# Patient Record
Sex: FEMALE | Race: WHITE | NOT HISPANIC OR LATINO | Employment: OTHER | URBAN - METROPOLITAN AREA
[De-identification: names, ages, dates, MRNs, and addresses within clinical notes are randomized per-mention and may not be internally consistent; named-entity substitution may affect disease eponyms.]

---

## 2017-03-01 ENCOUNTER — TRANSFERRED RECORDS (OUTPATIENT)
Dept: HEALTH INFORMATION MANAGEMENT | Facility: CLINIC | Age: 67
End: 2017-03-01

## 2017-03-20 NOTE — PROGRESS NOTES
"  SUBJECTIVE:                                                    Virginia Byers is a 66 year old female who presents to clinic today for the following health issues:  {Provider please address medication reconciliation discrepancies--rooming staff please delete if no med/rec issues}    {Superlists:599192}    {additional problems for provider to add:893310}    Problem list and histories reviewed & adjusted, as indicated.  Additional history: {NONE - AS DOCUMENTED:641192::\"as documented\"}    {HIST REVIEW/ LINKS 2:695759}    Reviewed and updated as needed this visit by clinical staff       Reviewed and updated as needed this visit by Provider         {PROVIDER CHARTING PREFERENCE:428981}    "

## 2017-03-29 ENCOUNTER — OFFICE VISIT (OUTPATIENT)
Dept: FAMILY MEDICINE | Facility: CLINIC | Age: 67
End: 2017-03-29
Payer: COMMERCIAL

## 2017-03-29 VITALS
SYSTOLIC BLOOD PRESSURE: 136 MMHG | WEIGHT: 155 LBS | DIASTOLIC BLOOD PRESSURE: 78 MMHG | HEIGHT: 63 IN | OXYGEN SATURATION: 96 % | BODY MASS INDEX: 27.46 KG/M2 | HEART RATE: 81 BPM | TEMPERATURE: 97 F

## 2017-03-29 DIAGNOSIS — Z11.59 NEED FOR HEPATITIS C SCREENING TEST: ICD-10-CM

## 2017-03-29 DIAGNOSIS — D05.82 OTHER SPECIFIED TYPE OF CARCINOMA IN SITU OF LEFT BREAST: ICD-10-CM

## 2017-03-29 DIAGNOSIS — Z00.00 ENCOUNTER FOR ROUTINE ADULT HEALTH EXAMINATION WITHOUT ABNORMAL FINDINGS: Primary | ICD-10-CM

## 2017-03-29 DIAGNOSIS — I10 BENIGN ESSENTIAL HYPERTENSION: ICD-10-CM

## 2017-03-29 DIAGNOSIS — E78.5 HYPERLIPIDEMIA LDL GOAL <160: ICD-10-CM

## 2017-03-29 DIAGNOSIS — Z12.11 SCREEN FOR COLON CANCER: ICD-10-CM

## 2017-03-29 LAB
ANION GAP SERPL CALCULATED.3IONS-SCNC: 6 MMOL/L (ref 3–14)
BUN SERPL-MCNC: 16 MG/DL (ref 7–30)
CALCIUM SERPL-MCNC: 9.6 MG/DL (ref 8.5–10.1)
CHLORIDE SERPL-SCNC: 103 MMOL/L (ref 94–109)
CHOLEST SERPL-MCNC: 238 MG/DL
CO2 SERPL-SCNC: 29 MMOL/L (ref 20–32)
CREAT SERPL-MCNC: 0.62 MG/DL (ref 0.52–1.04)
GFR SERPL CREATININE-BSD FRML MDRD: ABNORMAL ML/MIN/1.7M2
GLUCOSE SERPL-MCNC: 108 MG/DL (ref 70–99)
HCV AB SERPL QL IA: NORMAL
HDLC SERPL-MCNC: 75 MG/DL
LDLC SERPL CALC-MCNC: 125 MG/DL
NONHDLC SERPL-MCNC: 163 MG/DL
POTASSIUM SERPL-SCNC: 4.2 MMOL/L (ref 3.4–5.3)
SODIUM SERPL-SCNC: 138 MMOL/L (ref 133–144)
TRIGL SERPL-MCNC: 191 MG/DL

## 2017-03-29 PROCEDURE — 80061 LIPID PANEL: CPT | Performed by: FAMILY MEDICINE

## 2017-03-29 PROCEDURE — 99397 PER PM REEVAL EST PAT 65+ YR: CPT | Performed by: FAMILY MEDICINE

## 2017-03-29 PROCEDURE — 80048 BASIC METABOLIC PNL TOTAL CA: CPT | Performed by: FAMILY MEDICINE

## 2017-03-29 PROCEDURE — 86803 HEPATITIS C AB TEST: CPT | Performed by: FAMILY MEDICINE

## 2017-03-29 PROCEDURE — 36415 COLL VENOUS BLD VENIPUNCTURE: CPT | Performed by: FAMILY MEDICINE

## 2017-03-29 RX ORDER — LOSARTAN POTASSIUM 100 MG/1
100 TABLET ORAL DAILY
Qty: 90 TABLET | Refills: 4 | Status: SHIPPED | OUTPATIENT
Start: 2017-03-29 | End: 2018-04-04

## 2017-03-29 RX ORDER — ATORVASTATIN CALCIUM 20 MG/1
20 TABLET, FILM COATED ORAL DAILY
Qty: 90 TABLET | Refills: 4 | Status: SHIPPED | OUTPATIENT
Start: 2017-03-29 | End: 2018-04-04

## 2017-03-29 ASSESSMENT — PAIN SCALES - GENERAL: PAINLEVEL: NO PAIN (0)

## 2017-03-29 NOTE — NURSING NOTE
"Chief Complaint   Patient presents with     Physical       Initial /78  Pulse 81  Temp 97  F (36.1  C) (Oral)  Ht 5' 2.91\" (1.598 m)  Wt 155 lb (70.3 kg)  SpO2 96%  BMI 27.53 kg/m2 Estimated body mass index is 27.53 kg/(m^2) as calculated from the following:    Height as of this encounter: 5' 2.91\" (1.598 m).    Weight as of this encounter: 155 lb (70.3 kg).  Medication Reconciliation: complete    "

## 2017-03-29 NOTE — LETTER
[address] [place] [address]    March 30, 2017    Virginia Byers  [address]  [place] [address]          Dear [place],  Your lab tests are looking good, but your glucose and triglycerides are too high. This means you are eating too many carbohydrates and calories. Please try to follow a diet that is high in lean protein and vegetables and low in carbohydrates (such as potatoes, rice, breads, cereals, pasta, pizza, crackers, chips, desserts, juices, alcohol, soda pop, and candies) so you do not become diabetic.   Enclosed is a copy of your results.     Results for orders placed or performed in visit on 03/29/17   Hepatitis C Screen Reflex to HCV RNA Quant and Genotype   Result Value Ref Range    Hepatitis C Antibody  NR     Nonreactive   Assay performance characteristics have not been established for newborns,   infants, and children     Lipid panel reflex to direct LDL   Result Value Ref Range    Cholesterol 238 (H) <200 mg/dL    Triglycerides 191 (H) <150 mg/dL    HDL Cholesterol 75 >49 mg/dL    LDL Cholesterol Calculated 125 (H) <100 mg/dL    Non HDL Cholesterol 163 (H) <130 mg/dL   Basic metabolic panel   Result Value Ref Range    Sodium 138 133 - 144 mmol/L    Potassium 4.2 3.4 - 5.3 mmol/L    Chloride 103 94 - 109 mmol/L    Carbon Dioxide 29 20 - 32 mmol/L    Anion Gap 6 3 - 14 mmol/L    Glucose 108 (H) 70 - 99 mg/dL    Urea Nitrogen 16 7 - 30 mg/dL    Creatinine 0.62 0.52 - 1.04 mg/dL    GFR Estimate >90  Non  GFR Calc   >60 mL/min/1.7m2    GFR Estimate If Black >90   GFR Calc   >60 mL/min/1.7m2    Calcium 9.6 8.5 - 10.1 mg/dL       If you have any questions or concerns, please call myself or my nurse at [phone].      Sincerely,        Mariaelena Guevara MD/pb

## 2017-03-29 NOTE — PROGRESS NOTES
SUBJECTIVE:                                                            Virginia Byers is a 66 year old female who presents for Preventive Visit.  Are you in the first 12 months of your Medicare Part B coverage?  No    Healthy Habits:    Do you get at least three servings of calcium containing foods daily (dairy, green leafy vegetables, etc.)? yes    Amount of exercise or daily activities, outside of work: 2 day(s) per week    Problems taking medications regularly No    Medication side effects: No    Have you had an eye exam in the past two years? yes    Do you see a dentist twice per year? yes    Do you have sleep apnea, excessive snoring or daytime drowsiness?no    COGNITIVE SCREEN  1) Repeat 3 items (Banana, Sunrise, Chair)    2) Clock draw: NORMAL  3) 3 item recall: Recalls 3 objects  Results: 3 items recalled: COGNITIVE IMPAIRMENT LESS LIKELY    Mini-CogTM Copyright S Rahel. Licensed by the author for use in [facility]; reprinted with permission (vinh@[facility]). All rights reserved.                 Reviewed and updated as needed this visit by clinical staff  Tobacco  Allergies  Meds         Reviewed and updated as needed this visit by Provider        Social History   Substance Use Topics     Smoking status: Former Smoker     Types: Cigarettes     Quit date: 3/15/1999     Smokeless tobacco: Never Used     Alcohol use Yes      Comment: occasional       The patient does not drink >3 drinks per day nor >7 drinks per week.    Today's PHQ-2 Score:   PHQ-2 ( 1999 Pfizer) 3/29/2017 8/17/2016   Q1: Little interest or pleasure in doing things 0 0   Q2: Feeling down, depressed or hopeless 0 0   PHQ-2 Score 0 0       Do you feel safe in your environment - Yes    Do you have a Health Care Directive?: Yes: Advance Directive has been received and scanned.    Current providers sharing in care for this patient include:   Patient Care Team:  Mariaelena Guevara MD as PCP - General      Hearing impairment:  No    Ability to successfully perform activities of daily living: Yes, no assistance needed     Fall risk:  Fallen 2 or more times in the past year?: No  Any fall with injury in the past year?: No    Home safety:  throw rugs in the hallway and lack of grab bars in the bathroom    The following health maintenance items are reviewed in Epic and correct as of today:  Health Maintenance   Topic Date Due     HEPATITIS C SCREENING  10/13/1968     MICROALBUMIN Q1 YEAR( NO INBASKET)  03/19/2013     COLON CANCER SCREEN (SYSTEM ASSIGNED)  05/22/2016     FALL RISK ASSESSMENT  01/20/2017     ADVANCE DIRECTIVE PLANNING Q5 YRS (NO INBASKET)  01/23/2017     PAP Q3 YR  03/24/2017     MAMMO SCREEN Q2 YR (SYSTEM ASSIGNED)  05/15/2017     INFLUENZA VACCINE (SYSTEM ASSIGNED)  09/01/2017     LIPID SCREEN Q5 YR FEMALE (SYSTEM ASSIGNED)  08/17/2021     TETANUS IMMUNIZATION (SYSTEM ASSIGNED)  03/28/2026     DEXA SCAN SCREENING (SYSTEM ASSIGNED)  Completed     PNEUMOCOCCAL  Completed       Immunization History   Administered Date(s) Administered     Hepatitis A Vac Ped/Adol-2 Dose 03/07/2008, 03/13/2009     Influenza (IIV3) 10/20/2010, 10/15/2011, 09/28/2013     Pneumococcal (PCV 13) 03/25/2015     Pneumococcal 23 valent 08/17/2016     TD (ADULT, 7+) 12/30/1997     TDAP Vaccine (Adacel) 03/03/2006, 03/28/2016     Zoster vaccine, live 03/15/2010          ROS:  This 66 year old female is here today for annual female exam. She is single and still works full time night shift for [employer]. She hopes to retire in the next year. 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 She has had left breast cancer. Mammogram at [facility] 3/2017 was normal. All other review of systems are negative  Personal, family, and social history reviewed with patient and revised.    C: NEGATIVE for fever, chills, change in weight  I: NEGATIVE for worrisome rashes, moles or lesions, but wonders if I can remove some skin tags sometime  E: NEGATIVE for vision changes or irritation  E/M:  NEGATIVE for ear, mouth and throat problems  R: NEGATIVE for significant cough or SOB  B: NEGATIVE for masses, tenderness or discharge  CV: NEGATIVE for chest pain, palpitations or peripheral edema  GI: NEGATIVE for nausea, abdominal pain, heartburn, or change in bowel habits  : NEGATIVE for frequency, dysuria, or hematuria  M: NEGATIVE for significant arthralgias or myalgia  N: NEGATIVE for weakness, dizziness or paresthesias  E: NEGATIVE for temperature intolerance, skin/hair changes  H: NEGATIVE for bleeding problems  P: NEGATIVE for changes in mood or affect    Problem list, Medication list, Allergies, and Medical/Social/Surgical histories reviewed in [facility] and updated as appropriate.  Labs reviewed in EPIC  BP Readings from Last 3 Encounters:   03/29/17 136/78   08/17/16 124/60   03/28/16 128/70    Wt Readings from Last 3 Encounters:   03/29/17 155 lb (70.3 kg)   08/17/16 144 lb (65.3 kg)   03/28/16 146 lb (66.2 kg)                  Patient Active Problem List   Diagnosis     Osteoporosis     Atopic rhinitis     Hyperlipidemia LDL goal <160     S/P mastectomy     Breast cancer (H)     Advanced directives, counseling/discussion     Osteoarthritis     Family history of diabetes mellitus     Glaucoma     Personal history of malignant neoplasm of breast     Past Surgical History:   Procedure Laterality Date     C MASTECTOMY,SIMPLE  3/2000    left     COLONOSCOPY  2006    Q 10 years     SURGICAL HISTORY OF -  Left 8/2015    tissue expander placed in left breast area     SURGICAL HISTORY OF -  Left 2/2016    left breast implant and right mammoplasty       Social History   Substance Use Topics     Smoking status: Former Smoker     Types: Cigarettes     Quit date: 3/15/1999     Smokeless tobacco: Never Used     Alcohol use Yes      Comment: occasional     Family History   Problem Relation Age of Onset     CANCER Mother      bone cancer     HEART DISEASE Father      DIABETES Paternal Grandmother      Arthritis Sister   "        Current Outpatient Prescriptions   Medication Sig Dispense Refill     atorvastatin (LIPITOR) 20 MG tablet Take 1 tablet (20 mg) by mouth daily 90 tablet 4     losartan (COZAAR) 100 MG tablet Take 1 tablet (100 mg) by mouth daily 90 tablet 4     ASPIRIN PO Take 324 mg by mouth daily       latanoprost (XALATAN) 0.005 % ophthalmic solution 1 drop daily.       [DISCONTINUED] atorvastatin (LIPITOR) 20 MG tablet Take 1 tablet (20 mg) by mouth daily 90 tablet 4     [DISCONTINUED] losartan (COZAAR) 100 MG tablet Take 1 tablet (100 mg) by mouth daily 90 tablet 4     OBJECTIVE:                                                            /78  Pulse 81  Temp 97  F (36.1  C) (Oral)  Ht 5' 2.91\" (1.598 m)  Wt 155 lb (70.3 kg)  SpO2 96%  BMI 27.53 kg/m2 Estimated body mass index is 27.53 kg/(m^2) as calculated from the following:    Height as of this encounter: 5' 2.91\" (1.598 m).    Weight as of this encounter: 155 lb (70.3 kg).  EXAM:   GENERAL APPEARANCE: healthy, alert and no distress  EYES: Eyes grossly normal to inspection, PERRL and conjunctivae and sclerae normal  HENT: ear canals and TM's normal, nose and mouth without ulcers or lesions, oropharynx clear and oral mucous membranes moist  NECK: no adenopathy, no asymmetry, masses, or scars and thyroid normal to palpation  RESP: lungs clear to auscultation - no rales, rhonchi or wheezes  BREAST: normal without masses, tenderness or nipple discharge and no palpable axillary masses or adenopathy  CV: regular rate and rhythm, normal S1 S2, no S3 or S4, no murmur, click or rub, no peripheral edema and peripheral pulses strong  ABDOMEN: soft, nontender, no hepatosplenomegaly, no masses and bowel sounds normal  MS: no musculoskeletal defects are noted and gait is age appropriate without ataxia  SKIN: no suspicious lesions or rashes, but has skin tags in her axillary area that rub on her clothes. I can remove them in the future.   NEURO: Normal strength and tone, " "sensory exam grossly normal, mentation intact and speech normal  PSYCH: mentation appears normal and affect normal/bright    ASSESSMENT / PLAN:                                                            1. Encounter for routine adult health examination without abnormal findings  Healthy lady     2. Screen for colon cancer  due  - GASTROENTEROLOGY ADULT REF PROCEDURE ONLY    3. Hyperlipidemia LDL goal <160  Good control   - atorvastatin (LIPITOR) 20 MG tablet; Take 1 tablet (20 mg) by mouth daily  Dispense: 90 tablet; Refill: 4  - Lipid panel reflex to direct LDL    4. Benign essential hypertension  Good control   - losartan (COZAAR) 100 MG tablet; Take 1 tablet (100 mg) by mouth daily  Dispense: 90 tablet; Refill: 4  - Basic metabolic panel    5. 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 Need for hepatitis C screening test  due  - Hepatitis C Screen Reflex to HCV RNA Quant and Genotype    End of Life Planning:  Patient currently has an advanced directive: No.  I have verified the patient's ablity to prepare an advanced directive/make health care decisions.  Literature was provided to assist patient in preparing an advanced directive.    COUNSELING:  Reviewed preventive health counseling, as reflected in patient instructions       Regular exercise       Healthy diet/nutrition        Estimated body mass index is 27.53 kg/(m^2) as calculated from the following:    Height as of this encounter: 5' 2.91\" (1.598 m).    Weight as of this encounter: 155 lb (70.3 kg).     reports that she quit smoking about 18 years ago. Her smoking use included Cigarettes. She has never used smokeless tobacco.      Appropriate preventive services were discussed with this patient, including applicable screening as appropriate for cardiovascular disease, diabetes, osteopenia/osteoporosis, and glaucoma.  As appropriate for age/gender, discussed screening for colorectal cancer, prostate cancer, breast cancer, " and cervical cancer. Checklist reviewing preventive services available has been given to the patient.    Reviewed patients plan of care and provided an AVS. The Basic Care Plan (routine screening as documented in Health Maintenance) for [place] meets the Care Plan requirement. This Care Plan has been established and reviewed with the Patient.    Counseling Resources:  ATP IV Guidelines  Pooled Cohorts Equation Calculator  Breast Cancer Risk Calculator  FRAX Risk Assessment  ICSI Preventive Guidelines  Dietary Guidelines for Americans, 2010  USDA's MyPlate  ASA Prophylaxis  Lung CA Screening    WILMAR MELTON MD  [facility]

## 2017-03-29 NOTE — PATIENT INSTRUCTIONS
Preventive Health Recommendations    Female Ages 65 +    Yearly exam:     See your health care provider every year in order to  o Review health changes.   o Discuss preventive care.    o Review your medicines if your doctor has prescribed any.      You no longer need a yearly Pap test unless you've had an abnormal Pap test in the past 10 years. If you have vaginal symptoms, such as bleeding or discharge, be sure to talk with your provider about a Pap test.      Every 1 to 2 years, have a mammogram.  If you are over 69, talk with your health care provider about whether or not you want to continue having screening mammograms.      Every 10 years, have a colonoscopy. Or, have a yearly FIT test (stool test). These exams will check for colon cancer.       Have a cholesterol test every 5 years, or more often if your doctor advises it.       Have a diabetes test (fasting glucose) every three years. If you are at risk for diabetes, you should have this test more often.       At age 65, have a bone density scan (DEXA) to check for osteoporosis (brittle bone disease).    Shots:    Get a flu shot each year.    Get a tetanus shot every 10 years.    Talk to your doctor about your pneumonia vaccines. There are now two you should receive - Pneumovax (PPSV 23) and Prevnar (PCV 13).    Talk to your doctor about the shingles vaccine.    Talk to your doctor about the hepatitis B vaccine.    Nutrition:     Eat at least 5 servings of fruits and vegetables each day.      Eat whole-grain bread, whole-wheat pasta and brown rice instead of white grains and rice.      Talk to your provider about Calcium and Vitamin D.     Lifestyle    Exercise at least 150 minutes a week (30 minutes a day, 5 days a week). This will help you control your weight and prevent disease.      Limit alcohol to one drink per day.      No smoking.       Wear sunscreen to prevent skin cancer.       See your dentist twice a year for an exam and cleaning.      See your  eye doctor every 1 to 2 years to screen for conditions such as glaucoma, macular degeneration and cataracts.    [facility] (formerly [facility])    If you have any questions regarding to your visit please contact your care team:       Team Purple:   Clinic Hours Telephone Number   [address] Dr[address]   7am-7pm  Monday - Thursday   7am-5pm  Fridays  [phone]  (Appointment scheduling available 24/7)    Questions about your Visit?   Team Line:  [phone]   Urgent Care - [place] - 11am-9pm Monday-Friday Saturday-Sunday- 9am-5pm   [place] - 5pm-9pm Monday-Friday Saturday-Sunday- 9am-5pm  [phone] - [facility]  [phone] - [place]       What options do I have for visits at the clinic other than the traditional office visit?  To expand how we care for you, many of our providers are utilizing electronic visits (e-visits) and telephone visits, when medically appropriate, for interactions with their patients rather than a visit in the clinic.   We also offer nurse visits for many medical concerns. Just like any other service, we will bill your insurance company for this type of visit based on time spent on the phone with your provider. Not all insurance companies cover these visits. Please check with your medical insurance if this type of visit is covered. You will be responsible for any charges that are not paid by your insurance.      E-visits via Spodly:  generally incur a $35.00 fee.  Telephone visits:  Time spent on the phone: *charged based on time that is spent on the phone in increments of 10 minutes. Estimated cost:   5-10 mins $30.00   11-20 mins. $59.00   21-30 mins. $85.00     Use Lobt (secure email communication and access to your chart) to send your primary care provider a message or make an appointment. Ask someone on your Team how to sign up for Spodly.  For a Price Quote for your services, please call our  Consumer Raymond Line at [phone].  As always, Thank you for trusting us with your health care needs!

## 2017-03-29 NOTE — MR AVS SNAPSHOT
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   o Discuss preventive care.    o Review your medicines if your doctor has prescribed any.      You no longer need a yearly Pap test unless you've had an abnormal Pap test in the past 10 years. If you have vaginal symptoms, such as bleeding or discharge, be sure to talk with your provider about a Pap test.      Every 1 to 2 years, have a mammogram.  If you are over 69, talk with your health care provider about whether or not you want to continue having screening mammograms.      Every 10 years, have a colonoscopy. Or, have a yearly FIT test (stool test). These exams will check for colon cancer.       Have a cholesterol test every 5 years, or more often if your doctor advises it.       Have a diabetes test (fasting glucose) every three years. If you are at risk for diabetes, you should have this test more often.       At age 65, have a bone density scan (DEXA) to check for osteoporosis (brittle bone disease).    Shots:    Get a flu shot each year.    Get a tetanus shot every 10 years.    Talk to your doctor about your pneumonia vaccines. There are now two you should receive - Pneumovax (PPSV 23) and Prevnar (PCV 13).    Talk to your doctor about the  shingles vaccine.    Talk to your doctor about the hepatitis B vaccine.    Nutrition:     Eat at least 5 servings of fruits and vegetables each day.      Eat whole-grain bread, whole-wheat pasta and brown rice instead of white grains and rice.      Talk to your provider about Calcium and Vitamin D.     Lifestyle    Exercise at least 150 minutes a week (30 minutes a day, 5 days a week). This will help you control your weight and prevent disease.      Limit alcohol to one drink per day.      No smoking.       Wear sunscreen to prevent skin cancer.       See your dentist twice a year for an exam and cleaning.      See your eye doctor every 1 to 2 years to screen for conditions such as glaucoma, macular degeneration and cataracts.    [facility]    If you have any questions regarding to your visit please contact your care team:       Team Purple:   Clinic Hours Telephone Number   [address]ID [address] Dr.   7am-7pm  Monday - Thursday   7am-5pm  Fridays  [phone]  (Appointment scheduling available 24/7)    Questions about your Visit?   Team Line:  [phone]   Urgent Care - [place] and [facility] - 11am-9pm Monday-Friday Saturday-Sunday- 9am-5pm   [place] - 5pm-9pm Monday-Friday Saturday-Sunday- 9am-5pm  [phone] - [place]   [phone] - [place]       What options do I have for visits at the clinic other than the traditional office visit?  To expand how we care for you, many of our providers are utilizing electronic visits (e-visits) and telephone visits, when medically appropriate, for interactions with their patients rather than a visit in the clinic.   We also offer nurse visits for many medical concerns. Just like any other service, we will bill your insurance company for this type of visit based on time spent on the phone with your provider. Not all insurance companies cover these visits. Please check with your medical  insurance if this type of visit is covered. You will be responsible for any charges that are not paid by your insurance.      E-visits via Quacken[employer]:  generally incur a $35.00 fee.  Telephone visits:  Time spent on the phone: *charged based on time that is spent on the phone in increments of 10 minutes. Estimated cost:   5-10 mins $30.00   11-20 mins. $59.00   21-30 mins. $85.00     Use Quackenworth (secure email communication and access to your chart) to send your primary care provider a message or make an appointment. Ask someone on your Team how to sign up for [employer]en[employer].  For a Price Quote for your services, please call our [employer] Line at [phone].  As always, Thank you for trusting us with your health care needs!            Follow-ups after your visit        Additional Services     GASTROENTEROLOGY ADULT REF PROCEDURE ONLY       Last Lab Result: Creatinine (mg/dL)       Date                     Value                 03/28/2016               0.65             ----------  Body mass index is 27.53 kg/(m^2).      Patient will be contacted to schedule procedure.     Please be aware that coverage of these services is subject to the terms and limitations of your health insurance plan.  Call member services at your health plan with any benefit or coverage questions.  Any procedures must be performed at a [place] facility OR coordinated by your clinic's referral office.    Please bring the following with you to your appointment:    (1) Any X-Rays, CTs or MRIs which have been performed.  Contact the facility where they were done to arrange for  prior to your scheduled appointment.    (2) List of current medications   (3) This referral request   (4) Any documents/labs given to you for this referral                  Who to contact     If you have questions or need follow up information about today's clinic visit or your schedule please contact [facility] directly at [phone].  Normal or  "non-critical lab and imaging results will be communicated to you by MyChart, letter or phone within 4 business days after the clinic has received the results. If you do not hear from us within 7 days, please contact the clinic through Zbirdt or phone. If you have a critical or abnormal lab result, we will notify you by phone as soon as possible.  Submit refill requests through [id number] films or call your pharmacy and they will forward the refill request to us. Please allow 3 business days for your refill to be completed.          Additional Information About Your Visit        [employer] Information     3225 films lets you send messages to your doctor, view your test results, renew your prescriptions, schedule appointments and more. To sign up, go to [email or web address] . Click on \"Log in\" on the left side of the screen, which will take you to the Welcome page. Then click on \"Sign up Now\" on the right side of the page.     You will be asked to enter the access code listed below, as well as some personal information. Please follow the directions to create your username and password.     Your access code is: [email or web address]  Expires: 2017  8:35 AM     Your access code will  in 90 days. If you need help or a new code, please call your [place] clinic or [phone].        Care EveryWhere ID     This is your Care EveryWhere ID. This could be used by other organizations to access your [place] medical records  [id number]        Your Vitals Were     Pulse Temperature Height Pulse Oximetry BMI (Body Mass Index)       81 97  F (36.1  C) (Oral) 5' 2.91\" (1.598 m) 96% 27.53 kg/m2        Blood Pressure from Last 3 Encounters:   17 136/78   16 124/60   16 128/70    Weight from Last 3 Encounters:   17 155 lb (70.3 kg)   16 144 lb (65.3 kg)   16 146 lb (66.2 kg)              We Performed the Following     Basic metabolic panel     GASTROENTEROLOGY ADULT REF PROCEDURE ONLY     Hepatitis C " Screen Reflex to HCV RNA Quant and Genotype     Lipid panel reflex to direct LDL          Where to get your medicines      These medications were sent to [place] [address] [facility] [place] - [address]  [address] [place] [address]     Phone:  [phone]     atorvastatin 20 MG tablet    losartan 100 MG tablet          Primary Care Provider Office Phone # Fax #    Mariaelena Guevara [phone]       [address] [address]        Thank you!     Thank you for choosing [facility]  for your care. Our goal is always to provide you with excellent care. Hearing back from our patients is one way we can continue to improve our services. Please take a few minutes to complete the written survey that you may receive in the mail after your visit with us. Thank you!             Your Updated Medication List - Protect others around you: Learn how to safely use, store and throw away your medicines at www.disposemymeds.org.          This list is accurate as of: 3/29/17  8:35 AM.  Always use your most recent med list.                   Brand Name Dispense Instructions for use    ASPIRIN PO      Take 324 mg by mouth daily       atorvastatin 20 MG tablet    LIPITOR    90 tablet    Take 1 tablet (20 mg) by mouth daily       latanoprost 0.005 % ophthalmic solution    XALATAN     1 drop daily.       losartan 100 MG tablet    COZAAR    90 tablet    Take 1 tablet (100 mg) by mouth daily

## 2017-04-04 NOTE — PROGRESS NOTES
SUBJECTIVE:                                                    Virginia Byers is a 66 year old female who presents to clinic today for the following health issues:      Skin tag removal         Problem list and histories reviewed & adjusted, as indicated.  Additional history: as documented    Patient Active Problem List   Diagnosis     Osteoporosis     Atopic rhinitis     Hyperlipidemia LDL goal <160     S/P mastectomy     Breast cancer (H)     Advanced directives, counseling/discussion     Osteoarthritis     Family history of diabetes mellitus     Glaucoma     Personal history of malignant neoplasm of breast     Past Surgical History:   Procedure Laterality Date     C MASTECTOMY,SIMPLE  3/2000    left     COLONOSCOPY  2006    Q 10 years     SURGICAL HISTORY OF -  Left 8/2015    tissue expander placed in left breast area     SURGICAL HISTORY OF -  Left 2/2016    left breast implant and right mammoplasty       Social History   Substance Use Topics     Smoking status: Former Smoker     Types: Cigarettes     Quit date: 3/15/1999     Smokeless tobacco: Never Used     Alcohol use Yes      Comment: occasional     Family History   Problem Relation Age of Onset     CANCER Mother      bone cancer     HEART DISEASE Father      DIABETES Paternal Grandmother      Arthritis Sister          Current Outpatient Prescriptions   Medication Sig Dispense Refill     atorvastatin (LIPITOR) 20 MG tablet Take 1 tablet (20 mg) by mouth daily 90 tablet 4     losartan (COZAAR) 100 MG tablet Take 1 tablet (100 mg) by mouth daily 90 tablet 4     ASPIRIN PO Take 324 mg by mouth daily       latanoprost (XALATAN) 0.005 % ophthalmic solution 1 drop daily.       BP Readings from Last 3 Encounters:   04/10/17 130/74   03/29/17 136/78   08/17/16 124/60    Wt Readings from Last 3 Encounters:   04/10/17 158 lb (71.7 kg)   03/29/17 155 lb (70.3 kg)   08/17/16 144 lb (65.3 kg)                  Labs reviewed in EPIC    Reviewed and updated as needed  "this visit by clinical staff       Reviewed and updated as needed this visit by Provider         ROS:  This 66 year old female is here today because she has 4 skin tags that are rubbing on her clothing and jewelry. They seem to be growing. All other review of systems are negative  Personal, family, and social history reviewed with patient and revised.         OBJECTIVE:                                                    /74 (BP Location: Right arm, Patient Position: Chair, Cuff Size: Adult Regular)  Pulse 84  Temp 98.7  F (37.1  C)  Ht 5' 2.9\" (1.598 m)  Wt 158 lb (71.7 kg)  SpO2 99%  BMI 28.08 kg/m2  Body mass index is 28.08 kg/(m^2).   4 skins tags: 1 on neck, 1 in each axillary area, and 1 by her left breast  All are small and benign appears. Each was prepped with betadine and anesthetized with 1% xylocaine with epinephrine.  Each was easily removed with scissors and forceps. Bases were cauterized. Dressed with Bacitracin and band aids.   Patient tolerated it well  Discussed her recent elevated glucose and how patients with prediabetes can start to develop skin tags. She is motivated to eat less carbohydrates     Diagnostic Test Results:  none      ASSESSMENT/PLAN:                                                             1. Skin tag  As above   - REMOVAL OF SKIN TAGS, FIRST 15    Return to clinic as needed     WILMAR MELTON MD  [facility]  "

## 2017-04-10 ENCOUNTER — OFFICE VISIT (OUTPATIENT)
Dept: FAMILY MEDICINE | Facility: CLINIC | Age: 67
End: 2017-04-10
Payer: COMMERCIAL

## 2017-04-10 VITALS
HEART RATE: 84 BPM | TEMPERATURE: 98.7 F | HEIGHT: 63 IN | SYSTOLIC BLOOD PRESSURE: 130 MMHG | DIASTOLIC BLOOD PRESSURE: 74 MMHG | BODY MASS INDEX: 28 KG/M2 | WEIGHT: 158 LBS | OXYGEN SATURATION: 99 %

## 2017-04-10 DIAGNOSIS — L91.8 SKIN TAG: Primary | ICD-10-CM

## 2017-04-10 PROCEDURE — 11200 RMVL SKIN TAGS UP TO&INC 15: CPT | Performed by: FAMILY MEDICINE

## 2017-04-10 NOTE — NURSING NOTE
"Chief Complaint   Patient presents with     Skin Tags     4 skin tags       Initial /74 (BP Location: Right arm, Patient Position: Chair, Cuff Size: Adult Regular)  Pulse 84  Temp 98.7  F (37.1  C)  Ht 5' 2.9\" (1.598 m)  Wt 158 lb (71.7 kg)  SpO2 99%  BMI 28.08 kg/m2 Estimated body mass index is 28.08 kg/(m^2) as calculated from the following:    Height as of this encounter: 5' 2.9\" (1.598 m).    Weight as of this encounter: 158 lb (71.7 kg).  Medication Reconciliation: complete   Ailyn Mcdonald MA      "

## 2017-04-10 NOTE — PATIENT INSTRUCTIONS
[facility]    If you have any questions regarding to your visit please contact your care team:       Team Purple:   Clinic Hours Telephone Number   [address] Dr.   7am-7pm  Monday - Thursday   7am-5pm  Fridays  [phone]  (Appointment scheduling available 24/7)    Questions about your Visit?   Team Line:  [phone]   Urgent Care - [place] and [facility] - 11am-9pm Monday-Friday Saturday-Sunday- 9am-5pm   [place] - 5pm-9pm Monday-Friday Saturday-Sunday- 9am-5pm  [phone] - [facility]  [phone] - [place]       What options do I have for visits at the clinic other than the traditional office visit?  To expand how we care for you, many of our providers are utilizing electronic visits (e-visits) and telephone visits, when medically appropriate, for interactions with their patients rather than a visit in the clinic.   We also offer nurse visits for many medical concerns. Just like any other service, we will bill your insurance company for this type of visit based on time spent on the phone with your provider. Not all insurance companies cover these visits. Please check with your medical insurance if this type of visit is covered. You will be responsible for any charges that are not paid by your insurance.      E-visits via [employer]:  generally incur a $35.00 fee.  Telephone visits:  Time spent on the phone: *charged based on time that is spent on the phone in increments of 10 minutes. Estimated cost:   5-10 mins $30.00   11-20 mins. $59.00   21-30 mins. $85.00     Use Showpitcht (secure email communication and access to your chart) to send your primary care provider a message or make an appointment. Ask someone on your Team how to sign up for [employer].  For a Price Quote for your services, please call our Consumer Price Line at [phone].  As always, Thank you for trusting us with your health care needs!

## 2017-04-10 NOTE — MR AVS SNAPSHOT
After Visit Summary   4/10/2017    Virginia Byers    MRN: [id number]           Patient Information     Date Of Birth          1950        Visit Information        Provider Department      4/10/2017 9:15 AM Mariaelena Guevara MD [facility]        Today's Diagnoses     Skin tag    -  1      Care Instructions    [facility]    If you have any questions regarding to your visit please contact your care team:       Team Purple:   Clinic Hours Telephone Number   [address]ID [address] Dr[address] Dr.   7am-7pm  Monday - Thursday   7am-5pm  Fridays  [phone]  (Appointment scheduling available 24/7)    Questions about your Visit?   Team Line:  [phone]   Urgent Care - [place] [facility] - 11am-9pm Monday-Friday Saturday-Sunday- 9am-5pm   [place] - 5pm-9pm Monday-Friday Saturday-Sunday- 9am-5pm  [phone] - [place]   [phone] - [place]       What options do I have for visits at the clinic other than the traditional office visit?  To expand how we care for you, many of our providers are utilizing electronic visits (e-visits) and telephone visits, when medically appropriate, for interactions with their patients rather than a visit in the clinic.   We also offer nurse visits for many medical concerns. Just like any other service, we will bill your insurance company for this type of visit based on time spent on the phone with your provider. Not all insurance companies cover these visits. Please check with your medical insurance if this type of visit is covered. You will be responsible for any charges that are not paid by your insurance.      E-visits via [employer]:  generally incur a $35.00 fee.  Telephone visits:  Time spent on the phone: *charged based on time that is spent on the phone in increments of 10 minutes. Estimated cost:   5-10 mins $30.00   11-20 mins. $59.00   21-30 mins. $85.00     Use  "MyChart (secure email communication and access to your chart) to send your primary care provider a message or make an appointment. Ask someone on your Team how to sign up for Spocklyhart.  For a Price Quote for your services, please call our Consumer Price Line at [phone].  As always, Thank you for trusting us with your health care needs!            Follow-ups after your visit        Who to contact     If you have questions or need follow up information about today's clinic visit or your schedule please contact [facility] directly at [phone].  Normal or non-critical lab and imaging results will be communicated to you by MyChart, letter or phone within 4 business days after the clinic has received the results. If you do not hear from us within 7 days, please contact the clinic through Spocklyhart or phone. If you have a critical or abnormal lab result, we will notify you by phone as soon as possible.  Submit refill requests through [employer] or call your pharmacy and they will forward the refill request to us. Please allow 3 business days for your refill to be completed.          Additional Information About Your Visit        MyChart Information     Spocklyhart lets you send messages to your doctor, view your test results, renew your prescriptions, schedule appointments and more. To sign up, go to [email or web address] . Click on \"Log in\" on the left side of the screen, which will take you to the Welcome page. Then click on \"Sign up Now\" on the right side of the page.     You will be asked to enter the access code listed below, as well as some personal information. Please follow the directions to create your username and password.     Your access code is: [email or web address]  Expires: 2017  8:35 AM     Your access code will  in 90 days. If you need help or a new code, please call your [place] clinic or [phone].        Care EveryWhere ID     This is your Care EveryWhere ID. This could be used by " "other organizations to access your [place] medical records  [id number]        Your Vitals Were     Pulse Temperature Height Pulse Oximetry BMI (Body Mass Index)       84 98.7  F (37.1  C) 5' 2.9\" (1.598 m) 99% 28.08 kg/m2        Blood Pressure from Last 3 Encounters:   04/10/17 130/74   03/29/17 136/78   08/17/16 124/60    Weight from Last 3 Encounters:   04/10/17 158 lb (71.7 kg)   03/29/17 155 lb (70.3 kg)   08/17/16 144 lb (65.3 kg)              We Performed the Following     REMOVAL OF SKIN TAGS, FIRST 15        Primary Care Provider Office Phone # Fax #    Mariaelena Guevara [phone]       [address] [address]        Thank you!     Thank you for choosing [facility]  for your care. Our goal is always to provide you with excellent care. Hearing back from our patients is one way we can continue to improve our services. Please take a few minutes to complete the written survey that you may receive in the mail after your visit with us. Thank you!             Your Updated Medication List - Protect others around you: Learn how to safely use, store and throw away your medicines at www.disposemymeds.org.          This list is accurate as of: 4/10/17  9:50 AM.  Always use your most recent med list.                   Brand Name Dispense Instructions for use    ASPIRIN PO      Take 324 mg by mouth daily       atorvastatin 20 MG tablet    LIPITOR    90 tablet    Take 1 tablet (20 mg) by mouth daily       latanoprost 0.005 % ophthalmic solution    XALATAN     1 drop daily.       losartan 100 MG tablet    COZAAR    90 tablet    Take 1 tablet (100 mg) by mouth daily         "

## 2017-04-20 ENCOUNTER — TRANSFERRED RECORDS (OUTPATIENT)
Dept: HEALTH INFORMATION MANAGEMENT | Facility: CLINIC | Age: 67
End: 2017-04-20

## 2017-09-28 ENCOUNTER — TRANSFERRED RECORDS (OUTPATIENT)
Dept: HEALTH INFORMATION MANAGEMENT | Facility: CLINIC | Age: 67
End: 2017-09-28

## 2017-10-17 DIAGNOSIS — R05.9 COUGH: ICD-10-CM

## 2017-10-18 RX ORDER — ALBUTEROL SULFATE 90 UG/1
AEROSOL, METERED RESPIRATORY (INHALATION)
Qty: 18 INHALER | Refills: 3 | Status: SHIPPED | OUTPATIENT
Start: 2017-10-18 | End: 2018-04-04

## 2017-10-18 NOTE — TELEPHONE ENCOUNTER
albuterol (PROAIR HFA, PROVENTIL HFA, VENTOLIN HFA) 108 (90 BASE) MCG/ACT inhaler       Last Written Prescription Date: 08/17/2016   Last Fill Quantity: 1,   # refills: 1  Future Office visit:        Routing refill request to provider for review/approval because:  Drug not active on patient's medication list    Katharine Coe MA

## 2017-10-24 ENCOUNTER — TELEPHONE (OUTPATIENT)
Dept: FAMILY MEDICINE | Facility: CLINIC | Age: 67
End: 2017-10-24

## 2017-10-24 NOTE — LETTER
October 24, 2017          Virginia Byers,  [address]  [place] [address]        Dear Virginia Byers      Monitoring and managing your preventative and chronic health conditions are very important to us. Our records indicate that you have not scheduled for Colonoscopy or FIT test which was recommended by Dr. Guevara.      If you have received your health care elsewhere, please call the clinic so the information can be documented in your chart.    Please call [phone] or message us through your [employer] account to schedule an appointment or provide information for your chart.     Feel free to contact us if you have any questions or concerns!    I look forward to seeing you and working with you on your health care needs.     Sincerely,         Mariaelena Guevara MD  / JANEL

## 2017-10-24 NOTE — TELEPHONE ENCOUNTER
Panel Management Review      Patient has the following on her problem list: None      Composite cancer screening  Chart review shows that this patient is due/due soon for the following Colonoscopy and Fecal Colorectal (FIT)  Summary:    Patient is due/failing the following:   COLONOSCOPY and FIT    Action needed:   Patient needs office visit for Colon Cancer Screening.    Type of outreach:    Sent letter.    Questions for provider review:    None                                                                                                                                    Samina Barton MA        Chart routed to none .

## 2018-01-03 ENCOUNTER — TRANSFERRED RECORDS (OUTPATIENT)
Dept: HEALTH INFORMATION MANAGEMENT | Facility: CLINIC | Age: 68
End: 2018-01-03

## 2018-03-09 ENCOUNTER — TRANSFERRED RECORDS (OUTPATIENT)
Dept: HEALTH INFORMATION MANAGEMENT | Facility: CLINIC | Age: 68
End: 2018-03-09

## 2018-03-29 NOTE — PATIENT INSTRUCTIONS
Preventive Health Recommendations  Female Ages 65 +    Yearly exam:     See your health care provider every year in order to  o Review health changes.   o Discuss preventive care.    o Review your medicines if your doctor has prescribed any.      You no longer need a yearly Pap test unless you've had an abnormal Pap test in the past 10 years. If you have vaginal symptoms, such as bleeding or discharge, be sure to talk with your provider about a Pap test.      Every 1 to 2 years, have a mammogram.  If you are over 69, talk with your health care provider about whether or not you want to continue having screening mammograms.      Every 10 years, have a colonoscopy. Or, have a yearly FIT test (stool test). These exams will check for colon cancer.       Have a cholesterol test every 5 years, or more often if your doctor advises it.       Have a diabetes test (fasting glucose) every three years. If you are at risk for diabetes, you should have this test more often.       At age 65, have a bone density scan (DEXA) to check for osteoporosis (brittle bone disease).    Shots:    Get a flu shot each year.    Get a tetanus shot every 10 years.    Talk to your doctor about your pneumonia vaccines. There are now two you should receive - Pneumovax (PPSV 23) and Prevnar (PCV 13).    Talk to your doctor about the shingles vaccine.    Talk to your doctor about the hepatitis B vaccine.    Nutrition:     Eat at least 5 servings of fruits and vegetables each day.      Eat whole-grain bread, whole-wheat pasta and brown rice instead of white grains and rice.      Talk to your provider about Calcium and Vitamin D.     Lifestyle    Exercise at least 150 minutes a week (30 minutes a day, 5 days a week). This will help you control your weight and prevent disease.      Limit alcohol to one drink per day.      No smoking.       Wear sunscreen to prevent skin cancer.       See your dentist twice a year for an exam and cleaning.      See your  eye doctor every 1 to 2 years to screen for conditions such as glaucoma, macular degeneration, cataracts, etc     [facility]    If you have any questions regarding to your visit please contact your care team:       Team Purple:   Clinic Hours Telephone Number   Dr. Mariaelena Valentin   7am-7pm  Monday - Thursday   7am-5pm  Fridays  [phone]  (Appointment scheduling available 24/7)    Questions about your Visit?   Team Line:  [phone]   Urgent Care - [place] - 11am-9pm Monday-Friday Saturday-Sunday- 9am-5pm   [place] - 5pm-9pm Monday-Friday Saturday-Sunday- 9am-5pm  [phone] - [facility]  [phone] - [place]       What options do I have for visits at the clinic other than the traditional office visit?  To expand how we care for you, many of our providers are utilizing electronic visits (e-visits) and telephone visits, when medically appropriate, for interactions with their patients rather than a visit in the clinic.   We also offer nurse visits for many medical concerns. Just like any other service, we will bill your insurance company for this type of visit based on time spent on the phone with your provider. Not all insurance companies cover these visits. Please check with your medical insurance if this type of visit is covered. You will be responsible for any charges that are not paid by your insurance.      E-visits via [employer]:  generally incur a $35.00 fee.  Telephone visits:  Time spent on the phone: *charged based on time that is spent on the phone in increments of 10 minutes. Estimated cost:   5-10 mins $30.00   11-20 mins. $59.00   21-30 mins. $85.00     Use "[employer]t (secure email communication and access to your chart) to send your primary care provider a message or make an appointment. Ask someone on your Team how to sign up for [employer].  For a Price Quote for your services, please call  our Consumer Price Line at [phone].  As always, Thank you for trusting us with your health care needs!

## 2018-04-04 ENCOUNTER — OFFICE VISIT (OUTPATIENT)
Dept: FAMILY MEDICINE | Facility: CLINIC | Age: 68
End: 2018-04-04
Payer: MEDICARE

## 2018-04-04 VITALS
RESPIRATION RATE: 18 BRPM | TEMPERATURE: 97.3 F | HEIGHT: 63 IN | SYSTOLIC BLOOD PRESSURE: 130 MMHG | BODY MASS INDEX: 28.26 KG/M2 | WEIGHT: 159.5 LBS | HEART RATE: 76 BPM | OXYGEN SATURATION: 95 % | DIASTOLIC BLOOD PRESSURE: 70 MMHG

## 2018-04-04 DIAGNOSIS — Z85.3 PERSONAL HISTORY OF MALIGNANT NEOPLASM OF BREAST: ICD-10-CM

## 2018-04-04 DIAGNOSIS — Z00.00 ENCOUNTER FOR ROUTINE ADULT HEALTH EXAMINATION WITHOUT ABNORMAL FINDINGS: Primary | ICD-10-CM

## 2018-04-04 DIAGNOSIS — E78.5 HYPERLIPIDEMIA LDL GOAL <160: ICD-10-CM

## 2018-04-04 DIAGNOSIS — I10 BENIGN ESSENTIAL HYPERTENSION: ICD-10-CM

## 2018-04-04 DIAGNOSIS — Z12.11 SCREEN FOR COLON CANCER: ICD-10-CM

## 2018-04-04 LAB
ALBUMIN SERPL-MCNC: 3.9 G/DL (ref 3.4–5)
ALP SERPL-CCNC: 92 U/L (ref 40–150)
ALT SERPL W P-5'-P-CCNC: 33 U/L (ref 0–50)
ANION GAP SERPL CALCULATED.3IONS-SCNC: 4 MMOL/L (ref 3–14)
AST SERPL W P-5'-P-CCNC: 26 U/L (ref 0–45)
BILIRUB SERPL-MCNC: 0.5 MG/DL (ref 0.2–1.3)
BUN SERPL-MCNC: 16 MG/DL (ref 7–30)
CALCIUM SERPL-MCNC: 9.5 MG/DL (ref 8.5–10.1)
CHLORIDE SERPL-SCNC: 103 MMOL/L (ref 94–109)
CHOLEST SERPL-MCNC: 251 MG/DL
CO2 SERPL-SCNC: 31 MMOL/L (ref 20–32)
CREAT SERPL-MCNC: 0.65 MG/DL (ref 0.52–1.04)
GFR SERPL CREATININE-BSD FRML MDRD: >90 ML/MIN/1.7M2
GLUCOSE SERPL-MCNC: 106 MG/DL (ref 70–99)
HDLC SERPL-MCNC: 58 MG/DL
LDLC SERPL CALC-MCNC: 137 MG/DL
NONHDLC SERPL-MCNC: 193 MG/DL
POTASSIUM SERPL-SCNC: 4.3 MMOL/L (ref 3.4–5.3)
PROT SERPL-MCNC: 8 G/DL (ref 6.8–8.8)
SODIUM SERPL-SCNC: 138 MMOL/L (ref 133–144)
TRIGL SERPL-MCNC: 281 MG/DL

## 2018-04-04 PROCEDURE — 80061 LIPID PANEL: CPT | Performed by: FAMILY MEDICINE

## 2018-04-04 PROCEDURE — 36415 COLL VENOUS BLD VENIPUNCTURE: CPT | Performed by: FAMILY MEDICINE

## 2018-04-04 PROCEDURE — G0402 INITIAL PREVENTIVE EXAM: HCPCS | Performed by: FAMILY MEDICINE

## 2018-04-04 PROCEDURE — 80053 COMPREHEN METABOLIC PANEL: CPT | Performed by: FAMILY MEDICINE

## 2018-04-04 RX ORDER — LOSARTAN POTASSIUM 100 MG/1
100 TABLET ORAL DAILY
Qty: 90 TABLET | Refills: 4 | Status: SHIPPED | OUTPATIENT
Start: 2018-04-04 | End: 2019-04-08

## 2018-04-04 RX ORDER — ATORVASTATIN CALCIUM 20 MG/1
20 TABLET, FILM COATED ORAL DAILY
Qty: 90 TABLET | Refills: 4 | Status: SHIPPED | OUTPATIENT
Start: 2018-04-04 | End: 2019-04-08

## 2018-04-04 RX ORDER — TIMOLOL MALEATE 2.5 MG/ML
1 SOLUTION/ DROPS OPHTHALMIC DAILY
Refills: 5 | COMMUNITY
Start: 2018-02-05 | End: 2022-12-13

## 2018-04-04 NOTE — MR AVS SNAPSHOT
After Visit Summary   4/4/2018    Virginia Byers    MRN: [id number]           Patient Information     Date Of Birth          1950        Visit Information        Provider Department      4/4/2018 9:30 AM Mariaelena Guevara MD [facility]        Today's Diagnoses     Encounter for routine adult health examination without abnormal findings    -  1    Benign essential hypertension        Hyperlipidemia LDL goal <160        Screen for colon cancer          Care Instructions      Preventive Health Recommendations  Female Ages 65 +    Yearly exam:     See your health care provider every year in order to  o Review health changes.   o Discuss preventive care.    o Review your medicines if your doctor has prescribed any.      You no longer need a yearly Pap test unless you've had an abnormal Pap test in the past 10 years. If you have vaginal symptoms, such as bleeding or discharge, be sure to talk with your provider about a Pap test.      Every 1 to 2 years, have a mammogram.  If you are over 69, talk with your health care provider about whether or not you want to continue having screening mammograms.      Every 10 years, have a colonoscopy. Or, have a yearly FIT test (stool test). These exams will check for colon cancer.       Have a cholesterol test every 5 years, or more often if your doctor advises it.       Have a diabetes test (fasting glucose) every three years. If you are at risk for diabetes, you should have this test more often.       At age 65, have a bone density scan (DEXA) to check for osteoporosis (brittle bone disease).    Shots:    Get a flu shot each year.    Get a tetanus shot every 10 years.    Talk to your doctor about your pneumonia vaccines. There are now two you should receive - Pneumovax (PPSV 23) and Prevnar (PCV 13).    Talk to your doctor about the shingles vaccine.    Talk to your doctor about the hepatitis B vaccine.    Nutrition:     Eat at least 5 servings  of fruits and vegetables each day.      Eat whole-grain bread, whole-wheat pasta and brown rice instead of white grains and rice.      Talk to your provider about Calcium and Vitamin D.     Lifestyle    Exercise at least 150 minutes a week (30 minutes a day, 5 days a week). This will help you control your weight and prevent disease.      Limit alcohol to one drink per day.      No smoking.       Wear sunscreen to prevent skin cancer.       See your dentist twice a year for an exam and cleaning.      See your eye doctor every 1 to 2 years to screen for conditions such as glaucoma, macular degeneration, cataracts, etc     [facility]    If you have any questions regarding to your visit please contact your care team:       Team Purple:   Clinic Hours Telephone Number   Dr. Mariaelena Valentin   7am-7pm  Monday - Thursday   7am-5pm  Fridays  [phone]  (Appointment scheduling available 24/7)    Questions about your Visit?   Team Line:  [phone]   Urgent Care - [place] and [place] - 11am-9pm Monday-Friday Saturday-Sunday- 9am-5pm   [place] - 5pm-9pm Monday-Friday Saturday-Sunday- 9am-5pm  [phone] - [place]   [phone] - [place]       What options do I have for visits at the clinic other than the traditional office visit?  To expand how we care for you, many of our providers are utilizing electronic visits (e-visits) and telephone visits, when medically appropriate, for interactions with their patients rather than a visit in the clinic.   We also offer nurse visits for many medical concerns. Just like any other service, we will bill your insurance company for this type of visit based on time spent on the phone with your provider. Not all insurance companies cover these visits. Please check with your medical insurance if this type of visit is covered. You will be responsible for any charges that are not paid by your  insurance.      E-visits via [employer]:  generally incur a $35.00 fee.  Telephone visits:  Time spent on the phone: *charged based on time that is spent on the phone in increments of 10 minutes. Estimated cost:   5-10 mins $30.00   11-20 mins. $59.00   21-30 mins. $85.00     Use [employer] (secure email communication and access to your chart) to send your primary care provider a message or make an appointment. Ask someone on your Team how to sign up for [employer].  For a Price Quote for your services, please call our Health Access [employer] Line at [phone].  As always, Thank you for trusting us with your health care needs!              Follow-ups after your visit        Additional Services     GASTROENTEROLOGY ADULT REF PROCEDURE ONLY Other       Last Lab Result: Creatinine (mg/dL)       Date                     Value                 03/29/2017               0.62             ----------  There is no height or weight on file to calculate BMI.     Needed:  No  Language:  English    Patient will be contacted to schedule procedure.     Please be aware that coverage of these services is subject to the terms and limitations of your health insurance plan.  Call member services at your health plan with any benefit or coverage questions.  Any procedures must be performed at a [place] facility OR coordinated by your clinic's referral office.    Please bring the following with you to your appointment:    (1) Any X-Rays, CTs or MRIs which have been performed.  Contact the facility where they were done to arrange for  prior to your scheduled appointment.    (2) List of current medications   (3) This referral request   (4) Any documents/labs given to you for this referral                  Who to contact     If you have questions or need follow up information about today's clinic visit or your schedule please contact [facility] [place] directly at [phone].  Normal or non-critical lab and imaging results will be  "communicated to you by [employer], letter or phone within 4 business days after the clinic has received the results. If you do not hear from us within 7 days, please contact the clinic through [employer] or phone. If you have a critical or abnormal lab result, we will notify you by phone as soon as possible.  Submit refill requests through [employer] or call your pharmacy and they will forward the refill request to us. Please allow 3 business days for your refill to be completed.          Additional Information About Your Visit        [employer] Information     [employer] gives you secure access to your electronic health record. If you see a primary care provider, you can also send messages to your care team and make appointments. If you have questions, please call your primary care clinic.  If you do not have a primary care provider, please call [phone] and they will assist you.        Care EveryWhere ID     This is your Care EveryWhere ID. This could be used by other organizations to access your [place] medical records  [id number]        Your Vitals Were     Pulse Temperature Respirations Height Pulse Oximetry BMI (Body Mass Index)    76 97.3  F (36.3  C) (Oral) 18 5' 3.39\" (1.61 m) 95% 27.91 kg/m2       Blood Pressure from Last 3 Encounters:   04/04/18 130/70   04/10/17 130/74   03/29/17 136/78    Weight from Last 3 Encounters:   04/04/18 159 lb 8 oz (72.3 kg)   04/10/17 158 lb (71.7 kg)   03/29/17 155 lb (70.3 kg)              We Performed the Following     GASTROENTEROLOGY ADULT REF PROCEDURE ONLY Other          Where to get your medicines      These medications were sent to [place] [id number] [facility] - [place], [place] - [address]  [address] [address]     Phone:  [phone]     atorvastatin 20 MG tablet    losartan 100 MG tablet          Primary Care Provider Office Phone # Fax #    Mariaelena Guevara -[phone]       [address]  [place] [address]        Equal Access to Services     ESTEFANI ORTIZ : Hadii aad ku hadyamiletviolet Deanaadri, wasairada loringuyễnha, qamaria luzta williamsneelanasra carcamoalessianasra, jorge yeepegwillian vaughn. So [facility] [place] [phone].    ATENCIÓN: Si habla español, tiene a valenzuela disposición servicios gratuitos de asistencia lingüística. Llame al [phone].    We comply with applicable federal civil rights laws and [place] laws. We do not discriminate on the basis of race, color, national origin, age, disability, sex, sexual orientation, or gender identity.            Thank you!     Thank you for choosing [facility]  for your care. Our goal is always to provide you with excellent care. Hearing back from our patients is one way we can continue to improve our services. Please take a few minutes to complete the written survey that you may receive in the mail after your visit with us. Thank you!             Your Updated Medication List - Protect others around you: Learn how to safely use, store and throw away your medicines at www.disposemymeds.org.          This list is accurate as of 4/4/18 10:09 AM.  Always use your most recent med list.                   Brand Name Dispense Instructions for use Diagnosis    ASPIRIN PO      Take 324 mg by mouth daily        atorvastatin 20 MG tablet    LIPITOR    90 tablet    Take 1 tablet (20 mg) by mouth daily    Hyperlipidemia LDL goal <160       latanoprost 0.005 % ophthalmic solution    XALATAN     1 drop daily.        losartan 100 MG tablet    COZAAR    90 tablet    Take 1 tablet (100 mg) by mouth daily    Benign essential hypertension       timolol 0.25 % ophthalmic solution    TIMOPTIC     Place 1 drop into both eyes daily

## 2018-04-04 NOTE — PROGRESS NOTES
SUBJECTIVE:   Virginia Byers is a 67 year old female who presents for Preventive Visit.      Are you in the first 12 months of your Medicare Part B coverage?  Yes- wearing glasses,  Visual Acuity:  Right Eye: 10/16   Left Eye: 10/20  Both Eyes: 10/16    Healthy Habits:    Do you get at least three servings of calcium containing foods daily (dairy, green leafy vegetables, etc.)? yes    Amount of exercise or daily activities, outside of work: 7 day(s) per week of walking    Problems taking medications regularly No    Medication side effects: No    Have you had an eye exam in the past two years? yes    Do you see a dentist twice per year? yes    Do you have sleep apnea, excessive snoring or daytime drowsiness?no      Ability to successfully perform activities of daily living: Yes, no assistance needed    Home safety:  none identified     Hearing impairment: No    Fall risk:  Fallen 2 or more times in the past year?: No  Any fall with injury in the past year?: No        COGNITIVE SCREEN  1) Repeat 3 items (Banana, Sunrise, Chair)    2) Clock draw: ABNORMAL   3) 3 item recall: Recalls 3 objects  Results: 3 items recalled: COGNITIVE IMPAIRMENT LESS LIKELY    Mini-CogTM Copyright S Rahel. Licensed by the author for use in [facility]; reprinted with permission (soob@[facility]). All rights reserved.            Hyperlipidemia Follow-Up      Rate your low fat/cholesterol diet?: good    Taking statin?  Yes, no muscle aches from statin    Other lipid medications/supplements?:  none    Hypertension Follow-up      Outpatient blood pressures are not being checked.    Low Salt Diet: no added salt      Reviewed and updated as needed this visit by clinical staff  Tobacco  Allergies  Meds         Reviewed and updated as needed this visit by Provider        Social History   Substance Use Topics     Smoking status: Former Smoker     Types: Cigarettes     Quit date: 3/15/1999     Smokeless tobacco: Never Used      Alcohol use Yes      Comment: occasional       If you drink alcohol do you typically have >3 drinks per day or >7 drinks per week? No                        Today's PHQ-2 Score:   PHQ-2 ( 1999 [employer]) 4/4/2018 4/10/2017   Q1: Little interest or pleasure in doing things 0 0   Q2: Feeling down, depressed or hopeless 0 0   PHQ-2 Score 0 0       Do you feel safe in your environment - Yes    Do you have a Health Care Directive?: Yes: Advance Directive has been received and scanned.    Current providers sharing in care for this patient include:   Patient Care Team:  Mariaelena Guevara MD as PCP - General    The following health maintenance items are reviewed in Epic and correct as of today:  Health Maintenance   Topic Date Due     COLON CANCER SCREEN (SYSTEM ASSIGNED)  05/22/2016     ADVANCE DIRECTIVE PLANNING Q5 YRS  01/23/2017     FALL RISK ASSESSMENT  04/10/2018     INFLUENZA VACCINE (SYSTEM ASSIGNED)  09/01/2018     MAMMO SCREEN Q2 YR (SYSTEM ASSIGNED)  03/01/2019     LIPID SCREEN Q5 YR FEMALE (SYSTEM ASSIGNED)  03/29/2022     TETANUS IMMUNIZATION (SYSTEM ASSIGNED)  03/28/2026     DEXA SCAN SCREENING (SYSTEM ASSIGNED)  Completed     PNEUMOCOCCAL  Completed     HEPATITIS C SCREENING  Completed     Labs reviewed in EPIC  BP Readings from Last 3 Encounters:   04/04/18 130/70   04/10/17 130/74   03/29/17 136/78    Wt Readings from Last 3 Encounters:   04/04/18 159 lb 8 oz (72.3 kg)   04/10/17 158 lb (71.7 kg)   03/29/17 155 lb (70.3 kg)                  Patient Active Problem List   Diagnosis     Osteoporosis     Atopic rhinitis     Hyperlipidemia LDL goal <160     S/P mastectomy     Breast cancer (H)     Advanced directives, counseling/discussion     Osteoarthritis     Family history of diabetes mellitus     Glaucoma     Personal history of malignant neoplasm of breast     Past Surgical History:   Procedure Laterality Date     C MASTECTOMY,SIMPLE  3/2000    left     COLONOSCOPY  2006    Q 10 years     SURGICAL HISTORY  OF -  Left 8/2015    tissue expander placed in left breast area     SURGICAL HISTORY OF -  Left 2/2016    left breast implant and right mammoplasty       Social History   Substance Use Topics     Smoking status: Former Smoker     Types: Cigarettes     Quit date: 3/15/1999     Smokeless tobacco: Never Used     Alcohol use Yes      Comment: occasional     Family History   Problem Relation Age of Onset     CANCER Mother      bone cancer     HEART DISEASE Father      DIABETES Paternal Grandmother      Arthritis Sister          Current Outpatient Prescriptions   Medication Sig Dispense Refill     timolol (TIMOPTIC) 0.25 % ophthalmic solution Place 1 drop into both eyes daily  5     losartan (COZAAR) 100 MG tablet Take 1 tablet (100 mg) by mouth daily 90 tablet 4     atorvastatin (LIPITOR) 20 MG tablet Take 1 tablet (20 mg) by mouth daily 90 tablet 4     ASPIRIN PO Take 324 mg by mouth daily       latanoprost (XALATAN) 0.005 % ophthalmic solution 1 drop daily.       [DISCONTINUED] atorvastatin (LIPITOR) 20 MG tablet Take 1 tablet (20 mg) by mouth daily 90 tablet 4     [DISCONTINUED] losartan (COZAAR) 100 MG tablet Take 1 tablet (100 mg) by mouth daily 90 tablet 4     Allergies   Allergen Reactions     Compazine      Mental confusion     Simvastatin Other (See Comments)     Muscle aches            ROS:  This 67 year old female is here today for annual female exam. She just retired after 47 years with [employer]. She is finding many things to keep her busy. Tries to walk 2 miles a day, even if it is around in the house. She hopes to join a gym soon. No new concerns. Gets her mammograms at [facility] due to her past breast cancer. Finally is willing to have colonoscopy this summer. All other review of systems are negative  Personal, family, and social history reviewed with patient and revised.    CONSTITUTIONAL: NEGATIVE for fever, chills, change in weight  INTEGUMENTARY/SKIN: NEGATIVE for worrisome rashes, moles or  "lesions  EYES: NEGATIVE for vision changes or irritation  ENT/MOUTH: NEGATIVE for ear, mouth and throat problems  RESP: NEGATIVE for significant cough or SOB  BREAST: NEGATIVE for masses, tenderness or discharge  CV: NEGATIVE for chest pain, palpitations or peripheral edema  GI: NEGATIVE for nausea, abdominal pain, heartburn, or change in bowel habits  : NEGATIVE for frequency, dysuria, or hematuria  MUSCULOSKELETAL: NEGATIVE for significant arthralgias or myalgia  NEURO: NEGATIVE for weakness, dizziness or paresthesias  ENDOCRINE: NEGATIVE for temperature intolerance, skin/hair changes  HEME: NEGATIVE for bleeding problems  PSYCHIATRIC: NEGATIVE for changes in mood or affect    OBJECTIVE:   /70  Pulse 76  Temp 97.3  F (36.3  C) (Oral)  Resp 18  Ht 5' 3.39\" (1.61 m)  Wt 159 lb 8 oz (72.3 kg)  SpO2 95%  BMI 27.91 kg/m2 Estimated body mass index is 27.91 kg/(m^2) as calculated from the following:    Height as of this encounter: 5' 3.39\" (1.61 m).    Weight as of this encounter: 159 lb 8 oz (72.3 kg).  EXAM:   GENERAL APPEARANCE: healthy, alert and no distress  EYES: Eyes grossly normal to inspection, PERRL and conjunctivae and sclerae normal  HENT: ear canals and TM's normal, nose and mouth without ulcers or lesions, oropharynx clear and oral mucous membranes moist  NECK: no adenopathy, no asymmetry, masses, or scars and thyroid normal to palpation  RESP: lungs clear to auscultation - no rales, rhonchi or wheezes  BREAST: right breast is normal without masses, tenderness or nipple discharge and no palpable axillary masses or adenopathy. Left breast has implant. No masses or axillary adenopathy   CV: regular rate and rhythm, normal S1 S2, no S3 or S4, no murmur, click or rub, no peripheral edema and peripheral pulses strong  ABDOMEN: soft, nontender, no hepatosplenomegaly, no masses and bowel sounds normal  MS: no musculoskeletal defects are noted and gait is age appropriate without ataxia  SKIN: no " "suspicious lesions or rashes  NEURO: Normal strength and tone, sensory exam grossly normal, mentation intact and speech normal  PSYCH: mentation appears normal and affect normal/bright    ASSESSMENT / PLAN:   1. Encounter for routine adult health examination without abnormal findings  Healthy lady     2. Benign essential hypertension  Good control   - losartan (COZAAR) 100 MG tablet; Take 1 tablet (100 mg) by mouth daily  Dispense: 90 tablet; Refill: 4  - Comprehensive metabolic panel    3. Hyperlipidemia LDL goal <160  Good control   - atorvastatin (LIPITOR) 20 MG tablet; Take 1 tablet (20 mg) by mouth daily  Dispense: 90 tablet; Refill: 4  - Lipid panel reflex to direct LDL Fasting  - Comprehensive metabolic panel    4. Screen for colon cancer  due  - GASTROENTEROLOGY ADULT REF PROCEDURE ONLY Other    5. Personal history of malignant neoplasm of breast  As above   - Comprehensive metabolic panel    End of Life Planning:  Patient currently has an advanced directive: No.  I have verified the patient's ablity to prepare an advanced directive/make health care decisions.  Literature was provided to assist patient in preparing an advanced directive.    COUNSELING:  Reviewed preventive health counseling, as reflected in patient instructions       Regular exercise       Healthy diet/nutrition        Estimated body mass index is 27.91 kg/(m^2) as calculated from the following:    Height as of this encounter: 5' 3.39\" (1.61 m).    Weight as of this encounter: 159 lb 8 oz (72.3 kg).       reports that she quit smoking about 19 years ago. Her smoking use included Cigarettes. She has never used smokeless tobacco.      Appropriate preventive services were discussed with this patient, including applicable screening as appropriate for cardiovascular disease, diabetes, osteopenia/osteoporosis, and glaucoma.  As appropriate for age/gender, discussed screening for colorectal cancer, prostate cancer, breast cancer, and cervical " cancer. Checklist reviewing preventive services available has been given to the patient.    Reviewed patients plan of care and provided an AVS. The Basic Care Plan (routine screening as documented in Health Maintenance) for [place] meets the Care Plan requirement. This Care Plan has been established and reviewed with the Patient.    Counseling Resources:  ATP IV Guidelines  Pooled Cohorts Equation Calculator  Breast Cancer Risk Calculator  FRAX Risk Assessment  ICSI Preventive Guidelines  Dietary Guidelines for Americans, 2010  USDA's MyPlate  ASA Prophylaxis  Lung CA Screening    WILMAR MELTON MD  [facility]

## 2018-04-04 NOTE — LETTER
[address] [place] [address]    April 5, 2018    Virginia Byers  [address]  [place] [address]          Dear Virginia,    Your labs show that your glucose is too high. Your lipids have all gotten higher. This means you have been eating too many carbohydrates and calories. Please try to follow a diet that is high in lean protein and vegetables and low in carbohydrates (such as potatoes, rice, breads, cereals, pasta, pizza, crackers, chips, desserts, juices, alcohol, all soda pop, and candies). I would really like to try you on another cholesterol lowering medication. I know you developed muscle cramps on simvastatin. Please let me know if you would be willing to try lipitor or crestor to help bring down your cholesterol    Enclosed is a copy of your results.     Results for orders placed or performed in visit on 04/04/18   Lipid panel reflex to direct LDL Fasting   Result Value Ref Range    Cholesterol 251 (H) <200 mg/dL    Triglycerides 281 (H) <150 mg/dL    HDL Cholesterol 58 >49 mg/dL    LDL Cholesterol Calculated 137 (H) <100 mg/dL    Non HDL Cholesterol 193 (H) <130 mg/dL   Comprehensive metabolic panel   Result Value Ref Range    Sodium 138 133 - 144 mmol/L    Potassium 4.3 3.4 - 5.3 mmol/L    Chloride 103 94 - 109 mmol/L    Carbon Dioxide 31 20 - 32 mmol/L    Anion Gap 4 3 - 14 mmol/L    Glucose 106 (H) 70 - 99 mg/dL    Urea Nitrogen 16 7 - 30 mg/dL    Creatinine 0.65 0.52 - 1.04 mg/dL    GFR Estimate >90 >60 mL/min/1.7m2    GFR Estimate If Black >90 >60 mL/min/1.7m2    Calcium 9.5 8.5 - 10.1 mg/dL    Bilirubin Total 0.5 0.2 - 1.3 mg/dL    Albumin 3.9 3.4 - 5.0 g/dL    Protein Total 8.0 6.8 - 8.8 g/dL    Alkaline Phosphatase 92 40 - 150 U/L    ALT 33 0 - 50 U/L    AST 26 0 - 45 U/L       If you have any questions or concerns, please call myself or my nurse at [phone].      Sincerely,        Mariaelena CHAPA  Ismael ALEMAN/cornejo

## 2018-04-30 ENCOUNTER — TRANSFERRED RECORDS (OUTPATIENT)
Dept: HEALTH INFORMATION MANAGEMENT | Facility: CLINIC | Age: 68
End: 2018-04-30

## 2018-05-24 ENCOUNTER — TRANSFERRED RECORDS (OUTPATIENT)
Dept: HEALTH INFORMATION MANAGEMENT | Facility: CLINIC | Age: 68
End: 2018-05-24

## 2018-06-06 ENCOUNTER — TELEPHONE (OUTPATIENT)
Dept: FAMILY MEDICINE | Facility: CLINIC | Age: 68
End: 2018-06-06

## 2018-06-06 NOTE — TELEPHONE ENCOUNTER
6/6/2018        Patient is aware of preventative health screening and will schedule on their own time.                 Outreach ,  Chin Herring

## 2018-06-07 ENCOUNTER — TRANSFERRED RECORDS (OUTPATIENT)
Dept: HEALTH INFORMATION MANAGEMENT | Facility: CLINIC | Age: 68
End: 2018-06-07

## 2018-07-12 ENCOUNTER — TRANSFERRED RECORDS (OUTPATIENT)
Dept: HEALTH INFORMATION MANAGEMENT | Facility: CLINIC | Age: 68
End: 2018-07-12

## 2018-08-09 ENCOUNTER — TRANSFERRED RECORDS (OUTPATIENT)
Dept: HEALTH INFORMATION MANAGEMENT | Facility: CLINIC | Age: 68
End: 2018-08-09

## 2018-09-11 ENCOUNTER — TRANSFERRED RECORDS (OUTPATIENT)
Dept: HEALTH INFORMATION MANAGEMENT | Facility: CLINIC | Age: 68
End: 2018-09-11

## 2018-10-09 ENCOUNTER — TRANSFERRED RECORDS (OUTPATIENT)
Dept: HEALTH INFORMATION MANAGEMENT | Facility: CLINIC | Age: 68
End: 2018-10-09

## 2018-11-13 ENCOUNTER — TRANSFERRED RECORDS (OUTPATIENT)
Dept: HEALTH INFORMATION MANAGEMENT | Facility: CLINIC | Age: 68
End: 2018-11-13

## 2018-12-11 ENCOUNTER — TRANSFERRED RECORDS (OUTPATIENT)
Dept: HEALTH INFORMATION MANAGEMENT | Facility: CLINIC | Age: 68
End: 2018-12-11

## 2019-01-08 ENCOUNTER — TRANSFERRED RECORDS (OUTPATIENT)
Dept: HEALTH INFORMATION MANAGEMENT | Facility: CLINIC | Age: 69
End: 2019-01-08

## 2019-02-19 ENCOUNTER — TRANSFERRED RECORDS (OUTPATIENT)
Dept: HEALTH INFORMATION MANAGEMENT | Facility: CLINIC | Age: 69
End: 2019-02-19

## 2019-03-26 ENCOUNTER — TRANSFERRED RECORDS (OUTPATIENT)
Dept: HEALTH INFORMATION MANAGEMENT | Facility: CLINIC | Age: 69
End: 2019-03-26

## 2019-04-02 ENCOUNTER — TRANSFERRED RECORDS (OUTPATIENT)
Dept: HEALTH INFORMATION MANAGEMENT | Facility: CLINIC | Age: 69
End: 2019-04-02

## 2019-04-04 ASSESSMENT — ENCOUNTER SYMPTOMS
ABDOMINAL PAIN: 0
SHORTNESS OF BREATH: 0
HEADACHES: 0
EYE PAIN: 0
JOINT SWELLING: 0
PARESTHESIAS: 0
FEVER: 0
BREAST MASS: 0
HEMATOCHEZIA: 0
HEMATURIA: 0
CONSTIPATION: 0
NERVOUS/ANXIOUS: 0
PALPITATIONS: 0
DYSURIA: 0
WEAKNESS: 1
SORE THROAT: 0
NAUSEA: 0
ARTHRALGIAS: 1
DIZZINESS: 0
COUGH: 0
FREQUENCY: 0
HEARTBURN: 0
CHILLS: 0
DIARRHEA: 0
MYALGIAS: 1

## 2019-04-04 ASSESSMENT — ACTIVITIES OF DAILY LIVING (ADL): CURRENT_FUNCTION: NO ASSISTANCE NEEDED

## 2019-04-08 ENCOUNTER — TELEPHONE (OUTPATIENT)
Dept: FAMILY MEDICINE | Facility: CLINIC | Age: 69
End: 2019-04-08

## 2019-04-08 ENCOUNTER — OFFICE VISIT (OUTPATIENT)
Dept: FAMILY MEDICINE | Facility: CLINIC | Age: 69
End: 2019-04-08
Payer: MEDICARE

## 2019-04-08 VITALS
BODY MASS INDEX: 27.31 KG/M2 | HEART RATE: 89 BPM | TEMPERATURE: 96.8 F | SYSTOLIC BLOOD PRESSURE: 158 MMHG | HEIGHT: 64 IN | WEIGHT: 160 LBS | DIASTOLIC BLOOD PRESSURE: 78 MMHG | OXYGEN SATURATION: 96 % | RESPIRATION RATE: 16 BRPM

## 2019-04-08 DIAGNOSIS — M79.661 PAIN IN BOTH LOWER LEGS: ICD-10-CM

## 2019-04-08 DIAGNOSIS — I10 HYPERTENSION GOAL BP (BLOOD PRESSURE) < 140/90: ICD-10-CM

## 2019-04-08 DIAGNOSIS — Z83.3 FAMILY HISTORY OF DIABETES MELLITUS: ICD-10-CM

## 2019-04-08 DIAGNOSIS — Z90.12 STATUS POST LEFT MASTECTOMY: ICD-10-CM

## 2019-04-08 DIAGNOSIS — R73.09 ELEVATED GLUCOSE: ICD-10-CM

## 2019-04-08 DIAGNOSIS — Z00.01 ENCOUNTER FOR ROUTINE ADULT PHYSICAL EXAM WITH ABNORMAL FINDINGS: ICD-10-CM

## 2019-04-08 DIAGNOSIS — E78.5 HYPERLIPIDEMIA LDL GOAL <160: Primary | ICD-10-CM

## 2019-04-08 DIAGNOSIS — Z85.3 PERSONAL HISTORY OF MALIGNANT NEOPLASM OF BREAST: ICD-10-CM

## 2019-04-08 DIAGNOSIS — Z12.11 SCREEN FOR COLON CANCER: ICD-10-CM

## 2019-04-08 DIAGNOSIS — E78.5 HYPERLIPIDEMIA LDL GOAL <160: ICD-10-CM

## 2019-04-08 DIAGNOSIS — M79.662 PAIN IN BOTH LOWER LEGS: ICD-10-CM

## 2019-04-08 LAB
ALT SERPL W P-5'-P-CCNC: 30 U/L (ref 0–50)
ANION GAP SERPL CALCULATED.3IONS-SCNC: 10 MMOL/L (ref 3–14)
AST SERPL W P-5'-P-CCNC: 24 U/L (ref 0–45)
BUN SERPL-MCNC: 15 MG/DL (ref 7–30)
CALCIUM SERPL-MCNC: 9.2 MG/DL (ref 8.5–10.1)
CHLORIDE SERPL-SCNC: 104 MMOL/L (ref 94–109)
CHOLEST SERPL-MCNC: 222 MG/DL
CK SERPL-CCNC: 85 U/L (ref 30–225)
CO2 SERPL-SCNC: 23 MMOL/L (ref 20–32)
CREAT SERPL-MCNC: 0.54 MG/DL (ref 0.52–1.04)
GFR SERPL CREATININE-BSD FRML MDRD: >90 ML/MIN/{1.73_M2}
GLUCOSE SERPL-MCNC: 116 MG/DL (ref 70–99)
HDLC SERPL-MCNC: 61 MG/DL
LDLC SERPL CALC-MCNC: 117 MG/DL
NONHDLC SERPL-MCNC: 161 MG/DL
POTASSIUM SERPL-SCNC: 3.8 MMOL/L (ref 3.4–5.3)
SODIUM SERPL-SCNC: 137 MMOL/L (ref 133–144)
TRIGL SERPL-MCNC: 222 MG/DL
TSH SERPL DL<=0.005 MIU/L-ACNC: 1.25 MU/L (ref 0.4–4)

## 2019-04-08 PROCEDURE — G0438 PPPS, INITIAL VISIT: HCPCS | Performed by: FAMILY MEDICINE

## 2019-04-08 PROCEDURE — 84460 ALANINE AMINO (ALT) (SGPT): CPT | Performed by: FAMILY MEDICINE

## 2019-04-08 PROCEDURE — 82550 ASSAY OF CK (CPK): CPT | Performed by: FAMILY MEDICINE

## 2019-04-08 PROCEDURE — 84443 ASSAY THYROID STIM HORMONE: CPT | Performed by: FAMILY MEDICINE

## 2019-04-08 PROCEDURE — 99214 OFFICE O/P EST MOD 30 MIN: CPT | Mod: 25 | Performed by: FAMILY MEDICINE

## 2019-04-08 PROCEDURE — 80048 BASIC METABOLIC PNL TOTAL CA: CPT | Performed by: FAMILY MEDICINE

## 2019-04-08 PROCEDURE — 36415 COLL VENOUS BLD VENIPUNCTURE: CPT | Performed by: FAMILY MEDICINE

## 2019-04-08 PROCEDURE — 80061 LIPID PANEL: CPT | Performed by: FAMILY MEDICINE

## 2019-04-08 PROCEDURE — 84450 TRANSFERASE (AST) (SGOT): CPT | Performed by: FAMILY MEDICINE

## 2019-04-08 RX ORDER — LOSARTAN POTASSIUM AND HYDROCHLOROTHIAZIDE 12.5; 1 MG/1; MG/1
1 TABLET ORAL DAILY
Qty: 30 TABLET | Refills: 1 | Status: SHIPPED | OUTPATIENT
Start: 2019-04-08 | End: 2019-04-22

## 2019-04-08 RX ORDER — ATORVASTATIN CALCIUM 20 MG/1
20 TABLET, FILM COATED ORAL DAILY
Qty: 90 TABLET | Refills: 3 | Status: SHIPPED | OUTPATIENT
Start: 2019-04-08 | End: 2019-04-22

## 2019-04-08 ASSESSMENT — ENCOUNTER SYMPTOMS
SORE THROAT: 0
EYE PAIN: 0
CHILLS: 0
COUGH: 0
HEMATOCHEZIA: 0
JOINT SWELLING: 0
NERVOUS/ANXIOUS: 0
WEAKNESS: 0
FEVER: 0
ARTHRALGIAS: 1
BREAST MASS: 0
SHORTNESS OF BREATH: 0
HEMATURIA: 0
DYSURIA: 0
ABDOMINAL PAIN: 0
PARESTHESIAS: 0
NAUSEA: 0
HEADACHES: 0
MYALGIAS: 1
CONSTIPATION: 0
PALPITATIONS: 0
DIARRHEA: 0
DIZZINESS: 0
HEARTBURN: 0
FREQUENCY: 0

## 2019-04-08 ASSESSMENT — ACTIVITIES OF DAILY LIVING (ADL): CURRENT_FUNCTION: NO ASSISTANCE NEEDED

## 2019-04-08 ASSESSMENT — MIFFLIN-ST. JEOR: SCORE: 1227.82

## 2019-04-08 NOTE — TELEPHONE ENCOUNTER
Notes recorded by Tamika Fatima RN on 4/8/2019 at 4:05 PM CDT  Left message for patient to call RN hotline [phone].   Future lab order placed. Referral placed.  Patient has AFE appt scheduled for 4/22/19.  See TE.     Tamika Fatima RN  ------    Notes recorded by Chuyita Zaidi MD on 4/8/2019 at 3:54 PM CDT  lipid  ------    Notes recorded by Tamika Fatima RN on 4/8/2019 at 3:40 PM CDT  Please specify what labs you want ordered.     Tamika Fatima RN  ------    Notes recorded by Chuyita Zaidi MD on 4/8/2019 at 3:25 PM CDT  Cholesterol is High  Please start Crestor as recommended   The 10-year ASCVD -cardiac risk score (Tehu[place]ana DC Jr., et al., 2013) is: 17%  Follow up labs 6 weeks  Thyroid is normal   Electrolytes and kidney tests are normal.   Your glucose is borderline high. Please watch your diet-low calorie/low carb foods. Please see the Pre Diabetic educator at our clinic-You can make a appointment by calling [phone]  Chuyita Fatima RN

## 2019-04-08 NOTE — PROGRESS NOTES
"SUBJECTIVE:   [place]ia Byers is a 68 year old female who presents for Preventive Visit.  Are you in the first 12 months of your Medicare coverage?  No    Healthy Habits:     In general, how would you rate your overall health?  Fair    Frequency of exercise:  4-5 days/week    Duration of exercise:  15-30 minutes    Do you usually eat at least 4 servings of fruit and vegetables a day, include whole grains    & fiber and avoid regularly eating high fat or \"junk\" foods?  Yes    Taking medications regularly:  Yes    Medication side effects:  Muscle aches    Ability to successfully perform activities of daily living:  No assistance needed    Home Safety:  No safety concerns identified    Hearing Impairment:  No hearing concerns    In the past 6 months, have you been bothered by leaking of urine?  No    In general, how would you rate your overall mental or emotional health?  Excellent      PHQ-2 Total Score: 0    Do you feel safe in your environment? Yes    Do you have a Health Care Directive? No: Advance care planning reviewed with patient; information given to patient to review.    Fall risk  Fallen 2 or more times in the past year?: No  Any fall with injury in the past year?: No    Cognitive Screening   1) Repeat 3 items (Leader, Season, Table)    2) Clock draw: NORMAL  3) 3 item recall: Recalls 2 objects   Results: NORMAL clock, 1-2 items recalled: COGNITIVE IMPAIRMENT LESS LIKELY    Mini-CogTM Copyright S Rahel. Licensed by the author for use in [facility]; reprinted with permission (vinh@.[facility]). All rights reserved.      Do you have sleep apnea, excessive snoring or daytime drowsiness?: no    Reviewed and updated as needed this visit by clinical staff  Tobacco  Allergies  Meds  Med Hx  Surg Hx  Fam Hx  Soc Hx    Hyperlipidemia Follow-Up      Rate your low fat/cholesterol diet?: good    Taking statin?  Yes, no muscle aches from statin    Other lipid medications/supplements?:  " none    Hypertension Follow-up      Outpatient blood pressures are not being checked.    Low Salt Diet: no added salt  Legs Hurt  Has a hard time squatting  Sone Low back pain  This has been Going on  10 years  Would Like PT        Reviewed and updated as needed this visit by Provider        Social History     Tobacco Use     Smoking status: Former Smoker     Types: Cigarettes     Last attempt to quit: 3/15/1999     Years since quittin.0     Smokeless tobacco: Never Used   Substance Use Topics     Alcohol use: Yes     Comment: occasional         Alcohol Use 2019   Prescreen: >3 drinks/day or >7 drinks/week? No   Prescreen: >3 drinks/day or >7 drinks/week? -   No flowsheet data found.    1. Patient has some concerns regarding previous macular degeneration diagnosis and would like to discuss today.  Sees Opthamologist and getting shots  Current providers sharing in care for this patient include:   Patient Care Team:  Chuyita Zaidi MD as PCP - General (Family Practice)  Mariaelena Guevara MD as Assigned PCP    The following health maintenance items are reviewed in Epic and correct as of today:  Health Maintenance   Topic Date Due     ZOSTER IMMUNIZATION (2 of 3) 05/10/2010     COLON CANCER SCREEN (SYSTEM ASSIGNED)  2016     ADVANCE DIRECTIVE PLANNING Q5 YRS  2017     MEDICARE ANNUAL WELLNESS VISIT  2019     FALL RISK ASSESSMENT  2019     PHQ-2 Q1 YR  2020     MAMMO SCREEN Q2 YR (SYSTEM ASSIGNED)  2021     LIPID SCREEN Q5 YR FEMALE (SYSTEM ASSIGNED)  2023     DTAP/TDAP/TD IMMUNIZATION (4 - Td) 2026     DEXA SCAN SCREENING (SYSTEM ASSIGNED)  Completed     INFLUENZA VACCINE  Completed     HEPATITIS C SCREENING  Completed     IPV IMMUNIZATION  Aged Out     MENINGITIS IMMUNIZATION  Aged Out     Labs reviewed in EPIC  BP Readings from Last 3 Encounters:   19 158/78   18 130/70   04/10/17 130/74    Wt Readings from Last 3 Encounters:   19 72.6 kg  (160 lb)   18 72.3 kg (159 lb 8 oz)   04/10/17 71.7 kg (158 lb)                  Patient Active Problem List   Diagnosis     Osteoporosis     Atopic rhinitis     Hyperlipidemia LDL goal <160     S/P mastectomy     Breast cancer (H)     Advanced directives, counseling/discussion     Osteoarthritis     Family history of diabetes mellitus     Glaucoma     Personal history of malignant neoplasm of breast     Past Surgical History:   Procedure Laterality Date     C MASTECTOMY,SIMPLE  3/2000    left     COLONOSCOPY  2006    Q 10 years     SURGICAL HISTORY OF -  Left 2015    tissue expander placed in left breast area     SURGICAL HISTORY OF -  Left 2016    left breast implant and right mammoplasty       Social History     Tobacco Use     Smoking status: Former Smoker     Packs/day: 1.00     Years: 17.00     Pack years: 17.00     Types: Cigarettes     Last attempt to quit: 3/15/1999     Years since quittin.0     Smokeless tobacco: Never Used   Substance Use Topics     Alcohol use: Yes     Alcohol/week: 2.4 oz     Types: 4 Glasses of wine per week     Comment: occasional     Family History   Problem Relation Age of Onset     Cancer Mother         bone cancer     Other Cancer Mother      Heart Disease Father      Diabetes Maternal Grandmother      Diabetes Paternal Grandmother      Arthritis Sister          Current Outpatient Medications   Medication Sig Dispense Refill     atorvastatin (LIPITOR) 20 MG tablet Take 1 tablet (20 mg) by mouth daily 90 tablet 3     latanoprost (XALATAN) 0.005 % ophthalmic solution 1 drop daily.       losartan-hydrochlorothiazide (HYZAAR) 100-12.5 MG tablet Take 1 tablet by mouth daily 30 tablet 1     timolol (TIMOPTIC) 0.25 % ophthalmic solution Place 1 drop into both eyes daily  5     Allergies   Allergen Reactions     Compazine      Mental confusion     Simvastatin Other (See Comments)     Muscle aches     Recent Labs   Lab Test 18  1018 17  0842 16  1038  "03/28/16  0842  03/16/15  0746   * 125* 120* 157*  --  104   HDL 58 75 70 64  --  58   TRIG 281* 191* 119 159*  --  182*   ALT 33  --   --  31  --  29   CR 0.65 0.62  --  0.65   < > 0.54   GFRESTIMATED >90 >90  Non African American GFR Calc    --  >90  Non  GFR Calc     < > >90  Non  GFR Calc     GFRESTBLACK >90 >90  African American GFR Calc    --  >90   GFR Calc     < > >90   GFR Calc     POTASSIUM 4.3 4.2  --  4.1   < > 3.9    < > = values in this interval not displayed.      Pt hets annual Mammogram    Review of Systems   Constitutional: Negative for chills and fever.   HENT: Negative for congestion, ear pain, hearing loss and sore throat.    Eyes: Negative for pain. Visual disturbance: has macular degeneration.   Respiratory: Negative for cough and shortness of breath.    Cardiovascular: Positive for peripheral edema. Negative for chest pain and palpitations.   Gastrointestinal: Negative for abdominal pain, constipation, diarrhea, heartburn, hematochezia and nausea.   Breasts:  Negative for tenderness, breast mass and discharge.   Genitourinary: Negative for dysuria, frequency, genital sores, hematuria, pelvic pain, urgency, vaginal bleeding and vaginal discharge.   Musculoskeletal: Positive for arthralgias and myalgias. Negative for joint swelling.   Skin: Negative for rash.   Neurological: Negative for dizziness, weakness, headaches and paresthesias.   Psychiatric/Behavioral: Negative for mood changes. The patient is not nervous/anxious.          OBJECTIVE:   /78   Pulse 89   Temp 96.8  F (36  C) (Oral)   Resp 16   Ht 1.613 m (5' 3.5\")   Wt 72.6 kg (160 lb)   SpO2 96%   Breastfeeding? No   BMI 27.90 kg/m   Estimated body mass index is 27.9 kg/m  as calculated from the following:    Height as of this encounter: 1.613 m (5' 3.5\").    Weight as of this encounter: 72.6 kg (160 lb).  Physical Exam  GENERAL APPEARANCE: healthy, " alert and no distress  EYES: Eyes grossly normal to inspection, PERRL and conjunctivae and sclerae normal  HENT: ear canals and TM's normal, nose and mouth without ulcers or lesions, oropharynx clear and oral mucous membranes moist  NECK: no adenopathy, no asymmetry, masses, or scars and thyroid normal to palpation  RESP: lungs clear to auscultation - no rales, rhonchi or wheezes  BREAST:  Right normal without masses, tenderness or nipple discharge and no palpable axillary masses or adenopathy  , Left Breast -implants  CV: regular rate and rhythm, normal S1 S2, no S3 or S4, no murmur, click or rub, no peripheral edema and peripheral pulses strong  ABDOMEN: soft, nontender, no hepatosplenomegaly, no masses and bowel sounds normal  MS: no musculoskeletal defects are noted and gait is age appropriate without ataxia  SKIN: no suspicious lesions or rashes  NEURO: Normal strength and tone, sensory exam grossly normal, mentation intact and speech normal  PSYCH: mentation appears normal and affect normal/bright  Mild pain with Moveemnt in Both Hip area and Groin  Good strength LE  No tenderness Back  Diagnostic Test Results:  Pending     ASSESSMENT / PLAN:   1. Encounter for routine adult physical exam with abnormal findings      2. Hypertension goal BP (blood pressure) < 140/90  controlled  - TSH with free T4 reflex  - Basic metabolic panel  - CK total  - losartan-hydrochlorothiazide (HYZAAR) 100-12.5 MG tablet; Take 1 tablet by mouth daily  Dispense: 30 tablet; Refill: 1  Change to Hyzaar  Follow up BP check 1 month  3. Pain in both lower legs  Referral PT  - [facility]; Future    4. Personal history of malignant neoplasm of breast  Pt has had mammogram    5. Family history of diabetes mellitus      6. Hyperlipidemia LDL goal <160  Labs pending   - Lipid panel reflex to direct LDL Fasting  - ALT  - AST  - atorvastatin (LIPITOR) 20 MG tablet; Take 1 tablet (20 mg) by mouth daily  Dispense: 90  "tablet; Refill: 3    7. Status post left mastectomy      8. Screen for colon cancer  Advised cologuard  Pt is not ready for colonoscopy      End of Life Planning:  Patient currently has an advanced directive: pt is going to bring copy    COUNSELING:  Reviewed preventive health counseling, as reflected in patient instructions       Regular exercise       Healthy diet/nutrition       Vision screening       Hearing screening       Dental care       Bladder control       Fall risk prevention       Osteoporosis Prevention/Bone Health       Colon cancer screening       The 10-year ASCVD risk score (Yamile CROSS Jr., et al., 2013) is: 17.9%    Values used to calculate the score:      Age: 68 years      Sex: Female      Is Non- : No      Diabetic: No      Tobacco smoker: No      Systolic Blood Pressure: 158 mmHg      Is BP treated: Yes      HDL Cholesterol: 58 mg/dL      Total Cholesterol: 251 mg/dL       Advanced Planning     BP Readings from Last 1 Encounters:   04/08/19 158/78     Estimated body mass index is 27.9 kg/m  as calculated from the following:    Height as of this encounter: 1.613 m (5' 3.5\").    Weight as of this encounter: 72.6 kg (160 lb).           reports that she quit smoking about 20 years ago. Her smoking use included cigarettes. She has never used smokeless tobacco.  Tobacco Cessation Action Plan: Information offered: Patient not interested at this time    Appropriate preventive services were discussed with this patient, including applicable screening as appropriate for cardiovascular disease, diabetes, osteopenia/osteoporosis, and glaucoma.  As appropriate for age/gender, discussed screening for colorectal cancer, prostate cancer, breast cancer, and cervical cancer. Checklist reviewing preventive services available has been given to the patient.    Reviewed patients plan of care and provided an AVS. The Intermediate Care Plan ( asthma action plan, low back pain action plan, and " migraine action plan) for [place] meets the Care Plan requirement. This Care Plan has been established and reviewed with the Patient.    Counseling Resources:  ATP IV Guidelines  Pooled Cohorts Equation Calculator  Breast Cancer Risk Calculator  FRAX Risk Assessment  ICSI Preventive Guidelines  Dietary Guidelines for Americans, 2010  USDA's MyPlate  ASA Prophylaxis  Lung CA Screening    Chuyita Zaidi MD  [facility]    Identified Health Risks:

## 2019-04-08 NOTE — TELEPHONE ENCOUNTER
Patient notified of Provider's message as written.  Patient verbalized understanding.    Please clarify- Lipitor was refilled but result note says to take Crestor- which should she be on?    Ok to leave detailed message on patient's VM    Elijah Willis RN

## 2019-04-09 NOTE — TELEPHONE ENCOUNTER
Huddled with Dr. Zaidi.   OK to take Lipitor instead of Crestor.     Left detailed message on patient's VM with information.  Gave Rn Hotline # [phone] if any questions.    Tamika Fatima RN

## 2019-04-15 ENCOUNTER — TELEPHONE (OUTPATIENT)
Dept: FAMILY MEDICINE | Facility: CLINIC | Age: 69
End: 2019-04-15

## 2019-04-15 NOTE — TELEPHONE ENCOUNTER
Panel Management Review      Patient has the following on her problem list: None      Composite cancer screening  Chart review shows that this patient is due/due soon for the following Colonoscopy  Summary:    Patient is due/failing the following:   COLONOSCOPY    Action needed:   Patient needs referral/order: colonscopy    Type of outreach:    Sent comScore message.    Questions for provider review:    None                                                                                                                                    Jeffrey Salmeron CMA on 4/15/2019 at 8:02 AM       Chart routed to none .

## 2019-04-17 NOTE — PROGRESS NOTES
SUBJECTIVE:   [place]ia Byers is a 68 year old female who presents to clinic today for the following   health issues:      Hypertension Follow-up      Outpatient blood pressures are being checked at home.  Results are 137/76 () 128/76 ()    Low Salt Diet: no added salt    Amount of exercise or physical activity: 4-5 days/week for an average of 15-30 minutes    Problems taking medications regularly: No    Medication side effects: none    Diet: no added salt  Hypertension Follow-up      Outpatient blood pressures are not being checked.    Low Salt Diet: no added salt            Additional history: as documented    Reviewed  and updated as needed this visit by clinical staff         Reviewed and updated as needed this visit by Provider         Patient Active Problem List   Diagnosis     Osteoporosis     Atopic rhinitis     Hyperlipidemia LDL goal <160     S/P mastectomy     Advanced directives, counseling/discussion     Osteoarthritis     Family history of diabetes mellitus     Glaucoma     Personal history of malignant neoplasm of breast     Hypertension goal BP (blood pressure) < 140/90     Past Surgical History:   Procedure Laterality Date     C MASTECTOMY,SIMPLE  3/2000    left     COLONOSCOPY  2006    Q 10 years     SURGICAL HISTORY OF -  Left 2015    tissue expander placed in left breast area     SURGICAL HISTORY OF -  Left 2016    left breast implant and right mammoplasty       Social History     Tobacco Use     Smoking status: Former Smoker     Packs/day: 1.00     Years: 17.00     Pack years: 17.00     Types: Cigarettes     Last attempt to quit: 3/15/1999     Years since quittin.1     Smokeless tobacco: Never Used   Substance Use Topics     Alcohol use: Yes     Alcohol/week: 2.4 oz     Types: 4 Glasses of wine per week     Comment: occasional     Family History   Problem Relation Age of Onset     Cancer Mother         bone cancer     Other Cancer Mother      Heart Disease Father       "Diabetes Maternal Grandmother      Diabetes Paternal Grandmother      Arthritis Sister          Current Outpatient Medications   Medication Sig Dispense Refill     atorvastatin (LIPITOR) 40 MG tablet Take 1 tablet (40 mg) by mouth daily 90 tablet 3     latanoprost (XALATAN) 0.005 % ophthalmic solution 1 drop daily.       losartan-hydrochlorothiazide (HYZAAR) 100-12.5 MG tablet Take 1 tablet by mouth daily 90 tablet 3     timolol (TIMOPTIC) 0.25 % ophthalmic solution Place 1 drop into both eyes daily  5     Allergies   Allergen Reactions     Compazine      Mental confusion     Simvastatin Other (See Comments)     Muscle aches     Recent Labs   Lab Test 04/08/19  0849 04/04/18  1018 03/29/17  0842  03/28/16  0842   * 137* 125*   < > 157*   HDL 61 58 75   < > 64   TRIG 222* 281* 191*   < > 159*   ALT 30 33  --   --  31   CR 0.54 0.65 0.62  --  0.65   GFRESTIMATED >90 >90 >90  Non African American GFR Calc    --  >90  Non  GFR Calc     GFRESTBLACK >90 >90 >90  African American GFR Calc    --  >90   GFR Calc     POTASSIUM 3.8 4.3 4.2  --  4.1   TSH 1.25  --   --   --   --     < > = values in this interval not displayed.      BP Readings from Last 3 Encounters:   04/22/19 128/70   04/08/19 158/78   04/04/18 130/70    Wt Readings from Last 3 Encounters:   04/22/19 70.8 kg (156 lb)   04/08/19 72.6 kg (160 lb)   04/04/18 72.3 kg (159 lb 8 oz)                  Labs reviewed in EPIC    ROS:  CONSTITUTIONAL: NEGATIVE for fever, chills, change in weight  ENT/MOUTH: NEGATIVE for ear, mouth and throat problems  RESP: NEGATIVE for significant cough or SOB  CV: NEGATIVE for chest pain, palpitations or peripheral edema  GI: NEGATIVE for nausea, abdominal pain, heartburn, or change in bowel habits  MUSCULOSKELETAL: NEGATIVE for significant arthralgias or myalgia  ROS otherwise negative    OBJECTIVE:     /70   Pulse 86   Temp 97.2  F (36.2  C) (Oral)   Resp 16   Ht 1.613 m (5' 3.5\")   " Wt 70.8 kg (156 lb)   SpO2 95%   Breastfeeding? No   BMI 27.20 kg/m    Body mass index is 27.2 kg/m .  GENERAL: healthy, alert and no distress  NECK: no adenopathy, no asymmetry, masses, or scars and thyroid normal to palpation  RESP: lungs clear to auscultation - no rales, rhonchi or wheezes  CV: regular rate and rhythm, normal S1 S2, no S3 or S4, no murmur, click or rub, no peripheral edema and peripheral pulses strong  ABDOMEN: soft, nontender, no hepatosplenomegaly, no masses and bowel sounds normal  MS: no gross musculoskeletal defects noted, no edema    Diagnostic Test Results:  Pending     ASSESSMENT/PLAN:       1. Hypertension goal BP (blood pressure) < 140/90  Stable   - losartan-hydrochlorothiazide (HYZAAR) 100-12.5 MG tablet; Take 1 tablet by mouth daily  Dispense: 90 tablet; Refill: 3    2. Impaired glucose tolerance  Advised watch diet    3. Hyperlipidemia LDL goal <100  Advised   Follow up labs 6 weeks  - atorvastatin (LIPITOR) 40 MG tablet; Take 1 tablet (40 mg) by mouth daily  Dispense: 90 tablet; Refill: 3    4. Screen for colon cancer  Advised   Pt will do Cologuard  She has declined colonoscpy  Follow up 6 months  Chuyita Zaidi MD  [facility]

## 2019-04-21 ENCOUNTER — TRANSFERRED RECORDS (OUTPATIENT)
Dept: HEALTH INFORMATION MANAGEMENT | Facility: CLINIC | Age: 69
End: 2019-04-21

## 2019-04-22 ENCOUNTER — OFFICE VISIT (OUTPATIENT)
Dept: FAMILY MEDICINE | Facility: CLINIC | Age: 69
End: 2019-04-22
Payer: MEDICARE

## 2019-04-22 VITALS
HEIGHT: 64 IN | TEMPERATURE: 97.2 F | HEART RATE: 86 BPM | BODY MASS INDEX: 26.63 KG/M2 | DIASTOLIC BLOOD PRESSURE: 70 MMHG | WEIGHT: 156 LBS | SYSTOLIC BLOOD PRESSURE: 128 MMHG | RESPIRATION RATE: 16 BRPM | OXYGEN SATURATION: 95 %

## 2019-04-22 DIAGNOSIS — R73.02 IMPAIRED GLUCOSE TOLERANCE: ICD-10-CM

## 2019-04-22 DIAGNOSIS — Z12.11 SCREEN FOR COLON CANCER: ICD-10-CM

## 2019-04-22 DIAGNOSIS — I10 HYPERTENSION GOAL BP (BLOOD PRESSURE) < 140/90: ICD-10-CM

## 2019-04-22 DIAGNOSIS — E78.5 HYPERLIPIDEMIA LDL GOAL <100: ICD-10-CM

## 2019-04-22 LAB — POTASSIUM SERPL-SCNC: 3.7 MMOL/L (ref 3.4–5.3)

## 2019-04-22 PROCEDURE — 99213 OFFICE O/P EST LOW 20 MIN: CPT | Performed by: FAMILY MEDICINE

## 2019-04-22 PROCEDURE — 36415 COLL VENOUS BLD VENIPUNCTURE: CPT | Performed by: FAMILY MEDICINE

## 2019-04-22 PROCEDURE — 84132 ASSAY OF SERUM POTASSIUM: CPT | Performed by: FAMILY MEDICINE

## 2019-04-22 RX ORDER — ATORVASTATIN CALCIUM 40 MG/1
40 TABLET, FILM COATED ORAL DAILY
Qty: 90 TABLET | Refills: 3 | Status: SHIPPED | OUTPATIENT
Start: 2019-04-22 | End: 2020-03-17

## 2019-04-22 RX ORDER — LOSARTAN POTASSIUM AND HYDROCHLOROTHIAZIDE 12.5; 1 MG/1; MG/1
1 TABLET ORAL DAILY
Qty: 90 TABLET | Refills: 3 | Status: SHIPPED | OUTPATIENT
Start: 2019-04-22 | End: 2019-10-25

## 2019-04-22 ASSESSMENT — MIFFLIN-ST. JEOR: SCORE: 1209.67

## 2019-04-23 DIAGNOSIS — I10 HYPERTENSION GOAL BP (BLOOD PRESSURE) < 140/90: ICD-10-CM

## 2019-04-23 LAB — COLOGUARD-ABSTRACT: NEGATIVE

## 2019-04-23 RX ORDER — LOSARTAN POTASSIUM 100 MG/1
100 TABLET ORAL DAILY
Refills: 4 | COMMUNITY
Start: 2019-01-25 | End: 2019-10-23

## 2019-04-23 NOTE — TELEPHONE ENCOUNTER
Please call and check with pharmacy. Was just sent yesterday.    losartan-hydrochlorothiazide (HYZAAR) 100-12.5 MG tablet 90 tablet 3 4/22/2019  No   Sig - Route: Take 1 tablet by mouth daily - Oral   Sent to pharmacy as: losartan-hydrochlorothiazide (HYZAAR) 100-12.5 MG tablet   Class: E-Prescribe   Order: [id number]   E-Prescribing Status: Receipt confirmed by pharmacy (4/22/2019  8:16 AM CDT)       Tamika Fatima RN

## 2019-04-24 ENCOUNTER — MYC REFILL (OUTPATIENT)
Dept: FAMILY MEDICINE | Facility: CLINIC | Age: 69
End: 2019-04-24

## 2019-04-24 DIAGNOSIS — I10 HYPERTENSION GOAL BP (BLOOD PRESSURE) < 140/90: ICD-10-CM

## 2019-04-24 RX ORDER — LOSARTAN POTASSIUM AND HYDROCHLOROTHIAZIDE 12.5; 1 MG/1; MG/1
1 TABLET ORAL DAILY
Qty: 90 TABLET | Refills: 3 | Status: CANCELLED | OUTPATIENT
Start: 2019-04-24

## 2019-04-24 RX ORDER — LOSARTAN POTASSIUM AND HYDROCHLOROTHIAZIDE 12.5; 1 MG/1; MG/1
1 TABLET ORAL DAILY
Qty: 90 TABLET | Refills: 3
Start: 2019-04-24

## 2019-04-24 NOTE — TELEPHONE ENCOUNTER
Spoke to pharmacy and confirmed duplicate request.  Karol CALDERON CMA ([facility])

## 2019-05-01 ENCOUNTER — TELEPHONE (OUTPATIENT)
Dept: FAMILY MEDICINE | Facility: CLINIC | Age: 69
End: 2019-05-01

## 2019-05-01 NOTE — LETTER
May 2, 2019      Virginia Byers  [address] [facility]  [place] [address]      Dear [place],    The result of your recent Cologuard testing was negative. A negative result means that Cologuard did not detect significant levels of DNA and/or hemoglobin biomarkers in the stool which are associated with colon cancer or precancer.  Thank you for completing your screening, your next screening should be completed in 3 years.  If you have any questions or concerns, please contact your care team at [phone].     Sincerely,      Chuyita Zaidi MD/dt

## 2019-05-01 NOTE — TELEPHONE ENCOUNTER
Results have been received from [facility] for     The Cologuard Results Negative     Results date 4/21/2019    Results have been placed in provider basket- provider to review and sign off on results.  Please send back to team with OK to send result letter and any additional follow-up needed.     Kim Lozoya,       sent copy of results to scanning.

## 2019-05-17 DIAGNOSIS — E78.5 HYPERLIPIDEMIA LDL GOAL <160: ICD-10-CM

## 2019-05-17 LAB
CHOLEST SERPL-MCNC: 174 MG/DL
HDLC SERPL-MCNC: 56 MG/DL
LDLC SERPL CALC-MCNC: 101 MG/DL
NONHDLC SERPL-MCNC: 118 MG/DL
TRIGL SERPL-MCNC: 87 MG/DL

## 2019-05-17 PROCEDURE — 36415 COLL VENOUS BLD VENIPUNCTURE: CPT | Performed by: FAMILY MEDICINE

## 2019-05-17 PROCEDURE — 80061 LIPID PANEL: CPT | Performed by: FAMILY MEDICINE

## 2019-06-27 DIAGNOSIS — E78.5 HYPERLIPIDEMIA LDL GOAL <100: ICD-10-CM

## 2019-06-28 RX ORDER — ATORVASTATIN CALCIUM 40 MG/1
40 TABLET, FILM COATED ORAL DAILY
Qty: 90 TABLET | Refills: 3 | Status: CANCELLED | OUTPATIENT
Start: 2019-06-28

## 2019-06-28 NOTE — TELEPHONE ENCOUNTER
Patient should have refills available.   Please call and check with pharmacy.     atorvastatin (LIPITOR) 40 MG tablet 90 tablet 3 4/22/2019  --   Sig - Route: Take 1 tablet (40 mg) by mouth daily - Oral   Sent to pharmacy as: atorvastatin (LIPITOR) 40 MG tablet   Class: E-Prescribe   Order: [id number]   E-Prescribing Status: Receipt confirmed by pharmacy (4/22/2019  8:24 AM CDT)     Tamika Fatima RN

## 2019-06-28 NOTE — TELEPHONE ENCOUNTER
Called and verified with pharmacy on duplicate prescription. Please disregard. DOUGIE Rodney

## 2019-07-02 ENCOUNTER — THERAPY VISIT (OUTPATIENT)
Dept: PHYSICAL THERAPY | Facility: CLINIC | Age: 69
End: 2019-07-02
Attending: FAMILY MEDICINE
Payer: MEDICARE

## 2019-07-02 DIAGNOSIS — M79.662 PAIN IN BOTH LOWER LEGS: ICD-10-CM

## 2019-07-02 DIAGNOSIS — M54.50 CHRONIC BILATERAL LOW BACK PAIN WITHOUT SCIATICA: ICD-10-CM

## 2019-07-02 DIAGNOSIS — M79.661 PAIN IN BOTH LOWER LEGS: ICD-10-CM

## 2019-07-02 DIAGNOSIS — G89.29 CHRONIC BILATERAL LOW BACK PAIN WITHOUT SCIATICA: ICD-10-CM

## 2019-07-02 PROCEDURE — 97161 PT EVAL LOW COMPLEX 20 MIN: CPT | Mod: GP | Performed by: PHYSICAL THERAPIST

## 2019-07-02 PROCEDURE — 97110 THERAPEUTIC EXERCISES: CPT | Mod: GP | Performed by: PHYSICAL THERAPIST

## 2019-07-02 PROCEDURE — 97530 THERAPEUTIC ACTIVITIES: CPT | Mod: GP | Performed by: PHYSICAL THERAPIST

## 2019-07-02 NOTE — LETTER
DEPARTMENT OF HEALTH AND HUMAN SERVICES  CENTERS FOR MEDICARE & MEDICAID SERVICES    PLAN/UPDATED PLAN OF PROGRESS FOR OUTPATIENT REHABILITATION    PATIENTS NAME:  Virginia Byers   : 1950    PROVIDER NUMBER:    [id number]  HICN:  [id number]    PROVIDER NAME: [place] [facility] PHYSICAL [facility]ERAP[facility]  MEDICAL RECORD NUMBER: [id number]     START OF CARE DATE:  SOC Date: 19   TYPE:  PT    PRIMARY/TREATMENT DIAGNOSIS: (Pertinent Medical Diagnosis)  Pain in both lower legs  Chronic bilateral low back pain without sciatica    VISITS FROM START OF CARE:   1     Answers for HPI/ROS submitted by the patient on 2019   History Reported by Patient  Reason for Visit:: Muscle stiffness  How problem occurred:: Not sure  Pain quality: other  Progression since onset: gradually worsening  General health as reported by patient: good  Please check all that apply to your current or past medical history: cancer, high blood pressure  Surgeries: cancer surgery  Medications you are currently taking: high blood pressure medication  Other Meds Detail: Cholesterol atorvastatin 40 mg  Medical allergies: other  Other Allergies Detail: Compazine  Occupation:: Retired    [place] [employer] [facility] Initial Evaluation -- Lower Extremity  Evaluation Date: 2019  Virginia Byers is a 68 year old female with a (B) L/E condition.   Referral: GP  Work mechanical stresses: NA   Employment status: Retired  Leisure mechanical stresses: Walking 3 x week (2 miles)  Functional disability score: NA  VAS score (0-10): 6/10  Patient goals/expectations:  Improve stiffness to be able to walk, go up/down stairs, and rise from chair with greater ease.        PATIENTS NAME:  Virginia Byers   : 1950  PRIMARY/TREATMENT DIAGNOSIS: (Pertinent Medical Diagnosis)  Pain in both lower legs  Chronic bilateral low back pain without sciatica    HISTORY:  Present symptoms: (B) low back, Lt glut and posterior  thigh  Pain quality (sharp/shooting/stabbing/aching/burning/cramping):  Achy, and general stiffness throughout (B) L/E  Present since (onset date): 1.5 years MD referral date 19    Symptoms (improving/unchaning/worsening):  worsening.    Symptoms commenced as a result of: No apparent reason   Condition occurred in the following environment: Home   Symptoms at onset: As above    Paresthesia (yes/no):  No  Spinal history: Yes, on/off the past 10 or so years     Cough/Sneeze (pos/neg):  Neg  Constant symptoms:   Intermittent symptoms: As above  Symptoms are worse with the following: Always Bending, Always Rising, Always First few steps, Always Standing, Always Walking, Always Stairs, Always Squatting, Always On the move and Time of day - No effect   Symptoms are better with the following: Always When still  Continued use makes the pain (better/worse/no effect): worse  Disturbed night (yes/no): Yes, sometimes    Pain at rest (yes/no):  No    Site (back/hip/knee/ankle/foot):    Other questions (swelling/clicking/locking/giving way/falling):  None  Previous episodes: No  Previous treatments: None    Specific Questions:  General health (excellent/good/fair/poor):  Good  Pertinent medical history includes: See chart  Medications (nil/NSAIDS/analg/steroids/anticoag/other):  See chart  Medical allergies:  See chart  Imaging (none/Xray/MRI/other):  No  Recent or major surgery (yes/no):  See chart  Night pain (yes/no):  No  Accidents (yes/no):  No  Unexplained weight loss (yes/no):  No  Barriers at home: None  Other red flags: None    Sites for physical examination (back/hip/knee/ankle/foot/other): Back    EXAMINATION  Posture:  Sitting (good/fair/poor): Fair    Correction of Posture (better/worse/no effect/NA): No effect  Standing (good/fair/poor):   Other observations:    PATIENTS NAME:  Virginia Byers   : 1950  PRIMARY/TREATMENT DIAGNOSIS: (Pertinent Medical Diagnosis)  Pain in both lower legs  Chronic  bilateral low back pain without sciatica    Neurological: (NA/motor/sensory/reflexes/dural):    Motor:  Hip Flex Rt 4+/5 Lt 4/5; Quad Rt 5/5 Lt 5-/5; H-S Rt 4+/5 Lt 4/5, painful     Lt glut; DF 5/5 (B); PF 5/5 (B)   Dural:  Neg slump (B)    Baselines (pain or functional activity): Pain and stiffness standing/walking, rising from chair, going up/down stairs  Extremities (Hip / Knee / Ankle / Foot): NA    Movement Loss Clarence Mod Min Nil Pain   NT        Passive Movement (+/- over pressure)/(PDM/ERP):  NT  Resisted Test Response (pain): See above  Other Tests: NT    Spine:  Movement loss: Flex WNL (Pulls (B) thigh/calf), Ext Mod loss; SG Rt Mod loss/ERP Lt glut, Lt Mod loss, Pulls Rt lat hip  Effect of repeated movements:   Pre test sx:  2/10 Lt glut pain walking  EIL - NE, NE, Inc ROM (Ext and (B) SGIS w/less pain), Inc strength, Abolishes Lt glut pain walking afterwards  Effect of static positioning: NT  Spine testing (not relevant/relevant/secondary problem): Relevant    Baseline Symptoms: NA  Repeated Tests Symptom Response Mechanical Response   Active/Passive movement, resisted test, functional test During -  Produce, Abolish, Increase, Decrease, NE After -  Better, Worse, NB, NW, NE Effect -   ? or ? ROM, strength or key functional test No   Effect   NT due to screening lumbar spine       Effect of static positioning       NT               PATIENTS NAME:  Virginia Byers   : 1950  PRIMARY/TREATMENT DIAGNOSIS: (Pertinent Medical Diagnosis)  Pain in both lower legs  Chronic bilateral low back pain without sciatica    Provisional Classification (Extremity/Spine):  Spine - Derangement - Asymmetrical, unilateral, symptoms above knee    Princicple of Management:   Education:  Posture, specificity of exercise, spine as source of extremity sx's    Equipment provided:  None.  Use of rolled towel for posture correction.  Exercise and dosage:  EIL x 10-15 reps every 2 hrs.  EIS against counter as  alternative.    ASSESSMENT/PLAN:  Patient is a 68 year old female with lumbar complaints.    Patient has the following significant findings with corresponding treatment plan.                Diagnosis 1:  LBP  Pain -  self management, education, directional preference exercise and home program  Decreased ROM/flexibility - manual therapy, therapeutic exercise and home program  Decreased joint mobility - manual therapy, therapeutic exercise and home program  Decreased strength - therapeutic exercise, therapeutic activities and home program  Impaired muscle performance - neuro re-education and home program  Decreased function - therapeutic activities and home program  Impaired posture - neuro re-education and home program  Previous and current functional limitations:  (See Goal Flow Sheet for this information)    Short term and Long term goals: (See Goal Flow Sheet for this information)   Communication ability:  Patient appears to be able to clearly communicate and understand verbal and written communication and follow directions correctly.  Treatment Explanation - The following has been discussed with the patient:   RX ordered/plan of care, Anticipated outcomes, Possible risks and side effects                                                PATIENTS NAME:  Virginia Byers   : 1950  PRIMARY/TREATMENT DIAGNOSIS: (Pertinent Medical Diagnosis)  Pain in both lower legs  Chronic bilateral low back pain without sciatica        This patient would benefit from PT intervention to resume normal activities.   Rehab potential is good.  Frequency:  1 X week, once daily  Duration:  for 6 weeks  Discharge Plan:  Achieve all LTG.  Independent in home treatment program.  Reach maximal therapeutic benefit.           Caregiver Signature/Credentials _____________________________ Date ________          Vinayak Reese DPT, Cert MDT     I have reviewed and certified the need for these services and plan of treatment while under my  "care.        PHYSICIAN'S SIGNATURE:   _________________________________________      Date___________   Chuyita Zaidi MD    Certification period:  Beginning of Cert date period: 07/02/19 to 09/29/19     Functional Level Progress Report: Please see attached \"Goal Flow sheet for Functional level.\"    ____X____ Continue Services or       ________ DC Services                Service dates: From  SOC Date: 07/02/19  to present                         "

## 2019-07-02 NOTE — PROGRESS NOTES
Answers for HPI/ROS submitted by the patient on 6/30/2019   History Reported by Patient  Reason for Visit:: Muscle stiffness  How problem occurred:: Not sure  Pain quality: other  Progression since onset: gradually worsening  General health as reported by patient: good  Please check all that apply to your current or past medical history: cancer, high blood pressure  Surgeries: cancer surgery  Medications you are currently taking: high blood pressure medication  Other Meds Detail: Cholesterol atorvastatin 40 mg  Medical allergies: other  Other Allergies Detail: Compazine  Occupation:: Retired  [employer] [place] [employer] Initial Evaluation -- Lower Extremity    Evaluation Date: July 2, 2019  Virginia Byers is a 68 year old female with a (B) L/E condition.   Referral: GP  Work mechanical stresses: NA   Employment status: Retired  Leisure mechanical stresses: Walking 3 x week (2 miles)  Functional disability score: NA  VAS score (0-10): 6/10  Patient goals/expectations:  Improve stiffness to be able to walk, go up/down stairs, and rise from chair with greater ease.    HISTORY:    Present symptoms: (B) low back, Lt glut and posterior thigh  Pain quality (sharp/shooting/stabbing/aching/burning/cramping):  Achy, and general stiffness throughout (B) L/E    Present since (onset date): 1.5 years MD referral date 4.8.19    Symptoms (improving/unchaning/worsening):  worsening.      Symptoms commenced as a result of: No apparent reason   Condition occurred in the following environment: Home     Symptoms at onset: As above  Paresthesia (yes/no):  No  Spinal history: Yes, on/off the past 10 or so years   Cough/Sneeze (pos/neg):  Neg    Constant symptoms:   Intermittent symptoms: As above    Symptoms are worse with the following: Always Bending, Always Rising, Always First few steps, Always Standing, Always Walking, Always Stairs, Always Squatting, Always On the move and Time of day - No effect   Symptoms are better with  the following: Always When still    Continued use makes the pain (better/worse/no effect): worse    Disturbed night (yes/no): Yes, sometimes      Pain at rest (yes/no):  No  Site (back/hip/knee/ankle/foot):      Other questions (swelling/clicking/locking/giving way/falling):  None     Previous episodes: No  Previous treatments: None    Specific Questions:  General health (excellent/good/fair/poor):  Good  Pertinent medical history includes: See chart  Medications (nil/NSAIDS/analg/steroids/anticoag/other):  See chart  Medical allergies:  See chart  Imaging (none/Xray/MRI/other):  No  Recent or major surgery (yes/no):  See chart  Night pain (yes/no):  No  Accidents (yes/no):  No  Unexplained weight loss (yes/no):  No  Barriers at home: None  Other red flags: None    Sites for physical examination (back/hip/knee/ankle/foot/other): Back    EXAMINATION    Posture:  Sitting (good/fair/poor): Fair    Correction of Posture (better/worse/no effect/NA): No effect  Standing (good/fair/poor):   Other observations:      Neurological: (NA/motor/sensory/reflexes/dural):    Motor:  Hip Flex Rt 4+/5 Lt 4/5; Quad Rt 5/5 Lt 5-/5; H-S Rt 4+/5 Lt 4/5, painful Lt glut; DF 5/5 (B); PF 5/5 (B)   Dural:  Neg slump (B)    Baselines (pain or functional activity): Pain and stiffness standing/walking, rising from chair, going up/down stairs    Extremities (Hip / Knee / Ankle / Foot): NA    Movement Loss Clarence Mod Min Nil Pain   NT          Passive Movement (+/- over pressure)/(PDM/ERP):  NT  Resisted Test Response (pain): See above  Other Tests: NT    Spine:  Movement loss: Flex WNL (Pulls (B) thigh/calf), Ext Mod loss; SG Rt Mod loss/ERP Lt glut, Lt Mod loss, Pulls Rt lat hip  Effect of repeated movements:   Pre test sx:  2/10 Lt glut pain walking  EIL - NE, NE, Inc ROM (Ext and (B) SGIS w/less pain), Inc strength, Abolishes Lt glut pain walking afterwards  Effect of static positioning: NT  Spine testing (not relevant/relevant/secondary  problem): Relevant    Baseline Symptoms: NA  Repeated Tests Symptom Response Mechanical Response   Active/Passive movement, resisted test, functional test During -  Produce, Abolish, Increase, Decrease, NE After -  Better, Worse, NB, NW, NE Effect -   ? or ? ROM, strength or key functional test No   Effect   NT due to screening lumbar spine       Effect of static positioning       NT         Provisional Classification (Extremity/Spine):  Spine - Derangement - Asymmetrical, unilateral, symptoms above knee      Princicple of Management:   Education:  Posture, specificity of exercise, spine as source of extremity sx's    Equipment provided:  None.  Use of rolled towel for posture correction.  Exercise and dosage:  EIL x 10-15 reps every 2 hrs.  EIS against counter as alternative.    ASSESSMENT/PLAN:    Patient is a 68 year old female with lumbar complaints.    Patient has the following significant findings with corresponding treatment plan.                Diagnosis 1:  LBP    Pain -  self management, education, directional preference exercise and home program  Decreased ROM/flexibility - manual therapy, therapeutic exercise and home program  Decreased joint mobility - manual therapy, therapeutic exercise and home program  Decreased strength - therapeutic exercise, therapeutic activities and home program  Impaired muscle performance - neuro re-education and home program  Decreased function - therapeutic activities and home program  Impaired posture - neuro re-education and home program    Previous and current functional limitations:  (See Goal Flow Sheet for this information)    Short term and Long term goals: (See Goal Flow Sheet for this information)     Communication ability:  Patient appears to be able to clearly communicate and understand verbal and written communication and follow directions correctly.  Treatment Explanation - The following has been discussed with the patient:   RX ordered/plan of  care  Anticipated outcomes  Possible risks and side effects  This patient would benefit from PT intervention to resume normal activities.   Rehab potential is good.    Frequency:  1 X week, once daily  Duration:  for 6 weeks  Discharge Plan:  Achieve all LTG.  Independent in home treatment program.  Reach maximal therapeutic benefit.    Please refer to the daily flowsheet for treatment today, total treatment time and time spent performing 1:1 timed codes.

## 2019-07-10 ENCOUNTER — THERAPY VISIT (OUTPATIENT)
Dept: PHYSICAL THERAPY | Facility: CLINIC | Age: 69
End: 2019-07-10
Payer: MEDICARE

## 2019-07-10 DIAGNOSIS — G89.29 CHRONIC BILATERAL LOW BACK PAIN WITHOUT SCIATICA: ICD-10-CM

## 2019-07-10 DIAGNOSIS — M54.50 CHRONIC BILATERAL LOW BACK PAIN WITHOUT SCIATICA: ICD-10-CM

## 2019-07-10 PROCEDURE — 97110 THERAPEUTIC EXERCISES: CPT | Mod: GP | Performed by: PHYSICAL THERAPIST

## 2019-07-16 ENCOUNTER — THERAPY VISIT (OUTPATIENT)
Dept: PHYSICAL THERAPY | Facility: CLINIC | Age: 69
End: 2019-07-16
Payer: MEDICARE

## 2019-07-16 DIAGNOSIS — G89.29 CHRONIC BILATERAL LOW BACK PAIN WITHOUT SCIATICA: ICD-10-CM

## 2019-07-16 DIAGNOSIS — M54.50 CHRONIC BILATERAL LOW BACK PAIN WITHOUT SCIATICA: ICD-10-CM

## 2019-07-16 PROCEDURE — 97110 THERAPEUTIC EXERCISES: CPT | Mod: GP | Performed by: PHYSICAL THERAPIST

## 2019-07-23 ENCOUNTER — THERAPY VISIT (OUTPATIENT)
Dept: PHYSICAL THERAPY | Facility: CLINIC | Age: 69
End: 2019-07-23
Payer: MEDICARE

## 2019-07-23 DIAGNOSIS — M54.50 CHRONIC BILATERAL LOW BACK PAIN WITHOUT SCIATICA: ICD-10-CM

## 2019-07-23 DIAGNOSIS — G89.29 CHRONIC BILATERAL LOW BACK PAIN WITHOUT SCIATICA: ICD-10-CM

## 2019-07-23 PROCEDURE — 97110 THERAPEUTIC EXERCISES: CPT | Mod: GP | Performed by: PHYSICAL THERAPIST

## 2019-08-06 ENCOUNTER — THERAPY VISIT (OUTPATIENT)
Dept: PHYSICAL THERAPY | Facility: CLINIC | Age: 69
End: 2019-08-06
Payer: MEDICARE

## 2019-08-06 DIAGNOSIS — M54.50 CHRONIC BILATERAL LOW BACK PAIN WITHOUT SCIATICA: ICD-10-CM

## 2019-08-06 DIAGNOSIS — G89.29 CHRONIC BILATERAL LOW BACK PAIN WITHOUT SCIATICA: ICD-10-CM

## 2019-08-06 PROCEDURE — 97110 THERAPEUTIC EXERCISES: CPT | Mod: GP | Performed by: PHYSICAL THERAPIST

## 2019-08-20 ENCOUNTER — THERAPY VISIT (OUTPATIENT)
Dept: PHYSICAL THERAPY | Facility: CLINIC | Age: 69
End: 2019-08-20
Payer: MEDICARE

## 2019-08-20 DIAGNOSIS — M54.50 CHRONIC BILATERAL LOW BACK PAIN WITHOUT SCIATICA: ICD-10-CM

## 2019-08-20 DIAGNOSIS — G89.29 CHRONIC BILATERAL LOW BACK PAIN WITHOUT SCIATICA: ICD-10-CM

## 2019-08-20 PROCEDURE — 97110 THERAPEUTIC EXERCISES: CPT | Mod: GP | Performed by: PHYSICAL THERAPIST

## 2019-08-20 PROCEDURE — 97530 THERAPEUTIC ACTIVITIES: CPT | Mod: GP | Performed by: PHYSICAL THERAPIST

## 2019-08-20 NOTE — PROGRESS NOTES
DISCHARGE REPORT    Progress reporting period is from 7.2.19 to 8.20.19.       SUBJECTIVE  Subjective changes noted by patient:  Pt reports overall pain is much better.  Less pain when walking and sitting.  Much easier rising from chair and feels overall stronger.  Has been consistent with HEP as instructed.  [place] 4 miles without any increased pain.    Current Pain level: 0/10.     Initial Pain level: 6/10.   Changes in function:  Yes (See Goal flowsheet attached for changes in current functional level)  Adverse reaction to treatment or activity: None    OBJECTIVE  Objective: L/S AROM:  Flex WNL; Ext Min loss; SG Min loss (B).  ROM improves after repeated extension.  Strength:  Hip Flex 5-/5 (B); Hip Abd 4+/5 (B); Hip Ext 4+/5 (B).      ASSESSMENT/PLAN  Updated problem list and treatment plan:   Diagnosis 1:  LBP    Pain -  self management, education, directional preference exercise and home program  Decreased ROM/flexibility - therapeutic exercise and home program  Decreased joint mobility - therapeutic exercise and home program  Decreased strength - therapeutic exercise and home program  Impaired muscle performance - neuro re-education and home program  STG/LTGs have been met or progress has been made towards goals:  Yes (See Goal flow sheet completed today.)  Assessment of Progress: The patient's condition is improving.  The patient has met all of their long term goals.  Self Management Plans:  Patient is independent in a home treatment program.  Patient is independent in self management of symptoms.  I have re-evaluated this patient and find that the nature, scope, duration and intensity of the therapy is appropriate for the medical condition of the patient.  Virginia continues to require the following intervention to meet STG and LTG's:  PT intervention is no longer required to meet STG/LTG.    Recommendations:  This patient is ready to be discharged from therapy and continue their home  treatment program.

## 2019-10-22 NOTE — PROGRESS NOTES
[facility]  [address] [place] [address]  [place] [address]  [phone]  Dept: [phone]    PRE-OP EVALUATION:  Today's date: 10/23/2019    Virginia Byers (: 1950) presents for pre-operative evaluation assessment as requested by Dr. Casper.  She requires evaluation and anesthesia risk assessment prior to undergoing surgery/procedure for treatment of Cataract Surgery .    Fax number for surgical facility: [facility]  Fax [phone]  Primary Physician: Chuyita Zaidi  Type of Anesthesia Anticipated: to be determined    Patient has a Health Care Directive or Living Will:  YES     Preop Questions 10/20/2019   Who is doing your surgery? Dimas Casper MD   What are you having done? Cataract surgery   Date of Surgery/Procedure: 2019   Facility or Hospital where procedure/surgery will be performed: [address] [facility]   1.  Do you have a history of Heart attack, stroke, stent, coronary bypass surgery, or other heart surgery? No   2.  Do you ever have any pain or discomfort in your chest? No   3.  Do you have a history of  Heart Failure? No   4.   Are you troubled by shortness of breath when:  walking on a level surface, or up a slight hill, or at night? No   5.  Do you currently have a cold, bronchitis or other respiratory infection? No   6.  Do you have a cough, shortness of breath, or wheezing? No   7.  Do you sometimes get pains in the calves of your legs when you walk? No   8. Do you or anyone in your family have previous history of blood clots? No   9.  Do you or does anyone in your family have a serious bleeding problem such as prolonged bleeding following surgeries or cuts? No   10. Have you ever had problems with anemia or been told to take iron pills? No   11. Have you had any abnormal blood loss such as black, tarry or bloody stools, or abnormal vaginal bleeding? No   12. Have you ever had a blood transfusion?  No   13. Have you or any of your relatives ever had problems with anesthesia? No   14. Do you have sleep apnea, excessive snoring or daytime drowsiness? No   15. Do you have any prosthetic heart valves? No   16. Do you have prosthetic joints? No   17. Is there any chance that you may be pregnant? No         HPI:     HPI related to upcoming procedure: pt here for Preop -she is scheduled for cataract surgery.      HYPERLIPIDEMIA - Patient has a long history of significant Hyperlipidemia requiring medication for treatment with recent good control. Patient reports no problems or side effects with the medication.     HYPERTENSION - Patient has longstanding history of HTN , currently denies any symptoms referable to elevated blood pressure. Specifically denies chest pain, palpitations, dyspnea, orthopnea, PND or peripheral edema. Blood pressure readings have been in normal range. Current medication regimen is as listed below. Patient denies any side effects of medication.       MEDICAL HISTORY:     Patient Active Problem List    Diagnosis Date Noted     Pain in both lower legs 07/02/2019     Priority: Medium     Hypertension goal BP (blood pressure) < 140/90 04/22/2019     Priority: Medium     Personal history of malignant neoplasm of breast 03/28/2016     Priority: Medium     2000       Family history of diabetes mellitus 03/19/2012     Priority: Medium     Glaucoma 03/19/2012     Priority: Medium     Advanced directives, counseling/discussion 01/23/2012     Priority: Medium     Advance Directive Problem List Overview:   Name Relationship Phone    Primary Health Care Agent Aurelio Espinosa Friend [phone]         Alternative Health Care Agent Sariah Patel Sister [phone]   Received outside advance directive. Notary signature present.  scanned and placed behind media tab.  Please see advance directive for specifics. Advance Care Directive reviewed & complete.  Maria G Alcantara RN 3/21/2012    Patient states  has Advance Directive and will bring in a copy to clinic. 2012          Osteoarthritis 2012     Priority: Medium     Hyperlipidemia LDL goal <160 2011     Priority: Medium     S/P mastectomy 2011     Priority: Medium     Osteoporosis 03/15/2010     Priority: Medium     Atopic rhinitis 03/15/2010     Priority: Medium     (Problem list name updated by automated process. Provider to review and confirm.)        Past Medical History:   Diagnosis Date     Breast cancer (H)     Lt breast     Glaucoma     bilateral     Hyperlipidemia      Hypertension      Osteoarthritis      Osteoporosis      Past Surgical History:   Procedure Laterality Date     C MASTECTOMY,SIMPLE  3/2000    left     COLONOSCOPY  2006    Q 10 years     SURGICAL HISTORY OF -  Left 2015    tissue expander placed in left breast area     SURGICAL HISTORY OF -  Left 2016    left breast implant and right mammoplasty     Current Outpatient Medications   Medication Sig Dispense Refill     atorvastatin (LIPITOR) 40 MG tablet Take 1 tablet (40 mg) by mouth daily 90 tablet 3     brinzolamide-brimonidine (SIMBRINZA) 1-0.2 % ophthalmic suspension        latanoprost (XALATAN) 0.005 % ophthalmic solution 1 drop daily.       losartan-hydrochlorothiazide (HYZAAR) 100-12.5 MG tablet Take 1 tablet by mouth daily 90 tablet 3     timolol (TIMOPTIC) 0.25 % ophthalmic solution Place 1 drop into both eyes daily  5     OTC products: None, except as noted above    Allergies   Allergen Reactions     Compazine      Mental confusion     Simvastatin Other (See Comments)     Muscle aches      Latex Allergy: NO    Social History     Tobacco Use     Smoking status: Former Smoker     Packs/day: 1.00     Years: 17.00     Pack years: 17.00     Types: Cigarettes     Last attempt to quit: 3/15/1999     Years since quittin.6     Smokeless tobacco: Never Used   Substance Use Topics     Alcohol use: Yes     Alcohol/week: 4.0 standard drinks     Types: 4  Glasses of wine per week     Comment: occasional     History   Drug Use No     Social History     Socioeconomic History     Marital status: Single     Spouse name: Not on file     Number of children: 0     Years of education: Not on file     Highest education level: Not on file   Occupational History     Employer: [employer]     Occupation: REtired   Social Needs     Financial resource strain: Not on file     Food insecurity:     Worry: Not on file     Inability: Not on file     Transportation needs:     Medical: Not on file     Non-medical: Not on file   Tobacco Use     Smoking status: Former Smoker     Packs/day: 1.00     Years: 17.00     Pack years: 17.00     Types: Cigarettes     Last attempt to quit: 3/15/1999     Years since quittin.6     Smokeless tobacco: Never Used   Substance and Sexual Activity     Alcohol use: Yes     Alcohol/week: 4.0 standard drinks     Types: 4 Glasses of wine per week     Comment: occasional     Drug use: No     Sexual activity: Never   Lifestyle     Physical activity:     Days per week: Not on file     Minutes per session: Not on file     Stress: Not on file   Relationships     Social connections:     Talks on phone: Not on file     Gets together: Not on file     Attends [employer] service: Not on file     Active member of club or organization: Not on file     Attends meetings of clubs or organizations: Not on file     Relationship status: Not on file     Intimate partner violence:     Fear of current or ex partner: Not on file     Emotionally abused: Not on file     Physically abused: Not on file     Forced sexual activity: Not on file   Other Topics Concern     Parent/sibling w/ CABG, MI or angioplasty before 65F 55M? Yes     Comment: Father heart attack   Social History Narrative     Not on file     Family History   Problem Relation Age of Onset     Cancer Mother         bone cancer     Other Cancer Mother      Heart Disease Father      Diabetes Maternal Grandmother      Diabetes  "Paternal Grandmother      Arthritis Sister        REVIEW OF SYSTEMS:   CONSTITUTIONAL: NEGATIVE for fever, chills, change in weight  INTEGUMENTARY/SKIN: NEGATIVE for worrisome rashes, moles or lesions  EYES: as above  ENT/MOUTH: NEGATIVE for ear, mouth and throat problems  RESP: NEGATIVE for significant cough or SOB  BREAST: NEGATIVE for masses, tenderness or discharge  CV: NEGATIVE for chest pain, palpitations or peripheral edema  GI: NEGATIVE for nausea, abdominal pain, heartburn, or change in bowel habits  : NEGATIVE for frequency, dysuria, or hematuria  MUSCULOSKELETAL: NEGATIVE for significant arthralgias or myalgia  NEURO: NEGATIVE for weakness, dizziness or paresthesias  ENDOCRINE: NEGATIVE for temperature intolerance, skin/hair changes  HEME: NEGATIVE for bleeding problems  PSYCHIATRIC: NEGATIVE for changes in mood or affect    EXAM:   /62   Pulse 76   Temp 98.2  F (36.8  C) (Oral)   Resp 16   Ht 1.613 m (5' 3.5\")   Wt 70.7 kg (155 lb 12.8 oz)   SpO2 97%   Breastfeeding? No   BMI 27.17 kg/m      GENERAL APPEARANCE: healthy, alert and no distress     EYES: EOMI, PERRL     HENT: ear canals and TM's normal and nose and mouth without ulcers or lesions     NECK: no adenopathy, no asymmetry, masses, or scars and thyroid normal to palpation     RESP: lungs clear to auscultation - no rales, rhonchi or wheezes     CV: regular rates and rhythm, normal S1 S2, no S3 or S4 and no murmur, click or rub     ABDOMEN:  soft, nontender, no HSM or masses and bowel sounds normal     MS: extremities normal- no gross deformities noted, no evidence of inflammation in joints, FROM in all extremities.     SKIN: no suspicious lesions or rashes     NEURO: Normal strength and tone, sensory exam grossly normal, mentation intact and speech normal     PSYCH: mentation appears normal. and affect normal/bright     LYMPHATICS: No cervical adenopathy    DIAGNOSTICS:   No labs or EKG required for low risk surgery (cataract, skin " procedure, breast biopsy, etc)    Recent Labs   Lab Test 04/22/19  0835 04/08/19  0849 04/04/18  1018  08/27/15  0846   HGB  --   --   --   --  13.6   NA  --  137 138   < >  --    POTASSIUM 3.7 3.8 4.3   < > 3.6   CR  --  0.54 0.65   < >  --     < > = values in this interval not displayed.        IMPRESSION:   Reason for surgery/procedure: as above  Diagnosis/reason for consult: Preop    The proposed surgical procedure is considered LOW risk.    REVISED CARDIAC RISK INDEX  The patient has the following serious cardiovascular risks for perioperative complications such as (MI, PE, VFib and 3  AV Block):  No serious cardiac risks  INTERPRETATION: 0 risks: Class I (very low risk - 0.4% complication rate)    The patient has the following additional risks for perioperative complications:  No identified additional risks      ICD-10-CM    1. Preop general physical exam Z01.818    2. Hypertension goal BP (blood pressure) < 140/90 I10    3. Hyperlipidemia LDL goal <160 E78.5    4. Osteoporosis, unspecified osteoporosis type, unspecified pathological fracture presence M81.0    5. Personal history of malignant neoplasm of breast Z85.3        RECOMMENDATIONS:   APPROVAL GIVEN to proceed with proposed procedure, without further diagnostic evaluation       Signed Electronically by: Chuyita Zaidi MD    Copy of this evaluation report is provided to requesting physician.    [facility] Preop Guidelines    Revised Cardiac Risk Index

## 2019-10-23 ENCOUNTER — OFFICE VISIT (OUTPATIENT)
Dept: FAMILY MEDICINE | Facility: CLINIC | Age: 69
End: 2019-10-23
Payer: MEDICARE

## 2019-10-23 VITALS
HEART RATE: 76 BPM | RESPIRATION RATE: 16 BRPM | DIASTOLIC BLOOD PRESSURE: 62 MMHG | SYSTOLIC BLOOD PRESSURE: 122 MMHG | OXYGEN SATURATION: 97 % | TEMPERATURE: 98.2 F | BODY MASS INDEX: 26.6 KG/M2 | HEIGHT: 64 IN | WEIGHT: 155.8 LBS

## 2019-10-23 DIAGNOSIS — Z85.3 PERSONAL HISTORY OF MALIGNANT NEOPLASM OF BREAST: ICD-10-CM

## 2019-10-23 DIAGNOSIS — M81.0 OSTEOPOROSIS, UNSPECIFIED OSTEOPOROSIS TYPE, UNSPECIFIED PATHOLOGICAL FRACTURE PRESENCE: ICD-10-CM

## 2019-10-23 DIAGNOSIS — I10 HYPERTENSION GOAL BP (BLOOD PRESSURE) < 140/90: ICD-10-CM

## 2019-10-23 DIAGNOSIS — Z01.818 PREOP GENERAL PHYSICAL EXAM: Primary | ICD-10-CM

## 2019-10-23 DIAGNOSIS — E78.5 HYPERLIPIDEMIA LDL GOAL <160: ICD-10-CM

## 2019-10-23 PROCEDURE — 99214 OFFICE O/P EST MOD 30 MIN: CPT | Performed by: FAMILY MEDICINE

## 2019-10-23 ASSESSMENT — MIFFLIN-ST. JEOR: SCORE: 1203.76

## 2019-10-24 ENCOUNTER — TELEPHONE (OUTPATIENT)
Dept: FAMILY MEDICINE | Facility: CLINIC | Age: 69
End: 2019-10-24

## 2019-10-24 DIAGNOSIS — I10 HYPERTENSION GOAL BP (BLOOD PRESSURE) < 140/90: Primary | ICD-10-CM

## 2019-10-24 NOTE — TELEPHONE ENCOUNTER
"Pharmacy comments:    \"Please send new RXs for Losartan 100 AND hydrochlorothiazide 12.5 because the combo product is not available. Thank you.\"  "

## 2019-10-25 RX ORDER — LOSARTAN POTASSIUM 100 MG/1
100 TABLET ORAL DAILY
Qty: 90 TABLET | Refills: 1 | Status: SHIPPED | OUTPATIENT
Start: 2019-10-25 | End: 2020-03-17

## 2019-10-25 RX ORDER — HYDROCHLOROTHIAZIDE 12.5 MG/1
12.5 TABLET ORAL DAILY
Qty: 90 TABLET | Refills: 1 | Status: SHIPPED | OUTPATIENT
Start: 2019-10-25 | End: 2020-03-17

## 2019-10-25 NOTE — TELEPHONE ENCOUNTER
Rx's for losartan and hydrochlorothiazide sent to pharmacy.   Hyzaar removed from MAR.   Patient notified.     Tamika Slade RN

## 2020-03-17 ENCOUNTER — MYC REFILL (OUTPATIENT)
Dept: FAMILY MEDICINE | Facility: CLINIC | Age: 70
End: 2020-03-17

## 2020-03-17 DIAGNOSIS — I10 HYPERTENSION GOAL BP (BLOOD PRESSURE) < 140/90: ICD-10-CM

## 2020-03-17 DIAGNOSIS — E78.5 HYPERLIPIDEMIA LDL GOAL <100: ICD-10-CM

## 2020-03-18 ENCOUNTER — MYC REFILL (OUTPATIENT)
Dept: FAMILY MEDICINE | Facility: CLINIC | Age: 70
End: 2020-03-18

## 2020-03-18 DIAGNOSIS — I10 HYPERTENSION GOAL BP (BLOOD PRESSURE) < 140/90: ICD-10-CM

## 2020-03-18 DIAGNOSIS — E78.5 HYPERLIPIDEMIA LDL GOAL <100: ICD-10-CM

## 2020-03-18 RX ORDER — ATORVASTATIN CALCIUM 40 MG/1
40 TABLET, FILM COATED ORAL DAILY
Qty: 90 TABLET | Refills: 3 | Status: CANCELLED | OUTPATIENT
Start: 2020-03-18

## 2020-03-18 RX ORDER — HYDROCHLOROTHIAZIDE 12.5 MG/1
12.5 TABLET ORAL DAILY
Qty: 90 TABLET | Refills: 1 | Status: CANCELLED | OUTPATIENT
Start: 2020-03-18

## 2020-03-18 RX ORDER — LOSARTAN POTASSIUM 100 MG/1
100 TABLET ORAL DAILY
Qty: 90 TABLET | Refills: 1 | Status: CANCELLED | OUTPATIENT
Start: 2020-03-18

## 2020-03-19 DIAGNOSIS — I10 HYPERTENSION GOAL BP (BLOOD PRESSURE) < 140/90: ICD-10-CM

## 2020-03-19 RX ORDER — ATORVASTATIN CALCIUM 40 MG/1
40 TABLET, FILM COATED ORAL DAILY
Qty: 90 TABLET | Refills: 1 | Status: SHIPPED | OUTPATIENT
Start: 2020-03-19 | End: 2020-07-29

## 2020-03-19 RX ORDER — LOSARTAN POTASSIUM 100 MG/1
100 TABLET ORAL DAILY
Qty: 90 TABLET | Refills: 1 | Status: SHIPPED | OUTPATIENT
Start: 2020-03-19 | End: 2020-07-29

## 2020-03-19 RX ORDER — HYDROCHLOROTHIAZIDE 12.5 MG/1
12.5 TABLET ORAL DAILY
Qty: 90 TABLET | Refills: 1 | Status: SHIPPED | OUTPATIENT
Start: 2020-03-19 | End: 2020-07-29

## 2020-03-20 NOTE — TELEPHONE ENCOUNTER
Duplicate     Disp  Refills  Start  End  ELVIA    losartan (COZAAR) 100 MG tablet  90 tablet  1  3/19/2020   No    Sig - Route: Take 1 tablet (100 mg) by mouth daily - Oral    Sent to pharmacy as: losartan (COZAAR) 100 MG tablet    Class: E-Prescribe    Order: [id number]    E-Prescribing Status: Receipt confirmed by pharmacy (3/19/2020  8:00 AM CDT)

## 2020-03-22 ENCOUNTER — MYC MEDICAL ADVICE (OUTPATIENT)
Dept: FAMILY MEDICINE | Facility: CLINIC | Age: 70
End: 2020-03-22

## 2020-03-22 DIAGNOSIS — I10 HYPERTENSION GOAL BP (BLOOD PRESSURE) < 140/90: ICD-10-CM

## 2020-03-22 RX ORDER — LOSARTAN POTASSIUM 100 MG/1
TABLET ORAL
Qty: 90 TABLET | Refills: 0 | OUTPATIENT
Start: 2020-03-22

## 2020-03-23 ENCOUNTER — MYC REFILL (OUTPATIENT)
Dept: FAMILY MEDICINE | Facility: CLINIC | Age: 70
End: 2020-03-23

## 2020-03-23 DIAGNOSIS — I10 HYPERTENSION GOAL BP (BLOOD PRESSURE) < 140/90: ICD-10-CM

## 2020-03-23 RX ORDER — HYDROCHLOROTHIAZIDE 12.5 MG/1
12.5 TABLET ORAL DAILY
Qty: 90 TABLET | Refills: 1 | Status: CANCELLED | OUTPATIENT
Start: 2020-03-23

## 2020-03-23 RX ORDER — LOSARTAN POTASSIUM 100 MG/1
100 TABLET ORAL DAILY
Qty: 90 TABLET | Refills: 1 | Status: CANCELLED | OUTPATIENT
Start: 2020-03-23

## 2020-03-23 NOTE — TELEPHONE ENCOUNTER
hydrochlorothiazide (HYDRODIURIL) 12.5 MG tablet  90 tablet  1  3/19/2020   No    Sig - Route: Take 1 tablet (12.5 mg) by mouth daily - Oral    Sent to pharmacy as: hydrochlorothiazide (HYDRODIURIL) 12.5 MG tablet    Class: E-Prescribe    Order: [id number]    E-Prescribing Status: Receipt confirmed by pharmacy (3/19/2020  8:00 AM CDT)       Disp  Refills  Start  End  ELVIA    losartan (COZAAR) 100 MG tablet  90 tablet  1  3/19/2020   No    Sig - Route: Take 1 tablet (100 mg) by mouth daily - Oral    Sent to pharmacy as: losartan (COZAAR) 100 MG tablet    Class: E-Prescribe    Order: [id number]    E-Prescribing Status: Receipt confirmed by pharmacy (3/19/2020  8:00 AM CDT)      Duplicate request.   Closing encounter.     Tamika Slade RN

## 2020-03-23 NOTE — TELEPHONE ENCOUNTER
losartan (COZAAR) 100 MG tablet  90 tablet  1  3/19/2020   No    Sig - Route: Take 1 tablet (100 mg) by mouth daily - Oral    Sent to pharmacy as: losartan (COZAAR) 100 MG tablet    Class: E-Prescribe    Order: [id number]    E-Prescribing Status: Receipt confirmed by pharmacy (3/19/2020  8:00 AM CDT)      Duplicate request.   Closing encounter.     Tamika Slade RN

## 2020-03-23 NOTE — TELEPHONE ENCOUNTER
Sent a [employer] message to the patient:    A refills for (listed medications below) of 90 tablets and with 1 refill was sent over to your pharmacy of [facility] [address] [place], [place] on 3/19/2020.     atorvastatin (LIPITOR) 40 MG tablet, hydrochlorothiazide (HYDRODIURIL) 12.5 MG tablet & losartan (COZAAR) 100 MG tablet        Pharmacy : [facility] [address] [place], [place] - [address] [place] [address]    By chance are you using a old prescription number.    Your refills request have been sent to the refill team. New additional refills may not be sent as your pharmacy may already have these on file. Please contact them directly.     Kim Newsome ,  [facility]  [phone]  With Chuyita Zaidi MD

## 2020-07-21 ENCOUNTER — TRANSFERRED RECORDS (OUTPATIENT)
Dept: HEALTH INFORMATION MANAGEMENT | Facility: CLINIC | Age: 70
End: 2020-07-21

## 2020-07-28 DIAGNOSIS — E78.5 HYPERLIPIDEMIA LDL GOAL <100: ICD-10-CM

## 2020-07-28 DIAGNOSIS — I10 HYPERTENSION GOAL BP (BLOOD PRESSURE) < 140/90: ICD-10-CM

## 2020-07-29 RX ORDER — ATORVASTATIN CALCIUM 40 MG/1
TABLET, FILM COATED ORAL
Qty: 30 TABLET | Refills: 0 | Status: SHIPPED | OUTPATIENT
Start: 2020-07-29 | End: 2020-08-19

## 2020-07-29 RX ORDER — HYDROCHLOROTHIAZIDE 12.5 MG/1
TABLET ORAL
Qty: 30 TABLET | Refills: 0 | Status: SHIPPED | OUTPATIENT
Start: 2020-07-29 | End: 2020-08-11

## 2020-07-29 RX ORDER — LOSARTAN POTASSIUM 100 MG/1
TABLET ORAL
Qty: 30 TABLET | Refills: 0 | Status: SHIPPED | OUTPATIENT
Start: 2020-07-29 | End: 2020-08-11

## 2020-07-29 NOTE — TELEPHONE ENCOUNTER
Left a message for Virginia to call the clinic to schedule an appointment. Please help the patient schedule for (Virtual visit Video/ Phone) Provider Follow up appointment about medications. Kim Lozoya,

## 2020-07-29 NOTE — TELEPHONE ENCOUNTER
Chuyita Zaidi MD [facility] 15 minutes ago (12:23 PM)      Needs follow up appointment -next month with me   Routing comment     Chuyita Zaidi MD [facility] 17 minutes ago (12:21 PM)      Call -Please make appointment recheck meds and Physical if due with me   Routing comment

## 2020-07-29 NOTE — TELEPHONE ENCOUNTER
Routing refill request to provider for review/approval because:  Labs not current:  LDL, K+, Cr, Na

## 2020-08-10 NOTE — PROGRESS NOTES
"Virginia Byers is a 69 year old female who is being evaluated via a billable telephone visit.      The patient has been notified of following:     \"This telephone visit will be conducted via a call between you and your physician/provider. We have found that certain health care needs can be provided without the need for a physical exam.  This service lets us provide the care you need with a short phone conversation.  If a prescription is necessary we can send it directly to your pharmacy.  If lab work is needed we can place an order for that and you can then stop by our lab to have the test done at a later time.    Telephone visits are billed at different rates depending on your insurance coverage. During this emergency period, for some insurers they may be billed the same as an in-person visit.  Please reach out to your insurance provider with any questions.    If during the course of the call the physician/provider feels a telephone visit is not appropriate, you will not be charged for this service.\"    Patient has given verbal consent for Telephone visit?  Yes    What phone number would you like to be contacted at? [phone]    How would you like to obtain your AVS? [employer]   6:57 AM-start visit      Subjective     Virginia Byers is a 69 year old female who presents via phone visit today for the following health issues:    HPI    Hyperlipidemia Follow-Up      Are you regularly taking any medication or supplement to lower your cholesterol?   Yes- atorvastatin    Are you having muscle aches or other side effects that you think could be caused by your cholesterol lowering medication?  No    Hypertension Follow-up      Do you check your blood pressure regularly outside of the clinic? Yes     Are you following a low salt diet? Yes    Are your blood pressures ever more than 140 on the top number (systolic) OR more   than 90 on the bottom number (diastolic), for example 140/90? Yes      How many servings of " fruits and vegetables do you eat daily?  0-1    On average, how many sweetened beverages do you drink each day (Examples: soda, juice, sweet tea, etc.  Do NOT count diet or artificially sweetened beverages)?   0    How many days per week do you exercise enough to make your heart beat faster? 3 or less    How many minutes a day do you exercise enough to make your heart beat faster? 20 - 29    How many days per week do you miss taking your medication? 0         Patient Active Problem List   Diagnosis     Osteoporosis     Atopic rhinitis     Hyperlipidemia LDL goal <160     S/P mastectomy     Advanced directives, counseling/discussion     Osteoarthritis     Family history of diabetes mellitus     Glaucoma     Personal history of malignant neoplasm of breast     Hypertension goal BP (blood pressure) < 140/90     Pain in both lower legs     Past Surgical History:   Procedure Laterality Date     C MASTECTOMY,SIMPLE  3/2000    left     COLONOSCOPY  2006    Q 10 years     SURGICAL HISTORY OF -  Left 2015    tissue expander placed in left breast area     SURGICAL HISTORY OF -  Left 2016    left breast implant and right mammoplasty       Social History     Tobacco Use     Smoking status: Former Smoker     Packs/day: 1.00     Years: 17.00     Pack years: 17.00     Types: Cigarettes     Last attempt to quit: 3/15/1999     Years since quittin.4     Smokeless tobacco: Never Used   Substance Use Topics     Alcohol use: Yes     Alcohol/week: 4.0 standard drinks     Types: 4 Glasses of wine per week     Comment: occasional     Family History   Problem Relation Age of Onset     Cancer Mother         bone cancer     Other Cancer Mother      Heart Disease Father      Diabetes Maternal Grandmother      Diabetes Paternal Grandmother      Arthritis Sister          Current Outpatient Medications   Medication Sig Dispense Refill     atorvastatin (LIPITOR) 40 MG tablet TAKE 1 TABLET BY MOUTH EVERY DAY 30 tablet 0      brinzolamide-brimonidine (SIMBRINZA) 1-0.2 % ophthalmic suspension        hydrochlorothiazide (HYDRODIURIL) 12.5 MG tablet Take 1 tablet (12.5 mg) by mouth daily 90 tablet 0     latanoprost (XALATAN) 0.005 % ophthalmic solution 1 drop daily.       losartan (COZAAR) 100 MG tablet Take 1 tablet (100 mg) by mouth daily 90 tablet 0     timolol (TIMOPTIC) 0.25 % ophthalmic solution Place 1 drop into both eyes daily  5     Allergies   Allergen Reactions     Compazine      Mental confusion     Prochlorperazine Visual Disturbance     Simvastatin Other (See Comments)     Muscle aches     BP Readings from Last 3 Encounters:   10/23/19 122/62   04/22/19 128/70   04/08/19 158/78    Wt Readings from Last 3 Encounters:   10/23/19 70.7 kg (155 lb 12.8 oz)   04/22/19 70.8 kg (156 lb)   04/08/19 72.6 kg (160 lb)                    Reviewed and updated as needed this visit by Provider  Tobacco  Allergies  Meds  Problems  Med Hx  Surg Hx  Fam Hx         Review of Systems   CONSTITUTIONAL: NEGATIVE for fever, chills, change in weight  ENT/MOUTH: NEGATIVE for ear, mouth and throat problems  RESP: NEGATIVE for significant cough or SOB  CV: NEGATIVE for chest pain, palpitations or peripheral edema  MUSCULOSKELETAL: NEGATIVE for significant arthralgias or myalgia  PSYCHIATRIC: NEGATIVE for changes in mood or affect       Objective    BP -130's/73  134/79  Pulse -?  Reported vitals:  There were no vitals taken for this visit.   healthy, alert and no distress  PSYCH: Alert and oriented times 3; coherent speech, normal   rate and volume, able to articulate logical thoughts, able   to abstract reason, no tangential thoughts, no hallucinations   or delusions  Her affect is normal  RESP: No cough, no audible wheezing, able to talk in full sentences  Remainder of exam unable to be completed due to telephone visits    Diagnostic Test Results:  Labs reviewed in Epic  Pending         Assessment/Plan:    1. Hyperlipidemia LDL goal <160  Labs  pending   Refills done    2. Hypertension goal BP (blood pressure) < 140/90  Stable  Per pt    3. Personal history of malignant neoplasm of breast   advised annual Mammogram    4. Family history of diabetes mellitus  Labs pending     5. Osteoporosis, unspecified osteoporosis type, unspecified pathological fracture presence  Discussed starting Fosamax-pt will discuss with me t her visita      Return in about 1 month (around 9/11/2020) for Physical Exam.    7:12AM    Phone call duration:  As above/ minutes    Chuyita Zaidi MD

## 2020-08-11 ENCOUNTER — VIRTUAL VISIT (OUTPATIENT)
Dept: FAMILY MEDICINE | Facility: CLINIC | Age: 70
End: 2020-08-11
Payer: MEDICARE

## 2020-08-11 DIAGNOSIS — E78.5 HYPERLIPIDEMIA LDL GOAL <160: Primary | ICD-10-CM

## 2020-08-11 DIAGNOSIS — M81.0 OSTEOPOROSIS, UNSPECIFIED OSTEOPOROSIS TYPE, UNSPECIFIED PATHOLOGICAL FRACTURE PRESENCE: ICD-10-CM

## 2020-08-11 DIAGNOSIS — Z83.3 FAMILY HISTORY OF DIABETES MELLITUS: ICD-10-CM

## 2020-08-11 DIAGNOSIS — Z85.3 PERSONAL HISTORY OF MALIGNANT NEOPLASM OF BREAST: ICD-10-CM

## 2020-08-11 DIAGNOSIS — I10 HYPERTENSION GOAL BP (BLOOD PRESSURE) < 140/90: ICD-10-CM

## 2020-08-11 PROCEDURE — 99442 ZZC PHYSICIAN TELEPHONE EVALUATION 11-20 MIN: CPT | Performed by: FAMILY MEDICINE

## 2020-08-11 RX ORDER — HYDROCHLOROTHIAZIDE 12.5 MG/1
12.5 TABLET ORAL DAILY
Qty: 90 TABLET | Refills: 0 | Status: SHIPPED | OUTPATIENT
Start: 2020-08-11 | End: 2020-09-17

## 2020-08-11 RX ORDER — LOSARTAN POTASSIUM 100 MG/1
100 TABLET ORAL DAILY
Qty: 90 TABLET | Refills: 0 | Status: SHIPPED | OUTPATIENT
Start: 2020-08-11 | End: 2020-10-30

## 2020-08-12 DIAGNOSIS — I10 HYPERTENSION GOAL BP (BLOOD PRESSURE) < 140/90: ICD-10-CM

## 2020-08-12 DIAGNOSIS — E78.5 HYPERLIPIDEMIA LDL GOAL <160: ICD-10-CM

## 2020-08-12 LAB
ANION GAP SERPL CALCULATED.3IONS-SCNC: 6 MMOL/L (ref 3–14)
BUN SERPL-MCNC: 14 MG/DL (ref 7–30)
CALCIUM SERPL-MCNC: 9.5 MG/DL (ref 8.5–10.1)
CHLORIDE SERPL-SCNC: 102 MMOL/L (ref 94–109)
CHOLEST SERPL-MCNC: 200 MG/DL
CO2 SERPL-SCNC: 30 MMOL/L (ref 20–32)
CREAT SERPL-MCNC: 0.63 MG/DL (ref 0.52–1.04)
GFR SERPL CREATININE-BSD FRML MDRD: >90 ML/MIN/{1.73_M2}
GLUCOSE SERPL-MCNC: 103 MG/DL (ref 70–99)
HDLC SERPL-MCNC: 55 MG/DL
LDLC SERPL CALC-MCNC: 102 MG/DL
NONHDLC SERPL-MCNC: 145 MG/DL
POTASSIUM SERPL-SCNC: 4.1 MMOL/L (ref 3.4–5.3)
SODIUM SERPL-SCNC: 138 MMOL/L (ref 133–144)
TRIGL SERPL-MCNC: 216 MG/DL

## 2020-08-12 PROCEDURE — 80048 BASIC METABOLIC PNL TOTAL CA: CPT | Performed by: FAMILY MEDICINE

## 2020-08-12 PROCEDURE — 36415 COLL VENOUS BLD VENIPUNCTURE: CPT | Performed by: FAMILY MEDICINE

## 2020-08-12 PROCEDURE — 80061 LIPID PANEL: CPT | Performed by: FAMILY MEDICINE

## 2020-08-19 DIAGNOSIS — E78.5 HYPERLIPIDEMIA LDL GOAL <100: ICD-10-CM

## 2020-08-19 RX ORDER — ATORVASTATIN CALCIUM 40 MG/1
40 TABLET, FILM COATED ORAL DAILY
Qty: 90 TABLET | Refills: 3 | Status: SHIPPED | OUTPATIENT
Start: 2020-08-19 | End: 2020-09-17

## 2020-08-24 DIAGNOSIS — I10 HYPERTENSION GOAL BP (BLOOD PRESSURE) < 140/90: ICD-10-CM

## 2020-08-24 NOTE — TELEPHONE ENCOUNTER
Duplicate, 1st request.    losartan (COZAAR) 100 MG tablet  90 tablet  0  8/11/2020   No    Sig - Route: Take 1 tablet (100 mg) by mouth daily - Oral    Sent to pharmacy as: Losartan Potassium 100 MG Oral Tablet (COZAAR)    Class: E-Prescribe    Order: [id number]    E-Prescribing Status: Receipt confirmed by pharmacy (8/11/2020  7:10 AM CDT)      Karol CALDERON CMA ([facility])

## 2020-08-24 NOTE — TELEPHONE ENCOUNTER
Duplicate, 1st request.    hydrochlorothiazide (HYDRODIURIL) 12.5 MG tablet  90 tablet  0  8/11/2020   No    Sig - Route: Take 1 tablet (12.5 mg) by mouth daily - Oral    Sent to pharmacy as: hydroCHLOROthiazide 12.5 MG Oral Tablet (HYDRODIURIL)    Class: E-Prescribe    Order: [id number]    E-Prescribing Status: Receipt confirmed by pharmacy (8/11/2020  7:10 AM CDT)      Karol CALDERON CMA ([facility])

## 2020-08-25 RX ORDER — LOSARTAN POTASSIUM 100 MG/1
TABLET ORAL
Qty: 30 TABLET | Refills: 0 | OUTPATIENT
Start: 2020-08-25

## 2020-08-25 RX ORDER — HYDROCHLOROTHIAZIDE 12.5 MG/1
TABLET ORAL
Qty: 30 TABLET | Refills: 0 | OUTPATIENT
Start: 2020-08-25

## 2020-08-25 NOTE — TELEPHONE ENCOUNTER
Rx was sent for 3 month supply to [facility] at [address] [place] [address]. Message sent to call them to transfer script if patient wanting to switch pharmacy.

## 2020-09-16 ASSESSMENT — ENCOUNTER SYMPTOMS
PALPITATIONS: 0
HEARTBURN: 0
DIARRHEA: 0
BREAST MASS: 0
HEMATOCHEZIA: 0
CHILLS: 0
ABDOMINAL PAIN: 0
HEADACHES: 0
ARTHRALGIAS: 1
COUGH: 0
JOINT SWELLING: 0
CONSTIPATION: 0
MYALGIAS: 1
NERVOUS/ANXIOUS: 0
DYSURIA: 0
WEAKNESS: 0
SORE THROAT: 0
FEVER: 0
FREQUENCY: 0
HEMATURIA: 0
PARESTHESIAS: 0
NAUSEA: 0
EYE PAIN: 0
SHORTNESS OF BREATH: 0
DIZZINESS: 0

## 2020-09-16 ASSESSMENT — ACTIVITIES OF DAILY LIVING (ADL): CURRENT_FUNCTION: NO ASSISTANCE NEEDED

## 2020-09-17 ENCOUNTER — OFFICE VISIT (OUTPATIENT)
Dept: FAMILY MEDICINE | Facility: CLINIC | Age: 70
End: 2020-09-17
Payer: MEDICARE

## 2020-09-17 VITALS
DIASTOLIC BLOOD PRESSURE: 62 MMHG | HEART RATE: 83 BPM | SYSTOLIC BLOOD PRESSURE: 114 MMHG | OXYGEN SATURATION: 97 % | WEIGHT: 157 LBS | HEIGHT: 64 IN | RESPIRATION RATE: 16 BRPM | TEMPERATURE: 98 F | BODY MASS INDEX: 26.8 KG/M2

## 2020-09-17 DIAGNOSIS — M81.0 OSTEOPOROSIS, UNSPECIFIED OSTEOPOROSIS TYPE, UNSPECIFIED PATHOLOGICAL FRACTURE PRESENCE: ICD-10-CM

## 2020-09-17 DIAGNOSIS — E78.5 HYPERLIPIDEMIA LDL GOAL <160: ICD-10-CM

## 2020-09-17 DIAGNOSIS — Z00.00 ENCOUNTER FOR MEDICARE ANNUAL WELLNESS EXAM: ICD-10-CM

## 2020-09-17 DIAGNOSIS — Z71.89 ADVANCED DIRECTIVES, COUNSELING/DISCUSSION: ICD-10-CM

## 2020-09-17 DIAGNOSIS — Z90.12 STATUS POST LEFT MASTECTOMY: ICD-10-CM

## 2020-09-17 DIAGNOSIS — I10 HYPERTENSION GOAL BP (BLOOD PRESSURE) < 140/90: ICD-10-CM

## 2020-09-17 DIAGNOSIS — Z85.3 PERSONAL HISTORY OF MALIGNANT NEOPLASM OF BREAST: ICD-10-CM

## 2020-09-17 DIAGNOSIS — Z23 NEED FOR PROPHYLACTIC VACCINATION AND INOCULATION AGAINST INFLUENZA: ICD-10-CM

## 2020-09-17 PROCEDURE — G0439 PPPS, SUBSEQ VISIT: HCPCS | Performed by: FAMILY MEDICINE

## 2020-09-17 PROCEDURE — G0008 ADMIN INFLUENZA VIRUS VAC: HCPCS | Performed by: FAMILY MEDICINE

## 2020-09-17 PROCEDURE — 90662 IIV NO PRSV INCREASED AG IM: CPT | Performed by: FAMILY MEDICINE

## 2020-09-17 RX ORDER — ROSUVASTATIN CALCIUM 10 MG/1
10 TABLET, COATED ORAL DAILY
Qty: 30 TABLET | Refills: 0 | Status: SHIPPED | OUTPATIENT
Start: 2020-09-17 | End: 2020-09-28

## 2020-09-17 RX ORDER — ALENDRONATE SODIUM 70 MG/1
70 TABLET ORAL
Qty: 4 TABLET | Refills: 3 | Status: SHIPPED | OUTPATIENT
Start: 2020-09-17 | End: 2020-12-17

## 2020-09-17 RX ORDER — HYDROCHLOROTHIAZIDE 12.5 MG/1
12.5 TABLET ORAL DAILY
Qty: 90 TABLET | Refills: 3 | Status: SHIPPED | OUTPATIENT
Start: 2020-09-17 | End: 2021-09-30

## 2020-09-17 ASSESSMENT — ENCOUNTER SYMPTOMS
HEARTBURN: 0
COUGH: 0
FREQUENCY: 0
FEVER: 0
NERVOUS/ANXIOUS: 0
BREAST MASS: 0
DIZZINESS: 0
JOINT SWELLING: 0
HEMATURIA: 0
PARESTHESIAS: 0
MYALGIAS: 1
SHORTNESS OF BREATH: 0
CHILLS: 0
HEMATOCHEZIA: 0
ARTHRALGIAS: 1
DIARRHEA: 0
HEADACHES: 0
DYSURIA: 0
WEAKNESS: 0
CONSTIPATION: 0
EYE PAIN: 0
NAUSEA: 0
ABDOMINAL PAIN: 0
PALPITATIONS: 0
SORE THROAT: 0

## 2020-09-17 ASSESSMENT — MIFFLIN-ST. JEOR: SCORE: 1209.21

## 2020-09-17 ASSESSMENT — ACTIVITIES OF DAILY LIVING (ADL): CURRENT_FUNCTION: NO ASSISTANCE NEEDED

## 2020-09-17 ASSESSMENT — PAIN SCALES - GENERAL: PAINLEVEL: NO PAIN (0)

## 2020-09-17 NOTE — PATIENT INSTRUCTIONS
"  Patient Education   Personalized Prevention Plan  You are due for the preventive services outlined below.  Your care team is available to assist you in scheduling these services.  If you have already completed any of these items, please share that information with your care team to update in your medical record.  Health Maintenance Due   Topic Date Due     Zoster (Shingles) Vaccine (2 of 3) 05/10/2010     Discuss Advance Care Planning  01/23/2017     Annual Wellness Visit  04/08/2020     Flu Vaccine (1) 09/01/2020       Please take coenzyme q daily with your statins-Crestor  Sahil start Fosamax ones a week with a tall glass of water early in the Morning on a empty stomach  -Do not Lie down or eat anything for 30 minutes  Sincerely,  Chuyita Zaidi MD    Patient Education     Controlling Your Cholesterol  Cholesterol is a waxy substance. It travels in your blood through the blood vessels. When you have high cholesterol, it can build up along the walls of the blood vessels. This makes the vessels narrower and decreases blood flow. You are then at greater risk of having a heart attack or a stroke.  Good and bad cholesterol  Lipids are fats, and blood is mostly water. Fat and water don't mix. So our bodies need lipoproteins (lipids inside a protein shell) to carry the lipids. The protein shell carries its lipids through the bloodstream. There are two main kinds of lipoproteins:    LDL (low-density lipoprotein) is known as \"bad cholesterol.\" It mainly carries cholesterol. It delivers this cholesterol to body cells. Excess LDL cholesterol will build up in artery walls. This increases your risk for heart disease and stroke.    HDL (high-density lipoprotein) is known as \"good cholesterol.\" This protein shell collects excess cholesterol that LDLs have left behind on blood vessel walls. That's why high levels of HDL cholesterol can decrease your risk of heart disease and stroke.  Controlling cholesterol levels  Total " cholesterol includes LDL and HDL cholesterol, as well as other fats in the bloodstream. If your total cholesterol is high, follow the steps below to help lower your total cholesterol level:  Eat less unhealthy fat    Cut back on saturated fats and trans fats (also called hydrogenated) by selecting lean cuts of meat, low-fat dairy, and using oils instead of solid fats. Limit baked goods, processed meats, and fried foods. A diet that s high in these fats increases your bad cholesterol. It's not enough to just cut back on foods containing cholesterol.    Eat about 2 servings of fish per week. Most fish contain omega-3 fatty acids. These help lower blood cholesterol.    Eat more whole grains and soluble fiber (such as oat bran). These lower overall cholesterol.  Be active    Choose an activity you enjoy. Walking, swimming, and riding a bike are some good ways to be active.    Start at a level where you feel comfortable. Increase your time and pace a little each week.    Work up to 30 to 40 minutes of moderate to high intensity physical activity at least 3 to 4 days per week.    Remember, some activity is better than none.    If you haven't been exercising regularly, start slowly. Check with your healthcare provider to make sure the exercise plan is right for you.  Quit smoking  Quitting smoking can improve your lipid levels. It also lowers your risk for heart disease and stroke.  Manage your weight  If you are overweight or obese, your healthcare provider will work with you to lose weight and lower your BMI (body mass index) to a normal or near-normal level. Making diet changes and increasing physical activity can help.  Take medicine as directed  Many people need medicine to get their LDL levels to a safe level. Medicine to lower cholesterol levels is effective and safe. Taking medicine is not a substitute for exercise or watching your diet! Your healthcare provider can tell you whether you might benefit from a  cholesterol-lowering medicine.  Date Last Reviewed: 6/1/2017 2000-2019 [employer]. [address] [place] [id number]. All rights reserved. This information is not intended as a substitute for professional medical care. Always follow your healthcare professional's instructions.           Patient Education     Preventing Osteoporosis: Avoiding Bone Loss  Certain factors can speed up bone loss or decrease bone growth. For example, alcohol, cigarettes, and certain medicines reduce bone mass. Some foods make it hard for your body to absorb calcium.    Things to avoid  Here are things to avoid to help prevent osteoporosis:    Alcohol. This is toxic to bones. It is a major cause of bone loss. Heavy drinking can cause osteoporosis even if you have no other risk factors.    Smoking. This reduces bone mass. Smoking may also interfere with estrogen levels and cause early menopause.    Inactivity. Not being active makes your bones lose strength and become thinner. Over time, thin bones may break. Women who aren't active are at a high risk for osteoporosis.    Certain medicines. Some medicines, such as cortisone, increase bone loss. They also decrease bone growth. Ask your healthcare provider about any side effects of your medicines, and how to prevent them.    Protein-rich or salty foods. Eaten in large amounts, these foods may deplete calcium.    Caffeine. This increases calcium loss. People who drink a lot of coffee, tea, or soda lose more calcium than those who don't.  Date Last Reviewed: 5/1/2018 2000-2019 [employer]. [address] [place] [id number]. All rights reserved. This information is not intended as a substitute for professional medical care. Always follow your healthcare professional's instructions.           Patient Education     Alendronate tablets  Brand Name: Fosamax  What is this medicine?  ALENDRONATE (a WARREN droe terese) slows calcium loss from bones. It  helps to make normal healthy bone and to slow bone loss in people with Paget's disease and osteoporosis. It may be used in others at risk for bone loss.  How should I use this medicine?  You must take this medicine exactly as directed or you will lower the amount of the medicine you absorb into your body or you may cause yourself harm. Take this medicine by mouth first thing in the morning, after you are up for the day. Do not eat or drink anything before you take your medicine. Swallow the tablet with a full glass (6 to 8 fluid ounces) of plain water. Do not take this medicine with any other drink. Do not chew or crush the tablet. After taking this medicine, do not eat breakfast, drink, or take any medicines or vitamins for at least 30 minutes. Sit or stand up for at least 30 minutes after you take this medicine; do not lie down. Do not take your medicine more often than directed.  Talk to your pediatrician regarding the use of this medicine in children. Special care may be needed.  What side effects may I notice from receiving this medicine?  Side effects that you should report to your doctor or health care professional as soon as possible:    allergic reactions like skin rash, itching or hives, swelling of the face, lips, or tongue    black or tarry stools    bone, muscle or joint pain    changes in vision    chest pain    heartburn or stomach pain    jaw pain, especially after dental work    pain or trouble when swallowing    redness, blistering, peeling or loosening of the skin, including inside the mouth  Side effects that usually do not require medical attention (report to your doctor or health care professional if they continue or are bothersome):    changes in taste    diarrhea or constipation    eye pain or itching    headache    nausea or vomiting    stomach gas or fullness  What may interact with this medicine?    aluminum hydroxide    antacids    aspirin    calcium supplements    drugs for inflammation  like ibuprofen, naproxen, and others    iron supplements    magnesium supplements    vitamins with minerals    What if I miss a dose?  If you miss a dose, do not take it later in the day. Continue your normal schedule starting the next morning. Do not take double or extra doses.  Where should I keep my medicine?  Keep out of the reach of children.  Store at room temperature of 15 and 30 degrees C (59 and 86 degrees F). Throw away any unused medicine after the expiration date.  What should I tell my health care provider before I take this medicine?  They need to know if you have any of these conditions:    dental disease    esophagus, stomach, or intestine problems, like acid reflux or GERD    kidney disease    low blood calcium    low vitamin D    problems sitting or standing 30 minutes    trouble swallowing    an unusual or allergic reaction to alendronate, other medicines, foods, dyes, or preservatives    pregnant or trying to get pregnant    breast-feeding  What should I watch for while using this medicine?  Visit your doctor or health care professional for regular checks ups. It may be some time before you see benefit from this medicine. Do not stop taking your medicine except on your doctor's advice. Your doctor or health care professional may order blood tests and other tests to see how you are doing.  You should make sure you get enough calcium and vitamin D while you are taking this medicine, unless your doctor tells you not to. Discuss the foods you eat and the vitamins you take with your health care professional.  Some people who take this medicine have severe bone, joint, and/or muscle pain. This medicine may also increase your risk for a broken thigh bone. Tell your doctor right away if you have pain in your upper leg or groin. Tell your doctor if you have any pain that does not go away or that gets worse.  This medicine can make you more sensitive to the sun. If you get a rash while taking this  medicine, sunlight may cause the rash to get worse. Keep out of the sun. If you cannot avoid being in the sun, wear protective clothing and use sunscreen. Do not use sun lamps or tanning beds/booths.  NOTE:This sheet is a summary. It may not cover all possible information. If you have questions about this medicine, talk to your doctor, pharmacist, or health care provider. Copyright  2019 [employer]

## 2020-09-17 NOTE — PROGRESS NOTES
"SUBJECTIVE:   [place] Byers is a 69 year old female who presents for Preventive Visit.      Patient has been advised of split billing requirements and indicates understanding: Yes     Are you in the first 12 months of your Medicare coverage?  No    Healthy Habits:     In general, how would you rate your overall health?  Good    Frequency of exercise:  2-3 days/week    Duration of exercise:  Less than 15 minutes    Do you usually eat at least 4 servings of fruit and vegetables a day, include whole grains    & fiber and avoid regularly eating high fat or \"junk\" foods?  Yes    Taking medications regularly:  0    Medication side effects:  Muscle aches    Ability to successfully perform activities of daily living:  No assistance needed    Home Safety:  No safety concerns identified    Hearing Impairment:  No hearing concerns    In the past 6 months, have you been bothered by leaking of urine?  No    In general, how would you rate your overall mental or emotional health?  Good      PHQ-2 Total Score: 0    Additional concerns today:  No  History of Present Illness        Hyperlipidemia:  She presents for follow up of hyperlipidemia.  She is taking medication to lower cholesterol. She is having myalgia or other side effects to statin medications.    Hypertension: She presents for follow up of hypertension.  She does check blood pressure  regularly outside of the clinic. Outpatient blood pressures have not been over 140/90. She follows a low salt diet.     She eats 4 or more servings of fruits and vegetables daily.She consumes 0 sweetened beverage(s) daily.She exercises with enough effort to increase her heart rate 10 to 19 minutes per day.  She exercises with enough effort to increase her heart rate 3 or less days per week.   She is taking medications regularly.    Do you feel safe in your environment? Yes    Have you ever done Advance Care Planning? (For example, a Health Directive, POLST, or a discussion with a " medical provider or your loved ones about your wishes): Yes, advance care planning is on file.    Not dne  Fall risk  Fallen 2 or more times in the past year?: No  Any fall with injury in the past year?: No    Cognitive Screening   1) Repeat 3 items (Leader, Season, Table)    2) Clock draw: NORMAL  3) 3 item recall: Recalls 3 objects  Results: 3 items recalled: COGNITIVE IMPAIRMENT LESS LIKELY    Mini-CogTM Copyright MARIA LUZ Mcginnis. Licensed by the author for use in [facility]; reprinted with permission (vinh@[facility]). All rights reserved.      Do you have sleep apnea, excessive snoring or daytime drowsiness?: no    Reviewed and updated as needed this visit by clinical staff         Reviewed and updated as needed this visit by Provider        Social History     Tobacco Use     Smoking status: Former Smoker     Packs/day: 1.00     Years: 17.00     Pack years: 17.00     Types: Cigarettes     Last attempt to quit: 3/15/1999     Years since quittin.5     Smokeless tobacco: Never Used   Substance Use Topics     Alcohol use: Yes     Alcohol/week: 4.0 standard drinks     Types: 4 Glasses of wine per week     Comment: occasional     If you drink alcohol do you typically have >3 drinks per day or >7 drinks per week? No    Alcohol Use 2020   Prescreen: >3 drinks/day or >7 drinks/week? No   Prescreen: >3 drinks/day or >7 drinks/week? -   No flowsheet data found.            Current providers sharing in care for this patient include:   Patient Care Team:  Chuyita Zaidi MD as PCP - General (Family Practice)  Chuyita Zaidi MD as Assigned PCP    The following health maintenance items are reviewed in Epic and correct as of today:  Health Maintenance   Topic Date Due     ZOSTER IMMUNIZATION (2 of 3) 05/10/2010     ADVANCE CARE PLANNING  2017     MEDICARE ANNUAL WELLNESS VISIT  2020     INFLUENZA VACCINE (1) 2020     FALL RISK ASSESSMENT  2021     BMP  2021     COLORECTAL CANCER SCREENING   2022     MAMMO SCREENING  2022     LIPID  2025     DTAP/TDAP/TD IMMUNIZATION (4 - Td) 2026     DEXA  Completed     HEPATITIS C SCREENING  Completed     PHQ-2  Completed     PNEUMOCOCCAL IMMUNIZATION 65+ LOW/MEDIUM RISK  Completed     IPV IMMUNIZATION  Aged Out     MENINGITIS IMMUNIZATION  Aged Out     HEPATITIS B IMMUNIZATION  Aged Out     Lab work is in process  Labs reviewed in EPIC  BP Readings from Last 3 Encounters:   20 114/62   10/23/19 122/62   19 128/70    Wt Readings from Last 3 Encounters:   20 71.2 kg (157 lb)   10/23/19 70.7 kg (155 lb 12.8 oz)   19 70.8 kg (156 lb)                  Patient Active Problem List   Diagnosis     Osteoporosis     Atopic rhinitis     Hyperlipidemia LDL goal <160     S/P mastectomy     Advanced directives, counseling/discussion     Osteoarthritis     Family history of diabetes mellitus     Glaucoma     Personal history of malignant neoplasm of breast     Hypertension goal BP (blood pressure) < 140/90     Pain in both lower legs     Past Surgical History:   Procedure Laterality Date     C MASTECTOMY,SIMPLE  3/2000    left     COLONOSCOPY  2006    Q 10 years     SURGICAL HISTORY OF -  Left 2015    tissue expander placed in left breast area     SURGICAL HISTORY OF -  Left 2016    left breast implant and right mammoplasty       Social History     Tobacco Use     Smoking status: Former Smoker     Packs/day: 1.00     Years: 17.00     Pack years: 17.00     Types: Cigarettes     Last attempt to quit: 3/15/1999     Years since quittin.5     Smokeless tobacco: Never Used   Substance Use Topics     Alcohol use: Yes     Alcohol/week: 4.0 standard drinks     Types: 4 Glasses of wine per week     Comment: occasional     Family History   Problem Relation Age of Onset     Cancer Mother         bone cancer     Other Cancer Mother      Heart Disease Father      Diabetes Maternal Grandmother      Diabetes Paternal Grandmother       Arthritis Sister          Current Outpatient Medications   Medication Sig Dispense Refill     alendronate (FOSAMAX) 70 MG tablet Take 1 tablet (70 mg) by mouth every 7 days 4 tablet 3     hydrochlorothiazide (HYDRODIURIL) 12.5 MG tablet Take 1 tablet (12.5 mg) by mouth daily 90 tablet 3     latanoprost (XALATAN) 0.005 % ophthalmic solution 1 drop daily.       losartan (COZAAR) 100 MG tablet Take 1 tablet (100 mg) by mouth daily 90 tablet 0     rosuvastatin (CRESTOR) 10 MG tablet Take 1 tablet (10 mg) by mouth daily 30 tablet 0     timolol (TIMOPTIC) 0.25 % ophthalmic solution Place 1 drop into both eyes daily  5     Allergies   Allergen Reactions     Compazine      Mental confusion     Prochlorperazine Visual Disturbance     Simvastatin Other (See Comments)     Muscle aches     Recent Labs   Lab Test 08/12/20  0824 05/17/19  0752 04/22/19  0835 04/08/19  0849 04/04/18  1018  03/28/16  0842   * 101*  --  117* 137*   < > 157*   HDL 55 56  --  61 58   < > 64   TRIG 216* 87  --  222* 281*   < > 159*   ALT  --   --   --  30 33  --  31   CR 0.63  --   --  0.54 0.65   < > 0.65   GFRESTIMATED >90  --   --  >90 >90   < > >90  Non  GFR Calc     GFRESTBLACK >90  --   --  >90 >90   < > >90  African American GFR Calc     POTASSIUM 4.1  --  3.7 3.8 4.3   < > 4.1   TSH  --   --   --  1.25  --   --   --     < > = values in this interval not displayed.          Review of Systems   Constitutional: Negative for chills and fever.   HENT: Negative for congestion, ear pain, hearing loss and sore throat.    Eyes: Negative for pain and visual disturbance.   Respiratory: Negative for cough and shortness of breath.    Cardiovascular: Negative for chest pain, palpitations and peripheral edema.   Gastrointestinal: Negative for abdominal pain, constipation, diarrhea, heartburn, hematochezia and nausea.   Breasts:  Negative for tenderness, breast mass and discharge.   Genitourinary: Negative for dysuria, frequency, genital  "sores, hematuria, pelvic pain, urgency, vaginal bleeding and vaginal discharge.   Musculoskeletal: Positive for arthralgias and myalgias. Negative for joint swelling.   Skin: Negative for rash.   Neurological: Negative for dizziness, weakness, headaches and paresthesias.   Psychiatric/Behavioral: Negative for mood changes. The patient is not nervous/anxious.      CONSTITUTIONAL: NEGATIVE for fever, chills, change in weight  INTEGUMENTARY/SKIN: NEGATIVE for worrisome rashes, moles or lesions  EYES: NEGATIVE for vision changes or irritation  ENT/MOUTH: NEGATIVE for ear, mouth and throat problems  RESP: NEGATIVE for significant cough or SOB  BREAST: NEGATIVE for masses, tenderness or discharge  CV: NEGATIVE for chest pain, palpitations or peripheral edema  GI: NEGATIVE for nausea, abdominal pain, heartburn, or change in bowel habits  : NEGATIVE for frequency, dysuria, or hematuria  MUSCULOSKELETAL:myalgia due to statins  NEURO: NEGATIVE for weakness, dizziness or paresthesias  ENDOCRINE: NEGATIVE for temperature intolerance, skin/hair changes  HEME: NEGATIVE for bleeding problems  PSYCHIATRIC: NEGATIVE for changes in mood or affect    OBJECTIVE:   There were no vitals taken for this visit. Estimated body mass index is 27.17 kg/m  as calculated from the following:    Height as of 10/23/19: 1.613 m (5' 3.5\").    Weight as of 10/23/19: 70.7 kg (155 lb 12.8 oz).  Physical Exam  GENERAL APPEARANCE: healthy, alert and no distress  EYES: Eyes grossly normal to inspection, PERRL and conjunctivae and sclerae normal  HENT: ear canals and TM's normal, nose and mouth without ulcers or lesions, oropharynx clear and oral mucous membranes moist  NECK: no adenopathy, no asymmetry, masses, or scars and thyroid normal to palpation  RESP: lungs clear to auscultation - no rales, rhonchi or wheezes  BREAST: Right normal without masses, tenderness or nipple discharge and no palpable axillary masses or adenopathy, left Breast " Reconstruction after mastectomy  CV: regular rate and rhythm, normal S1 S2, no S3 or S4, no murmur, click or rub, no peripheral edema and peripheral pulses strong  ABDOMEN: soft, nontender, no hepatosplenomegaly, no masses and bowel sounds normal  MS: no musculoskeletal defects are noted and gait is age appropriate without ataxia  SKIN: no suspicious lesions or rashes  NEURO: Normal strength and tone, sensory exam grossly normal, mentation intact and speech normal  PSYCH: mentation appears normal and affect normal/bright    Diagnostic Test Results:  Labs reviewed in Epic    ASSESSMENT / PLAN:   1. Encounter for Medicare annual wellness exam      2. Hypertension goal BP (blood pressure) < 140/90  Stable   - hydrochlorothiazide (HYDRODIURIL) 12.5 MG tablet; Take 1 tablet (12.5 mg) by mouth daily  Dispense: 90 tablet; Refill: 3    3. Hyperlipidemia LDL goal <160  Advised change to Cresor due to muscle aches  Discussed side effects  Take co q 10 with this  Call if any side effects  - rosuvastatin (CRESTOR) 10 MG tablet; Take 1 tablet (10 mg) by mouth daily  Dispense: 30 tablet; Refill: 0    4. Osteoporosis, unspecified osteoporosis type, unspecified pathological fracture presence  The bone density shows some  Osteoporosis.  You should be taking 6121-0549 mg of calcium with vit D.  You should be exercising regularly.     .pt has had a drug Holiday from Fosamax  Advised restart  - alendronate (FOSAMAX) 70 MG tablet; Take 1 tablet (70 mg) by mouth every 7 days  Dispense: 4 tablet; Refill: 3  SEE [facility] care orders  The potential side effects of this medication have been discussed with the patient.  Call if any significant problems with these are experienced.    5. Personal history of malignant neoplasm of breast      6. Status post left mastectomy      7. Advanced directives, counseling/discussion  Discussed -Pt has one    8. Need for prophylactic vaccination and inoculation against influenza  Advised   - FLUZONE HIGH DOSE  "65+  [67171]  - Vaccine Administration, Initial [54554]  Advised shingrix at pharmacy    COUNSELING:  Reviewed preventive health counseling, as reflected in patient instructions       Regular exercise       Healthy diet/nutrition       Vision screening       Hearing screening       Dental care       Bladder control       Fall risk prevention       Colon cancer screening       Advanced Planning     Estimated body mass index is 27.17 kg/m  as calculated from the following:    Height as of 10/23/19: 1.613 m (5' 3.5\").    Weight as of 10/23/19: 70.7 kg (155 lb 12.8 oz).        She reports that she quit smoking about 21 years ago. Her smoking use included cigarettes. She has a 17.00 pack-year smoking history. She has never used smokeless tobacco.      Appropriate preventive services were discussed with this patient, including applicable screening as appropriate for cardiovascular disease, diabetes, osteopenia/osteoporosis, and glaucoma.  As appropriate for age/gender, discussed screening for colorectal cancer, prostate cancer, breast cancer, and cervical cancer. Checklist reviewing preventive services available has been given to the patient.    Reviewed patients plan of care and provided an AVS. The Intermediate Care Plan ( asthma action plan, low back pain action plan, and migraine action plan) for [place] meets the Care Plan requirement. This Care Plan has been established and reviewed with the Patient.    Counseling Resources:  ATP IV Guidelines  Pooled Cohorts Equation Calculator  Breast Cancer Risk Calculator  Breast Cancer: Medication to Reduce Risk  FRAX Risk Assessment  ICSI Preventive Guidelines  Dietary Guidelines for Americans, 2010  [employer]'s MyPlate  ASA Prophylaxis  Lung CA Screening    Chuyita Zaidi MD  [facility]    Identified Health Risks:  "

## 2020-09-28 ENCOUNTER — MYC REFILL (OUTPATIENT)
Dept: FAMILY MEDICINE | Facility: CLINIC | Age: 70
End: 2020-09-28

## 2020-09-28 DIAGNOSIS — E78.5 HYPERLIPIDEMIA LDL GOAL <160: ICD-10-CM

## 2020-09-30 RX ORDER — ROSUVASTATIN CALCIUM 10 MG/1
10 TABLET, COATED ORAL DAILY
Qty: 30 TABLET | Refills: 11 | Status: SHIPPED | OUTPATIENT
Start: 2020-09-30 | End: 2021-09-22

## 2020-12-16 DIAGNOSIS — M81.0 OSTEOPOROSIS, UNSPECIFIED OSTEOPOROSIS TYPE, UNSPECIFIED PATHOLOGICAL FRACTURE PRESENCE: ICD-10-CM

## 2020-12-17 DIAGNOSIS — E78.5 HYPERLIPIDEMIA LDL GOAL <160: ICD-10-CM

## 2020-12-17 LAB
CHOLEST SERPL-MCNC: 156 MG/DL
HDLC SERPL-MCNC: 52 MG/DL
LDLC SERPL CALC-MCNC: 72 MG/DL
NONHDLC SERPL-MCNC: 104 MG/DL
TRIGL SERPL-MCNC: 162 MG/DL

## 2020-12-17 PROCEDURE — 80061 LIPID PANEL: CPT | Performed by: FAMILY MEDICINE

## 2020-12-17 PROCEDURE — 36415 COLL VENOUS BLD VENIPUNCTURE: CPT | Performed by: FAMILY MEDICINE

## 2020-12-17 RX ORDER — ALENDRONATE SODIUM 70 MG/1
TABLET ORAL
Qty: 12 TABLET | Refills: 0 | Status: SHIPPED | OUTPATIENT
Start: 2020-12-17 | End: 2021-07-15

## 2020-12-17 NOTE — TELEPHONE ENCOUNTER
Prescription approved per [facility] Refill Protocol.  Kate Noble RN

## 2021-02-16 ENCOUNTER — TRANSFERRED RECORDS (OUTPATIENT)
Dept: HEALTH INFORMATION MANAGEMENT | Facility: CLINIC | Age: 71
End: 2021-02-16

## 2021-04-01 ENCOUNTER — OFFICE VISIT (OUTPATIENT)
Dept: FAMILY MEDICINE | Facility: CLINIC | Age: 71
End: 2021-04-01
Payer: MEDICARE

## 2021-04-01 VITALS
WEIGHT: 150 LBS | TEMPERATURE: 97.8 F | SYSTOLIC BLOOD PRESSURE: 128 MMHG | BODY MASS INDEX: 25.61 KG/M2 | OXYGEN SATURATION: 97 % | RESPIRATION RATE: 18 BRPM | HEART RATE: 77 BPM | HEIGHT: 64 IN | DIASTOLIC BLOOD PRESSURE: 72 MMHG

## 2021-04-01 DIAGNOSIS — G89.29 CHRONIC PAIN OF RIGHT KNEE: Primary | ICD-10-CM

## 2021-04-01 DIAGNOSIS — M25.561 CHRONIC PAIN OF RIGHT KNEE: Primary | ICD-10-CM

## 2021-04-01 PROCEDURE — 99213 OFFICE O/P EST LOW 20 MIN: CPT | Performed by: FAMILY MEDICINE

## 2021-04-01 ASSESSMENT — MIFFLIN-ST. JEOR: SCORE: 1172.46

## 2021-04-01 ASSESSMENT — PAIN SCALES - GENERAL: PAINLEVEL: SEVERE PAIN (7)

## 2021-04-01 NOTE — PROGRESS NOTES
"    Assessment & Plan     Chronic pain of right knee  Advised Xray  PT  Tylenol otc  Follow up 1 month  - XR Knee Right 1/2 Views; Future  - [facility] REFERR[facility]; Future  - Knee Supplies Order  Estimated body mass index is 26.15 kg/m  as calculated from the following:    Height as of this encounter: 1.613 m (5' 3.5\").    Weight as of this encounter: 68 kg (150 lb).           Return in about 1 month (around 5/1/2021) for recheck/Please schedule appointment.    Chuyita Zaidi MD  [facility] MIRIAN Coleman is a 70 year old who presents for the following health issues HPI       Pt has had Right Knee pain -several Months  Hard Time bending  Knee Hurts  No locking  No trauma  She wants to Exercise    Review of Systems   Rest of the ROS is Negative except see above and Problem list [stable]        Objective    /72   Pulse 77   Temp 97.8  F (36.6  C) (Oral)   Resp 18   Ht 1.613 m (5' 3.5\")   Wt 68 kg (150 lb)   SpO2 97%   BMI 26.15 kg/m    Body mass index is 26.15 kg/m .  Physical Exam   GENERAL: healthy, alert and no distress  MS: no gross musculoskeletal defects noted, no edema  SKIN: no suspicious lesions or rashes  BACK: no CVA tenderness, no paralumbar tenderness  PSYCH: mentation appears normal, affect normal/bright  Right Knee -no swelling  ROM is good  Mild medial Joint line tenderness  Back exam normal   Good ROM  Straight leg neg    Pending       DME (Durable Medical Equipment) Orders and Documentation  Orders Placed This Encounter   Procedures     Knee Supplies Order      The patient was assessed and it was determined the patient is in need of the following listed DME Supplies/Equipment. Please complete supporting documentation below to demonstrate medical necessity.      Knee Bracing Supplies Documentation  Patient requires the use of the ordered bracing device due to following medical need/condition: as above          "

## 2021-04-05 ENCOUNTER — ANCILLARY PROCEDURE (OUTPATIENT)
Dept: GENERAL RADIOLOGY | Facility: CLINIC | Age: 71
End: 2021-04-05
Attending: FAMILY MEDICINE
Payer: MEDICARE

## 2021-04-05 DIAGNOSIS — G89.29 CHRONIC PAIN OF RIGHT KNEE: ICD-10-CM

## 2021-04-05 DIAGNOSIS — M25.561 CHRONIC PAIN OF RIGHT KNEE: ICD-10-CM

## 2021-04-05 PROCEDURE — 73560 X-RAY EXAM OF KNEE 1 OR 2: CPT | Mod: RT | Performed by: RADIOLOGY

## 2021-04-16 ENCOUNTER — THERAPY VISIT (OUTPATIENT)
Dept: PHYSICAL THERAPY | Facility: CLINIC | Age: 71
End: 2021-04-16
Attending: FAMILY MEDICINE
Payer: MEDICARE

## 2021-04-16 DIAGNOSIS — M25.561 CHRONIC PAIN OF RIGHT KNEE: ICD-10-CM

## 2021-04-16 DIAGNOSIS — G89.29 CHRONIC PAIN OF RIGHT KNEE: ICD-10-CM

## 2021-04-16 DIAGNOSIS — M25.561 RIGHT KNEE PAIN: ICD-10-CM

## 2021-04-16 PROCEDURE — 97112 NEUROMUSCULAR REEDUCATION: CPT | Mod: GP | Performed by: PHYSICAL THERAPIST

## 2021-04-16 PROCEDURE — 97161 PT EVAL LOW COMPLEX 20 MIN: CPT | Mod: GP | Performed by: PHYSICAL THERAPIST

## 2021-04-16 ASSESSMENT — ACTIVITIES OF DAILY LIVING (ADL)
SQUAT: ACTIVITY IS VERY DIFFICULT
KNEE_ACTIVITY_OF_DAILY_LIVING_SUM: 26
HOW_WOULD_YOU_RATE_THE_CURRENT_FUNCTION_OF_YOUR_KNEE_DURING_YOUR_USUAL_DAILY_ACTIVITIES_ON_A_SCALE_FROM_0_TO_100_WITH_100_BEING_YOUR_LEVEL_OF_KNEE_FUNCTION_PRIOR_TO_YOUR_INJURY_AND_0_BEING_THE_INABILITY_TO_PERFORM_ANY_OF_YOUR_USUAL_DAILY_ACTIVITIES?: 50
WALK: ACTIVITY IS FAIRLY DIFFICULT
PAIN: THE SYMPTOM AFFECTS MY ACTIVITY MODERATELY
KNEE_ACTIVITY_OF_DAILY_LIVING_SCORE: 37.14
RAW_SCORE: 26
AS_A_RESULT_OF_YOUR_KNEE_INJURY,_HOW_WOULD_YOU_RATE_YOUR_CURRENT_LEVEL_OF_DAILY_ACTIVITY?: ABNORMAL
HOW_WOULD_YOU_RATE_THE_OVERALL_FUNCTION_OF_YOUR_KNEE_DURING_YOUR_USUAL_DAILY_ACTIVITIES?: ABNORMAL
SIT WITH YOUR KNEE BENT: ACTIVITY IS FAIRLY DIFFICULT
STAND: ACTIVITY IS FAIRLY DIFFICULT
LIMPING: THE SYMPTOM AFFECTS MY ACTIVITY MODERATELY
GO DOWN STAIRS: ACTIVITY IS VERY DIFFICULT
GO UP STAIRS: ACTIVITY IS VERY DIFFICULT
WEAKNESS: THE SYMPTOM AFFECTS MY ACTIVITY MODERATELY
KNEEL ON THE FRONT OF YOUR KNEE: ACTIVITY IS VERY DIFFICULT
STIFFNESS: THE SYMPTOM AFFECTS MY ACTIVITY MODERATELY
SWELLING: I DO NOT HAVE THE SYMPTOM
GIVING WAY, BUCKLING OR SHIFTING OF KNEE: THE SYMPTOM AFFECTS MY ACTIVITY MODERATELY
RISE FROM A CHAIR: ACTIVITY IS VERY DIFFICULT

## 2021-04-16 NOTE — PROGRESS NOTES
Physical Therapy Initial Evaluation  Subjective:    Patient Health History  Virginia Byers being seen for Stiffness making difficult with movement.       Problem occurred: Over years with not sure of improvement   Pain is reported as 7/10 on pain scale.  General health as reported by patient is good.  Pertinent medical history includes: cancer, high blood pressure, osteoporosis, pain at night/rest and weakness.     Medical allergies: other. Other medical allergies details: Compazine sim a statin brimonidine.   Surgeries include:  Cancer surgery and other. Other surgery history details: Glaucoma surgery.    Current medications:  High blood pressure medication. Other medications details: Cholesterol medication glaucoma drops macular degeneration injections.    Current occupation is Retired.   Primary job tasks include:  Repetitive tasks.                                    Objective:  System    Physical Exam    General     ROS     KNEE EVALUATION    Physical Therapy Initial Examination/Evaluation April 16, 2021   Virginia Byers is a 70 year old female referred to physical therapy by Dr. Chuyita Zaidi for treatment of R knee pain with Precautions/Restrictions/MD instructions E&T    Therapist Assessment:   Clinical Impression: Pt presents to PT with primary complaint of R knee and R LE pain.  Per clinical examination, knee with full ROM and strength.  Pt did have improved symptoms following seated nerve glide and trial of extension exercises.  Pt will benefit from skilled physical therapy for strengthening program, trial of directional preference exercises, and nerve glides.     Subjective: Pain has been present in R knee for years. Pt reports that she can't cross her legs. She feels its a muscular pain, especially in R calf. Pain is present from low back all the way to the foot/ankle. Pt reports stairs are getting very painful, both up and down. She has to lift leg into her car. Her low back feels very stiff. Pt  reports that if she stands for a period of time, she has pain with returning to sitting.   DOI/onset: MD Order: 2021   Mechanism of injury: NA   DOS NA   Related PMH: Back pain, chemo in  with breast CA   Previous treatment: PT    Imaging: X-ray of knee was negative    Chief Complaint: R knee and LE pain    Pain: rest  /10, activity /10  Described as: muscle achy, stiff Alleviated by: heating pad, ice,  Exacerbated by: Bending Progression of symptoms since initial onset: worsening  Time of day when pain is worse: none   Sleeping: pain with rolling, can't lay on back for a long time   Social history: Lives with friend    Occupation: Retired ; had a sitting job Job duties: Normal household tasks   Current HEP/exercise regimen: walking; tried elliptical but this was painful    Patient's goals are decrease pain    Other pertinent PMH: cataract surgery  General health as reported by patient: Good    Return to MD: 6/3/2021       Dynamic Movement Screen  Gait: Circumducting gait pattern with decreased knee flexion   Pain with transition to/from sitting/standing    Trunk Range of Motion  Flexion: 50%  Extension: WNL-good stretch   Side bendin% bilat but more of a stretch with R sidebend  Rotation: WNL; more pain with rotation to the R       Hip and Knee Strength   MMT Hip Abduction Hip Extension Hip Flexion  Knee Ext Knee Flexion   Left 3+/5 NT 4-/5 4+/5 4+/5   Right 3+/5 NT 4-/5 4+/5 4+/5     Special Tests  Neural tension: - slump ; improved symptoms with seated nerve glide   Dermatomes: WNL  Myotomes: WNL      Range of Motion  Knee ROM: WNL bilat -no pain with full ROM       PALPATION  Left: WNL  Right: Pain around fibular head    Assessment/Plan:  Patient is a 70 year old female with right side knee complaints.    Patient has the following significant findings with corresponding treatment plan.                Diagnosis 1:  Chronic pain of R knee   Pain -  hot/cold therapy, manual therapy, self management,  education, directional preference exercise and home program  Impaired muscle performance - neuro re-education and home program  Decreased function - therapeutic activities and home program    Therapy Evaluation Codes:   1) History comprised of:   Personal factors that impact the plan of care:      Past/current experiences and Time since onset of symptoms.    Comorbidity factors that impact the plan of care are:      See EMR.     Medications impacting care: See EMR.  2) Examination of Body Systems comprised of:   Body structures and functions that impact the plan of care:      Knee and Lumbar spine.   Activity limitations that impact the plan of care are:      Bending, Sitting, Squatting/kneeling, Stairs, Standing, Walking and Sleeping.  3) Clinical presentation characteristics are:   Evolving/Changing.  4) Decision-Making    Low complexity using standardized patient assessment instrument and/or measureable assessment of functional outcome.  Cumulative Therapy Evaluation is: Low complexity.    Previous and current functional limitations:  (See Goal Flow Sheet for this information)    Short term and Long term goals: (See Goal Flow Sheet for this information)     Communication ability:  Patient appears to be able to clearly communicate and understand verbal and written communication and follow directions correctly.  Treatment Explanation - The following has been discussed with the patient:   RX ordered/plan of care  Anticipated outcomes  Possible risks and side effects  This patient would benefit from PT intervention to resume normal activities.   Rehab potential is good.    Frequency:  1 X week, once daily  Duration:  for 6 weeks  Discharge Plan:  Achieve all LTG.  Independent in home treatment program.  Reach maximal therapeutic benefit.    Please refer to the daily flowsheet for treatment today, total treatment time and time spent performing 1:1 timed codes.

## 2021-04-16 NOTE — LETTER
DEPARTMENT OF HEALTH AND HUMAN SERVICES  [facility] MEDICA[facility] SERVICES    PLAN/UPDATED PLAN OF PROGRESS FOR OUTPATIENT REHABILITATION        PATIENTS NAME:  Virginia Byers     : 1950    PROVIDER NUMBER:    [id number]    HICN:  [id number]     PROVIDER NAME: [facility]    MEDICAL RECORD NUMBER: [id number]     START OF CARE DATE:  SOC Date: 21   TYPE:  PT    PRIMARY/TREATMENT DIAGNOSIS: (Pertinent Medical Diagnosis)  Chronic pain of right knee  Right knee pain    VISITS FROM START OF CARE:  Rxs Used: 1     Physical Therapy Initial Evaluation  Subjective:    Patient Health History  Virginia Byers being seen for Stiffness making difficult with movement.     Problem occurred: Over years with not sure of improvement   Pain is reported as 7/10 on pain scale.  General health as reported by patient is good.  Pertinent medical history includes: cancer, high blood pressure, osteoporosis, pain at night/rest and weakness.     Medical allergies: other. Other medical allergies details: Compazine sim a statin brimonidine.   Surgeries include:  Cancer surgery and other. Other surgery history details: Glaucoma surgery.    Current medications:  High blood pressure medication. Other medications details: Cholesterol medication glaucoma drops macular degeneration injections.    Current occupation is Retired.   Primary job tasks include:  Repetitive tasks.     KNEE EVALUATION    Physical Therapy Initial Examination/Evaluation 2021   Virginia Byers is a 70 year old female referred to physical therapy by Dr. Chuyita Zaidi for treatment of R knee pain with Precautions/Restrictions/MD instructions E&T    Therapist Assessment:   Clinical Impression: Pt presents to PT with primary complaint of R knee and R LE pain.  Per clinical examination, knee with full ROM and strength.  Pt did have improved symptoms following seated nerve glide and trial of extension  exercises.  Pt will benefit from skilled physical therapy for strengthening program, trial of directional preference exercises, and nerve glides.       PATIENTS NAME:  Virginia Byers   : 1950      Subjective: Pain has been present in R knee for years. Pt reports that she can't cross her legs. She feels its a muscular pain, especially in R calf. Pain is present from low back all the way to the foot/ankle. Pt reports stairs are getting very painful, both up and down. She has to lift leg into her car. Her low back feels very stiff. Pt reports that if she stands for a period of time, she has pain with returning to sitting.   DOI/onset: MD Order: 2021   Mechanism of injury: NA   DOS NA   Related PMH: Back pain, chemo in  with breast CA   Previous treatment: PT    Imaging: X-ray of knee was negative    Chief Complaint: R knee and LE pain    Pain: rest  /10, activity /10  Described as: muscle achy, stiff Alleviated by: heating pad, ice,  Exacerbated by: Bending Progression of symptoms since initial onset: worsening  Time of day when pain is worse: none   Sleeping: pain with rolling, can't lay on back for a long time   Social history: Lives with friend    Occupation: Retired ; had a sitting job Job duties: Normal household tasks   Current HEP/exercise regimen: walking; tried elliptical but this was painful    Patient's goals are decrease pain    Other pertinent PMH: cataract surgery  General health as reported by patient: Good    Return to MD: 6/3/2021     Dynamic Movement Screen  Gait: Circumducting gait pattern with decreased knee flexion   Pain with transition to/from sitting/standing    Trunk Range of Motion  Flexion: 50%  Extension: WNL-good stretch   Side bendin% bilat but more of a stretch with R sidebend  Rotation: WNL; more pain with rotation to the R     Hip and Knee Strength   MMT Hip Abduction Hip Extension Hip Flexion  Knee Ext Knee Flexion   Left 3+/5 NT 4-/5 4+/5 4+/5   Right 3+/5 NT  4-/5 4+/5 4+/5     Special Tests  Neural tension: - slump ; improved symptoms with seated nerve glide   Dermatomes: WNL  Myotomes: WNL    Range of Motion  Knee ROM: WNL bilat -no pain with full ROM   PALPATION  Left: WNL  Right: Pain around fibular head    Assessment/Plan:  Patient is a 70 year old female with right side knee complaints.    Patient has the following significant findings with corresponding treatment plan.                Diagnosis 1:  Chronic pain of R knee   Pain -  hot/cold therapy, manual therapy, self management, education, directional preference exercise and home program  Impaired muscle performance - neuro re-education and home program  Decreased function - therapeutic activities and home program  PATIENTS NAME:  Virginia Byers   : 1950      Therapy Evaluation Codes:   1) History comprised of:   Personal factors that impact the plan of care:      Past/current experiences and Time since onset of symptoms.    Comorbidity factors that impact the plan of care are:      See EMR.     Medications impacting care: See EMR.  2) Examination of Body Systems comprised of:   Body structures and functions that impact the plan of care:      Knee and Lumbar spine.   Activity limitations that impact the plan of care are:      Bending, Sitting, Squatting/kneeling, Stairs, Standing, Walking and Sleeping.  3) Clinical presentation characteristics are:   Evolving/Changing.  4) Decision-Making    Low complexity using standardized patient assessment instrument and/or measureable assessment of functional outcome.  Cumulative Therapy Evaluation is: Low complexity.    Previous and current functional limitations:  (See Goal Flow Sheet for this information)    Short term and Long term goals: (See Goal Flow Sheet for this information)     Communication ability:  Patient appears to be able to clearly communicate and understand verbal and written communication and follow directions correctly.  Treatment Explanation  "- The following has been discussed with the patient:   RX ordered/plan of care  Anticipated outcomes  Possible risks and side effects  This patient would benefit from PT intervention to resume normal activities.   Rehab potential is good.    Frequency:  1 X week, once daily  Duration:  for 6 weeks  Discharge Plan:  Achieve all LTG.  Independent in home treatment program.  Reach maximal therapeutic benefit.        Caregiver Signature/Credentials _____________________________ Date ________       Treating Provider: Earnestine Calderon, PT, DPT   I have reviewed and certified the need for these services and plan of treatment while under my care.        PHYSICIAN'S SIGNATURE:   _____________________________________  Date___________       Chuyita Dumont MD    Certification period:  Beginning of Cert date period: 04/16/21 to End of Cert period date: 07/14/21     Functional Level Progress Report: Please see attached \"Goal Flow sheet for Functional level.\"    ____X____ Continue Services or       ________ DC Services                Service dates: From  SOC Date: 04/16/21 date to present                "

## 2021-04-17 PROBLEM — M25.561 RIGHT KNEE PAIN: Status: ACTIVE | Noted: 2021-04-17

## 2021-04-21 ENCOUNTER — TRANSFERRED RECORDS (OUTPATIENT)
Dept: HEALTH INFORMATION MANAGEMENT | Facility: CLINIC | Age: 71
End: 2021-04-21

## 2021-04-23 ENCOUNTER — THERAPY VISIT (OUTPATIENT)
Dept: PHYSICAL THERAPY | Facility: CLINIC | Age: 71
End: 2021-04-23
Payer: MEDICARE

## 2021-04-23 DIAGNOSIS — M25.561 RIGHT KNEE PAIN: ICD-10-CM

## 2021-04-23 PROCEDURE — 97110 THERAPEUTIC EXERCISES: CPT | Mod: GP | Performed by: PHYSICAL THERAPIST

## 2021-04-23 PROCEDURE — 97112 NEUROMUSCULAR REEDUCATION: CPT | Mod: GP | Performed by: PHYSICAL THERAPIST

## 2021-04-30 ENCOUNTER — THERAPY VISIT (OUTPATIENT)
Dept: PHYSICAL THERAPY | Facility: CLINIC | Age: 71
End: 2021-04-30
Payer: MEDICARE

## 2021-04-30 DIAGNOSIS — M25.561 RIGHT KNEE PAIN: ICD-10-CM

## 2021-04-30 PROCEDURE — 97112 NEUROMUSCULAR REEDUCATION: CPT | Mod: GP | Performed by: PHYSICAL THERAPIST

## 2021-04-30 PROCEDURE — 97110 THERAPEUTIC EXERCISES: CPT | Mod: GP | Performed by: PHYSICAL THERAPIST

## 2021-05-14 ENCOUNTER — THERAPY VISIT (OUTPATIENT)
Dept: PHYSICAL THERAPY | Facility: CLINIC | Age: 71
End: 2021-05-14
Payer: MEDICARE

## 2021-05-14 DIAGNOSIS — M25.561 RIGHT KNEE PAIN: ICD-10-CM

## 2021-05-14 PROCEDURE — 97110 THERAPEUTIC EXERCISES: CPT | Mod: GP | Performed by: PHYSICAL THERAPIST

## 2021-05-14 PROCEDURE — 97012 MECHANICAL TRACTION THERAPY: CPT | Mod: GP | Performed by: PHYSICAL THERAPIST

## 2021-05-17 ENCOUNTER — THERAPY VISIT (OUTPATIENT)
Dept: PHYSICAL THERAPY | Facility: CLINIC | Age: 71
End: 2021-05-17
Payer: MEDICARE

## 2021-05-17 DIAGNOSIS — M25.561 RIGHT KNEE PAIN: ICD-10-CM

## 2021-05-17 PROCEDURE — 97012 MECHANICAL TRACTION THERAPY: CPT | Mod: GP | Performed by: PHYSICAL THERAPIST

## 2021-05-19 NOTE — PROGRESS NOTES
"    Assessment & Plan     Chronic bilateral low back pain with right-sided sciatica  Advised MRI as This has been going on for a while and getting worse  Pt has pain going down Right leg  Advised Tylenol otc  Follow up after MRI  - MR Lumbar Spine w/o Contrast; Future  - *UA reflex to Microscopic and Culture ([facility] (except [place] and [place])  - Urine Microscopic    Lumbar radiculopathy  As above  - MR Lumbar Spine w/o Contrast; Future  - *UA reflex to Microscopic and Culture ([place] [facility] (except [place] and [place])      Return in about 1 week (around 5/31/2021) for Imaging.    Chuyita Zaidi MD  [facility] MIRIAN Coleman is a 70 year old who presents for the following health issues {HPI     Chief Complaint   Patient presents with     Chronic pain of right knee     Fup       Chronic/Recurring Back Pain Follow Up      Where is your back pain located? (Select all that apply) low back bilateral 10 plus years    How would you describe your back pain?  Feels like a Bruise    Where does your back pain spread?goes down Right leg    Since your last clinic visit for back pain, how has your pain changed? gradually worsening    Does your back pain interfere with your job? YES at home    Since your last visit, have you tried any new treatment? No     Has a hard time getting up    Pain Lower back with walking    Review of Systems   CONSTITUTIONAL: NEGATIVE for fever, chills, change in weight  RESP: NEGATIVE for significant cough or SOB  CV: NEGATIVE for chest pain, palpitations or peripheral edema  GI: NEGATIVE for nausea, abdominal pain, heartburn, or change in bowel habits  : none  MUSCULOSKELETAL: as above  NEURO: as above  ROS otherwise negative      Objective    /78 (BP Location: Right arm, Patient Position: Chair, Cuff Size: Adult Regular)   Pulse 91   Temp 99.2  F (37.3  C) (Oral)   Ht 1.613 m (5' 3.5\")   Wt 69.4 kg (153 lb)   SpO2 97%   " BMI 26.68 kg/m    Body mass index is 26.68 kg/m .  Physical Exam   GENERAL: healthy, alert and no distress  RESP: lungs clear to auscultation - no rales, rhonchi or wheezes  CV: regular rate and rhythm, normal S1 S2, no S3 or S4, no murmur, click or rub, no peripheral edema and peripheral pulses strong  ABDOMEN: soft, nontender, no hepatosplenomegaly, no masses and bowel sounds normal  MS: no gross musculoskeletal defects noted, no edema  SKIN: no suspicious lesions or rashes  Comprehensive back pain exam:  Tenderness of Right Lower back, Pain limits the following motions: pain with flexion and extension, Lower extremity strength functional and equal on both sides, Lower extremity reflexes within normal limits bilaterally, Lower extremity sensation normal and equal on both sides and Straight leg raise negative bilaterally   Walks slowly due too back pain    Results for orders placed or performed in visit on 05/24/21   *UA reflex to Microscopic and Culture ([place] [facility] (except [place] and [place])     Status: Abnormal    Specimen: Midstream Urine   Result Value Ref Range    Color Urine Yellow     Appearance Urine Clear     Glucose Urine Negative NEG^Negative mg/dL    Bilirubin Urine Negative NEG^Negative    Ketones Urine Negative NEG^Negative mg/dL    Specific Gravity Urine <=1.005 1.003 - 1.035    Blood Urine Negative NEG^Negative    pH Urine 6.5 5.0 - 7.0 pH    Protein Albumin Urine Negative NEG^Negative mg/dL    Urobilinogen Urine 0.2 0.2 - 1.0 EU/dL    Nitrite Urine Negative NEG^Negative    Leukocyte Esterase Urine Trace (A) NEG^Negative    Source Midstream Urine    Urine Microscopic     Status: None   Result Value Ref Range    WBC Urine 0 - 5 OTO5^0 - 5 /HPF    RBC Urine O - 2 OTO2^O - 2 /HPF    Squamous Epithelial /LPF Urine Few FEW^Few /LPF

## 2021-05-21 ENCOUNTER — THERAPY VISIT (OUTPATIENT)
Dept: PHYSICAL THERAPY | Facility: CLINIC | Age: 71
End: 2021-05-21
Payer: MEDICARE

## 2021-05-21 DIAGNOSIS — M25.561 RIGHT KNEE PAIN: ICD-10-CM

## 2021-05-21 PROCEDURE — 97112 NEUROMUSCULAR REEDUCATION: CPT | Mod: GP | Performed by: PHYSICAL THERAPIST

## 2021-05-21 PROCEDURE — 97530 THERAPEUTIC ACTIVITIES: CPT | Mod: GP | Performed by: PHYSICAL THERAPIST

## 2021-05-21 NOTE — PROGRESS NOTES
Subjective:  HPI  Physical Exam                    Objective:  System    Physical Exam    General     ROS    PROGRESS  REPORT    Progress reporting period is from 4/16/2021 to 5/21/2021.       SUBJECTIVE  Subjective: Pt reports that pain has only minimally changed with traction and seems this improvement is only for a short time. The  pain improvement is in her leg, but not her back. She had more difficulty lifting leg into bathtub and car this morning, even having difficulty with the L leg. Pt very fearful of falling as she does not trust her legs with current symptoms.       Changes in function:  Minimal change in function. Slight improvement with gait immediately after traction but does not feel this lasts  Adverse reaction to treatment or activity: None    OBJECTIVE  Changes noted in objective findings:  Yes,   Myotomes: L2/L3 impairment with functional difficulty with R hip flexion     Lumbar ROM  Flexion: pain with any lumbar flexion   Extension: Feels good      Objective: Discussed return to MD for ongoing evaluation and pt agrees with this plan. Modified HEP and initiated core strengthening program. Discussed body mechanics to try to improve comfort with sleeping.      ASSESSMENT/PLAN  Updated problem list and treatment plan: Diagnosis 1:  R knee pain   Pain -  hot/cold therapy, self management, education, directional preference exercise and home program  Decreased ROM/flexibility - manual therapy, therapeutic exercise, therapeutic activity and home program  Impaired gait - gait training and home program  Impaired muscle performance - neuro re-education and home program  Decreased function - therapeutic activities and home program  STG/LTGs have been met or progress has been made towards goals:  Yes (See Goal flow sheet completed today.)  Assessment of Progress: The patient's condition is unchanged.  Self Management Plans:  Patient has been instructed in a home treatment program.  I have re-evaluated this  patient and find that the nature, scope, duration and intensity of the therapy is appropriate for the medical condition of the patient.  Virginia continues to require the following intervention to meet STG and LTG's:  PT-Will benefit from follow-up with MD prior to return to PT    Recommendations:  This patient would benefit from further evaluation.    Please refer to the daily flowsheet for treatment today, total treatment time and time spent performing 1:1 timed codes.

## 2021-05-24 ENCOUNTER — MYC MEDICAL ADVICE (OUTPATIENT)
Dept: FAMILY MEDICINE | Facility: CLINIC | Age: 71
End: 2021-05-24

## 2021-05-24 ENCOUNTER — OFFICE VISIT (OUTPATIENT)
Dept: FAMILY MEDICINE | Facility: CLINIC | Age: 71
End: 2021-05-24
Payer: MEDICARE

## 2021-05-24 VITALS
DIASTOLIC BLOOD PRESSURE: 78 MMHG | SYSTOLIC BLOOD PRESSURE: 130 MMHG | WEIGHT: 153 LBS | TEMPERATURE: 99.2 F | HEIGHT: 64 IN | HEART RATE: 91 BPM | OXYGEN SATURATION: 97 % | BODY MASS INDEX: 26.12 KG/M2

## 2021-05-24 DIAGNOSIS — G89.29 CHRONIC BILATERAL LOW BACK PAIN WITH RIGHT-SIDED SCIATICA: Primary | ICD-10-CM

## 2021-05-24 DIAGNOSIS — M54.41 CHRONIC BILATERAL LOW BACK PAIN WITH RIGHT-SIDED SCIATICA: Primary | ICD-10-CM

## 2021-05-24 DIAGNOSIS — M54.16 LUMBAR RADICULOPATHY: ICD-10-CM

## 2021-05-24 LAB
ALBUMIN UR-MCNC: NEGATIVE MG/DL
APPEARANCE UR: CLEAR
BILIRUB UR QL STRIP: NEGATIVE
COLOR UR AUTO: YELLOW
GLUCOSE UR STRIP-MCNC: NEGATIVE MG/DL
HGB UR QL STRIP: NEGATIVE
KETONES UR STRIP-MCNC: NEGATIVE MG/DL
LEUKOCYTE ESTERASE UR QL STRIP: ABNORMAL
NITRATE UR QL: NEGATIVE
NON-SQ EPI CELLS #/AREA URNS LPF: NORMAL /LPF
PH UR STRIP: 6.5 PH (ref 5–7)
RBC #/AREA URNS AUTO: NORMAL /HPF
SOURCE: ABNORMAL
SP GR UR STRIP: <=1.005 (ref 1–1.03)
UROBILINOGEN UR STRIP-ACNC: 0.2 EU/DL (ref 0.2–1)
WBC #/AREA URNS AUTO: NORMAL /HPF

## 2021-05-24 PROCEDURE — 99214 OFFICE O/P EST MOD 30 MIN: CPT | Performed by: FAMILY MEDICINE

## 2021-05-24 PROCEDURE — 81001 URINALYSIS AUTO W/SCOPE: CPT | Performed by: FAMILY MEDICINE

## 2021-05-24 ASSESSMENT — MIFFLIN-ST. JEOR: SCORE: 1186.06

## 2021-05-24 NOTE — TELEPHONE ENCOUNTER
Called Virginia to discuss imaging services. Pt states they got an appointment earlier on 5/27 in [place].   Karen Johnson-  Kati

## 2021-05-24 NOTE — TELEPHONE ENCOUNTER
She can Go to [facility] or [facility]-Please give phone no

## 2021-05-27 ENCOUNTER — ANCILLARY PROCEDURE (OUTPATIENT)
Dept: MRI IMAGING | Facility: CLINIC | Age: 71
End: 2021-05-27
Attending: FAMILY MEDICINE
Payer: MEDICARE

## 2021-05-27 DIAGNOSIS — M54.16 LUMBAR RADICULOPATHY: ICD-10-CM

## 2021-05-27 DIAGNOSIS — M54.41 CHRONIC BILATERAL LOW BACK PAIN WITH RIGHT-SIDED SCIATICA: ICD-10-CM

## 2021-05-27 DIAGNOSIS — G89.29 CHRONIC BILATERAL LOW BACK PAIN WITH RIGHT-SIDED SCIATICA: ICD-10-CM

## 2021-05-27 PROCEDURE — G1004 CDSM NDSC: HCPCS | Mod: TC | Performed by: RADIOLOGY

## 2021-05-27 PROCEDURE — 72148 MRI LUMBAR SPINE W/O DYE: CPT | Mod: TC | Performed by: RADIOLOGY

## 2021-06-02 ENCOUNTER — VIRTUAL VISIT (OUTPATIENT)
Dept: FAMILY MEDICINE | Facility: CLINIC | Age: 71
End: 2021-06-02
Payer: MEDICARE

## 2021-06-02 DIAGNOSIS — M48.061 SPINAL STENOSIS OF LUMBAR REGION, UNSPECIFIED WHETHER NEUROGENIC CLAUDICATION PRESENT: ICD-10-CM

## 2021-06-02 DIAGNOSIS — M51.369 DDD (DEGENERATIVE DISC DISEASE), LUMBAR: Primary | ICD-10-CM

## 2021-06-02 PROCEDURE — 99213 OFFICE O/P EST LOW 20 MIN: CPT | Mod: 95 | Performed by: FAMILY MEDICINE

## 2021-06-02 NOTE — PROGRESS NOTES
Virginia is a 70 year old who is being evaluated via a billable telephone visit.    5:08 PM-start visit  5:19 PM     What phone number would you like to be contacted at? [phone]   How would you like to obtain your AVS? MyChart    Assessment & Plan       ICD-10-CM    1. DDD (degenerative disc disease), lumbar  M51.36 PAIN MANAGEMENT REFERRAL     Orthopedic & Spine  Referral   2. Spinal stenosis of lumbar region, unspecified whether neurogenic claudication present  M48.061 PAIN MANAGEMENT REFERRAL     Orthopedic & Spine  Referral       Referral epidural and spine specialist  Continue PT  Follow up if any worsening    Chuyita Zaidi MD  [facility] [place]    Subjective   Virginia is a 70 year old who presents for the following health issues  She is here too discuss MRI Lumbar area  She still continues to have Low back pain   HPI     Chief Complaint   Patient presents with     Results     MRI results and plan     Review of Systems   Rest of the ROS is Negative except see above and Problem list [stable      Objective           Vitals:  No vitals were obtained today due to virtual visit.    Physical Exam   healthy, alert and no distress  PSYCH: Alert and oriented times 3; coherent speech, normal   rate and volume, able to articulate logical thoughts, able   to abstract reason, no tangential thoughts, no hallucinations   or delusions  Her affect is normal  RESP: No cough, no audible wheezing, able to talk in full sentences  Remainder of exam unable to be completed due to telephone visits    Office Visit on 05/24/2021   Component Date Value Ref Range Status     Color Urine 05/24/2021 Yellow   Final     Appearance Urine 05/24/2021 Clear   Final     Glucose Urine 05/24/2021 Negative  NEG^Negative mg/dL Final     Bilirubin Urine 05/24/2021 Negative  NEG^Negative Final     Ketones Urine 05/24/2021 Negative  NEG^Negative mg/dL Final     Specific Gravity Urine 05/24/2021 <=1.005  1.003 - 1.035 Final      Blood Urine 05/24/2021 Negative  NEG^Negative Final     pH Urine 05/24/2021 6.5  5.0 - 7.0 pH Final     Protein Albumin Urine 05/24/2021 Negative  NEG^Negative mg/dL Final     Urobilinogen Urine 05/24/2021 0.2  0.2 - 1.0 EU/dL Final     Nitrite Urine 05/24/2021 Negative  NEG^Negative Final     Leukocyte Esterase Urine 05/24/2021 Trace* NEG^Negative Final     Source 05/24/2021 Midstream Urine   Final     WBC Urine 05/24/2021 0 - 5  OTO5^0 - 5 /HPF Final     RBC Urine 05/24/2021 O - 2  OTO2^O - 2 /HPF Final     Squamous Epithelial /LPF Urine 05/24/2021 Few  FEW^Few /LPF Final     MRI report reviewed           Phone call duration: as above/ minutes

## 2021-06-04 ENCOUNTER — TELEPHONE (OUTPATIENT)
Dept: PALLIATIVE MEDICINE | Facility: CLINIC | Age: 71
End: 2021-06-04

## 2021-06-04 ENCOUNTER — OFFICE VISIT (OUTPATIENT)
Dept: PALLIATIVE MEDICINE | Facility: CLINIC | Age: 71
End: 2021-06-04
Payer: MEDICARE

## 2021-06-04 VITALS — DIASTOLIC BLOOD PRESSURE: 83 MMHG | HEART RATE: 80 BPM | SYSTOLIC BLOOD PRESSURE: 144 MMHG

## 2021-06-04 DIAGNOSIS — M51.369 DDD (DEGENERATIVE DISC DISEASE), LUMBAR: ICD-10-CM

## 2021-06-04 DIAGNOSIS — M54.81 CERVICO-OCCIPITAL NEURALGIA OF THE RIGHT SIDE: ICD-10-CM

## 2021-06-04 DIAGNOSIS — M47.819 FACET ARTHROPATHY: Primary | ICD-10-CM

## 2021-06-04 DIAGNOSIS — M48.061 SPINAL STENOSIS OF LUMBAR REGION, UNSPECIFIED WHETHER NEUROGENIC CLAUDICATION PRESENT: ICD-10-CM

## 2021-06-04 PROCEDURE — 99204 OFFICE O/P NEW MOD 45 MIN: CPT | Performed by: PSYCHIATRY & NEUROLOGY

## 2021-06-04 ASSESSMENT — PAIN SCALES - GENERAL: PAINLEVEL: EXTREME PAIN (9)

## 2021-06-04 NOTE — PATIENT INSTRUCTIONS
1. Cancel appt on Monday  2. Schedule lumbar facet joint injections  [phone]    ----------------------------------------------------------------  Clinic Number:  [phone]     Call with any questions about your care and for scheduling assistance.     Calls are returned Monday through Friday between 8 AM and 4:30 PM. We usually get back to you within 2 business days depending on the issue/request.    If we are prescribing your medications:    For opioid medication refills, call the clinic or send a [employer] message 7 days in advance.  Please include:    Name of requested medication    Name of the pharmacy.    For non-opioid medications, call your pharmacy directly to request a refill. Please allow 3-4 days to be processed.     Per [place] State Law:    All controlled substance prescriptions must be filled within 30 days of being written.      For those controlled substances allowing refills, pickup must occur within 30 days of last fill.      We believe regular attendance is key to your success in our program!      Any time you are unable to keep your appointment we ask that you call us at least 24 hours in advance to cancel.This will allow us to offer the appointment time to another patient.     Multiple missed appointments may lead to dismissal from the clinic.

## 2021-06-04 NOTE — TELEPHONE ENCOUNTER
Screening Questions for Radiology Injections:    Injection to be done at which interventional clinic site? [facility]    If [facility] location, tell patient that this procedure requires a COVID-19 lab test be done within 4 days of the procedure. Would you still like to move forward with scheduling the procedure?  NO   If YES, let patient know that someone will call them to schedule the COVID-19 test and that they will only receive a call back if the result is positive. Route to nursing to enter order.     Instruct patient to arrive as directed prior to the scheduled appointment time:    [place] minutes before      Mary: 30 minutes before; if IV needed 1 hour before     Procedure ordered by     Procedure ordered? bilateral L4/5 and L5/S1 facet joint injections       Transforaminal Cervical ROS - no pain provider currently performing    As a reminder, receiving steroids can decrease your body's ability to fight infection.   Would you still like to move forward with scheduling the injection?  Yes    What insurance would patient like us to bill for this procedure? MEDICARE/AARP      Worker's comp or MVA (motor vehicle accident) -Any injection DO NOT SCHEDULE and route to Darcy Stovall.      [id number] insurance - For SI joint injections, DO NOT SCHEDULE and route Darcy Stovall.       ALL BCBS, [employer] and [employer]-Route to Darcy for review DO NOT SCHEDULE      IF SCHEDULING IN [place] AND NEEDS A PA, IT IS OKAY TO SCHEDULE. [place] HANDLES THEIR OWN PA'S AFTER THE PATIENT IS SCHEDULED. PLEASE SCHEDULE AT LEAST 1 WEEK OUT SO A PA CAN BE OBTAINED.    Any chance of pregnancy? NO   If YES, do NOT schedule and route to RN pool    Is an  needed? No     Patient has a drive home? (mandatory) YES:     Is patient taking any blood thinners (i.e. plavix, coumadin, jantoven, warfarin, heparin, pradaxa or dabigatran, etc)? No   If hold needed, do NOT schedule, route to  RN pool     Is patient taking any aspirin products (includes Excedrin and Fiorinal)? No     If more than 325mg/day, OK to schedule; Instruct pt to decrease to less than 325 mg for 7 days AND route to RN pool    For CERVICAL procedures, hold all aspirin products for 6 days.     Tell pt that if aspirin product is not held for 6 days, the procedure WILL BE cancelled.      Does the patient have a bleeding or clotting disorder? No     If YES, okay to schedule AND route to RN nurse pool    For any patients with platelet count <100, must be forwarded to provider    Any allergies to contrast dye, iodine, shellfish, or numbing and steroid medications? No    If YES, add allergy information to appointment notes AND route to the RN pool     If ROS and Contrast Dye / Iodine Allergy? DO NOT SCHEDULE, route to RN pool    Allergies: Compazine, Prochlorperazine, and Simvastatin     Is patient diabetic?  No  If YES, instruct them to bring their glucometer.    Does patient have an active infection or treated for one within the past week? No     Is patient currently taking any antibiotics?  No     For patients on chronic, preventative, or prophylactic antibiotics, procedures may be scheduled.     For patients on antibiotics for active or recent infection:antibiotic course must have been completed for 4 days    Is patient currently taking any steroid medications? (i.e. Prednisone, Medrol)  No     For patients on steroid medications, course must have been completed for 4 days    Is patient actively being treated for cancer or immunocompromised? No  If YES, do NOT schedule and route to RN pool     Are you able to get on and off an exam table with minimal or no assistance? Yes  If NO, do NOT schedule and route to RN pool    Are you able to roll over and lay on your stomach with minimal or no assistance? Yes  If NO, do NOT schedule and route to RN pool     Has the patient had a flu shot or any other vaccinations within 7 days before or  after the procedure.  No     Have you recently had a COVID vaccine or have plans to get it in the near future? No    If yes, explain that for the vaccine to work best they need to:       wait 1 week before and 1 week after getting Vaccine #1    wait 1 week before and 2 weeks after getting Vaccine #2    If patient has concerns about the timing, send to RN pool     Does patient have an MRI/CT?  YES:   Check Procedure Scheduling Grid to see if required.      Was the MRI done within the last 3 years?  Yes    If yes, where was the MRI done i.e.[facility], [facility], [facility], [facility] etc? [facility]      If no, do not schedule and route to RN pool    If MRI was not done at [facility], [facility] or [facility] do NOT schedule and route to RN pool.      If pt has an imaging disc, the injection MAY be scheduled but pt has to bring disc to appt.     If they show up without the disc the injection cannot be done    Procedure Specific Instructions:      If celiac plexus block, informed patient NPO for 6 hours and that it is okay to take medications with sips of water, especially blood pressure medications  Not Applicable         If this is for a cervical procedure, informed patient that aspirin needs to be held for 6 days.   Not Applicable      If IV needed:    Do not schedule procedures requiring IV placement in the first appointment of the day or first appointment after lunch. Do NOT schedule at 0745, 0815 or 1245.     Instructed pt to arrive 30 minutes early for IV start if required. (Check Procedure Scheduling Grid)  NO    Reminders:      If you are started on any steroids or antibiotics between now and your appointment, you must contact us because the procedure may need to be cancelled.  Yes      For all procedures except radiofrequency ablations (RFAs) and spinal cord stimulator (SCS) trials, informed patient:    IV sedation is not provided for this procedure.  If you feel that an oral anti-anxiety medication is needed,  you can discuss this further with your referring provider or primary care provider.  The Pain Clinic provider will discuss specifics of what the procedure includes at your appointment.  Most procedures last 10-20 minutes.  We use numbing medications to help with any discomfort during the procedure.  Not Applicable      For patients 85 or older we recommend having an adult stay w/ them for the remainder of the day.       Does the patient have any questions?  NO  Darcy Stovall  [facility]

## 2021-06-04 NOTE — PROGRESS NOTES
[facility] Interventional Evaluation    Date of visit: 6/4/2021    Reason for consultation:    Virginia Byers is a 70 year old female who is seen for interventional evaluation today at the request of her provider, Dr. Zaidi.    Chief Complaint:    Chief Complaint   Patient presents with     Pain       Pain history:  Virginia Byers is a 70 year old female who first started having problems with pain in her low back for many years, midline.  Has symptoms in the legs, which are there when she is doing activity, but back pain is the worst.  She had pain in the right lateral calf, but that is better with PT, now has some pain in the psterior left calf.  She has trouble with walking, and was swinging the right leg. She feels this improved with PT.  Her neighbors noticed her atypical gait.  She also can't bend over.  She needs assistance to sit up. No n/t. No b/b problems.    Neck pain comes on when laying in bed. Feels it is coming from the low back.   She has some pain with rotating head to the right, at times feels shooting pain in the right upper neck to behind the ear.  She puts on heat and rubs to feel better.    Pain rating: Extreme Pain (9)   Any bowel or bladder incontinence: no    Other treatments have included:  Virginia Byers has not been seen at a pain clinic in the past.    PT: yes  Acupuncture: no  TENs Unit: no  Injections: no    Past Medical History:  Past Medical History:   Diagnosis Date     Breast cancer (H) 2000    Lt breast     Glaucoma     bilateral     Hyperlipidemia      Hypertension      Osteoarthritis      Osteoporosis      Patient Active Problem List    Diagnosis Date Noted     Spinal stenosis of lumbar region, unspecified whether neurogenic claudication present 06/02/2021     Priority: Medium     DDD (degenerative disc disease), lumbar 06/02/2021     Priority: Medium     Right knee pain 04/17/2021     Priority: Medium     Pain in both  lower legs 07/02/2019     Priority: Medium     Hypertension goal BP (blood pressure) < 140/90 04/22/2019     Priority: Medium     Personal history of malignant neoplasm of breast 03/28/2016     Priority: Medium     2000       Family history of diabetes mellitus 03/19/2012     Priority: Medium     Glaucoma 03/19/2012     Priority: Medium     Advanced directives, counseling/discussion 01/23/2012     Priority: Medium     Advance Directive Problem List Overview:   Name Relationship Phone    Primary Health Care Agent Aurelio Espinosa Friend [phone]         Alternative Health Care Agent Sariah Patel Sister [phone]   Received outside advance directive. Notary signature present.  scanned and placed behind media tab.  Please see advance directive for specifics. Advance Care Directive reviewed & complete.  Maria G Alcantara RN 3/21/2012    Patient states has Advance Directive and will bring in a copy to clinic. 1/23/2012          Osteoarthritis 01/23/2012     Priority: Medium     Hyperlipidemia LDL goal <160 03/16/2011     Priority: Medium     S/P mastectomy 03/16/2011     Priority: Medium     Osteoporosis 03/15/2010     Priority: Medium     Atopic rhinitis 03/15/2010     Priority: Medium     (Problem list name updated by automated process. Provider to review and confirm.)         Past Surgical History:  Past Surgical History:   Procedure Laterality Date     C MASTECTOMY,SIMPLE  3/2000    left     COLONOSCOPY  2006    Q 10 years     SURGICAL HISTORY OF -  Left 8/2015    tissue expander placed in left breast area     SURGICAL HISTORY OF -  Left 2/2016    left breast implant and right mammoplasty     Medications:  Current Outpatient Medications   Medication Sig Dispense Refill     hydrochlorothiazide (HYDRODIURIL) 12.5 MG tablet Take 1 tablet (12.5 mg) by mouth daily 90 tablet 3     latanoprost (XALATAN) 0.005 % ophthalmic solution 1 drop daily.       losartan (COZAAR) 100 MG tablet TAKE 1 TABLET BY MOUTH EVERY DAY  90 tablet 2     rosuvastatin (CRESTOR) 10 MG tablet Take 1 tablet (10 mg) by mouth daily 30 tablet 11     timolol (TIMOPTIC) 0.25 % ophthalmic solution Place 1 drop into both eyes daily  5     alendronate (FOSAMAX) 70 MG tablet TAKE 1 TABLET BY MOUTH EVERY 7 DAYS (Patient not taking: Reported on 2021) 12 tablet 0     Allergies:     Allergies   Allergen Reactions     Compazine      Mental confusion     Prochlorperazine Visual Disturbance     Simvastatin Other (See Comments)     Muscle aches     Social History:  Social History     Socioeconomic History     Marital status: Single     Spouse name: Not on file     Number of children: 0     Years of education: Not on file     Highest education level: Not on file   Occupational History     Employer: [employer]     Occupation: REtired   Social Needs     Financial resource strain: Not on file     Food insecurity     Worry: Not on file     Inability: Not on file     Transportation needs     Medical: Not on file     Non-medical: Not on file   Tobacco Use     Smoking status: Former Smoker     Packs/day: 1.00     Years: 17.00     Pack years: 17.00     Types: Cigarettes     Quit date: 3/15/1999     Years since quittin.2     Smokeless tobacco: Never Used   Substance and Sexual Activity     Alcohol use: Yes     Alcohol/week: 4.0 standard drinks     Types: 4 Glasses of wine per week     Comment: occasional     Drug use: No     Sexual activity: Never   Lifestyle     Physical activity     Days per week: Not on file     Minutes per session: Not on file     Stress: Not on file   Relationships     Social connections     Talks on phone: Not on file     Gets together: Not on file     Attends Sabianism service: Not on file     Active member of club or organization: Not on file     Attends meetings of clubs or organizations: Not on file     Relationship status: Not on file     Intimate partner violence     Fear of current or ex partner: Not on file     Emotionally abused: Not on file      Physically abused: Not on file     Forced sexual activity: Not on file   Other Topics Concern     Parent/sibling w/ CABG, MI or angioplasty before 65F 55M? Yes     Comment: Father heart attack   Social History Narrative     Not on file       Physical Exam:  Vitals:    21 1157   BP: (!) 144/83   Pulse: 80     Exam:  Constitutional: healthy, alert and no distress  Head: normocephalic. Atraumatic.   Skin: no suspicious lesions or rashes  Psychiatric: mentation appears normal and affect normal/bright    Musculoskeletal exam:  Gait/Station/Posture: wide based gait, some high steppage  Cervical spine: pain with right rotation and extension/rotation on the right    Flex:  30 degrees   Ext: 30 degrees   Rotation to right: 30 degrees- pain   Rotation to left: 30 degrees   Pain with extension/rotation to right   Right lesser occipital nerve tenderness     Thoracic spine:  Normal     Lumbar spine: myofascial tenderness lower   Flex:  10 degrees   Ext: 10 degrees   Rotation/ext to right: painful    Rotation/ext to left: painful     Myofascial tenderness:  No SI joint tenderness, no trochanteric bursa tenderness       Neurologic exam:  CN:    Motor:  5/5 UE and LE strength  Reflexes:     Biceps:     R:  1/4 L: 1/4   Brachioradialis   R:  1/4 L: 1/4   Triceps:  R:  1/4 L: 1/4   Patella:  R:  2/4 L: 2/4   Achilles:  R:  2/4 L: 2/4     Sensory:  (upper and lower extremities):   Light touch: normal    Allodynia: absent    Dysethesia: absent    Hyperalgesia: absent     Diagnostic tests:  MRI of lumbar spine was completed on 21 showin. Transitional segment is considered S1 for this dictation.  2. Grade 1 spondylolisthesis of L3 on L4.  3. Mild central spinal stenosis at L3-L4 and L4-L5.  4. Edema in the facets and periarticular tissues on the left side at  L4-L5 and L5-S1 is likely due to degenerative facet arthrosis.  5. Moderate to severe neural foraminal stenosis on the right side at  L5-S1 may be affecting the  right L5 nerve root. There is mild to  moderate foraminal narrowing on the right at L4-L5 and mild foraminal  narrowing on the right L3-L4.    Personally reviewed imaging - discussed with patient.        Assessment:  1. Chronic low back pain, with features of facet arthropathy, lumbar radiculopathy   2. Atypical gait, not sure if related to low back or other cause  3. Cervical spine pain- right, with features of possible right facet arthropathy , symptoms of right lesser occipital neuralgia      Plan:    1. Interventional recommendations:  1. Discussed facet procedures vs epidural steroid injection. Plan to start with facet joint injections.  2. She will update after 2 weeks.  Will consider epidural steroid injection.  Will need to monitor gait. Atypical for her issues, question if other potential cause.  3. Advised if the head pain worsens, feel free to call to schedule occipital nerve block.  2. Other recommendations: advised to cancel med spine appt, as it would be done by my partner and not different than what is being recommended today      45 minutes spent on the date of encounter doing chart review, history, and exam documentation and further activities as noted above.       Julia Maldonado MD

## 2021-06-13 NOTE — PROGRESS NOTES
Pre procedure Diagnosis: facet arthropathy   Post procedure Diagnosis: Same  Procedure performed: bilateral L4/5 and L5/S1 facet joint injections  Anesthesia: none  Complications: none  Operators: Julia Maldonado MD     Indications:   Virginia Byers is a 70 year old female seen by me in clinic for facet joint injections.  They have a history of low back pain.  Exam shows pain with extension/rotation  and they have tried conservative treatment including medication, PT.    Options/alternatives, benefits and risks were discussed with the patient including bleeding, infection, flared pain, tissue trauma, exposure to radiation, reaction to medications including seizure, spinal cord injury, paralysis, weakness, numbness and headache.   Questions were answered to her satisfaction and she agrees to proceed. Voluntary informed consent was obtained and signed.     Vitals were reviewed: Yes  Allergies were reviewed:  Yes   Medications were reviewed:  Yes   Pre-procedure pain score: 9/10    Procedure:  After getting informed consent, patient was brought into the procedure suite and was placed in a prone position on the procedure table.   A Pause for the Cause was performed.  Patient was prepped and draped in sterile fashion.     Under AP fluoroscopic guidance the L4/5 and L5/S1 facet joints on the right, then left side were identified, and the C-arm was rotated obliquely to the affected side to open the joint space. A total of 6 ml of 1% lidocaine was injected at the needle entry point and needle tract. Then a 22 gauge 3.5 inch quincke type spinal needle was inserted and advanced under fluoroscopic guidance targeting the superior articular pillar of each joint. Once the needle made a contact with SAP, it was rotated and was then advanced into the joint.    AP fluoroscopic views were obtained to confirm the needle placement. Then, contrast was injected after negative aspiration for heme and CSF in each joint, confirming  appropriate placement.  A total of 3ml of Omnipaque was used, and 7ml was wasted.     The injection was then accomplished using a solution containing 3ml of 0.5% bupivacaine mixed with 10mg of dexamethasone, divided between the 4 joints. The needles were removed.     Hemostasis was achieved, the area was cleaned, and bandaids were placed when appropriate.  The patient tolerated the procedure well, and was taken to the recovery room.    Images were saved to PACS.    Post-procedure pain score: 6/10  Follow-up includes:   -f/u with referring provider  -discussed possibility of radiofrequency ablation     Julia Maldonado MD  [facility]

## 2021-06-14 ENCOUNTER — MYC MEDICAL ADVICE (OUTPATIENT)
Dept: FAMILY MEDICINE | Facility: CLINIC | Age: 71
End: 2021-06-14

## 2021-06-14 ENCOUNTER — RADIOLOGY INJECTION OFFICE VISIT (OUTPATIENT)
Dept: PALLIATIVE MEDICINE | Facility: CLINIC | Age: 71
End: 2021-06-14
Payer: MEDICARE

## 2021-06-14 VITALS
RESPIRATION RATE: 16 BRPM | HEART RATE: 92 BPM | DIASTOLIC BLOOD PRESSURE: 82 MMHG | SYSTOLIC BLOOD PRESSURE: 157 MMHG | OXYGEN SATURATION: 97 %

## 2021-06-14 DIAGNOSIS — M47.816 LUMBAR FACET ARTHROPATHY: Primary | ICD-10-CM

## 2021-06-14 PROCEDURE — 64494 INJ PARAVERT F JNT L/S 2 LEV: CPT | Mod: LT | Performed by: PSYCHIATRY & NEUROLOGY

## 2021-06-14 PROCEDURE — 64493 INJ PARAVERT F JNT L/S 1 LEV: CPT | Mod: 50 | Performed by: PSYCHIATRY & NEUROLOGY

## 2021-06-14 ASSESSMENT — PAIN SCALES - GENERAL: PAINLEVEL: EXTREME PAIN (9)

## 2021-06-14 NOTE — NURSING NOTE
Discharge Information    IV Discontiued Time:  NA    Amount of Fluid Infused:  NA    Discharge Criteria = When patient returns to baseline or as per MD order    Consciousness:  Pt is fully awake    Circulation:  BP +/- 20% of pre-procedure level    Respiration:  Patient is able to breathe deeply    O2 Sat:  Patient is able to maintain O2 Sat >92% on room air    Activity:  Moves 4 extremities on command    Ambulation:  Patient is able to stand and walk or stand and pivot into wheelchair    Dressing:  Clean/dry or No Dressing    Notes:   Discharge instructions and AVS given to patient    Patient meets criteria for discharge?  YES    Admitted to PCU?  No    Responsible adult present to accompany patient home?  Yes    Signature/Title:    Vlad Turner RN  RN Care Coordinator  [facility]

## 2021-06-14 NOTE — PATIENT INSTRUCTIONS
[facility]   Procedure Discharge Instructions    Today you saw:  Dr. Valentina Maldonado      You had an:  facet joint injection      Medications used:  Lidocaine   Bupivacaine   Dexamethasone Omnipaque               If you were holding your blood thinning medication, please restart taking it: N/A    Be cautious when walking. Numbness and/or weakness in the lower extremities may occur for up to 6-8 hours after the procedure due to effect of the local anesthetic    Do not drive for 6 hours. The effect of the local anesthetic could slow your reflexes.     You may resume your regular activities after 24 hours    Avoid strenuous activity for the first 24 hours    You may shower, however avoid swimming, tub baths or hot tubs for 24 hours following your procedure    You may have a mild to moderate increase in pain for several days following the injection.    It may take up to 14 days for the steroid medication to start working although you may feel the effect as early as a few days after the procedure.       You may use ice packs for 10-15 minutes, 3 to 4 times a day at the injection site for comfort    Do not use heat to painful areas for 6 to 8 hours. This will give the local anesthetic time to wear off and prevent you from accidentally burning your skin.     Unless you have been directed to avoid the use of anti-inflammatory medications (NSAIDS), you may use medications such as ibuprofen, Aleve or Tylenol for pain control if needed.    Possible side effects of steroids that you may experience include flushing, elevated blood pressure, increased appetite, mild headaches and restlessness.  All of these symptoms will get better with time.    If you experience any of the following, call the Pain Clinic during work hours (Mon-Friday 8-4:30 pm) at [phone] or the Provider Line after hours at [phone]:  -Fever over 100 degree F  -Swelling, bleeding, redness, drainage, warmth at the injection  site  -Progressive weakness or numbness in your legs  -Unusual new onset of pain that is not improving

## 2021-06-14 NOTE — NURSING NOTE
Pre-procedure Intake    Have you been fasting? NA    If yes, for how long? NA    Are you taking a prescribed blood thinner such as coumadin, Plavix, Xarelto?    No    If yes, when did you take your last dose? NA    Do you take aspirin?  No    If cervical procedure, have you held aspirin for 6 days?   NA    Do you have any allergies to contrast dye, iodine, steroid and/or numbing medications?  NO    Are you currently taking antibiotics or have an active infection?  NO    Have you had a fever/elevated temperature within the past week? NO    Are you currently taking oral steroids? NO    Do you have a ? Yes       Are you pregnant or breastfeeding?  NO    Have you received the COVID-19 vaccine? Yes       If yes, was it your 1st, 2nd or only dose needed? 2nd    Date of most recent vaccine: 03/10/21    Notify provider and RNs if systolic BP >170, diastolic BP >100, P >100 or O2 sats < 90%      Estelle Jung CMA ([facility])

## 2021-06-24 ENCOUNTER — MYC MEDICAL ADVICE (OUTPATIENT)
Dept: PALLIATIVE MEDICINE | Facility: CLINIC | Age: 71
End: 2021-06-24

## 2021-06-24 DIAGNOSIS — M54.16 LUMBAR RADICULOPATHY: Primary | ICD-10-CM

## 2021-06-25 NOTE — TELEPHONE ENCOUNTER
From: Virginia Byers      Created: 6/24/2021 10:02 AM        Update on my facet joint injection.  It s been almost two weeks with little to no improvement.  The first two days were great after that went back to the way it was.  I have not been able to do therapy because my therapist can not get me in until july19. I have been taking Tylenol just to be able to move.  Can you please tell me what I can do?            From: Karol Cobos, RN      Created: 6/25/2021 2:24 PM        Saul Sands you have continued pain. Lets touch base on Monday about how you are doing as that is the 2 week point. We can discuss next steps at that time based on how your feeling.      TAY Kim, RN  Care Coordinator  [facility]          From: Virginia Byers      Created: 6/28/2021 11:48 AM        Touching base with you as today has been two weeks.  I still have not been able to get with my therapist until July 8 so have no instructions as to what to do for therapy.   It has been very painful and difficult getting in and out of bed and any turning getting in and out of the car I have to lift my legs there seems to be stiffness all the way down my legs now and I am not able to bend my knees to  anything.   If I could get some direction as to what to do would be very helpful.  Thank You Virginia Zeeshan          06/14/21Procedure performed: bilateral L4/5 and L5/S1 facet joint injections    06/04/21 Plan:     1. Interventional recommendations:  1. Discussed facet procedures vs epidural steroid injection. Plan to start with facet joint injections.  2. She will update after 2 weeks.  Will consider epidural steroid injection.  Will need to monitor gait. Atypical for her issues, question if other potential cause.  Advised if the head pain worsens, feel free to call to schedule occipital nerve block.    Routing to Dr Maldonado to review.

## 2021-06-28 NOTE — TELEPHONE ENCOUNTER
From: Karol Cobos, RN      Created: 6/28/2021 12:02 PM        Thank you for the update Virginia. Dr Maldonado mentioned we can consider trying an epidural steroid injection. Is that something you would like to try? Please respond to this message rather than starting a new one. It helps me keep track of the information we need. Thanks Virginia. Let me know if you would like to try the epidural.      TAY Kim, RN  Care Coordinator  [facility] [place]          From: Virginia Byers      Created: 6/28/2021 12:41 PM        Yes please let me know what I will then need to do.          Will route to Dr Maldonado to review and place order for LESI if approved.    TAY Kim, RN  Care Coordinator  [facility]

## 2021-06-28 NOTE — TELEPHONE ENCOUNTER
Virginia,  I put an order in for the epidural steroid injection.  You can call [phone] tomorrow during business hours to schedule.  I am tight for getting patients in to see me, as I have some vacation and meetings set up.  If you would like to get in sooner, you can certainly schedule with one of my partners.  You can find out availability when you call to schedule.    Julia Maldonado MD  [facility]

## 2021-06-30 ENCOUNTER — TRANSFERRED RECORDS (OUTPATIENT)
Dept: HEALTH INFORMATION MANAGEMENT | Facility: CLINIC | Age: 71
End: 2021-06-30

## 2021-06-30 NOTE — TELEPHONE ENCOUNTER
Pt seen for interventional eval only.    Messaged Dr. Zaidi, who is willing to discuss with patient.    --------------  Virginia,  I'm sorry for the delay. I am out of the office for vacation and fellowship interviews, but I'm not sure why they gave you out to July 19th.  I'm glad they offered that sooner appt.     I didn't review medications or other treatments, as Dr. Zaidi asked me to discuss interventional options. I just reached out to her, and she is willing to work with you on that, if you reach out to her office.     I would like an update 2 weeks after your injection with Dr. Garcia.     MD EVAN [facility]   -    She recommends scheduling a virtual visit or follow up      MD EVAN Campos [facility]

## 2021-06-30 NOTE — TELEPHONE ENCOUNTER
LVM to schedule injection sooner with Dr. Garcia.    Margie SCANLON    [facility] includes the [facility]

## 2021-06-30 NOTE — TELEPHONE ENCOUNTER
Patient was rescheduled 07/09/21 with Dr Garcia after she requested a sooner appointment. Will route to Dr Maldonado to review the info about her pain and the request for a medication option.    KEITH KimN, RN  Care Coordinator  [facility] [place]

## 2021-07-01 ENCOUNTER — MYC MEDICAL ADVICE (OUTPATIENT)
Dept: FAMILY MEDICINE | Facility: CLINIC | Age: 71
End: 2021-07-01

## 2021-07-01 DIAGNOSIS — M51.369 DDD (DEGENERATIVE DISC DISEASE), LUMBAR: Primary | ICD-10-CM

## 2021-07-01 RX ORDER — TRAMADOL HYDROCHLORIDE 50 MG/1
50 TABLET ORAL EVERY 6 HOURS PRN
Qty: 10 TABLET | Refills: 0 | Status: SHIPPED | OUTPATIENT
Start: 2021-07-01 | End: 2021-07-04

## 2021-07-02 NOTE — TELEPHONE ENCOUNTER
Patient notified. She expressed she is very thankful. No further questions.       Germaine Flores RN

## 2021-07-05 ENCOUNTER — VIRTUAL VISIT (OUTPATIENT)
Dept: FAMILY MEDICINE | Facility: CLINIC | Age: 71
End: 2021-07-05
Payer: MEDICARE

## 2021-07-05 DIAGNOSIS — M43.16 SPONDYLOLISTHESIS OF LUMBAR REGION: ICD-10-CM

## 2021-07-05 DIAGNOSIS — M51.369 DDD (DEGENERATIVE DISC DISEASE), LUMBAR: Primary | ICD-10-CM

## 2021-07-05 DIAGNOSIS — I10 HYPERTENSION GOAL BP (BLOOD PRESSURE) < 140/90: ICD-10-CM

## 2021-07-05 DIAGNOSIS — M48.061 LUMBAR FORAMINAL STENOSIS: ICD-10-CM

## 2021-07-05 PROCEDURE — 99443 PR PHYSICIAN TELEPHONE EVALUATION 21-30 MIN: CPT | Performed by: FAMILY MEDICINE

## 2021-07-05 RX ORDER — GABAPENTIN 100 MG/1
CAPSULE ORAL
Qty: 90 CAPSULE | Refills: 0 | Status: SHIPPED | OUTPATIENT
Start: 2021-07-05 | End: 2021-07-15

## 2021-07-05 RX ORDER — LOSARTAN POTASSIUM 100 MG/1
100 TABLET ORAL DAILY
Qty: 90 TABLET | Refills: 2 | Status: CANCELLED | OUTPATIENT
Start: 2021-07-05

## 2021-07-05 RX ORDER — NABUMETONE 500 MG/1
500 TABLET, FILM COATED ORAL 2 TIMES DAILY
Qty: 30 TABLET | Refills: 0 | Status: SHIPPED | OUTPATIENT
Start: 2021-07-05 | End: 2021-07-15

## 2021-07-05 NOTE — PROGRESS NOTES
Virginia is a 70 year old who is being evaluated via a billable telephone visit.    What phone number would you like to be contacted at? [phone]  How would you like to obtain your AVS? Nathanaelt   7:46 AM -start visit  8:07 AM -end visit    Assessment & Plan     Hypertension goal BP (blood pressure) < 140/90  Refilled  Follow up 2-3 weeks in clinic for BP check    DDD (degenerative disc disease), lumbar  Advised make appointment with spine specialist  SEE [facility] orders  The potential side effects of this medication have been discussed with the patient.  Call if any significant problems with these are experienced.    - Spine Referral; Future  - gabapentin (NEURONTIN) 100 MG capsule; 1 tablet daily x 1 week,  1 tablet twice daily   3-4 days And then increase  to 1 tablet 3 times daily  - PAIN MANAGEMENT REFERRAL; Future  - diclofenac (VOLTAREN) 1 % topical gel; Apply 4 g topically 4 times daily  - nabumetone (RELAFEN) 500 MG tablet; Take 1 tablet (500 mg) by mouth 2 times daily  If pain not better see pain clinic  Lumbar foraminal stenosis  As above  - Spine Referral; Future  - gabapentin (NEURONTIN) 100 MG capsule; 1 tablet daily x 1 week,  1 tablet twice daily   3-4 days And then increase  to 1 tablet 3 times daily  - PAIN MANAGEMENT REFERRAL; Future  - diclofenac (VOLTAREN) 1 % topical gel; Apply 4 g topically 4 times daily  - nabumetone (RELAFEN) 500 MG tablet; Take 1 tablet (500 mg) by mouth 2 times daily    Spondylolisthesis of lumbar region  As above  - Spine Referral; Future  - gabapentin (NEURONTIN) 100 MG capsule; 1 tablet daily x 1 week,  1 tablet twice daily   3-4 days And then increase  to 1 tablet 3 times daily  - PAIN MANAGEMENT REFERRAL; Future  - diclofenac (VOLTAREN) 1 % topical gel; Apply 4 g topically 4 times daily  - nabumetone (RELAFEN) 500 MG tablet; Take 1 tablet (500 mg) by mouth 2 times daily      No follow-ups on file.    Chuyita Zaidi MD  [facility]    Virginia is a 70 year old who presents for the following health issues   HPI   Chief Complaint   Patient presents with     Medication Request     Patient is requesting a possible pain pill until she is able to get her next epidural injection   pt has  a appointment for epidural this week  Has Problems moving Ultram has helped  Pt had a facet Lumbar Injection  Has stiffness in leg  No Incontinence or Leaking  MRI -lumbar  1. Transitional segment is considered S1 for this dictation.  2. Grade 1 spondylolisthesis of L3 on L4.  3. Mild central spinal stenosis at L3-L4 and L4-L5.  4. Edema in the facets and periarticular tissues on the left side at  L4-L5 and L5-S1 is likely due to degenerative facet arthrosis.  5. Moderate to severe neural foraminal stenosis on the right side at  L5-S1 may be affecting the right L5 nerve root. There is mild to  moderate foraminal narrowing on the right at L4-L5 and mild foraminal  narrowing on the right L3-L4.     Pertinent negatives include no fever, no numbness, no abdominal pain, no abdominal swelling, no bowel incontinence, no perianal numbness, no bladder incontinence, l no weakness.        Hypertension Follow-up      Do you check your blood pressure regularly outside of the clinic? No     Are you following a low salt diet? Yes      Review of Systems   Rest of the ROS is Negative except see above and Problem list [stable]        Objective           Vitals:  No vitals were obtained today due to virtual visit.    Physical Exam   alert and no distress  PSYCH: Alert and oriented times 3; coherent speech, normal   rate and volume, able to articulate logical thoughts, able   to abstract reason, no tangential thoughts, no hallucinations   or delusions  Her affect is normal  RESP: No cough, no audible wheezing, able to talk in full sentences  Remainder of exam unable to be completed due to telephone visits        Phone call duration: as above minutes

## 2021-07-05 NOTE — PATIENT INSTRUCTIONS
Please see me in 3 weeks for are check on medicines  Make appointment with spine specialist  Sincerely,  Chuyita Zaidi MD

## 2021-07-08 ENCOUNTER — THERAPY VISIT (OUTPATIENT)
Dept: PHYSICAL THERAPY | Facility: CLINIC | Age: 71
End: 2021-07-08
Attending: FAMILY MEDICINE
Payer: MEDICARE

## 2021-07-08 ENCOUNTER — MEDICAL CORRESPONDENCE (OUTPATIENT)
Dept: HEALTH INFORMATION MANAGEMENT | Facility: CLINIC | Age: 71
End: 2021-07-08

## 2021-07-08 DIAGNOSIS — M47.816 LUMBAR FACET ARTHROPATHY: ICD-10-CM

## 2021-07-08 PROCEDURE — 97112 NEUROMUSCULAR REEDUCATION: CPT | Mod: GP | Performed by: PHYSICAL THERAPIST

## 2021-07-08 PROCEDURE — 97161 PT EVAL LOW COMPLEX 20 MIN: CPT | Mod: GP | Performed by: PHYSICAL THERAPIST

## 2021-07-08 NOTE — PROGRESS NOTES
Physical Therapy Initial Evaluation  Subjective:    Patient Health History  Virginia Byers being seen for Lower lumbar.          Pain is reported as 10/10 on pain scale.                    Current occupation is Retired.                                       Objective:  System    Physical Exam    General     ROS     Lumbar Spine Evaluation    Physical Therapy Initial Examination/Evaluation July 8, 2021   Virginia Byers is a 70 year old female referred to physical therapy by Dr. Dhruv Delgadillo for treatment of Low back pain, lumbar facet arthropathy  with Precautions/Restrictions/MD instructions E&T    Therapist Assessment:   Clinical Impression: Pt presents to PT with primary complaint of low back and LE pain .  Per clinical examination, pt with overall impaired mobility from pain symptoms.  Pt initially with R LE symptoms, but more recently reporting bilat LE pain. Pt demonstrates weakness & pain with functional R hip flexion.   Special testing consistent with lumbar spine pathology.  Pt will benefit from skilled physical therapy for continued trial of directional preference exercises, education on proper body mechanics, and core stabilization program. Pt will also benefit from ongoing evaluation from spine specialist.     Subjective: Patient has a steroid epidural planned for tomorrow. She is seeing a spine specialist next Thursday. Pt has no issues with lying flat or sitting. She has pain with transitioning to standing and turning. She is now having symptoms into bilat LEs. Getting up at night is difficult. She is waking every 2 hours. Pt feels that pain has greatly affected her overall mobility and quality of life.   DOI/onset: MD order: 6/14/2021   Mechanism of injury: NA   DOS NA   Related PMH: LE pain  Previous treatment: injection, PT for knee   Imaging: MRI lumbar spine    Chief Complaint: Low back pain and LE pain    Pain: rest 7 /10, activity 9/10  Described as: Stiff  Alleviated by:  Lying on back  Exacerbated by:  Transition from sitting to standing, twisting, stairs, getting in and out of car, getting in and out of tub   Progression of symptoms since initial onset: worsening  Time of day when pain is worse: none noted    Sleeping: has to sleep on her back; wakes every 2 hours    Social history: Lives with a friend who is able to help  Occupation: retired  Job duties: normal housework    Current HEP/exercise regimen: hasn't been able to continue due to pain    Patient's goals are decrease pain, figure out what is wrong with back    Other pertinent PMH: See EMR General health as reported by patient: good    Return to MD: 7/15/2021         Dynamic Movement Screen    Gait:Circumducting gait on the R, limited R hip flexion   Antalgic overall movement      Trunk Range of Motion  Flexion: 25% hesitant to complete   Extension: 25% feels ok   Side bendin% stiff   Rotation: 75% to the R, 50% to the L       Hip and Knee Strength   MMT Hip Abduction Hip Extension Hip Flexion (sitting)  Knee Extension  Knee Flexion   Left NT NT 4-/5 pain  4+/5 4/5 discomfort   Right NT NT 4-/5 pain  4+/5 4/5 discomfort      Special Tests  Neural tension: + slump bilat     Assessment/Plan:    Patient is a 70 year old female with lumbar complaints.    Patient has the following significant findings with corresponding treatment plan.                Diagnosis 1:  Low back Pain, Lumbar facet arthropathy   Pain -  hot/cold therapy, manual therapy, self management, education, directional preference exercise and home program  Decreased ROM/flexibility - manual therapy, therapeutic exercise, therapeutic activity and home program  Decreased strength - therapeutic exercise, therapeutic activities and home program  Impaired muscle performance - neuro re-education and home program  Decreased function - therapeutic activities and home program    Therapy Evaluation Codes:   1) History comprised of:   Personal factors that impact the plan of care:       Living environment, Past/current experiences and Time since onset of symptoms.    Comorbidity factors that impact the plan of care are:      See EMR.     Medications impacting care: See EMR.  2) Examination of Body Systems comprised of:   Body structures and functions that impact the plan of care:      Lumbar spine.   Activity limitations that impact the plan of care are:      Bathing, Bending, Cooking, Driving, Dressing, Lifting, Sitting, Standing, Walking and Sleeping.  3) Clinical presentation characteristics are:   Stable/Uncomplicated.  4) Decision-Making    Low complexity using standardized patient assessment instrument and/or measureable assessment of functional outcome.  Cumulative Therapy Evaluation is: Low complexity.    Previous and current functional limitations:  (See Goal Flow Sheet for this information)    Short term and Long term goals: (See Goal Flow Sheet for this information)     Communication ability:  Patient appears to be able to clearly communicate and understand verbal and written communication and follow directions correctly.  Treatment Explanation - The following has been discussed with the patient:   RX ordered/plan of care  Anticipated outcomes  Possible risks and side effects  This patient would benefit from PT intervention to resume normal activities.   Rehab potential is good.    Frequency:  1 X week, once daily  Duration:  for 6 weeks  Discharge Plan:  Achieve all LTG.  Independent in home treatment program.  Reach maximal therapeutic benefit.    Please refer to the daily flowsheet for treatment today, total treatment time and time spent performing 1:1 timed codes.

## 2021-07-08 NOTE — LETTER
DEPARTMENT OF HEALTH AND HUMAN SERVICES  CENTERS FOR MEDICARE & MEDICAID SERVICES    PLAN/UPDATED PLAN OF PROGRESS FOR OUTPATIENT REHABILITATION          PATIENTS NAME:  Virginia Byers   : 1950  PROVIDER NUMBER:    [id number]  HICN: [id number]   PROVIDER NAME: [facility]  MEDICAL RECORD NUMBER: [id number]   START OF CARE DATE:  SOC Date: 21   TYPE:  PT    PRIMARY/TREATMENT DIAGNOSIS: (Pertinent Medical Diagnosis)  Lumbar facet arthropathy    VISITS FROM START OF CARE:  Rxs Used: 1     Physical Therapy Initial Evaluation    Lumbar Spine Evaluation  Physical Therapy Initial Examination/Evaluation 2021   Virginia Byers is a 70 year old female referred to physical therapy by Dr. Dhruv Delgadillo for treatment of Low back pain, lumbar facet arthropathy  with Precautions/Restrictions/MD instructions E&T    Therapist Assessment:   Clinical Impression: Pt presents to PT with primary complaint of low back and LE pain .  Per clinical examination, pt with overall impaired mobility from pain symptoms.  Pt initially with R LE symptoms, but more recently reporting bilat LE pain. Pt demonstrates weakness & pain with functional R hip flexion.   Special testing consistent with lumbar spine pathology.  Pt will benefit from skilled physical therapy for continued trial of directional preference exercises, education on proper body mechanics, and core stabilization program. Pt will also benefit from ongoing evaluation from spine specialist.     Subjective: Patient has a steroid epidural planned for tomorrow. She is seeing a spine specialist next Thursday. Pt has no issues with lying flat or sitting. She has pain with transitioning to standing and turning. She is now having symptoms into bilat LEs. Getting up at night is difficult. She is waking every 2 hours. Pt feels that pain has greatly affected her overall mobility and quality of life.   DOI/onset: MD order: 2021    Mechanism of injury: NA   DOS NA   Related PMH: LE pain  Previous treatment: injection, PT for knee   Imaging: MRI lumbar spine    Chief Complaint: Low back pain and LE pain    Pain: rest 7 /10, activity 9/10  Described as: Stiff  Alleviated by:  Lying on back Exacerbated by:  Transition from sitting to standing, twisting, stairs, getting in and out of car, getting in and out of tub   Progression of symptoms since initial onset: worsening  Time of day when pain is worse: none noted    Sleeping: has to sleep on her back; wakes every 2 hours    Social history: Lives with a friend who is able to help  PATIENTS NAME:  Virginia Byers   : 1950    Occupation: retired  Job duties: normal housework    Current HEP/exercise regimen: hasn't been able to continue due to pain    Patient's goals are decrease pain, figure out what is wrong with back    Other pertinent PMH: See EMR General health as reported by patient: good    Return to MD: 7/15/2021     Dynamic Movement Screen  Gait:Circumducting gait on the R, limited R hip flexion   Antalgic overall movement    Trunk Range of Motion  Flexion: 25% hesitant to complete   Extension: 25% feels ok   Side bendin% stiff   Rotation: 75% to the R, 50% to the L     Hip and Knee Strength   MMT Hip Abduction Hip Extension Hip Flexion (sitting)  Knee Extension  Knee Flexion   Left NT NT 4-/5 pain  4+/5 4/5 discomfort   Right NT NT 4-/5 pain  4+/5 4/5 discomfort      Special Tests  Neural tension: + slump bilat     Assessment/Plan:    Patient is a 70 year old female with lumbar complaints.    Patient has the following significant findings with corresponding treatment plan.                Diagnosis 1:  Low back Pain, Lumbar facet arthropathy   Pain -  hot/cold therapy, manual therapy, self management, education, directional preference exercise and home program  Decreased ROM/flexibility - manual therapy, therapeutic exercise, therapeutic activity and home program  Decreased  strength - therapeutic exercise, therapeutic activities and home program  Impaired muscle performance - neuro re-education and home program  Decreased function - therapeutic activities and home program    Therapy Evaluation Codes:   1) History comprised of:   Personal factors that impact the plan of care:      Living environment, Past/current experiences and Time since onset of symptoms.    Comorbidity factors that impact the plan of care are:      See EMR.     Medications impacting care: See EMR.  2) Examination of Body Systems comprised of:   Body structures and functions that impact the plan of care:      Lumbar spine.   Activity limitations that impact the plan of care are:    PATIENTS NAME:  Virginia Byers   : 1950      Bathing, Bending, Cooking, Driving, Dressing, Lifting, Sitting, Standing, Walking and Sleeping.  3) Clinical presentation characteristics are:   Stable/Uncomplicated.  4) Decision-Making    Low complexity using standardized patient assessment instrument and/or measureable assessment of functional outcome.  Cumulative Therapy Evaluation is: Low complexity.    Previous and current functional limitations:  (See Goal Flow Sheet for this information)    Short term and Long term goals: (See Goal Flow Sheet for this information)     Communication ability:  Patient appears to be able to clearly communicate and understand verbal and written communication and follow directions correctly.  Treatment Explanation - The following has been discussed with the patient:   RX ordered/plan of care  Anticipated outcomes  Possible risks and side effects  This patient would benefit from PT intervention to resume normal activities.   Rehab potential is good.    Frequency:  1 X week, once daily  Duration:  for 6 weeks  Discharge Plan:  Achieve all LTG.  Independent in home treatment program.  Reach maximal therapeutic benefit.    Please refer to the daily flowsheet for treatment today, total treatment time  "and time spent performing 1:1 timed codes.         Caregiver Signature/Credentials _____________________________ Date ________       Treating Provider: Earnestine Calderon, PT, DPT    I have reviewed and certified the need for these services and plan of treatment while under my care.        PHYSICIAN'S SIGNATURE:   _________________________________________  Date___________   Chuyita Zaidi MD    Certification period:  Beginning of Cert date period: 07/08/21 to  End of Cert period date: 10/05/21     Functional Level Progress Report: Please see attached \"Goal Flow sheet for Functional level.\"    ____X____ Continue Services or       ________ DC Services                Service dates: From  SOC Date: 07/08/21 date to present                         "

## 2021-07-09 ENCOUNTER — RADIOLOGY INJECTION OFFICE VISIT (OUTPATIENT)
Dept: PALLIATIVE MEDICINE | Facility: CLINIC | Age: 71
End: 2021-07-09
Attending: PSYCHIATRY & NEUROLOGY
Payer: MEDICARE

## 2021-07-09 VITALS
SYSTOLIC BLOOD PRESSURE: 145 MMHG | HEART RATE: 89 BPM | DIASTOLIC BLOOD PRESSURE: 75 MMHG | OXYGEN SATURATION: 98 % | RESPIRATION RATE: 16 BRPM

## 2021-07-09 DIAGNOSIS — I10 HYPERTENSION GOAL BP (BLOOD PRESSURE) < 140/90: ICD-10-CM

## 2021-07-09 DIAGNOSIS — M54.16 LUMBAR RADICULOPATHY: ICD-10-CM

## 2021-07-09 PROBLEM — M47.816 LUMBAR FACET ARTHROPATHY: Status: ACTIVE | Noted: 2021-07-09

## 2021-07-09 PROCEDURE — 64483 NJX AA&/STRD TFRM EPI L/S 1: CPT | Mod: 50 | Performed by: PAIN MEDICINE

## 2021-07-09 ASSESSMENT — PAIN SCALES - GENERAL: PAINLEVEL: EXTREME PAIN (9)

## 2021-07-09 NOTE — PATIENT INSTRUCTIONS
[facility]   Procedure Discharge Instructions    Today you saw:  Dr. Florencio Garcia      You had an:  Lumbar Epidural steroid injection       Medications used:  Lidocaine   Bupivacaine   Dexamethasone Omnipaque               If you were holding your blood thinning medication, please restart taking it: N/A    Be cautious when walking. Numbness and/or weakness in the lower extremities may occur for up to 6-8 hours after the procedure due to effect of the local anesthetic    Do not drive for 6 hours. The effect of the local anesthetic could slow your reflexes.     You may resume your regular activities after 24 hours    Avoid strenuous activity for the first 24 hours    You may shower, however avoid swimming, tub baths or hot tubs for 24 hours following your procedure    You may have a mild to moderate increase in pain for several days following the injection.    It may take up to 14 days for the steroid medication to start working although you may feel the effect as early as a few days after the procedure.       You may use ice packs for 10-15 minutes, 3 to 4 times a day at the injection site for comfort    Do not use heat to painful areas for 6 to 8 hours. This will give the local anesthetic time to wear off and prevent you from accidentally burning your skin.     Unless you have been directed to avoid the use of anti-inflammatory medications (NSAIDS), you may use medications such as ibuprofen, Aleve or Tylenol for pain control if needed.     Possible side effects of steroids that you may experience include flushing, elevated blood pressure, increased appetite, mild headaches and restlessness.  All of these symptoms will get better with time.    If you experience any of the following, call the Pain Clinic during work hours (Mon-Friday 8-4:30 pm) at [phone] or the Provider Line after hours at [phone]:  -Fever over 100 degree F  -Swelling, bleeding, redness, drainage, warmth at  the injection site  -Progressive weakness or numbness in your legs   -Loss of bowel or bladder function  -Unusual new onset of pain that is not improving

## 2021-07-09 NOTE — NURSING NOTE
Pre-procedure Intake    Have you been fasting? NA    If yes, for how long? NA    Are you taking a prescribed blood thinner such as coumadin, Plavix, Xarelto?    No    If yes, when did you take your last dose? NA    Do you take aspirin?  No    If cervical procedure, have you held aspirin for 6 days?   NA    Do you have any allergies to contrast dye, iodine, steroid and/or numbing medications?  NO    Are you currently taking antibiotics or have an active infection?  NO    Have you had a fever/elevated temperature within the past week? NO    Are you currently taking oral steroids? NO    Do you have a ? Yes       Are you pregnant or breastfeeding?  NO    Have you received the COVID-19 vaccine? Yes       If yes, was it your 1st, 2nd or only dose needed? 2nd     Date of most recent vaccine: 03/10/21    Notify provider and RNs if systolic BP >170, diastolic BP >100, P >100 or O2 sats < 90%      Estelle Jung CMA ([facility])

## 2021-07-09 NOTE — PROGRESS NOTES
Pre procedure Diagnosis: lumbar radiculopathy, lumbar degenerative disc disease   Post procedure Diagnosis: Same  Procedure performed: lumbar transforaminal epidural steroid injection at Bilateral L5-S1, fluoroscopically guided, contrast controlled  Anesthesia: none  Complications: none  Operators: Florencio Garcia MD     Indications:   Virginia Byers is a 70 year old female.  They have a history of bilateral LBP radiating to her LE.  Exam shows neg slump and they have tried conservative treatment including meds/pt.    MRI   T12-L1: Normal.     L1-L2: Normal.     L2-L3: Normal disc height. Mild dehydration of the disc. Shallow disc  bulge. Facets are unremarkable. Spinal canal is patent. Neural  foramina are patent.     L3-L4: Disc is partially uncovered by the anterolisthesis. Normal disc  height. Dehydration of disc. Shallow disc bulge. Mild facet  hypertrophy. The spinal canal is mildly narrowed. Neural foramina are  mildly narrowed on the right and mild to moderately narrowed on the  left.     L4-L5: Moderate loss of disc height. Dehydration of the disc.  Generalized disc bulge. Moderate ligamentum flavum thickening and mild  facet hypertrophy. The spinal canal is mildly narrowed. There is mild  lateral recess narrowing bilaterally. The neural foramina are mild to  moderately narrowed on the right and mildly narrowed on the left.     L5-S1: Normal disc height. Dehydration of the disc. Shallow disc  bulge. Mild facet hypertrophy on the left side. Mild ligamentum flavum  thickening. The spinal canal is patent. The neural foramina are  moderately to severely narrowed on the right and moderately narrowed  on the left.                                                                      IMPRESSION:  1. Transitional segment is considered S1 for this dictation.  2. Grade 1 spondylolisthesis of L3 on L4.  3. Mild central spinal stenosis at L3-L4 and L4-L5.  4. Edema in the facets and periarticular tissues on the  left side at  L4-L5 and L5-S1 is likely due to degenerative facet arthrosis.  5. Moderate to severe neural foraminal stenosis on the right side at  L5-S1 may be affecting the right L5 nerve root. There is mild to  moderate foraminal narrowing on the right at L4-L5 and mild foraminal  narrowing on the right L3-L4.Options/alternatives, benefits and risks were discussed with the patient including bleeding, infection, tissue trauma, numbness, weakness, paralysis, spinal cord injury, radiation exposure, headache and reaction to medications. Questions were answered to her satisfaction and she agrees to proceed. Voluntary informed consent was obtained and signed.     Vitals were reviewed: Yes  BP (!) 145/75   Pulse 89   Resp 16   SpO2 98%   Allergies were reviewed:  Yes   Medications were reviewed:  Yes   Pre-procedure pain score: 9/10    Procedure:  After getting informed consent, patient was brought into the procedure suite and was placed in a prone position on the procedure table.   A Pause for the Cause was performed.  Patient was prepped and draped in sterile fashion.     After identifying the bilateral L5-S1 neuroforamen, the C-arm was rotated to a bilateral lateral oblique angle.  A total of 4ml of Lidocaine 1% was used to anesthetize the skin and the needle track at a skin entry site coaxial with the fluoroscopy beam, and overriding the superior aspect of the neuroforamen.  A 22 gauge 3.5 inch spinal needle was advanced under intermittent fluoroscopy until it entered the foramen superiorly.    The position was then inspected from anteroposterior and lateral views, and the needle adjusted appropriately.  A total of 1ml of Omnipaque-300 was injected, confirming appropriate position, with spread into the nerve root sheath and the epidural space, with no intravascular uptake. 9ml was wasted    Then, after repeated negative aspiration, a combination of Decadron 10 mg, 0.25% bupivacaine 2 ml, diluted with 3ml of  normal saline was injected.     Hemostasis was achieved, the area was cleaned, and bandaids were placed when appropriate.  The patient tolerated the procedure well, and was taken to the recovery room.    Images were saved to PACS.    Post-procedure pain score: 6/10  Follow-up includes:   -f/u phone call in one week  -f/u with referring provider    Florencio Garcia MD  [facility]

## 2021-07-09 NOTE — NURSING NOTE
Discharge Information    IV Discontiued Time:  NA    Amount of Fluid Infused:  NA    Discharge Criteria = When patient returns to baseline or as per MD order    Consciousness:  Pt is fully awake    Circulation:  BP +/- 20% of pre-procedure level    Respiration:  Patient is able to breathe deeply    O2 Sat:  Patient is able to maintain O2 Sat >92% on room air    Activity:  Moves 4 extremities on command    Ambulation:  Patient is able to stand and walk or stand and pivot into wheelchair    Dressing:  Clean/dry or No Dressing    Notes:   Discharge instructions and AVS given to patient    Patient meets criteria for discharge?  YES    Admitted to PCU?  No    Responsible adult present to accompany patient home?  Yes    Signature/Title:    Vlad Turner RN  RN Care Coordinator  [facility]

## 2021-07-12 RX ORDER — LOSARTAN POTASSIUM 100 MG/1
TABLET ORAL
Qty: 30 TABLET | Refills: 0 | Status: SHIPPED | OUTPATIENT
Start: 2021-07-12 | End: 2021-08-10

## 2021-07-12 NOTE — TELEPHONE ENCOUNTER
Spoke to patient and she is wondering if she can wait to come until September when she is due for her wellness appointment. Patient has been checking blood pressure at home every day and stated she has been having good readings. Please advise.  Karol CALDERON CMA ([facility])

## 2021-07-12 NOTE — TELEPHONE ENCOUNTER
Routing refill request to provider for review/approval because:  BP Readings from Last 3 Encounters:   07/09/21 (!) 145/75   06/14/21 (!) 157/82   06/04/21 (!) 144/83

## 2021-07-15 ENCOUNTER — OFFICE VISIT (OUTPATIENT)
Dept: NEUROSURGERY | Facility: CLINIC | Age: 71
End: 2021-07-15
Payer: MEDICARE

## 2021-07-15 VITALS — SYSTOLIC BLOOD PRESSURE: 146 MMHG | HEART RATE: 96 BPM | DIASTOLIC BLOOD PRESSURE: 73 MMHG | OXYGEN SATURATION: 99 %

## 2021-07-15 DIAGNOSIS — M43.16 SPONDYLOLISTHESIS OF LUMBAR REGION: ICD-10-CM

## 2021-07-15 DIAGNOSIS — M48.061 LUMBAR FORAMINAL STENOSIS: ICD-10-CM

## 2021-07-15 DIAGNOSIS — M51.369 DDD (DEGENERATIVE DISC DISEASE), LUMBAR: ICD-10-CM

## 2021-07-15 PROCEDURE — 99203 OFFICE O/P NEW LOW 30 MIN: CPT | Performed by: NEUROLOGICAL SURGERY

## 2021-07-15 NOTE — PROGRESS NOTES
I was asked by Dr. Zaidi to see this patient in consultation    70F w/ lumbar spondylolisthesis and stenosis, back and leg pain.  Several months of throbbing, 7/10, daily, back pain, with radiation to the lateral thighs, knees, and shin, worse when she stands, gets out of a car, or bends over.  Has done 2 ROS, most recent one gave a couple days of complete relief but symptoms have returned.  MR shows a transitional counting system, shows L3-4 spondylolisthesis, L4-5 severe disc degeneration, and L5-S1 facet arthropathy, with lateral recess and foraminal stenosis.       Past Medical History:   Diagnosis Date     Breast cancer (H)     Lt breast     Glaucoma     bilateral     Hyperlipidemia      Hypertension      Osteoarthritis      Osteoporosis      Past Surgical History:   Procedure Laterality Date     C MASTECTOMY,SIMPLE  3/2000    left     COLONOSCOPY  2006    Q 10 years     SURGICAL HISTORY OF -  Left 2015    tissue expander placed in left breast area     SURGICAL HISTORY OF -  Left 2016    left breast implant and right mammoplasty     Social History     Socioeconomic History     Marital status: Single     Spouse name: Not on file     Number of children: 0     Years of education: Not on file     Highest education level: Not on file   Occupational History     Employer: [employer]     Occupation: REtired   Tobacco Use     Smoking status: Former Smoker     Packs/day: 1.00     Years: 17.00     Pack years: 17.00     Types: Cigarettes     Quit date: 3/15/1999     Years since quittin.3     Smokeless tobacco: Never Used   Substance and Sexual Activity     Alcohol use: Yes     Alcohol/week: 4.0 standard drinks     Types: 4 Glasses of wine per week     Comment: occasional     Drug use: No     Sexual activity: Never   Other Topics Concern     Parent/sibling w/ CABG, MI or angioplasty before 65F 55M? Yes     Comment: Father heart attack   Social History Narrative     Not on file     Social Determinants of Health      Financial Resource Strain:      Difficulty of Paying Living Expenses:    Food Insecurity:      Worried About Running Out of Food in the Last Year:      Ran Out of Food in the Last Year:    Transportation Needs:      Lack of Transportation (Medical):      Lack of Transportation (Non-Medical):    Physical Activity:      Days of Exercise per Week:      Minutes of Exercise per Session:    Stress:      Feeling of Stress :    Social Connections:      Frequency of Communication with Friends and Family:      Frequency of Social Gatherings with Friends and Family:      Attends Jehovah's witness Services:      Active Member of Clubs or Organizations:      Attends Club or Organization Meetings:      Marital Status:    Intimate Partner Violence:      Fear of Current or Ex-Partner:      Emotionally Abused:      Physically Abused:      Sexually Abused:      Family History   Problem Relation Age of Onset     Cancer Mother         bone cancer     Other Cancer Mother      Heart Disease Father      Diabetes Maternal Grandmother      Diabetes Paternal Grandmother      Arthritis Sister         ROS: 10 point ROS neg other than the symptoms noted above in the HPI.    Physical Exam  BP (!) 146/73   Pulse 96   SpO2 99%   HEENT:  Normocephalic, atraumatic.  PERRLA.  EOM s intact.  Visual fields full to gross exam  Neck:  Supple, non-tender, without lymphadenopathy.  Heart:  No peripheral edema  Lungs:  No SOB  Abdomen:  Non-distended.   Skin:  Warm and dry.  Extremities:  No edema, cyanosis or clubbing.  Psychiatric:  No apparent distress  Musculoskeletal:  Normal bulk and tone    NEUROLOGICAL EXAMINATION:     Mental status:  Alert and Oriented x 3, speech is fluent.  Cranial nerves:  II-XII intact.   Motor:    Shoulder Abduction:  Right:  5/5   Left:  5/5  Biceps:                      Right:  5/5   Left:  5/5  Triceps:                     Right:  5/5   Left:  5/5  Wrist Extensors:       Right:  5/5   Left:  5/5  Wrist Flexors:            Right:  5/5   Left:  5/5  interosseus :            Right:  5/5   Left:  5/5  Hip Flexion:                Right: 5/5  Left:  5/5  Quadriceps:             Right:  5/5  Left:  5/5  Hamstrings:             Right:  5/5  Left:  5/5  Gastroc Soleus:        Right:  5/5  Left:  5/5  Tib/Ant:                      Right:  5/5  Left:  5/5  EHL:                     Right:  5/5  Left:  5/5  Sensation:  Intact  Reflexes:  Negative Babinski.  Negative Clonus.  Negative Escamilla's.  Coordination:  Smooth finger to nose testing.   Negative pronator drift.  Antalgic gait    A/P:  70F w/ lumbar spondylolisthesis and stenosis, back and leg pain    I had a discussion with the patient, reviewing the history, symptoms, and imaging  Will plan for complete course of PT  Discussed that if no improvement, she may need to ultimately consider L3-S1 decompression and fusion  Follow up in 6 weeks

## 2021-07-15 NOTE — LETTER
7/15/2021         RE: Virginia Byers  [id number] [facility] [id number]        Dear Colleague,    Thank you for referring your patient, Virginia Byers, to the [facility]. Please see a copy of my visit note below.    I was asked by Dr. Zaidi to see this patient in consultation    70F w/ lumbar spondylolisthesis and stenosis, back and leg pain.  Several months of throbbing, 7/10, daily, back pain, with radiation to the lateral thighs, knees, and shin, worse when she stands, gets out of a car, or bends over.  Has done 2 ROS, most recent one gave a couple days of complete relief but symptoms have returned.  MR shows a transitional counting system, shows L3-4 spondylolisthesis, L4-5 severe disc degeneration, and L5-S1 facet arthropathy, with lateral recess and foraminal stenosis.       Past Medical History:   Diagnosis Date     Breast cancer (H)     Lt breast     Glaucoma     bilateral     Hyperlipidemia      Hypertension      Osteoarthritis      Osteoporosis      Past Surgical History:   Procedure Laterality Date     C MASTECTOMY,SIMPLE  3/2000    left     COLONOSCOPY  2006    Q 10 years     SURGICAL HISTORY OF -  Left 2015    tissue expander placed in left breast area     SURGICAL HISTORY OF -  Left 2016    left breast implant and right mammoplasty     Social History     Socioeconomic History     Marital status: Single     Spouse name: Not on file     Number of children: 0     Years of education: Not on file     Highest education level: Not on file   Occupational History     Employer: [employer]     Occupation: REtired   Tobacco Use     Smoking status: Former Smoker     Packs/day: 1.00     Years: 17.00     Pack years: 17.00     Types: Cigarettes     Quit date: 3/15/1999     Years since quittin.3     Smokeless tobacco: Never Used   Substance and Sexual Activity     Alcohol use: Yes     Alcohol/week: 4.0 standard drinks     Types: 4 Glasses of wine per week      Comment: occasional     Drug use: No     Sexual activity: Never   Other Topics Concern     Parent/sibling w/ CABG, MI or angioplasty before 65F 55M? Yes     Comment: Father heart attack   Social History Narrative     Not on file     Social Determinants of Health     Financial Resource Strain:      Difficulty of Paying Living Expenses:    Food Insecurity:      Worried About Running Out of Food in the Last Year:      Ran Out of Food in the Last Year:    Transportation Needs:      Lack of Transportation (Medical):      Lack of Transportation (Non-Medical):    Physical Activity:      Days of Exercise per Week:      Minutes of Exercise per Session:    Stress:      Feeling of Stress :    Social Connections:      Frequency of Communication with Friends and Family:      Frequency of Social Gatherings with Friends and Family:      Attends Hinduism Services:      Active Member of Clubs or Organizations:      Attends Club or Organization Meetings:      Marital Status:    Intimate Partner Violence:      Fear of Current or Ex-Partner:      Emotionally Abused:      Physically Abused:      Sexually Abused:      Family History   Problem Relation Age of Onset     Cancer Mother         bone cancer     Other Cancer Mother      Heart Disease Father      Diabetes Maternal Grandmother      Diabetes Paternal Grandmother      Arthritis Sister         ROS: 10 point ROS neg other than the symptoms noted above in the HPI.    Physical Exam  BP (!) 146/73   Pulse 96   SpO2 99%   HEENT:  Normocephalic, atraumatic.  PERRLA.  EOM s intact.  Visual fields full to gross exam  Neck:  Supple, non-tender, without lymphadenopathy.  Heart:  No peripheral edema  Lungs:  No SOB  Abdomen:  Non-distended.   Skin:  Warm and dry.  Extremities:  No edema, cyanosis or clubbing.  Psychiatric:  No apparent distress  Musculoskeletal:  Normal bulk and tone    NEUROLOGICAL EXAMINATION:     Mental status:  Alert and Oriented x 3, speech is fluent.  Cranial  nerves:  II-XII intact.   Motor:    Shoulder Abduction:  Right:  5/5   Left:  5/5  Biceps:                      Right:  5/5   Left:  5/5  Triceps:                     Right:  5/5   Left:  5/5  Wrist Extensors:       Right:  5/5   Left:  5/5  Wrist Flexors:           Right:  5/5   Left:  5/5  interosseus :            Right:  5/5   Left:  5/5  Hip Flexion:                Right: 5/5  Left:  5/5  Quadriceps:             Right:  5/5  Left:  5/5  Hamstrings:             Right:  5/5  Left:  5/5  Gastroc Soleus:        Right:  5/5  Left:  5/5  Tib/Ant:                      Right:  5/5  Left:  5/5  EHL:                     Right:  5/5  Left:  5/5  Sensation:  Intact  Reflexes:  Negative Babinski.  Negative Clonus.  Negative Escamilla's.  Coordination:  Smooth finger to nose testing.   Negative pronator drift.  Antalgic gait    A/P:  70F w/ lumbar spondylolisthesis and stenosis, back and leg pain    I had a discussion with the patient, reviewing the history, symptoms, and imaging  Will plan for complete course of PT  Discussed that if no improvement, she may need to ultimately consider L3-S1 decompression and fusion  Follow up in 6 weeks         Again, thank you for allowing me to participate in the care of your patient.        Sincerely,        Jose Alexander MD

## 2021-07-16 ENCOUNTER — THERAPY VISIT (OUTPATIENT)
Dept: PHYSICAL THERAPY | Facility: CLINIC | Age: 71
End: 2021-07-16
Payer: MEDICARE

## 2021-07-16 DIAGNOSIS — M47.816 LUMBAR FACET ARTHROPATHY: ICD-10-CM

## 2021-07-16 PROCEDURE — 97140 MANUAL THERAPY 1/> REGIONS: CPT | Mod: GP | Performed by: PHYSICAL THERAPIST

## 2021-07-16 PROCEDURE — 97110 THERAPEUTIC EXERCISES: CPT | Mod: GP | Performed by: PHYSICAL THERAPIST

## 2021-08-06 ENCOUNTER — THERAPY VISIT (OUTPATIENT)
Dept: PHYSICAL THERAPY | Facility: CLINIC | Age: 71
End: 2021-08-06
Payer: MEDICARE

## 2021-08-06 DIAGNOSIS — M47.816 LUMBAR FACET ARTHROPATHY: ICD-10-CM

## 2021-08-06 PROCEDURE — 97112 NEUROMUSCULAR REEDUCATION: CPT | Mod: GP | Performed by: PHYSICAL THERAPIST

## 2021-08-06 PROCEDURE — 97110 THERAPEUTIC EXERCISES: CPT | Mod: GP | Performed by: PHYSICAL THERAPIST

## 2021-08-09 PROCEDURE — 64483 NJX AA&/STRD TFRM EPI L/S 1: CPT | Mod: 50 | Performed by: PAIN MEDICINE

## 2021-08-09 RX ORDER — DEXAMETHASONE SODIUM PHOSPHATE 10 MG/ML
10 INJECTION INTRAMUSCULAR; INTRAVENOUS ONCE
Status: COMPLETED | OUTPATIENT
Start: 2021-07-09 | End: 2021-08-09

## 2021-08-09 RX ADMIN — DEXAMETHASONE SODIUM PHOSPHATE 10 MG: 10 INJECTION INTRAMUSCULAR; INTRAVENOUS at 15:16

## 2021-08-09 RX ADMIN — DEXAMETHASONE SODIUM PHOSPHATE 10 MG: 10 INJECTION INTRAMUSCULAR; INTRAVENOUS at 15:19

## 2021-08-12 ENCOUNTER — OFFICE VISIT (OUTPATIENT)
Dept: FAMILY MEDICINE | Facility: CLINIC | Age: 71
End: 2021-08-12
Payer: MEDICARE

## 2021-08-12 VITALS
TEMPERATURE: 98.3 F | SYSTOLIC BLOOD PRESSURE: 135 MMHG | RESPIRATION RATE: 15 BRPM | WEIGHT: 141.5 LBS | OXYGEN SATURATION: 99 % | DIASTOLIC BLOOD PRESSURE: 74 MMHG | BODY MASS INDEX: 24.67 KG/M2 | HEART RATE: 99 BPM

## 2021-08-12 DIAGNOSIS — I10 HYPERTENSION GOAL BP (BLOOD PRESSURE) < 140/90: Primary | ICD-10-CM

## 2021-08-12 DIAGNOSIS — E78.5 HYPERLIPIDEMIA LDL GOAL <160: ICD-10-CM

## 2021-08-12 DIAGNOSIS — M51.369 DDD (DEGENERATIVE DISC DISEASE), LUMBAR: ICD-10-CM

## 2021-08-12 LAB
ALBUMIN UR-MCNC: NEGATIVE MG/DL
ANION GAP SERPL CALCULATED.3IONS-SCNC: 7 MMOL/L (ref 3–14)
APPEARANCE UR: CLEAR
BACTERIA #/AREA URNS HPF: ABNORMAL /HPF
BILIRUB UR QL STRIP: NEGATIVE
BUN SERPL-MCNC: 18 MG/DL (ref 7–30)
CALCIUM SERPL-MCNC: 9.7 MG/DL (ref 8.5–10.1)
CHLORIDE BLD-SCNC: 93 MMOL/L (ref 94–109)
CHOLEST SERPL-MCNC: 138 MG/DL
CO2 SERPL-SCNC: 29 MMOL/L (ref 20–32)
COLOR UR AUTO: YELLOW
CREAT SERPL-MCNC: 0.5 MG/DL (ref 0.52–1.04)
FASTING STATUS PATIENT QL REPORTED: YES
GFR SERPL CREATININE-BSD FRML MDRD: >90 ML/MIN/1.73M2
GLUCOSE BLD-MCNC: 101 MG/DL (ref 70–99)
GLUCOSE UR STRIP-MCNC: NEGATIVE MG/DL
HDLC SERPL-MCNC: 38 MG/DL
HGB UR QL STRIP: ABNORMAL
KETONES UR STRIP-MCNC: ABNORMAL MG/DL
LDLC SERPL CALC-MCNC: 83 MG/DL
LEUKOCYTE ESTERASE UR QL STRIP: NEGATIVE
MUCOUS THREADS #/AREA URNS LPF: PRESENT /LPF
NITRATE UR QL: NEGATIVE
NONHDLC SERPL-MCNC: 100 MG/DL
PH UR STRIP: 6 [PH] (ref 5–7)
POTASSIUM BLD-SCNC: 3.7 MMOL/L (ref 3.4–5.3)
RBC #/AREA URNS AUTO: ABNORMAL /HPF
SODIUM SERPL-SCNC: 129 MMOL/L (ref 133–144)
SP GR UR STRIP: >=1.03 (ref 1–1.03)
SQUAMOUS #/AREA URNS AUTO: ABNORMAL /LPF
TRIGL SERPL-MCNC: 85 MG/DL
UROBILINOGEN UR STRIP-ACNC: 0.2 E.U./DL
WBC #/AREA URNS AUTO: ABNORMAL /HPF

## 2021-08-12 PROCEDURE — 36415 COLL VENOUS BLD VENIPUNCTURE: CPT | Performed by: FAMILY MEDICINE

## 2021-08-12 PROCEDURE — 80048 BASIC METABOLIC PNL TOTAL CA: CPT | Performed by: FAMILY MEDICINE

## 2021-08-12 PROCEDURE — 80061 LIPID PANEL: CPT | Performed by: FAMILY MEDICINE

## 2021-08-12 PROCEDURE — 99214 OFFICE O/P EST MOD 30 MIN: CPT | Performed by: FAMILY MEDICINE

## 2021-08-12 PROCEDURE — 81001 URINALYSIS AUTO W/SCOPE: CPT | Performed by: FAMILY MEDICINE

## 2021-08-12 NOTE — PROGRESS NOTES
Assessment & Plan     Hypertension goal BP (blood pressure) < 140/90  Stable   - Basic metabolic panel  (Ca, Cl, CO2, Creat, Gluc, K, Na, BUN); Future  - Lipid panel reflex to direct LDL Fasting; Future  - Lipid panel reflex to direct LDL Fasting  - Basic metabolic panel  (Ca, Cl, CO2, Creat, Gluc, K, Na, BUN)    Hyperlipidemia LDL goal <160  Labs pending   - Lipid panel reflex to direct LDL Fasting; Future  - Lipid panel reflex to direct LDL Fasting    DDD (degenerative disc disease), lumbar    - UA Macro with Reflex to Micro and Culture - lab collect; Future  - UA Macro with Reflex to Micro and Culture - lab collect  Follow up 6 weeks if not better    Return in about 2 months (around 10/12/2021) for Medicare wellness Exam.    Chuyita Zaidi MD  [facility] MIRIAN Coleman is a 70 year old who presents for the following health issues     HPI     Hypertension Follow-up      Do you check your blood pressure regularly outside of the clinic? YES    Are you following a low salt diet? Yes    Are your blood pressures ever more than 140 on the top number (systolic) OR more   than 90 on the bottom number (diastolic), for example 140/90? NO      How many servings of fruits and vegetables do you eat daily?  4 or more    On average, how many sweetened beverages do you drink each day (Examples: soda, juice, sweet tea, etc.  Do NOT count diet or artificially sweetened beverages)?   0    How many days per week do you exercise enough to make your heart beat faster? 3 or less- back issue    How many minutes a day do you exercise enough to make your heart beat faster? 9 or less    How many days per week do you miss taking your medication? 0    Hyperlipidemia Follow-Up      Are you regularly taking any medication or supplement to lower your cholesterol?   Yes- statin    Are you having muscle aches or other side effects that you think could be caused by your cholesterol lowering medication?  No  PT has seen  Dr Alexander for her back  Has had epidural shots  She feels PT maybe helping  Review of Systems   CONSTITUTIONAL: NEGATIVE for fever, chills, change in weight  ENT/MOUTH: NEGATIVE for ear, mouth and throat problems  RESP: NEGATIVE for significant cough or SOB  CV: NEGATIVE for chest pain, palpitations or peripheral edema  MUSCULOSKELETAL- as above  ROS otherwise negative      Objective    /74   Pulse 99   Temp 98.3  F (36.8  C) (Oral)   Resp 15   Wt 64.2 kg (141 lb 8 oz)   SpO2 99%   BMI 24.67 kg/m    Body mass index is 24.67 kg/m .  Physical Exam   GENERAL: healthy, alert and no distress  NECK: no adenopathy, no asymmetry, masses, or scars and thyroid normal to palpation  RESP: lungs clear to auscultation - no rales, rhonchi or wheezes  CV: regular rate and rhythm, normal S1 S2, no S3 or S4, no murmur, click or rub, no peripheral edema and peripheral pulses strong  ABDOMEN: soft, nontender, no hepatosplenomegaly, no masses and bowel sounds normal  MS: no gross musculoskeletal defects noted, no edema  NEURO: Normal strength and tone, mentation intact and speech normal    Pending

## 2021-08-13 ENCOUNTER — THERAPY VISIT (OUTPATIENT)
Dept: PHYSICAL THERAPY | Facility: CLINIC | Age: 71
End: 2021-08-13
Payer: MEDICARE

## 2021-08-13 DIAGNOSIS — M47.816 LUMBAR FACET ARTHROPATHY: ICD-10-CM

## 2021-08-13 PROCEDURE — 97110 THERAPEUTIC EXERCISES: CPT | Mod: GP | Performed by: PHYSICAL THERAPIST

## 2021-08-13 PROCEDURE — 97112 NEUROMUSCULAR REEDUCATION: CPT | Mod: GP | Performed by: PHYSICAL THERAPIST

## 2021-09-01 ENCOUNTER — TELEPHONE (OUTPATIENT)
Dept: OTOLARYNGOLOGY | Facility: CLINIC | Age: 71
End: 2021-09-01

## 2021-09-01 ENCOUNTER — TRANSFERRED RECORDS (OUTPATIENT)
Dept: HEALTH INFORMATION MANAGEMENT | Facility: CLINIC | Age: 71
End: 2021-09-01

## 2021-09-01 NOTE — TELEPHONE ENCOUNTER
Reason for call:  Other   Patient called regarding (reason for call): call back  Additional comments: Patient has an appt on 9/8/21 but was wondering if its possible to be seen sooner.ay have a fungal infection on tongue that seems to be moving to throat and interfering with eating.    Phone number to reach patient:  Cell number on file:    Telephone Information:   Mobile [phone]       Best Time:  Anytime    Can we leave a detailed message on this number?  YES    Travel screening: Not Applicable

## 2021-09-02 DIAGNOSIS — I10 HYPERTENSION GOAL BP (BLOOD PRESSURE) < 140/90: ICD-10-CM

## 2021-09-03 NOTE — TELEPHONE ENCOUNTER
Talked with patient.  Let her know that there are no sooner openings w/ ENT.    This is not a new issue for her, it's been going on several weeks and she has seen her PCP for it.  She is not having any trouble swallowing or breathing.  Let her know if that changes that she should be seen in ED immediately.    Kimberly Carrillo RN

## 2021-09-05 RX ORDER — LOSARTAN POTASSIUM 100 MG/1
TABLET ORAL
Qty: 30 TABLET | Refills: 0 | Status: SHIPPED | OUTPATIENT
Start: 2021-09-05 | End: 2021-10-03

## 2021-09-08 ENCOUNTER — OFFICE VISIT (OUTPATIENT)
Dept: OTOLARYNGOLOGY | Facility: CLINIC | Age: 71
End: 2021-09-08
Payer: MEDICARE

## 2021-09-08 VITALS
HEART RATE: 113 BPM | SYSTOLIC BLOOD PRESSURE: 138 MMHG | RESPIRATION RATE: 18 BRPM | OXYGEN SATURATION: 98 % | DIASTOLIC BLOOD PRESSURE: 70 MMHG

## 2021-09-08 DIAGNOSIS — K11.7 XEROSTOMIA: Primary | ICD-10-CM

## 2021-09-08 PROCEDURE — 99203 OFFICE O/P NEW LOW 30 MIN: CPT | Performed by: OTOLARYNGOLOGY

## 2021-09-08 NOTE — PROGRESS NOTES
Chief Complaint - dry mouth    History of Present Illness - Virginia Byers is a 70 year old female presents with dry mouth. The patient has noticed for approximately 4-6 months. She had an epidural injection in back, and feels like that was at the start of it. The dry mouth keeps her up at night. She also had some jaw pain. During the day it isn't dry. At night her mouth seems stuck shut. She is mouth breathing. Not snoring. Nonsmoker. She went to dentist. She tried biotene, but that didn't help.     Past Medical History -   Patient Active Problem List   Diagnosis     Osteoporosis     Atopic rhinitis     Hyperlipidemia LDL goal <160     S/P mastectomy     Advanced directives, counseling/discussion     Osteoarthritis     Family history of diabetes mellitus     Glaucoma     Personal history of malignant neoplasm of breast     Hypertension goal BP (blood pressure) < 140/90     Lumbago     Pain in both lower legs     Right knee pain     Spinal stenosis of lumbar region, unspecified whether neurogenic claudication present     DDD (degenerative disc disease), lumbar     Lumbar facet arthropathy       Current Medications -   Current Outpatient Medications:      hydrochlorothiazide (HYDRODIURIL) 12.5 MG tablet, Take 1 tablet (12.5 mg) by mouth daily, Disp: 90 tablet, Rfl: 3     latanoprost (XALATAN) 0.005 % ophthalmic solution, 1 drop daily., Disp: , Rfl:      losartan (COZAAR) 100 MG tablet, TAKE 1 TABLET BY MOUTH EVERY DAY, Disp: 30 tablet, Rfl: 0     rosuvastatin (CRESTOR) 10 MG tablet, Take 1 tablet (10 mg) by mouth daily, Disp: 30 tablet, Rfl: 11     timolol (TIMOPTIC) 0.25 % ophthalmic solution, Place 1 drop into both eyes daily, Disp: , Rfl: 5    Allergies -   Allergies   Allergen Reactions     Compazine      Mental confusion     Prochlorperazine Visual Disturbance     Simvastatin Other (See Comments)     Muscle aches       Social History -   Social History     Socioeconomic History     Marital status: Single      Spouse name: Not on file     Number of children: 0     Years of education: Not on file     Highest education level: Not on file   Occupational History     Employer: [employer]     Occupation: REtired   Tobacco Use     Smoking status: Former Smoker     Packs/day: 1.00     Years: 17.00     Pack years: 17.00     Types: Cigarettes     Quit date: 3/15/1999     Years since quittin.4     Smokeless tobacco: Never Used   Substance and Sexual Activity     Alcohol use: Yes     Alcohol/week: 4.0 standard drinks     Types: 4 Glasses of wine per week     Comment: occasional     Drug use: No     Sexual activity: Never   Other Topics Concern     Parent/sibling w/ CABG, MI or angioplasty before 65F 55M? Yes     Comment: Father heart attack   Social History Narrative     Not on file     Social Determinants of Health     Financial Resource Strain:      Difficulty of Paying Living Expenses:    Food Insecurity:      Worried About Running Out of Food in the Last Year:      Ran Out of Food in the Last Year:    Transportation Needs:      Lack of Transportation (Medical):      Lack of Transportation (Non-Medical):    Physical Activity:      Days of Exercise per Week:      Minutes of Exercise per Session:    Stress:      Feeling of Stress :    Social Connections:      Frequency of Communication with Friends and Family:      Frequency of Social Gatherings with Friends and Family:      Attends Taoism Services:      Active Member of Clubs or Organizations:      Attends Club or Organization Meetings:      Marital Status:    Intimate Partner Violence:      Fear of Current or Ex-Partner:      Emotionally Abused:      Physically Abused:      Sexually Abused:        Family History -   Family History   Problem Relation Age of Onset     Cancer Mother         bone cancer     Other Cancer Mother      Heart Disease Father      Diabetes Maternal Grandmother      Diabetes Paternal Grandmother      Arthritis Sister        Review of Systems - As per HPI  and PMHx, back pain, otherwise 7 system review of the head and neck negative.    Physical Exam  /70   Pulse 113   Resp 18   SpO2 98%   General - The patient is in no distress. Alert and oriented x3, answers questions and cooperates with examination appropriately.   Voice and Breathing - The patient was breathing comfortably without the use of accessory muscles. There was no wheezing, stridor, or stertor.  The patients voice was clear and strong.  Eyes - Extraocular movements intact. Sclera were not icteric or injected, conjunctiva were pink and moist.  Neurologic - Cranial nerves II-XII are grossly intact. Specifically, the facial nerve is intact, House-Brackmann grade 1 of 6.   Mouth - Examination of the oral cavity showed dryness and some thicker saliva. She has saliva flow out of levi's ducts, but less overall.   Oropharynx - The walls of the oropharynx were smooth, symmetric, and had no lesions or ulcerations. The uvula was midline and the palate raised symmetrically.   Neck -  Soft. Non-tender. Palpation of the occipital, submental, submandibular, internal jugular chain, and supraclavicular nodes did not demonstrate any abnormal lymph nodes or masses. The parotid glands were without masses. Palpation of the thyroid was soft and smooth, with no nodules or goiter appreciated.  The trachea was midline.      A/P - Virginia Byers is a 70 year old female with dry mouth only at night.  This may be due to mouth breathing.  I want her to try chinstrap or headband around her chin and mouth to keep it shut.  She can also sleep next a humidifier.  She can try Biotene.  She admits to taking her pills in the middle of the night and I recommend she take them in the morning in case there is any drying effects.  Drink copious amounts of water.  I see no infection today.    Jim Espinosa MD  Otolaryngology  [facility]

## 2021-09-08 NOTE — LETTER
9/8/2021         RE: Virginia Byers  [address]  [place] [address]        Dear Colleague,    Thank you for referring your patient, Virginia Byers, to the [facility]. Please see a copy of my visit note below.    Chief Complaint - dry mouth    History of Present Illness - Virginia Byers is a 70 year old female presents with dry mouth. The patient has noticed for approximately 4-6 months. She had an epidural injection in back, and feels like that was at the start of it. The dry mouth keeps her up at night. She also had some jaw pain. During the day it isn't dry. At night her mouth seems stuck shut. She is mouth breathing. Not snoring. Nonsmoker. She went to dentist. She tried biotene, but that didn't help.     Past Medical History -   Patient Active Problem List   Diagnosis     Osteoporosis     Atopic rhinitis     Hyperlipidemia LDL goal <160     S/P mastectomy     Advanced directives, counseling/discussion     Osteoarthritis     Family history of diabetes mellitus     Glaucoma     Personal history of malignant neoplasm of breast     Hypertension goal BP (blood pressure) < 140/90     Lumbago     Pain in both lower legs     Right knee pain     Spinal stenosis of lumbar region, unspecified whether neurogenic claudication present     DDD (degenerative disc disease), lumbar     Lumbar facet arthropathy       Current Medications -   Current Outpatient Medications:      hydrochlorothiazide (HYDRODIURIL) 12.5 MG tablet, Take 1 tablet (12.5 mg) by mouth daily, Disp: 90 tablet, Rfl: 3     latanoprost (XALATAN) 0.005 % ophthalmic solution, 1 drop daily., Disp: , Rfl:      losartan (COZAAR) 100 MG tablet, TAKE 1 TABLET BY MOUTH EVERY DAY, Disp: 30 tablet, Rfl: 0     rosuvastatin (CRESTOR) 10 MG tablet, Take 1 tablet (10 mg) by mouth daily, Disp: 30 tablet, Rfl: 11     timolol (TIMOPTIC) 0.25 % ophthalmic solution, Place 1 drop into both eyes daily, Disp: , Rfl: 5    Allergies -    Allergies   Allergen Reactions     Compazine      Mental confusion     Prochlorperazine Visual Disturbance     Simvastatin Other (See Comments)     Muscle aches       Social History -   Social History     Socioeconomic History     Marital status: Single     Spouse name: Not on file     Number of children: 0     Years of education: Not on file     Highest education level: Not on file   Occupational History     Employer: [employer]     Occupation: REtired   Tobacco Use     Smoking status: Former Smoker     Packs/day: 1.00     Years: 17.00     Pack years: 17.00     Types: Cigarettes     Quit date: 3/15/1999     Years since quittin.4     Smokeless tobacco: Never Used   Substance and Sexual Activity     Alcohol use: Yes     Alcohol/week: 4.0 standard drinks     Types: 4 Glasses of wine per week     Comment: occasional     Drug use: No     Sexual activity: Never   Other Topics Concern     Parent/sibling w/ CABG, MI or angioplasty before 65F 55M? Yes     Comment: Father heart attack   Social History Narrative     Not on file     Social Determinants of Health     Financial Resource Strain:      Difficulty of Paying Living Expenses:    Food Insecurity:      Worried About Running Out of Food in the Last Year:      Ran Out of Food in the Last Year:    Transportation Needs:      Lack of Transportation (Medical):      Lack of Transportation (Non-Medical):    Physical Activity:      Days of Exercise per Week:      Minutes of Exercise per Session:    Stress:      Feeling of Stress :    Social Connections:      Frequency of Communication with Friends and Family:      Frequency of Social Gatherings with Friends and Family:      Attends Amish Services:      Active Member of Clubs or Organizations:      Attends Club or Organization Meetings:      Marital Status:    Intimate Partner Violence:      Fear of Current or Ex-Partner:      Emotionally Abused:      Physically Abused:      Sexually Abused:        Family History -   Family  History   Problem Relation Age of Onset     Cancer Mother         bone cancer     Other Cancer Mother      Heart Disease Father      Diabetes Maternal Grandmother      Diabetes Paternal Grandmother      Arthritis Sister        Review of Systems - As per HPI and PMHx, back pain, otherwise 7 system review of the head and neck negative.    Physical Exam  /70   Pulse 113   Resp 18   SpO2 98%   General - The patient is in no distress. Alert and oriented x3, answers questions and cooperates with examination appropriately.   Voice and Breathing - The patient was breathing comfortably without the use of accessory muscles. There was no wheezing, stridor, or stertor.  The patients voice was clear and strong.  Eyes - Extraocular movements intact. Sclera were not icteric or injected, conjunctiva were pink and moist.  Neurologic - Cranial nerves II-XII are grossly intact. Specifically, the facial nerve is intact, House-Brackmann grade 1 of 6.   Mouth - Examination of the oral cavity showed dryness and some thicker saliva. She has saliva flow out of levi's ducts, but less overall.   Oropharynx - The walls of the oropharynx were smooth, symmetric, and had no lesions or ulcerations. The uvula was midline and the palate raised symmetrically.   Neck -  Soft. Non-tender. Palpation of the occipital, submental, submandibular, internal jugular chain, and supraclavicular nodes did not demonstrate any abnormal lymph nodes or masses. The parotid glands were without masses. Palpation of the thyroid was soft and smooth, with no nodules or goiter appreciated.  The trachea was midline.      A/P - Virginia Byers is a 70 year old female with dry mouth only at night.  This may be due to mouth breathing.  I want her to try chinstrap or headband around her chin and mouth to keep it shut.  She can also sleep next a humidifier.  She can try Biotene.  She admits to taking her pills in the middle of the night and I recommend she take them  in the morning in case there is any drying effects.  Drink copious amounts of water.  I see no infection today.    Jim Espinosa MD  Otolaryngology  [facility]        Again, thank you for allowing me to participate in the care of your patient.        Sincerely,        Jim Espinosa MD

## 2021-09-08 NOTE — PATIENT INSTRUCTIONS
- head band over chin to keep mouth shut, or chin strap to keep mouth shut  - humidifier at night  - take medications in morning  - biotene before bed  - drink plenty of fluids  - consider sialagen

## 2021-09-08 NOTE — H&P (VIEW-ONLY)
Chief Complaint - dry mouth    History of Present Illness - Virginia Byers is a 70 year old female presents with dry mouth. The patient has noticed for approximately 4-6 months. She had an epidural injection in back, and feels like that was at the start of it. The dry mouth keeps her up at night. She also had some jaw pain. During the day it isn't dry. At night her mouth seems stuck shut. She is mouth breathing. Not snoring. Nonsmoker. She went to dentist. She tried biotene, but that didn't help.     Past Medical History -   Patient Active Problem List   Diagnosis     Osteoporosis     Atopic rhinitis     Hyperlipidemia LDL goal <160     S/P mastectomy     Advanced directives, counseling/discussion     Osteoarthritis     Family history of diabetes mellitus     Glaucoma     Personal history of malignant neoplasm of breast     Hypertension goal BP (blood pressure) < 140/90     Lumbago     Pain in both lower legs     Right knee pain     Spinal stenosis of lumbar region, unspecified whether neurogenic claudication present     DDD (degenerative disc disease), lumbar     Lumbar facet arthropathy       Current Medications -   Current Outpatient Medications:      hydrochlorothiazide (HYDRODIURIL) 12.5 MG tablet, Take 1 tablet (12.5 mg) by mouth daily, Disp: 90 tablet, Rfl: 3     latanoprost (XALATAN) 0.005 % ophthalmic solution, 1 drop daily., Disp: , Rfl:      losartan (COZAAR) 100 MG tablet, TAKE 1 TABLET BY MOUTH EVERY DAY, Disp: 30 tablet, Rfl: 0     rosuvastatin (CRESTOR) 10 MG tablet, Take 1 tablet (10 mg) by mouth daily, Disp: 30 tablet, Rfl: 11     timolol (TIMOPTIC) 0.25 % ophthalmic solution, Place 1 drop into both eyes daily, Disp: , Rfl: 5    Allergies -   Allergies   Allergen Reactions     Compazine      Mental confusion     Prochlorperazine Visual Disturbance     Simvastatin Other (See Comments)     Muscle aches       Social History -   Social History     Socioeconomic History     Marital status: Single      Spouse name: Not on file     Number of children: 0     Years of education: Not on file     Highest education level: Not on file   Occupational History     Employer: [employer]     Occupation: REtired   Tobacco Use     Smoking status: Former Smoker     Packs/day: 1.00     Years: 17.00     Pack years: 17.00     Types: Cigarettes     Quit date: 3/15/1999     Years since quittin.4     Smokeless tobacco: Never Used   Substance and Sexual Activity     Alcohol use: Yes     Alcohol/week: 4.0 standard drinks     Types: 4 Glasses of wine per week     Comment: occasional     Drug use: No     Sexual activity: Never   Other Topics Concern     Parent/sibling w/ CABG, MI or angioplasty before 65F 55M? Yes     Comment: Father heart attack   Social History Narrative     Not on file     Social Determinants of Health     Financial Resource Strain:      Difficulty of Paying Living Expenses:    Food Insecurity:      Worried About Running Out of Food in the Last Year:      Ran Out of Food in the Last Year:    Transportation Needs:      Lack of Transportation (Medical):      Lack of Transportation (Non-Medical):    Physical Activity:      Days of Exercise per Week:      Minutes of Exercise per Session:    Stress:      Feeling of Stress :    Social Connections:      Frequency of Communication with Friends and Family:      Frequency of Social Gatherings with Friends and Family:      Attends Pentecostal Services:      Active Member of Clubs or Organizations:      Attends Club or Organization Meetings:      Marital Status:    Intimate Partner Violence:      Fear of Current or Ex-Partner:      Emotionally Abused:      Physically Abused:      Sexually Abused:        Family History -   Family History   Problem Relation Age of Onset     Cancer Mother         bone cancer     Other Cancer Mother      Heart Disease Father      Diabetes Maternal Grandmother      Diabetes Paternal Grandmother      Arthritis Sister        Review of Systems - As per HPI  and PMHx, back pain, otherwise 7 system review of the head and neck negative.    Physical Exam  /70   Pulse 113   Resp 18   SpO2 98%   General - The patient is in no distress. Alert and oriented x3, answers questions and cooperates with examination appropriately.   Voice and Breathing - The patient was breathing comfortably without the use of accessory muscles. There was no wheezing, stridor, or stertor.  The patients voice was clear and strong.  Eyes - Extraocular movements intact. Sclera were not icteric or injected, conjunctiva were pink and moist.  Neurologic - Cranial nerves II-XII are grossly intact. Specifically, the facial nerve is intact, House-Brackmann grade 1 of 6.   Mouth - Examination of the oral cavity showed dryness and some thicker saliva. She has saliva flow out of levi's ducts, but less overall.   Oropharynx - The walls of the oropharynx were smooth, symmetric, and had no lesions or ulcerations. The uvula was midline and the palate raised symmetrically.   Neck -  Soft. Non-tender. Palpation of the occipital, submental, submandibular, internal jugular chain, and supraclavicular nodes did not demonstrate any abnormal lymph nodes or masses. The parotid glands were without masses. Palpation of the thyroid was soft and smooth, with no nodules or goiter appreciated.  The trachea was midline.      A/P - Virginia Byers is a 70 year old female with dry mouth only at night.  This may be due to mouth breathing.  I want her to try chinstrap or headband around her chin and mouth to keep it shut.  She can also sleep next a humidifier.  She can try Biotene.  She admits to taking her pills in the middle of the night and I recommend she take them in the morning in case there is any drying effects.  Drink copious amounts of water.  I see no infection today.    Jim Espinosa MD  Otolaryngology  [facility]

## 2021-09-16 ASSESSMENT — ENCOUNTER SYMPTOMS
ABDOMINAL PAIN: 0
HEMATOCHEZIA: 0
SHORTNESS OF BREATH: 0
MYALGIAS: 1
NAUSEA: 1
CHILLS: 0
CONSTIPATION: 0
NERVOUS/ANXIOUS: 0
DIZZINESS: 0
PARESTHESIAS: 0
BREAST MASS: 0
HEMATURIA: 0
FEVER: 0
COUGH: 0
FREQUENCY: 0
HEADACHES: 0
HEARTBURN: 0
SORE THROAT: 0
WEAKNESS: 1
PALPITATIONS: 0
DIARRHEA: 0
JOINT SWELLING: 0
ARTHRALGIAS: 1
DYSURIA: 0
EYE PAIN: 0

## 2021-09-16 ASSESSMENT — ACTIVITIES OF DAILY LIVING (ADL): CURRENT_FUNCTION: NO ASSISTANCE NEEDED

## 2021-09-17 ENCOUNTER — THERAPY VISIT (OUTPATIENT)
Dept: PHYSICAL THERAPY | Facility: CLINIC | Age: 71
End: 2021-09-17
Payer: MEDICARE

## 2021-09-17 DIAGNOSIS — M47.816 LUMBAR FACET ARTHROPATHY: ICD-10-CM

## 2021-09-17 PROCEDURE — 97110 THERAPEUTIC EXERCISES: CPT | Mod: GP | Performed by: PHYSICAL THERAPIST

## 2021-09-17 PROCEDURE — 97112 NEUROMUSCULAR REEDUCATION: CPT | Mod: GP | Performed by: PHYSICAL THERAPIST

## 2021-09-17 NOTE — LETTER
[facility]  [address]  [address]  [place] [address]  [phone]    2021    Re: Virginia Byers   :   1950  MRN:  [id number]   REFERRING PHYSICIAN:   MD EVAN Munroe [facility]  Date of Initial Evaluation:  2021  Visits:  Rxs Used: 5  Reason for Referral:  Lumbar facet arthropathy    EVALUATION SUMMARY     PROGRESS  REPORT  Progress reporting period is from 2021 to 2021.       SUBJECTIVE  Subjective: Pt reports back pain is about the same, maybe gradually getting better. She reports she just doesn't feel right and is fatiuged all of the time. She has her physical on Monday and is hoping to talk to her doctor about her numerous health issues. She is frustrated and feels that she does not have answers to why she is feeling this way.     Initial Pain level: 9/10.   Changes in function:  Slow progress towards PT goals   Adverse reaction to treatment or activity: None    OBJECTIVE    Objective: Reviewed and progressed HEP as able. Pt is making progress, although slow, with some of her physical strength.      ASSESSMENT/PLAN  Updated problem list and treatment plan: Diagnosis 1:  Low Back pain   Pain -  hot/cold therapy, manual therapy, self management, education, directional preference exercise and home program  Decreased strength - therapeutic exercise, therapeutic activities and home program  Impaired gait - gait training and home program  Impaired muscle performance - neuro re-education and home program  Decreased function - therapeutic activities and home program  STG/LTGs have been met or progress has been made towards goals:  Yes (See Goal flow sheet completed today.)  Assessment of Progress: The patient's condition has potential to improve.  Self Management Plans:  Patient has been instructed in a home treatment program.  I have re-evaluated this patient and find that  the nature, scope, duration and intensity of the therapy is appropriate for the medical condition of the patient.  Virginia continues to require the following intervention to meet STG and LTG's:  PT      Re: Virginia Byers   :   1950    Recommendations:  This patient would benefit from continued therapy.     Frequency:  2 X a month, once daily  Duration:  for 2 months      Please refer to the daily flowsheet for treatment today, total treatment time and time spent performing 1:1 timed codes.        Thank you for your referral.    INQUIRIES  Therapist: Earnestine Calderon DPT  [facility] [address]  [address] [place] [id number]  Phone: [phone]  Fax: [phone]

## 2021-09-20 ENCOUNTER — OFFICE VISIT (OUTPATIENT)
Dept: FAMILY MEDICINE | Facility: CLINIC | Age: 71
End: 2021-09-20
Payer: MEDICARE

## 2021-09-20 VITALS
WEIGHT: 134 LBS | BODY MASS INDEX: 22.88 KG/M2 | SYSTOLIC BLOOD PRESSURE: 122 MMHG | HEIGHT: 64 IN | OXYGEN SATURATION: 100 % | DIASTOLIC BLOOD PRESSURE: 69 MMHG | TEMPERATURE: 98.8 F | HEART RATE: 72 BPM

## 2021-09-20 DIAGNOSIS — I10 HYPERTENSION GOAL BP (BLOOD PRESSURE) < 140/90: ICD-10-CM

## 2021-09-20 DIAGNOSIS — Z85.3 PERSONAL HISTORY OF MALIGNANT NEOPLASM OF BREAST: ICD-10-CM

## 2021-09-20 DIAGNOSIS — R63.4 WEIGHT LOSS: ICD-10-CM

## 2021-09-20 DIAGNOSIS — R68.2 DRY MOUTH: ICD-10-CM

## 2021-09-20 DIAGNOSIS — Z90.12 STATUS POST LEFT MASTECTOMY: ICD-10-CM

## 2021-09-20 DIAGNOSIS — R74.8 ELEVATED ALKALINE PHOSPHATASE LEVEL: ICD-10-CM

## 2021-09-20 DIAGNOSIS — D64.9 ANEMIA, UNSPECIFIED TYPE: ICD-10-CM

## 2021-09-20 DIAGNOSIS — M48.061 SPINAL STENOSIS OF LUMBAR REGION, UNSPECIFIED WHETHER NEUROGENIC CLAUDICATION PRESENT: ICD-10-CM

## 2021-09-20 DIAGNOSIS — Z00.00 ENCOUNTER FOR MEDICARE ANNUAL WELLNESS EXAM: Primary | ICD-10-CM

## 2021-09-20 DIAGNOSIS — R53.83 FATIGUE, UNSPECIFIED TYPE: ICD-10-CM

## 2021-09-20 DIAGNOSIS — Z12.31 VISIT FOR SCREENING MAMMOGRAM: ICD-10-CM

## 2021-09-20 LAB
ALBUMIN SERPL-MCNC: 2.3 G/DL (ref 3.4–5)
ALP SERPL-CCNC: 235 U/L (ref 40–150)
ALT SERPL W P-5'-P-CCNC: 108 U/L (ref 0–50)
ANION GAP SERPL CALCULATED.3IONS-SCNC: 8 MMOL/L (ref 3–14)
AST SERPL W P-5'-P-CCNC: 39 U/L (ref 0–45)
BASOPHILS # BLD AUTO: 0.1 10E3/UL (ref 0–0.2)
BASOPHILS NFR BLD AUTO: 1 %
BILIRUB SERPL-MCNC: 0.5 MG/DL (ref 0.2–1.3)
BUN SERPL-MCNC: 16 MG/DL (ref 7–30)
CALCIUM SERPL-MCNC: 9.6 MG/DL (ref 8.5–10.1)
CHLORIDE BLD-SCNC: 95 MMOL/L (ref 94–109)
CO2 SERPL-SCNC: 29 MMOL/L (ref 20–32)
CREAT SERPL-MCNC: 0.56 MG/DL (ref 0.52–1.04)
EOSINOPHIL # BLD AUTO: 0 10E3/UL (ref 0–0.7)
EOSINOPHIL NFR BLD AUTO: 0 %
ERYTHROCYTE [DISTWIDTH] IN BLOOD BY AUTOMATED COUNT: 15.4 % (ref 10–15)
ERYTHROCYTE [SEDIMENTATION RATE] IN BLOOD BY WESTERGREN METHOD: 106 MM/HR (ref 0–30)
FERRITIN SERPL-MCNC: 817 NG/ML (ref 8–252)
FOLATE SERPL-MCNC: 13.3 NG/ML
GFR SERPL CREATININE-BSD FRML MDRD: >90 ML/MIN/1.73M2
GLUCOSE BLD-MCNC: 114 MG/DL (ref 70–99)
HCT VFR BLD AUTO: 31.4 % (ref 35–47)
HGB BLD-MCNC: 10.1 G/DL (ref 11.7–15.7)
IMM GRANULOCYTES # BLD: 0.2 10E3/UL
IMM GRANULOCYTES NFR BLD: 1 %
IRON SATN MFR SERPL: 8 % (ref 15–46)
IRON SERPL-MCNC: 21 UG/DL (ref 35–180)
LYMPHOCYTES # BLD AUTO: 1.2 10E3/UL (ref 0.8–5.3)
LYMPHOCYTES NFR BLD AUTO: 9 %
MCH RBC QN AUTO: 25.4 PG (ref 26.5–33)
MCHC RBC AUTO-ENTMCNC: 32.2 G/DL (ref 31.5–36.5)
MCV RBC AUTO: 79 FL (ref 78–100)
MONOCYTES # BLD AUTO: 1.1 10E3/UL (ref 0–1.3)
MONOCYTES NFR BLD AUTO: 9 %
NEUTROPHILS # BLD AUTO: 10.2 10E3/UL (ref 1.6–8.3)
NEUTROPHILS NFR BLD AUTO: 80 %
NRBC # BLD AUTO: 0 10E3/UL
NRBC BLD AUTO-RTO: 0 /100
PLATELET # BLD AUTO: 682 10E3/UL (ref 150–450)
POTASSIUM BLD-SCNC: 4.5 MMOL/L (ref 3.4–5.3)
PROT SERPL-MCNC: 7.2 G/DL (ref 6.8–8.8)
PTH-INTACT SERPL-MCNC: 37 PG/ML (ref 18–80)
RBC # BLD AUTO: 3.98 10E6/UL (ref 3.8–5.2)
RETICS # AUTO: 0.07 10E6/UL (ref 0.03–0.1)
RETICS/RBC NFR AUTO: 1.8 % (ref 0.5–2)
SODIUM SERPL-SCNC: 132 MMOL/L (ref 133–144)
TIBC SERPL-MCNC: 266 UG/DL (ref 240–430)
TSH SERPL DL<=0.005 MIU/L-ACNC: 1.86 MU/L (ref 0.4–4)
URATE SERPL-MCNC: 3.7 MG/DL (ref 2.6–6)
VIT B12 SERPL-MCNC: 395 PG/ML (ref 193–986)
WBC # BLD AUTO: 12.8 10E3/UL (ref 4–11)

## 2021-09-20 PROCEDURE — 85045 AUTOMATED RETICULOCYTE COUNT: CPT | Performed by: FAMILY MEDICINE

## 2021-09-20 PROCEDURE — 86235 NUCLEAR ANTIGEN ANTIBODY: CPT | Performed by: FAMILY MEDICINE

## 2021-09-20 PROCEDURE — G0008 ADMIN INFLUENZA VIRUS VAC: HCPCS | Performed by: FAMILY MEDICINE

## 2021-09-20 PROCEDURE — 85060 BLOOD SMEAR INTERPRETATION: CPT | Performed by: PATHOLOGY

## 2021-09-20 PROCEDURE — 86038 ANTINUCLEAR ANTIBODIES: CPT | Performed by: FAMILY MEDICINE

## 2021-09-20 PROCEDURE — 82607 VITAMIN B-12: CPT | Performed by: FAMILY MEDICINE

## 2021-09-20 PROCEDURE — 85652 RBC SED RATE AUTOMATED: CPT | Performed by: FAMILY MEDICINE

## 2021-09-20 PROCEDURE — 85025 COMPLETE CBC W/AUTO DIFF WBC: CPT | Performed by: FAMILY MEDICINE

## 2021-09-20 PROCEDURE — 84550 ASSAY OF BLOOD/URIC ACID: CPT | Performed by: FAMILY MEDICINE

## 2021-09-20 PROCEDURE — 84443 ASSAY THYROID STIM HORMONE: CPT | Performed by: FAMILY MEDICINE

## 2021-09-20 PROCEDURE — 82728 ASSAY OF FERRITIN: CPT | Performed by: FAMILY MEDICINE

## 2021-09-20 PROCEDURE — G0439 PPPS, SUBSEQ VISIT: HCPCS | Performed by: FAMILY MEDICINE

## 2021-09-20 PROCEDURE — 80053 COMPREHEN METABOLIC PANEL: CPT | Performed by: FAMILY MEDICINE

## 2021-09-20 PROCEDURE — 83550 IRON BINDING TEST: CPT | Performed by: FAMILY MEDICINE

## 2021-09-20 PROCEDURE — 99214 OFFICE O/P EST MOD 30 MIN: CPT | Mod: 25 | Performed by: FAMILY MEDICINE

## 2021-09-20 PROCEDURE — 90662 IIV NO PRSV INCREASED AG IM: CPT | Performed by: FAMILY MEDICINE

## 2021-09-20 PROCEDURE — 86039 ANTINUCLEAR ANTIBODIES (ANA): CPT | Performed by: FAMILY MEDICINE

## 2021-09-20 PROCEDURE — 82746 ASSAY OF FOLIC ACID SERUM: CPT | Performed by: FAMILY MEDICINE

## 2021-09-20 PROCEDURE — 86431 RHEUMATOID FACTOR QUANT: CPT | Performed by: FAMILY MEDICINE

## 2021-09-20 PROCEDURE — 36415 COLL VENOUS BLD VENIPUNCTURE: CPT | Performed by: FAMILY MEDICINE

## 2021-09-20 PROCEDURE — 83970 ASSAY OF PARATHORMONE: CPT | Performed by: FAMILY MEDICINE

## 2021-09-20 ASSESSMENT — ENCOUNTER SYMPTOMS
DIZZINESS: 0
NAUSEA: 1
PALPITATIONS: 0
MYALGIAS: 1
CONSTIPATION: 0
SHORTNESS OF BREATH: 0
HEMATURIA: 0
PARESTHESIAS: 0
HEARTBURN: 0
NERVOUS/ANXIOUS: 0
FEVER: 0
COUGH: 0
JOINT SWELLING: 0
WEAKNESS: 1
ARTHRALGIAS: 1
DYSURIA: 0
ABDOMINAL PAIN: 0
FREQUENCY: 0
HEMATOCHEZIA: 0
CHILLS: 0
DIARRHEA: 0
SORE THROAT: 0
HEADACHES: 0
BREAST MASS: 0
EYE PAIN: 0

## 2021-09-20 ASSESSMENT — ACTIVITIES OF DAILY LIVING (ADL): CURRENT_FUNCTION: NO ASSISTANCE NEEDED

## 2021-09-20 ASSESSMENT — MIFFLIN-ST. JEOR: SCORE: 1099.88

## 2021-09-20 NOTE — PATIENT INSTRUCTIONS
Patient Education   Personalized Prevention Plan  You are due for the preventive services outlined below.  Your care team is available to assist you in scheduling these services.  If you have already completed any of these items, please share that information with your care team to update in your medical record.  Health Maintenance Due   Topic Date Due     Zoster (Shingles) Vaccine (2 of 3) 05/10/2010     Flu Vaccine (1) 09/01/2021     FALL RISK ASSESSMENT  09/17/2021     Annual Wellness Visit  09/17/2021

## 2021-09-20 NOTE — PROGRESS NOTES
"SUBJECTIVE:   Virignia Byers is a 70 year old female who presents for Preventive Visit.    Patient has been advised of split billing requirements and indicates understanding: Yes   Are you in the first 12 months of your Medicare coverage?  No    Healthy Habits:     In general, how would you rate your overall health?  Poor    Frequency of exercise:  6-7 days/week    Duration of exercise:  15-30 minutes    Do you usually eat at least 4 servings of fruit and vegetables a day, include whole grains    & fiber and avoid regularly eating high fat or \"junk\" foods?  Yes    Taking medications regularly:  No    Medication side effects:  Not applicable    Ability to successfully perform activities of daily living:  No assistance needed    Home Safety:  No safety concerns identified    Hearing Impairment:  No hearing concerns    In the past 6 months, have you been bothered by leaking of urine?  No    In general, how would you rate your overall mental or emotional health?  Good      PHQ-2 Total Score: 1    Additional concerns today:  No    Do you feel safe in your environment? Yes    Have you ever done Advance Care Planning? (For example, a Health Directive, POLST, or a discussion with a medical provider or your loved ones about your wishes): Yes, advance care planning is on file.       Fall risk  Fallen 2 or more times in the past year?: No  Any fall with injury in the past year?: No    Cognitive Screening   1) Repeat 3 items (Leader, Season, Table)    2) Clock draw: NORMAL  3) 3 item recall: Recalls 3 objects  Results: 3 items recalled: COGNITIVE IMPAIRMENT LESS LIKELY    Mini-CogTM Copyright MARIA LUZ Mcginnis. Licensed by the author for use in [facility]; reprinted with permission (vinh@.[facility]). All rights reserved.      Do you have sleep apnea, excessive snoring or daytime drowsiness?: no    Reviewed and updated as needed this visit by clinical staff  Tobacco  Allergies  Meds            Feels very " fatigued  Decreased appetite  Weight Loss  Dry mouth  The patient denies abdominal or flank pain, anorexia, nausea or vomiting, dysphagia, change in bowel habits or black or bloody stools.  No chest pain  No sob  Feels Tired  No visual  Symptoms or pain  No  sjkin rash  All this started per pt after the epidural    Reviewed and updated as needed this visit by Provider                Social History     Tobacco Use     Smoking status: Former Smoker     Packs/day: 1.00     Years: 17.00     Pack years: 17.00     Types: Cigarettes     Quit date: 3/15/1999     Years since quittin.5     Smokeless tobacco: Never Used   Substance Use Topics     Alcohol use: Yes     Alcohol/week: 4.0 standard drinks     Types: 4 Glasses of wine per week     Comment: occasional         Alcohol Use 2021   Prescreen: >3 drinks/day or >7 drinks/week? No   Prescreen: >3 drinks/day or >7 drinks/week? -       Current providers sharing in care for this patient include:   Patient Care Team:  Chuyita Zaidi MD as PCP - General (Family Practice)  Chuyita Zaidi MD as Assigned PCP  Jose Alexander MD as Assigned Neuroscience Provider    The following health maintenance items are reviewed in Epic and correct as of today:  Health Maintenance Due   Topic Date Due     ZOSTER IMMUNIZATION (2 of 3) 05/10/2010     FALL RISK ASSESSMENT  2021               Review of Systems   Constitutional: Negative for chills and fever.   HENT: Negative for congestion, ear pain, hearing loss and sore throat.    Eyes: Negative for pain and visual disturbance.   Respiratory: Negative for cough and shortness of breath.    Cardiovascular: Negative for chest pain, palpitations and peripheral edema.   Gastrointestinal: Positive for nausea. Negative for abdominal pain, constipation, diarrhea, heartburn and hematochezia.   Breasts:  Negative for tenderness, breast mass and discharge.   Genitourinary: Negative for dysuria, frequency, genital sores, hematuria, pelvic  "pain, urgency, vaginal bleeding and vaginal discharge.   Musculoskeletal: Positive for arthralgias and myalgias. Negative for joint swelling.   Skin: Negative for rash.   Neurological: Positive for weakness. Negative for dizziness, headaches and paresthesias.   Psychiatric/Behavioral: Negative for mood changes. The patient is not nervous/anxious.    Rest of the ROS is Negative except see above and Problem list [stable]      OBJECTIVE:   /69 (BP Location: Right arm, Patient Position: Sitting, Cuff Size: Adult Regular)   Pulse 72   Temp 98.8  F (37.1  C) (Oral)   Ht 1.613 m (5' 3.5\")   Wt 60.8 kg (134 lb)   SpO2 100%   BMI 23.36 kg/m   Estimated body mass index is 23.36 kg/m  as calculated from the following:    Height as of this encounter: 1.613 m (5' 3.5\").    Weight as of this encounter: 60.8 kg (134 lb).  Physical Exam  GENERAL APPEARANCE: alert, no distress and pale  EYES: Eyes grossly normal to inspection, PERRL and conjunctivae and sclerae normal  HENT: ear canals and TM's normal, nose and mouth without ulcers or lesions, oropharynx clear and oral mucous membranes moist  NECK: no adenopathy, no asymmetry, masses, or scars and thyroid normal to palpation  RESP: lungs clear to auscultation - no rales, rhonchi or wheezes  BREAST: normal without masses, tenderness or nipple discharge and no palpable axillary masses or adenopathy  CV: regular rate and rhythm, normal S1 S2, no S3 or S4, no murmur, click or rub, no peripheral edema and peripheral pulses strong  ABDOMEN: soft, nontender, no hepatosplenomegaly, no masses and bowel sounds normal  MS: no musculoskeletal defects are noted and gait is age appropriate without ataxia  SKIN: no suspicious lesions or rashes  NEURO: Normal strength and tone, sensory exam grossly normal, mentation intact and speech normal  PSYCH: mentation appears normal and affect normal/bright    Diagnostic Test Results:  Labs reviewed in Epic  Results for orders placed or performed " in visit on 09/20/21 (from the past 24 hour(s))   Uric acid   Result Value Ref Range    Uric Acid 3.7 2.6 - 6.0 mg/dL   Parathyroid Hormone Intact   Result Value Ref Range    Parathyroid Hormone Intact 37 18 - 80 pg/mL   CBC with platelets and differential *Canceled*    Narrative    The following orders were created for panel order CBC with platelets and differential.  Procedure                               Abnormality         Status                     ---------                               -----------         ------                     CBC with platelets and d...[[id number]]                                                   Please view results for these tests on the individual orders.   TSH with free T4 reflex   Result Value Ref Range    TSH 1.86 0.40 - 4.00 mU/L   ESR: Erythrocyte sedimentation rate   Result Value Ref Range    Erythrocyte Sedimentation Rate 106 (H) 0 - 30 mm/hr   Comprehensive metabolic panel (BMP + Alb, Alk Phos, ALT, AST, Total. Bili, TP)   Result Value Ref Range    Sodium 132 (L) 133 - 144 mmol/L    Potassium 4.5 3.4 - 5.3 mmol/L    Chloride 95 94 - 109 mmol/L    Carbon Dioxide (CO2) 29 20 - 32 mmol/L    Anion Gap 8 3 - 14 mmol/L    Urea Nitrogen 16 7 - 30 mg/dL    Creatinine 0.56 0.52 - 1.04 mg/dL    Calcium 9.6 8.5 - 10.1 mg/dL    Glucose 114 (H) 70 - 99 mg/dL    Alkaline Phosphatase 235 (H) 40 - 150 U/L    AST 39 0 - 45 U/L     (H) 0 - 50 U/L    Protein Total 7.2 6.8 - 8.8 g/dL    Albumin 2.3 (L) 3.4 - 5.0 g/dL    Bilirubin Total 0.5 0.2 - 1.3 mg/dL    GFR Estimate >90 >60 mL/min/1.73m2   VITAMIN B12   Result Value Ref Range    Vitamin B12 395 193 - 986 pg/mL   Lab Blood Morphology Pathologist Review    Narrative    The following orders were created for panel order Lab Blood Morphology Pathologist Review.  Procedure                               Abnormality         Status                     ---------                               -----------         ------                     Bld  morphology pathology...[[id number]]                      In process                 CBC with platelets and d...[[id number]]  Abnormal            Final result               Reticulocyte count[[id number]]           Normal              Final result               Morphology Tracking[[id number]]                              Final result                 Please view results for these tests on the individual orders.   CBC with platelets and differential   Result Value Ref Range    WBC Count 12.8 (H) 4.0 - 11.0 10e3/uL    RBC Count 3.98 3.80 - 5.20 10e6/uL    Hemoglobin 10.1 (L) 11.7 - 15.7 g/dL    Hematocrit 31.4 (L) 35.0 - 47.0 %    MCV 79 78 - 100 fL    MCH 25.4 (L) 26.5 - 33.0 pg    MCHC 32.2 31.5 - 36.5 g/dL    RDW 15.4 (H) 10.0 - 15.0 %    Platelet Count 682 (H) 150 - 450 10e3/uL    % Neutrophils 80 %    % Lymphocytes 9 %    % Monocytes 9 %    % Eosinophils 0 %    % Basophils 1 %    % Immature Granulocytes 1 %    NRBCs per 100 WBC 0 <1 /100    Absolute Neutrophils 10.2 (H) 1.6 - 8.3 10e3/uL    Absolute Lymphocytes 1.2 0.8 - 5.3 10e3/uL    Absolute Monocytes 1.1 0.0 - 1.3 10e3/uL    Absolute Eosinophils 0.0 0.0 - 0.7 10e3/uL    Absolute Basophils 0.1 0.0 - 0.2 10e3/uL    Absolute Immature Granulocytes 0.2 (H) <=0.0 10e3/uL    Absolute NRBCs 0.0 10e3/uL   Reticulocyte count   Result Value Ref Range    % Reticulocyte 1.8 0.5 - 2.0 %    Absolute Reticulocyte 0.071 0.025 - 0.095 10e6/uL   Iron & Iron Binding Capacity   Result Value Ref Range    Iron 21 (L) 35 - 180 ug/dL    Iron Binding Capacity 266 240 - 430 ug/dL    Iron Sat Index 8 (L) 15 - 46 %   Ferritin   Result Value Ref Range    Ferritin 817 (H) 8 - 252 ng/mL       ASSESSMENT / PLAN:   (Z00.00) Encounter for Medicare annual wellness exam  (primary encounter diagnosis)  Comment:   Plan:         Pending     (I10) Hypertension goal BP (blood pressure) < 140/90  Comment: controlled  Plan    (Z85.3) Personal history of malignant neoplasm of breast  Comment: left Breast CA_in  2000  Plan: s/p Left mastectomy    (M48.061) Spinal stenosis of lumbar region, unspecified whether neurogenic claudication present  Comment:   Plan: in PT -she is better    (R53.83) Fatigue, unspecified type  Comment: labs pending   Plan: Comprehensive metabolic panel (BMP + Alb, Alk         Phos, ALT, AST, Total. Bili, TP), ESR:         Erythrocyte sedimentation rate, TSH with free         T4 reflex, CBC with platelets and differential,        Parathyroid Hormone Intact, Anti Nuclear Liza         IgG by IFA with Reflex, Rheumatoid factor, Uric        acid, Iron & Iron Binding Capacity, Ferritin,         CT Abdomen wo & w & Pelvis w Contrast            (R68.2) Dry mouth  Comment: pending   Plan: SSA Ro CHANCE Antibody IgG, Scleroderma Antibody         Scl70 CHANCE IgG            (D64.9) Anemia, unspecified type  Comment: pending all labs  If Iron def recommend colonoscopy and EGD  Plan: IRON AND IRON BINDING CAPACITY, Ferritin,         VITAMIN B12, FOLATE, Lab Blood Morphology         Pathologist Review, Iron & Iron Binding         Capacity, Ferritin, CT Abdomen wo & w & Pelvis         w Contrast            (Z12.31) Visit for screening mammogram  Comment: advised   Plan: MA Screening Digital Bilateral            (Z90.12) Status post left mastectomy  Comment: in 2000  Plan:     (R74.8) Blood alkaline phosphatase increased   Comment:   Plan: CT Abdomen wo & w & Pelvis w Contrast        Advised       Patient has been advised of split billing requirements and indicates understanding: handouts  COUNSELING:  Reviewed preventive health counseling, as reflected in patient instructions       Regular exercise       Healthy diet/nutrition       Vision screening       Hearing screening       Bladder control       Colon cancer screening       The 10-year ASCVD risk score (Yamile CROSS Jr., et al., 2013) is: 12.3%    Values used to calculate the score:      Age: 70 years      Sex: Female      Is Non- : No       "Diabetic: No      Tobacco smoker: No      Systolic Blood Pressure: 122 mmHg      Is BP treated: Yes      HDL Cholesterol: 38 mg/dL      Total Cholesterol: 138 mg/dL       Advanced Planning     Estimated body mass index is 23.36 kg/m  as calculated from the following:    Height as of this encounter: 1.613 m (5' 3.5\").    Weight as of this encounter: 60.8 kg (134 lb).        She reports that she quit smoking about 22 years ago. Her smoking use included cigarettes. She has a 17.00 pack-year smoking history. She has never used smokeless tobacco.      Appropriate preventive services were discussed with this patient, including applicable screening as appropriate for cardiovascular disease, diabetes, osteopenia/osteoporosis, and glaucoma.  As appropriate for age/gender, discussed screening for colorectal cancer, prostate cancer, breast cancer, and cervical cancer. Checklist reviewing preventive services available has been given to the patient.    Reviewed patients plan of care and provided an AVS. The Complex Care Plan (for patients with higher acuity and needing more deliberate coordination of services) for [place] meets the Care Plan requirement. This Care Plan has been established and reviewed with the pt    Counseling Resources:  ATP IV Guidelines  Pooled Cohorts Equation Calculator  Breast Cancer Risk Calculator  Breast Cancer: Medication to Reduce Risk  FRAX Risk Assessment  ICSI Preventive Guidelines  Dietary Guidelines for Americans, 2010  "[employer]'s MyPlate  ASA Prophylaxis  Lung CA Screening    Chuyita Zaidi MD  [facility]    Identified Health Risks:  "

## 2021-09-20 NOTE — PROGRESS NOTES
Subjective:  HPI  Physical Exam                    Objective:  System    Physical Exam    General     ROS       PROGRESS  REPORT    Progress reporting period is from 7/8/2021 to 9/17/2021.       SUBJECTIVE  Subjective: Pt reports back pain is about the same, maybe gradually getting better. She reports she just doesn't feel right and is fatiuged all of the time. She has her physical on Monday and is hoping to talk to her doctor about her numerous health issues. She is frustrated and feels that she does not have answers to why she is feeling this way.     Initial Pain level: 9/10.   Changes in function:  Slow progress towards PT goals   Adverse reaction to treatment or activity: None    OBJECTIVE    Objective: Reviewed and progressed HEP as able. Pt is making progress, although slow, with some of her physical strength.      ASSESSMENT/PLAN  Updated problem list and treatment plan: Diagnosis 1:  Low Back pain   Pain -  hot/cold therapy, manual therapy, self management, education, directional preference exercise and home program  Decreased strength - therapeutic exercise, therapeutic activities and home program  Impaired gait - gait training and home program  Impaired muscle performance - neuro re-education and home program  Decreased function - therapeutic activities and home program  STG/LTGs have been met or progress has been made towards goals:  Yes (See Goal flow sheet completed today.)  Assessment of Progress: The patient's condition has potential to improve.  Self Management Plans:  Patient has been instructed in a home treatment program.  I have re-evaluated this patient and find that the nature, scope, duration and intensity of the therapy is appropriate for the medical condition of the patient.  [place] continues to require the following intervention to meet STG and LTG's:  PT    Recommendations:  This patient would benefit from continued therapy.     Frequency:  2 X a month, once daily  Duration:  for 2  months        Please refer to the daily flowsheet for treatment today, total treatment time and time spent performing 1:1 timed codes.

## 2021-09-21 ENCOUNTER — ANCILLARY PROCEDURE (OUTPATIENT)
Dept: CT IMAGING | Facility: CLINIC | Age: 71
End: 2021-09-21
Attending: FAMILY MEDICINE
Payer: MEDICARE

## 2021-09-21 DIAGNOSIS — E78.5 HYPERLIPIDEMIA LDL GOAL <160: ICD-10-CM

## 2021-09-21 DIAGNOSIS — D64.9 ANEMIA, UNSPECIFIED TYPE: ICD-10-CM

## 2021-09-21 DIAGNOSIS — R74.8 ELEVATED ALKALINE PHOSPHATASE LEVEL: ICD-10-CM

## 2021-09-21 DIAGNOSIS — R53.83 FATIGUE, UNSPECIFIED TYPE: ICD-10-CM

## 2021-09-21 LAB
ANA PAT SER IF-IMP: ABNORMAL
ANA SER QL IF: ABNORMAL
ANA TITR SER IF: ABNORMAL {TITER}
ENA SCL70 IGG SER IA-ACNC: <0.6 U/ML
ENA SCL70 IGG SER IA-ACNC: NEGATIVE
ENA SS-A AB SER IA-ACNC: <0.5 U/ML
ENA SS-A AB SER IA-ACNC: NEGATIVE
PATH REPORT.COMMENTS IMP SPEC: NORMAL
PATH REPORT.FINAL DX SPEC: NORMAL
PATH REPORT.MICROSCOPIC SPEC OTHER STN: NORMAL
PATH REPORT.MICROSCOPIC SPEC OTHER STN: NORMAL

## 2021-09-21 PROCEDURE — 74177 CT ABD & PELVIS W/CONTRAST: CPT | Performed by: RADIOLOGY

## 2021-09-21 RX ORDER — IOPAMIDOL 755 MG/ML
82 INJECTION, SOLUTION INTRAVASCULAR ONCE
Status: COMPLETED | OUTPATIENT
Start: 2021-09-21 | End: 2021-09-21

## 2021-09-21 RX ADMIN — IOPAMIDOL 82 ML: 755 INJECTION, SOLUTION INTRAVASCULAR at 13:57

## 2021-09-22 ENCOUNTER — PATIENT OUTREACH (OUTPATIENT)
Dept: GASTROENTEROLOGY | Facility: CLINIC | Age: 71
End: 2021-09-22

## 2021-09-22 ENCOUNTER — PREP FOR PROCEDURE (OUTPATIENT)
Dept: GASTROENTEROLOGY | Facility: CLINIC | Age: 71
End: 2021-09-22

## 2021-09-22 DIAGNOSIS — Z11.59 ENCOUNTER FOR SCREENING FOR OTHER VIRAL DISEASES: ICD-10-CM

## 2021-09-22 DIAGNOSIS — R79.89 ELEVATED LFTS: Primary | ICD-10-CM

## 2021-09-22 RX ORDER — ROSUVASTATIN CALCIUM 10 MG/1
TABLET, COATED ORAL
Qty: 90 TABLET | Refills: 2 | Status: SHIPPED | OUTPATIENT
Start: 2021-09-22 | End: 2021-12-14

## 2021-09-22 NOTE — TELEPHONE ENCOUNTER
Per Dr. Ruby  I will schedule EUS and/or ERCP. I will do liver biopsies as well. But peeking at the CT this does not appear to be a biliary issue but we can confirm on EUS       Please assist in scheduling:     Procedure/Imaging/Clinic: EUS +/- ERCP  Physician: Dr. Ruby  Timing: 10/4  Procedure length:60 min  Anesthesia:gen  Dx: elevated LFTs  Tier:2  Location: [facility]       Called to discuss with patient. Explained they can expect a call from  for date and time of procedure, will need a , someone to stay with them for 24 hours and should stay in town for 24 hours (within 45 min of Hospital) post procedure    Patient needs to get pre-op physical completed. If outside [facility] system will need physical faxed to number [phone]   If you do not get a preop physical, your procedure could be cancelled, patient voiced understanding*    Preop Plan:see wellness exam from 9-20    Med Review    Blood thinner -  no  ASA - no  Diabetic - no    COVID test discussed:will get 3-4 days prior    Patient Education r/t procedure:done    Does patient have any history of gastric bypass/gastric surgery/altered panc/bili anatomy?no    A pre-op nurse will call 1-2 days prior to the procedure. I    NPO/Prep: 8 hrs/2 hrs    Other specific details/comments:done     Verbalized understanding of all instructions. All questions answered.

## 2021-09-26 ENCOUNTER — HEALTH MAINTENANCE LETTER (OUTPATIENT)
Age: 71
End: 2021-09-26

## 2021-09-27 ENCOUNTER — TELEPHONE (OUTPATIENT)
Dept: GASTROENTEROLOGY | Facility: CLINIC | Age: 71
End: 2021-09-27

## 2021-09-27 NOTE — TELEPHONE ENCOUNTER
REFERRAL INFORMATION:    Referring Provider:  Dr. Chuyita Zaidi     Referring Clinic:  [facility]     Reason for Visit/Diagnosis: Abnormal weight loss     FUTURE VISIT INFORMATION:    Appointment Date: 1/21/2022    Appointment Time: 8 AM      NOTES STATUS DETAILS   OFFICE NOTE from Referring Provider Internal 9/20/2021 Office visit with Dr. Zaidi     OFFICE NOTE from Other Specialist N/A    HOSPITAL DISCHARGE SUMMARY/  ED VISITS N/A    OPERATIVE REPORT N/A    MEDICATION LIST Internal         ENDOSCOPY  In process    COLONOSCOPY In process    ERCP N/A    EUS N/A    STOOL TESTING N/A    PERTINENT LABS Internal    PATHOLOGY REPORTS (RELATED) In process    IMAGING (CT, MRI, EGD, MRCP, Small Bowel Follow Through/SBT, MR/CT Enterography) Internal CT Abdomen Pelvis: 9/21/2021

## 2021-09-27 NOTE — TELEPHONE ENCOUNTER
[facility] Call Center    Phone Message    May a detailed message be left on voicemail: yes     Reason for Call: Other: Patient called to schedule appt for DX - abnormal weight loss.  Patient lost 25 lbs in 2 months.  Appt scheduled for 1/21/22.  Please follow up with patient as needed.  Thank you!     Action Taken: Message routed to:  [facility]    Travel Screening: Not Applicable

## 2021-09-28 NOTE — TELEPHONE ENCOUNTER
Chart reviewed and patient has a pre-op physical on 9-30 and a EUS and ERCP on 10-4 with Dr. Ruby.

## 2021-09-30 ENCOUNTER — OFFICE VISIT (OUTPATIENT)
Dept: FAMILY MEDICINE | Facility: CLINIC | Age: 71
End: 2021-09-30
Payer: MEDICARE

## 2021-09-30 ENCOUNTER — MYC MEDICAL ADVICE (OUTPATIENT)
Dept: FAMILY MEDICINE | Facility: CLINIC | Age: 71
End: 2021-09-30

## 2021-09-30 ENCOUNTER — TELEPHONE (OUTPATIENT)
Dept: FAMILY MEDICINE | Facility: CLINIC | Age: 71
End: 2021-09-30

## 2021-09-30 VITALS
BODY MASS INDEX: 23.56 KG/M2 | RESPIRATION RATE: 18 BRPM | OXYGEN SATURATION: 99 % | HEIGHT: 64 IN | SYSTOLIC BLOOD PRESSURE: 136 MMHG | HEART RATE: 94 BPM | WEIGHT: 138 LBS | DIASTOLIC BLOOD PRESSURE: 74 MMHG | TEMPERATURE: 98.3 F

## 2021-09-30 DIAGNOSIS — I10 HYPERTENSION GOAL BP (BLOOD PRESSURE) < 140/90: ICD-10-CM

## 2021-09-30 DIAGNOSIS — Z11.59 ENCOUNTER FOR SCREENING FOR OTHER VIRAL DISEASES: ICD-10-CM

## 2021-09-30 DIAGNOSIS — D64.9 ANEMIA, UNSPECIFIED TYPE: ICD-10-CM

## 2021-09-30 DIAGNOSIS — R53.83 FATIGUE, UNSPECIFIED TYPE: ICD-10-CM

## 2021-09-30 DIAGNOSIS — R63.4 WEIGHT LOSS: ICD-10-CM

## 2021-09-30 DIAGNOSIS — R68.2 DRY MOUTH: ICD-10-CM

## 2021-09-30 DIAGNOSIS — Z01.818 PREOP GENERAL PHYSICAL EXAM: Primary | ICD-10-CM

## 2021-09-30 LAB
HGB BLD-MCNC: 9.6 G/DL (ref 11.7–15.7)
POTASSIUM BLD-SCNC: 3.8 MMOL/L (ref 3.4–5.3)

## 2021-09-30 PROCEDURE — U0005 INFEC AGEN DETEC AMPLI PROBE: HCPCS | Performed by: FAMILY MEDICINE

## 2021-09-30 PROCEDURE — 85018 HEMOGLOBIN: CPT | Performed by: FAMILY MEDICINE

## 2021-09-30 PROCEDURE — U0003 INFECTIOUS AGENT DETECTION BY NUCLEIC ACID (DNA OR RNA); SEVERE ACUTE RESPIRATORY SYNDROME CORONAVIRUS 2 (SARS-COV-2) (CORONAVIRUS DISEASE [COVID-19]), AMPLIFIED PROBE TECHNIQUE, MAKING USE OF HIGH THROUGHPUT TECHNOLOGIES AS DESCRIBED BY CMS-2020-01-R: HCPCS | Performed by: FAMILY MEDICINE

## 2021-09-30 PROCEDURE — 93000 ELECTROCARDIOGRAM COMPLETE: CPT | Performed by: FAMILY MEDICINE

## 2021-09-30 PROCEDURE — 36415 COLL VENOUS BLD VENIPUNCTURE: CPT | Performed by: FAMILY MEDICINE

## 2021-09-30 PROCEDURE — 84132 ASSAY OF SERUM POTASSIUM: CPT | Performed by: FAMILY MEDICINE

## 2021-09-30 PROCEDURE — 99214 OFFICE O/P EST MOD 30 MIN: CPT | Performed by: FAMILY MEDICINE

## 2021-09-30 RX ORDER — MULTIPLE VITAMINS W/ MINERALS TAB 9MG-400MCG
1 TAB ORAL DAILY
COMMUNITY
End: 2022-10-18

## 2021-09-30 ASSESSMENT — MIFFLIN-ST. JEOR: SCORE: 1118.02

## 2021-09-30 NOTE — TELEPHONE ENCOUNTER
"\"Please call Pt   After her endoscopy-I would Like her to stop hydrochlorothiazide and see if her Dry mouth gets better   Follow up with me in 2 weeks to check BP   Also do NOT take hydrochlorothiazide on the day of her Procedure\"    Left message on answering machine for patient to call back to the nurse at [phone].    Germaine Flores RN  [facility]      "

## 2021-09-30 NOTE — TELEPHONE ENCOUNTER
Patient called wanting to speak with Dr Zaidi's nurse.  Was frustrated because she was on hold forever and then did not get through to Dr Zaidi's office.  Patient requesting call back please

## 2021-09-30 NOTE — TELEPHONE ENCOUNTER
Please call Pt-I have also referred her for colonoscopy as she is anemic-cause of Bleeding  Ulcers, CA, polyos etc

## 2021-09-30 NOTE — TELEPHONE ENCOUNTER
Pt was given provider's message as written. Number provided for scheduling.    Karol Harvey RN  [facility]

## 2021-09-30 NOTE — PROGRESS NOTES
[facility]  [address] [address]  Phone: [phone]  Primary Provider: Magali Ahumada  Pre-op Performing Provider: MAGALI AHUMADA      PREOPERATIVE EVALUATION:  Today's date: 9/30/2021    Virginia Byers is a 70 year old female who presents for a preoperative evaluation.    Surgical Information:  Surgery/Procedure: ENDOSCOPIC ULTRASOUND, ESOPHAGOSCOPY / UPPER GASTROINTESTINAL TRACT   Surgery Location: [facility]  Surgeon: Dr Ruby  Surgery Date: 10/4/21   Time of Surgery: 7:25  Where patient plans to recover: At home with family  Fax number for surgical facility: Note does not need to be faxed, will be available electronically in Epic.    Type of Anesthesia Anticipated: General    Assessment & Plan     The proposed surgical procedure is considered LOW risk.    Preop general physical exam    - EKG 12-lead complete w/read - Clinics  - Hemoglobin; Future  - Potassium; Future    Encounter for screening for other viral diseases    - Asymptomatic COVID-19 Virus (Coronavirus) by PCR    Weight loss    - GASTROENTEROLOGY ADULT REF CONSULT ONLY; Future    Dry mouth  Labs reviewed     Fatigue, unspecified type      Anemia, unspecified type      Hypertension goal BP (blood pressure) < 140/90  Stable            Risks and Recommendations:  The patient has the following additional risks and recommendations for perioperative complications:   - No identified additional risk factors other than previously addressed    Medication Instructions:  Patient is to take all scheduled medications on the day of surgery EXCEPT for modifications listed below:   - Diuretics: HOLD on the day of surgery.    RECOMMENDATION:  APPROVAL GIVEN to proceed with proposed procedure, without further diagnostic evaluation.    Review of external notes as documented above                   Subjective     HPI related to upcoming procedure: pt has elevated LFT and Dilated bile duct now  scheduled for above    Preop Questions 9/23/2021   1. Have you ever had a heart attack or stroke? No   2. Have you ever had surgery on your heart or blood vessels, such as a stent placement, a coronary artery bypass, or surgery on an artery in your head, neck, heart, or legs? No   3. Do you have chest pain with activity? No   4. Do you have a history of  heart failure? No   5. Do you currently have a cold, bronchitis or symptoms of other infection? No   6. Do you have a cough, shortness of breath, or wheezing? No   7. Do you or anyone in your family have previous history of blood clots? No   8. Do you or does anyone in your family have a serious bleeding problem such as prolonged bleeding following surgeries or cuts? No   9. Have you ever had problems with anemia or been told to take iron pills? No   10. Have you had any abnormal blood loss such as black, tarry or bloody stools, or abnormal vaginal bleeding? No   11. Have you ever had a blood transfusion? No   12. Are you willing to have a blood transfusion if it is medically needed before, during, or after your surgery? Yes   13. Have you or any of your relatives ever had problems with anesthesia? No   14. Do you have sleep apnea, excessive snoring or daytime drowsiness? No   15. Do you have any artifical heart valves or other implanted medical devices like a pacemaker, defibrillator, or continuous glucose monitor? No   16. Do you have artificial joints? No   17. Are you allergic to latex? No   18. Is there any chance that you may be pregnant? -       Health Care Directive:  Patient does not have a Health Care Directive or Living Will: Patient states has Advance Directive and will bring in a copy to clinic.    Preoperative Review of :   reviewed - no record of controlled substances prescribed.      Status of Chronic Conditions:  HYPERTENSION - Patient has longstanding history of HTN , currently denies any symptoms referable to elevated blood pressure.  Specifically denies chest pain, palpitations, dyspnea, orthopnea, PND or peripheral edema. Blood pressure readings have been in normal range. Current medication regimen is as listed below. Patient denies any side effects of medication.       Review of Systems  CONSTITUTIONAL: NEGATIVE for fever, chills, change in weight  INTEGUMENTARY/SKIN: NEGATIVE for worrisome rashes, moles or lesions  EYES: NEGATIVE for vision changes or irritation  ENT/MOUTH: NEGATIVE for ear, mouth and throat problems  RESP: NEGATIVE for significant cough or SOB  CV: NEGATIVE for chest pain, palpitations or peripheral edema  GI: NEGATIVE for nausea, abdominal pain, heartburn, or change in bowel habits  : NEGATIVE for frequency, dysuria, or hematuria  MUSCULOSKELETAL: NEGATIVE for significant arthralgias or myalgia  NEURO: NEGATIVE for weakness, dizziness or paresthesias  ENDOCRINE: NEGATIVE for temperature intolerance, skin/hair changes  HEME: NEGATIVE for bleeding problems  PSYCHIATRIC: NEGATIVE for changes in mood or affect    Patient Active Problem List    Diagnosis Date Noted     Anemia, unspecified type 09/20/2021     Priority: Medium     Fatigue, unspecified type 09/20/2021     Priority: Medium     Dry mouth 09/20/2021     Priority: Medium     Weight loss 09/20/2021     Priority: Medium     Lumbar facet arthropathy 07/09/2021     Priority: Medium     Spinal stenosis of lumbar region, unspecified whether neurogenic claudication present 06/02/2021     Priority: Medium     DDD (degenerative disc disease), lumbar 06/02/2021     Priority: Medium     Right knee pain 04/17/2021     Priority: Medium     Lumbago 07/02/2019     Priority: Medium     Pain in both lower legs 07/02/2019     Priority: Medium     Hypertension goal BP (blood pressure) < 140/90 04/22/2019     Priority: Medium     Personal history of malignant neoplasm of breast 03/28/2016     Priority: Medium     2000       Family history of diabetes mellitus 03/19/2012     Priority:  Medium     Glaucoma 03/19/2012     Priority: Medium     Advanced directives, counseling/discussion 01/23/2012     Priority: Medium     Advance Directive Problem List Overview:   Name Relationship Phone    Primary Health Care Agent Aurelio Espinosa Friend [phone]         Alternative Health Care Agent Sariah Patel Sister [phone]   Received outside advance directive. Notary signature present.  scanned and placed behind media tab.  Please see advance directive for specifics. Advance Care Directive reviewed & complete.  Maria G Alcantara RN 3/21/2012    Patient states has Advance Directive and will bring in a copy to clinic. 1/23/2012          Osteoarthritis 01/23/2012     Priority: Medium     Hyperlipidemia LDL goal <160 03/16/2011     Priority: Medium     S/P mastectomy 03/16/2011     Priority: Medium     Osteoporosis 03/15/2010     Priority: Medium     Atopic rhinitis 03/15/2010     Priority: Medium     (Problem list name updated by automated process. Provider to review and confirm.)        Past Medical History:   Diagnosis Date     Breast cancer (H) 2000    Lt breast     Glaucoma     bilateral     Hyperlipidemia      Hypertension      Macular degeneration      Osteoarthritis      Osteoporosis      Past Surgical History:   Procedure Laterality Date     C MASTECTOMY,SIMPLE  3/2000    left     COLONOSCOPY  2006    Q 10 years     SURGICAL HISTORY OF -  Left 8/2015    tissue expander placed in left breast area     SURGICAL HISTORY OF -  Left 2/2016    left breast implant and right mammoplasty     Current Outpatient Medications   Medication Sig Dispense Refill     hydrochlorothiazide (HYDRODIURIL) 12.5 MG tablet Take 1 tablet (12.5 mg) by mouth daily 90 tablet 3     latanoprost (XALATAN) 0.005 % ophthalmic solution Place 1 drop into both eyes daily        losartan (COZAAR) 100 MG tablet TAKE 1 TABLET BY MOUTH EVERY DAY 30 tablet 0     multivitamin w/minerals (MULTI-VITAMIN) tablet Take 1 tablet by mouth daily        rosuvastatin (CRESTOR) 10 MG tablet TAKE 1 TABLET BY MOUTH EVERY DAY 90 tablet 2     timolol (TIMOPTIC) 0.25 % ophthalmic solution Place 1 drop into both eyes daily  5       Allergies   Allergen Reactions     Compazine      Mental confusion     Prochlorperazine Visual Disturbance     Simvastatin Other (See Comments)     Muscle aches        Social History     Tobacco Use     Smoking status: Former Smoker     Packs/day: 1.00     Years: 17.00     Pack years: 17.00     Types: Cigarettes     Quit date: 3/15/1999     Years since quittin.5     Smokeless tobacco: Never Used   Substance Use Topics     Alcohol use: Not Currently     Alcohol/week: 4.0 standard drinks     Types: 4 Glasses of wine per week     Comment: occasional     Family History   Problem Relation Age of Onset     Cancer Mother         bone cancer     Other Cancer Mother      Heart Disease Father      Arthritis Sister      Breast Cancer Sister         age 75     Diabetes Maternal Grandmother      Diabetes Paternal Grandmother      History   Drug Use No         Objective     There were no vitals taken for this visit.    Physical Exam    GENERAL APPEARANCE: healthy, alert and no distress     EYES: EOMI, PERRL     HENT: ear canals and TM's normal and nose and mouth without ulcers or lesions     NECK: no adenopathy, no asymmetry, masses, or scars and thyroid normal to palpation     RESP: lungs clear to auscultation - no rales, rhonchi or wheezes     CV: regular rates and rhythm, normal S1 S2, no S3 or S4 and no murmur, click or rub     ABDOMEN:  soft, nontender, no HSM or masses and bowel sounds normal     MS: extremities normal- no gross deformities noted, no evidence of inflammation in joints, FROM in all extremities.     SKIN: no suspicious lesions or rashes     NEURO: Normal strength and tone, sensory exam grossly normal, mentation intact and speech normal     PSYCH: mentation appears normal. and affect normal/bright     LYMPHATICS: No cervical  adenopathy    Recent Labs   Lab Test 09/20/21  0847 09/20/21  0800 08/12/21  1158   HGB 10.1*  --   --    *  --   --    NA  --  132* 129*   POTASSIUM  --  4.5 3.7   CR  --  0.56 0.50*        Diagnostics:  Labs pending at this time.  Results will be reviewed when available.   EKG: appears normal, NSR, normal axis, normal intervals, no acute ST/T changes c/w ischemia, no LVH by voltage criteria, unchanged from previous tracings    Revised Cardiac Risk Index (RCRI):  The patient has the following serious cardiovascular risks for perioperative complications:   - No serious cardiac risks = 0 points     RCRI Interpretation: 0 points: Class I (very low risk - 0.4% complication rate)           Signed Electronically by: Chuyita Zaidi MD  Copy of this evaluation report is provided to requesting physician.

## 2021-09-30 NOTE — PATIENT INSTRUCTIONS

## 2021-09-30 NOTE — Clinical Note
Please call Pt  After her endoscopy-I would Like her to stop hydrochlorothiazide and see if her Dry mouth gets better  Follow up with me in 2 weeks to check BP  Also do NOT take hydrochlorothiazide on the day of her Procedure

## 2021-09-30 NOTE — TELEPHONE ENCOUNTER
Called pt. Gave her provider's message as written. Verbalized understanding and agrees with plan.    Karol Harvey RN  [facility]

## 2021-09-30 NOTE — H&P (VIEW-ONLY)
[facility]  [address] [address]  Phone: [phone]  Primary Provider: Magali Ahumada  Pre-op Performing Provider: MAGALI AHUMADA      PREOPERATIVE EVALUATION:  Today's date: 9/30/2021    Virginia Byers is a 70 year old female who presents for a preoperative evaluation.    Surgical Information:  Surgery/Procedure: ENDOSCOPIC ULTRASOUND, ESOPHAGOSCOPY / UPPER GASTROINTESTINAL TRACT   Surgery Location: [facility]  Surgeon: Dr Ruby  Surgery Date: 10/4/21   Time of Surgery: 7:25  Where patient plans to recover: At home with family  Fax number for surgical facility: Note does not need to be faxed, will be available electronically in Epic.    Type of Anesthesia Anticipated: General    Assessment & Plan     The proposed surgical procedure is considered LOW risk.    Preop general physical exam    - EKG 12-lead complete w/read - Clinics  - Hemoglobin; Future  - Potassium; Future    Encounter for screening for other viral diseases    - Asymptomatic COVID-19 Virus (Coronavirus) by PCR    Weight loss    - GASTROENTEROLOGY ADULT REF CONSULT ONLY; Future    Dry mouth  Labs reviewed     Fatigue, unspecified type      Anemia, unspecified type      Hypertension goal BP (blood pressure) < 140/90  Stable            Risks and Recommendations:  The patient has the following additional risks and recommendations for perioperative complications:   - No identified additional risk factors other than previously addressed    Medication Instructions:  Patient is to take all scheduled medications on the day of surgery EXCEPT for modifications listed below:   - Diuretics: HOLD on the day of surgery.    RECOMMENDATION:  APPROVAL GIVEN to proceed with proposed procedure, without further diagnostic evaluation.    Review of external notes as documented above                   Subjective     HPI related to upcoming procedure: pt has elevated LFT and Dilated bile duct now  scheduled for above    Preop Questions 9/23/2021   1. Have you ever had a heart attack or stroke? No   2. Have you ever had surgery on your heart or blood vessels, such as a stent placement, a coronary artery bypass, or surgery on an artery in your head, neck, heart, or legs? No   3. Do you have chest pain with activity? No   4. Do you have a history of  heart failure? No   5. Do you currently have a cold, bronchitis or symptoms of other infection? No   6. Do you have a cough, shortness of breath, or wheezing? No   7. Do you or anyone in your family have previous history of blood clots? No   8. Do you or does anyone in your family have a serious bleeding problem such as prolonged bleeding following surgeries or cuts? No   9. Have you ever had problems with anemia or been told to take iron pills? No   10. Have you had any abnormal blood loss such as black, tarry or bloody stools, or abnormal vaginal bleeding? No   11. Have you ever had a blood transfusion? No   12. Are you willing to have a blood transfusion if it is medically needed before, during, or after your surgery? Yes   13. Have you or any of your relatives ever had problems with anesthesia? No   14. Do you have sleep apnea, excessive snoring or daytime drowsiness? No   15. Do you have any artifical heart valves or other implanted medical devices like a pacemaker, defibrillator, or continuous glucose monitor? No   16. Do you have artificial joints? No   17. Are you allergic to latex? No   18. Is there any chance that you may be pregnant? -       Health Care Directive:  Patient does not have a Health Care Directive or Living Will: Patient states has Advance Directive and will bring in a copy to clinic.    Preoperative Review of :   reviewed - no record of controlled substances prescribed.      Status of Chronic Conditions:  HYPERTENSION - Patient has longstanding history of HTN , currently denies any symptoms referable to elevated blood pressure.  Specifically denies chest pain, palpitations, dyspnea, orthopnea, PND or peripheral edema. Blood pressure readings have been in normal range. Current medication regimen is as listed below. Patient denies any side effects of medication.       Review of Systems  CONSTITUTIONAL: NEGATIVE for fever, chills, change in weight  INTEGUMENTARY/SKIN: NEGATIVE for worrisome rashes, moles or lesions  EYES: NEGATIVE for vision changes or irritation  ENT/MOUTH: NEGATIVE for ear, mouth and throat problems  RESP: NEGATIVE for significant cough or SOB  CV: NEGATIVE for chest pain, palpitations or peripheral edema  GI: NEGATIVE for nausea, abdominal pain, heartburn, or change in bowel habits  : NEGATIVE for frequency, dysuria, or hematuria  MUSCULOSKELETAL: NEGATIVE for significant arthralgias or myalgia  NEURO: NEGATIVE for weakness, dizziness or paresthesias  ENDOCRINE: NEGATIVE for temperature intolerance, skin/hair changes  HEME: NEGATIVE for bleeding problems  PSYCHIATRIC: NEGATIVE for changes in mood or affect    Patient Active Problem List    Diagnosis Date Noted     Anemia, unspecified type 09/20/2021     Priority: Medium     Fatigue, unspecified type 09/20/2021     Priority: Medium     Dry mouth 09/20/2021     Priority: Medium     Weight loss 09/20/2021     Priority: Medium     Lumbar facet arthropathy 07/09/2021     Priority: Medium     Spinal stenosis of lumbar region, unspecified whether neurogenic claudication present 06/02/2021     Priority: Medium     DDD (degenerative disc disease), lumbar 06/02/2021     Priority: Medium     Right knee pain 04/17/2021     Priority: Medium     Lumbago 07/02/2019     Priority: Medium     Pain in both lower legs 07/02/2019     Priority: Medium     Hypertension goal BP (blood pressure) < 140/90 04/22/2019     Priority: Medium     Personal history of malignant neoplasm of breast 03/28/2016     Priority: Medium     2000       Family history of diabetes mellitus 03/19/2012     Priority:  Medium     Glaucoma 03/19/2012     Priority: Medium     Advanced directives, counseling/discussion 01/23/2012     Priority: Medium     Advance Directive Problem List Overview:   Name Relationship Phone    Primary Health Care Agent Aurelio Espinosa Friend [phone]         Alternative Health Care Agent Sariah Patel Sister [phone]   Received outside advance directive. Notary signature present.  scanned and placed behind media tab.  Please see advance directive for specifics. Advance Care Directive reviewed & complete.  Maria G Alcantara RN 3/21/2012    Patient states has Advance Directive and will bring in a copy to clinic. 1/23/2012          Osteoarthritis 01/23/2012     Priority: Medium     Hyperlipidemia LDL goal <160 03/16/2011     Priority: Medium     S/P mastectomy 03/16/2011     Priority: Medium     Osteoporosis 03/15/2010     Priority: Medium     Atopic rhinitis 03/15/2010     Priority: Medium     (Problem list name updated by automated process. Provider to review and confirm.)        Past Medical History:   Diagnosis Date     Breast cancer (H) 2000    Lt breast     Glaucoma     bilateral     Hyperlipidemia      Hypertension      Macular degeneration      Osteoarthritis      Osteoporosis      Past Surgical History:   Procedure Laterality Date     C MASTECTOMY,SIMPLE  3/2000    left     COLONOSCOPY  2006    Q 10 years     SURGICAL HISTORY OF -  Left 8/2015    tissue expander placed in left breast area     SURGICAL HISTORY OF -  Left 2/2016    left breast implant and right mammoplasty     Current Outpatient Medications   Medication Sig Dispense Refill     hydrochlorothiazide (HYDRODIURIL) 12.5 MG tablet Take 1 tablet (12.5 mg) by mouth daily 90 tablet 3     latanoprost (XALATAN) 0.005 % ophthalmic solution Place 1 drop into both eyes daily        losartan (COZAAR) 100 MG tablet TAKE 1 TABLET BY MOUTH EVERY DAY 30 tablet 0     multivitamin w/minerals (MULTI-VITAMIN) tablet Take 1 tablet by mouth daily        rosuvastatin (CRESTOR) 10 MG tablet TAKE 1 TABLET BY MOUTH EVERY DAY 90 tablet 2     timolol (TIMOPTIC) 0.25 % ophthalmic solution Place 1 drop into both eyes daily  5       Allergies   Allergen Reactions     Compazine      Mental confusion     Prochlorperazine Visual Disturbance     Simvastatin Other (See Comments)     Muscle aches        Social History     Tobacco Use     Smoking status: Former Smoker     Packs/day: 1.00     Years: 17.00     Pack years: 17.00     Types: Cigarettes     Quit date: 3/15/1999     Years since quittin.5     Smokeless tobacco: Never Used   Substance Use Topics     Alcohol use: Not Currently     Alcohol/week: 4.0 standard drinks     Types: 4 Glasses of wine per week     Comment: occasional     Family History   Problem Relation Age of Onset     Cancer Mother         bone cancer     Other Cancer Mother      Heart Disease Father      Arthritis Sister      Breast Cancer Sister         age 75     Diabetes Maternal Grandmother      Diabetes Paternal Grandmother      History   Drug Use No         Objective     There were no vitals taken for this visit.    Physical Exam    GENERAL APPEARANCE: healthy, alert and no distress     EYES: EOMI, PERRL     HENT: ear canals and TM's normal and nose and mouth without ulcers or lesions     NECK: no adenopathy, no asymmetry, masses, or scars and thyroid normal to palpation     RESP: lungs clear to auscultation - no rales, rhonchi or wheezes     CV: regular rates and rhythm, normal S1 S2, no S3 or S4 and no murmur, click or rub     ABDOMEN:  soft, nontender, no HSM or masses and bowel sounds normal     MS: extremities normal- no gross deformities noted, no evidence of inflammation in joints, FROM in all extremities.     SKIN: no suspicious lesions or rashes     NEURO: Normal strength and tone, sensory exam grossly normal, mentation intact and speech normal     PSYCH: mentation appears normal. and affect normal/bright     LYMPHATICS: No cervical  adenopathy    Recent Labs   Lab Test 09/20/21  0847 09/20/21  0800 08/12/21  1158   HGB 10.1*  --   --    *  --   --    NA  --  132* 129*   POTASSIUM  --  4.5 3.7   CR  --  0.56 0.50*        Diagnostics:  Labs pending at this time.  Results will be reviewed when available.   EKG: appears normal, NSR, normal axis, normal intervals, no acute ST/T changes c/w ischemia, no LVH by voltage criteria, unchanged from previous tracings    Revised Cardiac Risk Index (RCRI):  The patient has the following serious cardiovascular risks for perioperative complications:   - No serious cardiac risks = 0 points     RCRI Interpretation: 0 points: Class I (very low risk - 0.4% complication rate)           Signed Electronically by: Chuyita Zaidi MD  Copy of this evaluation report is provided to requesting physician.

## 2021-10-01 ENCOUNTER — TELEPHONE (OUTPATIENT)
Dept: GASTROENTEROLOGY | Facility: CLINIC | Age: 71
End: 2021-10-01

## 2021-10-01 LAB — SARS-COV-2 RNA RESP QL NAA+PROBE: NEGATIVE

## 2021-10-01 NOTE — TELEPHONE ENCOUNTER
Screening Questions  1. Are you active on mychart? Yes     2. What insurance is in the chart? Medicare    2.  Ordering/Referring Provider: Chuyita Zaidi MD    3. BMI 24.4    4. Are you on daily home oxygen? N    5. Have you had a heart, lung, or liver transplant? N    6. Are you currently on dialysis or have chronic kidney disease? N    7. Have you had a stroke or Transient ischemic atttack (TIA) within 6 months? N    8. In the past 6 months, have you had any heart related issues including cardiomyopathy or heart attack? N      If yes, did it require cardiac stenting or other implantable device?N      9. Do you have any implantable devices in your body (pacemaker, defib, LVAD)? N    10. Do you take nitroglycerin? If yes, how often? N    11. Are you currently taking any blood thinners?N    12. Are you a diabetic? N    13. (Females) Are you currently pregnant? N  If yes, how many weeks?      15. Are you taking any prescription pain medications on a routine schedule? N If yes, MAC sedation.    16. Do you have any chemical dependencies such as alcohol, street drugs, or methadone? If yes, MAC sedation.    17. Do you have any history of post-traumatic stress syndrome, severe anxiety or history of psychosis? N    18. Do you transfer independently? Y    19.  Do you have any issues with constipation? N    20. Preferred Pharmacy for Pre Prescription [facility]/ On Chart     Scheduling Details    Which Colonoscopy Prep was Sent?:   Procedure Scheduled: Colonoscopy  Provider/Surgeon:   Date of Procedure: 10/04/2021  Location: [facility] OR   Caller (Please ask for phone number if not scheduled by patient): Self/ Virginia Byers      Sedation Type: General  Conscious Sedation- Needs  for 6 hours after the procedure  MAC/General-Needs  for 24 hours after procedure    Pre-op Required at [facility], [place], [place] and OR for MAC sedation:   (if yes advise patient they will need a pre-op prior to procedure)      Is patient  on blood thinners? -N (If yes- inform patient to follow up with PCP or provider for follow up instructions)     Informed patient they will need an adult  Y  Cannot take any type of public or medical transportation alone    [facility]: Y    Confirmed Nurse will call to complete assessment Y    Additional comments: N

## 2021-10-01 NOTE — TELEPHONE ENCOUNTER
Pre assessment questions completed for upcoming colonoscopy/EUS and ERCP procedure scheduled on 10/4/21 in the OR.    COVID test scheduled 9/30/21    Pre-op scheduled 9/30/21 [facility] with Dr. Zaidi    Procedural arrival time and facility location reviewed.    Designated  policy reviewed. Instructed to have someone stay 24 hours post procedure.     Bowel prep recommendation: Miralax/Magnesium citrate/Dulcolax     Reviewed Miralax prep instructions with patient. No fiber/iron supplements or foods that contain nuts/seeds 7 days prior to procedure. Patient is an colonoscopy add on. States she had blueberries this morning for breakfast.     Anticoagulation/blood thinners? no    Electronic implanted devices? no    Patient verbalized understanding and had no questions or concerns at this time.    Karol Hernandez RN

## 2021-10-03 RX ORDER — LOSARTAN POTASSIUM 100 MG/1
TABLET ORAL
Qty: 30 TABLET | Refills: 0 | Status: SHIPPED | OUTPATIENT
Start: 2021-10-03 | End: 2021-11-01

## 2021-10-04 ENCOUNTER — ANESTHESIA EVENT (OUTPATIENT)
Dept: SURGERY | Facility: CLINIC | Age: 71
End: 2021-10-04
Payer: MEDICARE

## 2021-10-04 ENCOUNTER — HOSPITAL ENCOUNTER (OUTPATIENT)
Facility: CLINIC | Age: 71
Discharge: HOME | End: 2021-10-04
Attending: INTERNAL MEDICINE | Admitting: INTERNAL MEDICINE
Payer: MEDICARE

## 2021-10-04 ENCOUNTER — ANESTHESIA (OUTPATIENT)
Dept: SURGERY | Facility: CLINIC | Age: 71
End: 2021-10-04
Payer: MEDICARE

## 2021-10-04 VITALS
BODY MASS INDEX: 23.48 KG/M2 | TEMPERATURE: 97.2 F | HEART RATE: 82 BPM | SYSTOLIC BLOOD PRESSURE: 122 MMHG | RESPIRATION RATE: 14 BRPM | HEIGHT: 63 IN | DIASTOLIC BLOOD PRESSURE: 64 MMHG | OXYGEN SATURATION: 97 % | WEIGHT: 132.5 LBS

## 2021-10-04 DIAGNOSIS — R79.89 ELEVATED LFTS: ICD-10-CM

## 2021-10-04 LAB
COLONOSCOPY: NORMAL
GLUCOSE BLDC GLUCOMTR-MCNC: 123 MG/DL (ref 70–99)

## 2021-10-04 PROCEDURE — 88307 TISSUE EXAM BY PATHOLOGIST: CPT | Mod: TC | Performed by: INTERNAL MEDICINE

## 2021-10-04 PROCEDURE — 272N000001 HC OR GENERAL SUPPLY STERILE: Performed by: INTERNAL MEDICINE

## 2021-10-04 PROCEDURE — 258N000003 HC RX IP 258 OP 636: Performed by: NURSE ANESTHETIST, CERTIFIED REGISTERED

## 2021-10-04 PROCEDURE — 250N000009 HC RX 250: Performed by: NURSE ANESTHETIST, CERTIFIED REGISTERED

## 2021-10-04 PROCEDURE — 370N000017 HC ANESTHESIA TECHNICAL FEE, PER MIN: Performed by: INTERNAL MEDICINE

## 2021-10-04 PROCEDURE — 82962 GLUCOSE BLOOD TEST: CPT

## 2021-10-04 PROCEDURE — 710N000012 HC RECOVERY PHASE 2, PER MINUTE: Performed by: INTERNAL MEDICINE

## 2021-10-04 PROCEDURE — 250N000011 HC RX IP 250 OP 636: Performed by: NURSE ANESTHETIST, CERTIFIED REGISTERED

## 2021-10-04 PROCEDURE — 250N000009 HC RX 250: Performed by: INTERNAL MEDICINE

## 2021-10-04 PROCEDURE — 999N000141 HC STATISTIC PRE-PROCEDURE NURSING ASSESSMENT: Performed by: INTERNAL MEDICINE

## 2021-10-04 PROCEDURE — 710N000010 HC RECOVERY PHASE 1, LEVEL 2, PER MIN: Performed by: INTERNAL MEDICINE

## 2021-10-04 PROCEDURE — 360N000076 HC SURGERY LEVEL 3, PER MIN: Performed by: INTERNAL MEDICINE

## 2021-10-04 PROCEDURE — 250N000011 HC RX IP 250 OP 636: Performed by: INTERNAL MEDICINE

## 2021-10-04 PROCEDURE — 250N000025 HC SEVOFLURANE, PER MIN: Performed by: INTERNAL MEDICINE

## 2021-10-04 RX ORDER — SODIUM CHLORIDE, SODIUM LACTATE, POTASSIUM CHLORIDE, CALCIUM CHLORIDE 600; 310; 30; 20 MG/100ML; MG/100ML; MG/100ML; MG/100ML
INJECTION, SOLUTION INTRAVENOUS CONTINUOUS PRN
Status: DISCONTINUED | OUTPATIENT
Start: 2021-10-04 | End: 2021-10-04

## 2021-10-04 RX ORDER — FENTANYL CITRATE 50 UG/ML
25 INJECTION, SOLUTION INTRAMUSCULAR; INTRAVENOUS EVERY 5 MIN PRN
Status: DISCONTINUED | OUTPATIENT
Start: 2021-10-04 | End: 2021-10-04 | Stop reason: HOSPADM

## 2021-10-04 RX ORDER — NALOXONE HYDROCHLORIDE 0.4 MG/ML
0.2 INJECTION, SOLUTION INTRAMUSCULAR; INTRAVENOUS; SUBCUTANEOUS
Status: DISCONTINUED | OUTPATIENT
Start: 2021-10-04 | End: 2021-10-04 | Stop reason: HOSPADM

## 2021-10-04 RX ORDER — HYDROMORPHONE HCL IN WATER/PF 6 MG/30 ML
0.2 PATIENT CONTROLLED ANALGESIA SYRINGE INTRAVENOUS EVERY 5 MIN PRN
Status: DISCONTINUED | OUTPATIENT
Start: 2021-10-04 | End: 2021-10-04 | Stop reason: HOSPADM

## 2021-10-04 RX ORDER — LIDOCAINE HYDROCHLORIDE 20 MG/ML
INJECTION, SOLUTION INFILTRATION; PERINEURAL PRN
Status: DISCONTINUED | OUTPATIENT
Start: 2021-10-04 | End: 2021-10-04

## 2021-10-04 RX ORDER — NALOXONE HYDROCHLORIDE 0.4 MG/ML
0.4 INJECTION, SOLUTION INTRAMUSCULAR; INTRAVENOUS; SUBCUTANEOUS
Status: DISCONTINUED | OUTPATIENT
Start: 2021-10-04 | End: 2021-10-04 | Stop reason: HOSPADM

## 2021-10-04 RX ORDER — DEXAMETHASONE SODIUM PHOSPHATE 4 MG/ML
INJECTION, SOLUTION INTRA-ARTICULAR; INTRALESIONAL; INTRAMUSCULAR; INTRAVENOUS; SOFT TISSUE PRN
Status: DISCONTINUED | OUTPATIENT
Start: 2021-10-04 | End: 2021-10-04

## 2021-10-04 RX ORDER — ONDANSETRON 2 MG/ML
4 INJECTION INTRAMUSCULAR; INTRAVENOUS EVERY 6 HOURS PRN
Status: DISCONTINUED | OUTPATIENT
Start: 2021-10-04 | End: 2021-10-04 | Stop reason: HOSPADM

## 2021-10-04 RX ORDER — MEPERIDINE HYDROCHLORIDE 25 MG/ML
12.5 INJECTION INTRAMUSCULAR; INTRAVENOUS; SUBCUTANEOUS
Status: DISCONTINUED | OUTPATIENT
Start: 2021-10-04 | End: 2021-10-04 | Stop reason: HOSPADM

## 2021-10-04 RX ORDER — ONDANSETRON 4 MG/1
4 TABLET, ORALLY DISINTEGRATING ORAL EVERY 30 MIN PRN
Status: DISCONTINUED | OUTPATIENT
Start: 2021-10-04 | End: 2021-10-04 | Stop reason: HOSPADM

## 2021-10-04 RX ORDER — PROPOFOL 10 MG/ML
INJECTION, EMULSION INTRAVENOUS PRN
Status: DISCONTINUED | OUTPATIENT
Start: 2021-10-04 | End: 2021-10-04

## 2021-10-04 RX ORDER — SODIUM CHLORIDE, SODIUM LACTATE, POTASSIUM CHLORIDE, CALCIUM CHLORIDE 600; 310; 30; 20 MG/100ML; MG/100ML; MG/100ML; MG/100ML
INJECTION, SOLUTION INTRAVENOUS CONTINUOUS
Status: DISCONTINUED | OUTPATIENT
Start: 2021-10-04 | End: 2021-10-04 | Stop reason: HOSPADM

## 2021-10-04 RX ORDER — ONDANSETRON 2 MG/ML
4 INJECTION INTRAMUSCULAR; INTRAVENOUS EVERY 30 MIN PRN
Status: DISCONTINUED | OUTPATIENT
Start: 2021-10-04 | End: 2021-10-04 | Stop reason: HOSPADM

## 2021-10-04 RX ORDER — FLUMAZENIL 0.1 MG/ML
0.2 INJECTION, SOLUTION INTRAVENOUS
Status: DISCONTINUED | OUTPATIENT
Start: 2021-10-04 | End: 2021-10-04 | Stop reason: HOSPADM

## 2021-10-04 RX ORDER — ONDANSETRON 2 MG/ML
INJECTION INTRAMUSCULAR; INTRAVENOUS PRN
Status: DISCONTINUED | OUTPATIENT
Start: 2021-10-04 | End: 2021-10-04

## 2021-10-04 RX ORDER — FENTANYL CITRATE 50 UG/ML
INJECTION, SOLUTION INTRAMUSCULAR; INTRAVENOUS PRN
Status: DISCONTINUED | OUTPATIENT
Start: 2021-10-04 | End: 2021-10-04

## 2021-10-04 RX ORDER — OXYCODONE HYDROCHLORIDE 5 MG/1
5 TABLET ORAL EVERY 4 HOURS PRN
Status: DISCONTINUED | OUTPATIENT
Start: 2021-10-04 | End: 2021-10-04 | Stop reason: HOSPADM

## 2021-10-04 RX ORDER — KETAMINE HYDROCHLORIDE 10 MG/ML
INJECTION INTRAMUSCULAR; INTRAVENOUS PRN
Status: DISCONTINUED | OUTPATIENT
Start: 2021-10-04 | End: 2021-10-04

## 2021-10-04 RX ORDER — HEPARIN SODIUM (PORCINE) LOCK FLUSH IV SOLN 100 UNIT/ML 100 UNIT/ML
SOLUTION INTRAVENOUS PRN
Status: DISCONTINUED | OUTPATIENT
Start: 2021-10-04 | End: 2021-10-04 | Stop reason: HOSPADM

## 2021-10-04 RX ORDER — LIDOCAINE 40 MG/G
CREAM TOPICAL
Status: DISCONTINUED | OUTPATIENT
Start: 2021-10-04 | End: 2021-10-04 | Stop reason: HOSPADM

## 2021-10-04 RX ORDER — FENTANYL CITRATE 50 UG/ML
25 INJECTION, SOLUTION INTRAMUSCULAR; INTRAVENOUS
Status: DISCONTINUED | OUTPATIENT
Start: 2021-10-04 | End: 2021-10-04 | Stop reason: HOSPADM

## 2021-10-04 RX ORDER — ONDANSETRON 4 MG/1
4 TABLET, ORALLY DISINTEGRATING ORAL EVERY 6 HOURS PRN
Status: DISCONTINUED | OUTPATIENT
Start: 2021-10-04 | End: 2021-10-04 | Stop reason: HOSPADM

## 2021-10-04 RX ADMIN — PHENYLEPHRINE HYDROCHLORIDE 100 MCG: 10 INJECTION INTRAVENOUS at 11:17

## 2021-10-04 RX ADMIN — ROCURONIUM BROMIDE 10 MG: 10 INJECTION INTRAVENOUS at 11:26

## 2021-10-04 RX ADMIN — PROPOFOL 60 MG: 10 INJECTION, EMULSION INTRAVENOUS at 10:18

## 2021-10-04 RX ADMIN — DEXAMETHASONE SODIUM PHOSPHATE 8 MG: 4 INJECTION, SOLUTION INTRA-ARTICULAR; INTRALESIONAL; INTRAMUSCULAR; INTRAVENOUS; SOFT TISSUE at 10:45

## 2021-10-04 RX ADMIN — ONDANSETRON 4 MG: 2 INJECTION INTRAMUSCULAR; INTRAVENOUS at 11:28

## 2021-10-04 RX ADMIN — SUGAMMADEX 200 MG: 100 INJECTION, SOLUTION INTRAVENOUS at 11:32

## 2021-10-04 RX ADMIN — SODIUM CHLORIDE, POTASSIUM CHLORIDE, SODIUM LACTATE AND CALCIUM CHLORIDE: 600; 310; 30; 20 INJECTION, SOLUTION INTRAVENOUS at 10:14

## 2021-10-04 RX ADMIN — FENTANYL CITRATE 25 MCG: 50 INJECTION, SOLUTION INTRAMUSCULAR; INTRAVENOUS at 10:18

## 2021-10-04 RX ADMIN — Medication 30 MG: at 10:18

## 2021-10-04 RX ADMIN — ROCURONIUM BROMIDE 40 MG: 10 INJECTION INTRAVENOUS at 10:18

## 2021-10-04 RX ADMIN — LIDOCAINE HYDROCHLORIDE 100 MG: 20 INJECTION, SOLUTION INFILTRATION; PERINEURAL at 10:18

## 2021-10-04 ASSESSMENT — LIFESTYLE VARIABLES: TOBACCO_USE: 1

## 2021-10-04 ASSESSMENT — MIFFLIN-ST. JEOR: SCORE: 1085.13

## 2021-10-04 NOTE — ANESTHESIA POSTPROCEDURE EVALUATION
Patient: Virginia Byers    Procedure: Procedure(s):  ENDOSCOPIC ULTRASOUND, ESOPHAGOSCOPY / UPPER GASTROINTESTINAL TRACT (GI), Esophagogastroduodenoscopy,  Fine Needle Biopsy of liver  COLONOSCOPY       Diagnosis:Elevated LFTs [R79.89]  Diagnosis Additional Information: No value filed.    Anesthesia Type:  General    Note:  Disposition: Outpatient   Postop Pain Control: Uneventful            Sign Out: Well controlled pain   PONV: No   Neuro/Psych: Uneventful            Sign Out: Acceptable/Baseline neuro status   Airway/Respiratory: Uneventful            Sign Out: Acceptable/Baseline resp. status   CV/Hemodynamics: Uneventful            Sign Out: Acceptable CV status; No obvious hypovolemia; No obvious fluid overload   Other NRE: NONE   DID A NON-ROUTINE EVENT OCCUR? No           Last vitals:  Vitals Value Taken Time   /65 10/04/21 1200   Temp 34.9  C (94.82  F) 10/04/21 1201   Pulse 84 10/04/21 1201   Resp 14 10/04/21 1145   SpO2 100 % 10/04/21 1201   Vitals shown include unvalidated device data.    Electronically Signed By: Rosie Lovelace MD  October 4, 2021  12:02 PM

## 2021-10-04 NOTE — ANESTHESIA CARE TRANSFER NOTE
Patient: Virginia Byers    Procedure: Procedure(s):  ENDOSCOPIC ULTRASOUND, ESOPHAGOSCOPY / UPPER GASTROINTESTINAL TRACT (GI), Esophagogastroduodenoscopy,  Fine Needle Biopsy of liver  COLONOSCOPY       Diagnosis: Elevated LFTs [R79.89]  Diagnosis Additional Information: No value filed.    Anesthesia Type:   General     Note:    Oropharynx: oropharynx clear of all foreign objects  Level of Consciousness: awake  Oxygen Supplementation: face mask  Level of Supplemental Oxygen (L/min / FiO2): 6  Independent Airway: airway patency satisfactory and stable  Dentition: dentition unchanged  Vital Signs Stable: post-procedure vital signs reviewed and stable  Report to RN Given: handoff report given  Patient transferred to: PACU  Comments: Anesthesia Care Transfer Note    Patient: Virginia Byers    Transferred to: PACU with supplemental O2    Patient vital signs: stable    Airway: none    Monitors on, VSS, breathing spontaneously, report to RN      Sherry Zhu CRNA   10/4/2021 11:42 AM  Handoff Report: Identifed the Patient, Identified the Reponsible Provider, Reviewed the pertinent medical history, Discussed the surgical course, Reviewed Intra-OP anesthesia mangement and issues during anesthesia, Set expectations for post-procedure period and Allowed opportunity for questions and acknowledgement of understanding      Vitals:  Vitals Value Taken Time   BP     Temp     Pulse     Resp     SpO2 100 % 10/04/21 1141   Vitals shown include unvalidated device data.    Electronically Signed By: KOSTA Carmona CRNA  October 4, 2021  11:42 AM

## 2021-10-04 NOTE — ANESTHESIA PREPROCEDURE EVALUATION
Anesthesia Pre-Procedure Evaluation    Patient: Virginia Byers   MRN: [id number] : 1950        Preoperative Diagnosis: Elevated LFTs [R79.89]   Procedure : Procedure(s):  ENDOSCOPIC ULTRASOUND, ESOPHAGOSCOPY / UPPER GASTROINTESTINAL TRACT (GI)  COLONOSCOPY  ENDOSCOPIC RETROGRADE CHOLANGIOPANCREATOGRAPHY     Past Medical History:   Diagnosis Date     Breast cancer (H)     Lt breast     Glaucoma     bilateral     Hyperlipidemia      Hypertension      Macular degeneration      Osteoarthritis      Osteoporosis       Past Surgical History:   Procedure Laterality Date     C MASTECTOMY,SIMPLE  3/2000    left     COLONOSCOPY  2006    Q 10 years     SURGICAL HISTORY OF -  Left 2015    tissue expander placed in left breast area     SURGICAL HISTORY OF -  Left 2016    left breast implant and right mammoplasty      Allergies   Allergen Reactions     Compazine      Mental confusion     Prochlorperazine Visual Disturbance     Simvastatin Other (See Comments)     Muscle aches      Social History     Tobacco Use     Smoking status: Former Smoker     Packs/day: 1.00     Years: 17.00     Pack years: 17.00     Types: Cigarettes     Quit date: 3/15/1999     Years since quittin.5     Smokeless tobacco: Never Used   Substance Use Topics     Alcohol use: Not Currently     Alcohol/week: 4.0 standard drinks     Types: 4 Glasses of wine per week     Comment: occasional      Wt Readings from Last 1 Encounters:   10/04/21 60.1 kg (132 lb 7.9 oz)        Anesthesia Evaluation   Pt has had prior anesthetic. Type: General and MAC.        ROS/MED HX  ENT/Pulmonary:     (+) tobacco use, Past use, 17  Pack-Year Hx,      Neurologic:       Cardiovascular:     (+) Dyslipidemia hypertension-----    METS/Exercise Tolerance:     Hematologic:     (+) anemia,     Musculoskeletal:   (+) arthritis,     GI/Hepatic:       Renal/Genitourinary:       Endo:       Psychiatric/Substance Use:       Infectious Disease:       Malignancy:        Other:      (+) , H/O Chronic Pain,           OUTSIDE LABS:  CBC:   Lab Results   Component Value Date    WBC 12.8 (H) 09/20/2021    HGB 9.6 (L) 09/30/2021    HGB 10.1 (L) 09/20/2021    HCT 31.4 (L) 09/20/2021     (H) 09/20/2021     BMP:   Lab Results   Component Value Date     (L) 09/20/2021     (L) 08/12/2021    POTASSIUM 3.8 09/30/2021    POTASSIUM 4.5 09/20/2021    CHLORIDE 95 09/20/2021    CHLORIDE 93 (L) 08/12/2021    CO2 29 09/20/2021    CO2 29 08/12/2021    BUN 16 09/20/2021    BUN 18 08/12/2021    CR 0.56 09/20/2021    CR 0.50 (L) 08/12/2021     (H) 10/04/2021     (H) 09/20/2021     COAGS: No results found for: PTT, INR, FIBR  POC: No results found for: BGM, HCG, HCGS  HEPATIC:   Lab Results   Component Value Date    ALBUMIN 2.3 (L) 09/20/2021    PROTTOTAL 7.2 09/20/2021     (H) 09/20/2021    AST 39 09/20/2021    ALKPHOS 235 (H) 09/20/2021    BILITOTAL 0.5 09/20/2021     OTHER:   Lab Results   Component Value Date    COURTNEY 9.6 09/20/2021    TSH 1.86 09/20/2021    T4 1.14 03/15/2010     (H) 09/20/2021       Anesthesia Plan    ASA Status:  3      Anesthesia Type: General.     - Airway: ETT   Induction: Intravenous, Propofol.   Maintenance: Balanced.        Consents    Anesthesia Plan(s) and associated risks, benefits, and realistic alternatives discussed. Questions answered and patient/representative(s) expressed understanding.     - Discussed with:  Patient      - Extended Intubation/Ventilatory Support Discussed: No.      - Patient is DNR/DNI Status: No    Use of blood products discussed: No .     Postoperative Care    Pain management: IV analgesics.   PONV prophylaxis: Ondansetron (or other 5HT-3), Dexamethasone or Solumedrol     Comments:                Rosie Lovelace MD

## 2021-10-04 NOTE — ANESTHESIA PROCEDURE NOTES
Airway       Patient location during procedure: OR       Procedure Start/Stop Times: 10/4/2021 10:20 AM  Staff -        Performed By: resident  Consent for Airway        Urgency: elective  Indications and Patient Condition       Indications for airway management: matteo-procedural       Induction type:intravenous       Mask difficulty assessment: 1 - vent by mask    Final Airway Details       Final airway type: endotracheal airway       Successful airway: ETT - single  Endotracheal Airway Details        ETT size (mm): 7.0       Cuffed: yes       Successful intubation technique: direct laryngoscopy       DL Blade Type: MAC 3       Grade View of Cords: 1       Adjucts: stylet       Position: Right       Measured from: lips       Secured at (cm): 20       Bite Block used: GI bite block.    Post intubation assessment        Placement verified by: capnometry, equal breath sounds and chest rise        Number of attempts at approach: 1       Secured with: silk tape       Ease of procedure: easy       Dentition: Intact and Unchanged    Additional Comments       Intubation performed by Venkatesh Weaver DDS [facility] resident

## 2021-10-04 NOTE — DISCHARGE INSTRUCTIONS
[facility]  Same-Day Surgery   Adult Discharge Orders & Instructions     For 24 hours after surgery    1. Get plenty of rest.  A responsible adult must stay with you for at least 24 hours after you leave the hospital.   2. Do not drive or use heavy equipment.  If you have weakness or tingling, don't drive or use heavy equipment until this feeling goes away.  3. Do not drink alcohol.  4. Avoid strenuous or risky activities.  Ask for help when climbing stairs.   5. You may feel lightheaded.  IF so, sit for a few minutes before standing.  Have someone help you get up.   6. If you have nausea (feel sick to your stomach): Drink only clear liquids such as apple juice, ginger ale, broth or 7-Up.  Rest may also help.  Be sure to drink enough fluids.  Move to a regular diet as you feel able.  7. You may have a slight fever. Call the doctor if your fever is over 100 F (37.7 C) (taken under the tongue) or lasts longer than 24 hours.  8. You may have a dry mouth, a sore throat, muscle aches or trouble sleeping.  These should go away after 24 hours.  9. Do not make important or legal decisions.   Call your doctor for any of the followin.  Signs of infection (fever, growing tenderness at the surgery site, a large amount of drainage or bleeding, severe pain, foul-smelling drainage, redness, swelling).    2. It has been over 8 to 10 hours since surgery and you are still not able to urinate (pass water).    3.  Headache for over 24 hours.      To contact a doctor, call Dr. Guru Ruby @ [phone] (GI Clinic) or [phone] or:        [phone] and ask for the resident on call for Gastroenterology (answered 24 hours a day)        Emergency Department:    [facility]: [phone]       (TTY for hearing impaired: [phone])    [facility]: [phone]       (TTY for hearing impaired: [phone])

## 2021-10-05 DIAGNOSIS — R79.89 ELEVATED LFTS: Primary | ICD-10-CM

## 2021-10-05 LAB
PATH REPORT.COMMENTS IMP SPEC: NORMAL
PATH REPORT.FINAL DX SPEC: NORMAL
PATH REPORT.GROSS SPEC: NORMAL
PATH REPORT.MICROSCOPIC SPEC OTHER STN: NORMAL
PATH REPORT.MICROSCOPIC SPEC OTHER STN: NORMAL
PATH REPORT.RELEVANT HX SPEC: NORMAL
PHOTO IMAGE: NORMAL

## 2021-10-05 PROCEDURE — 88307 TISSUE EXAM BY PATHOLOGIST: CPT | Mod: 26 | Performed by: PATHOLOGY

## 2021-10-05 PROCEDURE — 88313 SPECIAL STAINS GROUP 2: CPT | Mod: 26 | Performed by: PATHOLOGY

## 2021-10-06 DIAGNOSIS — I10 HYPERTENSION GOAL BP (BLOOD PRESSURE) < 140/90: ICD-10-CM

## 2021-10-06 LAB — UPPER EUS: NORMAL

## 2021-10-07 RX ORDER — HYDROCHLOROTHIAZIDE 12.5 MG/1
12.5 TABLET ORAL DAILY
Qty: 90 TABLET | Refills: 3 | Status: SHIPPED | OUTPATIENT
Start: 2021-10-07 | End: 2021-10-14

## 2021-10-07 NOTE — TELEPHONE ENCOUNTER
"Routing refill request to provider for review/approval because:  Labs out of range:  Sodium   Requested Prescriptions   Pending Prescriptions Disp Refills     hydrochlorothiazide (HYDRODIURIL) 12.5 MG tablet 30 tablet 0     Sig: Take 1 tablet (12.5 mg) by mouth daily       Diuretics (Including Combos) Protocol Failed - 10/6/2021  4:09 PM        Failed - Normal serum sodium on file in past 12 months     Recent Labs   Lab Test 09/20/21  0800   *              Passed - Blood pressure under 140/90 in past 12 months     BP Readings from Last 3 Encounters:   10/04/21 122/64   09/30/21 136/74   09/20/21 122/69                 Passed - Recent (12 mo) or future (30 days) visit within the authorizing provider's specialty     Patient has had an office visit with the authorizing provider or a provider within the authorizing providers department within the previous 12 mos or has a future within next 30 days. See \"Patient Info\" tab in inbasket, or \"Choose Columns\" in Meds & Orders section of the refill encounter.              Passed - Medication is active on med list        Passed - Patient is age 18 or older        Passed - No active pregancy on record        Passed - Normal serum creatinine on file in past 12 months     Recent Labs   Lab Test 09/20/21  0800   CR 0.56              Passed - Normal serum potassium on file in past 12 months     Recent Labs   Lab Test 09/30/21  1311   POTASSIUM 3.8                    Passed - No positive pregnancy test in past 12 months           Mona Brown RN          "

## 2021-10-14 ENCOUNTER — OFFICE VISIT (OUTPATIENT)
Dept: FAMILY MEDICINE | Facility: CLINIC | Age: 71
End: 2021-10-14
Payer: MEDICARE

## 2021-10-14 VITALS
TEMPERATURE: 98.5 F | HEART RATE: 89 BPM | SYSTOLIC BLOOD PRESSURE: 133 MMHG | BODY MASS INDEX: 22.88 KG/M2 | RESPIRATION RATE: 18 BRPM | OXYGEN SATURATION: 99 % | HEIGHT: 64 IN | DIASTOLIC BLOOD PRESSURE: 69 MMHG | WEIGHT: 134 LBS

## 2021-10-14 DIAGNOSIS — D64.9 ANEMIA, UNSPECIFIED TYPE: ICD-10-CM

## 2021-10-14 DIAGNOSIS — I10 HYPERTENSION GOAL BP (BLOOD PRESSURE) < 140/90: ICD-10-CM

## 2021-10-14 DIAGNOSIS — Z23 HIGH PRIORITY FOR 2019-NCOV VACCINE: ICD-10-CM

## 2021-10-14 DIAGNOSIS — R79.89 ELEVATED LIVER FUNCTION TESTS: ICD-10-CM

## 2021-10-14 LAB
ALBUMIN SERPL-MCNC: 2.4 G/DL (ref 3.4–5)
ALP SERPL-CCNC: 146 U/L (ref 40–150)
ALT SERPL W P-5'-P-CCNC: 51 U/L (ref 0–50)
AST SERPL W P-5'-P-CCNC: 16 U/L (ref 0–45)
BILIRUB DIRECT SERPL-MCNC: 0.1 MG/DL (ref 0–0.2)
BILIRUB SERPL-MCNC: 0.3 MG/DL (ref 0.2–1.3)
PROT SERPL-MCNC: 8 G/DL (ref 6.8–8.8)

## 2021-10-14 PROCEDURE — 36415 COLL VENOUS BLD VENIPUNCTURE: CPT | Performed by: FAMILY MEDICINE

## 2021-10-14 PROCEDURE — 87340 HEPATITIS B SURFACE AG IA: CPT | Performed by: FAMILY MEDICINE

## 2021-10-14 PROCEDURE — 85027 COMPLETE CBC AUTOMATED: CPT | Performed by: FAMILY MEDICINE

## 2021-10-14 PROCEDURE — 86039 ANTINUCLEAR ANTIBODIES (ANA): CPT | Performed by: FAMILY MEDICINE

## 2021-10-14 PROCEDURE — 99214 OFFICE O/P EST MOD 30 MIN: CPT | Mod: 25 | Performed by: FAMILY MEDICINE

## 2021-10-14 PROCEDURE — 80076 HEPATIC FUNCTION PANEL: CPT | Performed by: FAMILY MEDICINE

## 2021-10-14 PROCEDURE — 86803 HEPATITIS C AB TEST: CPT | Performed by: FAMILY MEDICINE

## 2021-10-14 PROCEDURE — 0003A COVID-19,PF,PFIZER (12+ YRS): CPT | Performed by: FAMILY MEDICINE

## 2021-10-14 PROCEDURE — 91300 COVID-19,PF,PFIZER (12+ YRS): CPT | Performed by: FAMILY MEDICINE

## 2021-10-14 PROCEDURE — 86038 ANTINUCLEAR ANTIBODIES: CPT | Performed by: FAMILY MEDICINE

## 2021-10-14 RX ORDER — HYDROCHLOROTHIAZIDE 12.5 MG/1
TABLET ORAL
Qty: 1 TABLET | Refills: 0 | Status: SHIPPED | OUTPATIENT
Start: 2021-10-14 | End: 2022-09-21

## 2021-10-14 ASSESSMENT — MIFFLIN-ST. JEOR: SCORE: 1094.88

## 2021-10-14 NOTE — PROGRESS NOTES
Assessment & Plan     Hypertension goal BP (blood pressure) < 140/90  Stable  - hydrochlorothiazide (HYDRODIURIL) 12.5 MG tablet; Stop the medicine    Elevated liver function tests  Has appointment with hepatologist  Labs pending   - Hepatic panel (Albumin, ALT, AST, Bili, Alk Phos, TP); Future  - Hepatitis B surface antigen; Future  - Hepatitis C Screen Reflex to HCV RNA Quant and Genotype; Future  - Hepatitis C Screen Reflex to HCV RNA Quant and Genotype  - Hepatitis B surface antigen  - Hepatic panel (Albumin, ALT, AST, Bili, Alk Phos, TP)    Anemia, unspecified type  Pending    - CBC with platelets; Future  - CBC with platelets  Weight is better    High priority for 2019-nCoV vaccine  Advised   - COVID-19,PF,PFIZER  }    Return in about 6 weeks (around 11/25/2021) for recheck/Please schedule appointment.    Chuyita Zaidi MD  [facility] MIRIAN Coleman is a 71 year old who presents for the following health issues    HPI   Chief Complaint   Patient presents with     Follow Up     test result     Imm/Inj     COVID-19 VACCINE     Pt has elevated LFT and has appointment with a Hepatologist  She is feeling much better since she stopped hydrochlorothiazide as discussed   Mouth feels normal   No jaw pain  She has gained weight and almost up to her usual weight  Liver biopsy showed Hepatic congestion  No abdominal pain  Feels back to normal   Blood Pressure is stable       Review of Systems   CONSTITUTIONAL: NEGATIVE for fever, chills, change in weight  ENT/MOUTH: NEGATIVE for ear, mouth and throat problems  RESP: NEGATIVE for significant cough or SOB  CV: NEGATIVE for chest pain, palpitations or peripheral edema  GI: NEGATIVE for nausea, abdominal pain, heartburn, or change in bowel habits  MUSCULOSKELETAL: NEGATIVE for significant arthralgias or myalgia  PSYCHIATRIC: NEGATIVE for changes in mood or affect      Objective    /69   Pulse 89   Temp 98.5  F (36.9  C) (Oral)   Resp 18   " Ht 1.613 m (5' 3.5\")   Wt 60.8 kg (134 lb)   SpO2 99%   BMI 23.36 kg/m    Body mass index is 23.36 kg/m .  Physical Exam   GENERAL: healthy, alert and no distress  EYES: Eyes grossly normal to inspection, PERRL and conjunctivae and sclerae normal  NECK: no adenopathy, no asymmetry, masses, or scars and thyroid normal to palpation  RESP: lungs clear to auscultation - no rales, rhonchi or wheezes  CV: regular rate and rhythm, normal S1 S2, no S3 or S4, no murmur, click or rub, no peripheral edema and peripheral pulses strong  ABDOMEN: soft, nontender, no hepatosplenomegaly, no masses and bowel sounds normal  MS: no gross musculoskeletal defects noted, no edema  NEURO: Normal strength and tone, mentation intact and speech normal  PSYCH: mentation appears normal, affect normal/bright    Admission on 10/04/2021, Discharged on 10/04/2021   Component Date Value Ref Range Status     GLUCOSE BY METER POCT 10/04/2021 123* 70 - 99 mg/dL Final     Case Report 10/04/2021    Final                    Value:Surgical Pathology Report                         Case: [id number]                                  Authorizing Provider:  Guru Lyla Ruby Collected:           10/04/2021 10:58 AM                                 MD Freddie                                                                 Ordering Location:     [facility]                 Received:            10/04/2021 11:22 AM          Pathologist:           Sabrina Griffiths MD                                                           Specimen:    Liver, Random Liver Biopsy (Fine Needle Biopsy)                                             Final Diagnosis 10/04/2021    Final                    Value:This result contains rich text formatting which cannot be displayed here.     Comment 10/04/2021    Final                    Value:This result contains rich text formatting which cannot be displayed here.     Clinical Information 10/04/2021    Final                    " Value:This result contains rich text formatting which cannot be displayed here.     Gross Description 10/04/2021    Final                    Value:This result contains rich text formatting which cannot be displayed here.     Microscopic Description 10/04/2021    Final                    Value:This result contains rich text formatting which cannot be displayed here.     Special Stains 10/04/2021    Final                    Value:This result contains rich text formatting which cannot be displayed here.     Performing Labs 10/04/2021    Final                    Value:This result contains rich text formatting which cannot be displayed here.     COLONOSCOPY 10/04/2021    Final                    Value:[address] [place] [address] [phone]     Endoscopy Department  _______________________________________________________________________________  Patient Name: Virginia Byers         Procedure Date: 10/4/2021 10:18 AM  MRN: [id number]                       Account Number: [id number]  YOB: 1950             Admit Type: Outpatient  Age: 70                               Room: OR  Gender: Female                        Note Status: Finalized  Attending MD: Guru Ruby ,     Total Sedation Time:   _______________________________________________________________________________     Procedure:           Colonoscopy  Indications:         Iron deficiency anemia                       70 year old white female who is being evaluated for dry                        mouth, unintentional weigh loss 26 lbs, no overt GI                        bleeding, diarrhea or constipation, normal cologuard in                        2019                           and normal colonoscopy 12 years ago. Anemia based                        on iron panel appears to be anemia of chronic disease.                        Colonoscopy to evaluate for bleeding as a cause for                         anemia  Providers:           Guru Ruby  Referring MD:        Chuyita Zaidi MD  Medicines:           General Anesthesia  Complications:       No immediate complications.  _______________________________________________________________________________  Procedure:           Pre-Anesthesia Assessment:                       - Prior to the procedure, a History and Physical was                        performed, and patient medications and allergies were                        reviewed. The patient is competent. The risks and                        benefits of the procedure and the sedation options and                        risks were discussed with the patient. All questions                        were answered and informed consent was obtained. Patient                                                  identification and proposed procedure were verified by                        the physician and the nurse in the pre-procedure area.                        Mental Status Examination: alert and oriented. Airway                        Examination: normal oropharyngeal airway and neck                        mobility. Respiratory Examination: clear to                        auscultation. CV Examination: normal. Prophylactic                        Antibiotics: The patient does not require prophylactic                        antibiotics. Prior Anticoagulants: The patient has taken                        no previous anticoagulant or antiplatelet agents. ASA                        Grade Assessment: II - A patient with mild systemic                        disease. After reviewing the risks and benefits, the                        patient was deemed in satisfactory condition to undergo                        the procedure. The anesthesia plan was to use general                                                  anesthesia. Immediately prior to administration of                        medications, the patient was  re-assessed for adequacy to                        receive sedatives. The heart rate, respiratory rate,                        oxygen saturations, blood pressure, adequacy of                        pulmonary ventilation, and response to care were                        monitored throughout the procedure. The physical status                        of the patient was re-assessed after the procedure.                       After obtaining informed consent, the colonoscope was                        passed under direct vision. Throughout the procedure,                        the patient's blood pressure, pulse, and oxygen                        saturations were monitored continuously. The Colonoscope                        was introduced through the anus and advanced to 10 cm                        into the ileum.                                                                                                             Findings:       Hemorrhoids were found on perianal exam.       A few small and large-mouthed diverticula were found in the sigmoid        colon and splenic flexure.       The exam was otherwise without abnormality.       The terminal ileum appeared normal.       Non-bleeding internal hemorrhoids were found during retroflexion. The        hemorrhoids were mild.                                                                                   Impression:          - Hemorrhoids found on perianal exam.                       - Diverticulosis in the sigmoid colon and at the splenic                        flexure.                       - The examination was otherwise normal.                       - The examined portion of the ileum was normal.                       - Non-bleeding internal hemorrhoids.  Recommendation:      - Observe patient in same day observation unit for                        ongoing care.                       - Her anemia appears to be anemia of chronic disease                                                   more than iron deficiency anemia, therefore will hold                        off on further work up including capsule endoscopy                       - Will recommend further work up for autoimmune diseases                        or thyroid disease with PCP                                                                                     ____________________  Guru Ruby,   10/4/2021 11:55:01 AM  I was physically present for the entire viewing portion of the exam.  __________________________  Signature of teaching physician  [id number] Ruby  Number of Addenda: 0    Note Initiated On: 10/4/2021 10:18 AM       Upper EUS 10/04/2021    Final                    Value:M [address] [place] [address] [phone]     Endoscopy Department  _______________________________________________________________________________  Patient Name: Virginia Byers         Procedure Date: 10/4/2021 10:19 AM  MRN: [id number]                       Account Number: [id number]  YOB: 1950             Admit Type: Outpatient  Age: 70                               Room: OR  Gender: Female                        Note Status: Finalized  Attending MD: Guru Ruby ,     Total Sedation Time:   _______________________________________________________________________________     Procedure:           Upper EUS  Indications:         Elevated liver enzymes                       70 year old white female with h/o breast cancer, HTN,                        HLD, glaucoma, macular degenerationis being evaluated                        for 26 lbs unintentional weight loss since July, no                        overt GI bl                          eeding, but new onset anemia (of chronic                        disease), mildly elevated ALT and alkaline phosphatase.                        EGD to evaluate for causes for unintentional weight loss                         followed by EUS to evaluate for elevated LFTs  Providers:           Guru Ruby  Referring MD:        Chuyita Zaidi MD  Medicines:           General Anesthesia  Complications:       No immediate complications.  _______________________________________________________________________________  Procedure:           Pre-Anesthesia Assessment:                       - Prior to the procedure, a History and Physical was                        performed, and patient medications and allergies were                        reviewed. The patient is competent. The risks and                        benefits of the procedure and the sedation options and                        risks were discussed with the patient. All questions                        were answered and informed co                          nsent was obtained. Patient                        identification and proposed procedure were verified by                        the physician and the nurse in the pre-procedure area.                        Mental Status Examination: alert and oriented. Airway                        Examination: normal oropharyngeal airway and neck                        mobility. Respiratory Examination: clear to                        auscultation. CV Examination: normal. Prophylactic                        Antibiotics: The patient does not require prophylactic                        antibiotics. Prior Anticoagulants: The patient has taken                        no previous anticoagulant or antiplatelet agents. ASA                        Grade Assessment: II - A patient with mild systemic                        disease. After reviewing the risks and benefits, the                        patient was deemed in satisfactory condition to undergo                        the procedure. The anesthesia plan wa                          s to use general                        anesthesia. Immediately prior to administration of                         medications, the patient was re-assessed for adequacy to                        receive sedatives. The heart rate, respiratory rate,                        oxygen saturations, blood pressure, adequacy of                        pulmonary ventilation, and response to care were                        monitored throughout the procedure. The physical status                        of the patient was re-assessed after the procedure.                       After obtaining informed consent, the endoscope was                        passed under direct vision. Throughout the procedure,                        the patient's blood pressure, pulse, and oxygen                        saturations were monitored continuously. The Endoscope                        was introduced through the mouth, and advanced to the                        second part of duodenum. The was introduced through the                                                  mouth, and advanced to the second part of duodenum.                                                                                   Findings:       ENDOSCOPIC FINDING: :       The examined esophagus was endoscopically normal.       The entire examined stomach was endoscopically normal.       The examined duodenum was endoscopically normal.       ENDOSONOGRAPHIC FINDING: :       The major papilla was endoscopically and sonographically normal. The        common bile duct was followed from the major papilla to the liver and no        stones were seen. The duct was non-dilated and measured 4 mm.The        gallbladder was surgically absent.There was no intrahepatic biliary        dilation.       There was no sign of significant endosonographic abnormality in the        visualized portion of the liver. No focal pathology and no masses were        identified. Fine needle biopsy was performed. Color Doppler imaging was        utilized prior to needle puncture to confir                          m a lack  of significant        vascular structures within the needle path. Two passes were made with        the 19 gauge ultrasound biopsy needle using a transgastric approach. A        good visible core of tissue was obtained. Final cytology results are        pending.       Non specific pancreatic parenchymal changes such as hyperechoic strands        were present. There were no features of chronic pancreatitis. No masses,        cysts or stones were visualized in the pancreatic parenchyma. The main        pancreatic duct was followed from the head to the body, excluding        pancreas divisum. The pancreatic duct measured 2.0 mm in the head, 0.8        mm in the body of the pancreas.       There was no sign of significant endosonographic abnormality in the left        adrenal gland. No focal pathology was identified.       No lymphadenopathy seen.                                                                                   Impression:          - Normal EGD and no stigmata or s                          ource of upper GI                        bleeding could be identfied to explain anemia.                       - Bile duct was normal and no evidence of biliary tract                        obstruction and gall bladder was unremarkable                       - There was no evidence of significant pathology in the                        visualized portion of the liver. EUS guided fine needle                        biopsy was performed using a 19 G needle (wet suction                        method and slow pull technique).                       - Non-specific pancreatic parenchymal changes                       - Liver, left adrenal were unremarkable  Recommendation:      - Perform a colonoscopy today for further work up of                        anemia.                       - Await path results.                       - Patient has been referred to Dr Cisse for further                        work up and management of  the mildly elevated LFTs                       - Findings discussed w                          ith patient                                                                                     ____________________  Guru Ruby,   10/6/2021 1:27:22 PM  I was physically present for the entire viewing portion of the exam.  __________________________  Signature of teaching physician  Omer/[id number] Ruby  Number of Addenda: 0    Note Initiated On: 10/4/2021 10:19 AM  Scope In:  Scope Out:

## 2021-10-15 LAB
ERYTHROCYTE [DISTWIDTH] IN BLOOD BY AUTOMATED COUNT: 16 % (ref 10–15)
HBV SURFACE AG SERPL QL IA: NONREACTIVE
HCT VFR BLD AUTO: 31.9 % (ref 35–47)
HCV AB SERPL QL IA: NONREACTIVE
HGB BLD-MCNC: 10.1 G/DL (ref 11.7–15.7)
MCH RBC QN AUTO: 25.4 PG (ref 26.5–33)
MCHC RBC AUTO-ENTMCNC: 31.7 G/DL (ref 31.5–36.5)
MCV RBC AUTO: 80 FL (ref 78–100)
PLATELET # BLD AUTO: 630 10E3/UL (ref 150–450)
RBC # BLD AUTO: 3.98 10E6/UL (ref 3.8–5.2)
WBC # BLD AUTO: 11 10E3/UL (ref 4–11)

## 2021-10-18 ENCOUNTER — TRANSFERRED RECORDS (OUTPATIENT)
Dept: HEALTH INFORMATION MANAGEMENT | Facility: CLINIC | Age: 71
End: 2021-10-18
Payer: COMMERCIAL

## 2021-10-18 LAB
ANA PAT SER IF-IMP: ABNORMAL
ANA SER QL IF: ABNORMAL
ANA TITR SER IF: ABNORMAL {TITER}

## 2021-10-25 NOTE — TELEPHONE ENCOUNTER
RECORDS RECEIVED FROM: [facility]   Appt Date: 10.28.2021   NOTES STATUS DETAILS   OFFICE NOTE from referring provider Internal 10.05.2021 Consult Guru Lyla Ruby MD   OFFICE NOTES from other specialists Internal 10.14.2021   Chuyita Zaidi MD          DISCHARGE SUMMARY from hospital Internal 10.04.2021 Guru Lyla Ruby MD     MEDICATION LIST Internal    LIVER BIOSPY (IF APPLICABLE)      PATHOLOGY REPORTS  Internal 10.04.2021 Liver Biopsy    IMAGING     ENDOSCOPY (IF AVAILABLE) N/A    COLONOSCOPY (IF AVAILABLE) Internal 10.04.2021   ULTRASOUND LIVER N/A    CT OF ABDOMEN N/A    MRI OF LIVER N/A    FIBROSCAN, US ELASTOGRAPHY, FIBROSIS SCAN, MR ELASTOGRAPHY N/A    LABS     HEPATIC PANEL (LIVER PANEL) Internal 10.14.2021   BASIC METABOLIC PANEL Internal 08.12.2021   COMPLETE METABOLIC PANEL Internal 10.14.2021   COMPLETE BLOOD COUNT (CBC) Internal 10.14.2021   INTERNATIONAL NORMALIZED RATIO (INR) N/A    HEPATITIS C ANTIBODY N/A    HEPATITIS C VIRAL LOAD/PCR N/A    HEPATITIS C GENOTYPE N/A    HEPATITIS B SURFACE ANTIGEN Internal 10.14.2021   HEPATITIS B SURFACE ANTIBODY N/A    HEPATITIS B DNA QUANT LEVEL N/A    HEPATITIS B CORE ANTIBODY N/A

## 2021-10-28 ENCOUNTER — OFFICE VISIT (OUTPATIENT)
Dept: GASTROENTEROLOGY | Facility: CLINIC | Age: 71
End: 2021-10-28
Attending: INTERNAL MEDICINE
Payer: MEDICARE

## 2021-10-28 ENCOUNTER — PRE VISIT (OUTPATIENT)
Dept: GASTROENTEROLOGY | Facility: CLINIC | Age: 71
End: 2021-10-28

## 2021-10-28 VITALS
HEART RATE: 93 BPM | WEIGHT: 134 LBS | DIASTOLIC BLOOD PRESSURE: 72 MMHG | SYSTOLIC BLOOD PRESSURE: 142 MMHG | OXYGEN SATURATION: 97 % | BODY MASS INDEX: 23.36 KG/M2

## 2021-10-28 DIAGNOSIS — I10 HYPERTENSION GOAL BP (BLOOD PRESSURE) < 140/90: ICD-10-CM

## 2021-10-28 DIAGNOSIS — R74.8 ABNORMAL LIVER ENZYMES: Primary | ICD-10-CM

## 2021-10-28 PROCEDURE — 99204 OFFICE O/P NEW MOD 45 MIN: CPT | Performed by: INTERNAL MEDICINE

## 2021-10-28 ASSESSMENT — PAIN SCALES - GENERAL: PAINLEVEL: NO PAIN (0)

## 2021-10-28 NOTE — PATIENT INSTRUCTIONS
It was a pleasure taking care of you today. I've included a brief summary of our discussion and care plan from today's visit below.  Please review this information with your primary care provider.  _______________________________________________________________________    My recommendations are summarized as follows:    1. Follow up with Dr. Zaidi. I would suggest that you have repeat liver enzymes in 3 and 6 months through her clinic.    2. Return to GI and/or Hepatology Clinic if you have recurrent GI symptoms or liver test abnormalities.          _______________________________________________________________________    Please be in touch if there are any further questions that arise following today's visit.  There are multiple ways to contact your gastroenterology care team.      During business hours, you may reach your gastroenterology RN Care Coordinator, Kimberly Cornejo, at [phone].      You can always send a secure message through [email or web address]. [email or web address] messages are answered by your nurse or doctor typically within 24 hours. Please allow extra time on weekends and holidays.     What is [email or web address]?  [email or web address] is a secure way for you to access all of your healthcare records from the [facility].  It is a web based computer program, so you can sign on to it from any location.  It also allows you to send secure messages to your care team.  I recommend signing up for [email or web address] access if you have not already done so and are comfortable with using a computer.     For urgent/emergent questions after business hours, you may reach the on-call GI Fellow by contacting the [facility] at [phone].     How will I get the results of any tests ordered?    You will receive all of your results.  If you have signed up for aka-aki network[employer], any tests ordered at your visit will be available to you after your physician reviews them.  Typically this takes 1-2 weeks.  If there are urgent results that require a change  in your care plan, your physician or nurse will call you to discuss the next steps.      Thank you for choosing [facility]!       Sincerely,    Wayne Cisse MD  Professor of Medicine  [facility]  Division of Gastroenterology, Hepatology, and Nutrition

## 2021-10-28 NOTE — PROGRESS NOTES
[facility]       Hepatology Consult    Date of Service: 10/28/2021     Referring Provider: Jerardo Ruby and Dejuan    Subjective:            Virginia Byers is a 71 year old female presenting for evaluation of abnormal liver enzymes.    History of Present Illness   Ms. Byers is a 71 year old female who has been undergoing a recent work-up because of fatigue, weight loss, nausea, and malaise.  A clear cause of her symptoms is not been identified.  Fortunately, she feels that the all of the symptoms are slowly and spontaneously improving, and she now feels that she has essentially returned to her normal state of health.    She does have chronic back pain that limits her activities.  She otherwise denies joint symptoms or rash.  She previously had some nausea with occasional vomiting, along with anorexia, but these have resolved.  She feels that her weight has stabilized and that her eating pattern is back to normal.    She reports that she is never had any type of liver disease that she is aware of.  She did have moderately abnormal liver enzymes, but these have essentially returned to normal.  Her serologic work-up, CT scan, and EUS did not reveal evidence of overt liver disease.    On review of symptoms now she reports no GI, hepatic, or constitutional symptoms.  She reports no history of GI bleeding.    Past Medical History:  Past Medical History:   Diagnosis Date     Breast cancer (H) 2000    Lt breast     Glaucoma     bilateral     Hyperlipidemia      Hypertension      Macular degeneration      Osteoarthritis      Osteoporosis        Surgical History:  Past Surgical History:   Procedure Laterality Date     C MASTECTOMY,SIMPLE  3/2000    left     COLONOSCOPY  2006    Q 10 years     COLONOSCOPY N/A 10/4/2021    Procedure: COLONOSCOPY;  Surgeon: Guru Lyla Ruby MD;  Location:  OR     ESOPHAGOSCOPY, GASTROSCOPY, DUODENOSCOPY (EGD), COMBINED N/A  10/4/2021    Procedure: ENDOSCOPIC ULTRASOUND, ESOPHAGOSCOPY / UPPER GASTROINTESTINAL TRACT (GI), Esophagogastroduodenoscopy,  Fine Needle Biopsy of liver;  Surgeon: Guru Lyla Ruby MD;  Location: [facility] OR     SURGICAL HISTORY OF -  Left 2015    tissue expander placed in left breast area     SURGICAL HISTORY OF -  Left 2016    left breast implant and right mammoplasty       Social History:  Social History     Tobacco Use     Smoking status: Former Smoker     Packs/day: 1.00     Years: 17.00     Pack years: 17.00     Types: Cigarettes     Quit date: 3/15/1999     Years since quittin.6     Smokeless tobacco: Never Used   Substance Use Topics     Alcohol use: Not Currently     Alcohol/week: 4.0 standard drinks     Types: 4 Glasses of wine per week     Comment: occasional     Drug use: No       Family History:  Family History   Problem Relation Age of Onset     Cancer Mother         bone cancer     Other Cancer Mother      Heart Disease Father      Arthritis Sister      Breast Cancer Sister         age 75     Diabetes Maternal Grandmother      Diabetes Paternal Grandmother    No family history of liver disease.    Medications:  Current Outpatient Medications   Medication     hydrochlorothiazide (HYDRODIURIL) 12.5 MG tablet     latanoprost (XALATAN) 0.005 % ophthalmic solution     losartan (COZAAR) 100 MG tablet     multivitamin w/minerals (MULTI-VITAMIN) tablet     rosuvastatin (CRESTOR) 10 MG tablet     timolol (TIMOPTIC) 0.25 % ophthalmic solution     No current facility-administered medications for this visit.       Review of Systems  A complete 10 point review of systems was asked and answered in the negative unless specifically commented upon in the HPI    Objective:         Vitals:    10/28/21 1320   BP: (!) 142/72   Pulse: 93   SpO2: 97%   Weight: 60.8 kg (134 lb)     Body mass index is 23.36 kg/m .     Physical Exam  Constitutional: Well-developed, well-nourished, in no apparent  distress.    HEENT: Normocephalic.  No scleral icterus. Moist oral mucosa.  Neck/Lymph: Normal ROM, supple. No thyromegaly.  No lymphadenopathy  Cardiac:  Regular rate and rhythm.  No overt murmurs  Respiratory: Clear to auscultation bilaterally.  No wheezes or rales  GI:  Abdomen soft, non-distended, non-tender. BS present.  No shifting dullness. No overt hepatosplenomegaly.     Skin:  No rash noted.  No jaundice.  No spider nevi noted.    Peripheral Vascular: No lower extremity edema.   Musculoskeletal:  ROM intact, somewhat decreased muscle bulk    Psychiatric: Normal mood and affect. Behavior is normal.  Neuro: No asterixis    Labs and Diagnostic tests:  Lab Results   Component Value Date     09/20/2021     08/12/2020    POTASSIUM 3.8 09/30/2021    POTASSIUM 4.1 08/12/2020    CHLORIDE 95 09/20/2021    CHLORIDE 102 08/12/2020    CO2 29 09/20/2021    CO2 30 08/12/2020    BUN 16 09/20/2021    BUN 14 08/12/2020    CR 0.56 09/20/2021    CR 0.63 08/12/2020     Lab Results   Component Value Date    BILITOTAL 0.3 10/14/2021    BILITOTAL 0.5 04/04/2018    ALT 51 10/14/2021    ALT 30 04/08/2019    AST 16 10/14/2021    AST 24 04/08/2019    ALKPHOS 146 10/14/2021    ALKPHOS 92 04/04/2018     Lab Results   Component Value Date    ALBUMIN 2.4 10/14/2021    ALBUMIN 3.9 04/04/2018    PROTTOTAL 8.0 10/14/2021    PROTTOTAL 8.0 04/04/2018      Lab Results   Component Value Date    WBC 11.0 10/14/2021    HGB 10.1 10/14/2021    HGB 13.6 08/27/2015    MCV 80 10/14/2021     10/14/2021       Imaging:  CT and EUS reviewed.    LIVER, RANDOM NEEDLE BIOPSY:   Benign parenchyma with congestion; no significant fibrosis or other abnormalities      Assessment and Plan:    1.  Recently abnormal liver enzyme tests.  She had mildly elevated alkaline phosphatase and ALT in the past few months associated with nonspecific constitutional symptoms.  As noted above, the patient has noted a steady improvement in her symptoms, and her  liver tests have essentially returned to normal.  Her albumin is low, which may reflect her prior poor oral intake or residual systemic inflammation.    Her liver looked normal on imaging studies aside from some small cysts.  The biopsy findings were mostly normal, aside from some possibly increased sinusoidal congestion.  The CT scan did not suggest this was due to hepatic outflow obstruction.  If otherwise clinically indicated, an echocardiogram could be obtained to rule out right-sided heart failure, but this may not be warranted if she continues to improve.    At this point, I think it is reasonable for the patient to follow-up with Dr. Zaidi with no additional liver testing aside from repeat liver panels in 3 and 6 months.  If she has recurrent liver test abnormalities, GI symptoms, or hepatic symptoms,, we would be happy to see her back.    Her thrombocytosis is notable, but I will defer further evaluation to Dr. Zaidi.  It is conceivable that she had a transient thrombosis of the hepatic veins, but this was not evident at the time of her CT scan.  I would consider hematology/oncology consult reguarding the risk of thrombosis (and evaluation of the underlying cause of her thrombocytosis).    The patient's questions were answered.      About 30 minutes spent with patient, reviewing results, and coordinating care.     Thank you very much for the opportunity to participate in the care of this patient.  If you have any further questions, please don't hesitate to contact our office.    Wayne Cisse MD  Professor of Medicine  [facility]  Division of Gastroenterology, Hepatology, and Nutrition

## 2021-10-28 NOTE — Clinical Note
Brent Zaidi - do you want to work-up her thrombocytosis further?  Please contact me if any questions or concerns.  -Toro Cisse

## 2021-10-28 NOTE — LETTER
10/28/2021         RE: Virginia Byers  [address] [facility] [address]        Dear Colleague,    Thank you for referring your patient, Virginia Byers, to the [facility] [place]. Please see a copy of my visit note below.    [facility]       Hepatology Consult    Date of Service: 10/28/2021     Referring Provider: Jerardo Ruby and Dejuan    Subjective:            Virginia Byers is a 71 year old female presenting for evaluation of abnormal liver enzymes.    History of Present Illness   Ms. Byers is a 71 year old female who has been undergoing a recent work-up because of fatigue, weight loss, nausea, and malaise.  A clear cause of her symptoms is not been identified.  Fortunately, she feels that the all of the symptoms are slowly and spontaneously improving, and she now feels that she has essentially returned to her normal state of health.    She does have chronic back pain that limits her activities.  She otherwise denies joint symptoms or rash.  She previously had some nausea with occasional vomiting, along with anorexia, but these have resolved.  She feels that her weight has stabilized and that her eating pattern is back to normal.    She reports that she is never had any type of liver disease that she is aware of.  She did have moderately abnormal liver enzymes, but these have essentially returned to normal.  Her serologic work-up, CT scan, and EUS did not reveal evidence of overt liver disease.    On review of symptoms now she reports no GI, hepatic, or constitutional symptoms.  She reports no history of GI bleeding.    Past Medical History:  Past Medical History:   Diagnosis Date     Breast cancer (H) 2000    Lt breast     Glaucoma     bilateral     Hyperlipidemia      Hypertension      Macular degeneration      Osteoarthritis      Osteoporosis        Surgical History:  Past Surgical History:   Procedure Laterality Date      C MASTECTOMY,SIMPLE  3/2000    left     COLONOSCOPY      Q 10 years     COLONOSCOPY N/A 10/4/2021    Procedure: COLONOSCOPY;  Surgeon: Guru Lyla Ruby MD;  Location: [facility] OR     ESOPHAGOSCOPY, GASTROSCOPY, DUODENOSCOPY (EGD), COMBINED N/A 10/4/2021    Procedure: ENDOSCOPIC ULTRASOUND, ESOPHAGOSCOPY / UPPER GASTROINTESTINAL TRACT (GI), Esophagogastroduodenoscopy,  Fine Needle Biopsy of liver;  Surgeon: Guru Lyla Ruby MD;  Location: [facility] OR     SURGICAL HISTORY OF -  Left 2015    tissue expander placed in left breast area     SURGICAL HISTORY OF -  Left 2016    left breast implant and right mammoplasty       Social History:  Social History     Tobacco Use     Smoking status: Former Smoker     Packs/day: 1.00     Years: 17.00     Pack years: 17.00     Types: Cigarettes     Quit date: 3/15/1999     Years since quittin.6     Smokeless tobacco: Never Used   Substance Use Topics     Alcohol use: Not Currently     Alcohol/week: 4.0 standard drinks     Types: 4 Glasses of wine per week     Comment: occasional     Drug use: No       Family History:  Family History   Problem Relation Age of Onset     Cancer Mother         bone cancer     Other Cancer Mother      Heart Disease Father      Arthritis Sister      Breast Cancer Sister         age 75     Diabetes Maternal Grandmother      Diabetes Paternal Grandmother    No family history of liver disease.    Medications:  Current Outpatient Medications   Medication     hydrochlorothiazide (HYDRODIURIL) 12.5 MG tablet     latanoprost (XALATAN) 0.005 % ophthalmic solution     losartan (COZAAR) 100 MG tablet     multivitamin w/minerals (MULTI-VITAMIN) tablet     rosuvastatin (CRESTOR) 10 MG tablet     timolol (TIMOPTIC) 0.25 % ophthalmic solution     No current facility-administered medications for this visit.       Review of Systems  A complete 10 point review of systems was asked and answered in the negative unless specifically  commented upon in the HPI    Objective:         Vitals:    10/28/21 1320   BP: (!) 142/72   Pulse: 93   SpO2: 97%   Weight: 60.8 kg (134 lb)     Body mass index is 23.36 kg/m .     Physical Exam  Constitutional: Well-developed, well-nourished, in no apparent distress.    HEENT: Normocephalic.  No scleral icterus. Moist oral mucosa.  Neck/Lymph: Normal ROM, supple. No thyromegaly.  No lymphadenopathy  Cardiac:  Regular rate and rhythm.  No overt murmurs  Respiratory: Clear to auscultation bilaterally.  No wheezes or rales  GI:  Abdomen soft, non-distended, non-tender. BS present.  No shifting dullness. No overt hepatosplenomegaly.     Skin:  No rash noted.  No jaundice.  No spider nevi noted.    Peripheral Vascular: No lower extremity edema.   Musculoskeletal:  ROM intact, somewhat decreased muscle bulk    Psychiatric: Normal mood and affect. Behavior is normal.  Neuro: No asterixis    Labs and Diagnostic tests:  Lab Results   Component Value Date     09/20/2021     08/12/2020    POTASSIUM 3.8 09/30/2021    POTASSIUM 4.1 08/12/2020    CHLORIDE 95 09/20/2021    CHLORIDE 102 08/12/2020    CO2 29 09/20/2021    CO2 30 08/12/2020    BUN 16 09/20/2021    BUN 14 08/12/2020    CR 0.56 09/20/2021    CR 0.63 08/12/2020     Lab Results   Component Value Date    BILITOTAL 0.3 10/14/2021    BILITOTAL 0.5 04/04/2018    ALT 51 10/14/2021    ALT 30 04/08/2019    AST 16 10/14/2021    AST 24 04/08/2019    ALKPHOS 146 10/14/2021    ALKPHOS 92 04/04/2018     Lab Results   Component Value Date    ALBUMIN 2.4 10/14/2021    ALBUMIN 3.9 04/04/2018    PROTTOTAL 8.0 10/14/2021    PROTTOTAL 8.0 04/04/2018      Lab Results   Component Value Date    WBC 11.0 10/14/2021    HGB 10.1 10/14/2021    HGB 13.6 08/27/2015    MCV 80 10/14/2021     10/14/2021       Imaging:  CT and EUS reviewed.    LIVER, RANDOM NEEDLE BIOPSY:   Benign parenchyma with congestion; no significant fibrosis or other abnormalities      Assessment and  Plan:    1.  Recently abnormal liver enzyme tests.  She had mildly elevated alkaline phosphatase and ALT in the past few months associated with nonspecific constitutional symptoms.  As noted above, the patient has noted a steady improvement in her symptoms, and her liver tests have essentially returned to normal.  Her albumin is low, which may reflect her prior poor oral intake or residual systemic inflammation.    Her liver looked normal on imaging studies aside from some small cysts.  The biopsy findings were mostly normal, aside from some possibly increased sinusoidal congestion.  The CT scan did not suggest this was due to hepatic outflow obstruction.  If otherwise clinically indicated, an echocardiogram could be obtained to rule out right-sided heart failure, but this may not be warranted if she continues to improve.    At this point, I think it is reasonable for the patient to follow-up with Dr. Zaidi with no additional liver testing aside from repeat liver panels in 3 and 6 months.  If she has recurrent liver test abnormalities, GI symptoms, or hepatic symptoms,, we would be happy to see her back.    Her thrombocytosis is notable, but I will defer further evaluation to Dr. Zaidi.  It is conceivable that she had a transient thrombosis of the hepatic veins, but this was not evident at the time of her CT scan.  I would consider hematology/oncology consult reguarding the risk of thrombosis (and evaluation of the underlying cause of her thrombocytosis).    The patient's questions were answered.      About 30 minutes spent with patient, reviewing results, and coordinating care.     Thank you very much for the opportunity to participate in the care of this patient.  If you have any further questions, please don't hesitate to contact our office.    Wayne Cisse MD  Professor of Medicine  [facility]  Division of Gastroenterology, Hepatology, and Nutrition      Again, thank you for allowing me to  participate in the care of your patient.        Sincerely,        Wayne Cisse MD

## 2021-10-28 NOTE — NURSING NOTE
Chief Complaint   Patient presents with     New Patient     Liver biopsy/weight loss     Blood pressure (!) 142/72, pulse 93, weight 60.8 kg (134 lb), SpO2 97 %, not currently breastfeeding.    Opal Contreras, CMA

## 2021-10-29 ENCOUNTER — TELEPHONE (OUTPATIENT)
Dept: GASTROENTEROLOGY | Facility: CLINIC | Age: 71
End: 2021-10-29

## 2021-10-29 NOTE — TELEPHONE ENCOUNTER
In review of 10/28 office visit recommendations, patient will do lab work through primary, Dr. Zaidi, and contact us for follow-up if needed.    No further action warranted at this time.

## 2021-10-31 NOTE — TELEPHONE ENCOUNTER
Routing refill request to provider for review/approval because:  Drug not on the [facility] refill protocol   BP Readings from Last 3 Encounters:   10/28/21 (!) 142/72   10/14/21 133/69   10/04/21 122/64

## 2021-11-01 RX ORDER — LOSARTAN POTASSIUM 100 MG/1
TABLET ORAL
Qty: 30 TABLET | Refills: 0 | Status: SHIPPED | OUTPATIENT
Start: 2021-11-01 | End: 2021-11-29

## 2021-11-08 ENCOUNTER — TRANSFERRED RECORDS (OUTPATIENT)
Dept: HEALTH INFORMATION MANAGEMENT | Facility: CLINIC | Age: 71
End: 2021-11-08

## 2021-11-17 ENCOUNTER — TELEPHONE (OUTPATIENT)
Dept: FAMILY MEDICINE | Facility: CLINIC | Age: 71
End: 2021-11-17
Payer: MEDICARE

## 2021-11-18 NOTE — TELEPHONE ENCOUNTER
Reason for call:  Other   Patient called regarding (reason for call): call back  Additional comments:   Patient requested PT orders via [employer] request.  Please call patient to discuss.    Phone number to reach patient:  Home number on file [phone] (home) or Cell number on file:    Telephone Information:   Mobile [phone]       Best Time:  any    Can we leave a detailed message on this number?  YES    Travel screening: Not Applicable

## 2021-11-18 NOTE — TELEPHONE ENCOUNTER
PT orders placed on 6/14/2021.    Called pt and informed her that orders were placed and gave her the number to call to get scheduled.    Letitia Orr MA

## 2021-11-25 DIAGNOSIS — I10 HYPERTENSION GOAL BP (BLOOD PRESSURE) < 140/90: ICD-10-CM

## 2021-11-29 RX ORDER — LOSARTAN POTASSIUM 100 MG/1
TABLET ORAL
Qty: 90 TABLET | Refills: 0 | Status: SHIPPED | OUTPATIENT
Start: 2021-11-29 | End: 2021-12-14

## 2021-11-29 NOTE — TELEPHONE ENCOUNTER
Medication is being filled for 1 time refill only due to:  Patient needs to be seen because due for blood pressure check.   Patient already scheduled with provider on 12/14/21.    Mona Brown RN

## 2021-12-10 ENCOUNTER — THERAPY VISIT (OUTPATIENT)
Dept: PHYSICAL THERAPY | Facility: CLINIC | Age: 71
End: 2021-12-10
Payer: MEDICARE

## 2021-12-10 DIAGNOSIS — M47.816 LUMBAR FACET ARTHROPATHY: ICD-10-CM

## 2021-12-10 PROCEDURE — 97110 THERAPEUTIC EXERCISES: CPT | Mod: GP | Performed by: PHYSICAL THERAPIST

## 2021-12-10 PROCEDURE — 97164 PT RE-EVAL EST PLAN CARE: CPT | Mod: GP | Performed by: PHYSICAL THERAPIST

## 2021-12-10 NOTE — PROGRESS NOTES
[facility]    OUTPATIENT Physical Therapy ORTHOPEDIC EVALUATION  PLAN OF TREATMENT FOR OUTPATIENT REHABILITATION  (COMPLETE FOR INITIAL CLAIMS ONLY)  Patient's Last Name, First Name, M.I.  YOB: 1950  Virginia Byers    Provider s Name:  [facility]   Medical Record No.  [id number]   Start of Care Date:  07/08/21   Onset Date:   06/14/21   Type:     _X__PT   ___OT Medical Diagnosis:    Encounter Diagnosis   Name Primary?     Lumbar facet arthropathy         Treatment Diagnosis:  Low Back Pain        Goals:     12/10/21 0500   Body Part   Goals listed below are for Low Back Pain    Goal #2   Goal #2 sitting   Previous Functional Level No restrictions   Current Functional Level Minutes patient can sit   Performance level < 30 minutes with pain increasing to 9/10    STG Target Performance Minutes patient will be able to sit   Performance level 15 minutes with pain <4/10    Rationale to allow rest from standing   Due date 08/06/21   Date Goal Met 08/13/21   LTG Target Performance Minutes patient will be able to sit   Performance Level 30 minutes with pain < 4/10    Rationale for community transportation   Due date 01/21/22   If goal not met, Why? date extended given gap in plan of care   Goal #3   Goal #3 self cares/transfers/bed mobility   Previous Functional Level No restrictions   Current Functional Level Able;to transfer in and out of a car;to transfer in and out of a bed   Performance Level with use of bilat UEs and pain increasing to up to 9/10 in low back    STG Target Performance Be able to ; transfer in and out of a car; transfer in and out of a chair; transfer in and out of a bed   Performance Level with pain <6/10 in low back and LEs   Rationale for independent self care such as dressing, personal hygiene, bathing;for independent living   Due Date 08/06/21    Date Goal Met 08/13/21   LTG Target Performance Be able to ; transfer in and out of a car; transfer in and out of a chair; transfer in and out of a bed  (and bathtub)   Performance level with pain < 3/10 in Low back and LEs   Rationale for independent community transportation;for independent living   Due Date 02/04/22   If goal not met, Why? date extended given gap in plan of care       Therapy Frequency:     Predicted Duration of Therapy Intervention:       Rob Newman, PT                 I CERTIFY THE NEED FOR THESE SERVICES FURNISHED UNDER        THIS PLAN OF TREATMENT AND WHILE UNDER MY CARE     (Physician attestation of this document indicates review and certification of the therapy plan).                       Certification Date From:  10/06/21   Certification Date To:  01/03/22    Referring Provider:  Dhruv Delgadillo    Initial Assessment        See Epic Evaluation SOC Date: 07/08/21

## 2021-12-10 NOTE — PROGRESS NOTES
"Subjective:    Patient Health History  Virginia Byers being seen for Back and shoulder pain.       Problem occurred: Age   Pain is reported as 5/10 on pain scale.  General health as reported by patient is fair.  Pertinent medical history includes: other.     Medical allergies: none.   Surgeries include:  Cancer surgery.    Current medications:  High blood pressure medication and other. Other medications details: Cholesterol.    Current occupation is Retired.   Primary job tasks include:  Lifting/carrying, prolonged sitting and prolonged standing.                  Physical Exam  Oswestry Score: (P) 40 %                 Objective:  System    Physical Exam    General     ROS    Assessment/Plan:    PROGRESS  REPORT    Progress reporting period is from 07/08/2021 to 12/10/2021.       SUBJECTIVE  Subjective: Pt reports she is doing better than when PT began but frustrated overall in that feels like she is surely not getting better lately.  Last seen in PT in September but has since been \"sick for a long time\" and not really sure why.  \"I feel like I'm getting more disabled.\"  Feels like pain has been spreading from her low back toward her shoulders and down her legs.  Frustrated that she doesn't feel like there is a clear diagnosis.  Difficulty walking, putting on jacket, sit to stand, getting in and out of car.  Wonders if she is simply deconditioned from not being active lately.    Current Pain level: No change.     Initial Pain level: 9/10.   Changes in function:  Yes (See Goal flowsheet attached for changes in current functional level)  Adverse reaction to treatment or activity: None    OBJECTIVE  Objective: Pt ambulates without device.  Wide KEE in shuffling pattern with minimal arm swing.  Guarded hypomobility diffusely throughout from cervical to shoulders, lumbar, hips.  Pt reported pain with all AROM in clinic.  Diffusely 3/5 MMT through B LE.  Negative SLR, [place], thigh thrust B. "     ASSESSMENT/PLAN  Updated problem list and treatment plan: Diagnosis 1:  Back pain in context of widespread symptoms -- see plan below  STG/LTGs have been met or progress has been made towards goals:  Yes (See Goal flow sheet completed today.)  Assessment of Progress: The patient has had set backs in their progress.  Self Management Plans:  Patient has been instructed in a home treatment program.  I have re-evaluated this patient and find that the nature, scope, duration and intensity of the therapy is appropriate for the medical condition of the patient.  Virginia continues to require the following intervention to meet STG and LTG's:  PT    Recommendations:  Pt returns to PT today for the first time since 09/17/2021.  She presents with symptoms wider spread compared to then.  Positive symptomatic testing diffusely.  Some concern over shuffling gait and widespread pain.  She is seeing primary care next week.  Anticipate working on increasing mobility and strength but underlying cause not apparent at this point.  Once per week for six weeks.    Please refer to the daily flowsheet for treatment today, total treatment time and time spent performing 1:1 timed codes.

## 2021-12-14 ENCOUNTER — OFFICE VISIT (OUTPATIENT)
Dept: FAMILY MEDICINE | Facility: CLINIC | Age: 71
End: 2021-12-14
Payer: MEDICARE

## 2021-12-14 VITALS
RESPIRATION RATE: 18 BRPM | WEIGHT: 134 LBS | OXYGEN SATURATION: 99 % | HEART RATE: 94 BPM | HEIGHT: 64 IN | SYSTOLIC BLOOD PRESSURE: 134 MMHG | DIASTOLIC BLOOD PRESSURE: 74 MMHG | TEMPERATURE: 97.8 F | BODY MASS INDEX: 22.88 KG/M2

## 2021-12-14 DIAGNOSIS — R79.89 ELEVATED LFTS: ICD-10-CM

## 2021-12-14 DIAGNOSIS — E78.5 HYPERLIPIDEMIA LDL GOAL <160: ICD-10-CM

## 2021-12-14 DIAGNOSIS — I10 HYPERTENSION GOAL BP (BLOOD PRESSURE) < 140/90: ICD-10-CM

## 2021-12-14 DIAGNOSIS — D75.839 THROMBOCYTOSIS: ICD-10-CM

## 2021-12-14 LAB
ERYTHROCYTE [DISTWIDTH] IN BLOOD BY AUTOMATED COUNT: 17.3 % (ref 10–15)
HCT VFR BLD AUTO: 34.9 % (ref 35–47)
HGB BLD-MCNC: 11 G/DL (ref 11.7–15.7)
MCH RBC QN AUTO: 25.1 PG (ref 26.5–33)
MCHC RBC AUTO-ENTMCNC: 31.5 G/DL (ref 31.5–36.5)
MCV RBC AUTO: 80 FL (ref 78–100)
PLATELET # BLD AUTO: 480 10E3/UL (ref 150–450)
RBC # BLD AUTO: 4.38 10E6/UL (ref 3.8–5.2)
WBC # BLD AUTO: 9.3 10E3/UL (ref 4–11)

## 2021-12-14 PROCEDURE — 99213 OFFICE O/P EST LOW 20 MIN: CPT | Performed by: FAMILY MEDICINE

## 2021-12-14 PROCEDURE — 36415 COLL VENOUS BLD VENIPUNCTURE: CPT | Performed by: FAMILY MEDICINE

## 2021-12-14 PROCEDURE — 80053 COMPREHEN METABOLIC PANEL: CPT | Performed by: FAMILY MEDICINE

## 2021-12-14 PROCEDURE — 85027 COMPLETE CBC AUTOMATED: CPT | Performed by: FAMILY MEDICINE

## 2021-12-14 RX ORDER — LOSARTAN POTASSIUM 100 MG/1
100 TABLET ORAL DAILY
Qty: 90 TABLET | Refills: 3 | Status: SHIPPED | OUTPATIENT
Start: 2021-12-14 | End: 2022-11-29

## 2021-12-14 RX ORDER — ROSUVASTATIN CALCIUM 10 MG/1
10 TABLET, COATED ORAL DAILY
Qty: 90 TABLET | Refills: 3 | Status: SHIPPED | OUTPATIENT
Start: 2021-12-14 | End: 2022-11-29

## 2021-12-14 ASSESSMENT — MIFFLIN-ST. JEOR: SCORE: 1094.88

## 2021-12-14 NOTE — PROGRESS NOTES
Assessment & Plan     Hyperlipidemia LDL goal <160  Stable   - rosuvastatin (CRESTOR) 10 MG tablet; Take 1 tablet (10 mg) by mouth daily  - Comprehensive metabolic panel (BMP + Alb, Alk Phos, ALT, AST, Total. Bili, TP); Future  - Comprehensive metabolic panel (BMP + Alb, Alk Phos, ALT, AST, Total. Bili, TP)    Hypertension goal BP (blood pressure) < 140/90  Stable   - losartan (COZAAR) 100 MG tablet; Take 1 tablet (100 mg) by mouth daily    Thrombocytosis  Will check  If High -see hematologist  - CBC with platelets; Future  - CBC with platelets    Elevated LFTs    - Comprehensive metabolic panel (BMP + Alb, Alk Phos, ALT, AST, Total. Bili, TP); Future  - Comprehensive metabolic panel (BMP + Alb, Alk Phos, ALT, AST, Total. Bili, TP)  Repeat 6 months          Return in about 6 months (around 6/14/2022) for Lab Work.    Chuyita Zaidi MD  [facility] MIRIAN Coleman is a 71 year old who presents for the following health issues     History of Present Illness       Hyperlipidemia:  She presents for follow up of hyperlipidemia.  She is taking medication to lower cholesterol. She is not having myalgia or other side effects to statin medications.    Hypertension: She presents for follow up of hypertension.  She does check blood pressure  regularly outside of the clinic. Outpatient blood pressures have not been over 140/90. She follows a low salt diet.     She eats 2-3 servings of fruits and vegetables daily.She consumes 0 sweetened beverage(s) daily.She exercises with enough effort to increase her heart rate 9 or less minutes per day.  She exercises with enough effort to increase her heart rate 3 or less days per week.   She is taking medications regularly.   pt has followed up with hepatologist  Feels Good and doing well  Notes reviewed   No abdominal pain  Appetites is good  Review of Systems   CONSTITUTIONAL: NEGATIVE for fever, chills, change in weight  ENT/MOUTH: NEGATIVE for ear, mouth and  "throat problems  RESP: NEGATIVE for significant cough or SOB  CV: NEGATIVE for chest pain, palpitations or peripheral edema  GI: NEGATIVE for nausea, abdominal pain, heartburn, or change in bowel habits  PSYCHIATRIC: NEGATIVE for changes in mood or affect  ROS otherwise negative      Objective    /74   Pulse 94   Temp 97.8  F (36.6  C) (Oral)   Resp 18   Ht 1.613 m (5' 3.5\")   Wt 60.8 kg (134 lb)   SpO2 99%   BMI 23.36 kg/m    Body mass index is 23.36 kg/m .  Physical Exam   GENERAL: healthy, alert and no distress  NECK: no adenopathy, no asymmetry, masses, or scars and thyroid normal to palpation  RESP: lungs clear to auscultation - no rales, rhonchi or wheezes  CV: regular rate and rhythm, normal S1 S2, no S3 or S4, no murmur, click or rub, no peripheral edema and peripheral pulses strong  ABDOMEN: soft, nontender, no hepatosplenomegaly, no masses and bowel sounds normal  MS: no gross musculoskeletal defects noted, no edema  PSYCH: mentation appears normal, affect normal/bright    Office Visit on 10/14/2021   Component Date Value Ref Range Status     Hepatitis C Antibody 10/14/2021 Nonreactive  Nonreactive Final     Hepatitis B Surface Antigen 10/14/2021 Nonreactive  Nonreactive Final     Bilirubin Total 10/14/2021 0.3  0.2 - 1.3 mg/dL Final     Bilirubin Direct 10/14/2021 0.1  0.0 - 0.2 mg/dL Final     Protein Total 10/14/2021 8.0  6.8 - 8.8 g/dL Final     Albumin 10/14/2021 2.4* 3.4 - 5.0 g/dL Final     Alkaline Phosphatase 10/14/2021 146  40 - 150 U/L Final     AST 10/14/2021 16  0 - 45 U/L Final     ALT 10/14/2021 51* 0 - 50 U/L Final     WBC Count 10/14/2021 11.0  4.0 - 11.0 10e3/uL Final     RBC Count 10/14/2021 3.98  3.80 - 5.20 10e6/uL Final     Hemoglobin 10/14/2021 10.1* 11.7 - 15.7 g/dL Final     Hematocrit 10/14/2021 31.9* 35.0 - 47.0 % Final     MCV 10/14/2021 80  78 - 100 fL Final     MCH 10/14/2021 25.4* 26.5 - 33.0 pg Final     MCHC 10/14/2021 31.7  31.5 - 36.5 g/dL Final     RDW " 10/14/2021 16.0* 10.0 - 15.0 % Final     Platelet Count 10/14/2021 630* 150 - 450 10e3/uL Final     DEVON interpretation 10/14/2021 Borderline Positive* Negative Final      Negative:              <1:40  Borderline Positive:   1:40 - 1:80  Positive:              >1:80     DEVON pattern 1 10/14/2021 Speckled   Final     DEVON titer 1 10/14/2021 1:40   Final

## 2021-12-15 LAB
ALBUMIN SERPL-MCNC: 2.8 G/DL (ref 3.4–5)
ALP SERPL-CCNC: 123 U/L (ref 40–150)
ALT SERPL W P-5'-P-CCNC: 25 U/L (ref 0–50)
ANION GAP SERPL CALCULATED.3IONS-SCNC: 5 MMOL/L (ref 3–14)
AST SERPL W P-5'-P-CCNC: 14 U/L (ref 0–45)
BILIRUB SERPL-MCNC: 0.2 MG/DL (ref 0.2–1.3)
BUN SERPL-MCNC: 20 MG/DL (ref 7–30)
CALCIUM SERPL-MCNC: 9.6 MG/DL (ref 8.5–10.1)
CHLORIDE BLD-SCNC: 100 MMOL/L (ref 94–109)
CO2 SERPL-SCNC: 29 MMOL/L (ref 20–32)
CREAT SERPL-MCNC: 0.41 MG/DL (ref 0.52–1.04)
GFR SERPL CREATININE-BSD FRML MDRD: >90 ML/MIN/1.73M2
GLUCOSE BLD-MCNC: 91 MG/DL (ref 70–99)
POTASSIUM BLD-SCNC: 3.9 MMOL/L (ref 3.4–5.3)
PROT SERPL-MCNC: 7.9 G/DL (ref 6.8–8.8)
SODIUM SERPL-SCNC: 134 MMOL/L (ref 133–144)

## 2021-12-16 LAB — RHEUMATOID FACT SER NEPH-ACNC: 12 IU/ML

## 2022-01-21 ENCOUNTER — PRE VISIT (OUTPATIENT)
Dept: GASTROENTEROLOGY | Facility: CLINIC | Age: 72
End: 2022-01-21

## 2022-02-07 ENCOUNTER — TRANSFERRED RECORDS (OUTPATIENT)
Dept: HEALTH INFORMATION MANAGEMENT | Facility: CLINIC | Age: 72
End: 2022-02-07

## 2022-02-10 PROBLEM — M47.816 LUMBAR FACET ARTHROPATHY: Status: RESOLVED | Noted: 2021-07-09 | Resolved: 2022-02-10

## 2022-03-14 PROBLEM — M25.561 RIGHT KNEE PAIN: Status: RESOLVED | Noted: 2021-04-17 | Resolved: 2022-03-14

## 2022-03-14 NOTE — PROGRESS NOTES
Patient did not return for further treatment and no additional progress was noted.  Please refer to the progress note and goal flowsheet completed on 09/17/21 for discharge information.

## 2022-05-31 ENCOUNTER — TRANSFERRED RECORDS (OUTPATIENT)
Dept: HEALTH INFORMATION MANAGEMENT | Facility: CLINIC | Age: 72
End: 2022-05-31

## 2022-06-06 ENCOUNTER — TRANSFERRED RECORDS (OUTPATIENT)
Dept: HEALTH INFORMATION MANAGEMENT | Facility: CLINIC | Age: 72
End: 2022-06-06

## 2022-06-20 ENCOUNTER — TRANSFERRED RECORDS (OUTPATIENT)
Dept: HEALTH INFORMATION MANAGEMENT | Facility: CLINIC | Age: 72
End: 2022-06-20

## 2022-08-02 NOTE — TELEPHONE ENCOUNTER
Nexplanon Insertion:    Procedures    Pre Dx: 1) Nexplanon Insertion   Post Dx: 1) Nexplanon Insertion     NDC #: 92762-781-40  Lot #: [id number]  Exp Date: 7/3/2024  BH device  Patient has not had menses c/p MAB  She did have a UPT in office today. The results were Negative.    Risks, benefits, alternatives explained. All questions answered. Consents signed.  Patient placed on examined table in supine position with her Right arm flexed at the elbow and hand resting under her head.  The area for insertion prepped with betadine in a sterile fashion.      The insertion site was identified approximately 8-10 cm proximal to the humoral epicondyle and 3-4 cm below with sulcus of the triceps and biceps muscle. The skin at the insertion site was stretched by my thumb and index finger. The insertion site was anesthetized about 3 mL of 1% lidocaine. Nex, the needle tip was inserted, bevel side up, at an angle not greater than 30° to the skin surface, just until the skin was penetrated to below the bevel. The applicator was then lowered so that it was parallel to the arm. To minimize the chance of deep incision, the skin was tented upwards with the tip of the needle. The needle was then gently inserted to the full length and the device was fixed in place. I then slowly and carefully retracted the needle back by retracting the release lever. The obturator was seen in the device to determine that the nexplanon had been released.     Both the patient and I were easily able to feel the device under the skin. Steri-Strips were applied at the site, and the arm was gently wrapped with gauze.    There were no complications.   The patient tolerated the procedure well.    She was given a reminder card. She was advised to use condoms as a backup method for at least a week after insertion.     She understands that the device terms in three years.     Expectations, warning signs and limitations reviewed.     Amberly Cruz,  From: [place] Byers      Created: 6/30/2021 5:37 AM        I did call to set up an appointment.  I was told there were no openings until July 19 and that was with you.  When I asked about seeing a partner I was told that there were only two of you and the partner was more booked.  I asked about a different location was told [facility] or [facility] still July 19 the earliest.  Is there any other options for me?  If not is there anything I could take to help from being so crippled and painful to move until that time?  I have been taking Tylenol and i m not sure that s good for me to just keep taking and it doesn t seem to be helping that much. Been using ice packs and heating pad.  Thank you in advance [place] Byers           MA  08/02/2022

## 2022-08-03 ENCOUNTER — TRANSFERRED RECORDS (OUTPATIENT)
Dept: HEALTH INFORMATION MANAGEMENT | Facility: CLINIC | Age: 72
End: 2022-08-03

## 2022-09-14 ASSESSMENT — PATIENT HEALTH QUESTIONNAIRE - PHQ9: SUM OF ALL RESPONSES TO PHQ QUESTIONS 1-9: 14

## 2022-09-21 ENCOUNTER — OFFICE VISIT (OUTPATIENT)
Dept: FAMILY MEDICINE | Facility: CLINIC | Age: 72
End: 2022-09-21
Payer: MEDICARE

## 2022-09-21 VITALS
SYSTOLIC BLOOD PRESSURE: 126 MMHG | HEIGHT: 63 IN | BODY MASS INDEX: 24.91 KG/M2 | OXYGEN SATURATION: 97 % | TEMPERATURE: 97.5 F | HEART RATE: 93 BPM | DIASTOLIC BLOOD PRESSURE: 70 MMHG | WEIGHT: 140.6 LBS

## 2022-09-21 DIAGNOSIS — Z85.3 PERSONAL HISTORY OF MALIGNANT NEOPLASM OF BREAST: ICD-10-CM

## 2022-09-21 DIAGNOSIS — M81.0 OSTEOPOROSIS, UNSPECIFIED OSTEOPOROSIS TYPE, UNSPECIFIED PATHOLOGICAL FRACTURE PRESENCE: ICD-10-CM

## 2022-09-21 DIAGNOSIS — E78.5 HYPERLIPIDEMIA LDL GOAL <160: ICD-10-CM

## 2022-09-21 DIAGNOSIS — I10 HYPERTENSION GOAL BP (BLOOD PRESSURE) < 140/90: ICD-10-CM

## 2022-09-21 DIAGNOSIS — Z23 HIGH PRIORITY FOR 2019-NCOV VACCINE: ICD-10-CM

## 2022-09-21 DIAGNOSIS — D64.9 ANEMIA, UNSPECIFIED TYPE: ICD-10-CM

## 2022-09-21 DIAGNOSIS — Z23 NEED FOR PROPHYLACTIC VACCINATION AND INOCULATION AGAINST INFLUENZA: ICD-10-CM

## 2022-09-21 DIAGNOSIS — Z00.00 ENCOUNTER FOR MEDICARE ANNUAL WELLNESS EXAM: ICD-10-CM

## 2022-09-21 DIAGNOSIS — Z90.12 STATUS POST LEFT MASTECTOMY: ICD-10-CM

## 2022-09-21 DIAGNOSIS — M51.369 DDD (DEGENERATIVE DISC DISEASE), LUMBAR: ICD-10-CM

## 2022-09-21 DIAGNOSIS — Z12.31 VISIT FOR SCREENING MAMMOGRAM: ICD-10-CM

## 2022-09-21 DIAGNOSIS — Z83.3 FAMILY HISTORY OF DIABETES MELLITUS: ICD-10-CM

## 2022-09-21 PROBLEM — R53.83 FATIGUE, UNSPECIFIED TYPE: Status: RESOLVED | Noted: 2021-09-20 | Resolved: 2022-09-21

## 2022-09-21 PROBLEM — R63.4 WEIGHT LOSS: Status: RESOLVED | Noted: 2021-09-20 | Resolved: 2022-09-21

## 2022-09-21 LAB
ALBUMIN UR-MCNC: NEGATIVE MG/DL
APPEARANCE UR: CLEAR
BILIRUB UR QL STRIP: ABNORMAL
COLOR UR AUTO: YELLOW
ERYTHROCYTE [DISTWIDTH] IN BLOOD BY AUTOMATED COUNT: 14.8 % (ref 10–15)
GLUCOSE UR STRIP-MCNC: NEGATIVE MG/DL
HCT VFR BLD AUTO: 39.8 % (ref 35–47)
HGB BLD-MCNC: 13.2 G/DL (ref 11.7–15.7)
HGB UR QL STRIP: ABNORMAL
KETONES UR STRIP-MCNC: 15 MG/DL
LEUKOCYTE ESTERASE UR QL STRIP: NEGATIVE
MCH RBC QN AUTO: 27.9 PG (ref 26.5–33)
MCHC RBC AUTO-ENTMCNC: 33.2 G/DL (ref 31.5–36.5)
MCV RBC AUTO: 84 FL (ref 78–100)
NITRATE UR QL: NEGATIVE
PH UR STRIP: 5.5 [PH] (ref 5–7)
PLATELET # BLD AUTO: 391 10E3/UL (ref 150–450)
RBC # BLD AUTO: 4.73 10E6/UL (ref 3.8–5.2)
RBC #/AREA URNS AUTO: ABNORMAL /HPF
SP GR UR STRIP: 1.02 (ref 1–1.03)
SQUAMOUS #/AREA URNS AUTO: ABNORMAL /LPF
TOTAL PROTEIN SERUM FOR ELP: 7.4 G/DL (ref 6.4–8.3)
UROBILINOGEN UR STRIP-ACNC: 0.2 E.U./DL
WBC # BLD AUTO: 11.4 10E3/UL (ref 4–11)
WBC #/AREA URNS AUTO: ABNORMAL /HPF

## 2022-09-21 PROCEDURE — G0008 ADMIN INFLUENZA VIRUS VAC: HCPCS | Performed by: FAMILY MEDICINE

## 2022-09-21 PROCEDURE — 90662 IIV NO PRSV INCREASED AG IM: CPT | Performed by: FAMILY MEDICINE

## 2022-09-21 PROCEDURE — 81001 URINALYSIS AUTO W/SCOPE: CPT | Performed by: FAMILY MEDICINE

## 2022-09-21 PROCEDURE — 91312 COVID-19,PF,PFIZER BOOSTER BIVALENT: CPT | Performed by: FAMILY MEDICINE

## 2022-09-21 PROCEDURE — 84165 PROTEIN E-PHORESIS SERUM: CPT | Performed by: PATHOLOGY

## 2022-09-21 PROCEDURE — 36415 COLL VENOUS BLD VENIPUNCTURE: CPT | Performed by: FAMILY MEDICINE

## 2022-09-21 PROCEDURE — 84155 ASSAY OF PROTEIN SERUM: CPT | Mod: 59 | Performed by: FAMILY MEDICINE

## 2022-09-21 PROCEDURE — G0439 PPPS, SUBSEQ VISIT: HCPCS | Performed by: FAMILY MEDICINE

## 2022-09-21 PROCEDURE — 0124A COVID-19,PF,PFIZER BOOSTER BIVALENT: CPT | Performed by: FAMILY MEDICINE

## 2022-09-21 PROCEDURE — 99214 OFFICE O/P EST MOD 30 MIN: CPT | Mod: 25 | Performed by: FAMILY MEDICINE

## 2022-09-21 PROCEDURE — 85027 COMPLETE CBC AUTOMATED: CPT | Performed by: FAMILY MEDICINE

## 2022-09-21 PROCEDURE — 80053 COMPREHEN METABOLIC PANEL: CPT | Performed by: FAMILY MEDICINE

## 2022-09-21 RX ORDER — TRAMADOL HYDROCHLORIDE 50 MG/1
50 TABLET ORAL EVERY 6 HOURS PRN
Qty: 25 TABLET | Refills: 0 | Status: SHIPPED | OUTPATIENT
Start: 2022-09-21 | End: 2022-09-24

## 2022-09-21 RX ORDER — DORZOLAMIDE HYDROCHLORIDE AND TIMOLOL MALEATE 20; 5 MG/ML; MG/ML
1 SOLUTION/ DROPS OPHTHALMIC 2 TIMES DAILY
COMMUNITY
Start: 2022-09-02

## 2022-09-21 RX ORDER — GABAPENTIN 100 MG/1
100 CAPSULE ORAL 3 TIMES DAILY
Qty: 90 CAPSULE | Refills: 0 | Status: SHIPPED | OUTPATIENT
Start: 2022-09-21 | End: 2022-10-18

## 2022-09-21 ASSESSMENT — ENCOUNTER SYMPTOMS
PARESTHESIAS: 0
DIARRHEA: 0
MYALGIAS: 1
SHORTNESS OF BREATH: 0
ABDOMINAL PAIN: 0
JOINT SWELLING: 1
HEMATOCHEZIA: 0
FREQUENCY: 0
CHILLS: 0
COUGH: 0
HEARTBURN: 0
ARTHRALGIAS: 1
EYE PAIN: 0
DYSURIA: 0
WEAKNESS: 0
HEMATURIA: 0
CONSTIPATION: 0
PALPITATIONS: 0
HEADACHES: 0
WEAKNESS: 1
FEVER: 0
SORE THROAT: 0
NAUSEA: 0
DIZZINESS: 0
NERVOUS/ANXIOUS: 0

## 2022-09-21 ASSESSMENT — ACTIVITIES OF DAILY LIVING (ADL)
CURRENT_FUNCTION: LAUNDRY REQUIRES ASSISTANCE
CURRENT_FUNCTION: TRANSPORTATION REQUIRES ASSISTANCE
CURRENT_FUNCTION: HOUSEWORK REQUIRES ASSISTANCE

## 2022-09-21 ASSESSMENT — PATIENT HEALTH QUESTIONNAIRE - PHQ9
10. IF YOU CHECKED OFF ANY PROBLEMS, HOW DIFFICULT HAVE THESE PROBLEMS MADE IT FOR YOU TO DO YOUR WORK, TAKE CARE OF THINGS AT HOME, OR GET ALONG WITH OTHER PEOPLE: SOMEWHAT DIFFICULT
SUM OF ALL RESPONSES TO PHQ QUESTIONS 1-9: 14

## 2022-09-21 ASSESSMENT — PAIN SCALES - GENERAL: PAINLEVEL: NO PAIN (0)

## 2022-09-21 NOTE — PROGRESS NOTES
"SUBJECTIVE:   [place] is a 71 year old who presents for Preventive Visit.    Answers for HPI/ROS submitted by the patient on 9/21/2022  If you checked off any problems, how difficult have these problems made it for you to do your work, take care of things at home, or get along with other people?: Somewhat difficult  PHQ9 TOTAL SCORE: 14    Due to her back pain  Pt says she does not feel depressed but her back pain Limits her activitie      Patient has been advised of split billing requirements and indicates understanding: Yes  Are you in the first 12 months of your Medicare coverage?  No    Healthy Habits:     In general, how would you rate your overall health?  Poor    Frequency of exercise:  2-3 days/week    Duration of exercise:  30-45 minutes    Do you usually eat at least 4 servings of fruit and vegetables a day, include whole grains    & fiber and avoid regularly eating high fat or \"junk\" foods?  Yes    Ability to successfully perform activities of daily living:  Transportation requires assistance, housework requires assistance and laundry requires assistance    Home Safety:  No safety concerns identified    Hearing Impairment:  No hearing concerns    In the past 6 months, have you been bothered by leaking of urine? Yes    In general, how would you rate your overall mental or emotional health?  Fair      PHQ-2 Total Score: 2    Additional concerns today:  Yes (1. form FMLA paper work. 2 disablilty park. 3. back pain PT is not helping. )    Do you feel safe in your environment? Yes    Have you ever done Advance Care Planning? (For example, a Health Directive, POLST, or a discussion with a medical provider or your loved ones about your wishes): Yes, advance care planning is on file.       Fall risk  Fallen 2 or more times in the past year?: No  Any fall with injury in the past year?: No    Cognitive Screening   1) Repeat 3 items (Leader, Season, Table)    2) Clock draw: NORMAL  3) 3 item recall: Recalls 3 " objects  Results: 3 items recalled: COGNITIVE IMPAIRMENT LESS LIKELY    Mini-CogTM Copyright MARIA LUZ Mcginnis. Licensed by the author for use in [facility]; reprinted with permission (vinh@.[email or web address]). All rights reserved.      Do you have sleep apnea, excessive snoring or daytime drowsiness?: no    Reviewed and updated as needed this visit by clinical staff   Tobacco  Allergies  Meds  Problems  Med Hx  Surg Hx  Fam Hx            Reviewed and updated as needed this visit by Provider   Tobacco  Allergies  Meds  Problems  Med Hx  Surg Hx  Fam Hx           Social History     Tobacco Use     Smoking status: Former Smoker     Packs/day: 1.00     Years: 17.00     Pack years: 17.00     Types: Cigarettes     Quit date: 3/15/1999     Years since quittin.5     Smokeless tobacco: Never Used   Substance Use Topics     Alcohol use: Yes     Alcohol/week: 4.0 standard drinks     Types: 4 Glasses of wine per week     Comment: occasional     Pt has chronic back pain  With some pain Right leg  Saw Spine specialist  Notes reviewed   Pertinent negatives include no fever, no numbness, no abdominal pain, no abdominal swelling, no bowel incontinence, no perianal numbness, no bladder incontinence, no dysuria, no leg pain, no paresthesias, no paresis, no tingling and no weakness.        Alcohol Use 2022   Prescreen: >3 drinks/day or >7 drinks/week? No   Prescreen: >3 drinks/day or >7 drinks/week? -           Hyperlipidemia Follow-Up      Are you regularly taking any medication or supplement to lower your cholesterol?   Yes- statin    Are you having muscle aches or other side effects that you think could be caused by your cholesterol lowering medication?  No    Hypertension Follow-up      Do you check your blood pressure regularly outside of the clinic? No     Are you following a low salt diet? Yes    Are your blood pressures ever more than 140 on the top number (systolic) OR more   than 90 on the bottom number  (diastolic), for example 140/90? No    Wants handicap forms  Current providers sharing in care for this patient include:   Patient Care Team:  Chuyita Zaidi MD as PCP - General (Family Practice)  Chuyita Zaidi MD as Assigned PCP  Jose Alexander MD as Assigned Neuroscience Provider  Jim Espinosa MD as Assigned Surgical Provider  Marlena Rogers PA-C as Physician Assistant (Gastroenterology)  Wayne Cisse MD as Assigned Gastroenterology Provider    The following health maintenance items are reviewed in Epic and correct as of today:  Health Maintenance   Topic Date Due     ZOSTER IMMUNIZATION (3 of 3) 09/19/2022     MAMMO SCREENING  10/18/2022     BMP  12/14/2022     PHQ-9  03/21/2023     MEDICARE ANNUAL WELLNESS VISIT  09/21/2023     ANNUAL REVIEW OF HM ORDERS  09/21/2023     FALL RISK ASSESSMENT  09/21/2023     DTAP/TDAP/TD IMMUNIZATION (4 - Td or Tdap) 03/28/2026     LIPID  08/12/2026     DEXA  03/19/2027     ADVANCE CARE PLANNING  09/21/2027     COLORECTAL CANCER SCREENING  10/04/2031     HEPATITIS C SCREENING  Completed     INFLUENZA VACCINE  Completed     Pneumococcal Vaccine: 65+ Years  Completed     COVID-19 Vaccine  Completed     IPV IMMUNIZATION  Aged Out     MENINGITIS IMMUNIZATION  Aged Out     HEPATITIS B IMMUNIZATION  Aged Out     Lab work is in process  Labs reviewed in EPIC  BP Readings from Last 3 Encounters:   09/21/22 126/70   12/14/21 134/74   10/28/21 (!) 142/72    Wt Readings from Last 3 Encounters:   09/21/22 63.8 kg (140 lb 9.6 oz)   12/14/21 60.8 kg (134 lb)   10/28/21 60.8 kg (134 lb)                  Patient Active Problem List   Diagnosis     Osteoporosis     Atopic rhinitis     Hyperlipidemia LDL goal <160     S/P mastectomy     Advanced directives, counseling/discussion     Osteoarthritis     Family history of diabetes mellitus     Glaucoma     Personal history of malignant neoplasm of breast     Hypertension goal BP (blood pressure) < 140/90     Lumbago      Pain in both lower legs     Spinal stenosis of lumbar region, unspecified whether neurogenic claudication present     DDD (degenerative disc disease), lumbar     Anemia, unspecified type     Dry mouth     Past Surgical History:   Procedure Laterality Date     C MASTECTOMY,SIMPLE  3/2000    left     COLONOSCOPY      Q 10 years     COLONOSCOPY N/A 10/4/2021    Procedure: COLONOSCOPY;  Surgeon: Guru Lyla Ruby MD;  Location: [facility] OR     ESOPHAGOSCOPY, GASTROSCOPY, DUODENOSCOPY (EGD), COMBINED N/A 10/4/2021    Procedure: ENDOSCOPIC ULTRASOUND, ESOPHAGOSCOPY / UPPER GASTROINTESTINAL TRACT (GI), Esophagogastroduodenoscopy,  Fine Needle Biopsy of liver;  Surgeon: Guru Lyla Ruby MD;  Location: [facility] OR     SURGICAL HISTORY OF -  Left 2015    tissue expander placed in left breast area     SURGICAL HISTORY OF -  Left 2016    left breast implant and right mammoplasty       Social History     Tobacco Use     Smoking status: Former Smoker     Packs/day: 1.00     Years: 17.00     Pack years: 17.00     Types: Cigarettes     Quit date: 3/15/1999     Years since quittin.5     Smokeless tobacco: Never Used   Substance Use Topics     Alcohol use: Yes     Alcohol/week: 4.0 standard drinks     Types: 4 Glasses of wine per week     Comment: occasional     Family History   Problem Relation Age of Onset     Cancer Mother         bone cancer     Other Cancer Mother      Heart Disease Father      Coronary Artery Disease Father      Arthritis Sister      Breast Cancer Sister         age 75     Diabetes Maternal Grandmother      Diabetes Paternal Grandmother          Current Outpatient Medications   Medication Sig Dispense Refill     dorzolamide-timolol (COSOPT) 2-0.5 % ophthalmic solution Place 1 drop into both eyes 2 times daily       gabapentin (NEURONTIN) 100 MG capsule Take 1 capsule (100 mg) by mouth 3 times daily 1  Capsule daily x 1 week, 2 capsules daily x-2nd week, and then 3  capsules daily  After this 90 capsule 0     latanoprost (XALATAN) 0.005 % ophthalmic solution Place 1 drop into both eyes daily        losartan (COZAAR) 100 MG tablet Take 1 tablet (100 mg) by mouth daily 90 tablet 3     Multiple Vitamins-Minerals (ICAPS AREDS 2 PO) Take by mouth 2 times daily       rosuvastatin (CRESTOR) 10 MG tablet Take 1 tablet (10 mg) by mouth daily 90 tablet 3     traMADol (ULTRAM) 50 MG tablet Take 1 tablet (50 mg) by mouth every 6 hours as needed for severe pain (7-10) 25 tablet 0     multivitamin w/minerals (MULTI-VITAMIN) tablet Take 1 tablet by mouth daily       timolol (TIMOPTIC) 0.25 % ophthalmic solution Place 1 drop into both eyes daily  5     Allergies   Allergen Reactions     Compazine      Mental confusion     Hydrochlorothiazide      Dryness mouth     Prochlorperazine Visual Disturbance     Simvastatin Other (See Comments)     Muscle aches     Pt is due for mammogram        Review of Systems   Constitutional: Negative for chills and fever.   HENT: Negative for congestion, ear pain, hearing loss and sore throat.    Eyes: Negative for pain and visual disturbance.   Respiratory: Negative for cough and shortness of breath.    Cardiovascular: Negative for chest pain, palpitations and peripheral edema.   Gastrointestinal: Negative for abdominal pain, constipation, diarrhea, heartburn, hematochezia and nausea.   Breasts:  Negative for tenderness and discharge.   Genitourinary: Negative for dysuria, frequency, genital sores, hematuria, urgency and vaginal bleeding.   Musculoskeletal: Positive for arthralgias, joint swelling and myalgias.   Skin: Negative for rash.   Neurological: Negative for dizziness, weakness, headaches and paresthesias.   Psychiatric/Behavioral: Negative for mood changes. The patient is not nervous/anxious.      CONSTITUTIONAL: NEGATIVE for fever, chills, change in weight  INTEGUMENTARY/SKIN: NEGATIVE for worrisome rashes, moles or lesions  EYES: NEGATIVE for vision  "changes or irritation  ENT/MOUTH: NEGATIVE for ear, mouth and throat problems  RESP: NEGATIVE for significant cough or SOB  BREAST: NEGATIVE for masses, tenderness or discharge  CV: NEGATIVE for chest pain, palpitations or peripheral edema  GI: NEGATIVE for nausea, abdominal pain, heartburn, or change in bowel habits  : NEGATIVE for frequency, dysuria, or hematuria  MUSCULOSKELETAL:as above  NEURO: NEGATIVE for weakness, dizziness or paresthesias  ENDOCRINE: NEGATIVE for temperature intolerance, skin/hair changes  HEME: NEGATIVE for bleeding problems  PSYCHIATRIC: NEGATIVE for changes in mood or affect  Rest of the ROS is Negative except see above and Problem list [stable]    OBJECTIVE:   /70   Pulse 93   Temp 97.5  F (36.4  C) (Temporal)   Ht 1.598 m (5' 2.91\")   Wt 63.8 kg (140 lb 9.6 oz)   SpO2 97%   BMI 24.98 kg/m   Estimated body mass index is 24.98 kg/m  as calculated from the following:    Height as of this encounter: 1.598 m (5' 2.91\").    Weight as of this encounter: 63.8 kg (140 lb 9.6 oz).  Physical Exam  GENERAL APPEARANCE: healthy, alert and no distress  EYES: Eyes grossly normal to inspection, PERRL and conjunctivae and sclerae normal  HENT: ear canals and TM's normal, nose and mouth without ulcers or lesions, oropharynx clear and oral mucous membranes moist  NECK: no adenopathy, no asymmetry, masses, or scars and thyroid normal to palpation  RESP: lungs clear to auscultation - no rales, rhonchi or wheezes  BREAST: normal without masses, tenderness or nipple discharge and no palpable axillary masses or adenopathy Right  Left mastectomy-has Implant  CV: regular rate and rhythm, normal S1 S2, no S3 or S4, no murmur, click or rub, no peripheral edema and peripheral pulses strong  ABDOMEN: soft, nontender, no hepatosplenomegaly, no masses and bowel sounds normal  MS: no musculoskeletal defects are noted and gait is age appropriate without ataxia  MS: has pain Lower back when asked to lie " down  SKIN: no suspicious lesions or rashes  NEURO: Normal strength and tone, sensory exam grossly normal, mentation intact and speech normal  PSYCH: mentation appears normal and affect normal/bright    Diagnostic Test Results:  Labs reviewed in Epic    ASSESSMENT / PLAN:   (Z00.00) Encounter for Medicare annual wellness exam  Comment:   Plan: CBC with platelets            (M51.36) DDD (degenerative disc disease), lumbar  Comment: refer pain  Pt yohana not do wwell with epidural  Has done PT  Spine specialist Notes reviewed   Advised gabapentin   SEE [facility] care orders  The potential side effects of this medication have been discussed with the patient.  Call if any significant problems with these are experienced.    Plan: gabapentin (NEURONTIN) 100 MG capsule, Pain         Management  Referral, traMADol         (ULTRAM) 50 MG tablet, Protein electrophoresis,        UA Macro with Reflex to Micro and Culture - lab        collect, Urine Microscopic            (E78.5) Hyperlipidemia LDL goal <160  Comment: pending   Plan: Comprehensive metabolic panel (BMP + Alb, Alk         Phos, ALT, AST, Total. Bili, TP)            (I10) Hypertension goal BP (blood pressure) < 140/90  Comment: stable   Plan: Comprehensive metabolic panel (BMP + Alb, Alk         Phos, ALT, AST, Total. Bili, TP)            (M81.0) Osteoporosis, unspecified osteoporosis type, unspecified pathological fracture presence  Comment: advised meds  Advised Dexa  Follow up after Dexa  Plan: DX Hip/Pelvis/Spine        T  You should be taking 6668-8423 mg of calcium with vit D.  You should be exercising regularly.  See me in the office to discuss the merits of  Taking meds for this.      (Z12.31) Visit for screening mammogram  Comment: advised   Plan: MA SCREENING DIGITAL BILAT - Future  (s+30)            (Z23) Need for prophylactic vaccination and inoculation against influenza  Comment: advised   Plan: INFLUENZA, QUAD, HIGH DOSE, PF, 65YR + (FLUZONE         "HD)            (Z23) High priority for 2019-nCoV vaccine  Comment:   Plan: COVID-19,PF,PFIZER BOOSTER BIVALENT 12+Yrs            (D64.9) Anemia, unspecified type  Comment: pending labs  Plan:     (Z90.12) Status post left mastectomy  Comment:   Plan:     (Z83.3) Family history of diabetes mellitus  Comment: pending   Plan:     (Z85.3) Personal history of malignant neoplasm of breast  Comment: doing well  Plan:   Reviewed preventive health counseling, as reflected in patient instructions       Regular exercise       Healthy diet/nutrition       Vision screening       Hearing screening       Dental care       Bladder control       Fall risk prevention       Osteoporosis prevention/bone health       Colon cancer screening       The 10-year ASCVD risk score (Yamile CROSS Jr., et al., 2013) is: 14.5%    Values used to calculate the score:      Age: 71 years      Sex: Female      Is Non- : No      Diabetic: No      Tobacco smoker: No      Systolic Blood Pressure: 126 mmHg      Is BP treated: Yes      HDL Cholesterol: 38 mg/dL      Total Cholesterol: 138 mg/dL       Advanced Planning     Estimated body mass index is 24.98 kg/m  as calculated from the following:    Height as of this encounter: 1.598 m (5' 2.91\").    Weight as of this encounter: 63.8 kg (140 lb 9.6 oz).        She reports that she quit smoking about 23 years ago. Her smoking use included cigarettes. She has a 17.00 pack-year smoking history. She has never used smokeless tobacco.      Appropriate preventive services were discussed with this patient, including applicable screening as appropriate for cardiovascular disease, diabetes, osteopenia/osteoporosis, and glaucoma.  As appropriate for age/gender, discussed screening for colorectal cancer, prostate cancer, breast cancer, and cervical cancer. Checklist reviewing preventive services available has been given to the patient.    Reviewed patients plan of care and provided an AVS. The " Intermediate Care Plan ( asthma action plan, low back pain action plan, and migraine action plan) for [place] meets the Care Plan requirement. This Care Plan has been established and reviewed with the Patient.    Counseling Resources:  ATP IV Guidelines  Pooled Cohorts Equation Calculator  Breast Cancer Risk Calculator  Breast Cancer: Medication to Reduce Risk  FRAX Risk Assessment  ICSI Preventive Guidelines  Dietary Guidelines for Americans, 2010  [employer] MyPlate  ASA Prophylaxis  Lung CA Screening    Chuyita Zaidi MD  [facility]    Identified Health Risks:

## 2022-09-21 NOTE — PROGRESS NOTES
"    The patient was provided with suggestions to help her develop a healthy physical lifestyle.  The patient reports that she has difficulty with activities of daily living. I have asked that the patient make a follow up appointment in 4  weeks where this issue will be further evaluated and addressed.  Information on urinary incontinence and treatment options given to patient.  The patient was provided with suggestions to help her develop a healthy emotional lifestyle.  The patient s PHQ-9 score is consistent with moderate depression. She was provided with information regarding depression and was advised to schedule a follow up appointment in 12 weeks to further address this issue.  Answers for HPI/ROS submitted by the patient on 9/21/2022  If you checked off any problems, how difficult have these problems made it for you to do your work, take care of things at home, or get along with other people?: Somewhat difficult  PHQ9 TOTAL SCORE: 14  In general, how would you rate your overall physical health?: poor  Frequency of exercise:: 2-3 days/week  Do you usually eat at least 4 servings of fruit and vegetables a day, include whole grains & fiber, and avoid regularly eating high fat or \"junk\" foods? : Yes  Activities of Daily Living: transportation requires assistance, housework requires assistance, laundry requires assistance  Home safety: no safety concerns identified  Hearing Impairment:: no hearing concerns  In the past 6 months, have you been bothered by leaking of urine?: Yes  abdominal pain: No  Blood in stool: No  Blood in urine: No  chest pain: No  chills: No  congestion: No  constipation: No  cough: No  diarrhea: No  dizziness: No  ear pain: No  eye pain: No  nervous/anxious: No  fever: No  frequency: No  genital sores: No  headaches: No  hearing loss: No  heartburn: No  arthralgias: Yes  joint swelling: Yes  peripheral edema: Yes  mood changes: No  myalgias: Yes  nausea: No  dysuria: No  palpitations: " No  Skin sensation changes: No  sore throat: No  urgency: No  rash: No  shortness of breath: No  visual disturbance: No  weakness: Yes  vaginal bleeding: No  tenderness: No  breast discharge: No  In general, how would you rate your overall mental or emotional health?: fair  Duration of exercise:: 30-45 minutes

## 2022-09-21 NOTE — PATIENT INSTRUCTIONS
Patient Education   Personalized Prevention Plan  You are due for the preventive services outlined below.  Your care team is available to assist you in scheduling these services.  If you have already completed any of these items, please share that information with your care team to update in your medical record.  Health Maintenance Due   Topic Date Due     ANNUAL REVIEW OF HM ORDERS  08/12/2022     Zoster (Shingles) Vaccine (3 of 3) 09/19/2022     Mammogram  10/18/2022     Your Health Risk Assessment indicates you feel you are not in good health    A healthy lifestyle helps keep the body fit and the mind alert. It helps protect you from disease, helps you fight disease, and helps prevent chronic disease (disease that doesn't go away) from getting worse. This is important as you get older and begin to notice twinges in muscles and joints and a decline in the strength and stamina you once took for granted. A healthy lifestyle includes good healthcare, good nutrition, weight control, recreation, and regular exercise. Avoid harmful substances and do what you can to keep safe. Another part of a healthy lifestyle is stay mentally active and socially involved.    Good healthcare     Have a wellness visit every year.     If you have new symptoms, let us know right away. Don't wait until the next checkup.     Take medicines exactly as prescribed and keep your medicines in a safe place. Tell us if your medicine causes problems.   Healthy diet and weight control     Eat 3 or 4 small, nutritious, low-fat, high-fiber meals a day. Include a variety of fruits, vegetables, and whole-grain foods.     Make sure you get enough calcium in your diet. Calcium, vitamin D, and exercise help prevent osteoporosis (bone thinning).     If you live alone, try eating with others when you can. That way you get a good meal and have company while you eat it.     Try to keep a healthy weight. If you eat more calories than your body uses for  energy, it will be stored as fat and you will gain weight.     Recreation   Recreation is not limited to sports and team events. It includes any activity that provides relaxation, interest, enjoyment, and exercise. Recreation provides an outlet for physical, mental, and social energy. It can give a sense of worth and achievement. It can help you stay healthy.    Mental Exercise and Social Involvement  Mental and emotional health is as important as physical health. Keep in touch with friends and family. Stay as active as possible. Continue to learn and challenge yourself.   Things you can do to stay mentally active are:    Learn something new, like a foreign language or musical instrument.     Play SCRABBLE or do crossword puzzles. If you cannot find people to play these games with you at home, you can play them with others on your computer through the Internet.     Join a games club--anything from card games to chess or checkers or lawn bowling.     Start a new hobby.     Go back to school.     Volunteer.     Read.   Keep up with world events.  Activities of Daily Living    Your Health Risk Assessment indicates you have difficulties with activities of daily living such as housework, bathing, preparing meals, taking medication, etc. Please make a follow up appointment for us to address this issue in more detail.    Urinary Incontinence, Female (Adult)   Urinary incontinence means loss of bladder control. This problem affects many women, especially as they get older. If you have incontinence, you may be embarrassed to ask for help. But know that this problem can be treated.   Types of Incontinence  There are different types of incontinence. Two of the main types are described here. You can have more than one type.     Stress incontinence. With this type, urine leaks when pressure (stress) is put on the bladder. This may happen when you cough, sneeze, or laugh. Stress incontinence most often occurs because the pelvic  floor muscles that support the bladder and urethra are weak. This can happen after pregnancy and vaginal childbirth or a hysterectomy. It can also be due to excess body weight or hormone changes.    Urge incontinence (also called overactive bladder). With this type, a sudden urge to urinate is felt often. This may happen even though there may not be much urine in the bladder. The need to urinate often during the night is common. Urge incontinence most often occurs because of bladder spasms. This may be due to bladder irritation or infection. Damage to bladder nerves or pelvic muscles, constipation, and certain medicines can also lead to urge incontinence.  Treatment depends on the cause. Further evaluation is needed to find the type you have. This will likely include an exam and certain tests. Based on the results, you and your healthcare provider can then plan treatment. Until a diagnosis is made, the home care tips below can help ease symptoms.   Home care    Do pelvic floor muscle exercises, if they are prescribed. The pelvic floor muscles help support the bladder and urethra. Many women find that their symptoms improve when doing special exercises that strengthen these muscles. To do the exercises, contract the muscles you would use to stop your stream of urine. But do this when you re not urinating. Hold for 10 seconds, then relax. Repeat 10 to 20 times in a row, at least 3 times a day. Your healthcare provider may give you other instructions for how to do the exercises and how often.    Keep a bladder diary. This helps track how often and how much you urinate over a set period of time. Bring this diary with you to your next visit with the provider. The information can help your provider learn more about your bladder problem.    Lose weight, if advised to by your provider. Extra weight puts pressure on the bladder. Your provider can help you create a weight-loss plan that s right for you. This may include  exercising more and making certain diet changes.    Don't have foods and drinks that may irritate the bladder. These can include alcohol and caffeinated drinks.    Quit smoking. Smoking and other tobacco use can lead to a long-term (chronic) cough that strains the pelvic floor muscles. Smoking may also damage the bladder and urethra. Talk with your provider about treatments or methods you can use to quit smoking.    If drinking large amounts of fluid makes you have symptoms, you may be advised to limit your fluid intake. You may also be advised to drink most of your fluids during the day and to limit fluids at night.    If you re worried about urine leakage or accidents, you may wear absorbent pads to catch urine. Change the pads often. This helps reduce discomfort. It may also reduce the risk of skin or bladder infections.    Follow-up care  Follow up with your healthcare provider, or as directed. It may take some to find the right treatment for your problem. But healthy lifestyle changes can be made right away. These include such things as exercising on a regular basis, eating a healthy diet, losing weight (if needed), and quitting smoking. Your treatment plan may include special therapies or medicines. Certain procedures or surgery may also be options. Talk about any questions you have with your provider.   When to seek medical advice  Call the healthcare provider right away if any of these occur:    Fever of 100.4 F (38 C) or higher, or as directed by your provider    Bladder pain or fullness    Belly swelling    Nausea or vomiting    Back pain    Weakness, dizziness, or fainting  [employer] last reviewed this educational content on 1/1/2020 2000-2021 [employer]. All rights reserved. This information is not intended as a substitute for professional medical care. Always follow your healthcare professional's instructions.        Your Health Risk Assessment indicates you feel you are not in good  "emotional health.    Recreation   Recreation is not limited to sports and team events. It includes any activity that provides relaxation, interest, enjoyment, and exercise. Recreation provides an outlet for physical, mental, and social energy. It can give a sense of worth and achievement. It can help you stay healthy.    Mental Exercise and Social Involvement  Mental and emotional health is as important as physical health. Keep in touch with friends and family. Stay as active as possible. Continue to learn and challenge yourself.   Things you can do to stay mentally active are:    Learn something new, like a foreign language or musical instrument.     Play SCRABBLE or do crossword puzzles. If you cannot find people to play these games with you at home, you can play them with others on your computer through the Internet.     Join a games club--anything from card games to chess or checkers or lawn bowling.     Start a new hobby.     Go back to school.     Volunteer.     Read.   Keep up with world events.    Depression and Suicide in Older Adults    Nearly 2 million older Americans have some type of depression. Some of them even take their own lives. Yet depression among older adults is often ignored. Learn the warning signs. You may help spare a loved one needless pain. You may also save a life.   What is depression?  Depression is a common and serious illness that affects the way you think and feel. It is not a normal part of aging, nor is it a sign of weakness, a character flaw, or something you can snap out of. Most people with depression need treatment to get better. The most common symptom is a feeling of deep sadness. People who are depressed also may seem tired and listless. And nothing seems to give them pleasure. It s normal to grieve or be sad sometimes. But sadness lessens or passes with time. Depression rarely goes away or improves on its own. A person with clinical depression can't \"snap out of it.\" Other " symptoms of depression are:     Sleeping more or less than normal    Eating more or less than normal    Having headaches, stomachaches, or other pains that don t go away    Feeling nervous,  empty,  or worthless    Crying a great deal    Thinking or talking about suicide or death    Loss of interest in activities previously enjoyed    Social isolation    Feeling confused or forgetful  What causes it?  The causes of depression aren t fully known. But it is thought to result from a complex blend of these factors:     Biochemistry. Certain chemicals in the brain play a role.    Genes. Depression does run in families.    Life stress. Life stresses can also trigger depression in some people. Older adults often face many stressors, such as death of friends or a spouse, health problems, and financial concerns.    Chronic conditions. This includes conditions such as diabetes, heart disease, or cancer. These can cause symptoms of depression. Medicine side effects can cause changes in thoughts and behaviors.  How you can help  Often, depressed people may not want to ask for help. When they do, they may be ignored. Or, they may receive the wrong treatment. You can help by showing parents and older friends love and support. If they seem depressed, don t lecture the person, ignore the symptoms, or discount the symptoms as a  normal  part of aging -which they are not. Get involved, listen, and show interest and support.   Help them understand that depression is a treatable illness. Tell them you can help them find the right treatment. Offer to go to their healthcare provider's appointment with them for support when the symptoms are discussed. With their approval, contact a local mental health center, social service agency, or hospital about services.   You can be an advocate for him or her at healthcare appointments. Many older adults have chronic illnesses that can cause symptoms of depression. Medicine side effects can change  thoughts and behaviors. You can help make sure that the healthcare provider looks at all of these factors. He or she should refer your family member or friend to a mental healthcare provider when needed. in some cases, untreated depression can lead to a misdiagnosis. A person may be diagnosed with a brain disorder such as dementia. If the healthcare provider does not take the issue of depression seriously, help your family member or friend to find another provider.   Don't be afraid to ask  If you think an older person you care about could be suicidal, ask,  Have you thought about suicide?  Most people will tell you the truth. If they say  yes,  they may already have a plan for how and when they will attempt it. Find out as much as you can. The more detailed the plan, and the easier it is to carry out, the more danger the person is in right now. Tell the person you are there for them and do not want them to harm him or herself. Don't wait to get help for the person. Call the person's healthcare provider, local hospital, or emergency services.   To learn more    National Suicide Prevention Lifeline (crisis hotline) 310-361-FZIC (396-871-0271)    National [employer] of Mental Knmkxa916-092-7207gdd.[facility].nih.gov    National [place] on Mental Ykkobyc086-872-7640ayn.liv.org    Mental Health Bwlxoxy419-870-9943hxm.[facility] [place].org    National Suicide Icnojmj515-OWKUECT (039-251-9271)    Call 911  Never leave the person alone. A person who is actively suicidal needs psychiatric care right away. They will need constant supervision. Never leave the person out of sight. Call 911 or the national 24-hour suicide crisis hotline at 852-943-SDMK (505-055-4698). You can also take the person to the closest emergency room.   [employer] last reviewed this educational content on 5/1/2020 2000-2021 [employer]. All rights reserved. This information is not intended as a substitute for professional medical care. Always follow your  healthcare professional's instructions.

## 2022-09-22 LAB
ALBUMIN SERPL ELPH-MCNC: 3.6 G/DL (ref 3.7–5.1)
ALBUMIN SERPL-MCNC: 3.5 G/DL (ref 3.4–5)
ALP SERPL-CCNC: 132 U/L (ref 40–150)
ALPHA1 GLOB SERPL ELPH-MCNC: 0.5 G/DL (ref 0.2–0.4)
ALPHA2 GLOB SERPL ELPH-MCNC: 1.2 G/DL (ref 0.5–0.9)
ALT SERPL W P-5'-P-CCNC: 20 U/L (ref 0–50)
ANION GAP SERPL CALCULATED.3IONS-SCNC: 9 MMOL/L (ref 3–14)
AST SERPL W P-5'-P-CCNC: 14 U/L (ref 0–45)
B-GLOBULIN SERPL ELPH-MCNC: 0.9 G/DL (ref 0.6–1)
BILIRUB SERPL-MCNC: 0.4 MG/DL (ref 0.2–1.3)
BUN SERPL-MCNC: 20 MG/DL (ref 7–30)
CALCIUM SERPL-MCNC: 10.1 MG/DL (ref 8.5–10.1)
CHLORIDE BLD-SCNC: 105 MMOL/L (ref 94–109)
CO2 SERPL-SCNC: 23 MMOL/L (ref 20–32)
CREAT SERPL-MCNC: 0.53 MG/DL (ref 0.52–1.04)
GAMMA GLOB SERPL ELPH-MCNC: 1.2 G/DL (ref 0.7–1.6)
GFR SERPL CREATININE-BSD FRML MDRD: >90 ML/MIN/1.73M2
GLUCOSE BLD-MCNC: 88 MG/DL (ref 70–99)
M PROTEIN SERPL ELPH-MCNC: 0.1 G/DL
POTASSIUM BLD-SCNC: 4.2 MMOL/L (ref 3.4–5.3)
PROT PATTERN SERPL ELPH-IMP: ABNORMAL
PROT SERPL-MCNC: 8.5 G/DL (ref 6.8–8.8)
SODIUM SERPL-SCNC: 137 MMOL/L (ref 133–144)

## 2022-09-27 ENCOUNTER — LAB (OUTPATIENT)
Dept: LAB | Facility: CLINIC | Age: 72
End: 2022-09-27
Payer: MEDICARE

## 2022-09-27 DIAGNOSIS — M51.369 DDD (DEGENERATIVE DISC DISEASE), LUMBAR: ICD-10-CM

## 2022-09-27 PROCEDURE — 36415 COLL VENOUS BLD VENIPUNCTURE: CPT

## 2022-09-27 PROCEDURE — 86334 IMMUNOFIX E-PHORESIS SERUM: CPT

## 2022-09-27 PROCEDURE — 86335 IMMUNFIX E-PHORSIS/URINE/CSF: CPT

## 2022-09-28 LAB
PROT ELPH PNL UR ELPH: NORMAL
PROT PATTERN SERPL IFE-IMP: NORMAL

## 2022-10-14 ENCOUNTER — ANCILLARY PROCEDURE (OUTPATIENT)
Dept: BONE DENSITY | Facility: CLINIC | Age: 72
End: 2022-10-14
Attending: FAMILY MEDICINE
Payer: MEDICARE

## 2022-10-14 DIAGNOSIS — M81.0 OSTEOPOROSIS, UNSPECIFIED OSTEOPOROSIS TYPE, UNSPECIFIED PATHOLOGICAL FRACTURE PRESENCE: ICD-10-CM

## 2022-10-14 PROCEDURE — 77085 DXA BONE DENSITY AXL VRT FX: CPT | Performed by: INTERNAL MEDICINE

## 2022-10-18 ENCOUNTER — VIRTUAL VISIT (OUTPATIENT)
Dept: FAMILY MEDICINE | Facility: CLINIC | Age: 72
End: 2022-10-18
Payer: MEDICARE

## 2022-10-18 DIAGNOSIS — M81.0 OSTEOPOROSIS, UNSPECIFIED OSTEOPOROSIS TYPE, UNSPECIFIED PATHOLOGICAL FRACTURE PRESENCE: Primary | ICD-10-CM

## 2022-10-18 DIAGNOSIS — M51.369 DDD (DEGENERATIVE DISC DISEASE), LUMBAR: ICD-10-CM

## 2022-10-18 PROCEDURE — 99213 OFFICE O/P EST LOW 20 MIN: CPT | Mod: 95 | Performed by: FAMILY MEDICINE

## 2022-10-18 RX ORDER — GABAPENTIN 100 MG/1
100 CAPSULE ORAL 2 TIMES DAILY
Qty: 60 CAPSULE | Refills: 3 | Status: SHIPPED | OUTPATIENT
Start: 2022-10-18 | End: 2022-11-16

## 2022-10-18 RX ORDER — TRAMADOL HYDROCHLORIDE 50 MG/1
50 TABLET ORAL EVERY 6 HOURS PRN
Qty: 10 TABLET | Refills: 0 | Status: SHIPPED | OUTPATIENT
Start: 2022-10-18 | End: 2022-10-21

## 2022-10-18 NOTE — PATIENT INSTRUCTIONS
Decrease Crestor to 10 mg daily  Take gabapentin 100 mg Twice daily  Follow up pain management  Sincerely,  Chuyita Zaidi MD

## 2022-10-18 NOTE — PROGRESS NOTES
Virginia is a 72 year old who is being evaluated via a billable telephone visit.      What phone number would you like to be contacted at? [phone]  How would you like to obtain your AVS? MyChart    Assessment & Plan     DDD (degenerative disc disease), lumbar  Advised take Gabapentin that she is Tolerating without dizzines  Tylenol 1000 mg q 6 Hours  Pain clinic appointment pending   - gabapentin (NEURONTIN) 100 MG capsule; Take 1 capsule (100 mg) by mouth 2 times daily  - traMADol (ULTRAM) 50 MG tablet; Take 1 tablet (50 mg) by mouth every 6 hours as needed for severe pain    Osteoporosis, unspecified osteoporosis type, unspecified pathological fracture presence  She will discuss this after her pain management appointment   Avoid falls  The bone density shows some  Osteoporosis.  You should be taking [id number] mg of calcium with vit D.  You should be exercising regularly.  See me in the office to discuss the merits of  Taking meds for this.  Consider meds for osteoporosis as discussed   Advised Hold Crestor due to pain issues and then restart at 1 tablet a week  Will increase when pain is better    No follow-ups on file.    Chuyita Zaidi MD  [facility] [place]    Subjective   Virginia is a 72 year old, presenting for the following health issues:  Recheck Medication (gabapentin (NEURONTIN) 100 MG capsule) and Results (Dexa results and discuss medication. )  she says gabapentin 300 mg made her =Dizzy   She was able to tolerate 100 mg BID  No dizziness now  Pt has chronic pain and has appointment with a pain specialist  DEXA showed osteoporosis  5:14 PM -start visit  5:30 PM -end visit  History of Present Illness       Reason for visit:  Followup to medications    She eats 2-3 servings of fruits and vegetables daily.She consumes 0 sweetened beverage(s) daily.She exercises with enough effort to increase her heart rate 9 or less minutes per day.  She exercises with enough effort to increase her heart rate 3 or  less days per week.   She is taking medications regularly.     Review of Systems   Rest of the ROS is Negative except see above and Problem list [stable]        Objective           Vitals:  No vitals were obtained today due to virtual visit.    Physical Exam   healthy, alert and no distress  PSYCH: Alert and oriented times 3; coherent speech, normal   rate and volume, able to articulate logical thoughts, able   to abstract reason, no tangential thoughts, no hallucinations   or delusions  Her affect is normal  RESP: No cough, no audible wheezing, able to talk in full sentences  Remainder of exam unable to be completed due to telephone visits    Phone call duration: as above minutes

## 2022-10-25 ENCOUNTER — TRANSFERRED RECORDS (OUTPATIENT)
Dept: HEALTH INFORMATION MANAGEMENT | Facility: CLINIC | Age: 72
End: 2022-10-25

## 2022-11-12 ENCOUNTER — E-VISIT (OUTPATIENT)
Dept: FAMILY MEDICINE | Facility: CLINIC | Age: 72
End: 2022-11-12
Payer: MEDICARE

## 2022-11-12 DIAGNOSIS — G89.29 CHRONIC LOW BACK PAIN WITH SCIATICA, SCIATICA LATERALITY UNSPECIFIED, UNSPECIFIED BACK PAIN LATERALITY: ICD-10-CM

## 2022-11-12 DIAGNOSIS — M54.40 CHRONIC LOW BACK PAIN WITH SCIATICA, SCIATICA LATERALITY UNSPECIFIED, UNSPECIFIED BACK PAIN LATERALITY: ICD-10-CM

## 2022-11-12 DIAGNOSIS — M79.661 PAIN IN BOTH LOWER LEGS: ICD-10-CM

## 2022-11-12 DIAGNOSIS — M79.662 PAIN IN BOTH LOWER LEGS: ICD-10-CM

## 2022-11-12 DIAGNOSIS — M48.061 SPINAL STENOSIS OF LUMBAR REGION, UNSPECIFIED WHETHER NEUROGENIC CLAUDICATION PRESENT: Primary | ICD-10-CM

## 2022-11-12 PROCEDURE — 99421 OL DIG E/M SVC 5-10 MIN: CPT | Performed by: FAMILY MEDICINE

## 2022-11-12 NOTE — PATIENT INSTRUCTIONS
Virginia,    Thank you for choosing us for your care. I understand that you suffer from chronic back and leg pain. You have previously had an MRI and tried physical therapy. You are taking medications as prescribed. You are scheduled to see a pain specialist. In some cases, steroid injections or referral to a spine specialist (neurosurgeon) are considered too.     Would you specifically request the refills that you need in your MyChart? Let me know what else you need in the meantime.     View your full visit summary for details by clicking on the link below. Follow-up as planned. You can schedule an appointment right here in [facility], or call [phone]  If the visit is for the same symptoms as your eVisit, we'll refund the cost of your eVisit if seen within seven days.      Sharlene Bishop MD      Caring for Your Back    You are not alone.    Low back pain is very common. Nearly half of all adults will have low back pain in any given year. The good news is that back pain is rarely a danger to your health. Most people can manage their back pain on their own. About half of people start feeling better within two weeks. In 9 out of 10 cases, low back pain goes away or no longer limits daily activity within 6 weeks.     Your outlook is good!     Your symptoms tell us that your low back pain is most likely not a danger to you. Most of the time we will not know the exact cause of low back pain, even if you see a doctor or have an MRI. However, treatment can still work without knowing the cause of the pain. Less than 1 in 100 people need surgery for their back pain.     What can I do about my low back pain?     There are three basic things you can do to ease low back pain and help it go away.     Use heat or cold packs.    Take medicine as directed.    Use positions, movements and exercises.    Using heat or cold packs:    Try cold packs or gentle heat to ease your pain.  Use whichever gives you the most relief. Apply the  cold pack or heat for 15 minutes at a time, as often as needed.    Taking medicine:    If taking over-the-counter medicine:    Take ibuprofen (Advil, Motrin) 600 mg three times a day as needed for pain.  OR    Take Aleve (naproxen) 220 to 440 mg two times a day as needed for pain. If your doctor prescribed a muscle relaxant (cyclobenzaprine 10 mg.):    Take   to 1 tablet at bedtime.    Do not drive when taking this medicine. This drug may make you sleepy.     Using positions, movements and exercises:    Research tells us that moving your joints and muscles can help you recover from back pain. Such activity should be simple and gentle. Use the positions below as well as walking to help relieve your pain. Try taking a short walk every 3 to 4 hours during the day. Walk for a few minutes inside your home or take longer walks outside, on a treadmill or at a mall. Slowly increase the amount of time you walk. Expect discomfort when you begin, but it should lessen as your back starts to heal. When your back feels better, walk daily to keep your back and body healthy.    Finding a position that is comfortable:    When your back pain is new, certain positions will ease your pain. Gently try each of the positions below until you find one that is helpful. Once you find a position of comfort, use it as often as you like when you are resting. You will recover faster if you combine rest with activity.    * Lie on your back with your legs bent. You can do this by placing a pillow under your knees or lie on the floor and rest your lower legs on the seat of a chair.  * Lie on your side with your knees bent and place a pillow between your knees.    Lie on your stomach over pillows.       When should I call my doctor?    Your back pain should improve over the first couple of weeks. As it improves, you should be able to return to your normal activities.  But call your doctor if:      You have a sudden change in your ability to control  your bladder or bowels.    You begin to feel tingling in your groin or legs.    The pain spreads down your leg and into your foot.    Your toes, feet or leg muscles begin to feel weak.    You feel generally unwell or sick.    Your pain gets worse.    If you are deaf or hard of hearing, please let us know. We provide many free services including sign language interpreters, oral interpreters, TTYs, telephone amplifiers, note takers and written materials.    For informational purposes only. Not to replace the advice of your health care provider. Copyright   2013 [facility]. All rights reserved. [employer] 064231 - 04/13.

## 2022-11-16 ENCOUNTER — VIRTUAL VISIT (OUTPATIENT)
Dept: FAMILY MEDICINE | Facility: CLINIC | Age: 72
End: 2022-11-16
Payer: MEDICARE

## 2022-11-16 DIAGNOSIS — M48.061 SPINAL STENOSIS OF LUMBAR REGION, UNSPECIFIED WHETHER NEUROGENIC CLAUDICATION PRESENT: ICD-10-CM

## 2022-11-16 DIAGNOSIS — G89.29 CHRONIC LOW BACK PAIN WITH SCIATICA, SCIATICA LATERALITY UNSPECIFIED, UNSPECIFIED BACK PAIN LATERALITY: Primary | ICD-10-CM

## 2022-11-16 DIAGNOSIS — M54.40 CHRONIC LOW BACK PAIN WITH SCIATICA, SCIATICA LATERALITY UNSPECIFIED, UNSPECIFIED BACK PAIN LATERALITY: Primary | ICD-10-CM

## 2022-11-16 DIAGNOSIS — M51.369 DDD (DEGENERATIVE DISC DISEASE), LUMBAR: ICD-10-CM

## 2022-11-16 DIAGNOSIS — C48.8 MALIGNANT NEOPLASM OF OVERLAPPING SITES OF RETROPERITONEUM AND PERITONEUM (H): ICD-10-CM

## 2022-11-16 DIAGNOSIS — R59.1 LA (LYMPHADENOPATHY): ICD-10-CM

## 2022-11-16 DIAGNOSIS — M79.661 PAIN IN BOTH LOWER LEGS: ICD-10-CM

## 2022-11-16 DIAGNOSIS — M79.662 PAIN IN BOTH LOWER LEGS: ICD-10-CM

## 2022-11-16 PROCEDURE — 99214 OFFICE O/P EST MOD 30 MIN: CPT | Mod: 95 | Performed by: FAMILY MEDICINE

## 2022-11-16 RX ORDER — GABAPENTIN 100 MG/1
100 CAPSULE ORAL 2 TIMES DAILY
Qty: 60 CAPSULE | Refills: 3 | Status: SHIPPED | OUTPATIENT
Start: 2022-11-16 | End: 2022-12-21

## 2022-11-16 RX ORDER — TRAMADOL HYDROCHLORIDE 50 MG/1
50 TABLET ORAL EVERY 6 HOURS PRN
Qty: 30 TABLET | Refills: 0 | Status: SHIPPED | OUTPATIENT
Start: 2022-11-16 | End: 2022-11-19

## 2022-11-16 NOTE — PROGRESS NOTES
Virginia is a 72 year old who is being evaluated via a billable video visit.      How would you like to obtain your AVS? MyChart          Assessment & Plan       ICD-10-CM    1. Chronic low back pain with sciatica, sciatica laterality unspecified, unspecified back pain laterality  M54.40 Spine  Referral    G89.29 traMADol (ULTRAM) 50 MG tablet     MR Lumbar Spine w/o Contrast      2. Spinal stenosis of lumbar region, unspecified whether neurogenic claudication present  M48.061 Spine  Referral     traMADol (ULTRAM) 50 MG tablet     MR Lumbar Spine w/o Contrast      3. Pain in both lower legs  M79.661 Spine  Referral    M79.662 traMADol (ULTRAM) 50 MG tablet     MR Lumbar Spine w/o Contrast      4. DDD (degenerative disc disease), lumbar  M51.36 traMADol (ULTRAM) 50 MG tablet     gabapentin (NEURONTIN) 100 MG capsule     MR Lumbar Spine w/o Contrast        Advised PT  Tramadol  Advised MRI Lumbar  Keep appointment pain clinic  Take gabapentin as reviewed   Make appointment spine specialist  Return in about 1 month (around 12/16/2022) for recheck/ sooner if worse or New symptoms.    Chuyita Zaidi MD  [facility] [place]    Subjective   Virginia is a 72 year old{, presenting for the following health issues:  No chief complaint on file.      History of Present Illness       Back Pain:  She presents for follow up of back pain. Patient's back pain is a chronic problem.  Location of back pain:  Right middle of back, left middle of back, right side of waist and left side of waist  Description of back pain: other  Back pain spreads: right thigh, left thigh, right knee and left knee    Since patient first noticed back pain, pain is: gradually worsening  Does back pain interfere with her job:  Not applicable      She eats 2-3 servings of fruits and vegetables daily.She consumes 0 sweetened beverage(s) daily.She exercises with enough effort to increase her heart rate 9 or less minutes per day.  She  exercises with enough effort to increase her heart rate 3 or less days per week.   She is taking medications regularly.   Review of Systems   Rest of the ROS is Negative except see above and Problem list [stable]        Objective           Vitals:  No vitals were obtained today due to virtual visit.    Physical Exam   GENERAL: Healthy, alert and no distress  EYES: Eyes grossly normal to inspection.  No discharge or erythema, or obvious scleral/conjunctival abnormalities.  RESP: No audible wheeze, cough, or visible cyanosis.  No visible retractions or increased work of breathing.    SKIN: Visible skin clear. No significant rash, abnormal pigmentation or lesions.  NEURO: Cranial nerves grossly intact.  Mentation and speech appropriate for age.  PSYCH: Mentation appears normal, affect normal/bright, judgement and insight intact, normal speech and appearance well-groomed.      Do Not remember the Time but total 25 minutes with 5 minutes documentation lonnie sosa and Genie in clinic

## 2022-11-16 NOTE — PATIENT INSTRUCTIONS
Please make appointment with spine specialist  I want you to get another MRI  Take Ultram  Let me Know in 2 weeks How You are doing  Sincerely,  Chuyita Zaidi MD

## 2022-11-16 NOTE — CONFIDENTIAL NOTE
Virginia is a 72 year old who is being evaluated via a billable video visit.      How would you like to obtain your AVS? MyChart  If the video visit is dropped, the invitation should be resent by: Text to cell phone: [phone]  Will anyone else be joining your video visit? No        Assessment & Plan    MRI done last year  Transitional segment is considered S1 for this dictation.  2. Grade 1 spondylolisthesis of L3 on L4.  3. Mild central spinal stenosis at L3-L4 and L4-L5.  4. Edema in the facets and periarticular tissues on the left side at  L4-L5 and L5-S1 is likely due to degenerative facet arthrosis.  5. Moderate to severe neural foraminal stenosis on the right side at  L5-S1 may be affecting the right L5 nerve root. There is mild to  moderate foraminal narrowing on the right at L4-L5 and mild foraminal  narrowing on the right L3-L4.    ICD-10-CM    1. Chronic low back pain with sciatica, sciatica laterality unspecified, unspecified back pain laterality  M54.40 Spine  Referral    G89.29 traMADol (ULTRAM) 50 MG tablet     MR Lumbar Spine w/o Contrast      2. Spinal stenosis of lumbar region, unspecified whether neurogenic claudication present  M48.061 Spine  Referral     traMADol (ULTRAM) 50 MG tablet     MR Lumbar Spine w/o Contrast      3. Pain in both lower legs  M79.661 Spine  Referral    M79.662 traMADol (ULTRAM) 50 MG tablet     MR Lumbar Spine w/o Contrast      4. DDD (degenerative disc disease), lumbar  M51.36 traMADol (ULTRAM) 50 MG tablet     gabapentin (NEURONTIN) 100 MG capsule     MR Lumbar Spine w/o Contrast          SEE EPIC care orders  The potential side effects of this medication have been discussed with the patient.  Call if any significant problems with these are experienced.  Advised make appointment with spine specialist  Get another MRI  Try to increase Gabapentin if Tolerated  Follow up 1 month    Return in about 1 month (around 12/16/2022) for recheck/  sooner if worse or New symptoms.  See PI  5:26 PM -visit complete  Chuyita Zaidi MD  [facility] MIRIAN Coleman is a 72 year old, presenting for the following health issues:  No chief complaint on file.    PAIN CLINIC APPOINTMENT HAD TO BE RESChEDULED as pain Provider had to cancel  HPI     Pain History:  When did you first notice your pain? - Chronic Pain   Have you seen this provider for your pain in the past?Pt ahs had chronic back pain  MRI with spinal stenosis  Has Radiation of pain leg  This is chronic  No change  Pt Frustrated that she cannot get in with pain specialist till January  Pertinent negatives include no fever, no numbness, no abdominal pain, no abdominal swelling, no bowel incontinence, no perianal numbness, no bladder incontinence, no dysuria, no leg pain, no paresthesias, no paresis no weakness.      Review of Systems   Rest of the ROS is Negative except see above and Problem list [stable]        Objective           Vitals:  No vitals were obtained today due to virtual visit.    Physical Exam   GENERAL: Healthy, alert and no distress  EYES: Eyes grossly normal to inspection.  No discharge or erythema, or obvious scleral/conjunctival abnormalities.  RESP: No audible wheeze, cough, or visible cyanosis.  No visible retractions or increased work of breathing.    SKIN: Visible skin clear. No significant rash, abnormal pigmentation or lesions.  NEURO: Cranial nerves grossly intact.  Mentation and speech appropriate for age.  PSYCH: Mentation appears normal, affect normal/bright, judgement and insight intact, normal speech and appearance well-groomed.              Video-Visit Details    Video Start Time: 4-29 PM    Type of service:  Video Visit    Video End Time:5:16 PM    Originating Location (pt. Location): Home    Distant Location (provider location):  On-site    Platform used for Video Visit: jessikawell and Genie

## 2022-11-17 ENCOUNTER — TELEPHONE (OUTPATIENT)
Dept: FAMILY MEDICINE | Facility: CLINIC | Age: 72
End: 2022-11-17

## 2022-11-17 NOTE — TELEPHONE ENCOUNTER
SPINE PATIENTS - NEW PROTOCOL PREVISIT    RECORDS RECEIVED FROM: Internal   REASON FOR VISIT: Chronic low back pain with sciatica    Date of Appt: 12/13/2022   NOTES (FOR ALL VISITS) STATUS DETAILS   OFFICE NOTE from referring provider Internal 11/16/2022 Dr Zaidi [facility]    OFFICE NOTE from other specialist Internal 11/12/2022 Dr Bishop [facility] e-visit  12/10/2021 PT [facility]   07/15/2021 Dr Alexander [facility]   07/09/2021 Dr Garcia [facility]    DISCHARGE SUMMARY from hospital N/A    DISCHARGE REPORT from ER N/A    EMG REPORT N/A    MEDICATION LIST N/A    IMAGING  (FOR ALL VISITS)     MRI (HEAD, NECK, SPINE) Internal 11/25/2022 lumbar spine  05/27/2021 lumbar spine   XRAY (SPINE) *NEUROSURGERY* N/A    CT (HEAD, NECK, SPINE) N/A

## 2022-11-23 ENCOUNTER — TRANSFERRED RECORDS (OUTPATIENT)
Dept: HEALTH INFORMATION MANAGEMENT | Facility: CLINIC | Age: 72
End: 2022-11-23

## 2022-11-25 ENCOUNTER — ANCILLARY PROCEDURE (OUTPATIENT)
Dept: MRI IMAGING | Facility: CLINIC | Age: 72
End: 2022-11-25
Attending: FAMILY MEDICINE
Payer: MEDICARE

## 2022-11-25 DIAGNOSIS — G89.29 CHRONIC LOW BACK PAIN WITH SCIATICA, SCIATICA LATERALITY UNSPECIFIED, UNSPECIFIED BACK PAIN LATERALITY: ICD-10-CM

## 2022-11-25 DIAGNOSIS — M79.662 PAIN IN BOTH LOWER LEGS: ICD-10-CM

## 2022-11-25 DIAGNOSIS — M48.061 SPINAL STENOSIS OF LUMBAR REGION, UNSPECIFIED WHETHER NEUROGENIC CLAUDICATION PRESENT: ICD-10-CM

## 2022-11-25 DIAGNOSIS — M54.40 CHRONIC LOW BACK PAIN WITH SCIATICA, SCIATICA LATERALITY UNSPECIFIED, UNSPECIFIED BACK PAIN LATERALITY: ICD-10-CM

## 2022-11-25 DIAGNOSIS — M51.369 DDD (DEGENERATIVE DISC DISEASE), LUMBAR: ICD-10-CM

## 2022-11-25 DIAGNOSIS — M79.661 PAIN IN BOTH LOWER LEGS: ICD-10-CM

## 2022-11-25 PROCEDURE — 72148 MRI LUMBAR SPINE W/O DYE: CPT | Mod: TC | Performed by: RADIOLOGY

## 2022-11-25 PROCEDURE — G1010 CDSM STANSON: HCPCS | Mod: TC | Performed by: RADIOLOGY

## 2022-11-28 DIAGNOSIS — I10 HYPERTENSION GOAL BP (BLOOD PRESSURE) < 140/90: ICD-10-CM

## 2022-11-28 DIAGNOSIS — E78.5 HYPERLIPIDEMIA LDL GOAL <160: ICD-10-CM

## 2022-11-28 RX ORDER — DORZOLAMIDE HYDROCHLORIDE AND TIMOLOL MALEATE 20; 5 MG/ML; MG/ML
SOLUTION/ DROPS OPHTHALMIC
Qty: 30 ML | Refills: 1 | OUTPATIENT
Start: 2022-11-28

## 2022-11-28 NOTE — TELEPHONE ENCOUNTER
Called and spoke to pharmacy. Pharmacy stated that we can disregard the request for dorzolamide-timolol (COSOPT) 2-0.5 % ophthalmic solution.   Karon Salmeron CMA

## 2022-11-29 RX ORDER — LOSARTAN POTASSIUM 100 MG/1
TABLET ORAL
Qty: 90 TABLET | Refills: 0 | Status: SHIPPED | OUTPATIENT
Start: 2022-11-29 | End: 2023-04-11

## 2022-11-29 RX ORDER — ROSUVASTATIN CALCIUM 10 MG/1
10 TABLET, COATED ORAL DAILY
Qty: 90 TABLET | Refills: 3 | Status: SHIPPED | OUTPATIENT
Start: 2022-11-29 | End: 2023-09-26

## 2022-12-06 NOTE — PROGRESS NOTES
"    SUBJECTIVE:  HPI:  Virginia Byers  Is a 72 year old female who presents today for new patient evaluation of chronic bilateral low middle back and waist back pain radiating to both thighs and both knees, upon referral from Dr. Diana Zaidi, whose virtual visit of 11/16/2022 documents treatment with tramadol and gabapentin.  No red flags noted.    States that for the last 5 years she has been back pain.  Despite many rounds of physical therapy, the pain has not improved and in the last year has gotten worse.  She says \"I do not trust my legs\" with the right worse than the left,and she has to lifti her leg up by hand to step into a car.  She has been relegated to using a walker for a year.  Records indicate she had bilateral L5-S1 transforaminal ROS by Dr. Garcia on 7/9/2021, and Virginia states that she could walk without a walker or pain for about a week, but not more than that.  She walked \"funny\" for about a year after that.  She did not need any walker for a year but she said her therapist told her that the right foot \"curved in\" with walking.  She had a similar response to bilateral L4-5 and L5-S1 facet joint injections on 6/14/2021.  She was compliant with her HEP but did not appreciate significant improvement.  She is taking gabapentin 100 mg at night which gives her fatigue and dizziness and \"I hate them\" and that she does not want to be on medication.  She is not taking the tramadol.    Neurosurgery consultation with Dr. Jose Alexander 7/15/2021 led to recommendation for completing physical therapy and if not improved ultimately consider an L3-S1 decompression and fusion.  MRI showed what he felt was severe L4-5 DDD and L5-S1 facet arthropathy with lateral recess and foraminal stenosis.    She says that her bones are \"cracking\" whenever she moves, which has come on over the last year or so.  In reviewing her chart, she had a dramatically elevated ESR of 106, Borderline positive DEVON 1: 80 speckled, RF " borderline 12, Normal uric acid, negative single-stranded antibody IgG, negative scleroderma antibody IgG on 9/20/2021.  Repeat DEVON 1: 40 borderline + 10/14/2021.    Lumbar MRI 11/25/2022: No high-grade stenosis.    DEXA scan 10/14/2022: Osteoporosis T score -2.6 lumbar, -3.7 left femoral neck, -2.9 right femoral neck.    SYMPTOMS WORSENED WITH standing, sitting, going to lay down    SYMPTOMS IMPROVED WITH staying in 1 place    Pain score and diagram reviewed.  See questionnaire.      ROS: .  Otherwise negative for bowel/bladder dysfunction, balance changes, headache, leg pain/numbness/weakness, fevers, chills, night sweats, unexplained weight loss;  otherwise unremarkable.   See the patient's intake questionnaire from today for details.    Treatment to Date: Physical therapy, bilateral TF LESI L5-S1 7/9/2021-diagnostic and some therapeutic response.  Bilateral L4-5 and L5-S1 facet joint injections-diagnostic and some therapeutic response.  No chiropractic.    MEDICATIONS:  Reviewed.    ALLERGIES:  Reviewed.     Reviewed past medical, surgical, and family history.    Pertinent for osteoporosis, hypertension, breast cancer (status post left mastectomy 2000)-in remission since, osteoarthritis, osteoporosis by 10/14/2022 DEXA scan.  Sister has rheumatoid arthritis and needs injections.    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  She is single with no children.  She has a roommate.  Sports hobbies and activities: None currently but she used to love biking, hiking, traveling.      OBJECTIVE:    --CONSTITUTIONAL:   No acute distress.  The patient is well nourished and well groomed.  Transitioning from sitting to standing she asked that school her backside edge of the seat and have a heavy pushoff with both arms, getting up quite slowly.  She needs my hands for support to walk without her walker.  --PSYCHIATRIC:  Appropriate mood and affect. The patient is awake, alert,  oriented to person, place, time and answering questions appropriately with clear speech.    --SKIN:  Skin over the face, bilateral lower extremities, and posterior torso is clean, dry, intact without rashes.    --RESPIRATORY: Normal respiratory excursion and effort, and no dyspnea.   --GAIT:  is non-antalgic. Flat foot, heel and toe walking:  normal   .  Squat and rise   FPC in bed under her own power.  All the time with my hands for minimal support..  She is unable to actively flex her hips sufficiently to get up onto the exam table even with the help of a footstool.  --STANDING EXAMINATION:    Symmetry of spine/pelvis   unremarkable   .      Range of motion more painful in extension than flexion.  Lumbar range of motion is accompanied by dramatic and audible crepitation that seems to be coming from her lumbar spine that sounds like [employer]cracker plus a [place] counter.  It is quite dramatic.  Palpable crepitation felt throughout the spinal column sequentliay with progressive ROM.  This also occurs with active and passive left hip movements with crepitation coming from the hip socket but not on the right..  Forward flexion is hands to the knees.  Extension 50% with   Standing flexion   negative   .    Yefri's sign   negative    .     Stork test   negative    .   --NEUROLOGICAL:     ROMBERG, PRONATOR DRIFT:   Normal.   Tandem walk aborted for safety.  SENSATION to light touch is intact in bilateral thighs, lower legs and feet.   MANUAL MOTOR TESTING:  L3- S1 Myotomes, Femoral, Obturator, Peroneal and Tibial nerves 5/5   DURAL STRETCH TESTS:  SLR negative.  Femoral Stretch Test not done.   --PELVIC/HIP JOINTS:              Unable to assess as she is unable to get onto the exam table, but standing exam suggested.   --LUMBAR/GLUTEAL MUSCLES: Nontender.    --ABDOMINAL:  Non-distended.  Nontender in the seated position        IMAGING: Images and reports reviewed.    MRI LUMBAR SPINE WITHOUT CONTRAST   11/25/2022      COMPARISON: Lumbar spine MR 5/27/2021.      Mild rightward listhesis of L3 on L4. Mild grade 1 anterolisthesis of  L3 on L4. No loss of vertebral body height. No focal destructive bony  lesions. Mild STIR hyperintense endplate edema (Modic type I changes)  on the right at L5-S1. Several presumed Schmorl's node deformities  throughout the lumbar spine.     L3-L4: Mild grade 1 anterolisthesis with uncovering of the disc. Disc  bulge posteriorly through the left foraminal region with left uncinate  spurring. Mild facet hypertrophy left greater than right with facet  tropism. No significant spinal canal narrowing. No right neural  foraminal narrowing. Mild left neural foraminal narrowing.      L4-L5: Moderate loss of disc height and signal with degenerative  endplate changes more pronounced towards the right. Disc bulge in the  right foraminal and far lateral region with mild right uncinate  spurring. Normal facets. No spinal canal narrowing. Moderate right  neural foraminal narrowing. No significant left neural foraminal  narrowing.      Paraspinous soft tissues: Left iliac lymph nodes appear enlarged.  Abnormal T1 hypointensity of the right iliac bone which is only  partially visualized.                                                                      IMPRESSION:    1. 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  2. Degenerative changes in the lumbar spine as detailed above.  3. Moderate neural foraminal narrowings on the right at L4-L5 and  L5-S1. Mild left neural foraminal narrowing at L3-L4.  4. Abnormal T1 hypointensity of the right iliac bone which is  incompletely evaluated.  5. Prominent left iliac lymph nodes.    ASSESSMENT: Virginia Byers is a 72 year old female who presents  today for new patient evaluation of:      Chronic low back pain    Dramatic crepitation with lumbosacral flexion and extension and also with left hip resisted flexion    Grade 1 anterolisthesis of L3 on  4    Neurologically intact    No findings to suggest significant Pelvic Joint Dysfunction    Atypical lesion within the right iliac bone incompletely evaluated on lumbar MRI with a history of breast cancer in remission since 2000    Status post bilateral TF LESI L5-S1 as well as bilateral L4-5 and L5-S1 facet joint injections 2021, with some improvement which wore off over the course of a year    Minimal lumbar facet arthrosis, not significant enough to be causing these clinical findings.    Failure of conservative care including multiple physical therapy attempts    Elevated ESR, borderline RF and DEVON on 9/20/2021 with a family history of a sister with rheumatoid arthritis      PLAN:  Pelvis MRI with and without contrast.  Stop the gabapentin.    With Virginia's permission, we took videos of the crepitation noted with left hip movement as well as flexion extension and she allows me to send this video to my colleagues for consultation.  Bilateral leg EMGs.   Consultation with Dr. Margie Agudelo.  Rheumatology consultation.  I will message her on my chart with the results of her MRI.    I like to thank Dr. Diana Zaidi for the opportunity to participate in the care of this very nice patient.    Advised patient to call or return early if symptoms worsen, or having problems controlling bladder and bowel function or worsening leg weakness.     Please note: Voice recognition software was used in this dictation.  It may therefore contain typographical errors.    Sami Murphy MD    Addendum 12/15/2022:  With the patient's permission I shared her video with several of my colleagues in neurology as well as PMR.  Thus far we are all stumped as to what is causing her dramatic examination findings.  I have also made a referral to the neurology movement disorder clinic preferably to be done after Dr. Margie Agudelo completes the EMGs of the legs.  Also Lyme Dz test ordered.

## 2022-12-13 ENCOUNTER — OFFICE VISIT (OUTPATIENT)
Dept: NEUROSURGERY | Facility: CLINIC | Age: 72
End: 2022-12-13
Attending: FAMILY MEDICINE
Payer: MEDICARE

## 2022-12-13 ENCOUNTER — PRE VISIT (OUTPATIENT)
Dept: NEUROSURGERY | Facility: CLINIC | Age: 72
End: 2022-12-13

## 2022-12-13 VITALS
HEART RATE: 87 BPM | SYSTOLIC BLOOD PRESSURE: 155 MMHG | BODY MASS INDEX: 25.76 KG/M2 | DIASTOLIC BLOOD PRESSURE: 79 MMHG | WEIGHT: 145 LBS

## 2022-12-13 DIAGNOSIS — Z17.0 MALIGNANT NEOPLASM OF OVERLAPPING SITES OF BREAST IN FEMALE, ESTROGEN RECEPTOR POSITIVE, UNSPECIFIED LATERALITY (H): ICD-10-CM

## 2022-12-13 DIAGNOSIS — C50.819 MALIGNANT NEOPLASM OF OVERLAPPING SITES OF BREAST IN FEMALE, ESTROGEN RECEPTOR POSITIVE, UNSPECIFIED LATERALITY (H): ICD-10-CM

## 2022-12-13 DIAGNOSIS — M47.816 LUMBAR SPONDYLOSIS: ICD-10-CM

## 2022-12-13 DIAGNOSIS — M54.50 CHRONIC MIDLINE LOW BACK PAIN WITHOUT SCIATICA: Primary | ICD-10-CM

## 2022-12-13 DIAGNOSIS — M25.552 LEFT HIP PAIN: ICD-10-CM

## 2022-12-13 DIAGNOSIS — G89.29 CHRONIC MIDLINE LOW BACK PAIN WITHOUT SCIATICA: Primary | ICD-10-CM

## 2022-12-13 PROCEDURE — 99204 OFFICE O/P NEW MOD 45 MIN: CPT | Performed by: PREVENTIVE MEDICINE

## 2022-12-13 NOTE — PATIENT INSTRUCTIONS
Virginia that is very nice to meet you and I am sorry you are going through such difficulty.  Honestly I am not sure what is causing this, but I am going to get some other things going to see if someone else can help us figure this out.  See the assessment and plan below for further details of what we discussed today and I will send you a MyChart message regarding her pelvis MRI.  Hopefully we can get some answers for you.  I wish you a happy holiday season      ASSESSMENT: Virginia Byers is a 72 year old female who presents  today for new patient evaluation of:    Chronic low back pain  Traumatic crepitation with lumbosacral flexion and extension and also with left hip resisted flexion  Neurologically intact  No findings to suggest significant Pelvic Joint Dysfunction  Atypical lesion within the right iliac bone incompletely evaluated on lumbar MRI with a history of breast cancer in remission since 2000  Status post bilateral TF LESI L5-S1 as well as bilateral L4-5 and L5-S1 facet joint injections 2021, with some improvement wore off over the course of the year  Minimal lumbar sacral facet arthrosis, not significant enough to be causing these clinical findings.  Failure of conservative care including multiple physical therapy attempts  Elevated ESR, borderline RF and DEVON on 9/20/2021 with a family history of a sister with rheumatoid arthritis      PLAN:  Pelvis MRI with and without contrast.  Stop the gabapentin.    With Virginia's permission, we took videos of the crepitation noted with left hip movement as well as flexion extension and she allows me to send this video to my colleagues for consultation.  Bilateral leg EMGs.   Consultation with Dr. Margie Agudelo.  Rheumatology consultation.  I will message her on my chart with the results of her MRI.

## 2022-12-13 NOTE — NURSING NOTE
Reason For Visit:   Chief Complaint   Patient presents with     Consult     Low back pain         Occupation: former   Currently working? No.  Work status?  Retired.    Sports: n  Activities: n             BP (!) 155/79   Pulse 87   Wt 65.8 kg (145 lb)   BMI 25.76 kg/m        Allergies   Allergen Reactions     Compazine      Mental confusion     Hydrochlorothiazide      Dryness mouth     Prochlorperazine Visual Disturbance     Simvastatin Other (See Comments)     Muscle aches       Current Outpatient Medications   Medication     dorzolamide-timolol (COSOPT) 2-0.5 % ophthalmic solution     gabapentin (NEURONTIN) 100 MG capsule     latanoprost (XALATAN) 0.005 % ophthalmic solution     losartan (COZAAR) 100 MG tablet     rosuvastatin (CRESTOR) 10 MG tablet     timolol (TIMOPTIC) 0.25 % ophthalmic solution     No current facility-administered medications for this visit.         Darla Severin-Brown, LPN

## 2022-12-13 NOTE — LETTER
"    12/13/2022         RE: Virginia Byers  [id number] [facility] [address]        Dear Colleague,    Thank you for referring your patient, Virginia Byers, to the [facility] [place]. Please see a copy of my visit note below.        SUBJECTIVE:  HPI:  Virginia Byers  Is a 72 year old female who presents today for new patient evaluation of chronic bilateral low middle back and waist back pain radiating to both thighs and both knees, upon referral from Dr. Diana Zaidi, whose virtual visit of 11/16/2022 documents treatment with tramadol and gabapentin.  No red flags noted.    States that for the last 5 years she has been back pain.  Despite many rounds of physical therapy, the pain has not improved and in the last year has gotten worse.  She says \"I do not trust my legs\" with the right worse than the left,and she has to lifti her leg up by hand to step into a car.  She has been relegated to using a walker for a year.  Records indicate she had bilateral L5-S1 transforaminal ROS by Dr. Garcia on 7/9/2021, and Virginia states that she could walk without a walker or pain for about a week, but not more than that.  She walked \"funny\" for about a year after that.  She did not need any walker for a year but she said her therapist told her that the right foot \"curved in\" with walking.  She had a similar response to bilateral L4-5 and L5-S1 facet joint injections on 6/14/2021.  She was compliant with her HEP but did not appreciate significant improvement.  She is taking gabapentin 100 mg at night which gives her fatigue and dizziness and \"I hate them\" and that she does not want to be on medication.  She is not taking the tramadol.    Neurosurgery consultation with Dr. Jose Alexander 7/15/2021 led to recommendation for completing physical therapy and if not improved ultimately consider an L3-S1 decompression and fusion.  MRI showed what he felt was severe L4-5 DDD and L5-S1 facet arthropathy with " "lateral recess and foraminal stenosis.    She says that her bones are \"cracking\" whenever she moves, which has come on over the last year or so.  In reviewing her chart, she had a dramatically elevated ESR of 106, Borderline positive DEVON 1: 80 speckled, RF borderline 12, Normal uric acid, negative single-stranded antibody IgG, negative scleroderma antibody IgG on 9/20/2021.  Repeat DEVON 1: 40 borderline + 10/14/2021.    Lumbar MRI 11/25/2022: No high-grade stenosis.    DEXA scan 10/14/2022: Osteoporosis T score -2.6 lumbar, -3.7 left femoral neck, -2.9 right femoral neck.    SYMPTOMS WORSENED WITH standing, sitting, going to lay down    SYMPTOMS IMPROVED WITH staying in 1 place    Pain score and diagram reviewed.  See questionnaire.      ROS: .  Otherwise negative for bowel/bladder dysfunction, balance changes, headache, leg pain/numbness/weakness, fevers, chills, night sweats, unexplained weight loss;  otherwise unremarkable.   See the patient's intake questionnaire from today for details.    Treatment to Date: Physical therapy, bilateral TF LESI L5-S1 7/9/2021-diagnostic and some therapeutic response.  Bilateral L4-5 and L5-S1 facet joint injections-diagnostic and some therapeutic response.  No chiropractic.    MEDICATIONS:  Reviewed.    ALLERGIES:  Reviewed.     Reviewed past medical, surgical, and family history.    Pertinent for osteoporosis, hypertension, breast cancer (status post left mastectomy 2000)-in remission since, osteoarthritis, osteoporosis by 10/14/2022 DEXA scan.  Sister has rheumatoid arthritis and needs injections.    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  She is single with no children.  She has a roommate.  Sports hobbies and activities: None currently but she used to love biking, hiking, traveling.      OBJECTIVE:    --CONSTITUTIONAL:   No acute distress.  The patient is well nourished and well groomed.  Transitioning from sitting to standing " she asked that school her backside edge of the seat and have a heavy pushoff with both arms, getting up quite slowly.  She needs my hands for support to walk without her walker.  --PSYCHIATRIC:  Appropriate mood and affect. The patient is awake, alert, oriented to person, place, time and answering questions appropriately with clear speech.    --SKIN:  Skin over the face, bilateral lower extremities, and posterior torso is clean, dry, intact without rashes.    --RESPIRATORY: Normal respiratory excursion and effort, and no dyspnea.   --GAIT:  is non-antalgic. Flat foot, heel and toe walking:  normal   .  Squat and rise   long term in bed under her own power.  All the time with my hands for minimal support..  She is unable to actively flex her hips sufficiently to get up onto the exam table even with the help of a footstool.  --STANDING EXAMINATION:    Symmetry of spine/pelvis   unremarkable   .      Range of motion more painful in extension than flexion.  Lumbar range of motion is accompanied by dramatic and audible crepitation that seems to be coming from her lumbar spine that sounds like Nutcracker plus a Caleb counter.  It is quite dramatic.  Palpable crepitation felt throughout the spinal column sequentliay with progressive ROM.  This also occurs with active and passive left hip movements with crepitation coming from the hip socket but not on the right..  Forward flexion is hands to the knees.  Extension 50% with   Standing flexion   negative   .    Yefri's sign   negative    .     Stork test   negative    .   --NEUROLOGICAL:     ROMBERG, PRONATOR DRIFT:   Normal.   Tandem walk aborted for safety.  SENSATION to light touch is intact in bilateral thighs, lower legs and feet.   MANUAL MOTOR TESTING:  L3- S1 Myotomes, Femoral, Obturator, Peroneal and Tibial nerves 5/5   DURAL STRETCH TESTS:  SLR negative.  Femoral Stretch Test not done.   --PELVIC/HIP JOINTS:              Unable to assess as she is unable to get onto  the exam table, but standing exam suggested.   --LUMBAR/GLUTEAL MUSCLES: Nontender.    --ABDOMINAL:  Non-distended.  Nontender in the seated position        IMAGING: Images and reports reviewed.    MRI LUMBAR SPINE WITHOUT CONTRAST   11/25/2022     COMPARISON: Lumbar spine MR 5/27/2021.      Mild rightward listhesis of L3 on L4. Mild grade 1 anterolisthesis of  L3 on L4. No loss of vertebral body height. No focal destructive bony  lesions. Mild STIR hyperintense endplate edema (Modic type I changes)  on the right at L5-S1. Several presumed Schmorl's node deformities  throughout the lumbar spine.     L3-L4: Mild grade 1 anterolisthesis with uncovering of the disc. Disc  bulge posteriorly through the left foraminal region with left uncinate  spurring. Mild facet hypertrophy left greater than right with facet  tropism. No significant spinal canal narrowing. No right neural  foraminal narrowing. Mild left neural foraminal narrowing.      L4-L5: Moderate loss of disc height and signal with degenerative  endplate changes more pronounced towards the right. Disc bulge in the  right foraminal and far lateral region with mild right uncinate  spurring. Normal facets. No spinal canal narrowing. Moderate right  neural foraminal narrowing. No significant left neural foraminal  narrowing.      Paraspinous soft tissues: Left iliac lymph nodes appear enlarged.  Abnormal T1 hypointensity of the right iliac bone which is only  partially visualized.                                                                      IMPRESSION:    1. 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  2. Degenerative changes in the lumbar spine as detailed above.  3. Moderate neural foraminal narrowings on the right at L4-L5 and  L5-S1. Mild left neural foraminal narrowing at L3-L4.  4. Abnormal T1 hypointensity of the right iliac bone which is  incompletely evaluated.  5. Prominent left iliac lymph nodes.    ASSESSMENT: Virginia GRESHAM  Zeeshan is a 72 year old female who presents  today for new patient evaluation of:      Chronic low back pain    Dramatic crepitation with lumbosacral flexion and extension and also with left hip resisted flexion    Grade 1 anterolisthesis of L3 on 4    Neurologically intact    No findings to suggest significant Pelvic Joint Dysfunction    Atypical lesion within the right iliac bone incompletely evaluated on lumbar MRI with a history of breast cancer in remission since 2000    Status post bilateral TF LESI L5-S1 as well as bilateral L4-5 and L5-S1 facet joint injections 2021, with some improvement which wore off over the course of a year    Minimal lumbar facet arthrosis, not significant enough to be causing these clinical findings.    Failure of conservative care including multiple physical therapy attempts    Elevated ESR, borderline RF and DEVON on 9/20/2021 with a family history of a sister with rheumatoid arthritis      PLAN:  Pelvis MRI with and without contrast.  Stop the gabapentin.    With Virginia's permission, we took videos of the crepitation noted with left hip movement as well as flexion extension and she allows me to send this video to my colleagues for consultation.  Bilateral leg EMGs.   Consultation with Dr. Margie Agudelo.  Rheumatology consultation.  I will message her on my chart with the results of her MRI.    I like to thank Dr. Diana Zaidi for the opportunity to participate in the care of this very nice patient.    Advised patient to call or return early if symptoms worsen, or having problems controlling bladder and bowel function or worsening leg weakness.     Please note: Voice recognition software was used in this dictation.  It may therefore contain typographical errors.    Sami Murphy MD             Again, thank you for allowing me to participate in the care of your patient.        Sincerely,        Sami Murphy MD

## 2022-12-15 NOTE — TELEPHONE ENCOUNTER
RECORDS RECEIVED FROM: internal   REASON FOR VISIT: Movement Disorders/Tremor   Date of Appt: 2/22/23   NOTES (FOR ALL VISITS) STATUS DETAILS   OFFICE NOTE from referring provider Internal Dr Sami Murphy @ [facility]:  12/15/22 mychart encounter  12/13/22   MEDICATION LIST Internal    IMAGING  (FOR ALL VISITS)     MRI (HEAD, NECK, SPINE) Internal [facility]:  MRI Lumbar Spine 11/25/22  MRI Lumbar Spine 5/27/21

## 2022-12-20 ENCOUNTER — LAB (OUTPATIENT)
Dept: LAB | Facility: CLINIC | Age: 72
End: 2022-12-20
Payer: MEDICARE

## 2022-12-20 ENCOUNTER — ANCILLARY PROCEDURE (OUTPATIENT)
Dept: MRI IMAGING | Facility: CLINIC | Age: 72
End: 2022-12-20
Attending: PREVENTIVE MEDICINE
Payer: MEDICARE

## 2022-12-20 DIAGNOSIS — M54.50 CHRONIC MIDLINE LOW BACK PAIN WITHOUT SCIATICA: ICD-10-CM

## 2022-12-20 DIAGNOSIS — Z85.3 HISTORY OF BREAST CANCER IN ADULTHOOD: Primary | ICD-10-CM

## 2022-12-20 DIAGNOSIS — M47.816 LUMBAR SPONDYLOSIS: ICD-10-CM

## 2022-12-20 DIAGNOSIS — G89.29 CHRONIC MIDLINE LOW BACK PAIN WITHOUT SCIATICA: ICD-10-CM

## 2022-12-20 PROCEDURE — 72197 MRI PELVIS W/O & W/DYE: CPT | Mod: MG | Performed by: RADIOLOGY

## 2022-12-20 PROCEDURE — G1010 CDSM STANSON: HCPCS | Performed by: RADIOLOGY

## 2022-12-20 PROCEDURE — 86618 LYME DISEASE ANTIBODY: CPT

## 2022-12-20 PROCEDURE — A9585 GADOBUTROL INJECTION: HCPCS | Mod: JW | Performed by: RADIOLOGY

## 2022-12-20 PROCEDURE — 36415 COLL VENOUS BLD VENIPUNCTURE: CPT

## 2022-12-20 RX ORDER — GADOBUTROL 604.72 MG/ML
7.5 INJECTION INTRAVENOUS ONCE
Status: COMPLETED | OUTPATIENT
Start: 2022-12-20 | End: 2022-12-20

## 2022-12-20 RX ADMIN — GADOBUTROL 6.5 ML: 604.72 INJECTION INTRAVENOUS at 09:50

## 2022-12-20 NOTE — RESULT ENCOUNTER NOTE
I discussed the pelvic MRI results with the patient by phone, explaining the differential diagnosis including the possibility of metastatic disease.  She is seeing Dr. Agudelo in March and she is seeing the rheumatologist in July.  Dr. Diana Zaidi is aware of this.  I will refer her to hematology oncology for a work-up of possible metastatic disease.

## 2022-12-21 ENCOUNTER — PATIENT OUTREACH (OUTPATIENT)
Dept: ONCOLOGY | Facility: CLINIC | Age: 72
End: 2022-12-21

## 2022-12-21 DIAGNOSIS — R29.898 LEG WEAKNESS, BILATERAL: Primary | ICD-10-CM

## 2022-12-21 LAB — B BURGDOR IGG+IGM SER QL: 0.17

## 2022-12-21 NOTE — PROGRESS NOTES
New Patient Oncology Nurse Navigator Note     Referring provider: Sami Murphy MD    Referring Clinic/Organization: [facility]    Referred to (specialty:) Medical Oncology      Date Referral Received: December 21, 2022     Evaluation for:  Breast cancer with pelvic MRI results suggesting metastatic disease in the pelvis and in multiple lymph nodes     Clinical History (per Nurse review of records provided):    Virginia has a history of left breast cancer diagnosed in 2000. She is s/p left mastectomy, chemotherapy, and delayed left breast reconstruction and right breast augmentation. In at least 2021 she had work up for fatigue, weight loss, nausea and malaise and chronic back pain.  CT Abdomen pelvis on 9/21/21 (bookmarked in Imaging tab) revealed no destructive bony lesions of the visualized bones but did show mild left pelvic and retroperitoneal adenopathy. An external iliac node on the left measures 2.7 x 1.5 cm. Just above this measures 1.5 x 1.3 cm. Mildly prominent common iliac nodes noted as well on the left.    12/20/22 - MRI Pelvis (bookmarked in Imaging tab)  Impression:  1. Severe bilateral hip degenerative changes with synovitis and extensive subchondral cystic changes. Findings may potentially indicate inflammatory component presence with secondary osteoarthrosis.  2. Enhancing marrow signal abnormality of the right iliac bone extending from the superior acetabulum subchondral location proximally almost to the level of the sacroiliac joint caudal aspect. No associated fracture line. Underlying marrow infiltrative process such as from metastasis cannot be excluded especially given the degree of T1 hypointensity and history of primary tumor. Other consideration include stress reaction.  3. Mildly increased left external iliac chain, and the right medial thigh lymphadenopathy. Metastasis cannot be excluded.  4. Left posterior iliac enhancing lesion, similar in size to prior CT 9/2021 and  previously not visible on CT, focal enhancing lesion in the right greater trochanter laterally. Findings are concerning for metastasis.  5. Nonspecific T1 hyperintensity in sacral perineural region, possibly enhancing. In a provided clinical setting, leptomeningeal disease cannot be entirely excluded. Consider follow up with lumbar spine MR without with contrast in 3-6 months or lumbar puncture if clinically indicated.    2000 - Virginia is s/p left mastectomy, chemotherapy, endocrine therapy, and delayed left breast reconstruction and right breast augmentation.     3/2/2000  Excisional biopsy of Left breast mass -   Infiltrating ductal carcinoma with associated DCIS   ER/OR+, HER2 by FISH POSITIVE    3/10/2000 - Left lumpectomy and left axillary node  Invasive ductal carcinoma, Grade 3, 1.45 cm, DCIS, greatest histologic dimension of tumor (DCIS PLUS invasive tumor) = 2.9 cm (estimate 50% DCIS)  1 of 17 axillary lymph nodes sampled was positive for metastatic focus of 0.5 cm.  ER/OR+    Sections of lumpectomy left breast showing residual 1.1 cm focus of ductal carcinoma in situ (DCIS), present within microns of the linked margin of resection.      4/11/2000 - Left breast simple mastectomy, negative for residual carcinoma  Left mastectomy on 4/11/2000 performed by Dr. Cristian Mcdonald at [facility].     Her medical oncologist was Dr. Sakina Brandt at [facility] and documentation is now available up to 5/1/2006.  Virginia received adjuvant chemotherapy with four cycles of adriamycin and cyclophosphamide, and four cycles of paclitaxel. Dr. Brandt's documentation on 5/2/2000 describes a discussion regarding about trastuzumab and wanted sounded like a randomized trial/protocol. Patient declined stating the year of Herceptin would be a hardship for her.     Dr. Brandt prescribed tamoxifen and Virginia took this through approximately April 2004 and then was switched to anastrozole with plan to discontinue in  December 2005.  Patient was on Fosamax for osteopenia.     Of note: Dr. Brandt's documentation after 6/3/2002 states patient had radiation therapy but patient denies this and Dr. Brandt's documentation on 5/2/2000 states they talked about radiation therapy, but patient absolutely did not want any radiation therapy.      8/31/2015 Left delayed reconstruction with tissue expander  2/3/2016 Left 2nd stage 450 cc high profile silicone implant; right augmentation 150 cc Moderate classic profile silicone implant; cresent mastopexy    Records Location: Care Everywhere, Media and See Bookmarked material     Records Needed:   All breast imaging for at least last 5 years  Core biopsy pathology from spring 2000  Left mastectomy pathology report from 4/11/2000  Medical oncology consult and progress notes from 2000 to approximately 2010    Telephoned and spoke with Virginia to review her breast cancer history and assist in scheduling medical oncology consult. Virginia resides in [place] and would like to see provider in [place]. She was able to retrieve some details about her providers from 2000 breast cancer. She is willing to meet provider for video consult if needed. (Winter storm predicted for next few days.)    Writer received referral, reviewed for appropriate plan, and call transferred to New Patient Scheduling for completion.

## 2022-12-22 NOTE — PROGRESS NOTES
RECORDS STATUS - BREAST    RECORDS REQUESTED FROM: [facility]/ [facility] ([facility] Ph: [phone] )     DATE REQUESTED: 12/27/2022    Action    Action Taken 12/22/2022 10AM KEMARLY     I called [facility]     3:46pm KEMARLY   I called our internal file room to have the mammos resolved in PACS  [phone]- Khris will work on the images tomorrow AM       NOTES DETAILS STATUS   OFFICE NOTE from referring provider Complete Sami Carmona MD   OFFICE NOTE from surgeon     OFFICE NOTE from radiation oncologist     DISCHARGE SUMMARY from hospital     DISCHARGE REPORT from the ER     MEDICATION LIST Complete [facility]   CLINICAL TRIAL TREATMENTS TO DATE     LABS     REQUEST BLOCKS FOR ALL BREAST CANCER PTS     PATHOLOGY REPORTS  (Tissue diagnosis, Stage, ER/PA percentage positive and intensity of staining, HER2 IHC, FISH, and all biopsies from breast and any distant metastasis)                     GENONOMIC TESTING     TYPE:   (Next Generation Sequencing, including Foundation One testing, and Oncotype score)     IMAGING (NEED IMAGES & REPORT)     CT SCANS     MRI Requested- [facility]     MAMMO Requested- [facility]     ULTRASOUND     PET     BONE SCAN     BRAIN MRI

## 2022-12-27 ENCOUNTER — PRE VISIT (OUTPATIENT)
Dept: ONCOLOGY | Facility: CLINIC | Age: 72
End: 2022-12-27

## 2022-12-27 NOTE — PROGRESS NOTES
Telephoned and spoke with Virginia to inform her we will need to reschedule medical oncology consult originally scheduled for today at 1:00 with Dr. Jose. Medical oncology records from earlier cancer diagnosis are not available yet.  Medical records aware and can hopefully retrieve from [facility] later today.    Virginia is anxious her cancer has come back and worried about the delays in care.    12/27 12:22 Writer paged Rapid [facility] provider and sent Staff Message. Dr. Chen placed orders for CT CAP, lab tests (CBC, CMP, CEA).     12/27 14:07 - Telephoned and spoke with Virginia to describe options based on Dr. Chen's suggestion.  He did order CT CAP, lab tests (CBC, CMP, CEA).  We did conference call to central scheduling and CT CAP is scheduled for 12/28. New Patient Scheduling was able to assist with scheduling labs and those will also be performed tomorrow.    Distant medical oncology notes have been reviewed by writer and staff message to provider regarding 12/29 at 1:45. Patient knows to expect call to finalize appointment.

## 2022-12-27 NOTE — TELEPHONE ENCOUNTER
Action    Action Taken 12/27/22  Spoke w/ pt - pt advised they had their procedures done @ [facility]. Pt advised they saw Dr. Sakina Brandt, who saw her @ [facility] in [place], [place].     Advised pt due to age of records MAKEDA may be needed. Emailed MAKEDA to pt.     Spoke misael/ More @ [facility] - they faxed over Bx reports for pt - sent to HIM for STAT upload, emailed to Casandra YUNG.     Spoke w/ [facility] Medical Records - they advised that they have no records for pt. They advised that their [place] location used to be owned by [employer] & at one point [facility]/. They also provided a phone number for clinic, [phone], which is a [facility] location.    Spoke w/ pt again, confirmed receipt of MAKEDA on our end. Pt advised they never went to [facility].     Spoke w/ [facility] Medical records - they advised they could not see records from that far back & requested I fax a request for them to check to see if they have the records. Faxed request. They advised it could take between 3-7 business days.     Spoke w/  Medical records - no records found.     Spoke w/ [facility] - they were unable to locate pt. Advised they could check and older system, but requested MAKEDA/Faxed request. Faxed MAKEDA/Request.  9:48 AM    Records from [facility] received, sent to HIM for STAT upload, emailed to Casandra ROQUE   2:17 PM

## 2022-12-28 ENCOUNTER — ANCILLARY PROCEDURE (OUTPATIENT)
Dept: CT IMAGING | Facility: CLINIC | Age: 72
End: 2022-12-28
Attending: INTERNAL MEDICINE
Payer: MEDICARE

## 2022-12-28 ENCOUNTER — LAB (OUTPATIENT)
Dept: LAB | Facility: CLINIC | Age: 72
End: 2022-12-28
Payer: MEDICARE

## 2022-12-28 DIAGNOSIS — Z17.0 MALIGNANT NEOPLASM OF OVERLAPPING SITES OF BREAST IN FEMALE, ESTROGEN RECEPTOR POSITIVE, UNSPECIFIED LATERALITY (H): ICD-10-CM

## 2022-12-28 DIAGNOSIS — C50.819 MALIGNANT NEOPLASM OF OVERLAPPING SITES OF BREAST IN FEMALE, ESTROGEN RECEPTOR POSITIVE, UNSPECIFIED LATERALITY (H): ICD-10-CM

## 2022-12-28 DIAGNOSIS — R59.1 LA (LYMPHADENOPATHY): ICD-10-CM

## 2022-12-28 DIAGNOSIS — R59.9 LYMPH NODE ENLARGEMENT: ICD-10-CM

## 2022-12-28 DIAGNOSIS — R93.89 ABNORMAL FINDINGS ON DIAGNOSTIC IMAGING OF OTHER SPECIFIED BODY STRUCTURES: ICD-10-CM

## 2022-12-28 DIAGNOSIS — C48.8 MALIGNANT NEOPLASM OF OVERLAPPING SITES OF RETROPERITONEUM AND PERITONEUM (H): ICD-10-CM

## 2022-12-28 LAB
ALBUMIN SERPL-MCNC: 3.3 G/DL (ref 3.4–5)
ALP SERPL-CCNC: 141 U/L (ref 40–150)
ALT SERPL W P-5'-P-CCNC: 22 U/L (ref 0–50)
ANION GAP SERPL CALCULATED.3IONS-SCNC: 4 MMOL/L (ref 3–14)
AST SERPL W P-5'-P-CCNC: 12 U/L (ref 0–45)
BASOPHILS # BLD AUTO: 0.1 10E3/UL (ref 0–0.2)
BASOPHILS NFR BLD AUTO: 1 %
BILIRUB SERPL-MCNC: 0.4 MG/DL (ref 0.2–1.3)
BUN SERPL-MCNC: 14 MG/DL (ref 7–30)
CALCIUM SERPL-MCNC: 10.4 MG/DL (ref 8.5–10.1)
CANCER AG125 SERPL-ACNC: 29 U/ML
CANCER AG15-3 SERPL-ACNC: 24 U/ML
CEA SERPL-MCNC: 3.1 NG/ML
CHLORIDE BLD-SCNC: 106 MMOL/L (ref 94–109)
CO2 SERPL-SCNC: 30 MMOL/L (ref 20–32)
CREAT SERPL-MCNC: 0.43 MG/DL (ref 0.52–1.04)
EOSINOPHIL # BLD AUTO: 0.2 10E3/UL (ref 0–0.7)
EOSINOPHIL NFR BLD AUTO: 2 %
ERYTHROCYTE [DISTWIDTH] IN BLOOD BY AUTOMATED COUNT: 14.3 % (ref 10–15)
GFR SERPL CREATININE-BSD FRML MDRD: >90 ML/MIN/1.73M2
GLUCOSE BLD-MCNC: 111 MG/DL (ref 70–99)
HCT VFR BLD AUTO: 40.9 % (ref 35–47)
HGB BLD-MCNC: 12.9 G/DL (ref 11.7–15.7)
IMM GRANULOCYTES # BLD: 0 10E3/UL
IMM GRANULOCYTES NFR BLD: 0 %
LYMPHOCYTES # BLD AUTO: 2.2 10E3/UL (ref 0.8–5.3)
LYMPHOCYTES NFR BLD AUTO: 21 %
MCH RBC QN AUTO: 26.9 PG (ref 26.5–33)
MCHC RBC AUTO-ENTMCNC: 31.5 G/DL (ref 31.5–36.5)
MCV RBC AUTO: 85 FL (ref 78–100)
MONOCYTES # BLD AUTO: 0.7 10E3/UL (ref 0–1.3)
MONOCYTES NFR BLD AUTO: 7 %
NEUTROPHILS # BLD AUTO: 7.6 10E3/UL (ref 1.6–8.3)
NEUTROPHILS NFR BLD AUTO: 69 %
NRBC # BLD AUTO: 0 10E3/UL
NRBC BLD AUTO-RTO: 0 /100
PLATELET # BLD AUTO: 422 10E3/UL (ref 150–450)
POTASSIUM BLD-SCNC: 4.8 MMOL/L (ref 3.4–5.3)
PROT SERPL-MCNC: 8.4 G/DL (ref 6.8–8.8)
RBC # BLD AUTO: 4.8 10E6/UL (ref 3.8–5.2)
SODIUM SERPL-SCNC: 140 MMOL/L (ref 133–144)
WBC # BLD AUTO: 10.8 10E3/UL (ref 4–11)

## 2022-12-28 PROCEDURE — 86304 IMMUNOASSAY TUMOR CA 125: CPT

## 2022-12-28 PROCEDURE — 82378 CARCINOEMBRYONIC ANTIGEN: CPT

## 2022-12-28 PROCEDURE — 80053 COMPREHEN METABOLIC PANEL: CPT

## 2022-12-28 PROCEDURE — 82105 ALPHA-FETOPROTEIN SERUM: CPT

## 2022-12-28 PROCEDURE — 86300 IMMUNOASSAY TUMOR CA 15-3: CPT

## 2022-12-28 PROCEDURE — G1010 CDSM STANSON: HCPCS | Mod: XU | Performed by: RADIOLOGY

## 2022-12-28 PROCEDURE — 74177 CT ABD & PELVIS W/CONTRAST: CPT | Mod: MG | Performed by: RADIOLOGY

## 2022-12-28 PROCEDURE — 85025 COMPLETE CBC W/AUTO DIFF WBC: CPT

## 2022-12-28 PROCEDURE — 36415 COLL VENOUS BLD VENIPUNCTURE: CPT

## 2022-12-28 PROCEDURE — 71260 CT THORAX DX C+: CPT | Mod: MG | Performed by: RADIOLOGY

## 2022-12-28 RX ORDER — IOPAMIDOL 755 MG/ML
89 INJECTION, SOLUTION INTRAVASCULAR ONCE
Status: COMPLETED | OUTPATIENT
Start: 2022-12-28 | End: 2022-12-28

## 2022-12-28 RX ADMIN — IOPAMIDOL 89 ML: 755 INJECTION, SOLUTION INTRAVASCULAR at 14:13

## 2022-12-28 NOTE — TELEPHONE ENCOUNTER
RECORDS STATUS - BREAST    RECORDS REQUESTED FROM: [facility], [facility], [facility]   DATE REQUESTED:    NOTES DETAILS STATUS   OFFICE NOTE from referring provider [facility] 12/13/22: Dr. Sami hess   OFFICE NOTE from medical oncologist External: [facility] 05/01/06: Dr. Sakina Brandt   OFFICE NOTE from surgeon [facility] 08/11/21: Dr. Eileen Matta   OPERATIVE REPORT [facility] 02/03/16: LEFT SECOND STAGE BREAST RECONSTRUCTION WITH REMOVAL OF TISSUE EXPANDER PLACEMENT OF PERMANENT IMPLANT    08/31/15: FIRST STAGE LEFT BREAST RECONSTRUCTION WITH PLACEMENT OF TISSUE EXPANDER AND DERMAL MATRIX    03/02/00: Left breast biopsy   MEDICATION LIST External: [facility]    LABS     REQUEST BLOCKS FOR ALL BREAST CANCER PTS     PATHOLOGY REPORTS  (Tissue diagnosis, Stage, ER/[place] percentage positive and intensity of staining, HER2 IHC, FISH, and all biopsies from breast and any distant metastasis)                 External: [facility] (Slides no longer available for send out) 03/02/00:: [id number]  06/03/02: HER 2 KAJAL   IMAGING (NEED IMAGES & REPORT)     CT SCANS PACS 12/28/22: CT Chest Abd Pel  09/21/21: CT Abd Pel   MRI PACS 12/20/22: MR Pelvis  06/18/19: MR Breast   MAMMO PACS 10/18/21-05/04/14   BONE SCAN PACS 10/14/22

## 2022-12-29 ENCOUNTER — ONCOLOGY VISIT (OUTPATIENT)
Dept: ONCOLOGY | Facility: CLINIC | Age: 72
End: 2022-12-29
Attending: PREVENTIVE MEDICINE
Payer: MEDICARE

## 2022-12-29 ENCOUNTER — PRE VISIT (OUTPATIENT)
Dept: ONCOLOGY | Facility: CLINIC | Age: 72
End: 2022-12-29
Payer: MEDICARE

## 2022-12-29 VITALS
HEIGHT: 63 IN | HEART RATE: 85 BPM | BODY MASS INDEX: 25.16 KG/M2 | OXYGEN SATURATION: 99 % | WEIGHT: 142 LBS | DIASTOLIC BLOOD PRESSURE: 73 MMHG | SYSTOLIC BLOOD PRESSURE: 137 MMHG

## 2022-12-29 DIAGNOSIS — E83.52 HYPERCALCEMIA: ICD-10-CM

## 2022-12-29 DIAGNOSIS — R93.89 ABNORMAL FINDINGS ON DIAGNOSTIC IMAGING OF OTHER SPECIFIED BODY STRUCTURES: ICD-10-CM

## 2022-12-29 DIAGNOSIS — R59.9 LYMPH NODE ENLARGEMENT: Primary | ICD-10-CM

## 2022-12-29 DIAGNOSIS — G89.3 NEOPLASM RELATED PAIN (ACUTE) (CHRONIC): ICD-10-CM

## 2022-12-29 DIAGNOSIS — R59.9 LYMPH NODE ENLARGEMENT: ICD-10-CM

## 2022-12-29 DIAGNOSIS — Z85.3 HISTORY OF BREAST CANCER IN ADULTHOOD: ICD-10-CM

## 2022-12-29 LAB
AFP SERPL-MCNC: 5.9 NG/ML
HOLD SPECIMEN: NORMAL
LDH SERPL L TO P-CCNC: 169 U/L (ref 81–234)
TOTAL PROTEIN SERUM FOR ELP: 7.2 G/DL (ref 6.4–8.3)

## 2022-12-29 PROCEDURE — 83615 LACTATE (LD) (LDH) ENZYME: CPT | Performed by: INTERNAL MEDICINE

## 2022-12-29 PROCEDURE — 86334 IMMUNOFIX E-PHORESIS SERUM: CPT

## 2022-12-29 PROCEDURE — 84155 ASSAY OF PROTEIN SERUM: CPT | Performed by: INTERNAL MEDICINE

## 2022-12-29 PROCEDURE — 86335 IMMUNFIX E-PHORSIS/URINE/CSF: CPT

## 2022-12-29 PROCEDURE — 82784 ASSAY IGA/IGD/IGG/IGM EACH: CPT | Performed by: INTERNAL MEDICINE

## 2022-12-29 PROCEDURE — 99000 SPECIMEN HANDLING OFFICE-LAB: CPT | Performed by: INTERNAL MEDICINE

## 2022-12-29 PROCEDURE — 84165 PROTEIN E-PHORESIS SERUM: CPT

## 2022-12-29 PROCEDURE — 86301 IMMUNOASSAY TUMOR CA 19-9: CPT | Performed by: INTERNAL MEDICINE

## 2022-12-29 PROCEDURE — 99204 OFFICE O/P NEW MOD 45 MIN: CPT | Performed by: INTERNAL MEDICINE

## 2022-12-29 PROCEDURE — 36415 COLL VENOUS BLD VENIPUNCTURE: CPT | Performed by: INTERNAL MEDICINE

## 2022-12-29 PROCEDURE — 83521 IG LIGHT CHAINS FREE EACH: CPT | Performed by: INTERNAL MEDICINE

## 2022-12-29 ASSESSMENT — PAIN SCALES - GENERAL: PAINLEVEL: EXTREME PAIN (9)

## 2022-12-29 NOTE — PROGRESS NOTES
[facility]    Hematology/Oncology New Patient Note      Today's Date: 12/29/2022    Referring provider: Sami Murphy MD  Reason for Consultation: See a pelvic MRI results suggesting metastatic disease in the pelvis and in multiple lymph nodes.  History of breast cancer in remission since 2000    HISTORY OF PRESENT ILLNESS: Virginia Byers is a 72 year old female who was referred to the Hematology/Oncology Clinic for concern for metastatic disease with a distant history of breast cancer in 2000. Pt is accompanied by her friend Aurelio, who is her primary care taker.     Patient has medical history including LEFT breast cancer in 2000, hypertension, hyperlipidemia, osteoporosis, osteoarthritis, degenerative disc disease, spinal stenosis of lumbar region,  s/p bilateral L5-S1 transforaminal ROS by Dr. Garcia on 7/9/2021, s/p bilateral L4-5 and L5-S1 facet joint injections on 6/14/2021, glaucoma and macular degeneration, cataracts s/p surgery, history of tobacco use (quit 1999)    Regarding previous history of breast cancer:  She was previously treated by Dr. Sakina Brandt at [facility]     In summary, diagnosed at age 49 with LEFT breast IDC ER positive, UT positive, HER2 positive on FISH. 3/2000 left breast lumpectomy and left axillary node dissection (IDC, grade 3, 1.45 cm incompletely excised tumor, DCIS, positive margins with infiltrating carcinoma, 1 of 17 lymph nodes positive (0.5 cm metastatic focus), ER positive, UT positive, HER2 positive).  4/2020 left breast simple mastectomy with no residual carcinoma. S/p ACx4, taxol x4, no anti-her2 treatment, no RT. S/p 5 years adjuvant endocrine therapy (tamoxifen and anastrozole). Delayed left breast reconstruction and right breast augmentation.      3/2/2000  Excisional biopsy of Left breast mass -   Infiltrating ductal carcinoma with associated DCIS   ER positive (60%), UT positive (22%), HER2 by FISH  POSITIVE     3/10/2000 - Left lumpectomy and left axillary node  Invasive ductal carcinoma, Grade 3, 1.45 cm (incompletely excised), negative for LVI  DCIS, greatest histologic dimension of tumor (DCIS PLUS invasive tumor) = 2.9 cm (estimate 50% DCIS)  Positive margins- infiltrating carcinoma extends to margin  1 of 17 axillary lymph nodes sampled was positive for metastatic focus of 0.5 cm.  ER/WI+     Sections of lumpectomy left breast showing residual 1.1 cm focus of ductal carcinoma in situ (DCIS), present within microns of the linked margin of resection.       4/11/2000 - Left breast simple mastectomy, negative for residual carcinoma  Left mastectomy on 4/11/2000 performed by Dr. Cristian Mcdonald at [facility].      Adjuvant treatment:  - 5/15/2000-7/24/2000 4 cycles of Adriamycin and Cytoxan   - 8/16/2000-10/18/2000 4 cycles of paclitaxel   - Appears she may have been on some kind of trial/protocol, was not given any anti-HER2 targeted treatment  - No adjuvant radiation  - 12/2000- 10/2002 tamoxifen  - 10/2002-5/2006 switched to anastrozole (due to in vitro studies showing anastrozole may be slightly better for patients were HER2 positive) with Fosamax for osteopenia    8/31/2015 Left delayed reconstruction with tissue expander    2/3/2016 Left 2nd stage 450 cc high profile silicone implant; right augmentation 150 cc Moderate classic profile silicone implant; cresent mastopexy     Current history:  -Ongoing right knee pain not improved with brace and physical therapy, s/p bilateral L5-S1 transforaminal ROS by Dr. Garcia on 7/9/2021, s/p bilateral L4-5 and L5-S1 facet joint injections on 6/14/2021. ongoing b/l hip and leg pain, worse with position changes (ie sitting to standing), constant, 8-9/10, tylenol brings pain down to 6/10 (using tylenol bid). ECOG 3.   -Patient has seen neurosurgery Dr. Jose Alexander, occupational medicine specialist Dr.Orrin Murphy, with plan to see PM&R Dr. Margie Agudelo and  movement disorder clinic, EMG pending  - 11/22 MRI lumbar spine:    prominent left iliac lymph nodes among other findings related to degenerative changes and neural foraminal stenosis throughout lumbar spine  - 12/22 MRI pelvis:  2. Enhancing marrow signal abnormality of the right iliac bone extending from the superior acetabulum subchondral location proximally almost to the level of the sacroiliac joint caudal aspect. No associated fracture line. Underlying marrow infiltrative process such as from metastasis cannot be excluded especially given the degree of T1 hypointensity and history of primary tumor. Other consideration include stress reaction.  3. Mildly increased left external iliac chain, and the right medial thigh lymphadenopathy. Metastasis cannot be excluded.  4. Left posterior iliac enhancing lesion, similar in size to prior CT 9/2021 and previously not visible on CT, focal enhancing lesion in the right greater trochanter laterally. Findings are concerning for Metastasis.  -12/22 CT chest abdomen pelvis with contrast:  COMPARISON: MRI pelvis 12/20/2022 and CT abdomen pelvis 9/21/2021.  1. Mastectomies with implant reconstructions. No lymphadenopathy  2.  There is an new or increased left common iliac, external iliac and pelvic sidewall lymphadenopathy since prior CT. Distal left common iliac lymph nodes measure up to 1.2 cm on this exam compared to 0.7 cm on prior CT. Left pelvic sidewall lymph node measures 2.1 cm short axis on this exam compared to 1.2 cm on prior CT . consistent with metastatic disease.  3. Sclerotic lesions in the left sacrum and left posterior ilium are unchanged from prior CT.  may be metastatic.  4. No evidence of metastatic disease in the chest.    Of note, patient has documentation of anemia (iron deficiency and anemia of chronic disease), elevated LFTs, nausea and vomiting of all solids and liquids, fatigue with 26 pound unintentional weight loss from 7/20/2021 - 10/20/2021 with  mildly elevated LFTs which led to evaluation with mammogram, endoscopy with EUS and colonoscopy as detailed below. Pt did associate these with some spinal injections she had gotten, due to overlapping times. Pt did regain all of the weight and hand improvement of anemia within 2 months, without significant intervention.     -10/18/2021: Screening mammogram right breast ZACKARY  -10/2021: Endoscopy with EUS normal EGD and no stigmata or source of upper GI bleeding, visualized bile duct, gallbladder, liver, pancreas, left adrenal gland normal.  No lymphadenopathy. LIVER, RANDOM NEEDLE BIOPSY: Benign parenchyma with congestion; no significant fibrosis or other abnormalities     -10/2021 colonoscopy: Hemorrhoids, diverticulosis in sigmoid colon and splenic flexure    Family history of cancer- sister breast cancer- dx at age 75 y/o, localized breast cancer, surgical resection, RT, no adjuvant chemo or endocrine therapy that pt is aware of     Denies melena, hematochezia, hematuria, dysphagia, odynophagia, GERD, dyspnea. Pts activity is limited by pain. Pt has hx of smoking- quit in 1999.       REVIEW OF SYSTEMS:   A 14 point ROS was reviewed with pertinent positives and negatives in the HPI.        HOME MEDICATIONS:  Current Outpatient Medications   Medication Sig Dispense Refill     dorzolamide-timolol (COSOPT) 2-0.5 % ophthalmic solution Place 1 drop into both eyes 2 times daily       latanoprost (XALATAN) 0.005 % ophthalmic solution Place 1 drop into both eyes daily        losartan (COZAAR) 100 MG tablet TAKE 1 TABLET BY MOUTH EVERY DAY 90 tablet 0     rosuvastatin (CRESTOR) 10 MG tablet TAKE 1 TABLET (10 MG) BY MOUTH DAILY. (Patient taking differently: Take 10 mg by mouth every 7 days) 90 tablet 3         ALLERGIES:  Allergies   Allergen Reactions     Compazine      Mental confusion     Hydrochlorothiazide      Dryness mouth     Prochlorperazine Visual Disturbance     Simvastatin Other (See Comments)     Muscle aches          PAST MEDICAL HISTORY:  Past Medical History:   Diagnosis Date     Breast cancer (H)     Lt breast     Glaucoma     bilateral     Hyperlipidemia      Hypertension      Macular degeneration      Osteoarthritis      Osteoporosis          PAST SURGICAL HISTORY:  Past Surgical History:   Procedure Laterality Date     C MASTECTOMY,SIMPLE  3/2000    left     COLONOSCOPY      Q 10 years     COLONOSCOPY N/A 10/4/2021    Procedure: COLONOSCOPY;  Surgeon: Guru Lyla Ruby MD;  Location: [facility] OR     ESOPHAGOSCOPY, GASTROSCOPY, DUODENOSCOPY (EGD), COMBINED N/A 10/4/2021    Procedure: ENDOSCOPIC ULTRASOUND, ESOPHAGOSCOPY / UPPER GASTROINTESTINAL TRACT (GI), Esophagogastroduodenoscopy,  Fine Needle Biopsy of liver;  Surgeon: Guru Lyla Ruby MD;  Location: [facility] OR     SURGICAL HISTORY OF -  Left 2015    tissue expander placed in left breast area     SURGICAL HISTORY OF -  Left 2016    left breast implant and right mammoplasty         SOCIAL HISTORY:  Social History     Socioeconomic History     Marital status: Single     Spouse name: Not on file     Number of children: 0     Years of education: Not on file     Highest education level: Not on file   Occupational History     Employer: [employer]     Occupation: REtired   Tobacco Use     Smoking status: Former     Packs/day: 1.00     Years: 17.00     Pack years: 17.00     Types: Cigarettes     Quit date: 3/15/1999     Years since quittin.8     Smokeless tobacco: Never   Substance and Sexual Activity     Alcohol use: Yes     Alcohol/week: 4.0 standard drinks     Types: 4 Glasses of wine per week     Comment: occasional     Drug use: No     Sexual activity: Not Currently   Other Topics Concern     Parent/sibling w/ CABG, MI or angioplasty before 65F 55M? Yes     Comment: Father heart attack   Social History Narrative     Not on file     Social Determinants of Health     Financial Resource Strain: Not on file   Food Insecurity: Not  "on file   Transportation Needs: Not on file   Physical Activity: Not on file   Stress: Not on file   Social Connections: Not on file   Intimate Partner Violence: Not on file   Housing Stability: Not on file         FAMILY HISTORY:  Family History   Problem Relation Age of Onset     Cancer Mother         bone cancer     Other Cancer Mother      Heart Disease Father      Coronary Artery Disease Father      Arthritis Sister      Breast Cancer Sister         age 75     Diabetes Maternal Grandmother      Diabetes Paternal Grandmother          PHYSICAL EXAM:  Vital signs:  /73 (BP Location: Right arm, Patient Position: Chair, Cuff Size: Adult Regular)   Pulse 85   Ht 1.598 m (5' 2.91\")   Wt 64.4 kg (142 lb)   SpO2 99%   BMI 25.23 kg/m       ECOG: 3  GENERAL/CONSTITUTIONAL: No acute distress.  EYES: Pupils are equal and round. Extraocular movements intact without nystagmus.  No scleral icterus.  RESPIRATORY: Equal chest rise.   MUSCULOSKELETAL: Warm and well-perfused, no cyanosis, clubbing, or edema.   NEUROLOGIC: Cranial nerves are grossly intact. Alert, oriented to person, place and time, answers questions appropriately.  INTEGUMENTARY: No rashes or jaundice.  GAIT: Steady, does not use assistive device  BREAST: left breast implant intact, right breast reconstructive surgery with scarring at areola. No palpable masses.        LABS:  CBC RESULTS:   Recent Labs   Lab Test 12/28/22  1254   WBC 10.8   RBC 4.80   HGB 12.9   HCT 40.9   MCV 85   MCH 26.9   MCHC 31.5   RDW 14.3          Recent Labs   Lab Test 12/28/22  1254 09/21/22  1256    137   POTASSIUM 4.8 4.2   CHLORIDE 106 105   CO2 30 23   ANIONGAP 4 9   * 88   BUN 14 20   CR 0.43* 0.53   COURTNEY 10.4* 10.1         PATHOLOGY:  Case Report   Surgical Pathology Report                         Case: [id number]                                   Authorizing Provider:  Guru Lyla Ruby Collected:           10/04/2021 10:58 AM                "                   MD Freddie                                                                  Ordering Location:      MAIN OR                 Received:            10/04/2021 11:22 AM           Pathologist:           Sabrina Griffiths MD                                                            Specimen:    Liver, Random Liver Biopsy (Fine Needle Biopsy)                                            Final Diagnosis   LIVER, RANDOM NEEDLE BIOPSY:   Benign parenchyma with congestion; no significant fibrosis or other abnormalities   (See Comment)          Electronically signed by Sabrina Griffiths MD on 10/5/2021 at  8:53 AM   Comment  [facility]   The sample is fragmented and shows benign, non-neoplastic liver parenchyma with no steatosis, necroinflammation or evidence of an obstructive or other biliary tract disease. The parenchyma is however moderately congested with mildly dilated sinusoids. There is no mass lesion identified, and the trichrome and reticulin stains highlight absence of fibrosis or other architectural abnormality of NRH or other. The iron stain is negative for hemosiderosis; PAS and PAS-D stains show no abnormal cytoplasmic accumulations.      Parenchymal congestion in a random biopsy usually indicates some element of venous outflow impediment, for which additional screening for right ventricular function, and assessment of the major hepatic veins and IVC should be considered.  Rarely, this is due to injury at the level of small intrahepatic venules and distal sinusoids, typically by medications (but also radiation, and others). Lastly congestion could be seen around a mass lesion due to compression. This does not immediately seem to be the context in this patient though, given that this was a non-targeted lesion and recent images does not seem to have neoplastic concerns.  Nevertheless, if necessary, this could be another consideration in the overall work-up in this clinical context of unexplained weight  "loss.       Clinical Information  SHAGUFTA   Patient is a 70-year-old woman with abnormal weight loss of up to 25 lbs in 2 months. Liver enzymes also elevated at ; ; AST 39; normal bilirubin.      Gross Description  SHAGUFTA GRESHAM(1). Liver, Random Liver Biopsy (Fine Needle Biopsy):  The specimen is received in formalin with proper patient identification, labeled \"random liver biopsy\".  The specimen consists of multiple fragmented white-red core biopsies ranging from 0.1 to 0.9 cm in length and averaging 0.1 cm in diameter.  The specimen is filtered and submitted in toto in cassettes A1-A2.                 Microscopic Description  SHAGUFTA   Microscopic examination has been performed.     Special Stains  LUBAMARISELA   Trichrome, reticulin, iron, PAS/PAS-D          IMAGING:  MR pelvis with and withoutcontrast 12/20/2022 10:13 AM     Techniques: Multiplanar multisequence imaging of the pelvis was  obtained with and without administration of intravenous contrast using  routing MSK protocol.     History: Chronic midline low back pain without sciatica; Chronic  midline low back pain without sciatica      Comparison: MRI lumbar spine same date, CT 9/21/2021     Findings:     Low signal-to-noise ratio on the fluid sensitive sequences partially  compromising assessment.     Osseous structures  Osseous structures: Severe bilateral hip degenerative changes with  complete joint space loss, extensive subchondral cystlike changes and  subchondral edema like marrow signal intensities. No fracture     T1 hypointense, T2 hyperintense, enhancing marrow signal abnormality  of the right iliac bone extending from the superior acetabulum  subchondral location proximally almost to the level of the sacroiliac  joint caudal aspect. Degree of T1 hypointensity reaches mildly  hypointense to regional muscle. No associated fracture line.     Enhancing lesion in the left posterior iliac bone, measuring  approximately 2.1 cm, similar to " comparison where previously  associated sclerotic lesion was present. Findings most concerning for  metastatic lesion.     Previously not visible on CT, focal enhancing lesion in the right  greater trochanter laterally (image 43 axial). Nonspecific cystlike  change at the right iliac bone adjacent to some sacroiliac joint.     Degenerative changes of pubic symphysis.     Degenerative changes of visualized lower lumbar spine. Lumbosacral  transitional anatomy.     Internal derangement of joints are not well assessed owing to chosen  field of view.     Joint and Periarticular soft tissue:     Sacroiliac joints and pubic symphysis are congruent.     Joint effusion: Bilateral moderate hip joint effusions with extensive  synovitis.     Bursal effusion: Minimal nonspecific edema over the greater  trochanter. No substantial iliopsoas or trochanteric bursal effusion.     Muscles and tendons  Muscles and tendons: Small left ischial tuberosity bursal fluid.  Severe left hamstring conjoined portion tendinosis. Bilateral adductor  muscle edema, likely related to muscle strain.     The rectus femoris, sartorius, and iliopsoas tendons intact  bilaterally. The hip abductors are intact bilaterally.     Nerves:  The visualized course of the sciatic nerves are unremarkable  bilaterally.     Other Findings:     Persistent, mildly increased left external iliac chain  lymphadenopathy, measuring 1.8 x 3.2, previously 1.5 x 2.7 cm using  similar measurement technique. Presumed 1.7 cm short axis right medial  thigh lymphadenopathy, mildly increased since comparison.     T1 hyperintense presumed perineural cysts at sacrum.                                                                      Impression:  1. Severe bilateral hip degenerative changes with synovitis and  extensive subchondral cystic changes. Findings may potentially  indicate inflammatory component presence with secondary  osteoarthrosis.  2. Enhancing marrow signal abnormality  of the right iliac bone  extending from the superior acetabulum subchondral location proximally  almost to the level of the sacroiliac joint caudal aspect. No  associated fracture line. Underlying marrow infiltrative process such  as from metastasis cannot be excluded especially given the degree of  T1 hypointensity and history of primary tumor. Other consideration  include stress reaction.  3. Mildly increased left external iliac chain, and the right medial  thigh lymphadenopathy. Metastasis cannot be excluded.  4. Left posterior iliac enhancing lesion, similar in size to prior CT  9/2021 and previously not visible on CT, focal enhancing lesion in the  right greater trochanter laterally. Findings are concerning for  metastasis.     I have personally reviewed the examination and initial interpretation  and I agree with the findings.     HARSH MOREL     PROCEDURES:  COLONOSCOPY 10/04/2021 10:18 AM [address] [place] [address] [phone]     Endoscopy Department   _______________________________________________________________________________   Patient Name: Virginia Byers         Procedure Date: 10/4/2021 10:18 AM   MRN: [id number]                       Account Number: [id number]   YOB: 1950             Admit Type: Outpatient   Age: 70                               Room: OR   Gender: Female                        Note Status: Finalized   Attending MD: Guru Ruby ,     Total Sedation Time:   _______________________________________________________________________________       Procedure:           Colonoscopy   Indications:         Iron deficiency anemia                        70 year old white female who is being evaluated for dry                        mouth, unintentional weigh loss 26 lbs, no overt GI                        bleeding, diarrhea or constipation, normal cologuard in                        2019 and  normal colonoscopy 12 years ago. Anemia based                        on iron panel appears to be anemia of chronic disease.                        Colonoscopy to evaluate for bleeding as a cause for                        anemia   Providers:           Guru Ruby   Referring MD:        Chuyita Zaidi MD   Medicines:           General Anesthesia   Complications:       No immediate complications.   _______________________________________________________________________________   Procedure:           Pre-Anesthesia Assessment:                        - Prior to the procedure, a History and Physical was                        performed, and patient medications and allergies were                        reviewed. The patient is competent. The risks and                        benefits of the procedure and the sedation options and                        risks were discussed with the patient. All questions                        were answered and informed consent was obtained. Patient                        identification and proposed procedure were verified by                        the physician and the nurse in the pre-procedure area.                        Mental Status Examination: alert and oriented. Airway                        Examination: normal oropharyngeal airway and neck                        mobility. Respiratory Examination: clear to                        auscultation. CV Examination: normal. Prophylactic                        Antibiotics: The patient does not require prophylactic                        antibiotics. Prior Anticoagulants: The patient has taken                        no previous anticoagulant or antiplatelet agents. ASA                        Grade Assessment: II - A patient with mild systemic                        disease. After reviewing the risks and benefits, the                        patient was deemed in satisfactory condition to undergo                        the procedure. The  anesthesia plan was to use general                        anesthesia. Immediately prior to administration of                        medications, the patient was re-assessed for adequacy to                        receive sedatives. The heart rate, respiratory rate,                        oxygen saturations, blood pressure, adequacy of                        pulmonary ventilation, and response to care were                        monitored throughout the procedure. The physical status                        of the patient was re-assessed after the procedure.                        After obtaining informed consent, the colonoscope was                        passed under direct vision. Throughout the procedure,                        the patient's blood pressure, pulse, and oxygen                        saturations were monitored continuously. The Colonoscope                        was introduced through the anus and advanced to 10 cm                        into the ileum.                                                                                     Findings:        Hemorrhoids were found on perianal exam.        A few small and large-mouthed diverticula were found in the sigmoid        colon and splenic flexure.        The exam was otherwise without abnormality.        The terminal ileum appeared normal.        Non-bleeding internal hemorrhoids were found during retroflexion. The        hemorrhoids were mild.                                                                                     Impression:          - Hemorrhoids found on perianal exam.                        - Diverticulosis in the sigmoid colon and at the splenic                        flexure.                        - The examination was otherwise normal.                        - The examined portion of the ileum was normal.                        - Non-bleeding internal hemorrhoids.   Recommendation:      - Observe patient in same day observation  unit for                        ongoing care.                        - Her anemia appears to be anemia of chronic disease                         more than iron deficiency anemia, therefore will hold                        off on further work up including capsule endoscopy                        - Will recommend further work up for autoimmune diseases                        or thyroid disease with PCP                                                                                       ____________________   Guru Ruby,   10/4/2021 11:55:01 AM   I was physically present for the entire viewing portion of the exam.   __________________________      Upper EUS 10/04/2021 10:19 AM [address] [place] [address] [phone]     Endoscopy Department   _______________________________________________________________________________   Patient Name: Virginia Byers         Procedure Date: 10/4/2021 10:19 AM   MRN: [id number]                       Account Number: [id number]   YOB: 1950             Admit Type: Outpatient   Age: 70                               Room: OR   Gender: Female                        Note Status: Finalized   Attending MD: Guru Ruby ,     Total Sedation Time:   _______________________________________________________________________________       Procedure:           Upper EUS   Indications:         Elevated liver enzymes                        70 year old white female with h/o breast cancer, HTN,                        HLD, glaucoma, macular degenerationis being evaluated                        for 26 lbs unintentional weight loss since July, no                        overt GI bleeding, but new onset anemia (of chronic                        disease), mildly elevated ALT and alkaline phosphatase.                        EGD to evaluate for causes for unintentional weight loss                        followed  by EUS to evaluate for elevated LFTs   Providers:           Guru Ruby   Referring MD:        Chuyita Zaidi MD   Medicines:           General Anesthesia   Complications:       No immediate complications.   _______________________________________________________________________________   Procedure:           Pre-Anesthesia Assessment:                        - Prior to the procedure, a History and Physical was                        performed, and patient medications and allergies were                        reviewed. The patient is competent. The risks and                        benefits of the procedure and the sedation options and                        risks were discussed with the patient. All questions                        were answered and informed consent was obtained. Patient                        identification and proposed procedure were verified by                        the physician and the nurse in the pre-procedure area.                        Mental Status Examination: alert and oriented. Airway                        Examination: normal oropharyngeal airway and neck                        mobility. Respiratory Examination: clear to                        auscultation. CV Examination: normal. Prophylactic                        Antibiotics: The patient does not require prophylactic                        antibiotics. Prior Anticoagulants: The patient has taken                        no previous anticoagulant or antiplatelet agents. ASA                        Grade Assessment: II - A patient with mild systemic                        disease. After reviewing the risks and benefits, the                        patient was deemed in satisfactory condition to undergo                        the procedure. The anesthesia plan was to use general                        anesthesia. Immediately prior to administration of                        medications, the patient was re-assessed for adequacy to                         receive sedatives. The heart rate, respiratory rate,                        oxygen saturations, blood pressure, adequacy of                        pulmonary ventilation, and response to care were                        monitored throughout the procedure. The physical status                        of the patient was re-assessed after the procedure.                        After obtaining informed consent, the endoscope was                        passed under direct vision. Throughout the procedure,                        the patient's blood pressure, pulse, and oxygen                        saturations were monitored continuously. The Endoscope                        was introduced through the mouth, and advanced to the                        second part of duodenum. The was introduced through the                        mouth, and advanced to the second part of duodenum.                                                                                     Findings:        ENDOSCOPIC FINDING: :        The examined esophagus was endoscopically normal.        The entire examined stomach was endoscopically normal.        The examined duodenum was endoscopically normal.        ENDOSONOGRAPHIC FINDING: :        The major papilla was endoscopically and sonographically normal. The        common bile duct was followed from the major papilla to the liver and no        stones were seen. The duct was non-dilated and measured 4 mm.The        gallbladder was surgically absent.There was no intrahepatic biliary        dilation.        There was no sign of significant endosonographic abnormality in the        visualized portion of the liver. No focal pathology and no masses were        identified. Fine needle biopsy was performed. Color Doppler imaging was        utilized prior to needle puncture to confirm a lack of significant        vascular structures within the needle path. Two passes were made with        the 19  gauge ultrasound biopsy needle using a transgastric approach. A        good visible core of tissue was obtained. Final cytology results are        pending.        Non specific pancreatic parenchymal changes such as hyperechoic strands        were present. There were no features of chronic pancreatitis. No masses,        cysts or stones were visualized in the pancreatic parenchyma. The main        pancreatic duct was followed from the head to the body, excluding        pancreas divisum. The pancreatic duct measured 2.0 mm in the head, 0.8        mm in the body of the pancreas.        There was no sign of significant endosonographic abnormality in the left        adrenal gland. No focal pathology was identified.        No lymphadenopathy seen.                                                                                     Impression:          - Normal EGD and no stigmata or source of upper GI                        bleeding could be identfied to explain anemia.                        - Bile duct was normal and no evidence of biliary tract                        obstruction and gall bladder was unremarkable                        - There was no evidence of significant pathology in the                        visualized portion of the liver. EUS guided fine needle                        biopsy was performed using a 19 G needle (wet suction                        method and slow pull technique).                        - Non-specific pancreatic parenchymal changes                        - Liver, left adrenal were unremarkable   Recommendation:      - Perform a colonoscopy today for further work up of                        anemia.                        - Await path results.                        - Patient has been referred to Dr Cisse for further                        work up and management of the mildly elevated LFTs                        - Findings discussed with patient                                                                                        ____________________   Guru Ruby,   10/6/2021 1:27:22 PM   I was physically present for the entire viewing portion of the exam.          ASSESSMENT/PLAN:  Virginia Byers is a 72 year old female with:    #New left common iliac, external iliac, pelvic sidewall lymphadenopathy, suspected metastatic disease with unknown primary   #Stable sclerotic lesions in left sacrum and left posterior ilium, possible metastasis  -10/21 mammogram, endoscopy with EUS and colonoscopy WNL  -11/22 MRI lumbar spine in 12/22 MRI pelvis show prominent left iliac lymph nodes, right medial thigh lymphadenopathy, right iliac bone enhancing marrow signal, left posterior iliac enhancing lesion  -12/22 CT chest abdomen pelvis shows new or increased left common iliac, external iliac, pelvic sidewall lymphadenopathy and sclerotic lesions in left sacrum and left posterior ilium.  No metastatic disease in chest.  No abnormal findings in breast.  - 12/22 tumor markers: CA 15-3 24,  29, CEA 3.1    - IR referral for lymph node biopsy  - MRI brain with contrast  - check AFP, , LDH    #mild hypercalcemia  - 12/22 calcium 10.4, corrected for hypoalbuminemia calcium is 11  -May be related to suspected malignancy as detailed above  -Patient does have protein gap with hypoalbuminemia  -9/22 SIFE and UIFE negative, repeat protein studies to check for monoclonal gammopathy including SPEP, SIFE, 24 hour urine UPEP and UIFE, 24 hour urine with protein to creatinine ratio, kappa and lambda light chain ratio, quantitative serum immunoglobulins    #History of LEFT breast IDC ER positive, [place]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    -4/2020 left breast simple mastectomy with no residual carcinoma.   -S/p ACx4, taxol x4, no anti-her2 treatment, no RT.   -S/p 5 years adjuvant endocrine therapy (tamoxifen and anastrozole).  -Delayed left breast reconstruction and right breast augmentation.     RTC 2 weeks for f/u      Naomy Zaidi DO  Hematology/Oncology  [facility]    Future Appointments   Date Time Provider Department Center   12/29/2022  1:45 PM Naomy Zaidi DO [facility]   1/6/2023 12:30 PM Margie Agudelo MD [facility]   1/19/2023  9:00 AM Janett Noland APRN CNP [facility]   2/22/2023  4:00 PM Mikki Witt MD [facility]   3/23/2023  1:00 PM Santi Weems MD [facility]   7/10/2023  9:00 AM Dusty Robin MD [facility]

## 2022-12-29 NOTE — LETTER
12/29/2022         RE: Virginia Byers  [address] [facility] [address]        Dear Colleague,    Thank you for referring your patient, Virginia Byers, to the [facility]. Please see a copy of my visit note below.    [facility]    Hematology/Oncology New Patient Note      Today's Date: 12/29/2022    Referring provider: Sami Murphy MD  Reason for Consultation: See a pelvic MRI results suggesting metastatic disease in the pelvis and in multiple lymph nodes.  History of breast cancer in remission since 2000    HISTORY OF PRESENT ILLNESS: Virginia Byers is a 72 year old female who was referred to the Hematology/Oncology Clinic for concern for metastatic disease with a distant history of breast cancer in 2000. Pt is accompanied by her friend Aurelio, who is her primary care taker.     Patient has medical history including LEFT breast cancer in 2000, hypertension, hyperlipidemia, osteoporosis, osteoarthritis, degenerative disc disease, spinal stenosis of lumbar region,  s/p bilateral L5-S1 transforaminal ROS by Dr. Garcia on 7/9/2021, s/p bilateral L4-5 and L5-S1 facet joint injections on 6/14/2021, glaucoma and macular degeneration, cataracts s/p surgery, history of tobacco use (quit 1999)    Regarding previous history of breast cancer:  She was previously treated by Dr. Sakina Brandt at [facility]     In summary, diagnosed at age 49 with LEFT breast IDC ER positive, AZ positive, HER2 positive on FISH. 3/2000 left breast lumpectomy and left axillary node dissection (IDC, grade 3, 1.45 cm incompletely excised tumor, DCIS, positive margins with infiltrating carcinoma, 1 of 17 lymph nodes positive (0.5 cm metastatic focus), ER positive, AZ positive, HER2 positive).  4/2020 left breast simple mastectomy with no residual carcinoma. S/p ACx4, taxol x4, no anti-her2 treatment, no RT. S/p 5 years adjuvant endocrine therapy (tamoxifen and  anastrozole). Delayed left breast reconstruction and right breast augmentation.      3/2/2000  Excisional biopsy of Left breast mass -   Infiltrating ductal carcinoma with associated DCIS   ER positive (60%), NM positive (22%), HER2 by FISH POSITIVE     3/10/2000 - Left lumpectomy and left axillary node  Invasive ductal carcinoma, Grade 3, 1.45 cm (incompletely excised), negative for LVI  DCIS, greatest histologic dimension of tumor (DCIS PLUS invasive tumor) = 2.9 cm (estimate 50% DCIS)  Positive margins- infiltrating carcinoma extends to margin  1 of 17 axillary lymph nodes sampled was positive for metastatic focus of 0.5 cm.  ER/NM+     Sections of lumpectomy left breast showing residual 1.1 cm focus of ductal carcinoma in situ (DCIS), present within microns of the linked margin of resection.       4/11/2000 - Left breast simple mastectomy, negative for residual carcinoma  Left mastectomy on 4/11/2000 performed by Dr. Cristian Mcdonald at [facility].      Adjuvant treatment:  - 5/15/2000-7/24/2000 4 cycles of Adriamycin and Cytoxan   - 8/16/2000-10/18/2000 4 cycles of paclitaxel   - Appears she may have been on some kind of trial/protocol, was not given any anti-HER2 targeted treatment  - No adjuvant radiation  - 12/2000- 10/2002 tamoxifen  - 10/2002-5/2006 switched to anastrozole (due to in vitro studies showing anastrozole may be slightly better for patients were HER2 positive) with Fosamax for osteopenia    8/31/2015 Left delayed reconstruction with tissue expander    2/3/2016 Left 2nd stage 450 cc high profile silicone implant; right augmentation 150 cc Moderate classic profile silicone implant; cresent mastopexy     Current history:  -Ongoing right knee pain not improved with brace and physical therapy, s/p bilateral L5-S1 transforaminal ROS by Dr. Garcia on 7/9/2021, s/p bilateral L4-5 and L5-S1 facet joint injections on 6/14/2021. ongoing b/l hip and leg pain, worse with position changes (ie sitting  to standing), constant, 8-9/10, tylenol brings pain down to 6/10 (using tylenol bid). ECOG 3.   -Patient has seen neurosurgery Dr. Jose Alexander, occupational medicine specialist Dr.Orrin Murphy, with plan to see PM&R Dr. Margie Agudelo and movement disorder clinic, EMG pending  - 11/22 MRI lumbar spine:    prominent left iliac lymph nodes among other findings related to degenerative changes and neural foraminal stenosis throughout lumbar spine  - 12/22 MRI pelvis:  2. Enhancing marrow signal abnormality of the right iliac bone extending from the superior acetabulum subchondral location proximally almost to the level of the sacroiliac joint caudal aspect. No associated fracture line. Underlying marrow infiltrative process such as from metastasis cannot be excluded especially given the degree of T1 hypointensity and history of primary tumor. Other consideration include stress reaction.  3. Mildly increased left external iliac chain, and the right medial thigh lymphadenopathy. Metastasis cannot be excluded.  4. Left posterior iliac enhancing lesion, similar in size to prior CT 9/2021 and previously not visible on CT, focal enhancing lesion in the right greater trochanter laterally. Findings are concerning for Metastasis.  -12/22 CT chest abdomen pelvis with contrast:  COMPARISON: MRI pelvis 12/20/2022 and CT abdomen pelvis 9/21/2021.  1. Mastectomies with implant reconstructions. No lymphadenopathy  2.  There is an new or increased left common iliac, external iliac and pelvic sidewall lymphadenopathy since prior CT. Distal left common iliac lymph nodes measure up to 1.2 cm on this exam compared to 0.7 cm on prior CT. Left pelvic sidewall lymph node measures 2.1 cm short axis on this exam compared to 1.2 cm on prior CT . consistent with metastatic disease.  3. Sclerotic lesions in the left sacrum and left posterior ilium are unchanged from prior CT.  may be metastatic.  4. No evidence of metastatic disease in the  chest.    Of note, patient has documentation of anemia (iron deficiency and anemia of chronic disease), elevated LFTs, nausea and vomiting of all solids and liquids, fatigue with 26 pound unintentional weight loss from 7/20/2021 - 10/20/2021 with mildly elevated LFTs which led to evaluation with mammogram, endoscopy with EUS and colonoscopy as detailed below. Pt did associate these with some spinal injections she had gotten, due to overlapping times. Pt did regain all of the weight and hand improvement of anemia within 2 months, without significant intervention.     -10/18/2021: Screening mammogram right breast ZACKARY  -10/2021: Endoscopy with EUS normal EGD and no stigmata or source of upper GI bleeding, visualized bile duct, gallbladder, liver, pancreas, left adrenal gland normal.  No lymphadenopathy. LIVER, RANDOM NEEDLE BIOPSY: Benign parenchyma with congestion; no significant fibrosis or other abnormalities     -10/2021 colonoscopy: Hemorrhoids, diverticulosis in sigmoid colon and splenic flexure    Family history of cancer- sister breast cancer- dx at age 77 y/o, localized breast cancer, surgical resection, RT, no adjuvant chemo or endocrine therapy that pt is aware of     Denies melena, hematochezia, hematuria, dysphagia, odynophagia, GERD, dyspnea. Pts activity is limited by pain. Pt has hx of smoking- quit in 1999.       REVIEW OF SYSTEMS:   A 14 point ROS was reviewed with pertinent positives and negatives in the HPI.        HOME MEDICATIONS:  Current Outpatient Medications   Medication Sig Dispense Refill     dorzolamide-timolol (COSOPT) 2-0.5 % ophthalmic solution Place 1 drop into both eyes 2 times daily       latanoprost (XALATAN) 0.005 % ophthalmic solution Place 1 drop into both eyes daily        losartan (COZAAR) 100 MG tablet TAKE 1 TABLET BY MOUTH EVERY DAY 90 tablet 0     rosuvastatin (CRESTOR) 10 MG tablet TAKE 1 TABLET (10 MG) BY MOUTH DAILY. (Patient taking differently: Take 10 mg by mouth every  7 days) 90 tablet 3         ALLERGIES:  Allergies   Allergen Reactions     Compazine      Mental confusion     Hydrochlorothiazide      Dryness mouth     Prochlorperazine Visual Disturbance     Simvastatin Other (See Comments)     Muscle aches         PAST MEDICAL HISTORY:  Past Medical History:   Diagnosis Date     Breast cancer (H)     Lt breast     Glaucoma     bilateral     Hyperlipidemia      Hypertension      Macular degeneration      Osteoarthritis      Osteoporosis          PAST SURGICAL HISTORY:  Past Surgical History:   Procedure Laterality Date     C MASTECTOMY,SIMPLE  3/2000    left     COLONOSCOPY  2006    Q 10 years     COLONOSCOPY N/A 10/4/2021    Procedure: COLONOSCOPY;  Surgeon: Guru Lyla Ruby MD;  Location: [facility] OR     ESOPHAGOSCOPY, GASTROSCOPY, DUODENOSCOPY (EGD), COMBINED N/A 10/4/2021    Procedure: ENDOSCOPIC ULTRASOUND, ESOPHAGOSCOPY / UPPER GASTROINTESTINAL TRACT (GI), Esophagogastroduodenoscopy,  Fine Needle Biopsy of liver;  Surgeon: Guru Lyla Ruby MD;  Location: [facility] OR     SURGICAL HISTORY OF -  Left 2015    tissue expander placed in left breast area     SURGICAL HISTORY OF -  Left 2016    left breast implant and right mammoplasty         SOCIAL HISTORY:  Social History     Socioeconomic History     Marital status: Single     Spouse name: Not on file     Number of children: 0     Years of education: Not on file     Highest education level: Not on file   Occupational History     Employer: [employer]     Occupation: REtired   Tobacco Use     Smoking status: Former     Packs/day: 1.00     Years: 17.00     Pack years: 17.00     Types: Cigarettes     Quit date: 3/15/1999     Years since quittin.8     Smokeless tobacco: Never   Substance and Sexual Activity     Alcohol use: Yes     Alcohol/week: 4.0 standard drinks     Types: 4 Glasses of wine per week     Comment: occasional     Drug use: No     Sexual activity: Not Currently   Other Topics  "Concern     Parent/sibling w/ CABG, MI or angioplasty before 65F 55M? Yes     Comment: Father heart attack   Social History Narrative     Not on file     Social Determinants of Health     Financial Resource Strain: Not on file   Food Insecurity: Not on file   Transportation Needs: Not on file   Physical Activity: Not on file   Stress: Not on file   Social Connections: Not on file   Intimate Partner Violence: Not on file   Housing Stability: Not on file         FAMILY HISTORY:  Family History   Problem Relation Age of Onset     Cancer Mother         bone cancer     Other Cancer Mother      Heart Disease Father      Coronary Artery Disease Father      Arthritis Sister      Breast Cancer Sister         age 75     Diabetes Maternal Grandmother      Diabetes Paternal Grandmother          PHYSICAL EXAM:  Vital signs:  /73 (BP Location: Right arm, Patient Position: Chair, Cuff Size: Adult Regular)   Pulse 85   Ht 1.598 m (5' 2.91\")   Wt 64.4 kg (142 lb)   SpO2 99%   BMI 25.23 kg/m       ECOG: 3  GENERAL/CONSTITUTIONAL: No acute distress.  EYES: Pupils are equal and round. Extraocular movements intact without nystagmus.  No scleral icterus.  RESPIRATORY: Equal chest rise.   MUSCULOSKELETAL: Warm and well-perfused, no cyanosis, clubbing, or edema.   NEUROLOGIC: Cranial nerves are grossly intact. Alert, oriented to person, place and time, answers questions appropriately.  INTEGUMENTARY: No rashes or jaundice.  GAIT: Steady, does not use assistive device  BREAST: left breast implant intact, right breast reconstructive surgery with scarring at areola. No palpable masses.        LABS:  CBC RESULTS:   Recent Labs   Lab Test 12/28/22  1254   WBC 10.8   RBC 4.80   HGB 12.9   HCT 40.9   MCV 85   MCH 26.9   MCHC 31.5   RDW 14.3          Recent Labs   Lab Test 12/28/22  1254 09/21/22  1256    137   POTASSIUM 4.8 4.2   CHLORIDE 106 105   CO2 30 23   ANIONGAP 4 9   * 88   BUN 14 20   CR 0.43* 0.53   COURTNEY " 10.4* 10.1         PATHOLOGY:  Case Report   Surgical Pathology Report                         Case: [id number]                                   Authorizing Provider:  Guru Lyla Ruby Collected:           10/04/2021 10:58 AM                                  MD Freddie                                                                  Ordering Location:      MAIN OR                 Received:            10/04/2021 11:22 AM           Pathologist:           Sabrina Griffiths MD                                                            Specimen:    Liver, Random Liver Biopsy (Fine Needle Biopsy)                                            Final Diagnosis   LIVER, RANDOM NEEDLE BIOPSY:   Benign parenchyma with congestion; no significant fibrosis or other abnormalities   (See Comment)          Electronically signed by Sabrina Griffiths MD on 10/5/2021 at  8:53 AM   Comment  [facility]   The sample is fragmented and shows benign, non-neoplastic liver parenchyma with no steatosis, necroinflammation or evidence of an obstructive or other biliary tract disease. The parenchyma is however moderately congested with mildly dilated sinusoids. There is no mass lesion identified, and the trichrome and reticulin stains highlight absence of fibrosis or other architectural abnormality of NRH or other. The iron stain is negative for hemosiderosis; PAS and PAS-D stains show no abnormal cytoplasmic accumulations.      Parenchymal congestion in a random biopsy usually indicates some element of venous outflow impediment, for which additional screening for right ventricular function, and assessment of the major hepatic veins and IVC should be considered.  Rarely, this is due to injury at the level of small intrahepatic venules and distal sinusoids, typically by medications (but also radiation, and others). Lastly congestion could be seen around a mass lesion due to compression. This does not immediately seem to be the context in this  "patient though, given that this was a non-targeted lesion and recent images does not seem to have neoplastic concerns.  Nevertheless, if necessary, this could be another consideration in the overall work-up in this clinical context of unexplained weight loss.       Clinical Information  SHAGUFTA   Patient is a 70-year-old woman with abnormal weight loss of up to 25 lbs in 2 months. Liver enzymes also elevated at ; ; AST 39; normal bilirubin.      Gross Description  SHAGUFTA GRESHAM(1). Liver, Random Liver Biopsy (Fine Needle Biopsy):  The specimen is received in formalin with proper patient identification, labeled \"random liver biopsy\".  The specimen consists of multiple fragmented white-red core biopsies ranging from 0.1 to 0.9 cm in length and averaging 0.1 cm in diameter.  The specimen is filtered and submitted in toto in cassettes A1-A2.                 Microscopic Description  SHAGUFTA   Microscopic examination has been performed.     Special Stains  UEUGENE   Trichrome, reticulin, iron, PAS/PAS-D          IMAGING:  MR pelvis with and withoutcontrast 12/20/2022 10:13 AM     Techniques: Multiplanar multisequence imaging of the pelvis was  obtained with and without administration of intravenous contrast using  routing [facility] protocol.     History: Chronic midline low back pain without sciatica; Chronic  midline low back pain without sciatica      Comparison: MRI lumbar spine same date, CT 9/21/2021     Findings:     Low signal-to-noise ratio on the fluid sensitive sequences partially  compromising assessment.     Osseous structures  Osseous structures: Severe bilateral hip degenerative changes with  complete joint space loss, extensive subchondral cystlike changes and  subchondral edema like marrow signal intensities. No fracture     T1 hypointense, T2 hyperintense, enhancing marrow signal abnormality  of the right iliac bone extending from the superior acetabulum  subchondral location proximally almost to the " level of the sacroiliac  joint caudal aspect. Degree of T1 hypointensity reaches mildly  hypointense to regional muscle. No associated fracture line.     Enhancing lesion in the left posterior iliac bone, measuring  approximately 2.1 cm, similar to comparison where previously  associated sclerotic lesion was present. Findings most concerning for  metastatic lesion.     Previously not visible on CT, focal enhancing lesion in the right  greater trochanter laterally (image 43 axial). Nonspecific cystlike  change at the right iliac bone adjacent to some sacroiliac joint.     Degenerative changes of pubic symphysis.     Degenerative changes of visualized lower lumbar spine. Lumbosacral  transitional anatomy.     Internal derangement of joints are not well assessed owing to chosen  field of view.     Joint and Periarticular soft tissue:     Sacroiliac joints and pubic symphysis are congruent.     Joint effusion: Bilateral moderate hip joint effusions with extensive  synovitis.     Bursal effusion: Minimal nonspecific edema over the greater  trochanter. No substantial iliopsoas or trochanteric bursal effusion.     Muscles and tendons  Muscles and tendons: Small left ischial tuberosity bursal fluid.  Severe left hamstring conjoined portion tendinosis. Bilateral adductor  muscle edema, likely related to muscle strain.     The rectus femoris, sartorius, and iliopsoas tendons intact  bilaterally. The hip abductors are intact bilaterally.     Nerves:  The visualized course of the sciatic nerves are unremarkable  bilaterally.     Other Findings:     Persistent, mildly increased left external iliac chain  lymphadenopathy, measuring 1.8 x 3.2, previously 1.5 x 2.7 cm using  similar measurement technique. Presumed 1.7 cm short axis right medial  thigh lymphadenopathy, mildly increased since comparison.     T1 hyperintense presumed perineural cysts at sacrum.                                                                       Impression:  1. Severe bilateral hip degenerative changes with synovitis and  extensive subchondral cystic changes. Findings may potentially  indicate inflammatory component presence with secondary  osteoarthrosis.  2. Enhancing marrow signal abnormality of the right iliac bone  extending from the superior acetabulum subchondral location proximally  almost to the level of the sacroiliac joint caudal aspect. No  associated fracture line. Underlying marrow infiltrative process such  as from metastasis cannot be excluded especially given the degree of  T1 hypointensity and history of primary tumor. Other consideration  include stress reaction.  3. Mildly increased left external iliac chain, and the right medial  thigh lymphadenopathy. Metastasis cannot be excluded.  4. Left posterior iliac enhancing lesion, similar in size to prior CT  9/2021 and previously not visible on CT, focal enhancing lesion in the  right greater trochanter laterally. Findings are concerning for  metastasis.     I have personally reviewed the examination and initial interpretation  and I agree with the findings.     HARSH MOREL     PROCEDURES:  COLONOSCOPY 10/04/2021 10:18 AM [address] [place] [address] [phone]     Endoscopy Department   _______________________________________________________________________________   Patient Name: Virginia Byers         Procedure Date: 10/4/2021 10:18 AM   MRN: [id number]                       Account Number: [id number]   YOB: 1950             Admit Type: Outpatient   Age: 70                               Room: OR   Gender: Female                        Note Status: Finalized   Attending MD: Guru Ruyb ,     Total Sedation Time:   _______________________________________________________________________________       Procedure:           Colonoscopy   Indications:         Iron deficiency anemia                         70 year old white female who is being evaluated for dry                        mouth, unintentional weigh loss 26 lbs, no overt GI                        bleeding, diarrhea or constipation, normal cologuard in                        2019 and normal colonoscopy 12 years ago. Anemia based                        on iron panel appears to be anemia of chronic disease.                        Colonoscopy to evaluate for bleeding as a cause for                        anemia   Providers:           Guru Ruby   Referring MD:        Chuyita Zaidi MD   Medicines:           General Anesthesia   Complications:       No immediate complications.   _______________________________________________________________________________   Procedure:           Pre-Anesthesia Assessment:                        - Prior to the procedure, a History and Physical was                        performed, and patient medications and allergies were                        reviewed. The patient is competent. The risks and                        benefits of the procedure and the sedation options and                        risks were discussed with the patient. All questions                        were answered and informed consent was obtained. Patient                        identification and proposed procedure were verified by                        the physician and the nurse in the pre-procedure area.                        Mental Status Examination: alert and oriented. Airway                        Examination: normal oropharyngeal airway and neck                        mobility. Respiratory Examination: clear to                        auscultation. CV Examination: normal. Prophylactic                        Antibiotics: The patient does not require prophylactic                        antibiotics. Prior Anticoagulants: The patient has taken                        no previous anticoagulant or antiplatelet agents. ASA                         Grade Assessment: II - A patient with mild systemic                        disease. After reviewing the risks and benefits, the                        patient was deemed in satisfactory condition to undergo                        the procedure. The anesthesia plan was to use general                        anesthesia. Immediately prior to administration of                        medications, the patient was re-assessed for adequacy to                        receive sedatives. The heart rate, respiratory rate,                        oxygen saturations, blood pressure, adequacy of                        pulmonary ventilation, and response to care were                        monitored throughout the procedure. The physical status                        of the patient was re-assessed after the procedure.                        After obtaining informed consent, the colonoscope was                        passed under direct vision. Throughout the procedure,                        the patient's blood pressure, pulse, and oxygen                        saturations were monitored continuously. The Colonoscope                        was introduced through the anus and advanced to 10 cm                        into the ileum.                                                                                     Findings:        Hemorrhoids were found on perianal exam.        A few small and large-mouthed diverticula were found in the sigmoid        colon and splenic flexure.        The exam was otherwise without abnormality.        The terminal ileum appeared normal.        Non-bleeding internal hemorrhoids were found during retroflexion. The        hemorrhoids were mild.                                                                                     Impression:          - Hemorrhoids found on perianal exam.                        - Diverticulosis in the sigmoid colon and at the splenic                        flexure.                         - The examination was otherwise normal.                        - The examined portion of the ileum was normal.                        - Non-bleeding internal hemorrhoids.   Recommendation:      - Observe patient in same day observation unit for                        ongoing care.                        - Her anemia appears to be anemia of chronic disease                         more than iron deficiency anemia, therefore will hold                        off on further work up including capsule endoscopy                        - Will recommend further work up for autoimmune diseases                        or thyroid disease with PCP                                                                                       ____________________   Guru Ruby,   10/4/2021 11:55:01 AM   I was physically present for the entire viewing portion of the exam.   __________________________      Upper EUS 10/04/2021 10:19 AM [address] [place] [address] [phone]     Endoscopy Department   _______________________________________________________________________________   Patient Name: Virginia Byers         Procedure Date: 10/4/2021 10:19 AM   MRN: [id number]                       Account Number: [id number]   YOB: 1950             Admit Type: Outpatient   Age: 70                               Room: OR   Gender: Female                        Note Status: Finalized   Attending MD: Guru Ruby ,     Total Sedation Time:   _______________________________________________________________________________       Procedure:           Upper EUS   Indications:         Elevated liver enzymes                        70 year old white female with h/o breast cancer, HTN,                        HLD, glaucoma, macular degenerationis being evaluated                        for 26 lbs unintentional weight loss since July, no                         overt GI bleeding, but new onset anemia (of chronic                        disease), mildly elevated ALT and alkaline phosphatase.                        EGD to evaluate for causes for unintentional weight loss                        followed by EUS to evaluate for elevated LFTs   Providers:           Guru Ruby   Referring MD:        Chuyita Zaidi MD   Medicines:           General Anesthesia   Complications:       No immediate complications.   _______________________________________________________________________________   Procedure:           Pre-Anesthesia Assessment:                        - Prior to the procedure, a History and Physical was                        performed, and patient medications and allergies were                        reviewed. The patient is competent. The risks and                        benefits of the procedure and the sedation options and                        risks were discussed with the patient. All questions                        were answered and informed consent was obtained. Patient                        identification and proposed procedure were verified by                        the physician and the nurse in the pre-procedure area.                        Mental Status Examination: alert and oriented. Airway                        Examination: normal oropharyngeal airway and neck                        mobility. Respiratory Examination: clear to                        auscultation. CV Examination: normal. Prophylactic                        Antibiotics: The patient does not require prophylactic                        antibiotics. Prior Anticoagulants: The patient has taken                        no previous anticoagulant or antiplatelet agents. ASA                        Grade Assessment: II - A patient with mild systemic                        disease. After reviewing the risks and benefits, the                        patient was deemed in satisfactory  condition to undergo                        the procedure. The anesthesia plan was to use general                        anesthesia. Immediately prior to administration of                        medications, the patient was re-assessed for adequacy to                        receive sedatives. The heart rate, respiratory rate,                        oxygen saturations, blood pressure, adequacy of                        pulmonary ventilation, and response to care were                        monitored throughout the procedure. The physical status                        of the patient was re-assessed after the procedure.                        After obtaining informed consent, the endoscope was                        passed under direct vision. Throughout the procedure,                        the patient's blood pressure, pulse, and oxygen                        saturations were monitored continuously. The Endoscope                        was introduced through the mouth, and advanced to the                        second part of duodenum. The was introduced through the                        mouth, and advanced to the second part of duodenum.                                                                                     Findings:        ENDOSCOPIC FINDING: :        The examined esophagus was endoscopically normal.        The entire examined stomach was endoscopically normal.        The examined duodenum was endoscopically normal.        ENDOSONOGRAPHIC FINDING: :        The major papilla was endoscopically and sonographically normal. The        common bile duct was followed from the major papilla to the liver and no        stones were seen. The duct was non-dilated and measured 4 mm.The        gallbladder was surgically absent.There was no intrahepatic biliary        dilation.        There was no sign of significant endosonographic abnormality in the        visualized portion of the liver. No focal pathology and no  masses were        identified. Fine needle biopsy was performed. Color Doppler imaging was        utilized prior to needle puncture to confirm a lack of significant        vascular structures within the needle path. Two passes were made with        the 19 gauge ultrasound biopsy needle using a transgastric approach. A        good visible core of tissue was obtained. Final cytology results are        pending.        Non specific pancreatic parenchymal changes such as hyperechoic strands        were present. There were no features of chronic pancreatitis. No masses,        cysts or stones were visualized in the pancreatic parenchyma. The main        pancreatic duct was followed from the head to the body, excluding        pancreas divisum. The pancreatic duct measured 2.0 mm in the head, 0.8        mm in the body of the pancreas.        There was no sign of significant endosonographic abnormality in the left        adrenal gland. No focal pathology was identified.        No lymphadenopathy seen.                                                                                     Impression:          - Normal EGD and no stigmata or source of upper GI                        bleeding could be identfied to explain anemia.                        - Bile duct was normal and no evidence of biliary tract                        obstruction and gall bladder was unremarkable                        - There was no evidence of significant pathology in the                        visualized portion of the liver. EUS guided fine needle                        biopsy was performed using a 19 G needle (wet suction                        method and slow pull technique).                        - Non-specific pancreatic parenchymal changes                        - Liver, left adrenal were unremarkable   Recommendation:      - Perform a colonoscopy today for further work up of                        anemia.                        - Await path  results.                        - Patient has been referred to Dr Cisse for further                        work up and management of the mildly elevated LFTs                        - Findings discussed with patient                                                                                       ____________________   Guru Ruby,   10/6/2021 1:27:22 PM   I was physically present for the entire viewing portion of the exam.          ASSESSMENT/PLAN:  Virginia Byers is a 72 year old female with:    #New left common iliac, external iliac, pelvic sidewall lymphadenopathy, suspected metastatic disease with unknown primary   #Stable sclerotic lesions in left sacrum and left posterior ilium, possible metastasis  -10/21 mammogram, endoscopy with EUS and colonoscopy WNL  -11/22 MRI lumbar spine in 12/22 MRI pelvis show prominent left iliac lymph nodes, right medial thigh lymphadenopathy, right iliac bone enhancing marrow signal, left posterior iliac enhancing lesion  -12/22 CT chest abdomen pelvis shows new or increased left common iliac, external iliac, pelvic sidewall lymphadenopathy and sclerotic lesions in left sacrum and left posterior ilium.  No metastatic disease in chest.  No abnormal findings in breast.  - 12/22 tumor markers: CA 15-3 24,  29, CEA 3.1    - IR referral for lymph node biopsy  - MRI brain with contrast  - check AFP, , LDH    #mild hypercalcemia  - 12/22 calcium 10.4, corrected for hypoalbuminemia calcium is 11  -May be related to suspected malignancy as detailed above  -Patient does have protein gap with hypoalbuminemia  -9/22 SIFE and UIFE negative, repeat protein studies to check for monoclonal gammopathy including SPEP, SIFE, 24 hour urine UPEP and UIFE, 24 hour urine with protein to creatinine ratio, kappa and lambda light chain ratio, quantitative serum immunoglobulins    #History of LEFT breast IDC ER positive, WV positive, 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    -4/2020 left breast simple mastectomy with no residual carcinoma.   -S/p ACx4, taxol x4, no anti-her2 treatment, no RT.   -S/p 5 years adjuvant endocrine therapy (tamoxifen and anastrozole).  -Delayed left breast reconstruction and right breast augmentation.     RTC 2 weeks for f/u      Arlene Zaidi DO  Hematology/Oncology  [facility]    Future Appointments   Date Time Provider Department Center   12/29/2022  1:45 PM Arlene Zaidi DO [facility]   1/6/2023 12:30 PM Margie Agudelo MD [facility]   1/19/2023  9:00 AM Janett Noland APRN CNP [facility]   2/22/2023  4:00 PM Mikki Witt MD [facility]   3/23/2023  1:00 PM Santi Weems MD [facility]   7/10/2023  9:00 AM Dusty Robin MD [facility]          Again, thank you for allowing me to participate in the care of your patient.        Sincerely,        ARLENE ZAIDI DO

## 2022-12-29 NOTE — CONSULTS
Outpatient IR Biopsy Referral    Patient is a 73 y/o female with a PMH of HDL, DDD, glaucoma, HTN, osteoporosis, spinal stenosis of lumbar region,  s/p bilateral L5-S1 transforaminal ROS by Dr. Garcia on 7/9/2021, s/p bilateral L4-5 and L5-S1 facet joint injections on 6/14/2021,tobacco use, left breast cancer s/p left  Lumpectomy 3/2000, left mastectomy 4/2000, chemotherapy, adjuvant endocrine therapy, back pain, new imaging notes progressive pelvic lymphadenopathy, bone lesions. IR has been asked to biopsy left external iliac or accessible LN for concerns of metastatic disease.        CT 12/28/22 LYMPH NODES: There is an new or increased left common iliac, external iliac and pelvic sidewall lymphadenopathy since prior CT. Distal left common iliac lymph nodes measure up to 1.2 cm on this exam (image 28) compared to 0.7 cm on prior CT. Left pelvic sidewall lymph node measures 2.1 cm short axis on this exam (image 260) compared to 1.2 cm on prior CT.    IMPRESSION:  1. New/increased pelvic sidewall lymphadenopathy since 9/21/2021 consistent with metastatic disease.  2. Sclerotic lesions in the left sacrum and left posterior ilium unchanged and may be metastatic.  3. No evidence of metastatic disease in the chest.  4. Colonic diverticulosis.    Case and imaging CT 12/28/22 was reviewed with Dr. Harvey from IR and US Guided biopsy of a right external iliac LN is approved. Series 5 Image 312      Procedure order, surgical pathology and flow orders placed.    If requesting team would like samples sent for anything else please enter them prior to scheduled procedure.    Primary team Dr. Zaidi Internal Medicine made aware of IR recommendations via epic messaging.    KOSTA Nolan CNP  Interventional Radiology   IR on-call pager: [phone]

## 2022-12-29 NOTE — NURSING NOTE
"Oncology Rooming Note    December 29, 2022 1:31 PM   Virginia Byers is a 72 year old female who presents for:    Chief Complaint   Patient presents with     Oncology Clinic Visit     New Patient     Initial Vitals: /73 (BP Location: Right arm, Patient Position: Chair, Cuff Size: Adult Regular)   Pulse 85   Ht 1.598 m (5' 2.91\")   Wt 64.4 kg (142 lb)   SpO2 99%   BMI 25.23 kg/m   Estimated body mass index is 25.23 kg/m  as calculated from the following:    Height as of this encounter: 1.598 m (5' 2.91\").    Weight as of this encounter: 64.4 kg (142 lb). Body surface area is 1.69 meters squared.  Extreme Pain (9) Comment: Legs - walking and getting up   No LMP recorded. Patient is postmenopausal.  Allergies reviewed: Yes  Medications reviewed: Yes    Medications: Medication refills not needed today.  Pharmacy name entered into [employer]: [id number] - [place] - [id number] [facility]    Clinical concerns: New Patient     Dr. Zaidi was notified.      Linda Landry CMA              "

## 2022-12-30 LAB
ALBUMIN SERPL ELPH-MCNC: 3.5 G/DL (ref 3.7–5.1)
ALPHA1 GLOB SERPL ELPH-MCNC: 0.4 G/DL (ref 0.2–0.4)
ALPHA2 GLOB SERPL ELPH-MCNC: 1.3 G/DL (ref 0.5–0.9)
B-GLOBULIN SERPL ELPH-MCNC: 0.9 G/DL (ref 0.6–1)
GAMMA GLOB SERPL ELPH-MCNC: 1.1 G/DL (ref 0.7–1.6)
IGA SERPL-MCNC: 187 MG/DL (ref 84–499)
IGG SERPL-MCNC: 1221 MG/DL (ref 610–1616)
IGM SERPL-MCNC: 165 MG/DL (ref 35–242)
KAPPA LC FREE SER-MCNC: 2.17 MG/DL (ref 0.33–1.94)
KAPPA LC FREE/LAMBDA FREE SER NEPH: 1.3 {RATIO} (ref 0.26–1.65)
LAMBDA LC FREE SERPL-MCNC: 1.67 MG/DL (ref 0.57–2.63)
M PROTEIN SERPL ELPH-MCNC: 0 G/DL
PROT ELPH PNL UR ELPH: NORMAL
PROT PATTERN SERPL ELPH-IMP: ABNORMAL
PROT PATTERN SERPL IFE-IMP: NORMAL

## 2022-12-31 ENCOUNTER — TELEPHONE (OUTPATIENT)
Dept: FAMILY MEDICINE | Facility: CLINIC | Age: 72
End: 2022-12-31

## 2022-12-31 LAB — CANCER AG19-9 SERPL IA-ACNC: 4 U/ML

## 2022-12-31 NOTE — TELEPHONE ENCOUNTER
Patient scheduled a Pre-op appointment with Dr. Zaidi on 01-. Her surgery is on  01-20-23. If there are any cancellations before the 01/18, Patient would like to be called for a possible appointment. Her Pre-op appt is so close to her surgery date and she would prefer something a little sooner incase anything comes up. There were no other appointments available.     Please call [phone] to leave a message.

## 2023-01-03 ENCOUNTER — PRE VISIT (OUTPATIENT)
Dept: ONCOLOGY | Facility: CLINIC | Age: 73
End: 2023-01-03

## 2023-01-06 ENCOUNTER — OFFICE VISIT (OUTPATIENT)
Dept: NEUROLOGY | Facility: CLINIC | Age: 73
End: 2023-01-06
Attending: PREVENTIVE MEDICINE
Payer: MEDICARE

## 2023-01-06 DIAGNOSIS — R29.898 LEG WEAKNESS, BILATERAL: ICD-10-CM

## 2023-01-06 DIAGNOSIS — M47.816 LUMBAR SPONDYLOSIS: Primary | ICD-10-CM

## 2023-01-06 PROCEDURE — 95886 MUSC TEST DONE W/N TEST COMP: CPT | Mod: RT | Performed by: PHYSICAL MEDICINE & REHABILITATION

## 2023-01-06 PROCEDURE — 95910 NRV CNDJ TEST 7-8 STUDIES: CPT | Performed by: PHYSICAL MEDICINE & REHABILITATION

## 2023-01-06 PROCEDURE — 95885 MUSC TST DONE W/NERV TST LIM: CPT | Mod: LT | Performed by: PHYSICAL MEDICINE & REHABILITATION

## 2023-01-06 NOTE — LETTER
2023       RE: Virginia Byers  [address] [address] [facility] [address]     Dear Colleague,    Thank you for referring your patient, Virginia Byers, to the [facility] [place] at [facility]. Please see a copy of my visit note below.                          [facility]  Electrodiagnostic Laboratory                 Department of Neurology                                                                                                         Test Date:  2023    Patient: Virginia Byers : 1950 Physician: Margie Agudelo MD   Sex: Female AGE: 72 year Ref Phys: Sami Murphy MD   ID#: [id number]   Technician: Vlad Shipley       Clinical Information:  Virginia Byers is a 73 yo female with h/o breast cancer, HTN, HLD, osteoporosis and lumbar spondylosis who presents with low back pain, radicular symptoms and some subjective weakness in her bilateral lower extremities. Query peripheral neuropathy vs radiculopathy.       Techniques:  Motor and sensory conduction studies were done with surface recording electrodes. EMG was done with a concentric needle electrode.       Results:  All the nerve conduction studies (as indicated in the following tables) were within normal limits.      All examined muscles (as indicated in the following table) showed no evidence of electrical instability.        Interpretation:  Normal examination.    --There is no electrodiagnostic evidence of diffuse peripheral neuropathy in the lower extremities.    --There is no electrodiagnostic evidence of right or left lower extremity radiculopathy.      ___________________________  Margie Agudelo MD        Nerve Conduction Studies  Motor Sites      Latency Amplitude Neg. Amp Diff Segment Distance Velocity Neg. Dur Neg Area Diff Temperature Comment   Site (ms) Norm (mV) Norm %  cm m/s Norm ms %  C    Left Fibular (EDB) Motor   Ankle 3.7  < 6.0 2.7  > 2.0   Ankle-EDB 8   7.3  31.6    Bel Fib Head 9.9 - 2.5 - -7.4 Bel Fib Head-Ankle 28.5 46  > 38 7.8 -0.84 31.3    Pop Fossa 10.8 - 2.4 - -4.0 Pop Fossa-Bel Fib Head 6 67  > 38 8.0 -5.1 31.2    Right Fibular (EDB) Motor   Ankle 4.0  < 6.0 2.2  > 2.0  Ankle-EDB 8   8.3  31.9    Bel Fib Head 11.1 - 2.0 - -9.1 Bel Fib Head-Ankle 32.2 45  > 38 9.0 -5.8 32    Pop Fossa 12.3 - 1.99 - -0.50 Pop Fossa-Bel Fib Head 5 42  > 38 8.9 2.1 32    Left Tibial (AHB) Motor   Ankle 3.7  < 6.5 13.2  > 4.4  Ankle-AHB 8   6.0  30.8    Knee 11.5 - 9.6 - -27.3 Knee-Ankle 32.5 42  > 38 6.5 -11.1 31.5    Right Tibial (AHB) Motor   Ankle 3.5  < 6.5 10.8  > 4.4  Ankle-AHB 8   5.8  31.8    Knee 11.0 - 9.1 - -15.7 Knee-Ankle 32.7 44  > 38 7.1 -9.6 31.7      Sensory Sites      Onset Lat Peak Lat Amp (O-P) Amp (P-P) Segment Distance Velocity Temperature Comment   Site ms ms  V Norm  V  cm m/s Norm  C    Left Superficial Fibular Sensory   14 cm-Ankle 2.5 3.2 6  > 3 8 14 cm-Ankle 12.5 50  > 38 31.2    Right Superficial Fibular Sensory   14 cm-Ankle 2.5 3.3 8  > 3 9 14 cm-Ankle 12.5 50  > 38 31.4    Left Sural Sensory   Calf-Lat Mall 3.0 3.8 8  > 5 4 Calf-Lat Mall 14 47  > 38 31.3    Right Sural Sensory   Calf-Lat Mall 2.9 3.7 14  > 5 13 Calf-Lat Mall 14 48  > 38 31.6        Electromyography     Side Muscle Ins Act Fibs/PSW Fasc HF Amp Dur Poly Recrt Int Pat   Right Tib Anterior Nml None Nml 0 Nml Nml 0 Nml Nml   Right Gastroc Nml None Nml 0 Nml Nml 0 Nml Nml   Right Vastus Lat Nml None Nml 0 Nml Nml 0 Nml Nml   Left Tib Anterior Nml None Nml 0 Nml Nml 0 Nml Nml   Left Gastroc Nml None Nml 0 Nml Nml 0 Nml Nml   Left Vastus Lat Nml None Nml 0 Nml Nml 0 Nml Nml   Right Biceps Fem SH Nml None Nml 0 Nml Nml 0 Nml Nml   Right TensFascLat Nml None Nml 0 Nml Nml 0 Nml Nml         NCS Waveforms:    Motor                Sensory                        Sincerely,    Margie Agudelo MD

## 2023-01-06 NOTE — PROGRESS NOTES
[facility]  Electrodiagnostic Laboratory                 Department of Neurology                                                                                                         Test Date:  2023    Patient: Virginia Byers : 1950 Physician: Margie Agudelo MD   Sex: Female AGE: 72 year Ref Phys: Sami Murphy MD   ID#: [id number]   Technician: Vlad Shipley       Clinical Information:  Virginia Byers is a 73 yo female with h/o breast cancer, HTN, HLD, osteoporosis and lumbar spondylosis who presents with low back pain, radicular symptoms and some subjective weakness in her bilateral lower extremities. Query peripheral neuropathy vs radiculopathy.       Techniques:  Motor and sensory conduction studies were done with surface recording electrodes. EMG was done with a concentric needle electrode.       Results:  All the nerve conduction studies (as indicated in the following tables) were within normal limits.      All examined muscles (as indicated in the following table) showed no evidence of electrical instability.        Interpretation:  Normal examination.    --There is no electrodiagnostic evidence of diffuse peripheral neuropathy in the lower extremities.    --There is no electrodiagnostic evidence of right or left lower extremity radiculopathy.      ___________________________  Margie Agudelo MD        Nerve Conduction Studies  Motor Sites      Latency Amplitude Neg. Amp Diff Segment Distance Velocity Neg. Dur Neg Area Diff Temperature Comment   Site (ms) Norm (mV) Norm %  cm m/s Norm ms %  C    Left Fibular (EDB) Motor   Ankle 3.7  < 6.0 2.7  > 2.0  Ankle-EDB 8   7.3  31.6    Bel Fib Head 9.9 - 2.5 - -7.4 Bel Fib Head-Ankle 28.5 46  > 38 7.8 -0.84 31.3    Pop Fossa 10.8 - 2.4 - -4.0 Pop Fossa-Bel Fib Head 6 67  > 38 8.0 -5.1 31.2    Right Fibular (EDB) Motor   Ankle 4.0  < 6.0 2.2  > 2.0  Ankle-EDB 8   8.3  31.9    Bel Fib Head 11.1 - 2.0 - -9.1 Bel Fib  Head-Ankle 32.2 45  > 38 9.0 -5.8 32    Pop Fossa 12.3 - 1.99 - -0.50 Pop Fossa-Bel Fib Head 5 42  > 38 8.9 2.1 32    Left Tibial (AHB) Motor   Ankle 3.7  < 6.5 13.2  > 4.4  Ankle-AHB 8   6.0  30.8    Knee 11.5 - 9.6 - -27.3 Knee-Ankle 32.5 42  > 38 6.5 -11.1 31.5    Right Tibial (AHB) Motor   Ankle 3.5  < 6.5 10.8  > 4.4  Ankle-AHB 8   5.8  31.8    Knee 11.0 - 9.1 - -15.7 Knee-Ankle 32.7 44  > 38 7.1 -9.6 31.7      Sensory Sites      Onset Lat Peak Lat Amp (O-P) Amp (P-P) Segment Distance Velocity Temperature Comment   Site ms ms  V Norm  V  cm m/s Norm  C    Left Superficial Fibular Sensory   14 cm-Ankle 2.5 3.2 6  > 3 8 14 cm-Ankle 12.5 50  > 38 31.2    Right Superficial Fibular Sensory   14 cm-Ankle 2.5 3.3 8  > 3 9 14 cm-Ankle 12.5 50  > 38 31.4    Left Sural Sensory   Calf-Lat Mall 3.0 3.8 8  > 5 4 Calf-Lat Mall 14 47  > 38 31.3    Right Sural Sensory   Calf-Lat Mall 2.9 3.7 14  > 5 13 Calf-Lat Mall 14 48  > 38 31.6        Electromyography     Side Muscle Ins Act Fibs/PSW Fasc HF Amp Dur Poly Recrt Int Pat   Right Tib Anterior Nml None Nml 0 Nml Nml 0 Nml Nml   Right Gastroc Nml None Nml 0 Nml Nml 0 Nml Nml   Right Vastus Lat Nml None Nml 0 Nml Nml 0 Nml Nml   Left Tib Anterior Nml None Nml 0 Nml Nml 0 Nml Nml   Left Gastroc Nml None Nml 0 Nml Nml 0 Nml Nml   Left Vastus Lat Nml None Nml 0 Nml Nml 0 Nml Nml   Right Biceps Fem SH Nml None Nml 0 Nml Nml 0 Nml Nml   Right TensFascLat Nml None Nml 0 Nml Nml 0 Nml Nml         NCS Waveforms:    Motor                Sensory

## 2023-01-09 ENCOUNTER — ANCILLARY PROCEDURE (OUTPATIENT)
Dept: MRI IMAGING | Facility: CLINIC | Age: 73
End: 2023-01-09
Attending: INTERNAL MEDICINE
Payer: MEDICARE

## 2023-01-09 DIAGNOSIS — R59.9 LYMPH NODE ENLARGEMENT: ICD-10-CM

## 2023-01-09 LAB
ALBUMIN MFR UR ELPH: <6 MG/DL (ref 1–14)
COLLECT DURATION TIME UR: 24 H
PROT 24H UR-MRATE: NORMAL G/(24.H)
SPECIMEN VOL UR: 1950 ML

## 2023-01-09 PROCEDURE — 81050 URINALYSIS VOLUME MEASURE: CPT | Performed by: INTERNAL MEDICINE

## 2023-01-09 PROCEDURE — G1010 CDSM STANSON: HCPCS | Performed by: RADIOLOGY

## 2023-01-09 PROCEDURE — 70553 MRI BRAIN STEM W/O & W/DYE: CPT | Mod: TC | Performed by: RADIOLOGY

## 2023-01-09 PROCEDURE — 81050 URINALYSIS VOLUME MEASURE: CPT

## 2023-01-09 PROCEDURE — 84166 PROTEIN E-PHORESIS/URINE/CSF: CPT

## 2023-01-09 PROCEDURE — A9585 GADOBUTROL INJECTION: HCPCS | Mod: JW | Performed by: RADIOLOGY

## 2023-01-09 PROCEDURE — 84156 ASSAY OF PROTEIN URINE: CPT | Performed by: INTERNAL MEDICINE

## 2023-01-09 RX ORDER — GADOBUTROL 604.72 MG/ML
7.5 INJECTION INTRAVENOUS ONCE
Status: COMPLETED | OUTPATIENT
Start: 2023-01-09 | End: 2023-01-09

## 2023-01-09 RX ADMIN — GADOBUTROL 6.5 ML: 604.72 INJECTION INTRAVENOUS at 11:13

## 2023-01-10 ENCOUNTER — MYC MEDICAL ADVICE (OUTPATIENT)
Dept: INTERVENTIONAL RADIOLOGY/VASCULAR | Facility: CLINIC | Age: 73
End: 2023-01-10

## 2023-01-10 ENCOUNTER — OFFICE VISIT (OUTPATIENT)
Dept: FAMILY MEDICINE | Facility: CLINIC | Age: 73
End: 2023-01-10
Payer: MEDICARE

## 2023-01-10 VITALS
WEIGHT: 146 LBS | TEMPERATURE: 97.7 F | BODY MASS INDEX: 25.94 KG/M2 | DIASTOLIC BLOOD PRESSURE: 76 MMHG | HEART RATE: 79 BPM | OXYGEN SATURATION: 98 % | SYSTOLIC BLOOD PRESSURE: 138 MMHG

## 2023-01-10 DIAGNOSIS — E78.5 HYPERLIPIDEMIA LDL GOAL <160: ICD-10-CM

## 2023-01-10 DIAGNOSIS — M81.0 OSTEOPOROSIS, UNSPECIFIED OSTEOPOROSIS TYPE, UNSPECIFIED PATHOLOGICAL FRACTURE PRESENCE: ICD-10-CM

## 2023-01-10 DIAGNOSIS — I10 HYPERTENSION GOAL BP (BLOOD PRESSURE) < 140/90: ICD-10-CM

## 2023-01-10 DIAGNOSIS — Z85.3 PERSONAL HISTORY OF MALIGNANT NEOPLASM OF BREAST: ICD-10-CM

## 2023-01-10 DIAGNOSIS — M06.9 RHEUMATOID ARTHRITIS, INVOLVING UNSPECIFIED SITE, UNSPECIFIED WHETHER RHEUMATOID FACTOR PRESENT (H): ICD-10-CM

## 2023-01-10 DIAGNOSIS — Z01.818 PREOP GENERAL PHYSICAL EXAM: ICD-10-CM

## 2023-01-10 DIAGNOSIS — M48.061 SPINAL STENOSIS OF LUMBAR REGION, UNSPECIFIED WHETHER NEUROGENIC CLAUDICATION PRESENT: ICD-10-CM

## 2023-01-10 DIAGNOSIS — Z79.899 ENCOUNTER FOR LONG-TERM (CURRENT) USE OF MEDICATIONS: ICD-10-CM

## 2023-01-10 PROCEDURE — 93000 ELECTROCARDIOGRAM COMPLETE: CPT | Performed by: FAMILY MEDICINE

## 2023-01-10 PROCEDURE — 99214 OFFICE O/P EST MOD 30 MIN: CPT | Performed by: FAMILY MEDICINE

## 2023-01-10 ASSESSMENT — PAIN SCALES - GENERAL: PAINLEVEL: NO PAIN (0)

## 2023-01-10 NOTE — PROGRESS NOTES
[facility]  [address]  [place] [address]  Phone: [phone]  Primary Provider: Magali Ahumada  Pre-op Performing Provider: MAGALI AHUMADA      PREOPERATIVE EVALUATION:  Today's date: 1/10/2023    Virginia Byers is a 72 year old female who presents for a preoperative evaluation.    Surgical Information:  Surgery/Procedure: Biosy Lymphnodes  Surgery Location: [facility]  Surgeon:   Surgery Date: January 20, 2023  Time of Surgery: 11:00 am   Where patient plans to recover: At home with family  Fax number for surgical facility: Note does not need to be faxed, will be available electronically in Epic.    Type of Anesthesia Anticipated: to be determined    Assessment & Plan     The proposed surgical procedure is considered LOW risk.    Preop general physical exam    - EKG 12-lead complete w/read - Clinics    Encounter for long-term (current) use of medications      Hypertension goal BP (blood pressure) < 140/90  Stable     Hyperlipidemia LDL goal <160  Stable     Osteoporosis, unspecified osteoporosis type, unspecified pathological fracture presence      Personal history of malignant neoplasm of breast  In the past    Spinal stenosis of lumbar region, unspecified whether neurogenic claudication present      Rheumatoid arthritis, involving unspecified site, unspecified whether rheumatoid factor present (H)                   Medication Instructions:  Patient is to take all scheduled medications on the day of surgery   - ACE/ARB: May be continued on the day of surgery.    - Statins: Continue taking on the day of surgery.     RECOMMENDATION:  APPROVAL GIVEN to proceed with proposed procedure, without further diagnostic evaluation.  Subjective     HPI related to upcoming procedure: pt  Has  had  Low back pain  And scheduled for Lymph node Biopsy  CT chest showed  .  New/increased pelvic sidewall lymphadenopathy since 9/21/2021 consistent with  metastatic disease.  2.  Sclerotic lesions in the left sacrum and left posterior ilium unchanged and may be metastatic.  3.  No evidence of metastatic disease in the chest.  4.  Colonic diverticulosis.       Preop Questions 1/3/2023   1. Have you ever had a heart attack or stroke? No   2. Have you ever had surgery on your heart or blood vessels, such as a stent placement, a coronary artery bypass, or surgery on an artery in your head, neck, heart, or legs? No   3. Do you have chest pain with activity? No   4. Do you have a history of  heart failure? No   5. Do you currently have a cold, bronchitis or symptoms of other infection? No   6. Do you have a cough, shortness of breath, or wheezing? No   7. Do you or anyone in your family have previous history of blood clots? UNKNOWN -    8. Do you or does anyone in your family have a serious bleeding problem such as prolonged bleeding following surgeries or cuts? UNKNOWN -    9. Have you ever had problems with anemia or been told to take iron pills? No   10. Have you had any abnormal blood loss such as black, tarry or bloody stools, or abnormal vaginal bleeding? No   11. Have you ever had a blood transfusion? No   12. Are you willing to have a blood transfusion if it is medically needed before, during, or after your surgery? Yes   13. Have you or any of your relatives ever had problems with anesthesia? No   14. Do you have sleep apnea, excessive snoring or daytime drowsiness? No   15. Do you have any artifical heart valves or other implanted medical devices like a pacemaker, defibrillator, or continuous glucose monitor? No   16. Do you have artificial joints? No   17. Are you allergic to latex? No   18. Is there any chance that you may be pregnant? -       Health Care Directive:  Patient does not have a Health Care Directive or Living Will: Advance Directive received and scanned. Click on Code in the patient header to view.    Preoperative Review of :   reviewed - no  record of controlled substances prescribed.      Status of Chronic Conditions:  HYPERLIPIDEMIA - Patient has a long history of significant Hyperlipidemia requiring medication for treatment with recent good control. Patient reports no problems or side effects with the medication.     HYPERTENSION - Patient has longstanding history of HTN , currently denies any symptoms referable to elevated blood pressure. Specifically denies chest pain, palpitations, dyspnea, orthopnea, PND or peripheral edema. Blood pressure readings have been in normal range. Current medication regimen is as listed below. Patient denies any side effects of medication.       Review of Systems  CONSTITUTIONAL: NEGATIVE for fever, chills, change in weight  INTEGUMENTARY/SKIN: NEGATIVE for worrisome rashes, moles or lesions  EYES: NEGATIVE for vision changes or irritation  ENT/MOUTH: NEGATIVE for ear, mouth and throat problems  RESP: NEGATIVE for significant cough or SOB  CV: NEGATIVE for chest pain, palpitations or peripheral edema  GI: NEGATIVE for nausea, abdominal pain, heartburn, or change in bowel habits  : NEGATIVE for frequency, dysuria, or hematuria  MUSCULOSKELETAL:DDD  NEURO: NEGATIVE for weakness, dizziness or paresthesias  ENDOCRINE: NEGATIVE for temperature intolerance, skin/hair changes  HEME: NEGATIVE for bleeding problems  PSYCHIATRIC: NEGATIVE for changes in mood or affect       Patient Active Problem List    Diagnosis Date Noted     Anemia, unspecified type 09/20/2021     Priority: Medium     Dry mouth 09/20/2021     Priority: Medium     Spinal stenosis of lumbar region, unspecified whether neurogenic claudication present 06/02/2021     Priority: Medium     DDD (degenerative disc disease), lumbar 06/02/2021     Priority: Medium     Lumbago 07/02/2019     Priority: Medium     Pain in both lower legs 07/02/2019     Priority: Medium     Hypertension goal BP (blood pressure) < 140/90 04/22/2019     Priority: Medium     Personal  history of malignant neoplasm of breast 03/28/2016     Priority: Medium     2000       Family history of diabetes mellitus 03/19/2012     Priority: Medium     Glaucoma 03/19/2012     Priority: Medium     Advanced directives, counseling/discussion 01/23/2012     Priority: Medium     Advance Directive Problem List Overview:   Name Relationship Phone    Primary Health Care Agent Aurelio Espinosa Friend [phone]         Alternative Health Care Agent Sariah Patel Sister [phone]   Received outside advance directive. Notary signature present.  scanned and placed behind media tab.  Please see advance directive for specifics. Advance Care Directive reviewed & complete.  Maria G Alcantara RN 3/21/2012    Patient states has Advance Directive and will bring in a copy to clinic. 1/23/2012          Osteoarthritis 01/23/2012     Priority: Medium     Hyperlipidemia LDL goal <160 03/16/2011     Priority: Medium     S/P mastectomy 03/16/2011     Priority: Medium     Osteoporosis 03/15/2010     Priority: Medium     Atopic rhinitis 03/15/2010     Priority: Medium     (Problem list name updated by automated process. Provider to review and confirm.)       Past Medical History:   Diagnosis Date     Breast cancer (H) 2000    Lt breast     Glaucoma     bilateral     Hyperlipidemia      Hypertension      Macular degeneration      Osteoarthritis      Osteoporosis      Past Surgical History:   Procedure Laterality Date     C MASTECTOMY,SIMPLE  3/2000    left     COLONOSCOPY  2006    Q 10 years     COLONOSCOPY N/A 10/4/2021    Procedure: COLONOSCOPY;  Surgeon: Guru Lyla Ruby MD;  Location:  OR     ESOPHAGOSCOPY, GASTROSCOPY, DUODENOSCOPY (EGD), COMBINED N/A 10/4/2021    Procedure: ENDOSCOPIC ULTRASOUND, ESOPHAGOSCOPY / UPPER GASTROINTESTINAL TRACT (GI), Esophagogastroduodenoscopy,  Fine Needle Biopsy of liver;  Surgeon: Guru Lyla Ruby MD;  Location:  OR     SURGICAL HISTORY OF -  Left  2015    tissue expander placed in left breast area     SURGICAL HISTORY OF -  Left 2016    left breast implant and right mammoplasty     Current Outpatient Medications   Medication Sig Dispense Refill     dorzolamide-timolol (COSOPT) 2-0.5 % ophthalmic solution Place 1 drop into both eyes 2 times daily       latanoprost (XALATAN) 0.005 % ophthalmic solution Place 1 drop into both eyes daily        losartan (COZAAR) 100 MG tablet TAKE 1 TABLET BY MOUTH EVERY DAY 90 tablet 0     rosuvastatin (CRESTOR) 10 MG tablet TAKE 1 TABLET (10 MG) BY MOUTH DAILY. (Patient taking differently: Take 10 mg by mouth every 7 days) 90 tablet 3       Allergies   Allergen Reactions     Compazine      Mental confusion     Hydrochlorothiazide      Dryness mouth     Prochlorperazine Visual Disturbance     Simvastatin Other (See Comments)     Muscle aches        Social History     Tobacco Use     Smoking status: Former     Packs/day: 1.00     Years: 17.00     Pack years: 17.00     Types: Cigarettes     Quit date: 3/15/1999     Years since quittin.8     Smokeless tobacco: Never   Substance Use Topics     Alcohol use: Yes     Alcohol/week: 4.0 standard drinks     Types: 4 Glasses of wine per week     Comment: occasional     Family History   Problem Relation Age of Onset     Cancer Mother         bone cancer     Other Cancer Mother      Heart Disease Father      Coronary Artery Disease Father      Diabetes Father      Arthritis Sister      Breast Cancer Sister         age 75     Diabetes Maternal Grandmother      Diabetes Paternal Grandmother      History   Drug Use No         Objective     /76   Pulse 79   Temp 97.7  F (36.5  C) (Temporal)   Wt 66.2 kg (146 lb)   SpO2 98%   BMI 25.94 kg/m      Physical Exam    GENERAL APPEARANCE: healthy, alert and no distress     EYES: EOMI, PERRL     HENT: ear canals and TM's normal and nose and mouth without ulcers or lesions     NECK: no adenopathy, no asymmetry, masses, or scars and  thyroid normal to palpation     RESP: lungs clear to auscultation - no rales, rhonchi or wheezes     CV: regular rates and rhythm, normal S1 S2, no S3 or S4 and no murmur, click or rub     ABDOMEN:  soft, nontender, no HSM or masses and bowel sounds normal     MS: extremities normal- no gross deformities noted, no evidence of inflammation in joints, FROM in all extremities.     SKIN: no suspicious lesions or rashes     NEURO: Normal strength and tone, sensory exam grossly normal, mentation intact and speech normal     PSYCH: mentation appears normal. and affect normal/bright     LYMPHATICS: No cervical adenopathy    Recent Labs   Lab Test 12/28/22  1254 09/21/22  1256   HGB 12.9 13.2    391    137   POTASSIUM 4.8 4.2   CR 0.43* 0.53        Diagnostics:  Recent Results (from the past 720 hour(s))   **Lyme Disease Total Abs Bld with Reflex to Confirm CLIA FUTURE 14d    Collection Time: 12/20/22  8:18 AM   Result Value Ref Range    Lyme Disease Antibodies Total 0.17 <0.90   Comprehensive metabolic panel (BMP + Alb, Alk Phos, ALT, AST, Total. Bili, TP)    Collection Time: 12/28/22 12:54 PM   Result Value Ref Range    Sodium 140 133 - 144 mmol/L    Potassium 4.8 3.4 - 5.3 mmol/L    Chloride 106 94 - 109 mmol/L    Carbon Dioxide (CO2) 30 20 - 32 mmol/L    Anion Gap 4 3 - 14 mmol/L    Urea Nitrogen 14 7 - 30 mg/dL    Creatinine 0.43 (L) 0.52 - 1.04 mg/dL    Calcium 10.4 (H) 8.5 - 10.1 mg/dL    Glucose 111 (H) 70 - 99 mg/dL    Alkaline Phosphatase 141 40 - 150 U/L    AST 12 0 - 45 U/L    ALT 22 0 - 50 U/L    Protein Total 8.4 6.8 - 8.8 g/dL    Albumin 3.3 (L) 3.4 - 5.0 g/dL    Bilirubin Total 0.4 0.2 - 1.3 mg/dL    GFR Estimate >90 >60 mL/min/1.73m2   CEA    Collection Time: 12/28/22 12:54 PM   Result Value Ref Range    CEA 3.1 ng/mL   Cancer Antigen 15-3    Collection Time: 12/28/22 12:54 PM   Result Value Ref Range    Cancer Antigen 15-3 24 <=25 U/mL   CBC with platelets and differential    Collection Time:  12/28/22 12:54 PM   Result Value Ref Range    WBC Count 10.8 4.0 - 11.0 10e3/uL    RBC Count 4.80 3.80 - 5.20 10e6/uL    Hemoglobin 12.9 11.7 - 15.7 g/dL    Hematocrit 40.9 35.0 - 47.0 %    MCV 85 78 - 100 fL    MCH 26.9 26.5 - 33.0 pg    MCHC 31.5 31.5 - 36.5 g/dL    RDW 14.3 10.0 - 15.0 %    Platelet Count 422 150 - 450 10e3/uL    % Neutrophils 69 %    % Lymphocytes 21 %    % Monocytes 7 %    % Eosinophils 2 %    % Basophils 1 %    % Immature Granulocytes 0 %    NRBCs per 100 WBC 0 <1 /100    Absolute Neutrophils 7.6 1.6 - 8.3 10e3/uL    Absolute Lymphocytes 2.2 0.8 - 5.3 10e3/uL    Absolute Monocytes 0.7 0.0 - 1.3 10e3/uL    Absolute Eosinophils 0.2 0.0 - 0.7 10e3/uL    Absolute Basophils 0.1 0.0 - 0.2 10e3/uL    Absolute Immature Granulocytes 0.0 <=0.4 10e3/uL    Absolute NRBCs 0.0 10e3/uL       Collection Time: 12/28/22 12:54 PM   Result Value Ref Range     29 <=38 U/mL   AFP tumor marker    Collection Time: 12/28/22 12:54 PM   Result Value Ref Range    AFP tumor marker 5.9 <=8.3 ng/mL   Kappa and lambda light chain    Collection Time: 12/29/22  2:56 PM   Result Value Ref Range    Kappa Free Light Chains 2.17 (H) 0.33 - 1.94 mg/dL    Lambda Free Light Chains 1.67 0.57 - 2.63 mg/dL    Kappa /Lambda Ratio 1.30 0.26 - 1.65   Immunoglobulins A G and M    Collection Time: 12/29/22  2:56 PM   Result Value Ref Range    Immunoglobulin G 1,221 610 - 1,616 mg/dL    Immunoglobulin A 187 84 - 499 mg/dL    Immunoglobulin M 165 35 - 242 mg/dL   Protein Immunofixation Serum    Collection Time: 12/29/22  2:56 PM   Result Value Ref Range    Immunofixation ELP       Trace amount of IgG Kappa monoclonal protein seen on immunofixation. Pathologic significance requires clinical correlation. MISSY Moran M.D., Ph.D., Pathologist   Protein immunofixation urine    Collection Time: 12/29/22  2:56 PM   Result Value Ref Range    Immunofixation ELP Urine       No monoclonal protein seen on immunofixation. Pathologic  significance requires clinical correlation. MISSY Mroan M.D., Ph.D., Pathologist   Cancer antigen 19-9    Collection Time: 12/29/22  2:56 PM   Result Value Ref Range    Cancer Antigen 19-9 4 <=35 U/mL   Lactate Dehydrogenase    Collection Time: 12/29/22  2:56 PM   Result Value Ref Range    Lactate Dehydrogenase 169 81 - 234 U/L   Total Protein, Serum for ELP    Collection Time: 12/29/22  2:56 PM   Result Value Ref Range    Total Protein Serum for ELP 7.2 6.4 - 8.3 g/dL   Protein Electrophoresis, Serum    Collection Time: 12/29/22  2:56 PM   Result Value Ref Range    Albumin 3.5 (L) 3.7 - 5.1 g/dL    Alpha 1 0.4 0.2 - 0.4 g/dL    Alpha 2 1.3 (H) 0.5 - 0.9 g/dL    Beta Globulin 0.9 0.6 - 1.0 g/dL    Gamma Globulin 1.1 0.7 - 1.6 g/dL    Monoclonal Peak 0.0 <=0.0 g/dL    ELP Interpretation       Hypoalbuminemia with high normal alpha 1 and  increased alpha 2 globulins, suggestive of acute phase reaction. No monoclonal protein seen. Pathologic significance requires clinical correlation. MISSY Moran M.D., Ph.D., Pathologist.   Extra Purple Top Tube    Collection Time: 12/29/22  2:56 PM   Result Value Ref Range    Hold Specimen JIC    Protein timed urine    Collection Time: 01/09/23  9:47 AM   Result Value Ref Range    Total Protein Urine mg/dL <6.0 1.0 - 14.0 mg/dL    Duration in hours 24.0 h    Volume in mL 1,950 mL    Total Protein Timed Urine        EKG: appears normal, NSR, unchanged from previous tracings    Revised Cardiac Risk Index (RCRI):  The patient has the following serious cardiovascular risks for perioperative complications:   - No serious cardiac risks = 0 points     RCRI Interpretation: 0 points: Class I (very low risk - 0.4% complication rate)           Signed Electronically by: Chuyita Zaidi MD  Copy of this evaluation report is provided to requesting physician.

## 2023-01-11 LAB — PROT PATTERN UR ELPH-IMP: NORMAL

## 2023-01-12 ASSESSMENT — PAIN SCALES - PAIN ENJOYMENT GENERAL ACTIVITY SCALE (PEG)
PEG_TOTALSCORE: 10
INTERFERED_GENERAL_ACTIVITY: 10 - COMPLETELY INTERFERES
INTERFERED_ENJOYMENT_LIFE: 10 - COMPLETELY INTERFERES
AVG_PAIN_PASTWEEK: 10 - PAIN AS BAD AS YOU CAN IMAGINE

## 2023-01-12 ASSESSMENT — ANXIETY QUESTIONNAIRES
1. FEELING NERVOUS, ANXIOUS, OR ON EDGE: SEVERAL DAYS
4. TROUBLE RELAXING: SEVERAL DAYS
2. NOT BEING ABLE TO STOP OR CONTROL WORRYING: SEVERAL DAYS
3. WORRYING TOO MUCH ABOUT DIFFERENT THINGS: SEVERAL DAYS
5. BEING SO RESTLESS THAT IT IS HARD TO SIT STILL: NOT AT ALL
IF YOU CHECKED OFF ANY PROBLEMS ON THIS QUESTIONNAIRE, HOW DIFFICULT HAVE THESE PROBLEMS MADE IT FOR YOU TO DO YOUR WORK, TAKE CARE OF THINGS AT HOME, OR GET ALONG WITH OTHER PEOPLE: NOT DIFFICULT AT ALL
GAD7 TOTAL SCORE: 5
6. BECOMING EASILY ANNOYED OR IRRITABLE: SEVERAL DAYS
7. FEELING AFRAID AS IF SOMETHING AWFUL MIGHT HAPPEN: NOT AT ALL

## 2023-01-13 ENCOUNTER — ONCOLOGY VISIT (OUTPATIENT)
Dept: ONCOLOGY | Facility: CLINIC | Age: 73
End: 2023-01-13
Attending: INTERNAL MEDICINE
Payer: MEDICARE

## 2023-01-13 ENCOUNTER — TELEPHONE (OUTPATIENT)
Dept: INTERVENTIONAL RADIOLOGY/VASCULAR | Facility: CLINIC | Age: 73
End: 2023-01-13

## 2023-01-13 VITALS
HEIGHT: 63 IN | WEIGHT: 144.8 LBS | TEMPERATURE: 98.1 F | RESPIRATION RATE: 16 BRPM | OXYGEN SATURATION: 98 % | SYSTOLIC BLOOD PRESSURE: 152 MMHG | HEART RATE: 77 BPM | DIASTOLIC BLOOD PRESSURE: 72 MMHG | BODY MASS INDEX: 25.66 KG/M2

## 2023-01-13 DIAGNOSIS — H04.032: ICD-10-CM

## 2023-01-13 DIAGNOSIS — K11.8 NODULE OF PAROTID GLAND: ICD-10-CM

## 2023-01-13 DIAGNOSIS — R59.9 LYMPH NODE ENLARGEMENT: Primary | ICD-10-CM

## 2023-01-13 PROCEDURE — 99214 OFFICE O/P EST MOD 30 MIN: CPT | Performed by: INTERNAL MEDICINE

## 2023-01-13 ASSESSMENT — PAIN SCALES - GENERAL: PAINLEVEL: SEVERE PAIN (7)

## 2023-01-13 NOTE — LETTER
1/13/2023         RE: Virginia Byers  [id number] [facility] [address]        Dear Colleague,    Thank you for referring your patient, Virginia Byers, to the [facility]. Please see a copy of my visit note below.    [facility]    Hematology/Oncology Established Patient Follow-up Note    Treatment Summary:      Today's Date: 1/13/2023    Reason for Follow-up: New left common iliac, external iliac, pelvic sidewall lymphadenopathy, suspected metastatic disease with unknown primary    and Stable sclerotic lesions in left sacrum and left posterior ilium, possible metastasis    HISTORY OF PRESENT ILLNESS: Virginia Byers is a 72 year old female who presents for follow up.    Patient has medical history including LEFT breast cancer in 2000, hypertension, hyperlipidemia, osteoporosis, osteoarthritis, degenerative disc disease, spinal stenosis of lumbar region,  s/p bilateral L5-S1 transforaminal ROS by Dr. Garcia on 7/9/2021, s/p bilateral L4-5 and L5-S1 facet joint injections on 6/14/2021, glaucoma and macular degeneration, cataracts s/p surgery, history of tobacco use (quit 1999)     Regarding previous history of breast cancer:  She was previously treated by Dr. Sakina Brandt at [facility]      In summary, diagnosed at age 49 with LEFT breast IDC ER positive, OR positive, HER2 positive on FISH. 3/2000 left breast lumpectomy and left axillary node dissection (IDC, grade 3, 1.45 cm incompletely excised tumor, DCIS, positive margins with infiltrating carcinoma, 1 of 17 lymph nodes positive (0.5 cm metastatic focus), ER positive, OR positive, HER2 positive).  4/2020 left breast simple mastectomy with no residual carcinoma. S/p ACx4, taxol x4, no anti-her2 treatment, no RT. S/p 5 years adjuvant endocrine therapy (tamoxifen and anastrozole). Delayed left breast reconstruction and right breast augmentation.      3/2/2000  Excisional biopsy  of Left breast mass -   Infiltrating ductal carcinoma with associated DCIS   ER positive (60%), CA positive (22%), HER2 by FISH POSITIVE     3/10/2000 - Left lumpectomy and left axillary node  Invasive ductal carcinoma, Grade 3, 1.45 cm (incompletely excised), negative for LVI  DCIS, greatest histologic dimension of tumor (DCIS PLUS invasive tumor) = 2.9 cm (estimate 50% DCIS)  Positive margins- infiltrating carcinoma extends to margin  1 of 17 axillary lymph nodes sampled was positive for metastatic focus of 0.5 cm.  ER/CA+     Sections of lumpectomy left breast showing residual 1.1 cm focus of ductal carcinoma in situ (DCIS), present within microns of the linked margin of resection.       4/11/2000 - Left breast simple mastectomy, negative for residual carcinoma  Left mastectomy on 4/11/2000 performed by Dr. Cristian Mcdonald at [facility].      Adjuvant treatment:  - 5/15/2000-7/24/2000 4 cycles of Adriamycin and Cytoxan   - 8/16/2000-10/18/2000 4 cycles of paclitaxel   - Appears she may have been on some kind of trial/protocol, was not given any anti-HER2 targeted treatment  - No adjuvant radiation  - 12/2000- 10/2002 tamoxifen  - 10/2002-5/2006 switched to anastrozole (due to in vitro studies showing anastrozole may be slightly better for patients were HER2 positive) with Fosamax for osteopenia     8/31/2015 Left delayed reconstruction with tissue expander    2/3/2016 Left 2nd stage 450 cc high profile silicone implant; right augmentation 150 cc Moderate classic profile silicone implant; cresent mastopexy     Current history:  -Ongoing right knee pain not improved with brace and physical therapy, s/p bilateral L5-S1 transforaminal ROS by Dr. Garcia on 7/9/2021, s/p bilateral L4-5 and L5-S1 facet joint injections on 6/14/2021. ongoing b/l hip and leg pain, worse with position changes (ie sitting to standing), constant, 8-9/10, tylenol brings pain down to 6/10 (using tylenol bid). ECOG 3.   -Patient has seen  neurosurgery Dr. Jose Alexander, occupational medicine specialist Dr.Orrin Murphy, with plan to see PM&R Dr. Margie Agudelo and movement disorder clinic, EMG pending  - 11/22 MRI lumbar spine:    prominent left iliac lymph nodes among other findings related to degenerative changes and neural foraminal stenosis throughout lumbar spine  - 12/22 MRI pelvis:  1. Enhancing marrow signal abnormality of the right iliac bone extending from the superior acetabulum subchondral location proximally almost to the level of the sacroiliac joint caudal aspect. No associated fracture line. Underlying marrow infiltrative process such as from metastasis cannot be excluded especially given the degree of T1 hypointensity and history of primary tumor. Other consideration include stress reaction.  2. Mildly increased left external iliac chain, and the right medial thigh lymphadenopathy. Metastasis cannot be excluded.  3. Left posterior iliac enhancing lesion, similar in size to prior CT 9/2021 and previously not visible on CT, focal enhancing lesion in the right greater trochanter laterally. Findings are concerning for Metastasis.  -12/22 CT chest abdomen pelvis with contrast:  COMPARISON: MRI pelvis 12/20/2022 and CT abdomen pelvis 9/21/2021.  1. Mastectomies with implant reconstructions. No lymphadenopathy  2.  There is an new or increased left common iliac, external iliac and pelvic sidewall lymphadenopathy since prior CT. Distal left common iliac lymph nodes measure up to 1.2 cm on this exam compared to 0.7 cm on prior CT. Left pelvic sidewall lymph node measures 2.1 cm short axis on this exam compared to 1.2 cm on prior CT . consistent with metastatic disease.  3. Sclerotic lesions in the left sacrum and left posterior ilium are unchanged from prior CT.  may be metastatic.  4. No evidence of metastatic disease in the chest.     Of note, patient has documentation of anemia (iron deficiency and anemia of chronic disease), elevated LFTs,  nausea and vomiting of all solids and liquids, fatigue with 26 pound unintentional weight loss from 7/20/2021 - 10/20/2021 with mildly elevated LFTs which led to evaluation with mammogram, endoscopy with EUS and colonoscopy as detailed below. Pt did associate these with some spinal injections she had gotten, due to overlapping times. Pt did regain all of the weight and hand improvement of anemia within 2 months, without significant intervention.      -10/18/2021: Screening mammogram right breast ZACKARY  -10/2021: Endoscopy with EUS normal EGD and no stigmata or source of upper GI bleeding, visualized bile duct, gallbladder, liver, pancreas, left adrenal gland normal.  No lymphadenopathy. LIVER, RANDOM NEEDLE BIOPSY: Benign parenchyma with congestion; no significant fibrosis or other abnormalities     -10/2021 colonoscopy: Hemorrhoids, diverticulosis in sigmoid colon and splenic flexure        Denies melena, hematochezia, hematuria, dysphagia, odynophagia, GERD, dyspnea. Pts activity is limited by pain. Pt has hx of smoking- quit in 1999.        INTERIM HISTORY:  -1/2023 MRI brain w/wo contrast:  IMPRESSION:  1. No findings of intracranial metastatic disease.  2. Indeterminate ovoid T2 hyperintense enhancing mass in the region of the  left lacrimal gland measuring approximately 2.4 cm AP by 1.4 cm  transverse by 2.3 cm craniocaudal associated with/replacing the left  lacrimal gland. Consider correlation with tissue sampling.  3. A few indeterminate partially visualized heterogeneous enhancing  Nodules measuring up to approximately 1.5 cm in the bilateral parotid glands. Consider soft tissue neck CT with contrast for further evaluation.    -planning for 1/20/2023: IR US Guided biopsy of a right external iliac LN     pt has better control of pain with aleve 2 in AM w/breakast, 1 in evening.     Upcoming appts:  Neuro movement d/o appt 2/22/23  PM&R appt 3/23/23  Rheumatology appt 7/10/23    REVIEW OF SYSTEMS:   A 14 point  ROS was reviewed with pertinent positives and negatives in the HPI.       HOME MEDICATIONS:  Current Outpatient Medications   Medication Sig Dispense Refill     dorzolamide-timolol (COSOPT) 2-0.5 % ophthalmic solution Place 1 drop into both eyes 2 times daily       latanoprost (XALATAN) 0.005 % ophthalmic solution Place 1 drop into both eyes daily        losartan (COZAAR) 100 MG tablet TAKE 1 TABLET BY MOUTH EVERY DAY 90 tablet 0     rosuvastatin (CRESTOR) 10 MG tablet TAKE 1 TABLET (10 MG) BY MOUTH DAILY. (Patient taking differently: Take 10 mg by mouth every 7 days) 90 tablet 3         ALLERGIES:  Allergies   Allergen Reactions     Compazine      Mental confusion     Hydrochlorothiazide      Dryness mouth     Prochlorperazine Visual Disturbance     Simvastatin Other (See Comments)     Muscle aches         PAST MEDICAL HISTORY:  Past Medical History:   Diagnosis Date     Breast cancer (H) 2000    Lt breast     Glaucoma     bilateral     Hyperlipidemia      Hypertension      Macular degeneration      Osteoarthritis      Osteoporosis          PAST SURGICAL HISTORY:  Past Surgical History:   Procedure Laterality Date     C MASTECTOMY,SIMPLE  3/2000    left     COLONOSCOPY  2006    Q 10 years     COLONOSCOPY N/A 10/4/2021    Procedure: COLONOSCOPY;  Surgeon: Guru Lyla Ruby MD;  Location: [facility] OR     ESOPHAGOSCOPY, GASTROSCOPY, DUODENOSCOPY (EGD), COMBINED N/A 10/4/2021    Procedure: ENDOSCOPIC ULTRASOUND, ESOPHAGOSCOPY / UPPER GASTROINTESTINAL TRACT (GI), Esophagogastroduodenoscopy,  Fine Needle Biopsy of liver;  Surgeon: Guru Lyla Ruby MD;  Location: [facility] OR     SURGICAL HISTORY OF -  Left 8/2015    tissue expander placed in left breast area     SURGICAL HISTORY OF -  Left 2/2016    left breast implant and right mammoplasty         SOCIAL HISTORY:  Social History     Socioeconomic History     Marital status: Single     Spouse name: Not on file     Number of children: 0     Years  "of education: Not on file     Highest education level: Not on file   Occupational History     Employer: [employer]     Occupation: REtired   Tobacco Use     Smoking status: Former     Packs/day: 1.00     Years: 17.00     Pack years: 17.00     Types: Cigarettes     Quit date: 3/15/1999     Years since quittin.8     Smokeless tobacco: Never   Substance and Sexual Activity     Alcohol use: Yes     Alcohol/week: 4.0 standard drinks     Types: 4 Glasses of wine per week     Comment: occasional     Drug use: No     Sexual activity: Not Currently     Birth control/protection: None   Other Topics Concern     Parent/sibling w/ CABG, MI or angioplasty before 65F 55M? Yes     Comment: Father heart attack   Social History Narrative     Not on file     Social Determinants of Health     Financial Resource Strain: Not on file   Food Insecurity: Not on file   Transportation Needs: Not on file   Physical Activity: Not on file   Stress: Not on file   Social Connections: Not on file   Intimate Partner Violence: Not on file   Housing Stability: Not on file         FAMILY HISTORY:  Family History   Problem Relation Age of Onset     Cancer Mother         bone cancer     Other Cancer Mother      Heart Disease Father      Coronary Artery Disease Father      Diabetes Father      Arthritis Sister      Breast Cancer Sister         age 75     Diabetes Maternal Grandmother      Diabetes Paternal Grandmother      Family history of cancer- sister breast cancer- dx at age 75 y/o, localized breast cancer, surgical resection, RT, no adjuvant chemo or endocrine therapy that pt is aware of     PHYSICAL EXAM:  Vital signs:  BP (!) 152/72 (BP Location: Right arm)   Pulse 77   Temp 98.1  F (36.7  C) (Oral)   Resp 16   Ht 1.598 m (5' 2.91\")   Wt 65.7 kg (144 lb 12.8 oz)   SpO2 98%   BMI 25.72 kg/m       ECO  GENERAL/CONSTITUTIONAL: No acute distress.  EYES: Pupils are equal and round. Extraocular movements intact without nystagmus.  No scleral " icterus.  RESPIRATORY: Equal chest rise.   MUSCULOSKELETAL: Warm and well-perfused, no cyanosis, clubbing, or edema.   NEUROLOGIC: Cranial nerves are grossly intact. Alert, oriented to person, place and time, answers questions appropriately.  INTEGUMENTARY: No rashes or jaundice.  GAIT: Steady, does not use assistive device        LABS:   Latest Reference Range & Units 12/28/22 12:54 12/29/22 14:56   Sodium 133 - 144 mmol/L 140    Potassium 3.4 - 5.3 mmol/L 4.8    Chloride 94 - 109 mmol/L 106    Carbon Dioxide 20 - 32 mmol/L 30    Urea Nitrogen 7 - 30 mg/dL 14    Creatinine 0.52 - 1.04 mg/dL 0.43 (L)    GFR Estimate >60 mL/min/1.73m2 >90    Calcium 8.5 - 10.1 mg/dL 10.4 (H)    Anion Gap 3 - 14 mmol/L 4    Albumin 3.4 - 5.0 g/dL 3.3 (L)    Protein Total 6.8 - 8.8 g/dL 8.4    Alkaline Phosphatase 40 - 150 U/L 141    ALT 0 - 50 U/L 22    AST 0 - 45 U/L 12    AFP tumor marker <=8.3 ng/mL 5.9    Bilirubin Total 0.2 - 1.3 mg/dL 0.4    Cancer Antigen 15-3 <=25 U/mL 24     <=38 U/mL 29    Cancer Antigen 19-9 <=35 U/mL  4   CEA ng/mL 3.1    Lactate Dehydrogenase 81 - 234 U/L  169   Glucose 70 - 99 mg/dL 111 (H)    WBC 4.0 - 11.0 10e3/uL 10.8    Hemoglobin 11.7 - 15.7 g/dL 12.9    Hematocrit 35.0 - 47.0 % 40.9    Platelet Count 150 - 450 10e3/uL 422    RBC Count 3.80 - 5.20 10e6/uL 4.80    MCV 78 - 100 fL 85    MCH 26.5 - 33.0 pg 26.9    MCHC 31.5 - 36.5 g/dL 31.5    RDW 10.0 - 15.0 % 14.3    % Neutrophils % 69    % Lymphocytes % 21    % Monocytes % 7    % Eosinophils % 2    % Basophils % 1    Absolute Basophils 0.0 - 0.2 10e3/uL 0.1    Absolute Eosinophils 0.0 - 0.7 10e3/uL 0.2    Absolute Immature Granulocytes <=0.4 10e3/uL 0.0    Absolute Lymphocytes 0.8 - 5.3 10e3/uL 2.2    Absolute Monocytes 0.0 - 1.3 10e3/uL 0.7    % Immature Granulocytes % 0    Absolute Neutrophils 1.6 - 8.3 10e3/uL 7.6    Absolute NRBCs 10e3/uL 0.0    NRBCs per 100 WBC <1 /100 0    Albumin Fraction 3.7 - 5.1 g/dL  3.5 (L)   Alpha 1 Fraction 0.2  - 0.4 g/dL  0.4   Alpha 2 Fraction 0.5 - 0.9 g/dL  1.3 (H)   Beta Fraction 0.6 - 1.0 g/dL  0.9   ELP Interpretation:   Hypoalbuminemia with high normal alpha 1 and  increased alpha 2 globulins, suggestive of acute phase reaction. No monoclonal protein seen. Pathologic significance requires clinical correlation. MISSY Moran M.D., Ph.D., Pathologist.   Gamma Fraction 0.7 - 1.6 g/dL  1.1   IGA 84 - 499 mg/dL  187    - 1,616 mg/dL  1,221   IGM 35 - 242 mg/dL  165   Immunofix ELP Urine   No monoclonal protein seen on immunofixation. Pathologic significance requires clinical correlation. MISSY Moran M.D., Ph.D., Pathologist   Immunofixation ELP   Trace amount of IgG Kappa monoclonal protein seen on immunofixation. Pathologic significance requires clinical correlation. MISSY Moran M.D., Ph.D., Pathologist   Kappa Free Lt Chain 0.33 - 1.94 mg/dL  2.17 (H)   Kappa Lambda Ratio 0.26 - 1.65   1.30   Lambda Free Lt Chain 0.57 - 2.63 mg/dL  1.67   Monoclonal Peak <=0.0 g/dL  0.0   Total Protein Serum for ELP 6.4 - 8.3 g/dL  7.2   CT CHEST/ABDOMEN/PELVIS W CONTRAST      (L): Data is abnormally low  (H): Data is abnormally high  Rpt: View report in Results Review for more information    PATHOLOGY:      IMAGING:  MRI BRAIN WITHOUT AND WITH CONTRAST  1/9/2023 11:35 AM      HISTORY:  New pelvic lymphadenopathy and bone lesions concerning for  metastatic disease of unknown origin, r/o brain mets; Lymph node  enlargement.      TECHNIQUE:  Multiplanar, multisequence MRI of the brain without and  with 6.5 mL Gadavist      COMPARISON: None.      FINDINGS: No abnormal intracranial resected diffusion identified. The  ventricles are normal in size and configuration. Mild generalized  brain parenchymal volume loss. Mild to moderate patchy scattered  nonspecific T2 FLAIR hyperintense signal in the cerebral white matter  and ivan, likely due to chronic small vessel ischemic disease. No  acute intracranial hemorrhage, extra axial  fluid collection, or mass  effect. Punctate focus of susceptibility related signal loss in the  right cerebellar hemisphere (series 9 image 6), possibly a chronic  microhemorrhage. No intracranial mass or definite abnormal  intracranial enhancement identified.     There is an ovoid T2 hyperintense enhancing mass in the region of the  left lacrimal gland measuring approximately 2.4 cm AP by 1.4 cm  transverse by 2.3 cm craniocaudal (series 8 image 4, series 12 image  59). Bilateral lens implants.     There appear to be at least a couple of T2 FLAIR hyperintense  heterogeneously enhancing indeterminate nodules in the bilateral  parotid glands measuring up to approximately 1.5 cm (series 8 image  11, series 13 image 82). The calvarium, skull base, and midface  otherwise appear grossly unremarkable.                                                                      IMPRESSION:  1. No findings of intracranial metastatic disease.  2. Indeterminate enhancing mass associated with/replacing the left  lacrimal gland. Consider correlation with tissue sampling.  3. A few indeterminate partially visualized heterogeneous enhancing  nodules in the bilateral parotid glands. Consider soft tissue neck CT  with contrast for further evaluation.    ASSESSMENT/PLAN:  Virginia Byers is a 72 year old female with:    #New left common iliac, external iliac, pelvic sidewall lymphadenopathy, suspected metastatic disease with unknown primary   #Stable sclerotic lesions in left sacrum and left posterior ilium, possible metastasis  -10/21 mammogram, endoscopy with EUS and colonoscopy WNL  -11/22 MRI lumbar spine in 12/22 MRI pelvis show prominent left iliac lymph nodes, right medial thigh lymphadenopathy, right iliac bone enhancing marrow signal, left posterior iliac enhancing lesion  -12/22 CT chest abdomen pelvis shows new or increased left common iliac, external iliac, pelvic sidewall lymphadenopathy and sclerotic lesions in left sacrum  and left posterior ilium.  No metastatic disease in chest.  No abnormal findings in breast.  -  tumor markers: CA 15-3 24,  29, CEA 3.1, CA 19-9 4, AFP 5.9,   - 2023 MRI brain w/wo contrast: no metastases   -2023: IR US Guided biopsy of a right external iliac LN    #mild hypercalcemia  -  calcium 10.4, corrected for hypoalbuminemia calcium is 11  -May be related to suspected malignancy as detailed above  -Patient does have protein gap with hypoalbuminemia  - SIFE and UIFE negative  -  labs:  1.  SIFE: trace amount of IgG kappa monoclonal protein  2.  SPEP: no monoclonal protein seen  3.  UIFE: no monoclonal protein seen  4.  UPEP: no monoclonal protein seen  5.  Kappa/lambda ratio: kappa 2.17, lambda 1.67, kappa/lambda ratio 1.30   6.  Quantitative immunoglobulins: Ig    IgA: 187    IgM: 165  - can repeat SIFE in 3-6 months (-) for f/u, otherwise no significant findings of monoclonal gammopathy     # left lacrimal gland enhancing mass  -  brain MRI: Indeterminate ovoid T2 hyperintense enhancing mass in the region of the left lacrimal gland measuring approximately 2.4 cm AP by 1.4 cm  transverse by 2.3 cm craniocaudal associated with/replacing the left  lacrimal gland. 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 Pt follows with Dr. Moore at [facility] in [place]. Planned f/u in 3/2023  - will obtain records   - pt to f/u with Dr. Moore for the above and further recommendations    #b/l parotid gland nodules  -  brain MRI:  A few indeterminate partially visualized heterogeneous enhancing nodules measuring up to approximately 1.5 cm in the bilateral parotid glands.  Consider soft tissue neck CT with contrast for further evaluation.  - CT soft tissue neck  ordered    #History of LEFT breast IDC ER positive, CA positive, HER2 positive on FISH  -Diagnosed at age 49  - 3/2000 left breast lumpectomy and left axillary node dissection (IDC, grade 3, 1.45 cm incompletely excised tumor, DCIS, positive margins with infiltrating carcinoma, 1 of 17 lymph nodes positive (0.5 cm metastatic focus), ER positive, CA positive, HER2 positive).    -4/2020 left breast simple mastectomy with no residual carcinoma.   -S/p ACx4, taxol x4, no anti-her2 treatment, no RT.   -S/p 5 years adjuvant endocrine therapy (tamoxifen and anastrozole).  -Delayed left breast reconstruction and right breast augmentation.     RTC 3-4 weeks for f/u (after biopsy results are back)    Arlene Zaidi DO  Hematology/Oncology  [facility]    Future Appointments   Date Time Provider Department Center   1/13/2023 10:15 AM Arlene Zaidi DO [facility]   1/19/2023  9:00 AM Janett Noland APRN CNP [facility]   1/20/2023  9:30 AM [facility] ROOM 10 [facility] O   1/20/2023 11:00 AM [id number] [place] [address] O   2/22/2023  4:00 PM Mikki Witt MD [facility]   3/23/2023  1:00 PM Santi Weems MD [facility]   7/10/2023  9:00 AM [facility]          Again, thank you for allowing me to participate in the care of your patient.        Sincerely,        ARLENE ZAIDI DO

## 2023-01-13 NOTE — PROGRESS NOTES
[facility]    Hematology/Oncology Established Patient Follow-up Note    Treatment Summary:      Today's Date: 1/13/2023    Reason for Follow-up: New left common iliac, external iliac, pelvic sidewall lymphadenopathy, suspected metastatic disease with unknown primary    and Stable sclerotic lesions in left sacrum and left posterior ilium, possible metastasis    HISTORY OF PRESENT ILLNESS: Virginia Byers is a 72 year old female who presents for follow up.    Patient has medical history including LEFT breast cancer in 2000, hypertension, hyperlipidemia, osteoporosis, osteoarthritis, degenerative disc disease, spinal stenosis of lumbar region,  s/p bilateral L5-S1 transforaminal ROS by Dr. Garcia on 7/9/2021, s/p bilateral L4-5 and L5-S1 facet joint injections on 6/14/2021, glaucoma and macular degeneration, cataracts s/p surgery, history of tobacco use (quit 1999)     Regarding previous history of breast cancer:  She was previously treated by Dr. Sakina Brandt at [facility]      In summary, diagnosed at age 49 with LEFT breast IDC ER positive, UT positive, HER2 positive on FISH. 3/2000 left breast lumpectomy and left axillary node dissection (IDC, grade 3, 1.45 cm incompletely excised tumor, DCIS, positive margins with infiltrating carcinoma, 1 of 17 lymph nodes positive (0.5 cm metastatic focus), ER positive, UT positive, HER2 positive).  4/2020 left breast simple mastectomy with no residual carcinoma. S/p ACx4, taxol x4, no anti-her2 treatment, no RT. S/p 5 years adjuvant endocrine therapy (tamoxifen and anastrozole). Delayed left breast reconstruction and right breast augmentation.      3/2/2000  Excisional biopsy of Left breast mass -   Infiltrating ductal carcinoma with associated DCIS   ER positive (60%), UT positive (22%), HER2 by FISH POSITIVE     3/10/2000 - Left lumpectomy and left axillary node  Invasive ductal carcinoma, Grade 3, 1.45 cm (incompletely  excised), negative for LVI  DCIS, greatest histologic dimension of tumor (DCIS PLUS invasive tumor) = 2.9 cm (estimate 50% DCIS)  Positive margins- infiltrating carcinoma extends to margin  1 of 17 axillary lymph nodes sampled was positive for metastatic focus of 0.5 cm.  ER/OK+     Sections of lumpectomy left breast showing residual 1.1 cm focus of ductal carcinoma in situ (DCIS), present within microns of the linked margin of resection.       4/11/2000 - Left breast simple mastectomy, negative for residual carcinoma  Left mastectomy on 4/11/2000 performed by Dr. Cristian Mcdonald at [facility].      Adjuvant treatment:  - 5/15/2000-7/24/2000 4 cycles of Adriamycin and Cytoxan   - 8/16/2000-10/18/2000 4 cycles of paclitaxel   - Appears she may have been on some kind of trial/protocol, was not given any anti-HER2 targeted treatment  - No adjuvant radiation  - 12/2000- 10/2002 tamoxifen  - 10/2002-5/2006 switched to anastrozole (due to in vitro studies showing anastrozole may be slightly better for patients were HER2 positive) with Fosamax for osteopenia     8/31/2015 Left delayed reconstruction with tissue expander    2/3/2016 Left 2nd stage 450 cc high profile silicone implant; right augmentation 150 cc Moderate classic profile silicone implant; cresent mastopexy     Current history:  -Ongoing right knee pain not improved with brace and physical therapy, s/p bilateral L5-S1 transforaminal ROS by Dr. Garcia on 7/9/2021, s/p bilateral L4-5 and L5-S1 facet joint injections on 6/14/2021. ongoing b/l hip and leg pain, worse with position changes (ie sitting to standing), constant, 8-9/10, tylenol brings pain down to 6/10 (using tylenol bid). ECOG 3.   -Patient has seen neurosurgery Dr. Jose Alexander, occupational medicine specialist Dr.Orrin Murphy, with plan to see PM&R Dr. Margie Agudelo and movement disorder clinic, EMG pending  - 11/22 MRI lumbar spine:    prominent left iliac lymph nodes among other findings related  to degenerative changes and neural foraminal stenosis throughout lumbar spine  - 12/22 MRI pelvis:  1. Enhancing marrow signal abnormality of the right iliac bone extending from the superior acetabulum subchondral location proximally almost to the level of the sacroiliac joint caudal aspect. No associated fracture line. Underlying marrow infiltrative process such as from metastasis cannot be excluded especially given the degree of T1 hypointensity and history of primary tumor. Other consideration include stress reaction.  2. Mildly increased left external iliac chain, and the right medial thigh lymphadenopathy. Metastasis cannot be excluded.  3. Left posterior iliac enhancing lesion, similar in size to prior CT 9/2021 and previously not visible on CT, focal enhancing lesion in the right greater trochanter laterally. Findings are concerning for Metastasis.  -12/22 CT chest abdomen pelvis with contrast:  COMPARISON: MRI pelvis 12/20/2022 and CT abdomen pelvis 9/21/2021.  1. Mastectomies with implant reconstructions. No lymphadenopathy  2.  There is an new or increased left common iliac, external iliac and pelvic sidewall lymphadenopathy since prior CT. Distal left common iliac lymph nodes measure up to 1.2 cm on this exam compared to 0.7 cm on prior CT. Left pelvic sidewall lymph node measures 2.1 cm short axis on this exam compared to 1.2 cm on prior CT . consistent with metastatic disease.  3. Sclerotic lesions in the left sacrum and left posterior ilium are unchanged from prior CT.  may be metastatic.  4. No evidence of metastatic disease in the chest.     Of note, patient has documentation of anemia (iron deficiency and anemia of chronic disease), elevated LFTs, nausea and vomiting of all solids and liquids, fatigue with 26 pound unintentional weight loss from 7/20/2021 - 10/20/2021 with mildly elevated LFTs which led to evaluation with mammogram, endoscopy with EUS and colonoscopy as detailed below. Pt did  associate these with some spinal injections she had gotten, due to overlapping times. Pt did regain all of the weight and hand improvement of anemia within 2 months, without significant intervention.      -10/18/2021: Screening mammogram right breast ZACKARY  -10/2021: Endoscopy with EUS normal EGD and no stigmata or source of upper GI bleeding, visualized bile duct, gallbladder, liver, pancreas, left adrenal gland normal.  No lymphadenopathy. LIVER, RANDOM NEEDLE BIOPSY: Benign parenchyma with congestion; no significant fibrosis or other abnormalities     -10/2021 colonoscopy: Hemorrhoids, diverticulosis in sigmoid colon and splenic flexure        Denies melena, hematochezia, hematuria, dysphagia, odynophagia, GERD, dyspnea. Pts activity is limited by pain. Pt has hx of smoking- quit in 1999.        INTERIM HISTORY:  -1/2023 MRI brain w/wo contrast:  IMPRESSION:  1. No findings of intracranial metastatic disease.  2. Indeterminate ovoid T2 hyperintense enhancing mass in the region of the  left lacrimal gland measuring approximately 2.4 cm AP by 1.4 cm  transverse by 2.3 cm craniocaudal associated with/replacing the left  lacrimal gland. Consider correlation with tissue sampling.  3. A few indeterminate partially visualized heterogeneous enhancing  Nodules measuring up to approximately 1.5 cm in the bilateral parotid glands. Consider soft tissue neck CT with contrast for further evaluation.    -planning for 1/20/2023: IR US Guided biopsy of a right external iliac LN     pt has better control of pain with aleve 2 in AM w/breakast, 1 in evening.     Upcoming appts:  Neuro movement d/o appt 2/22/23  PM&R appt 3/23/23  Rheumatology appt 7/10/23    REVIEW OF SYSTEMS:   A 14 point ROS was reviewed with pertinent positives and negatives in the HPI.       HOME MEDICATIONS:  Current Outpatient Medications   Medication Sig Dispense Refill     dorzolamide-timolol (COSOPT) 2-0.5 % ophthalmic solution Place 1 drop into both eyes 2  times daily       latanoprost (XALATAN) 0.005 % ophthalmic solution Place 1 drop into both eyes daily        losartan (COZAAR) 100 MG tablet TAKE 1 TABLET BY MOUTH EVERY DAY 90 tablet 0     rosuvastatin (CRESTOR) 10 MG tablet TAKE 1 TABLET (10 MG) BY MOUTH DAILY. (Patient taking differently: Take 10 mg by mouth every 7 days) 90 tablet 3         ALLERGIES:  Allergies   Allergen Reactions     Compazine      Mental confusion     Hydrochlorothiazide      Dryness mouth     Prochlorperazine Visual Disturbance     Simvastatin Other (See Comments)     Muscle aches         PAST MEDICAL HISTORY:  Past Medical History:   Diagnosis Date     Breast cancer (H) 2000    Lt breast     Glaucoma     bilateral     Hyperlipidemia      Hypertension      Macular degeneration      Osteoarthritis      Osteoporosis          PAST SURGICAL HISTORY:  Past Surgical History:   Procedure Laterality Date     C MASTECTOMY,SIMPLE  3/2000    left     COLONOSCOPY  2006    Q 10 years     COLONOSCOPY N/A 10/4/2021    Procedure: COLONOSCOPY;  Surgeon: Guru Lyla Ruby MD;  Location: [facility] OR     ESOPHAGOSCOPY, GASTROSCOPY, DUODENOSCOPY (EGD), COMBINED N/A 10/4/2021    Procedure: ENDOSCOPIC ULTRASOUND, ESOPHAGOSCOPY / UPPER GASTROINTESTINAL TRACT (GI), Esophagogastroduodenoscopy,  Fine Needle Biopsy of liver;  Surgeon: Guru Lyla Ruby MD;  Location: [facility] OR     SURGICAL HISTORY OF -  Left 8/2015    tissue expander placed in left breast area     SURGICAL HISTORY OF -  Left 2/2016    left breast implant and right mammoplasty         SOCIAL HISTORY:  Social History     Socioeconomic History     Marital status: Single     Spouse name: Not on file     Number of children: 0     Years of education: Not on file     Highest education level: Not on file   Occupational History     Employer: [employer]     Occupation: REtired   Tobacco Use     Smoking status: Former     Packs/day: 1.00     Years: 17.00     Pack years: 17.00     Types:  "Cigarettes     Quit date: 3/15/1999     Years since quittin.8     Smokeless tobacco: Never   Substance and Sexual Activity     Alcohol use: Yes     Alcohol/week: 4.0 standard drinks     Types: 4 Glasses of wine per week     Comment: occasional     Drug use: No     Sexual activity: Not Currently     Birth control/protection: None   Other Topics Concern     Parent/sibling w/ CABG, MI or angioplasty before 65F 55M? Yes     Comment: Father heart attack   Social History Narrative     Not on file     Social Determinants of Health     Financial Resource Strain: Not on file   Food Insecurity: Not on file   Transportation Needs: Not on file   Physical Activity: Not on file   Stress: Not on file   Social Connections: Not on file   Intimate Partner Violence: Not on file   Housing Stability: Not on file         FAMILY HISTORY:  Family History   Problem Relation Age of Onset     Cancer Mother         bone cancer     Other Cancer Mother      Heart Disease Father      Coronary Artery Disease Father      Diabetes Father      Arthritis Sister      Breast Cancer Sister         age 75     Diabetes Maternal Grandmother      Diabetes Paternal Grandmother      Family history of cancer- sister breast cancer- dx at age 75 y/o, localized breast cancer, surgical resection, RT, no adjuvant chemo or endocrine therapy that pt is aware of     PHYSICAL EXAM:  Vital signs:  BP (!) 152/72 (BP Location: Right arm)   Pulse 77   Temp 98.1  F (36.7  C) (Oral)   Resp 16   Ht 1.598 m (5' 2.91\")   Wt 65.7 kg (144 lb 12.8 oz)   SpO2 98%   BMI 25.72 kg/m       ECO  GENERAL/CONSTITUTIONAL: No acute distress.  EYES: Pupils are equal and round. Extraocular movements intact without nystagmus.  No scleral icterus.  RESPIRATORY: Equal chest rise.   MUSCULOSKELETAL: Warm and well-perfused, no cyanosis, clubbing, or edema.   NEUROLOGIC: Cranial nerves are grossly intact. Alert, oriented to person, place and time, answers questions " appropriately.  INTEGUMENTARY: No rashes or jaundice.  GAIT: Steady, does not use assistive device        LABS:   Latest Reference Range & Units 12/28/22 12:54 12/29/22 14:56   Sodium 133 - 144 mmol/L 140    Potassium 3.4 - 5.3 mmol/L 4.8    Chloride 94 - 109 mmol/L 106    Carbon Dioxide 20 - 32 mmol/L 30    Urea Nitrogen 7 - 30 mg/dL 14    Creatinine 0.52 - 1.04 mg/dL 0.43 (L)    GFR Estimate >60 mL/min/1.73m2 >90    Calcium 8.5 - 10.1 mg/dL 10.4 (H)    Anion Gap 3 - 14 mmol/L 4    Albumin 3.4 - 5.0 g/dL 3.3 (L)    Protein Total 6.8 - 8.8 g/dL 8.4    Alkaline Phosphatase 40 - 150 U/L 141    ALT 0 - 50 U/L 22    AST 0 - 45 U/L 12    AFP tumor marker <=8.3 ng/mL 5.9    Bilirubin Total 0.2 - 1.3 mg/dL 0.4    Cancer Antigen 15-3 <=25 U/mL 24     <=38 U/mL 29    Cancer Antigen 19-9 <=35 U/mL  4   CEA ng/mL 3.1    Lactate Dehydrogenase 81 - 234 U/L  169   Glucose 70 - 99 mg/dL 111 (H)    WBC 4.0 - 11.0 10e3/uL 10.8    Hemoglobin 11.7 - 15.7 g/dL 12.9    Hematocrit 35.0 - 47.0 % 40.9    Platelet Count 150 - 450 10e3/uL 422    RBC Count 3.80 - 5.20 10e6/uL 4.80    MCV 78 - 100 fL 85    MCH 26.5 - 33.0 pg 26.9    MCHC 31.5 - 36.5 g/dL 31.5    RDW 10.0 - 15.0 % 14.3    % Neutrophils % 69    % Lymphocytes % 21    % Monocytes % 7    % Eosinophils % 2    % Basophils % 1    Absolute Basophils 0.0 - 0.2 10e3/uL 0.1    Absolute Eosinophils 0.0 - 0.7 10e3/uL 0.2    Absolute Immature Granulocytes <=0.4 10e3/uL 0.0    Absolute Lymphocytes 0.8 - 5.3 10e3/uL 2.2    Absolute Monocytes 0.0 - 1.3 10e3/uL 0.7    % Immature Granulocytes % 0    Absolute Neutrophils 1.6 - 8.3 10e3/uL 7.6    Absolute NRBCs 10e3/uL 0.0    NRBCs per 100 WBC <1 /100 0    Albumin Fraction 3.7 - 5.1 g/dL  3.5 (L)   Alpha 1 Fraction 0.2 - 0.4 g/dL  0.4   Alpha 2 Fraction 0.5 - 0.9 g/dL  1.3 (H)   Beta Fraction 0.6 - 1.0 g/dL  0.9   ELP Interpretation:   Hypoalbuminemia with high normal alpha 1 and  increased alpha 2 globulins, suggestive of acute phase  reaction. No monoclonal protein seen. Pathologic significance requires clinical correlation. MISSY Moran M.D., Ph.D., Pathologist.   Gamma Fraction 0.7 - 1.6 g/dL  1.1   IGA 84 - 499 mg/dL  187    - 1,616 mg/dL  1,221   IGM 35 - 242 mg/dL  165   Immunofix ELP Urine   No monoclonal protein seen on immunofixation. Pathologic significance requires clinical correlation. MISSY Moran M.D., Ph.D., Pathologist   Immunofixation ELP   Trace amount of IgG Kappa monoclonal protein seen on immunofixation. Pathologic significance requires clinical correlation. MISSY Moran M.D., Ph.D., Pathologist   Kappa Free Lt Chain 0.33 - 1.94 mg/dL  2.17 (H)   Kappa Lambda Ratio 0.26 - 1.65   1.30   Lambda Free Lt Chain 0.57 - 2.63 mg/dL  1.67   Monoclonal Peak <=0.0 g/dL  0.0   Total Protein Serum for ELP 6.4 - 8.3 g/dL  7.2   CT CHEST/ABDOMEN/PELVIS W CONTRAST      (L): Data is abnormally low  (H): Data is abnormally high  Rpt: View report in Results Review for more information    PATHOLOGY:      IMAGING:  MRI BRAIN WITHOUT AND WITH CONTRAST  1/9/2023 11:35 AM      HISTORY:  New pelvic lymphadenopathy and bone lesions concerning for  metastatic disease of unknown origin, r/o brain mets; Lymph node  enlargement.      TECHNIQUE:  Multiplanar, multisequence MRI of the brain without and  with 6.5 mL Gadavist      COMPARISON: None.      FINDINGS: No abnormal intracranial resected diffusion identified. The  ventricles are normal in size and configuration. Mild generalized  brain parenchymal volume loss. Mild to moderate patchy scattered  nonspecific T2 FLAIR hyperintense signal in the cerebral white matter  and ivan, likely due to chronic small vessel ischemic disease. No  acute intracranial hemorrhage, extra axial fluid collection, or mass  effect. Punctate focus of susceptibility related signal loss in the  right cerebellar hemisphere (series 9 image 6), possibly a chronic  microhemorrhage. No intracranial mass or definite  abnormal  intracranial enhancement identified.     There is an ovoid T2 hyperintense enhancing mass in the region of the  left lacrimal gland measuring approximately 2.4 cm AP by 1.4 cm  transverse by 2.3 cm craniocaudal (series 8 image 4, series 12 image  59). Bilateral lens implants.     There appear to be at least a couple of T2 FLAIR hyperintense  heterogeneously enhancing indeterminate nodules in the bilateral  parotid glands measuring up to approximately 1.5 cm (series 8 image  11, series 13 image 82). The calvarium, skull base, and midface  otherwise appear grossly unremarkable.                                                                      IMPRESSION:  1. No findings of intracranial metastatic disease.  2. Indeterminate enhancing mass associated with/replacing the left  lacrimal gland. Consider correlation with tissue sampling.  3. A few indeterminate partially visualized heterogeneous enhancing  nodules in the bilateral parotid glands. Consider soft tissue neck CT  with contrast for further evaluation.    ASSESSMENT/PLAN:  Virginia Byers is a 72 year old female with:    #New left common iliac, external iliac, pelvic sidewall lymphadenopathy, suspected metastatic disease with unknown primary   #Stable sclerotic lesions in left sacrum and left posterior ilium, possible metastasis  -10/21 mammogram, endoscopy with EUS and colonoscopy WNL  -11/22 MRI lumbar spine in 12/22 MRI pelvis show prominent left iliac lymph nodes, right medial thigh lymphadenopathy, right iliac bone enhancing marrow signal, left posterior iliac enhancing lesion  -12/22 CT chest abdomen pelvis shows new or increased left common iliac, external iliac, pelvic sidewall lymphadenopathy and sclerotic lesions in left sacrum and left posterior ilium.  No metastatic disease in chest.  No abnormal findings in breast.  - 12/22 tumor markers: CA 15-3 24,  29, CEA 3.1, CA 19-9 4, AFP 5.9,   - 1/2023 MRI brain w/wo contrast: no  metastases   -2023: IR US Guided biopsy of a right external iliac LN    #mild hypercalcemia  -  calcium 10.4, corrected for hypoalbuminemia calcium is 11  -May be related to suspected malignancy as detailed above  -Patient does have protein gap with hypoalbuminemia  - SIFE and UIFE negative  -  labs:  1.  SIFE: trace amount of IgG kappa monoclonal protein  2.  SPEP: no monoclonal protein seen  3.  UIFE: no monoclonal protein seen  4.  UPEP: no monoclonal protein seen  5.  Kappa/lambda ratio: kappa 2.17, lambda 1.67, kappa/lambda ratio 1.30   6.  Quantitative immunoglobulins: Ig    IgA: 187    IgM: 165  - can repeat SIFE in 3-6 months (-) for f/u, otherwise no significant findings of monoclonal gammopathy     # left lacrimal gland enhancing mass  -  brain MRI: Indeterminate ovoid T2 hyperintense enhancing mass in the region of the left lacrimal gland measuring approximately 2.4 cm AP by 1.4 cm  transverse by 2.3 cm craniocaudal associated with/replacing the left  lacrimal gland. 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 Pt follows with Dr. Moore at [facility] in [place]. Planned f/u in 3/2023  - will obtain records   - pt to f/u with Dr. Moore for the above and further recommendations    #b/l parotid gland nodules  -  brain MRI:  A few indeterminate partially visualized heterogeneous enhancing nodules measuring up to approximately 1.5 cm in the bilateral parotid glands.  Consider soft tissue neck CT with contrast for further evaluation.  - CT soft tissue neck ordered    #History of LEFT breast IDC ER positive, TN positive, HER2 positive on FISH  -Diagnosed at age 49  - 3/2000 left breast lumpectomy and left axillary node dissection (IDC, grade 3, 1.45 cm incompletely excised tumor,  DCIS, positive margins with infiltrating carcinoma, 1 of 17 lymph nodes positive (0.5 cm metastatic focus), ER positive, NH positive, HER2 positive).    -4/2020 left breast simple mastectomy with no residual carcinoma.   -S/p ACx4, taxol x4, no anti-her2 treatment, no RT.   -S/p 5 years adjuvant endocrine therapy (tamoxifen and anastrozole).  -Delayed left breast reconstruction and right breast augmentation.     RTC 3-4 weeks for f/u (after biopsy results are back)    Naomy Zaidi DO  Hematology/Oncology  [facility]    Future Appointments   Date Time Provider Department Center   1/13/2023 10:15 AM Naomy Zaidi DO [facility] [place]   1/19/2023  9:00 AM Janett Noland APRN CNP [facility]   1/20/2023  9:30 AM [facility] ROOM 10 [facility] O   1/20/2023 11:00 AM [id number] [place] [address] O   2/22/2023  4:00 PM Mikki Witt MD [facility]   3/23/2023  1:00 PM Santi Weems MD [facility]   7/10/2023  9:00 AM Dusty Robin MD [facility]

## 2023-01-13 NOTE — NURSING NOTE
"Oncology Rooming Note    January 13, 2023 10:17 AM   Virginia Byers is a 72 year old female who presents for:    Chief Complaint   Patient presents with     Oncology Clinic Visit     2 week follow up- results     Initial Vitals: BP (!) 152/72 (BP Location: Right arm)   Pulse 77   Temp 98.1  F (36.7  C) (Oral)   Resp 16   Ht 1.598 m (5' 2.91\")   Wt 65.7 kg (144 lb 12.8 oz)   SpO2 98%   BMI 25.72 kg/m   Estimated body mass index is 25.72 kg/m  as calculated from the following:    Height as of this encounter: 1.598 m (5' 2.91\").    Weight as of this encounter: 65.7 kg (144 lb 12.8 oz). Body surface area is 1.71 meters squared.  Severe Pain (7) Comment: Data Unavailable   No LMP recorded. Patient is postmenopausal.  Allergies reviewed: Yes  Medications reviewed: Yes    Medications: Medication refills not needed today.  Pharmacy name entered into Mob[facility]: [id number] - [place] - [address]     Clinical concerns: results       Eri Garber LPN            "

## 2023-01-20 ENCOUNTER — APPOINTMENT (OUTPATIENT)
Dept: MEDSURG UNIT | Facility: CLINIC | Age: 73
End: 2023-01-20
Attending: INTERNAL MEDICINE
Payer: MEDICARE

## 2023-01-20 ENCOUNTER — HOSPITAL ENCOUNTER (OUTPATIENT)
Facility: CLINIC | Age: 73
Discharge: HOME | End: 2023-01-20
Attending: INTERNAL MEDICINE | Admitting: INTERNAL MEDICINE
Payer: MEDICARE

## 2023-01-20 ENCOUNTER — APPOINTMENT (OUTPATIENT)
Dept: INTERVENTIONAL RADIOLOGY/VASCULAR | Facility: CLINIC | Age: 73
End: 2023-01-20
Attending: INTERNAL MEDICINE
Payer: MEDICARE

## 2023-01-20 VITALS
HEIGHT: 63 IN | SYSTOLIC BLOOD PRESSURE: 142 MMHG | OXYGEN SATURATION: 97 % | BODY MASS INDEX: 25.11 KG/M2 | WEIGHT: 141.7 LBS | DIASTOLIC BLOOD PRESSURE: 65 MMHG | RESPIRATION RATE: 16 BRPM | TEMPERATURE: 98.4 F | HEART RATE: 83 BPM

## 2023-01-20 DIAGNOSIS — R59.9 LYMPH NODE ENLARGEMENT: ICD-10-CM

## 2023-01-20 LAB
ATRIAL RATE - MUSE: 77 BPM
DIASTOLIC BLOOD PRESSURE - MUSE: NORMAL MMHG
INR PPP: 1.09 (ref 0.85–1.15)
INTERPRETATION ECG - MUSE: NORMAL
P AXIS - MUSE: 63 DEGREES
PR INTERVAL - MUSE: 180 MS
QRS DURATION - MUSE: 84 MS
QT - MUSE: 388 MS
QTC - MUSE: 439 MS
R AXIS - MUSE: -51 DEGREES
SYSTOLIC BLOOD PRESSURE - MUSE: NORMAL MMHG
T AXIS - MUSE: 51 DEGREES
VENTRICULAR RATE- MUSE: 77 BPM

## 2023-01-20 PROCEDURE — 93005 ELECTROCARDIOGRAM TRACING: CPT

## 2023-01-20 PROCEDURE — 99152 MOD SED SAME PHYS/QHP 5/>YRS: CPT | Performed by: PHYSICIAN ASSISTANT

## 2023-01-20 PROCEDURE — 250N000009 HC RX 250: Performed by: RADIOLOGY

## 2023-01-20 PROCEDURE — 250N000011 HC RX IP 250 OP 636: Performed by: RADIOLOGY

## 2023-01-20 PROCEDURE — 38505 NEEDLE BIOPSY LYMPH NODES: CPT | Mod: RT | Performed by: PHYSICIAN ASSISTANT

## 2023-01-20 PROCEDURE — 88333 PATH CONSLTJ SURG CYTO XM 1: CPT | Mod: 26 | Performed by: PATHOLOGY

## 2023-01-20 PROCEDURE — 76942 ECHO GUIDE FOR BIOPSY: CPT | Mod: 26 | Performed by: PHYSICIAN ASSISTANT

## 2023-01-20 PROCEDURE — 88185 FLOWCYTOMETRY/TC ADD-ON: CPT | Performed by: INTERNAL MEDICINE

## 2023-01-20 PROCEDURE — 88305 TISSUE EXAM BY PATHOLOGIST: CPT | Mod: 26 | Performed by: PATHOLOGY

## 2023-01-20 PROCEDURE — 93010 ELECTROCARDIOGRAM REPORT: CPT | Mod: 59 | Performed by: INTERNAL MEDICINE

## 2023-01-20 PROCEDURE — 88305 TISSUE EXAM BY PATHOLOGIST: CPT | Mod: TC | Performed by: INTERNAL MEDICINE

## 2023-01-20 PROCEDURE — 88275 CYTOGENETICS 100-300: CPT | Performed by: INTERNAL MEDICINE

## 2023-01-20 PROCEDURE — 999N000132 HC STATISTIC PP CARE STAGE 1

## 2023-01-20 PROCEDURE — 258N000003 HC RX IP 258 OP 636: Performed by: NURSE PRACTITIONER

## 2023-01-20 PROCEDURE — 88189 FLOWCYTOMETRY/READ 16 & >: CPT | Performed by: PATHOLOGY

## 2023-01-20 PROCEDURE — 88333 PATH CONSLTJ SURG CYTO XM 1: CPT | Mod: TC | Performed by: INTERNAL MEDICINE

## 2023-01-20 PROCEDURE — 88360 TUMOR IMMUNOHISTOCHEM/MANUAL: CPT | Mod: 26 | Performed by: PATHOLOGY

## 2023-01-20 PROCEDURE — 999N000054 HC STATISTIC EKG NON-CHARGEABLE

## 2023-01-20 PROCEDURE — 999N000142 HC STATISTIC PROCEDURE PREP ONLY

## 2023-01-20 PROCEDURE — 99152 MOD SED SAME PHYS/QHP 5/>YRS: CPT

## 2023-01-20 PROCEDURE — 88341 IMHCHEM/IMCYTCHM EA ADD ANTB: CPT | Mod: 26 | Performed by: PATHOLOGY

## 2023-01-20 PROCEDURE — 88342 IMHCHEM/IMCYTCHM 1ST ANTB: CPT | Mod: 26 | Performed by: PATHOLOGY

## 2023-01-20 PROCEDURE — 85610 PROTHROMBIN TIME: CPT | Performed by: NURSE PRACTITIONER

## 2023-01-20 PROCEDURE — 36415 COLL VENOUS BLD VENIPUNCTURE: CPT | Performed by: NURSE PRACTITIONER

## 2023-01-20 PROCEDURE — 88184 FLOWCYTOMETRY/ TC 1 MARKER: CPT | Performed by: INTERNAL MEDICINE

## 2023-01-20 PROCEDURE — 88368 INSITU HYBRIDIZATION MANUAL: CPT | Mod: 26 | Performed by: MEDICAL GENETICS

## 2023-01-20 RX ORDER — NALOXONE HYDROCHLORIDE 0.4 MG/ML
0.4 INJECTION, SOLUTION INTRAMUSCULAR; INTRAVENOUS; SUBCUTANEOUS
Status: DISCONTINUED | OUTPATIENT
Start: 2023-01-20 | End: 2023-01-20 | Stop reason: HOSPADM

## 2023-01-20 RX ORDER — LIDOCAINE 40 MG/G
CREAM TOPICAL
Status: DISCONTINUED | OUTPATIENT
Start: 2023-01-20 | End: 2023-01-20 | Stop reason: HOSPADM

## 2023-01-20 RX ORDER — NALOXONE HYDROCHLORIDE 0.4 MG/ML
0.2 INJECTION, SOLUTION INTRAMUSCULAR; INTRAVENOUS; SUBCUTANEOUS
Status: DISCONTINUED | OUTPATIENT
Start: 2023-01-20 | End: 2023-01-20 | Stop reason: HOSPADM

## 2023-01-20 RX ORDER — SODIUM CHLORIDE 9 MG/ML
INJECTION, SOLUTION INTRAVENOUS CONTINUOUS
Status: DISCONTINUED | OUTPATIENT
Start: 2023-01-20 | End: 2023-01-20 | Stop reason: HOSPADM

## 2023-01-20 RX ORDER — FENTANYL CITRATE 50 UG/ML
25-50 INJECTION, SOLUTION INTRAMUSCULAR; INTRAVENOUS EVERY 5 MIN PRN
Status: DISCONTINUED | OUTPATIENT
Start: 2023-01-20 | End: 2023-01-20 | Stop reason: HOSPADM

## 2023-01-20 RX ORDER — FLUMAZENIL 0.1 MG/ML
0.2 INJECTION, SOLUTION INTRAVENOUS
Status: DISCONTINUED | OUTPATIENT
Start: 2023-01-20 | End: 2023-01-20 | Stop reason: HOSPADM

## 2023-01-20 RX ADMIN — SODIUM CHLORIDE: 9 INJECTION, SOLUTION INTRAVENOUS at 09:38

## 2023-01-20 RX ADMIN — FENTANYL CITRATE 50 MCG: 50 INJECTION, SOLUTION INTRAMUSCULAR; INTRAVENOUS at 11:10

## 2023-01-20 RX ADMIN — MIDAZOLAM HYDROCHLORIDE 1 MG: 1 INJECTION, SOLUTION INTRAMUSCULAR; INTRAVENOUS at 11:10

## 2023-01-20 RX ADMIN — LIDOCAINE HYDROCHLORIDE 4 ML: 10 INJECTION, SOLUTION EPIDURAL; INFILTRATION; INTRACAUDAL; PERINEURAL at 11:13

## 2023-01-20 ASSESSMENT — ACTIVITIES OF DAILY LIVING (ADL)
ADLS_ACUITY_SCORE: 35
ADLS_ACUITY_SCORE: 35

## 2023-01-20 NOTE — DISCHARGE INSTRUCTIONS
[facility]    Interventional Radiology  Patient Instructions Following Biopsy    AFTER YOU GO HOME  If you were given sedation DO NOT drive or operate machinery at home or at work for at least 24 hours  DO relax and take it easy for 48 hours, no strenuous activity for 24 hours  DO drink plenty of fluids  DO resume your regular diet, unless otherwise instructed by your Primary Physician  Keep the dressing dry and in place for 24 hours.  DO NOT SMOKE FOR AT LEAST 24 HOURS, if you have been given any medications that were to help you relax or sedate you during your procedure  DO NOT drink alcoholic beverages the day of your procedure  DO NOT do any strenuous exercise or lifting (> 10 lbs) for at least 7 days following your procedure  DO NOT take a bath or shower for at least 12 hours following your procedure  Remove dressing after shower the next day. Replace with Band aid for 2 days.  Never leave a wet dressing in place.  DO NOT make any important or legal decisions for 24 hours following your procedure  There should be minimum drainage from the biopsy site    CALL THE PHYSICIAN IF:  You start bleeding from the procedure site.  If you do start to bleed from that site, lie down flat and hold pressure on the site for a minimum of 10 minutes.  Your physician will tell you if you need to return to the hospital  You develop nausea or vomiting  You have excessive swelling, redness, or tenderness at the site  You have drainage that looks like it is infected.  You experience severe pain  You develop hives or a rash or unexplained itching  You develop shortness of breath  You develop a temperature of 101 degrees F or greater  You develop bloody clots or red urine after you are discharged  You develop chest pain or cough up blood, lightheadedness or fainting    ADDITIONAL INSTRUCTIONS  If you are taking Coumadin, restart tonight.  Follow up with your Coumadin Clinic or Primary Care MD to have your INR  rechecked.    [facility] INTERVENTIONAL RADIOLOGY DEPARTMENT  Procedure Physician: Dusty Nunez PA-C     Date of procedure: January 20, 2023  Telephone Numbers: [phone]      Monday-Friday 7:30 am to 4:00 pm  [phone] After 4:00 pm Monday-Friday, Weekends & Holidays.   Ask for the Interventional Radiologist on call.  Someone is on call 24 hrs/day  [facility] toll free number: [phone] Monday-Friday 8:00 am to 4:30 pm  [facility] Emergency Dept: [phone]

## 2023-01-20 NOTE — PROGRESS NOTES
Patient arrived to room via litter with IR RN s/p Iliac lymph biopsy . VSS. Denies/report pain. Pt alert and oriented x4. Right thigh site CDI.

## 2023-01-20 NOTE — PRE-PROCEDURE
GENERAL PRE-PROCEDURE:   Procedure:  Lymph node bx    Verbal consent obtained?: Yes    Written consent obtained?: Yes    Risks and benefits: Risks, benefits and alternatives were discussed    Consent given by:  Patient  Patient states understanding of procedure being performed: Yes    Patient's understanding of procedure matches consent: Yes    Procedure consent matches procedure scheduled: Yes    Expected level of sedation:  Moderate  Appropriately NPO:  Yes  Mallampati  :  Grade 2- soft palate, base of uvula, tonsillar pillars, and portion of posterior pharyngeal wall visible  Lungs:  Lungs clear with good breath sounds bilaterally  Heart:  Normal heart sounds and rate  History & Physical reviewed:  History and physical reviewed and no updates needed  Statement of review:  I have reviewed the lab findings, diagnostic data, medications, and the plan for sedation

## 2023-01-20 NOTE — PROGRESS NOTES
Pt arrived to 2A from home for lymph biopsy. VSS. Denies pain*. Waiting on Consent and Lab. H&P current. Allergies reviewed with pt. Appropriately NPO.  Prep completed. (Aurelio, friend)  at bedside; will be transporting patient to home. [phone]

## 2023-01-20 NOTE — PROCEDURES
[facility]    Procedure: IR Procedure Note    Date/Time: 1/20/2023 11:30 AM  Performed by: Dusty Nunez PA-C  Authorized by: Dusty Nunez PA-C       UNIVERSAL PROTOCOL   Site Marked: NA  Prior Images Obtained and Reviewed:  Yes  Required items: Required blood products, implants, devices and special equipment available    Patient identity confirmed:  Verbally with patient, arm band, provided demographic data and hospital-assigned identification number  Patient was reevaluated immediately before administering moderate or deep sedation or anesthesia  Confirmation Checklist:  Patient's identity using two indicators, relevant allergies, procedure was appropriate and matched the consent or emergent situation and correct equipment/implants were available  Time out: Immediately prior to the procedure a time out was called    Universal Protocol: the Joint Commission Universal Protocol was followed    Preparation: Patient was prepped and draped in usual sterile fashion       ANESTHESIA    Anesthesia: Local infiltration  Local Anesthetic:  Lidocaine 1% without epinephrine  Anesthetic Total (mL):  5      SEDATION  Patient Sedated: Yes    Sedation Type:  Moderate (conscious) sedation  Sedation:  Fentanyl and midazolam  Vital signs: Vital signs monitored during sedation    See dictated procedure note for full details.  Findings: Moderate sedation for lymph node biopsy - 1 mg of midazolam and 50 mcg of fentanyl.  Patient tolerated procedure well no immediate complication.    Specimens: core needle biopsy specimens sent for pathological analysis    Complications: None    Condition: Stable    Plan: 1 hour bedrest following sedation.      PROCEDURE  Describe Procedure: Completed ultrasound-guided right thigh lymph node biopsy. 6 passes yielded 6 cores handed directly to pathology to confirm adequacy.  4 core sent for pathology and 2 cores sent for flow cytometry.  No  immediate complication.    Patient Tolerance:  Patient tolerated the procedure well with no immediate complications  Length of time physician/provider present for 1:1 monitoring during sedation: 15

## 2023-01-20 NOTE — PROGRESS NOTES
PIV removed. Discharge paperwork reviewed with patient, pt stated understanding. Friend will transport patient home

## 2023-01-20 NOTE — PROGRESS NOTES
Patient Name: Virginia Byers  Medical Record Number: [id number]  Today's Date: 1/20/2023    Procedure: Right external iliac lymph node biopsy   Proceduralist: Dusty Nunez PA-C  Pathology present: Yes    Procedure Start: 11:09  Procedure end: 11:25    Sedation Start: 11:10  Sedation End: 11:25  Total Sedation Time: 16 minutes    Sedation medications administered: Midazolam 1 mg, Fentanyl 50 mcg     Report given to: CHEO Hanson   : N/A    Other Notes: Pt arrived to IR room 6 from . Consent reviewed. Pt denies any questions or concerns regarding procedure. Pt positioned supine and monitored per protocol.     4 cores for Surg Path obtained and 2 cores for Flow Cytometry obtained and sent to lab as ordered.    Primapore placed over biopsy needle track.    Hemastatis achieved.    Patient to be on bedrest for 1 hour ending at 12:25 per Dusty Nunez PA-C.    Pt tolerated procedure without any noted complications. Pt transferred back to .

## 2023-01-23 ENCOUNTER — ANCILLARY PROCEDURE (OUTPATIENT)
Dept: CT IMAGING | Facility: CLINIC | Age: 73
End: 2023-01-23
Attending: INTERNAL MEDICINE
Payer: MEDICARE

## 2023-01-23 ENCOUNTER — TELEPHONE (OUTPATIENT)
Dept: OTOLARYNGOLOGY | Facility: CLINIC | Age: 73
End: 2023-01-23

## 2023-01-23 DIAGNOSIS — E04.1 THYROID NODULE: Primary | ICD-10-CM

## 2023-01-23 DIAGNOSIS — K11.8 NODULE OF PAROTID GLAND: ICD-10-CM

## 2023-01-23 DIAGNOSIS — K11.8 NODULE OF PAROTID GLAND: Primary | ICD-10-CM

## 2023-01-23 PROCEDURE — G1010 CDSM STANSON: HCPCS | Performed by: RADIOLOGY

## 2023-01-23 PROCEDURE — 70491 CT SOFT TISSUE NECK W/DYE: CPT | Mod: TC | Performed by: RADIOLOGY

## 2023-01-23 RX ORDER — IOPAMIDOL 755 MG/ML
100 INJECTION, SOLUTION INTRAVASCULAR ONCE
Status: COMPLETED | OUTPATIENT
Start: 2023-01-23 | End: 2023-01-23

## 2023-01-23 RX ADMIN — IOPAMIDOL 100 ML: 755 INJECTION, SOLUTION INTRAVASCULAR at 10:20

## 2023-01-23 NOTE — TELEPHONE ENCOUNTER
This encounter is being sent to inform the clinic that this patient has a referral from Naomy Zaidi DO in  [facility] for the diagnoses of Nodule of parotid gland  and has requested that this patient be seen within 1-2 weeks and/or with .parotid gland nodules on MRI brain and CT neck/soft tissue- r/o malignancy .   Based on the availability of our provider(s), we are unable to accommodate this request.        Were all sites offered this patient?  Yes      Does scheduling algorithm request to schedule next available?      Patient appointment has not been scheduled.  Please review the referral request for accommodation and contact the patient.  If unable to accommodate, please resubmit a referral and indicate a preferred partner or affiliate location using Provider Finder or Scheduling Instructions field.

## 2023-01-23 NOTE — TELEPHONE ENCOUNTER
Superficial left parotid gland 1.2cm, additional 0.5cm nodule in deep portion  Sperficial right parotid gland 0.8cm.  Heterogeneous nodule right thyroid gland up to 1.6cm.    Routing to on call ENT due to nodule on thyroid gland as well as not all providers see for thyroid.    Cristal CASTANEDA, Specialty RN 1/23/2023 4:18 PM

## 2023-01-24 ENCOUNTER — TELEPHONE (OUTPATIENT)
Dept: INTERVENTIONAL RADIOLOGY/VASCULAR | Facility: CLINIC | Age: 73
End: 2023-01-24
Payer: MEDICARE

## 2023-01-24 NOTE — TELEPHONE ENCOUNTER
Spoke with: Virginia  Call attempt: 1  Message left: No  Any pain: No  Any fever: No  Any redness/swelling/ abnormal drainage around puncture site: No  Were you instructed well enough to take care of yourself at home: Yes  Are you satisfied with the care you received: Yes  Any additional concerns or questions: No    Post call completed.   January 24, 2023 11:14 AM

## 2023-01-24 NOTE — TELEPHONE ENCOUNTER
Wilmar Morales MD Wensole, Megan, RN; [facility]  Caller: Unspecified (Yesterday,  4:05 PM)  I reviewed the CT imaging.  These do not have the standard appearance for a parotid neoplasm and are more likely to be intraparotid lymph nodes.  It appears the patient is being worked up for lymphadenopathy and so possibly this could be related to her lymphadenopathy condition.  The likelihood of cancer within the parotid if these are primary parotid neoplasms is quite low.  I agree she needs to see ENT but this is not an urgency or emergency.     I would say the same regarding the thyroid nodule.  It needs further evaluation.  We would start with a thyroid ultrasound because there are ultrasound characteristics that make us decide whether or not thyroid nodule needs biopsy.     Similarly, we would consider ultrasound guided biopsy of the dominant parotid lesion, but I might defer this for now and await to see what her final pathology shows from her other lymph node biopsy.     My recommendation would be to order a thyroid ultrasound.  I will review the thyroid ultrasound when available and decide if needle biopsy is needed.  If needle biopsy is needed, we can order and see the patient back for follow-up with the biopsy results.  That would give time to have the other needle biopsy resulted and decide if we should also perform needle biopsy of her parotid nodule(s).     IJL

## 2023-01-24 NOTE — TELEPHONE ENCOUNTER
Called patient   Informed of message below  Provided with number to schedule ultrasound  Patient stated she is going to speak with Onc provider first Barry KAPLAN RN  [facility]

## 2023-01-26 LAB
PATH REPORT.ADDENDUM SPEC: ABNORMAL
PATH REPORT.COMMENTS IMP SPEC: ABNORMAL
PATH REPORT.COMMENTS IMP SPEC: YES
PATH REPORT.FINAL DX SPEC: ABNORMAL
PATH REPORT.MICROSCOPIC SPEC OTHER STN: ABNORMAL
PATH REPORT.RELEVANT HX SPEC: ABNORMAL

## 2023-01-27 ENCOUNTER — ANCILLARY PROCEDURE (OUTPATIENT)
Dept: ULTRASOUND IMAGING | Facility: CLINIC | Age: 73
End: 2023-01-27
Attending: OTOLARYNGOLOGY
Payer: MEDICARE

## 2023-01-27 ENCOUNTER — MYC MEDICAL ADVICE (OUTPATIENT)
Dept: OTOLARYNGOLOGY | Facility: CLINIC | Age: 73
End: 2023-01-27

## 2023-01-27 DIAGNOSIS — E04.1 THYROID NODULE: Primary | ICD-10-CM

## 2023-01-27 DIAGNOSIS — E04.1 THYROID NODULE: ICD-10-CM

## 2023-01-27 PROCEDURE — 76536 US EXAM OF HEAD AND NECK: CPT | Mod: TC | Performed by: RADIOLOGY

## 2023-01-27 NOTE — TELEPHONE ENCOUNTER
Spoke to the patient on the phone regarding the results of her thyroid ultrasound.  She does have a 1.7 cm category 4 nodule in her thyroid that will require biopsy.  I placed a order for ultrasound-guided needle biopsy and provided her the phone number.    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  I explained the diagnosis to the patient and that there would be more discussion after her FISH testing and when she meets with Dr. Zaidi.  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  She will probably require some staging imaging, such as PET scan and we can take a look at that as well.  I would not recommend biopsy at this time of the parotid lesions given their appearance and the recent diagnosis of low-grade follicular lymphoma.    I will contact her with the results of her thyroid biopsy when available.    Wilmar Morales MD  Department of Otolaryngology-Head and Neck Surgery  [facility]

## 2023-02-03 ENCOUNTER — HOSPITAL ENCOUNTER (OUTPATIENT)
Dept: ULTRASOUND IMAGING | Facility: CLINIC | Age: 73
Discharge: HOME | End: 2023-02-03
Attending: OTOLARYNGOLOGY | Admitting: OTOLARYNGOLOGY
Payer: MEDICARE

## 2023-02-03 DIAGNOSIS — E04.1 THYROID NODULE: ICD-10-CM

## 2023-02-03 LAB — INTERPRETATION: NORMAL

## 2023-02-03 PROCEDURE — 272N000431 US BIOPSY THYROID FINE NEEDLE ASPIRATION

## 2023-02-03 PROCEDURE — 88173 CYTOPATH EVAL FNA REPORT: CPT | Mod: TC | Performed by: OTOLARYNGOLOGY

## 2023-02-03 PROCEDURE — 250N000009 HC RX 250: Performed by: RADIOLOGY

## 2023-02-03 RX ADMIN — LIDOCAINE HYDROCHLORIDE 5 ML: 10 INJECTION, SOLUTION EPIDURAL; INFILTRATION; INTRACAUDAL; PERINEURAL at 08:31

## 2023-02-06 PROCEDURE — 88368 INSITU HYBRIDIZATION MANUAL: CPT | Mod: 26 | Performed by: MEDICAL GENETICS

## 2023-02-07 LAB
PATH REPORT.COMMENTS IMP SPEC: NORMAL
PATH REPORT.COMMENTS IMP SPEC: NORMAL
PATH REPORT.FINAL DX SPEC: NORMAL
PATH REPORT.GROSS SPEC: NORMAL
PATH REPORT.MICROSCOPIC SPEC OTHER STN: NORMAL

## 2023-02-07 PROCEDURE — 88173 CYTOPATH EVAL FNA REPORT: CPT | Mod: 26 | Performed by: PATHOLOGY

## 2023-02-14 NOTE — PROGRESS NOTES
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    Patient has medical history including LEFT breast cancer in 2000, hypertension, hyperlipidemia, osteoporosis, osteoarthritis, degenerative disc disease, spinal stenosis of lumbar region,  s/p bilateral L5-S1 transforaminal ROS by Dr. Garcia on 7/9/2021, s/p bilateral L4-5 and L5-S1 facet joint injections on 6/14/2021, glaucoma and macular degeneration, cataracts s/p surgery, history of tobacco use (quit 1999)     Regarding previous history of breast cancer:  She was previously treated by Dr. Sakina Brandt at [facility]      In summary, diagnosed at age 49 with LEFT breast IDC ER positive, MI positive, HER2 positive on FISH. 3/2000 left breast lumpectomy and left axillary node dissection (IDC, grade 3, 1.45 cm incompletely excised tumor, DCIS, positive margins with infiltrating carcinoma, 1 of 17 lymph nodes positive (0.5 cm metastatic focus), ER positive, MI positive, HER2 positive).  4/2020 left breast simple mastectomy with no residual carcinoma. S/p ACx4, taxol x4, no anti-her2 treatment, no RT. S/p 5 years adjuvant endocrine therapy (tamoxifen and anastrozole). Delayed left breast reconstruction and right breast augmentation.      3/2/2000  Excisional biopsy of Left breast mass -   Infiltrating ductal carcinoma with associated DCIS   ER positive (60%), MI positive (22%), HER2 by FISH POSITIVE     3/10/2000 - Left lumpectomy and left axillary node  Invasive ductal carcinoma, Grade 3, 1.45 cm (incompletely excised), negative for LVI  DCIS, greatest histologic dimension of tumor (DCIS PLUS invasive tumor) = 2.9 cm (estimate 50% DCIS)  Positive margins- infiltrating carcinoma  extends to margin  1 of 17 axillary lymph nodes sampled was positive for metastatic focus of 0.5 cm.  ER/RI+     Sections of lumpectomy left breast showing residual 1.1 cm focus of ductal carcinoma in situ (DCIS), present within microns of the linked margin of resection.       4/11/2000 - Left breast simple mastectomy, negative for residual carcinoma  Left mastectomy on 4/11/2000 performed by Dr. Cristian Mcdonald at [facility].      Adjuvant treatment:  - 5/15/2000-7/24/2000 4 cycles of Adriamycin and Cytoxan   - 8/16/2000-10/18/2000 4 cycles of paclitaxel   - Appears she may have been on some kind of trial/protocol, was not given any anti-HER2 targeted treatment  - No adjuvant radiation  - 12/2000- 10/2002 tamoxifen  - 10/2002-5/2006 switched to anastrozole (due to in vitro studies showing anastrozole may be slightly better for patients were HER2 positive) with Fosamax for osteopenia     8/31/2015 Left delayed reconstruction with tissue expander    2/3/2016 Left 2nd stage 450 cc high profile silicone implant; right augmentation 150 cc Moderate classic profile silicone implant; cresent mastopexy    10/18/2021: Screening mammogram right breast ZACKARY    OTHER:  Of note, patient has documentation of anemia (iron deficiency and anemia of chronic disease), elevated LFTs, nausea and vomiting of all solids and liquids, fatigue with 26 pound unintentional weight loss from 7/20/2021 - 10/20/2021 with mildly elevated LFTs which led to evaluation with mammogram, endoscopy with EUS and colonoscopy as detailed below. Pt did associate these with some spinal injections she had gotten, due to overlapping times. Pt did regain all of the weight and hand improvement of anemia within 2 months, without significant intervention.     -10/2021: Endoscopy with EUS normal EGD and no stigmata or source of upper GI bleeding, visualized bile duct, gallbladder, liver, pancreas, left adrenal gland normal.  No lymphadenopathy. LIVER, RANDOM  NEEDLE BIOPSY: Benign parenchyma with congestion; no significant fibrosis or other abnormalities     -10/2021 colonoscopy: Hemorrhoids, diverticulosis in sigmoid colon and splenic flexure    Current history:  -Ongoing right knee pain not improved with brace and physical therapy, s/p bilateral L5-S1 transforaminal ROS by Dr. Garcia on 7/9/2021, s/p bilateral L4-5 and L5-S1 facet joint injections on 6/14/2021. ongoing b/l hip and leg pain, worse with position changes (ie sitting to standing), constant, 8-9/10, tylenol brings pain down to 6/10 (using tylenol bid). ECOG 3.   -Patient has seen neurosurgery Dr. Jose Alexander, occupational medicine specialist Dr.Orrin Murphy, with plan to see PM&R Dr. Margie Agudelo and movement disorder clinic  - 11/22 MRI lumbar spine:    prominent left iliac lymph nodes among other findings related to degenerative changes and neural foraminal stenosis throughout lumbar spine    - 12/22 MRI pelvis:  1. Enhancing marrow signal abnormality of the right iliac bone extending from the superior acetabulum subchondral location proximally almost to the level of the sacroiliac joint caudal aspect. No associated fracture line. Underlying marrow infiltrative process such as from metastasis cannot be excluded especially given the degree of T1 hypointensity and history of primary tumor. Other consideration include stress reaction.  2. Mildly increased left external iliac chain, and the right medial thigh lymphadenopathy. Metastasis cannot be excluded.  3. Left posterior iliac enhancing lesion, similar in size to prior CT 9/2021 and previously not visible on CT, focal enhancing lesion in the right greater trochanter laterally. Findings are concerning for Metastasis.    -12/22 CT chest abdomen pelvis with contrast:  COMPARISON: MRI pelvis 12/20/2022 and CT abdomen pelvis 9/21/2021.  1. Mastectomies with implant reconstructions. No lymphadenopathy  2.  There is an new or increased left common iliac, external  iliac and pelvic sidewall lymphadenopathy since prior CT. Distal left common iliac lymph nodes measure up to 1.2 cm on this exam compared to 0.7 cm on prior CT. Left pelvic sidewall lymph node measures 2.1 cm short axis on this exam compared to 1.2 cm on prior CT . consistent with metastatic disease.  3. Sclerotic lesions in the left sacrum and left posterior ilium are unchanged from prior CT.  may be metastatic.  4. No evidence of metastatic disease in the chest.     -1/2023 MRI brain w/wo contrast:  IMPRESSION:  1. No findings of intracranial metastatic disease.  2. Indeterminate ovoid T2 hyperintense enhancing mass in the region of the  left lacrimal gland measuring approximately 2.4 cm AP by 1.4 cm  transverse by 2.3 cm craniocaudal associated with/replacing the left  lacrimal gland. Consider correlation with tissue sampling.  3. A few indeterminate partially visualized heterogeneous enhancing  Nodules measuring up to approximately 1.5 cm in the bilateral parotid glands.   Consider soft tissue neck CT with contrast for further evaluation.    INTERIM HISTORY:  -1/20/2023: IR US Guided biopsy of a right external iliac LN  Final Diagnosis   A.  LYMPH NODE, RIGHT EXTERNAL ILIAC, CORE NEEDLE BIOPSY AND TOUCH IMPRINT:  -Involved by follicular lymphoma, low-grade (WHO grade 1-2)  -Negative for metastatic carcinoma     The morphologic and immunophenotypic patterns are consistent with follicular lymphoma.  There is no high-grade lymphoma present in this limited specimen.  Concurrent flow study ([id number]) demonstrated CD10-positive lambda monotypic B cells (22%), rare kappa monotypic B cells (1.3%), and no aberrant immunophenotype on T cells.  A FISH study for Bcl-2 is pending and will be reported separately.     The case was initially reviewed by Dr. Cifuentes (breast pathology).     This case was reviewed in part at our Hematopathology Faculty Consensus conference at which Girish Nolasco, Narciso, and Kayode were  present in addition to myself.  This is a small needle core.  In some of the areas, the CD10+ B cells are confined to follicles.  Though the possibility of in situ follicular neoplasia was discussed, we still favor a diagnosis of follicular lymphoma - there are a number of CD10+ B cells outside of follicles at the base of the biopsy, and the clinical history supports a greater extent of involvement.     There was a tiny kappa monotypic B cell population identified by flow (separate from the CD10+ clone).  We looked for the morphologic correlate in this case by IHC, but it is not readily apparent.  This may represent a monoclonal B lymphocytosis - consider sending a peirpheral blood sample for flow cytometry.    FISH:  RESULTS:     NORMAL  - No rearrangement of BCL2     INTERPRETATION:  No evidence was found by FISH of rearrangement of the BCL2 (18q21) locus. These findings are not helpful for confirming or further characterizing the reported pathologic diagnosis of follicular lymphoma.    Flow Interpretation   A. Lymph Node(s), :  -CD10-positive lambda monotypic B cells (22%)  -Rare kappa monotypic B cells (1.3%)  -No aberrant immunophenotype on T cells  See comment   Electronically signed by Krista Headley MD on 1/24/2023 at  3:10 PM   Comment     The CD10-positive population is consistent with a clonal B cell population and a CD10 positive B-cell lymphoma is favored.      In addition, a second clonal B-cell population is identified which has a CLL-like immunophenotype except that CD5 expression is equivocal, this may represent a monoclonal B-cell lymphocytosis (MBL).     Addendum   INTERPRETATION  The diagnosis remains the same (see comment)     REASON FOR ADDENDUM  This addendum is issued to reflect additional testing performed on this case. See Comment.      COMMENT  Additional multi-color flow analysis was performed for the following markers:   CD23, CD79b     The CD5 (dimly) positive B cells mostly lack CD23  and CD79b        1/23/23 CT neck:  A) Heterogeneous nodule in the right thyroid gland measuring up  to 1.6 cm.   B) Soft tissue nodule in the superficial left parotid  gland measuring 1.2 cm. Additional 0.8 cm nodule in the deep portion  of the left parotid gland. Soft tissue nodule in the superficial right  parotid gland measuring 0.8 cm. These may represent benign or malignant parotid lesions. Multiplicity of lesions raises concern for Warthin tumors. Otolaryngology consultation is  recommended.  C) Prominent soft tissue around the major arteries in the neck  particularly the common and internal carotid arteries. This is  atypical for atherosclerotic disease and vasculopathy/vasculitis such  as giant cell arteritis should be considered. Probable superimposed  atherosclerotic disease at the carotid bifurcations right greater than  left without definite high-grade stenosis.    2/3/23- Right middle lobe thyroid nodule FNA: benign    Pt has better control of pain with aleve 2 in AM w/breakast, 1 in evening.     Upcoming appts:  Neuro movement d/o appt 2/22/23  PM&R appt 3/23/23  Rheumatology appt 7/10/23     REVIEW OF SYSTEMS:   A 14 point ROS was reviewed with pertinent positives and negatives in the HPI.       HOME MEDICATIONS:  Current Outpatient Medications   Medication Sig Dispense Refill     dorzolamide-timolol (COSOPT) 2-0.5 % ophthalmic solution Place 1 drop into both eyes 2 times daily       latanoprost (XALATAN) 0.005 % ophthalmic solution Place 1 drop into both eyes daily        losartan (COZAAR) 100 MG tablet TAKE 1 TABLET BY MOUTH EVERY DAY 90 tablet 0     rosuvastatin (CRESTOR) 10 MG tablet TAKE 1 TABLET (10 MG) BY MOUTH DAILY. (Patient taking differently: Take 10 mg by mouth every 7 days) 90 tablet 3         ALLERGIES:  Allergies   Allergen Reactions     Compazine      Mental confusion     Hydrochlorothiazide      Dryness mouth     Prochlorperazine Visual Disturbance     Simvastatin Other (See Comments)      Muscle aches         PAST MEDICAL HISTORY:  Past Medical History:   Diagnosis Date     Breast cancer (H)     Lt breast     Glaucoma     bilateral     Hyperlipidemia      Hypertension      Macular degeneration      Osteoarthritis      Osteoporosis          PAST SURGICAL HISTORY:  Past Surgical History:   Procedure Laterality Date     C MASTECTOMY,SIMPLE  3/2000    left     COLONOSCOPY      Q 10 years     COLONOSCOPY N/A 10/4/2021    Procedure: COLONOSCOPY;  Surgeon: Guru Lyla Ruby MD;  Location: [facility] OR     ESOPHAGOSCOPY, GASTROSCOPY, DUODENOSCOPY (EGD), COMBINED N/A 10/4/2021    Procedure: ENDOSCOPIC ULTRASOUND, ESOPHAGOSCOPY / UPPER GASTROINTESTINAL TRACT (GI), Esophagogastroduodenoscopy,  Fine Needle Biopsy of liver;  Surgeon: Guru Lyla Ruby MD;  Location: [facility] OR     IR LYMPH NODE BIOPSY  2023     SURGICAL HISTORY OF -  Left 2015    tissue expander placed in left breast area     SURGICAL HISTORY OF -  Left 2016    left breast implant and right mammoplasty         SOCIAL HISTORY:  Social History     Socioeconomic History     Marital status: Single     Spouse name: Not on file     Number of children: 0     Years of education: Not on file     Highest education level: Not on file   Occupational History     Employer: [employer]     Occupation: REtired   Tobacco Use     Smoking status: Former     Packs/day: 1.00     Years: 17.00     Pack years: 17.00     Types: Cigarettes     Quit date: 3/15/1999     Years since quittin.9     Smokeless tobacco: Never   Vaping Use     Vaping Use: Not on file   Substance and Sexual Activity     Alcohol use: Not Currently     Alcohol/week: 4.0 standard drinks     Types: 4 Glasses of wine per week     Comment: occasional     Drug use: No     Sexual activity: Not Currently     Birth control/protection: None   Other Topics Concern     Parent/sibling w/ CABG, MI or angioplasty before 65F 55M? Yes     Comment: Father heart attack    Social History Narrative     Not on file     Social Determinants of Health     Financial Resource Strain: Not on file   Food Insecurity: Not on file   Transportation Needs: Not on file   Physical Activity: Not on file   Stress: Not on file   Social Connections: Not on file   Intimate Partner Violence: Not on file   Housing Stability: Not on file       FAMILY HISTORY:  Family History   Problem Relation Age of Onset     Cancer Mother         bone cancer     Other Cancer Mother      Heart Disease Father      Coronary Artery Disease Father      Diabetes Father      Arthritis Sister      Breast Cancer Sister         age 75     Diabetes Maternal Grandmother      Diabetes Paternal Grandmother      Family history of cancer- sister breast cancer- dx at age 77 y/o, localized breast cancer, surgical resection, RT, no adjuvant chemo or endocrine therapy that pt is aware of     PHYSICAL EXAM:  Vital signs:  There were no vitals taken for this visit.     ECO  GENERAL/CONSTITUTIONAL: No acute distress.  EYES: Pupils are equal and round. Extraocular movements intact without nystagmus.  No scleral icterus.  RESPIRATORY: Equal chest rise.   MUSCULOSKELETAL: Warm and well-perfused, no cyanosis, clubbing, or edema.   NEUROLOGIC: Cranial nerves are grossly intact. Alert, oriented to person, place and time, answers questions appropriately.  INTEGUMENTARY: No rashes or jaundice.  GAIT: Steady, does not use assistive device        LABS:      PATHOLOGY:      IMAGING:  CT SCAN OF THE NECK WITH CONTRAST  2023 10:26 AM      HISTORY: Partially imaged parotid gland nodules on MRI brain,  recommended follow up CT soft tissue neck. Nodule of parotid gland.     TECHNIQUE: Axial CT images of the neck following administration of  intravenous contrast with reformations. Radiation dose for this scan  was reduced using automated exposure control, adjustment of the mA  and/or kV according to patient size, or iterative  reconstruction  technique. 100 mL Isovue-370 IV.      COMPARISON: None.      FINDINGS:   Visualized sinuses, nasopharynx and orbits: Mild mucosal thickening in  the only partially visualized right sphenoid sinus.      Tongue, oral cavity and oropharynx:  Unremarkable.      Hypopharynx: Unremarkable.      Larynx and trachea: Unremarkable.      Thyroid: Heterogeneous nodule in the right thyroid gland measuring up  to 1.6 cm.      Submandibular glands: Unremarkable.      Parotid glands: Soft tissue nodule in the superficial left parotid  gland measuring 1.2 cm. Additional 0.8 cm nodule in the deep portion  of the left parotid gland. Soft tissue nodule in the superficial right  parotid gland measuring 0.8 cm.       Lymph nodes: No suspicious enlarged or necrotic cervical lymph nodes.      Vasculature: Prominent soft tissue around the great vessels of the  neck particularly the common and internal carotid arteries but also  seen surrounding the vertebral arteries and subclavian arteries as  well as the aortic arch. Presumed atherosclerotic disease at the  carotid bifurcations right greater than left without definite  high-grade stenosis.      Upper mediastinum and lungs: Unremarkable.      Bones: Degenerative changes in the spine. No focal destructive bony  lesions.                                                                       IMPRESSION:     1.  Several soft tissue nodules in the parotid glands as above  including nodules in both the deep and superficial left parotid gland  and a nodule in the superficial right parotid gland. These may  represent benign or malignant parotid lesions. Multiplicity of lesions  raises concern for Warthin tumors. Otolaryngology consultation is  recommended.  2.  Prominent soft tissue around the major arteries in the neck  particularly the common and internal carotid arteries. This is  atypical for atherosclerotic disease and vasculopathy/vasculitis such  as giant cell arteritis  should be considered. Probable superimposed  atherosclerotic disease at the carotid bifurcations right greater than  left without definite high-grade stenosis.  3.  Heterogeneous right thyroid gland nodule measuring up to 1.6 cm.  Recommend further evaluation with thyroid ultrasound if not previously  performed.      MICHELLE STEARNS MD     US THYROID 1/27/2023 9:28 AM     CLINICAL HISTORY: Thyroid nodule.     TECHNIQUE: Thyroid ultrasound.      COMPARISON: Neck CT 1/23/2023.      FINDINGS:  RIGHT lobe: 4.9 x 1.4 x 1.7 cm. Homogeneous echotexture.  Isthmus: 3 mm.  LEFT lobe: 4.2 x 1.1 x 1.1 cm. Homogeneous echotexture.     NECK: No cervical lymphadenopathy.     NODULES:     Nodule 1: 1.7 x 1.2 x 1.2 cm nodule, mid right.   Composition: Mixed cystic and solid, 1 point   Echogenicity: Hyperechoic or isoechoic, 1 point   Shape: Wider-than-tall, 0 points   Margin: Ill-defined, 0 points   Echogenic Foci: Punctate echoic foci, 3 points   Point Total: 4-6 points. TI-RADS 4. If 1.5 cm or larger, recommend  FNA; if 1 cm or larger, follow up US (annually for 5 years).                                                                         IMPRESSION:  1.7 cm TI-RADS category 4 nodule mid right thyroid lobe. FNA indicated  per guidelines referenced below.    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 No splenomegaly  -1/20/2023: IR US Guided core needle biopsy of a right external iliac LN:  "  -follicular lymphoma, low grade WHO grade 1-2 (negative for metastatic carcinoma),  - no high grade lymphoma present,   - FISH negative for BCL2 rearrangement;   - IHC: neoplastic cells demonstrate expression of CD20, BCL-6 (in follicles), and CD10 (bright). CD5 stains T cells  - Ki-67 proliferative index is approximately 10%, also low in the follicles.  - flow cytometry \"CD10-positive population is consistent with a clonal B cell population and a CD10 positive B-cell lymphoma is favored.\"  - Stage: non-contiguous stage 2 given right medial thigh SURINDER and left common iliac, external iliac, pelvic sidewall; there was some concern from ENT that patients parotid lesions were related to pts follicular lymphoma   - FLIPI: score 1 (age>60), low risk   - pertinent labs: , will check beta 2 microglobulin, hepatitis B and C  - GELF criteria to indicate treatment (involvement of 3 or more bj sites each with a diameter greater than or equal to 3 cm, any bj or extranodal tumor mass with a diameter of greater than or equal to 7 cm, B symptoms, splenomegaly, pleural effusions, peritoneal ascites, cytopenias, leukemia): pt reports b/l LE pain and weakness but MRI reports \"The visualized course of the sciatic nerves are unremarkable bilaterally\"  - I do not think the patients pelvic lymphadenopathy is causing her musculoskeletal symptoms of weakness in b/l LE. I recommend pt has full neurological workup and if no answer is found there, then can consider possible treatment option with single agent weekly rituximab x4 weeks. This was discussed with pt in detail today, pt is concerned about AE of rituximab and would like to hold off on this, and I am in agreement.     #  rare kappa monotypic B cells   - 1/23 right external iliac LN core needle biopsy-  Second tiny kappa monoclonal B cell population identified by flow (1.3%) (separate from the CD10+ clone), which has a CLL-like immunophenotype except that CD5 expression " is equivocal, this may represent a monoclonal B-cell lymphocytosis (MBL). The CD5 (dimly) positive B cells mostly lack CD23 and CD79b. We looked for the morphologic correlate in this case by IHC, but it is not readily apparent.   - of note pt does not have leukocytosis or lymphocytosis, ALC 2.2, no splenomegaly, LN biopsy did not demonstrate IHC consistent with SLL   - will check peripheral blood flow cytometry     #Stable sclerotic lesions in left sacrum and left posterior ilium  -10/21 mammogram, endoscopy with EUS and colonoscopy WNL  - CT chest abdomen pelvis shows new or increased left common iliac, external iliac, pelvic sidewall lymphadenopathy and sclerotic lesions in left sacrum and left posterior ilium.  No metastatic disease in chest.  No abnormal findings in breast.  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  SIFE: trace amount of IgG kappa monoclonal protein  2.  SPEP: no monoclonal protein seen  3.  UIFE: no monoclonal protein seen  4.  UPEP: no monoclonal protein seen  5.  Kappa/lambda ratio: kappa 2.17, lambda 1.67, kappa/lambda ratio 1.30   6.  Quantitative immunoglobulins: Ig    IgA: 187    IgM: 165  - repeat protein studies  for f/u     # left lacrimal gland enhancing mass  -  brain MRI: Indeterminate ovoid T2 hyperintense enhancing mass in the region of the left lacrimal gland measuring approximately 2.4 cm AP by 1.4 cm  transverse by 2.3 cm craniocaudal associated with/replacing the left  lacrimal gland. 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 Pt follows with Dr. Moore at [facility] in [place].   - 2/23 f/u with Dr. Moore, pt referred to oculoplastics and is having biopsy of lacrimal gland with Dr. David on 2/23/23    #b/l parotid gland nodules  - 1/23 brain MRI:  A few indeterminate partially visualized heterogeneous enhancing nodules measuring up to approximately 1.5 cm in the bilateral parotid glands.  Consider soft tissue neck CT with contrast for further evaluation.  - 1/23 CT soft tissue neck: Soft tissue nodule in the superficial left parotid gland measuring 1.2 cm. Additional 0.8 cm nodule in the deep portion of the left parotid gland. Soft tissue nodule in the superficial right parotid gland measuring 0.8 cm. These may represent benign or malignant parotid lesions. Multiplicity of lesions raises concern for Warthin tumors  - 1/23/23 case reviewed by Dr. Wilmar Morales  - 1/27/23 note from Dr. Wilmar Morales- \"Based on this diagnosis, I think the 2 nodules in her parotid gland are likely atypical lymph nodes containing lymphoma.  She will probably require some staging imaging, such as PET scan and we can take a look at that as well.  I would not recommend biopsy at this time of the parotid lesions given their appearance and the recent diagnosis of low-grade follicular lymphoma.\"    #right thyroid nodule  - 1/23 CT soft tissue neck: Heterogeneous right thyroid gland nodule measuring up to 1.6 cm  - 1/23 thyroid US: 1.7 cm TI-RADS category 4 nodule mid right thyroid lobe  - 2/3/23- Right middle lobe thyroid nodule FNA: benign    #soft tissue prominence around great vessels of neck  - 1/23 CT soft tissue neck- \"This is atypical for atherosclerotic disease and vasculopathy/vasculitis such as giant cell arteritis should be considered.\"  - pt is already established with neurology- can follow up with neurology and " PCP for further evaluation    #History of LEFT breast IDC ER positive, TN positive, 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    -4/2020 left breast simple mastectomy with no residual carcinoma.   -S/p ACx4, taxol x4, no anti-her2 treatment, no RT.   -S/p 5 years adjuvant endocrine therapy (tamoxifen and anastrozole).  -Delayed left breast reconstruction and right breast augmentation.     RTC 3 months for f/u with me     Naomy Zaidi DO  Hematology/Oncology  [facility]    Future Appointments   Date Time Provider Department Center   1/13/2023 10:15 AM Naomy Zaidi DO [facility]   1/19/2023  9:00 AM Janett Noland APRN CNP [facility]   1/20/2023  9:30 AM U2A ROOM 10 [facility] O   1/20/2023 11:00 AM [id number] [place] [address] O   2/22/2023  4:00 PM Mikki Witt MD [facility]   3/23/2023  1:00 PM Santi Weems MD [facility]   7/10/2023  9:00 AM Dusty Robin MD [facility]

## 2023-02-16 ENCOUNTER — ONCOLOGY VISIT (OUTPATIENT)
Dept: ONCOLOGY | Facility: CLINIC | Age: 73
End: 2023-02-16
Payer: MEDICARE

## 2023-02-16 VITALS
DIASTOLIC BLOOD PRESSURE: 80 MMHG | BODY MASS INDEX: 25.67 KG/M2 | WEIGHT: 144.9 LBS | SYSTOLIC BLOOD PRESSURE: 175 MMHG | TEMPERATURE: 97.6 F | HEART RATE: 76 BPM | OXYGEN SATURATION: 97 % | RESPIRATION RATE: 16 BRPM

## 2023-02-16 DIAGNOSIS — Z11.59 ENCOUNTER FOR SCREENING FOR OTHER VIRAL DISEASES: ICD-10-CM

## 2023-02-16 DIAGNOSIS — K11.8 NODULE OF PAROTID GLAND: ICD-10-CM

## 2023-02-16 DIAGNOSIS — C82.00 FOLLICULAR LYMPHOMA GRADE I, UNSPECIFIED BODY REGION (H): Primary | ICD-10-CM

## 2023-02-16 DIAGNOSIS — Q78.2 BONY SCLEROSIS: ICD-10-CM

## 2023-02-16 DIAGNOSIS — D49.89 LACRIMAL GLAND TUMOR: ICD-10-CM

## 2023-02-16 LAB
HBV CORE AB SERPL QL IA: NONREACTIVE
HBV SURFACE AB SERPL IA-ACNC: 1.54 M[IU]/ML
HBV SURFACE AB SERPL IA-ACNC: NONREACTIVE M[IU]/ML
HBV SURFACE AG SERPL QL IA: NONREACTIVE
HCV AB SERPL QL IA: NONREACTIVE
INTERPRETATION: NORMAL

## 2023-02-16 PROCEDURE — 88185 FLOWCYTOMETRY/TC ADD-ON: CPT | Performed by: STUDENT IN AN ORGANIZED HEALTH CARE EDUCATION/TRAINING PROGRAM

## 2023-02-16 PROCEDURE — 87340 HEPATITIS B SURFACE AG IA: CPT | Performed by: INTERNAL MEDICINE

## 2023-02-16 PROCEDURE — 86704 HEP B CORE ANTIBODY TOTAL: CPT | Performed by: INTERNAL MEDICINE

## 2023-02-16 PROCEDURE — 36415 COLL VENOUS BLD VENIPUNCTURE: CPT | Performed by: INTERNAL MEDICINE

## 2023-02-16 PROCEDURE — 86803 HEPATITIS C AB TEST: CPT | Performed by: INTERNAL MEDICINE

## 2023-02-16 PROCEDURE — 87522 HEPATITIS C REVRS TRNSCRPJ: CPT | Performed by: INTERNAL MEDICINE

## 2023-02-16 PROCEDURE — 82232 ASSAY OF BETA-2 PROTEIN: CPT | Performed by: INTERNAL MEDICINE

## 2023-02-16 PROCEDURE — 88184 FLOWCYTOMETRY/ TC 1 MARKER: CPT | Performed by: STUDENT IN AN ORGANIZED HEALTH CARE EDUCATION/TRAINING PROGRAM

## 2023-02-16 PROCEDURE — 99214 OFFICE O/P EST MOD 30 MIN: CPT | Performed by: INTERNAL MEDICINE

## 2023-02-16 PROCEDURE — 88188 FLOWCYTOMETRY/READ 9-15: CPT | Performed by: STUDENT IN AN ORGANIZED HEALTH CARE EDUCATION/TRAINING PROGRAM

## 2023-02-16 PROCEDURE — 86706 HEP B SURFACE ANTIBODY: CPT | Performed by: INTERNAL MEDICINE

## 2023-02-16 ASSESSMENT — PAIN SCALES - GENERAL: PAINLEVEL: NO PAIN (0)

## 2023-02-16 NOTE — NURSING NOTE
"Oncology Rooming Note    February 16, 2023 10:16 AM   Virginia Byers is a 72 year old female who presents for:    Chief Complaint   Patient presents with     Oncology Clinic Visit     Personal history of malignant neoplasm of breast-1 month follow up      Initial Vitals: BP (!) 175/80 (BP Location: Right arm, Patient Position: Sitting, Cuff Size: Adult Regular)   Pulse 76   Temp 97.6  F (36.4  C) (Oral)   Resp 16   Wt 65.7 kg (144 lb 14.4 oz)   SpO2 97%   BMI (P) 25.67 kg/m   Estimated body mass index is 25.67 kg/m  (pended) as calculated from the following:    Height as of 1/20/23: (P) 1.6 m (5' 3\").    Weight as of this encounter: 65.7 kg (144 lb 14.4 oz). Body surface area is 1.71 meters squared (pended).  No Pain (0) Comment: Data Unavailable   No LMP recorded. Patient is postmenopausal.  Allergies reviewed: Yes  Medications reviewed: Yes    Medications: Medication refills not needed today.  Pharmacy name entered into [employer]: [id number] - [place], [place] - [id number] [facility]    Clinical concerns: None.        Sharla Toscano LPN February 16, 2023 10:17 AM              "

## 2023-02-16 NOTE — LETTER
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    Patient has medical history including LEFT breast cancer in 2000, hypertension, hyperlipidemia, osteoporosis, osteoarthritis, degenerative disc disease, spinal stenosis of lumbar region,  s/p bilateral L5-S1 transforaminal ROS by Dr. Garcia on 7/9/2021, s/p bilateral L4-5 and L5-S1 facet joint injections on 6/14/2021, glaucoma and macular degeneration, cataracts s/p surgery, history of tobacco use (quit 1999)     Regarding previous history of breast cancer:  She was previously treated by Dr. Sakina Brandt at [facility]      In summary, diagnosed at age 49 with LEFT breast IDC ER positive, WA positive, HER2 positive on FISH. 3/2000 left breast lumpectomy and left axillary node dissection (IDC, grade 3, 1.45 cm incompletely excised tumor, DCIS, positive margins with infiltrating carcinoma, 1 of 17 lymph nodes positive (0.5 cm metastatic focus), ER positive, WA positive, HER2 positive).  4/2020 left breast simple mastectomy with no residual carcinoma. S/p ACx4, taxol x4, no anti-her2 treatment, no RT. S/p 5 years adjuvant endocrine therapy (tamoxifen and anastrozole). Delayed left breast reconstruction and right breast augmentation.      3/2/2000  Excisional biopsy of Left breast mass -   Infiltrating ductal carcinoma with associated DCIS   ER positive (60%), WA positive (22%), HER2 by FISH POSITIVE     3/10/2000 - Left lumpectomy and left axillary node  Invasive ductal carcinoma, Grade 3, 1.45 cm (incompletely excised), negative for LVI  DCIS, greatest histologic dimension of tumor (DCIS PLUS  invasive tumor) = 2.9 cm (estimate 50% DCIS)  Positive margins- infiltrating carcinoma extends to margin  1 of 17 axillary lymph nodes sampled was positive for metastatic focus of 0.5 cm.  ER/OK+     Sections of lumpectomy left breast showing residual 1.1 cm focus of ductal carcinoma in situ (DCIS), present within microns of the linked margin of resection.       4/11/2000 - Left breast simple mastectomy, negative for residual carcinoma  Left mastectomy on 4/11/2000 performed by Dr. Cristian Mcdonald at [facility].      Adjuvant treatment:  - 5/15/2000-7/24/2000 4 cycles of Adriamycin and Cytoxan   - 8/16/2000-10/18/2000 4 cycles of paclitaxel   - Appears she may have been on some kind of trial/protocol, was not given any anti-HER2 targeted treatment  - No adjuvant radiation  - 12/2000- 10/2002 tamoxifen  - 10/2002-5/2006 switched to anastrozole (due to in vitro studies showing anastrozole may be slightly better for patients were HER2 positive) with Fosamax for osteopenia     8/31/2015 Left delayed reconstruction with tissue expander    2/3/2016 Left 2nd stage 450 cc high profile silicone implant; right augmentation 150 cc Moderate classic profile silicone implant; cresent mastopexy    10/18/2021: Screening mammogram right breast ZACKARY    OTHER:  Of note, patient has documentation of anemia (iron deficiency and anemia of chronic disease), elevated LFTs, nausea and vomiting of all solids and liquids, fatigue with 26 pound unintentional weight loss from 7/20/2021 - 10/20/2021 with mildly elevated LFTs which led to evaluation with mammogram, endoscopy with EUS and colonoscopy as detailed below. Pt did associate these with some spinal injections she had gotten, due to overlapping times. Pt did regain all of the weight and hand improvement of anemia within 2 months, without significant intervention.     -10/2021: Endoscopy with EUS normal EGD and no stigmata or source of upper GI bleeding, visualized bile duct,  gallbladder, liver, pancreas, left adrenal gland normal.  No lymphadenopathy. LIVER, RANDOM NEEDLE BIOPSY: Benign parenchyma with congestion; no significant fibrosis or other abnormalities     -10/2021 colonoscopy: Hemorrhoids, diverticulosis in sigmoid colon and splenic flexure    Current history:  -Ongoing right knee pain not improved with brace and physical therapy, s/p bilateral L5-S1 transforaminal ROS by Dr. Garica on 7/9/2021, s/p bilateral L4-5 and L5-S1 facet joint injections on 6/14/2021. ongoing b/l hip and leg pain, worse with position changes (ie sitting to standing), constant, 8-9/10, tylenol brings pain down to 6/10 (using tylenol bid). ECOG 3.   -Patient has seen neurosurgery Dr. Jose Alexander, occupational medicine specialist Dr.Orrin Murphy, with plan to see PM&R Dr. Margie Agudelo and movement disorder clinic  - 11/22 MRI lumbar spine:    prominent left iliac lymph nodes among other findings related to degenerative changes and neural foraminal stenosis throughout lumbar spine    - 12/22 MRI pelvis:  1. Enhancing marrow signal abnormality of the right iliac bone extending from the superior acetabulum subchondral location proximally almost to the level of the sacroiliac joint caudal aspect. No associated fracture line. Underlying marrow infiltrative process such as from metastasis cannot be excluded especially given the degree of T1 hypointensity and history of primary tumor. Other consideration include stress reaction.  2. Mildly increased left external iliac chain, and the right medial thigh lymphadenopathy. Metastasis cannot be excluded.  3. Left posterior iliac enhancing lesion, similar in size to prior CT 9/2021 and previously not visible on CT, focal enhancing lesion in the right greater trochanter laterally. Findings are concerning for Metastasis.    -12/22 CT chest abdomen pelvis with contrast:  COMPARISON: MRI pelvis 12/20/2022 and CT abdomen pelvis 9/21/2021.  1. Mastectomies with implant  reconstructions. No lymphadenopathy  2.  There is an new or increased left common iliac, external iliac and pelvic sidewall lymphadenopathy since prior CT. Distal left common iliac lymph nodes measure up to 1.2 cm on this exam compared to 0.7 cm on prior CT. Left pelvic sidewall lymph node measures 2.1 cm short axis on this exam compared to 1.2 cm on prior CT . consistent with metastatic disease.  3. Sclerotic lesions in the left sacrum and left posterior ilium are unchanged from prior CT.  may be metastatic.  4. No evidence of metastatic disease in the chest.     -1/2023 MRI brain w/wo contrast:  IMPRESSION:  1. No findings of intracranial metastatic disease.  2. Indeterminate ovoid T2 hyperintense enhancing mass in the region of the  left lacrimal gland measuring approximately 2.4 cm AP by 1.4 cm  transverse by 2.3 cm craniocaudal associated with/replacing the left  lacrimal gland. Consider correlation with tissue sampling.  3. A few indeterminate partially visualized heterogeneous enhancing  Nodules measuring up to approximately 1.5 cm in the bilateral parotid glands.   Consider soft tissue neck CT with contrast for further evaluation.    INTERIM HISTORY:  -1/20/2023: IR US Guided biopsy of a right external iliac LN  Final Diagnosis   A.  LYMPH NODE, RIGHT EXTERNAL ILIAC, CORE NEEDLE BIOPSY AND TOUCH IMPRINT:  -Involved by follicular lymphoma, low-grade (WHO grade 1-2)  -Negative for metastatic carcinoma     The morphologic and immunophenotypic patterns are consistent with follicular lymphoma.  There is no high-grade lymphoma present in this limited specimen.  Concurrent flow study ([id number]) demonstrated CD10-positive lambda monotypic B cells (22%), rare kappa monotypic B cells (1.3%), and no aberrant immunophenotype on T cells.  A FISH study for Bcl-2 is pending and will be reported separately.     The case was initially reviewed by Dr. Cifuentes (breast pathology).     This case was reviewed in part at our  Hematopathology Faculty Consensus conference at which Girish Nolasco, Narciso, and Kayode were present in addition to myself.  This is a small needle core.  In some of the areas, the CD10+ B cells are confined to follicles.  Though the possibility of in situ follicular neoplasia was discussed, we still favor a diagnosis of follicular lymphoma - there are a number of CD10+ B cells outside of follicles at the base of the biopsy, and the clinical history supports a greater extent of involvement.     There was a tiny kappa monotypic B cell population identified by flow (separate from the CD10+ clone).  We looked for the morphologic correlate in this case by IHC, but it is not readily apparent.  This may represent a monoclonal B lymphocytosis - consider sending a peirpheral blood sample for flow cytometry.    FISH:  RESULTS:     NORMAL  - No rearrangement of BCL2     INTERPRETATION:  No evidence was found by FISH of rearrangement of the BCL2 (18q21) locus. These findings are not helpful for confirming or further characterizing the reported pathologic diagnosis of follicular lymphoma.    Flow Interpretation   A. Lymph Node(s), :  -CD10-positive lambda monotypic B cells (22%)  -Rare kappa monotypic B cells (1.3%)  -No aberrant immunophenotype on T cells  See comment   Electronically signed by Krista Headley MD on 1/24/2023 at  3:10 PM   Comment     The CD10-positive population is consistent with a clonal B cell population and a CD10 positive B-cell lymphoma is favored.      In addition, a second clonal B-cell population is identified which has a CLL-like immunophenotype except that CD5 expression is equivocal, this may represent a monoclonal B-cell lymphocytosis (MBL).     Addendum   INTERPRETATION  The diagnosis remains the same (see comment)     REASON FOR ADDENDUM  This addendum is issued to reflect additional testing performed on this case. See Comment.      COMMENT  Additional multi-color flow analysis was  performed for the following markers:   CD23, CD79b     The CD5 (dimly) positive B cells mostly lack CD23 and CD79b        1/23/23 CT neck:  A) Heterogeneous nodule in the right thyroid gland measuring up  to 1.6 cm.   B) Soft tissue nodule in the superficial left parotid  gland measuring 1.2 cm. Additional 0.8 cm nodule in the deep portion  of the left parotid gland. Soft tissue nodule in the superficial right  parotid gland measuring 0.8 cm. These may represent benign or malignant parotid lesions. Multiplicity of lesions raises concern for Warthin tumors. Otolaryngology consultation is  recommended.  C) Prominent soft tissue around the major arteries in the neck  particularly the common and internal carotid arteries. This is  atypical for atherosclerotic disease and vasculopathy/vasculitis such  as giant cell arteritis should be considered. Probable superimposed  atherosclerotic disease at the carotid bifurcations right greater than  left without definite high-grade stenosis.    2/3/23- Right middle lobe thyroid nodule FNA: benign    Pt has better control of pain with aleve 2 in AM w/breakast, 1 in evening.     Upcoming appts:  Neuro movement d/o appt 2/22/23  PM&R appt 3/23/23  Rheumatology appt 7/10/23     REVIEW OF SYSTEMS:   A 14 point ROS was reviewed with pertinent positives and negatives in the HPI.       HOME MEDICATIONS:  Current Outpatient Medications   Medication Sig Dispense Refill     dorzolamide-timolol (COSOPT) 2-0.5 % ophthalmic solution Place 1 drop into both eyes 2 times daily       latanoprost (XALATAN) 0.005 % ophthalmic solution Place 1 drop into both eyes daily        losartan (COZAAR) 100 MG tablet TAKE 1 TABLET BY MOUTH EVERY DAY 90 tablet 0     rosuvastatin (CRESTOR) 10 MG tablet TAKE 1 TABLET (10 MG) BY MOUTH DAILY. (Patient taking differently: Take 10 mg by mouth every 7 days) 90 tablet 3         ALLERGIES:  Allergies   Allergen Reactions     Compazine      Mental confusion      Hydrochlorothiazide      Dryness mouth     Prochlorperazine Visual Disturbance     Simvastatin Other (See Comments)     Muscle aches         PAST MEDICAL HISTORY:  Past Medical History:   Diagnosis Date     Breast cancer (H)     Lt breast     Glaucoma     bilateral     Hyperlipidemia      Hypertension      Macular degeneration      Osteoarthritis      Osteoporosis          PAST SURGICAL HISTORY:  Past Surgical History:   Procedure Laterality Date     C MASTECTOMY,SIMPLE  3/2000    left     COLONOSCOPY  2006    Q 10 years     COLONOSCOPY N/A 10/4/2021    Procedure: COLONOSCOPY;  Surgeon: Guru Lyla Ruby MD;  Location: [facility] OR     ESOPHAGOSCOPY, GASTROSCOPY, DUODENOSCOPY (EGD), COMBINED N/A 10/4/2021    Procedure: ENDOSCOPIC ULTRASOUND, ESOPHAGOSCOPY / UPPER GASTROINTESTINAL TRACT (GI), Esophagogastroduodenoscopy,  Fine Needle Biopsy of liver;  Surgeon: Guru Lyla Ruby MD;  Location: [facility] OR     IR LYMPH NODE BIOPSY  2023     SURGICAL HISTORY OF -  Left 2015    tissue expander placed in left breast area     SURGICAL HISTORY OF -  Left 2016    left breast implant and right mammoplasty         SOCIAL HISTORY:  Social History     Socioeconomic History     Marital status: Single     Spouse name: Not on file     Number of children: 0     Years of education: Not on file     Highest education level: Not on file   Occupational History     Employer: [employer]     Occupation: REtired   Tobacco Use     Smoking status: Former     Packs/day: 1.00     Years: 17.00     Pack years: 17.00     Types: Cigarettes     Quit date: 3/15/1999     Years since quittin.9     Smokeless tobacco: Never   Vaping Use     Vaping Use: Not on file   Substance and Sexual Activity     Alcohol use: Not Currently     Alcohol/week: 4.0 standard drinks     Types: 4 Glasses of wine per week     Comment: occasional     Drug use: No     Sexual activity: Not Currently     Birth control/protection: None   Other  Topics Concern     Parent/sibling w/ CABG, MI or angioplasty before 65F 55M? Yes     Comment: Father heart attack   Social History Narrative     Not on file     Social Determinants of Health     Financial Resource Strain: Not on file   Food Insecurity: Not on file   Transportation Needs: Not on file   Physical Activity: Not on file   Stress: Not on file   Social Connections: Not on file   Intimate Partner Violence: Not on file   Housing Stability: Not on file       FAMILY HISTORY:  Family History   Problem Relation Age of Onset     Cancer Mother         bone cancer     Other Cancer Mother      Heart Disease Father      Coronary Artery Disease Father      Diabetes Father      Arthritis Sister      Breast Cancer Sister         age 75     Diabetes Maternal Grandmother      Diabetes Paternal Grandmother      Family history of cancer- sister breast cancer- dx at age 77 y/o, localized breast cancer, surgical resection, RT, no adjuvant chemo or endocrine therapy that pt is aware of     PHYSICAL EXAM:  Vital signs:  There were no vitals taken for this visit.     ECO  GENERAL/CONSTITUTIONAL: No acute distress.  EYES: Pupils are equal and round. Extraocular movements intact without nystagmus.  No scleral icterus.  RESPIRATORY: Equal chest rise.   MUSCULOSKELETAL: Warm and well-perfused, no cyanosis, clubbing, or edema.   NEUROLOGIC: Cranial nerves are grossly intact. Alert, oriented to person, place and time, answers questions appropriately.  INTEGUMENTARY: No rashes or jaundice.  GAIT: Steady, does not use assistive device        LABS:      PATHOLOGY:      IMAGING:  CT SCAN OF THE NECK WITH CONTRAST  2023 10:26 AM      HISTORY: Partially imaged parotid gland nodules on MRI brain,  recommended follow up CT soft tissue neck. Nodule of parotid gland.     TECHNIQUE: Axial CT images of the neck following administration of  intravenous contrast with reformations. Radiation dose for this scan  was reduced using automated  exposure control, adjustment of the mA  and/or kV according to patient size, or iterative reconstruction  technique. 100 mL Isovue-370 IV.      COMPARISON: None.      FINDINGS:   Visualized sinuses, nasopharynx and orbits: Mild mucosal thickening in  the only partially visualized right sphenoid sinus.      Tongue, oral cavity and oropharynx:  Unremarkable.      Hypopharynx: Unremarkable.      Larynx and trachea: Unremarkable.      Thyroid: Heterogeneous nodule in the right thyroid gland measuring up  to 1.6 cm.      Submandibular glands: Unremarkable.      Parotid glands: Soft tissue nodule in the superficial left parotid  gland measuring 1.2 cm. Additional 0.8 cm nodule in the deep portion  of the left parotid gland. Soft tissue nodule in the superficial right  parotid gland measuring 0.8 cm.       Lymph nodes: No suspicious enlarged or necrotic cervical lymph nodes.      Vasculature: Prominent soft tissue around the great vessels of the  neck particularly the common and internal carotid arteries but also  seen surrounding the vertebral arteries and subclavian arteries as  well as the aortic arch. Presumed atherosclerotic disease at the  carotid bifurcations right greater than left without definite  high-grade stenosis.      Upper mediastinum and lungs: Unremarkable.      Bones: Degenerative changes in the spine. No focal destructive bony  lesions.                                                                       IMPRESSION:     1.  Several soft tissue nodules in the parotid glands as above  including nodules in both the deep and superficial left parotid gland  and a nodule in the superficial right parotid gland. These may  represent benign or malignant parotid lesions. Multiplicity of lesions  raises concern for Warthin tumors. Otolaryngology consultation is  recommended.  2.  Prominent soft tissue around the major arteries in the neck  particularly the common and internal carotid arteries. This is  atypical  for atherosclerotic disease and vasculopathy/vasculitis such  as giant cell arteritis should be considered. Probable superimposed  atherosclerotic disease at the carotid bifurcations right greater than  left without definite high-grade stenosis.  3.  Heterogeneous right thyroid gland nodule measuring up to 1.6 cm.  Recommend further evaluation with thyroid ultrasound if not previously  performed.      MICHELLE STEARNS MD     US THYROID 1/27/2023 9:28 AM     CLINICAL HISTORY: Thyroid nodule.     TECHNIQUE: Thyroid ultrasound.      COMPARISON: Neck CT 1/23/2023.      FINDINGS:  RIGHT lobe: 4.9 x 1.4 x 1.7 cm. Homogeneous echotexture.  Isthmus: 3 mm.  LEFT lobe: 4.2 x 1.1 x 1.1 cm. Homogeneous echotexture.     NECK: No cervical lymphadenopathy.     NODULES:     Nodule 1: 1.7 x 1.2 x 1.2 cm nodule, mid right.   Composition: Mixed cystic and solid, 1 point   Echogenicity: Hyperechoic or isoechoic, 1 point   Shape: Wider-than-tall, 0 points   Margin: Ill-defined, 0 points   Echogenic Foci: Punctate echoic foci, 3 points   Point Total: 4-6 points. TI-RADS 4. If 1.5 cm or larger, recommend  FNA; if 1 cm or larger, follow up US (annually for 5 years).                                                                         IMPRESSION:  1.7 cm TI-RADS category 4 nodule mid right thyroid lobe. FNA indicated  per guidelines referenced below.    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 No  "splenomegaly  -1/20/2023: IR US Guided core needle biopsy of a right external iliac LN:   -follicular lymphoma, low grade WHO grade 1-2 (negative for metastatic carcinoma),  - no high grade lymphoma present,   - FISH negative for BCL2 rearrangement;   - IHC: neoplastic cells demonstrate expression of CD20, BCL-6 (in follicles), and CD10 (bright). CD5 stains T cells  - Ki-67 proliferative index is approximately 10%, also low in the follicles.  - flow cytometry \"CD10-positive population is consistent with a clonal B cell population and a CD10 positive B-cell lymphoma is favored.\"  - Stage: non-contiguous stage 2 given right medial thigh SURINDER and left common iliac, external iliac, pelvic sidewall; there was some concern from ENT that patients parotid lesions were related to pts follicular lymphoma   - FLIPI: score 1 (age>60), low risk   - pertinent labs: , will check beta 2 microglobulin, hepatitis B and C  - GELF criteria to indicate treatment (involvement of 3 or more bj sites each with a diameter greater than or equal to 3 cm, any bj or extranodal tumor mass with a diameter of greater than or equal to 7 cm, B symptoms, splenomegaly, pleural effusions, peritoneal ascites, cytopenias, leukemia): pt reports b/l LE pain and weakness but MRI reports \"The visualized course of the sciatic nerves are unremarkable bilaterally\"  - I do not think the patients pelvic lymphadenopathy is causing her musculoskeletal symptoms of weakness in b/l LE. I recommend pt has full neurological workup and if no answer is found there, then can consider possible treatment option with single agent weekly rituximab x4 weeks. This was discussed with pt in detail today, pt is concerned about AE of rituximab and would like to hold off on this, and I am in agreement.     #  rare kappa monotypic B cells   - 1/23 right external iliac LN core needle biopsy-  Second tiny kappa monoclonal B cell population identified by flow (1.3%) (separate " from the CD10+ clone), which has a CLL-like immunophenotype except that CD5 expression is equivocal, this may represent a monoclonal B-cell lymphocytosis (MBL). The CD5 (dimly) positive B cells mostly lack CD23 and CD79b. We looked for the morphologic correlate in this case by IHC, but it is not readily apparent.   - of note pt does not have leukocytosis or lymphocytosis, ALC 2.2, no splenomegaly, LN biopsy did not demonstrate IHC consistent with SLL   - will check peripheral blood flow cytometry     #Stable sclerotic lesions in left sacrum and left posterior ilium  -10/21 mammogram, endoscopy with EUS and colonoscopy WNL  - CT chest abdomen pelvis shows new or increased left common iliac, external iliac, pelvic sidewall lymphadenopathy and sclerotic lesions in left sacrum and left posterior ilium.  No metastatic disease in chest.  No abnormal findings in breast.  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  SIFE: trace amount of IgG kappa monoclonal protein  2.  SPEP: no monoclonal protein seen  3.  UIFE: no monoclonal protein seen  4.  UPEP: no monoclonal protein seen  5.  Kappa/lambda ratio: kappa 2.17, lambda 1.67, kappa/lambda ratio 1.30   6.  Quantitative immunoglobulins: Ig    IgA: 187    IgM: 165  - repeat protein studies  for f/u     # left lacrimal gland enhancing mass  -  brain MRI: Indeterminate ovoid T2 hyperintense enhancing mass in the region of the left lacrimal gland measuring approximately 2.4 cm AP by 1.4 cm  transverse by 2.3 cm craniocaudal associated with/replacing the left  lacrimal gland.  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 Pt follows with Dr. Moore at [facility] in [place].   - 2/23 f/u with Dr. Moore, pt referred to oculoplastics and is having biopsy of lacrimal gland with Dr. David on 2/23/23    #b/l parotid gland nodules  - 1/23 brain MRI:  A few indeterminate partially visualized heterogeneous enhancing nodules measuring up to approximately 1.5 cm in the bilateral parotid glands.  Consider soft tissue neck CT with contrast for further evaluation.  - 1/23 CT soft tissue neck: Soft tissue nodule in the superficial left parotid gland measuring 1.2 cm. Additional 0.8 cm nodule in the deep portion of the left parotid gland. Soft tissue nodule in the superficial right parotid gland measuring 0.8 cm. These may represent benign or malignant parotid lesions. Multiplicity of lesions raises concern for Warthin tumors  - 1/23/23 case reviewed by Dr. Wilmar Morales  - 1/27/23 note from Dr. Wilmar Morales- \"Based on this diagnosis, I think the 2 nodules in her parotid gland are likely atypical lymph nodes containing lymphoma.  She will probably require some staging imaging, such as PET scan and we can take a look at that as well.  I would not recommend biopsy at this time of the parotid lesions given their appearance and the recent diagnosis of low-grade follicular lymphoma.\"    #right thyroid nodule  - 1/23 CT soft tissue neck: Heterogeneous right thyroid gland nodule measuring up to 1.6 cm  - 1/23 thyroid US: 1.7 cm TI-RADS category 4 nodule mid right thyroid lobe  - 2/3/23- Right middle lobe thyroid nodule FNA: benign    #soft tissue prominence around great vessels of neck  - 1/23 CT soft tissue neck- \"This is atypical for atherosclerotic disease and vasculopathy/vasculitis such as giant cell arteritis should be " "considered.\"  - pt is already established with neurology- can follow up with neurology and PCP for further evaluation    #History of LEFT breast IDC ER positive, SC positive, 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    -4/2020 left breast simple mastectomy with no residual carcinoma.   -S/p ACx4, taxol x4, no anti-her2 treatment, no RT.   -S/p 5 years adjuvant endocrine therapy (tamoxifen and anastrozole).  -Delayed left breast reconstruction and right breast augmentation.     RTC 3 months for f/u with me     Arlene Zaidi DO  Hematology/Oncology  [facility]    Future Appointments   Date Time Provider Department Center   1/13/2023 10:15 AM Arlene Zaidi DO [facility] [place]   1/19/2023  9:00 AM Janett Noland APRN CNP [facility]   1/20/2023  9:30 AM U2A ROOM 10 [facility] O   1/20/2023 11:00 AM [id number] [facility]   2/22/2023  4:00 PM Mikki Witt MD [facility]   3/23/2023  1:00 PM Santi Weems MD [facility]   7/10/2023  9:00 AM Dusty Robin MD [facility]            ARLENE ZAIDI DO  "

## 2023-02-16 NOTE — LETTER
2/16/2023         RE: Virginia Byers  [address] [facility] [address]        Dear Colleague,    Thank you for referring your patient, Virginia Byers, to the [facility]. Please see a copy of my visit note below.    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    Patient has medical history including LEFT breast cancer in 2000, hypertension, hyperlipidemia, osteoporosis, osteoarthritis, degenerative disc disease, spinal stenosis of lumbar region,  s/p bilateral L5-S1 transforaminal ROS by Dr. Garcia on 7/9/2021, s/p bilateral L4-5 and L5-S1 facet joint injections on 6/14/2021, glaucoma and macular degeneration, cataracts s/p surgery, history of tobacco use (quit 1999)     Regarding previous history of breast cancer:  She was previously treated by Dr. Sakina Brandt at [facility]      In summary, diagnosed at age 49 with LEFT breast IDC ER positive, OR positive, HER2 positive on FISH. 3/2000 left breast lumpectomy and left axillary node dissection (IDC, grade 3, 1.45 cm incompletely excised tumor, DCIS, positive margins with infiltrating carcinoma, 1 of 17 lymph nodes positive (0.5 cm metastatic focus), ER positive, OR positive, HER2 positive).  4/2020 left breast simple mastectomy with no residual carcinoma. S/p ACx4, taxol x4, no anti-her2 treatment, no RT. S/p 5 years adjuvant endocrine therapy (tamoxifen and anastrozole). Delayed left breast reconstruction and right breast augmentation.      3/2/2000  Excisional biopsy of Left breast mass -   Infiltrating ductal carcinoma with associated DCIS   ER positive (60%), OR positive (22%), HER2 by FISH POSITIVE     3/10/2000 - Left  lumpectomy and left axillary node  Invasive ductal carcinoma, Grade 3, 1.45 cm (incompletely excised), negative for LVI  DCIS, greatest histologic dimension of tumor (DCIS PLUS invasive tumor) = 2.9 cm (estimate 50% DCIS)  Positive margins- infiltrating carcinoma extends to margin  1 of 17 axillary lymph nodes sampled was positive for metastatic focus of 0.5 cm.  ER/GA+     Sections of lumpectomy left breast showing residual 1.1 cm focus of ductal carcinoma in situ (DCIS), present within microns of the linked margin of resection.       4/11/2000 - Left breast simple mastectomy, negative for residual carcinoma  Left mastectomy on 4/11/2000 performed by Dr. Cristian Mcdonald at [facility].      Adjuvant treatment:  - 5/15/2000-7/24/2000 4 cycles of Adriamycin and Cytoxan   - 8/16/2000-10/18/2000 4 cycles of paclitaxel   - Appears she may have been on some kind of trial/protocol, was not given any anti-HER2 targeted treatment  - No adjuvant radiation  - 12/2000- 10/2002 tamoxifen  - 10/2002-5/2006 switched to anastrozole (due to in vitro studies showing anastrozole may be slightly better for patients were HER2 positive) with Fosamax for osteopenia     8/31/2015 Left delayed reconstruction with tissue expander    2/3/2016 Left 2nd stage 450 cc high profile silicone implant; right augmentation 150 cc Moderate classic profile silicone implant; cresent mastopexy    10/18/2021: Screening mammogram right breast ZACKARY    OTHER:  Of note, patient has documentation of anemia (iron deficiency and anemia of chronic disease), elevated LFTs, nausea and vomiting of all solids and liquids, fatigue with 26 pound unintentional weight loss from 7/20/2021 - 10/20/2021 with mildly elevated LFTs which led to evaluation with mammogram, endoscopy with EUS and colonoscopy as detailed below. Pt did associate these with some spinal injections she had gotten, due to overlapping times. Pt did regain all of the weight and hand improvement of  anemia within 2 months, without significant intervention.     -10/2021: Endoscopy with EUS normal EGD and no stigmata or source of upper GI bleeding, visualized bile duct, gallbladder, liver, pancreas, left adrenal gland normal.  No lymphadenopathy. LIVER, RANDOM NEEDLE BIOPSY: Benign parenchyma with congestion; no significant fibrosis or other abnormalities     -10/2021 colonoscopy: Hemorrhoids, diverticulosis in sigmoid colon and splenic flexure    Current history:  -Ongoing right knee pain not improved with brace and physical therapy, s/p bilateral L5-S1 transforaminal ROS by Dr. Garcia on 7/9/2021, s/p bilateral L4-5 and L5-S1 facet joint injections on 6/14/2021. ongoing b/l hip and leg pain, worse with position changes (ie sitting to standing), constant, 8-9/10, tylenol brings pain down to 6/10 (using tylenol bid). ECOG 3.   -Patient has seen neurosurgery Dr. Jose Alexander, occupational medicine specialist Dr.Orrin Murphy, with plan to see PM&R Dr. Margie Agudelo and movement disorder clinic  - 11/22 MRI lumbar spine:    prominent left iliac lymph nodes among other findings related to degenerative changes and neural foraminal stenosis throughout lumbar spine    - 12/22 MRI pelvis:  1. Enhancing marrow signal abnormality of the right iliac bone extending from the superior acetabulum subchondral location proximally almost to the level of the sacroiliac joint caudal aspect. No associated fracture line. Underlying marrow infiltrative process such as from metastasis cannot be excluded especially given the degree of T1 hypointensity and history of primary tumor. Other consideration include stress reaction.  2. Mildly increased left external iliac chain, and the right medial thigh lymphadenopathy. Metastasis cannot be excluded.  3. Left posterior iliac enhancing lesion, similar in size to prior CT 9/2021 and previously not visible on CT, focal enhancing lesion in the right greater trochanter laterally. Findings are  concerning for Metastasis.    -12/22 CT chest abdomen pelvis with contrast:  COMPARISON: MRI pelvis 12/20/2022 and CT abdomen pelvis 9/21/2021.  1. Mastectomies with implant reconstructions. No lymphadenopathy  2.  There is an new or increased left common iliac, external iliac and pelvic sidewall lymphadenopathy since prior CT. Distal left common iliac lymph nodes measure up to 1.2 cm on this exam compared to 0.7 cm on prior CT. Left pelvic sidewall lymph node measures 2.1 cm short axis on this exam compared to 1.2 cm on prior CT . consistent with metastatic disease.  3. Sclerotic lesions in the left sacrum and left posterior ilium are unchanged from prior CT.  may be metastatic.  4. No evidence of metastatic disease in the chest.     -1/2023 MRI brain w/wo contrast:  IMPRESSION:  1. No findings of intracranial metastatic disease.  2. Indeterminate ovoid T2 hyperintense enhancing mass in the region of the  left lacrimal gland measuring approximately 2.4 cm AP by 1.4 cm  transverse by 2.3 cm craniocaudal associated with/replacing the left  lacrimal gland. Consider correlation with tissue sampling.  3. A few indeterminate partially visualized heterogeneous enhancing  Nodules measuring up to approximately 1.5 cm in the bilateral parotid glands.   Consider soft tissue neck CT with contrast for further evaluation.    INTERIM HISTORY:  -1/20/2023: IR US Guided biopsy of a right external iliac LN  Final Diagnosis   A.  LYMPH NODE, RIGHT EXTERNAL ILIAC, CORE NEEDLE BIOPSY AND TOUCH IMPRINT:  -Involved by follicular lymphoma, low-grade (WHO grade 1-2)  -Negative for metastatic carcinoma     The morphologic and immunophenotypic patterns are consistent with follicular lymphoma.  There is no high-grade lymphoma present in this limited specimen.  Concurrent flow study ([id number]) demonstrated CD10-positive lambda monotypic B cells (22%), rare kappa monotypic B cells (1.3%), and no aberrant immunophenotype on T cells.  A FISH  study for Bcl-2 is pending and will be reported separately.     The case was initially reviewed by Dr. Cifuentes (breast pathology).     This case was reviewed in part at our Hematopathology Faculty Consensus conference at which Girish Nolasco, Narciso, and Kayode were present in addition to myself.  This is a small needle core.  In some of the areas, the CD10+ B cells are confined to follicles.  Though the possibility of in situ follicular neoplasia was discussed, we still favor a diagnosis of follicular lymphoma - there are a number of CD10+ B cells outside of follicles at the base of the biopsy, and the clinical history supports a greater extent of involvement.     There was a tiny kappa monotypic B cell population identified by flow (separate from the CD10+ clone).  We looked for the morphologic correlate in this case by IHC, but it is not readily apparent.  This may represent a monoclonal B lymphocytosis - consider sending a peirpheral blood sample for flow cytometry.    FISH:  RESULTS:     NORMAL  - No rearrangement of BCL2     INTERPRETATION:  No evidence was found by FISH of rearrangement of the BCL2 (18q21) locus. These findings are not helpful for confirming or further characterizing the reported pathologic diagnosis of follicular lymphoma.    Flow Interpretation   A. Lymph Node(s), :  -CD10-positive lambda monotypic B cells (22%)  -Rare kappa monotypic B cells (1.3%)  -No aberrant immunophenotype on T cells  See comment   Electronically signed by Krista Headley MD on 1/24/2023 at  3:10 PM   Comment     The CD10-positive population is consistent with a clonal B cell population and a CD10 positive B-cell lymphoma is favored.      In addition, a second clonal B-cell population is identified which has a CLL-like immunophenotype except that CD5 expression is equivocal, this may represent a monoclonal B-cell lymphocytosis (MBL).     Addendum   INTERPRETATION  The diagnosis remains the same (see  comment)     REASON FOR ADDENDUM  This addendum is issued to reflect additional testing performed on this case. See Comment.      COMMENT  Additional multi-color flow analysis was performed for the following markers:   CD23, CD79b     The CD5 (dimly) positive B cells mostly lack CD23 and CD79b        1/23/23 CT neck:  A) Heterogeneous nodule in the right thyroid gland measuring up  to 1.6 cm.   B) Soft tissue nodule in the superficial left parotid  gland measuring 1.2 cm. Additional 0.8 cm nodule in the deep portion  of the left parotid gland. Soft tissue nodule in the superficial right  parotid gland measuring 0.8 cm. These may represent benign or malignant parotid lesions. Multiplicity of lesions raises concern for Warthin tumors. Otolaryngology consultation is  recommended.  C) Prominent soft tissue around the major arteries in the neck  particularly the common and internal carotid arteries. This is  atypical for atherosclerotic disease and vasculopathy/vasculitis such  as giant cell arteritis should be considered. Probable superimposed  atherosclerotic disease at the carotid bifurcations right greater than  left without definite high-grade stenosis.    2/3/23- Right middle lobe thyroid nodule FNA: benign    Pt has better control of pain with aleve 2 in AM w/breakast, 1 in evening.     Upcoming appts:  Neuro movement d/o appt 2/22/23  PM&R appt 3/23/23  Rheumatology appt 7/10/23     REVIEW OF SYSTEMS:   A 14 point ROS was reviewed with pertinent positives and negatives in the HPI.       HOME MEDICATIONS:  Current Outpatient Medications   Medication Sig Dispense Refill     dorzolamide-timolol (COSOPT) 2-0.5 % ophthalmic solution Place 1 drop into both eyes 2 times daily       latanoprost (XALATAN) 0.005 % ophthalmic solution Place 1 drop into both eyes daily        losartan (COZAAR) 100 MG tablet TAKE 1 TABLET BY MOUTH EVERY DAY 90 tablet 0     rosuvastatin (CRESTOR) 10 MG tablet TAKE 1 TABLET (10 MG) BY MOUTH  DAILY. (Patient taking differently: Take 10 mg by mouth every 7 days) 90 tablet 3         ALLERGIES:  Allergies   Allergen Reactions     Compazine      Mental confusion     Hydrochlorothiazide      Dryness mouth     Prochlorperazine Visual Disturbance     Simvastatin Other (See Comments)     Muscle aches         PAST MEDICAL HISTORY:  Past Medical History:   Diagnosis Date     Breast cancer (H)     Lt breast     Glaucoma     bilateral     Hyperlipidemia      Hypertension      Macular degeneration      Osteoarthritis      Osteoporosis          PAST SURGICAL HISTORY:  Past Surgical History:   Procedure Laterality Date     C MASTECTOMY,SIMPLE  3/2000    left     COLONOSCOPY  2006    Q 10 years     COLONOSCOPY N/A 10/4/2021    Procedure: COLONOSCOPY;  Surgeon: Guru Lyla Ruby MD;  Location: [facility] OR     ESOPHAGOSCOPY, GASTROSCOPY, DUODENOSCOPY (EGD), COMBINED N/A 10/4/2021    Procedure: ENDOSCOPIC ULTRASOUND, ESOPHAGOSCOPY / UPPER GASTROINTESTINAL TRACT (GI), Esophagogastroduodenoscopy,  Fine Needle Biopsy of liver;  Surgeon: Guru Lyla Ruby MD;  Location: [facility] OR     IR LYMPH NODE BIOPSY  2023     SURGICAL HISTORY OF -  Left 2015    tissue expander placed in left breast area     SURGICAL HISTORY OF -  Left 2016    left breast implant and right mammoplasty         SOCIAL HISTORY:  Social History     Socioeconomic History     Marital status: Single     Spouse name: Not on file     Number of children: 0     Years of education: Not on file     Highest education level: Not on file   Occupational History     Employer: [employer]     Occupation: REtired   Tobacco Use     Smoking status: Former     Packs/day: 1.00     Years: 17.00     Pack years: 17.00     Types: Cigarettes     Quit date: 3/15/1999     Years since quittin.9     Smokeless tobacco: Never   Vaping Use     Vaping Use: Not on file   Substance and Sexual Activity     Alcohol use: Not Currently     Alcohol/week: 4.0  standard drinks     Types: 4 Glasses of wine per week     Comment: occasional     Drug use: No     Sexual activity: Not Currently     Birth control/protection: None   Other Topics Concern     Parent/sibling w/ CABG, MI or angioplasty before 65F 55M? Yes     Comment: Father heart attack   Social History Narrative     Not on file     Social Determinants of Health     Financial Resource Strain: Not on file   Food Insecurity: Not on file   Transportation Needs: Not on file   Physical Activity: Not on file   Stress: Not on file   Social Connections: Not on file   Intimate Partner Violence: Not on file   Housing Stability: Not on file       FAMILY HISTORY:  Family History   Problem Relation Age of Onset     Cancer Mother         bone cancer     Other Cancer Mother      Heart Disease Father      Coronary Artery Disease Father      Diabetes Father      Arthritis Sister      Breast Cancer Sister         age 75     Diabetes Maternal Grandmother      Diabetes Paternal Grandmother      Family history of cancer- sister breast cancer- dx at age 75 y/o, localized breast cancer, surgical resection, RT, no adjuvant chemo or endocrine therapy that pt is aware of     PHYSICAL EXAM:  Vital signs:  There were no vitals taken for this visit.     ECO  GENERAL/CONSTITUTIONAL: No acute distress.  EYES: Pupils are equal and round. Extraocular movements intact without nystagmus.  No scleral icterus.  RESPIRATORY: Equal chest rise.   MUSCULOSKELETAL: Warm and well-perfused, no cyanosis, clubbing, or edema.   NEUROLOGIC: Cranial nerves are grossly intact. Alert, oriented to person, place and time, answers questions appropriately.  INTEGUMENTARY: No rashes or jaundice.  GAIT: Steady, does not use assistive device        LABS:      PATHOLOGY:      IMAGING:  CT SCAN OF THE NECK WITH CONTRAST  2023 10:26 AM      HISTORY: Partially imaged parotid gland nodules on MRI brain,  recommended follow up CT soft tissue neck. Nodule of parotid  gland.     TECHNIQUE: Axial CT images of the neck following administration of  intravenous contrast with reformations. Radiation dose for this scan  was reduced using automated exposure control, adjustment of the mA  and/or kV according to patient size, or iterative reconstruction  technique. 100 mL Isovue-370 IV.      COMPARISON: None.      FINDINGS:   Visualized sinuses, nasopharynx and orbits: Mild mucosal thickening in  the only partially visualized right sphenoid sinus.      Tongue, oral cavity and oropharynx:  Unremarkable.      Hypopharynx: Unremarkable.      Larynx and trachea: Unremarkable.      Thyroid: Heterogeneous nodule in the right thyroid gland measuring up  to 1.6 cm.      Submandibular glands: Unremarkable.      Parotid glands: Soft tissue nodule in the superficial left parotid  gland measuring 1.2 cm. Additional 0.8 cm nodule in the deep portion  of the left parotid gland. Soft tissue nodule in the superficial right  parotid gland measuring 0.8 cm.       Lymph nodes: No suspicious enlarged or necrotic cervical lymph nodes.      Vasculature: Prominent soft tissue around the great vessels of the  neck particularly the common and internal carotid arteries but also  seen surrounding the vertebral arteries and subclavian arteries as  well as the aortic arch. Presumed atherosclerotic disease at the  carotid bifurcations right greater than left without definite  high-grade stenosis.      Upper mediastinum and lungs: Unremarkable.      Bones: Degenerative changes in the spine. No focal destructive bony  lesions.                                                                       IMPRESSION:     1.  Several soft tissue nodules in the parotid glands as above  including nodules in both the deep and superficial left parotid gland  and a nodule in the superficial right parotid gland. These may  represent benign or malignant parotid lesions. Multiplicity of lesions  raises concern for Warthin tumors.  Otolaryngology consultation is  recommended.  2.  Prominent soft tissue around the major arteries in the neck  particularly the common and internal carotid arteries. This is  atypical for atherosclerotic disease and vasculopathy/vasculitis such  as giant cell arteritis should be considered. Probable superimposed  atherosclerotic disease at the carotid bifurcations right greater than  left without definite high-grade stenosis.  3.  Heterogeneous right thyroid gland nodule measuring up to 1.6 cm.  Recommend further evaluation with thyroid ultrasound if not previously  performed.      MICHELLE STEARNS MD     US THYROID 1/27/2023 9:28 AM     CLINICAL HISTORY: Thyroid nodule.     TECHNIQUE: Thyroid ultrasound.      COMPARISON: Neck CT 1/23/2023.      FINDINGS:  RIGHT lobe: 4.9 x 1.4 x 1.7 cm. Homogeneous echotexture.  Isthmus: 3 mm.  LEFT lobe: 4.2 x 1.1 x 1.1 cm. Homogeneous echotexture.     NECK: No cervical lymphadenopathy.     NODULES:     Nodule 1: 1.7 x 1.2 x 1.2 cm nodule, mid right.   Composition: Mixed cystic and solid, 1 point   Echogenicity: Hyperechoic or isoechoic, 1 point   Shape: Wider-than-tall, 0 points   Margin: Ill-defined, 0 points   Echogenic Foci: Punctate echoic foci, 3 points   Point Total: 4-6 points. TI-RADS 4. If 1.5 cm or larger, recommend  FNA; if 1 cm or larger, follow up US (annually for 5 years).                                                                         IMPRESSION:  1.7 cm TI-RADS category 4 nodule mid right thyroid lobe. FNA indicated  per guidelines referenced below.    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 No splenomegaly  -1/20/2023: IR US Guided core needle biopsy of a right external iliac LN:   -follicular lymphoma, low grade WHO grade 1-2 (negative for metastatic carcinoma),  - no high grade lymphoma present,   - FISH negative for BCL2 rearrangement;   - IHC: neoplastic cells demonstrate expression of CD20, BCL-6 (in follicles), and CD10 (bright). CD5 stains T cells  - Ki-67 proliferative index is approximately 10%, also low in the follicles.  - flow cytometry \"CD10-positive population is consistent with a clonal B cell population and a CD10 positive B-cell lymphoma is favored.\"  - Stage: non-contiguous stage 2 given right medial thigh SURINDER and left common iliac, external iliac, pelvic sidewall; there was some concern from ENT that patients parotid lesions were related to pts follicular lymphoma   - FLIPI: score 1 (age>60), low risk   - pertinent labs: , will check beta 2 microglobulin, hepatitis B and C  - GELF criteria to indicate treatment (involvement of 3 or more bj sites each with a diameter greater than or equal to 3 cm, any bj or extranodal tumor mass with a diameter of greater than or equal to 7 cm, B symptoms, splenomegaly, pleural effusions, peritoneal ascites, cytopenias, leukemia): pt reports b/l LE pain and weakness but MRI reports \"The visualized course of the sciatic nerves are unremarkable bilaterally\"  - I do not think the patients pelvic lymphadenopathy is causing her musculoskeletal symptoms of weakness in b/l LE. I recommend pt has full neurological workup and if no answer is found there, then can consider possible treatment option with single agent weekly rituximab x4 weeks. This was discussed with pt in detail today, pt is concerned about AE of rituximab and would like to hold off on this, and I am in agreement. "     #  rare kappa monotypic B cells   -  right external iliac LN core needle biopsy-  Second tiny kappa monoclonal B cell population identified by flow (1.3%) (separate from the CD10+ clone), which has a CLL-like immunophenotype except that CD5 expression is equivocal, this may represent a monoclonal B-cell lymphocytosis (MBL). The CD5 (dimly) positive B cells mostly lack CD23 and CD79b. We looked for the morphologic correlate in this case by IHC, but it is not readily apparent.   - of note pt does not have leukocytosis or lymphocytosis, ALC 2.2, no splenomegaly, LN biopsy did not demonstrate IHC consistent with SLL   - will check peripheral blood flow cytometry     #Stable sclerotic lesions in left sacrum and left posterior ilium  -10/21 mammogram, endoscopy with EUS and colonoscopy WNL  - CT chest abdomen pelvis shows new or increased left common iliac, external iliac, pelvic sidewall lymphadenopathy and sclerotic lesions in left sacrum and left posterior ilium.  No metastatic disease in chest.  No abnormal findings in breast.  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  SIFE: trace amount of IgG kappa monoclonal protein  2.  SPEP: no monoclonal protein seen  3.  UIFE: no monoclonal protein seen  4.  UPEP: no monoclonal protein seen  5.  Kappa/lambda ratio: kappa 2.17, lambda 1.67, kappa/lambda ratio 1.30   6.  Quantitative immunoglobulins: Ig    IgA: 187    IgM: 165  - repeat protein studies  for f/u     # left lacrimal gland enhancing mass  -  brain MRI: Indeterminate ovoid T2 hyperintense enhancing mass  "in the region of the left lacrimal gland measuring approximately 2.4 cm AP by 1.4 cm  transverse by 2.3 cm craniocaudal associated with/replacing the left  lacrimal gland. 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 Pt follows with Dr. Moore at [facility] in [place].   - 2/23 f/u with Dr. Moore, pt referred to oculoplastics and is having biopsy of lacrimal gland with Dr. David on 2/23/23    #b/l parotid gland nodules  - 1/23 brain MRI:  A few indeterminate partially visualized heterogeneous enhancing nodules measuring up to approximately 1.5 cm in the bilateral parotid glands.  Consider soft tissue neck CT with contrast for further evaluation.  - 1/23 CT soft tissue neck: Soft tissue nodule in the superficial left parotid gland measuring 1.2 cm. Additional 0.8 cm nodule in the deep portion of the left parotid gland. Soft tissue nodule in the superficial right parotid gland measuring 0.8 cm. These may represent benign or malignant parotid lesions. Multiplicity of lesions raises concern for Warthin tumors  - 1/23/23 case reviewed by Dr. Wilmar Morales  - 1/27/23 note from Dr. Wilmar Morales- \"Based on this diagnosis, I think the 2 nodules in her parotid gland are likely atypical lymph nodes containing lymphoma.  She will probably require some staging imaging, such as PET scan and we can take a look at that as well.  I would not recommend biopsy at this time of the parotid lesions given their appearance and the recent diagnosis of low-grade follicular lymphoma.\"    #right thyroid nodule  - 1/23 CT soft tissue neck: Heterogeneous right thyroid gland nodule measuring up to 1.6 cm  - 1/23 thyroid US: 1.7 cm TI-RADS category 4 nodule mid right thyroid lobe  - 2/3/23- Right middle lobe thyroid nodule FNA: benign    #soft tissue prominence " "around great vessels of neck  - 1/23 CT soft tissue neck- \"This is atypical for atherosclerotic disease and vasculopathy/vasculitis such as giant cell arteritis should be considered.\"  - pt is already established with neurology- can follow up with neurology and PCP for further evaluation    #History of LEFT breast IDC ER positive, KS positive, HER2 positive on FISH  -Diagnosed at age 49  - 3/2000 left breast lumpectomy and left axillary node dissection (IDC, grade 3, 1.45 cm incompletely excised tumor, DCIS, positive margins with infiltrating carcinoma, 1 of 17 lymph nodes positive (0.5 cm metastatic focus), ER positive, KS positive, HER2 positive).    -4/2020 left breast simple mastectomy with no residual carcinoma.   -S/p ACx4, taxol x4, no anti-her2 treatment, no RT.   -S/p 5 years adjuvant endocrine therapy (tamoxifen and anastrozole).  -Delayed left breast reconstruction and right breast augmentation.     RTC 3 months for f/u with me     Arlene Zaidi DO  Hematology/Oncology  [facility]    Future Appointments   Date Time Provider Department Center   1/13/2023 10:15 AM Arlene Zaidi DO [facility] [place]   1/19/2023  9:00 AM Janett Noland APRN CNP [facility]   1/20/2023  9:30 AM U2A ROOM 10 [facility]   1/20/2023 11:00 AM [address]   2/22/2023  4:00 PM Mikki Witt MD [facility]   3/23/2023  1:00 PM Santi Weems MD [facility]   7/10/2023  9:00 AM Dusty Robin MD [facility]          Again, thank you for allowing me to participate in the care of your patient.        Sincerely,        ARLENE ZAIDI DO    "

## 2023-02-17 LAB
B2 MICROGLOB TUMOR MARKER SER-MCNC: 1.7 MG/L
PATH REPORT.COMMENTS IMP SPEC: ABNORMAL
PATH REPORT.COMMENTS IMP SPEC: YES
PATH REPORT.FINAL DX SPEC: ABNORMAL
PATH REPORT.MICROSCOPIC SPEC OTHER STN: ABNORMAL
PATH REPORT.RELEVANT HX SPEC: ABNORMAL

## 2023-02-19 LAB — HCV RNA SERPL NAA+PROBE-ACNC: NOT DETECTED IU/ML

## 2023-02-22 ENCOUNTER — PRE VISIT (OUTPATIENT)
Dept: NEUROLOGY | Facility: CLINIC | Age: 73
End: 2023-02-22

## 2023-02-28 LAB
PATH REPORT.ADDENDUM SPEC: ABNORMAL
PATH REPORT.COMMENTS IMP SPEC: ABNORMAL
PATH REPORT.COMMENTS IMP SPEC: YES
PATH REPORT.FINAL DX SPEC: ABNORMAL
PATH REPORT.GROSS SPEC: ABNORMAL
PATH REPORT.MICROSCOPIC SPEC OTHER STN: ABNORMAL
PATH REPORT.RELEVANT HX SPEC: ABNORMAL
PHOTO IMAGE: ABNORMAL

## 2023-03-20 ENCOUNTER — OFFICE VISIT (OUTPATIENT)
Dept: NEUROLOGY | Facility: CLINIC | Age: 73
End: 2023-03-20
Attending: PREVENTIVE MEDICINE
Payer: MEDICARE

## 2023-03-20 ENCOUNTER — TELEPHONE (OUTPATIENT)
Dept: NEUROLOGY | Facility: CLINIC | Age: 73
End: 2023-03-20

## 2023-03-20 VITALS — OXYGEN SATURATION: 99 % | SYSTOLIC BLOOD PRESSURE: 145 MMHG | DIASTOLIC BLOOD PRESSURE: 66 MMHG | HEART RATE: 77 BPM

## 2023-03-20 DIAGNOSIS — G89.29 CHRONIC MIDLINE LOW BACK PAIN WITHOUT SCIATICA: ICD-10-CM

## 2023-03-20 DIAGNOSIS — M54.50 CHRONIC MIDLINE LOW BACK PAIN WITHOUT SCIATICA: ICD-10-CM

## 2023-03-20 DIAGNOSIS — M25.552 LEFT HIP PAIN: ICD-10-CM

## 2023-03-20 DIAGNOSIS — M47.816 LUMBAR SPONDYLOSIS: ICD-10-CM

## 2023-03-20 DIAGNOSIS — R26.9 ABNORMAL GAIT: Primary | ICD-10-CM

## 2023-03-20 PROCEDURE — 99205 OFFICE O/P NEW HI 60 MIN: CPT | Performed by: PSYCHIATRY & NEUROLOGY

## 2023-03-20 RX ORDER — CARBIDOPA AND LEVODOPA 25; 100 MG/1; MG/1
2.5 TABLET ORAL 3 TIMES DAILY
Qty: 675 TABLET | Refills: 3 | Status: SHIPPED | OUTPATIENT
Start: 2023-03-20 | End: 2023-06-02

## 2023-03-20 ASSESSMENT — UNIFIED PARKINSONS DISEASE RATING SCALE (UPDRS)
POSTURE: (2) MILD: DEFINITE FLEXION, SCOLIOSIS OR LEANING TO ONE SIDE, BUT CAN CORRECT POSTURE TO NORMAL WHEN ASKED.
RIGIDITY_NECK: (0) NORMAL: NO RIGIDITY.
SPONTANEITY_OF_MOVEMENT: (0) NORMAL: NO PROBLEMS.
ARISING_CHAIR: (2) MILD: PUSHES SELF UP FROM ARMS OF CHAIR WITHOUT DIFFICULTY.
CONSTANCY_TREMOR_ATREST: (0) NORMAL: NO TREMOR.
PARKINSONS_MEDS: OFF
LEG_AGILITY_LEFT: (2) MILD: ANY OF THE FOLLOWING: A) 3 TO 5 INTERRUPTIONS DURING TAPPING  B) MILD SLOWING  C) THE AMPLITUDE DECREMENTS MIDWAY IN THE 10-MOVEMENT SEQUENCE.
AMPLITUDE_LIP_JAW: (0) NORMAL: NO TREMOR.
TOTAL_SCORE_LEFT: 7
FINGER_TAPPING_RIGHT: (1) SLIGHT: ANY OF THE FOLLOWING: A) THE REGULAR RHYTHM IS BROKEN WITH ONE WITH ONE OR TWO INTERRUPTIONS OR HESITATIONS OF THE MOVEMENT  B) SLIGHT SLOWING  C) THE AMPLITUDE DECREMENTS NEAR THE END OF THE 10 MOVEMENTS.
PRONATION_SUPINATION_RIGHT: (1) SLIGHT: ANY OF THE FOLLOWING: A) THE REGULAR RHYTHM IS BROKEN WITH ONE WITH ONE OR TWO INTERRUPTIONS OR HESITATIONS OF THE MOVEMENT  B) SLIGHT SLOWING  C) THE AMPLITUDE DECREMENTS NEAR THE END OF THE 10 MOVEMENTS.
FINGER_TAPPING_LEFT: (1) SLIGHT: ANY OF THE FOLLOWING: A) THE REGULAR RHYTHM IS BROKEN WITH ONE WITH ONE OR TWO INTERRUPTIONS OR HESITATIONS OF THE MOVEMENT  B) SLIGHT SLOWING  C) THE AMPLITUDE DECREMENTS NEAR THE END OF THE 10 MOVEMENTS.
TOTAL_SCORE: 26
HANDMOVEMENTS_LEFT: (1) SLIGHT: ANY OF THE FOLLOWING: A) THE REGULAR RHYTHM IS BROKEN WITH ONE WITH ONE OR TWO INTERRUPTIONS OR HESITATIONS OF THE MOVEMENT  B) SLIGHT SLOWING  C) THE AMPLITUDE DECREMENTS NEAR THE END OF THE 10 MOVEMENTS.
FREEZING_GAIT: (0) NORMAL: NO FREEZING.
AMPLITUDE_LLE: (0) NORMAL: NO TREMOR.
LEG_AGILITY_RIGHT: (2) MILD: ANY OF THE FOLLOWING: A) 3 TO 5 INTERRUPTIONS DURING TAPPING  B) MILD SLOWING  C) THE AMPLITUDE DECREMENTS MIDWAY IN THE 10-MOVEMENT SEQUENCE.
POSTURAL_STABILITY: (4) SEVERE: VERY UNSTABLE, TENDS TO LOSE BALANCE SPONTANEOUSLY OR WITH JUST A GENTLE PULL ON THE SHOULDERS.
PRONATION_SUPINATION_LEFT: (1) SLIGHT: ANY OF THE FOLLOWING: A) THE REGULAR RHYTHM IS BROKEN WITH ONE WITH ONE OR TWO INTERRUPTIONS OR HESITATIONS OF THE MOVEMENT  B) SLIGHT SLOWING  C) THE AMPLITUDE DECREMENTS NEAR THE END OF THE 10 MOVEMENTS.
MOVEMENTS_INTERFERE_WITH_RATINGS: NO
AMPLITUDE_LUE: (0) NORMAL: NO TREMOR.
AXIAL_SCORE: 11
SPEECH: (0) NORMAL: NO SPEECH PROBLEMS.
RIGIDITY_RLE: (0) NORMAL: NO RIGIDITY.
RIGIDITY_RUE: (0) NORMAL: NO RIGIDITY.
HANDMOVEMENTS_RIGHT: (1) SLIGHT: ANY OF THE FOLLOWING: A) THE REGULAR RHYTHM IS BROKEN WITH ONE WITH ONE OR TWO INTERRUPTIONS OR HESITATIONS OF THE MOVEMENT  B) SLIGHT SLOWING  C) THE AMPLITUDE DECREMENTS NEAR THE END OF THE 10 MOVEMENTS.
RIGIDITY_LLE: (0) NORMAL: NO RIGIDITY.
AMPLITUDE_RLE: (0) NORMAL: NO TREMOR.
TOETAPPING_LEFT: (2) MILD: ANY OF THE FOLLOWING: A) 3 TO 5 INTERRUPTIONS DURING TAPPING  B) MILD SLOWING  C) THE AMPLITUDE DECREMENTS MIDWAY IN THE 10-MOVEMENT SEQUENCE.
FACIAL_EXPRESSION: (0) NORMAL: NORMAL FACIAL EXPRESSION.
AMPLITUDE_RUE: (0) NORMAL: NO TREMOR.
TOETAPPING_RIGHT: (3) MODERATE: ANY OF THE FOLLOWING: A) MORE THAN 5 INTERRUPTIONS OR AT LEAST ONE LONGER ARREST (FREEZE) IN ONGOING MOVEMENT  B) MODERATE SLOWING  C) THE AMPLITUDE DECREMENTS STARTING AFTER THE FIRST MOVEMENT.
GAIT: (3) MODERATE: REQUIRES AN ASSISTANCE DEVICE FOR SAFE WALKING (WALKING STICK, WALKER) BUT NOT A PERSON.
TOTAL_SCORE: 8
RIGIDITY_LUE: (0) NORMAL: NO RIGIDITY.
DYSKINESIAS_PRESENT: NO

## 2023-03-20 ASSESSMENT — PAIN SCALES - GENERAL: PAINLEVEL: SEVERE PAIN (7)

## 2023-03-20 NOTE — PATIENT INSTRUCTIONS
Dear Virginia,    After the visit today we decided that we cannot rule out completely a little Parkinson's underlying in the way you walk, even though I suspect this is mostly related to orthopedic/arthritic changes, we can try and see if this helps or not.    We are prescribing a medication called carbidopa/levodopa (25/100mg). Let's try the schedule below:    Week 1: take 1/2 tablet by mouth three times a day (before breakfast, lunch and dinneer)  Week 2. Increase to 1 tablet three times a day  Week 3. Increase to 1.5 tablets three times a day  Week 4. Increase to 2 tablets three times a day  Week 5. Increase to 2.5 tablets three times a day    If you notice a remarkable improvement in your walking along the increase stages, you don't have to go any higher. Along the same lines, if you have a specific side effect as we discussed in clinic, don't go any higher,but rather reduce to the least amount that you tolerated well and stay there a little longer and contact me.    If there is no benefit, we can work on that schedule backwards until you are completely off of it, but communicate with us first for instructions.    We are also sending a referral for home rehab PT and OT. PT will work on balance and walking. OT will do a home safety assessment.

## 2023-03-20 NOTE — PROGRESS NOTES
Department of Neurology  Movement Disorders Division   New Patient Visit    Patient: Virginia Byers   MRN: [id number]   : 1950   Date of Visit: 3/20/2023  PCP: Chuyita Zaidi MD   Referring provider: Jeffrey    CC:  Pain in her legs and gait problems    HPI:  Ms. Byers is a 72 year old R-handed female with history significant for HLD who presents to movement disorder clinic for pain. She is accompanied by her friend Rasta who assists with collateral history.  She began experiencing some difficulties walking about 2 years with some pain and difficulties moving her legs, associated with some shuffling. Perhaps R leg is a little worse than L. She reports difficulties picking her legs up. She also endorses some difficulties turning around or bending over. No falls, but some close calls. About a year ago, due to instability of walking, leading to having a walker.   She also feels generalized slowness and stiffness. She denies tremors. She has some difficulties with ADLs, pertaining ability to bend over, so things like putting on pants, putting shoes and socks. Also getting up from a sitting position seems to be more difficult, such as lifting up from the toilet, getting out of bed, or getting in and out of the car.  No changes in her writing/signature. No difficulties with using utensils. She denies speech or swallowing changes.   She denies any cognitive changes. She no longer drives because of the difficulties getting out of the car, when she needs help. She denies any mood changes. She denies hyposmia, she denies orthostasis, she denies constipation or bladder problems. She denies any instances now or in the past concerning for RBD.  Due to these changes she underwent a workup with her local doctor. She had a biopsy on the R groin and they found to have the diagnosis of follicular lymphoma, which they are watching. She has had biopsies of her thyroid that that is reportedly ok.  She denies chronic exposures  to heavy chemical. She denies chronic exposures to dopamine blocking agents. She has a sister with RA, but nobody with neurological diseases that she knows.  Due to the concerns above, she is coming her for opinion on the case.      ROS:  All others negative except as listed above. Pertinent movement disorders-specific ROS listed above.    Past Medical History:   Diagnosis Date     Breast cancer (H) 2000    Lt breast     Glaucoma     bilateral     Hyperlipidemia      Hypertension      Macular degeneration      Osteoarthritis      Osteoporosis         Past Surgical History:   Procedure Laterality Date     C MASTECTOMY,SIMPLE  3/2000    left     COLONOSCOPY  2006    Q 10 years     COLONOSCOPY N/A 10/4/2021    Procedure: COLONOSCOPY;  Surgeon: Guru Lyla Ruby MD;  Location: [facility] OR     ESOPHAGOSCOPY, GASTROSCOPY, DUODENOSCOPY (EGD), COMBINED N/A 10/4/2021    Procedure: ENDOSCOPIC ULTRASOUND, ESOPHAGOSCOPY / UPPER GASTROINTESTINAL TRACT (GI), Esophagogastroduodenoscopy,  Fine Needle Biopsy of liver;  Surgeon: Guru Lyla Ruby MD;  Location: [facility] OR     IR LYMPH NODE BIOPSY  1/20/2023     SURGICAL HISTORY OF -  Left 8/2015    tissue expander placed in left breast area     SURGICAL HISTORY OF -  Left 2/2016    left breast implant and right mammoplasty          Current Outpatient Medications:      dorzolamide-timolol (COSOPT) 2-0.5 % ophthalmic solution, Place 1 drop into both eyes 2 times daily, Disp: , Rfl:      latanoprost (XALATAN) 0.005 % ophthalmic solution, Place 1 drop into both eyes daily , Disp: , Rfl:      losartan (COZAAR) 100 MG tablet, TAKE 1 TABLET BY MOUTH EVERY DAY, Disp: 90 tablet, Rfl: 0     rosuvastatin (CRESTOR) 10 MG tablet, TAKE 1 TABLET (10 MG) BY MOUTH DAILY. (Patient taking differently: Take 10 mg by mouth every 7 days), Disp: 90 tablet, Rfl: 3     Allergies   Allergen Reactions     Compazine      Mental confusion     Hydrochlorothiazide      Dryness mouth      Prochlorperazine Visual Disturbance     Simvastatin Other (See Comments)     Muscle aches        Family History   Problem Relation Age of Onset     Cancer Mother         bone cancer     Other Cancer Mother      Heart Disease Father      Coronary Artery Disease Father      Diabetes Father      Arthritis Sister      Breast Cancer Sister         age 75     Diabetes Maternal Grandmother      Diabetes Paternal Grandmother         Social History:  She  reports that she quit smoking about 24 years ago. Her smoking use included cigarettes. She has a 17.00 pack-year smoking history. She has never used smokeless tobacco. She reports that she does not currently use alcohol after a past usage of about 4.0 standard drinks per week. She reports that she does not use drugs.     PHYSICAL EXAM:  BP (!) 145/66   Pulse 77   SpO2 99%      Gen: no acute distress, respirations appeared nonlabored. Attention paid to swollen area right above the L eyebrow (consistent with area of enlarged lacrimal gland on MRI brain)    NEURO:  MS:  Alert, oriented, appropriate    CN:  PERRLA, EOMI, face symmetric, normal strength and sensation, tongue and palate are midline, SCM 5/5 throughout    Motor:    Normal tone, no fasciculations, strength is 5/5 throughout    Sensation:  Preserved to all modalities in all extremities    Coordination:  Normal finger-nose    Reflexes: 2+ throughout, downgoing toes bilaterally     Gait:  Gait is very unstable, with a multifactorial appearance, she bears a good amount of weight on her upper extremities by holding the walker otherwise she feels her legs are going to give out on her. She has an antalgic component to her gait as well    UPDRS Motor Scale 3/20/2023   Time:  9:18 AM   Medication Off   R Brain DBS: None   L Brain DBS: None   Dyskinesia (LID) No   Did LID interfere No   Speech 0   Facial Expression 0   Rigidity Neck 0   Rigidity RUE 0   Rigidity LUE 0   Rigidity RLE 0   Rigidity LLE 0   Finger Taps R 1    Finger Taps L 1   Hand Mvt R 1   Hand Mvt L 1   Pron-/Supinate R 1   Pron-/Supinate L 1   Toe Tap R 3   Toe Tap L 2   Leg Agility R 2   Leg Agility L 2   Arise From Chair 2   Gait 3   Gait Freezing 0   Postural Stability 4   Posture 2   Global Spont Mvt 0   Postural Tremor RUE 0   Postural Tremor LUE 0   Kinetic Tremor RUE 0   Kinetic Tremor LUE 0   Rest Tremor RUE 0   Rest Tremor LUE 0   Rest Tremor RLE 0   Rest Tremor LLE 0   Rest Tremor Lip/Jaw 0   Rest Tremor Constancy 0   Total Right 8   Total Left 7   Axial Total 11   Total 26     Of note her upper extremities findings on UPDRS are masked by some degree of arthritis. Her lower extremities findings are masked by pain limiting ROM.    DATA REVIEWED:  Reviewed her recent brain MRI, she has some scattered white matter disease and mild atrophy, but no focal findings or atrophy patterns to suggest an underlying neurological cause of her gait.    ASSESSMENT:  73 yo F with a multifactorial gait disorder, with multiple orthopedic issues. Exam doesn't show clear bradykinesia, resting tremor or rigidity, but rather a good amount of arthritis, also contributing to her gait. There is some gait instability. Although I cannot appreciate clear convincing signs of parkinsonism, an underlying component of that to her gait cannot be completely excluded given the amount of other overlapping aspects of her gait which limits the examination today.    PLAN:  - Reviewed impression and plan  - Discussed that even though I cannot safely rule out some underlying parkinsonism on her multifactorial gait problem, I think it is reasonable to do a sinemet challenge. Discussed titration schedule and potential side effects  - Home health PT and OT for home safety assessment and rehab plan  - Continue workup for other underlying issues per her PCP  - Plan to regroup for another check in about 4 months, sooner if needed, encouraged to call if questions, concerns, or changes.     There are no  Patient Instructions on file for this visit.      Hosea Gupta MD (Leo) (he/him/his)   of Neurology  [facility]  Department of Neurology      Thank you for referring Virginia Byers to our [facility] Movement Disorders Program, we appreciate the opportunity of being your partner in healthcare. Please feel free to reach out to us at any point if any questions or concerns about this visit.    Attending attestation: Today I spent a total 80 minutes on this case, with greater than 50% of the time spent in face-to-face, examining, obtaining history, providing education and coordination of care. Additional time was spent in chart review, ancillary data, and documentation as delineated above.

## 2023-03-20 NOTE — TELEPHONE ENCOUNTER
From: Nila Resendiz   Sent: 3/20/2023  10:28 AM CDT   To: Meg Benz MA, Hosea Carlisle MD   Subject: SKILLED NURSING ORDER NEEDED                     We received order for PT/OT, however we are needing a order for SKILLED NURSING eval and treat for start of care. I can receive v/o here in [facility]. Thank you     Nila Resendiz Lvn   Clinical    [employer]   [phone] ext [phone]     Left message for Nila to call me back. Writer will be giving verbal orders for skilled nursing to eval and treat for start of care. This note can serve as verbal order.

## 2023-03-20 NOTE — NURSING NOTE
"Virginia Byers is a 72 year old female who presents for:  Chief Complaint   Patient presents with     Tremors     Scheduled per patient/ referred by Sami Goldstein/Movement Disorders/Tremor TEST        Initial Vitals:  BP (!) 145/66   Pulse 77   SpO2 99%  Estimated body mass index is 25.67 kg/m  (pended) as calculated from the following:    Height as of 1/20/23: (P) 1.6 m (5' 3\").    Weight as of 2/16/23: 65.7 kg (144 lb 14.4 oz).. There is no height or weight on file to calculate BSA. BP completed using cuff size: regular  Severe Pain (7)    Nursing Comments:     Meg Benz MA    "

## 2023-03-20 NOTE — LETTER
3/20/2023         RE: Virginia Byers  [address] [address] [facility] [address]        Dear Colleague,    Thank you for referring your patient, Virginia Byers, to the [facility] NEUROLOG[facility]. Please see a copy of my visit note below.    Department of Neurology  Movement Disorders Division   New Patient Visit    Patient: Virginia Byers   MRN: [id number]   : 1950   Date of Visit: 3/20/2023  PCP: Chuyita Zaidi MD   Referring provider: Jeffrey    CC:  Pain in her legs and gait problems    HPI:  Ms. Byers is a 72 year old R-handed female with history significant for HLD who presents to movement disorder clinic for pain. She is accompanied by her friend Rasta who assists with collateral history.  She began experiencing some difficulties walking about 2 years with some pain and difficulties moving her legs, associated with some shuffling. Perhaps R leg is a little worse than L. She reports difficulties picking her legs up. She also endorses some difficulties turning around or bending over. No falls, but some close calls. About a year ago, due to instability of walking, leading to having a walker.   She also feels generalized slowness and stiffness. She denies tremors. She has some difficulties with ADLs, pertaining ability to bend over, so things like putting on pants, putting shoes and socks. Also getting up from a sitting position seems to be more difficult, such as lifting up from the toilet, getting out of bed, or getting in and out of the car.  No changes in her writing/signature. No difficulties with using utensils. She denies speech or swallowing changes.   She denies any cognitive changes. She no longer drives because of the difficulties getting out of the car, when she needs help. She denies any mood changes. She denies hyposmia, she denies orthostasis, she denies constipation or bladder problems. She denies any instances now or in the past concerning for RBD.  Due to these  changes she underwent a workup with her local doctor. She had a biopsy on the R groin and they found to have the diagnosis of follicular lymphoma, which they are watching. She has had biopsies of her thyroid that that is reportedly ok.  She denies chronic exposures to heavy chemical. She denies chronic exposures to dopamine blocking agents. She has a sister with RA, but nobody with neurological diseases that she knows.  Due to the concerns above, she is coming her for opinion on the case.      ROS:  All others negative except as listed above. Pertinent movement disorders-specific ROS listed above.    Past Medical History:   Diagnosis Date     Breast cancer (H) 2000    Lt breast     Glaucoma     bilateral     Hyperlipidemia      Hypertension      Macular degeneration      Osteoarthritis      Osteoporosis         Past Surgical History:   Procedure Laterality Date     C MASTECTOMY,SIMPLE  3/2000    left     COLONOSCOPY  2006    Q 10 years     COLONOSCOPY N/A 10/4/2021    Procedure: COLONOSCOPY;  Surgeon: Guru Lyla Ruby MD;  Location: [facility] OR     ESOPHAGOSCOPY, GASTROSCOPY, DUODENOSCOPY (EGD), COMBINED N/A 10/4/2021    Procedure: ENDOSCOPIC ULTRASOUND, ESOPHAGOSCOPY / UPPER GASTROINTESTINAL TRACT (GI), Esophagogastroduodenoscopy,  Fine Needle Biopsy of liver;  Surgeon: Guru Lyla Ruby MD;  Location: [facility] OR     IR LYMPH NODE BIOPSY  1/20/2023     SURGICAL HISTORY OF -  Left 8/2015    tissue expander placed in left breast area     SURGICAL HISTORY OF -  Left 2/2016    left breast implant and right mammoplasty          Current Outpatient Medications:      dorzolamide-timolol (COSOPT) 2-0.5 % ophthalmic solution, Place 1 drop into both eyes 2 times daily, Disp: , Rfl:      latanoprost (XALATAN) 0.005 % ophthalmic solution, Place 1 drop into both eyes daily , Disp: , Rfl:      losartan (COZAAR) 100 MG tablet, TAKE 1 TABLET BY MOUTH EVERY DAY, Disp: 90 tablet, Rfl: 0     rosuvastatin  (CRESTOR) 10 MG tablet, TAKE 1 TABLET (10 MG) BY MOUTH DAILY. (Patient taking differently: Take 10 mg by mouth every 7 days), Disp: 90 tablet, Rfl: 3     Allergies   Allergen Reactions     Compazine      Mental confusion     Hydrochlorothiazide      Dryness mouth     Prochlorperazine Visual Disturbance     Simvastatin Other (See Comments)     Muscle aches        Family History   Problem Relation Age of Onset     Cancer Mother         bone cancer     Other Cancer Mother      Heart Disease Father      Coronary Artery Disease Father      Diabetes Father      Arthritis Sister      Breast Cancer Sister         age 75     Diabetes Maternal Grandmother      Diabetes Paternal Grandmother         Social History:  She  reports that she quit smoking about 24 years ago. Her smoking use included cigarettes. She has a 17.00 pack-year smoking history. She has never used smokeless tobacco. She reports that she does not currently use alcohol after a past usage of about 4.0 standard drinks per week. She reports that she does not use drugs.     PHYSICAL EXAM:  BP (!) 145/66   Pulse 77   SpO2 99%      Gen: no acute distress, respirations appeared nonlabored. Attention paid to swollen area right above the L eyebrow (consistent with area of enlarged lacrimal gland on MRI brain)    NEURO:  MS:  Alert, oriented, appropriate    CN:  PERRLA, EOMI, face symmetric, normal strength and sensation, tongue and palate are midline, SCM 5/5 throughout    Motor:    Normal tone, no fasciculations, strength is 5/5 throughout    Sensation:  Preserved to all modalities in all extremities    Coordination:  Normal finger-nose    Reflexes: 2+ throughout, downgoing toes bilaterally     Gait:  Gait is very unstable, with a multifactorial appearance, she bears a good amount of weight on her upper extremities by holding the walker otherwise she feels her legs are going to give out on her. She has an antalgic component to her gait as well    UPDRS Motor Scale  3/20/2023   Time:  9:18 AM   Medication Off   R Brain DBS: None   L Brain DBS: None   Dyskinesia (LID) No   Did LID interfere No   Speech 0   Facial Expression 0   Rigidity Neck 0   Rigidity RUE 0   Rigidity LUE 0   Rigidity RLE 0   Rigidity LLE 0   Finger Taps R 1   Finger Taps L 1   Hand Mvt R 1   Hand Mvt L 1   Pron-/Supinate R 1   Pron-/Supinate L 1   Toe Tap R 3   Toe Tap L 2   Leg Agility R 2   Leg Agility L 2   Arise From Chair 2   Gait 3   Gait Freezing 0   Postural Stability 4   Posture 2   Global Spont Mvt 0   Postural Tremor RUE 0   Postural Tremor LUE 0   Kinetic Tremor RUE 0   Kinetic Tremor LUE 0   Rest Tremor RUE 0   Rest Tremor LUE 0   Rest Tremor RLE 0   Rest Tremor LLE 0   Rest Tremor Lip/Jaw 0   Rest Tremor Constancy 0   Total Right 8   Total Left 7   Axial Total 11   Total 26     Of note her upper extremities findings on UPDRS are masked by some degree of arthritis. Her lower extremities findings are masked by pain limiting ROM.    DATA REVIEWED:  Reviewed her recent brain MRI, she has some scattered white matter disease and mild atrophy, but no focal findings or atrophy patterns to suggest an underlying neurological cause of her gait.    ASSESSMENT:  73 yo F with a multifactorial gait disorder, with multiple orthopedic issues. Exam doesn't show clear bradykinesia, resting tremor or rigidity, but rather a good amount of arthritis, also contributing to her gait. There is some gait instability. Although I cannot appreciate clear convincing signs of parkinsonism, an underlying component of that to her gait cannot be completely excluded given the amount of other overlapping aspects of her gait which limits the examination today.    PLAN:  - Reviewed impression and plan  - Discussed that even though I cannot safely rule out some underlying parkinsonism on her multifactorial gait problem, I think it is reasonable to do a sinemet challenge. Discussed titration schedule and potential side effects  - Home  health PT and OT for home safety assessment and rehab plan  - Continue workup for other underlying issues per her PCP  - Plan to regroup for another check in about 4 months, sooner if needed, encouraged to call if questions, concerns, or changes.     There are no Patient Instructions on file for this visit.      Hosea Gupta MD (Leo) (he/him/his)   of Neurology  [facility]  Department of Neurology      Thank you for referring Virginia Byers to our [facility] Movement Disorders Program, we appreciate the opportunity of being your partner in healthcare. Please feel free to reach out to us at any point if any questions or concerns about this visit.    Attending attestation: Today I spent a total 80 minutes on this case, with greater than 50% of the time spent in face-to-face, examining, obtaining history, providing education and coordination of care. Additional time was spent in chart review, ancillary data, and documentation as delineated above.      Again, thank you for allowing me to participate in the care of your patient.        Sincerely,        Hosea Carlisle MD

## 2023-03-21 ENCOUNTER — TELEPHONE (OUTPATIENT)
Dept: FAMILY MEDICINE | Facility: CLINIC | Age: 73
End: 2023-03-21
Payer: MEDICARE

## 2023-03-21 NOTE — TELEPHONE ENCOUNTER
I approve of requested home care orders.    Chuyita Zaidi MD     Pt calls stating she had an  about 1 month ago.  For past few days has had abdominal pain so bad it brings her to the floor and the strong pain can last for about 30 minutes before she can get up.  It is getting more severe in nature.  Appt scheduled with Dr Kaur for tomorrow 17 at 1:30

## 2023-03-21 NOTE — TELEPHONE ENCOUNTER
Routing to PCP to review- initial HC orders signed by another specialist.    More GRIDER calling from [facility]-  [phone]  The Home Care/Assisted Living/Nursing Facility is calling regarding an established patient.  Has the patient seen Home Care in the past or is currently residing in Assisted Living or Nursing Facility? No.     More GRIDER calling from [facility] requesting the following orders that are NOT within the Home Care, Assisted Living or Nursing Home Eval and Treatment standing order and must be ordered by a Licensed Practitioner.    Preferred Call Back Number:  [phone]    Skilled Nursing One time a week for 2 weeks 1 PRN  PT eval and treat    Routing to Licensed Practitioner (Provider) to review request and provide approval or recommendation.    Writer has verified Requestor will send fax to have orders signed.      CHEO Mckeon    Triage Nurse  [facility]  Appointment line: [phone]  [facility], 24 hour nurse line, available by calling clinic at [phone] and following prompts.

## 2023-03-23 ENCOUNTER — TELEPHONE (OUTPATIENT)
Dept: FAMILY MEDICINE | Facility: CLINIC | Age: 73
End: 2023-03-23

## 2023-03-23 NOTE — TELEPHONE ENCOUNTER
The Home Care/Assisted Living/Nursing Facility is calling regarding an established patient.  Has the patient seen Home Care in the past or is currently residing in Assisted Living or Nursing Facility? Yes.     Pattie calling from [facility] requesting the following orders that are within the Home Care, Assisted Living or Nursing Home Eval and Treatment standing order and can be signed as standing order signature required by RN.    Preferred Call Back Number: [phone]    PT 2 x 1 week  1 x 3 weeks    Any additional Orders:  Are there any orders requested, not stated above, that are outside of the standing order and must be routed to a licensed practitioner for approval?    No    Writer has verified Requestor will send fax to have orders signed.    Elijah Dillon RN  [facility]

## 2023-03-27 ENCOUNTER — OFFICE VISIT (OUTPATIENT)
Dept: FAMILY MEDICINE | Facility: CLINIC | Age: 73
End: 2023-03-27
Payer: MEDICARE

## 2023-03-27 VITALS
BODY MASS INDEX: 25.8 KG/M2 | SYSTOLIC BLOOD PRESSURE: 134 MMHG | HEART RATE: 77 BPM | OXYGEN SATURATION: 98 % | DIASTOLIC BLOOD PRESSURE: 78 MMHG | TEMPERATURE: 97.5 F | HEIGHT: 63 IN | RESPIRATION RATE: 16 BRPM | WEIGHT: 145.6 LBS

## 2023-03-27 DIAGNOSIS — M25.50 ARTHRALGIA, UNSPECIFIED JOINT: ICD-10-CM

## 2023-03-27 DIAGNOSIS — M81.0 OSTEOPOROSIS, UNSPECIFIED OSTEOPOROSIS TYPE, UNSPECIFIED PATHOLOGICAL FRACTURE PRESENCE: ICD-10-CM

## 2023-03-27 DIAGNOSIS — R70.0 ELEVATED SED RATE: ICD-10-CM

## 2023-03-27 DIAGNOSIS — Z01.818 PREOP GENERAL PHYSICAL EXAM: Primary | ICD-10-CM

## 2023-03-27 DIAGNOSIS — E78.5 HYPERLIPIDEMIA, UNSPECIFIED HYPERLIPIDEMIA TYPE: ICD-10-CM

## 2023-03-27 DIAGNOSIS — M48.061 SPINAL STENOSIS OF LUMBAR REGION, UNSPECIFIED WHETHER NEUROGENIC CLAUDICATION PRESENT: ICD-10-CM

## 2023-03-27 DIAGNOSIS — I10 HYPERTENSION GOAL BP (BLOOD PRESSURE) < 140/90: ICD-10-CM

## 2023-03-27 DIAGNOSIS — Z85.3 PERSONAL HISTORY OF MALIGNANT NEOPLASM OF BREAST: ICD-10-CM

## 2023-03-27 DIAGNOSIS — M16.0 PRIMARY OSTEOARTHRITIS OF BOTH HIPS: ICD-10-CM

## 2023-03-27 DIAGNOSIS — C82.90 FOLLICULAR LYMPHOMA, UNSPECIFIED FOLLICULAR LYMPHOMA TYPE, UNSPECIFIED BODY REGION (H): ICD-10-CM

## 2023-03-27 DIAGNOSIS — M35.3 PMR (POLYMYALGIA RHEUMATICA) (H): ICD-10-CM

## 2023-03-27 DIAGNOSIS — D64.9 ANEMIA, UNSPECIFIED TYPE: ICD-10-CM

## 2023-03-27 LAB
CHOLEST SERPL-MCNC: 191 MG/DL
FASTING STATUS PATIENT QL REPORTED: YES
HDLC SERPL-MCNC: 50 MG/DL
HGB BLD-MCNC: 14.4 G/DL (ref 11.7–15.7)
LDLC SERPL CALC-MCNC: 93 MG/DL
NONHDLC SERPL-MCNC: 141 MG/DL
POTASSIUM BLD-SCNC: 3.9 MMOL/L (ref 3.4–5.3)
TRIGL SERPL-MCNC: 238 MG/DL

## 2023-03-27 PROCEDURE — 85018 HEMOGLOBIN: CPT | Performed by: FAMILY MEDICINE

## 2023-03-27 PROCEDURE — 86200 CCP ANTIBODY: CPT | Performed by: FAMILY MEDICINE

## 2023-03-27 PROCEDURE — 84132 ASSAY OF SERUM POTASSIUM: CPT | Performed by: FAMILY MEDICINE

## 2023-03-27 PROCEDURE — 36415 COLL VENOUS BLD VENIPUNCTURE: CPT | Performed by: FAMILY MEDICINE

## 2023-03-27 PROCEDURE — 99214 OFFICE O/P EST MOD 30 MIN: CPT | Performed by: FAMILY MEDICINE

## 2023-03-27 PROCEDURE — 80061 LIPID PANEL: CPT | Performed by: FAMILY MEDICINE

## 2023-03-27 NOTE — PROGRESS NOTES
[facility]  [address]  [place] [address]  Phone: [phone]  Primary Provider: Chuyita Ahumada  Pre-op Performing Provider: CHUYITA AHUMADA      PREOPERATIVE EVALUATION:  Today's date: 3/27/2023    Virginia Byers is a 72 year old female who presents for a preoperative evaluation.  No flowsheet data found.  Surgical Information:  Surgery/Procedure: Biopsy on left eye   Surgery Location: [facility], [place]   Surgeon: Dr. Brody David   Surgery Date: 4/6/2023  Time of Surgery: 9:00 am   Where patient plans to recover: At home with family  Fax number for surgical facility: [phone]    Assessment & Plan     The proposed surgical procedure is considered LOW risk.    Preop general physical exam    - Hemoglobin; Future  - Hemoglobin    Spinal stenosis of lumbar region, unspecified whether neurogenic claudication present  Has seen Neurosurgery     Rheumatoid arthritis, involving unspecified site, unspecified whether rheumatoid factor present (H)  Has appointment with  Rheumatology     Personal history of malignant neoplasm of breast    Hypertension goal BP (blood pressure) < 140/90  Stable   - Potassium; Future  - Potassium    Anemia, unspecified type      Primary osteoarthritis of both hips  Referral   - Orthopedic  Referral; Future  Pt refuses PT  Advised Tylenol 2 tablest q 6 hours  Hyperlipidemia, unspecified hyperlipidemia type  Pending   - Lipid panel reflex to direct LDL Fasting; Future  - Lipid panel reflex to direct LDL Fasting       Follicular Lymphoma  Pt seeing oncology    Risks and Recommendations:  The patient has the following additional risks and recommendations for perioperative complications:   - No identified additional risk factors other than previously addressed    Medication Instructions:  Patient is to take all scheduled medications on the day of surgery    RECOMMENDATION:  APPROVAL GIVEN to proceed with proposed procedure, without  further diagnostic evaluation.      Subjective     HPI related to upcoming procedure: pt has a Lacrimal gland mass and now scheduled for Biopsy    Preop Questions 3/20/2023   1. Have you ever had a heart attack or stroke? No   2. Have you ever had surgery on your heart or blood vessels, such as a stent placement, a coronary artery bypass, or surgery on an artery in your head, neck, heart, or legs? No   3. Do you have chest pain with activity? No   4. Do you have a history of  heart failure? No   5. Do you currently have a cold, bronchitis or symptoms of other infection? No   6. Do you have a cough, shortness of breath, or wheezing? No   7. Do you or anyone in your family have previous history of blood clots? UNKNOWN -    8. Do you or does anyone in your family have a serious bleeding problem such as prolonged bleeding following surgeries or cuts? UNKNOWN -    9. Have you ever had problems with anemia or been told to take iron pills? UNKNOWN -    10. Have you had any abnormal blood loss such as black, tarry or bloody stools, or abnormal vaginal bleeding? No   11. Have you ever had a blood transfusion? No   12. Are you willing to have a blood transfusion if it is medically needed before, during, or after your surgery? Yes   13. Have you or any of your relatives ever had problems with anesthesia? No   14. Do you have sleep apnea, excessive snoring or daytime drowsiness? No   15. Do you have any artifical heart valves or other implanted medical devices like a pacemaker, defibrillator, or continuous glucose monitor? No   16. Do you have artificial joints? No   17. Are you allergic to latex? No   18. Is there any chance that you may be pregnant? -       Health Care Directive:  Patient does not have a Health Care Directive or Living Will: Patient states has Advance Directive and will bring in a copy to clinic.    Preoperative Review of :   reviewed - no record of controlled substances prescribed.      Status of  "Chronic Conditions:  HYPERLIPIDEMIA - Patient has a long history of significant Hyperlipidemia requiring medication for treatment with recent good control. Patient reports no problems or side effects with the medication.     HYPERTENSION - Patient has longstanding history of HTN , currently denies any symptoms referable to elevated blood pressure. Specifically denies chest pain, palpitations, dyspnea, orthopnea, PND or peripheral edema. Blood pressure readings have been in normal range. Current medication regimen is as listed below. Patient denies any side effects of medication.       Review of Systems  CONSTITUTIONAL: NEGATIVE for fever, chills, change in weight  INTEGUMENTARY/SKIN: NEGATIVE for worrisome rashes, moles or lesions  EYES: NEGATIVE for vision changes or irritation  ENT/MOUTH: NEGATIVE for ear, mouth and throat problems  RESP:cough  CV: NEGATIVE for chest pain, palpitations or peripheral edema  GI: NEGATIVE for nausea, abdominal pain, heartburn, or change in bowel habits  : NEGATIVE for frequency, dysuria, or hematuria  MUSCULOSKELETAL:joint pain  ENDOCRINE: NEGATIVE for temperature intolerance, skin/hair changes  HEME: NEGATIVE for bleeding problems  PSYCHIATRIC: NEGATIVE for changes in mood or affect  Exam  /78 (BP Location: Right arm, Patient Position: Sitting, Cuff Size: Adult Regular)   Pulse 77   Temp 97.5  F (36.4  C) (Temporal)   Resp 16   Ht 1.6 m (5' 3\")   Wt 66 kg (145 lb 9.6 oz)   SpO2 98%   BMI 25.79 kg/m       Exam:  Constitutional: alert and no distress  Head: Normocephalic. No masses, lesions, tenderness or abnormalities  Neck: Neck supple. No adenopathy. Thyroid symmetric, normal size,, Carotids without bruits.  ENT: ENT exam normal, no neck nodes or sinus tenderness  Cardiovascular: . RRR. No murmurs, clicks gallops or rub  Respiratory: negative, negative findings: lungs clear to auscultation  Gastrointestinal: Abdomen soft, non-tender. BS normal. No masses, " organomegaly  : Deferred  Musculoskeletal: extremities normal- no gross deformities noted and gait normal  Skin: no suspicious lesions or rashes  Neurologic: Gait normal. Reflexes normal and symmetric. Sensation grossly WNL.  Psychiatric: mentation appears normal, affect normal/bright and patient appearance--      Patient Active Problem List    Diagnosis Date Noted     Rheumatoid arthritis (H) 01/10/2023     Priority: Medium     Anemia, unspecified type 09/20/2021     Priority: Medium     Dry mouth 09/20/2021     Priority: Medium     Spinal stenosis of lumbar region, unspecified whether neurogenic claudication present 06/02/2021     Priority: Medium     DDD (degenerative disc disease), lumbar 06/02/2021     Priority: Medium     Lumbago 07/02/2019     Priority: Medium     Pain in both lower legs 07/02/2019     Priority: Medium     Hypertension goal BP (blood pressure) < 140/90 04/22/2019     Priority: Medium     Personal history of malignant neoplasm of breast 03/28/2016     Priority: Medium     2000       Family history of diabetes mellitus 03/19/2012     Priority: Medium     Glaucoma 03/19/2012     Priority: Medium     Advanced directives, counseling/discussion 01/23/2012     Priority: Medium     Advance Directive Problem List Overview:   Name Relationship Phone    Primary Health Care Agent Aurelio Espinosa Friend [phone]         Alternative Health Care Agent Sariah Patel Sister [phone]   Received outside advance directive. Notary signature present.  scanned and placed behind media tab.  Please see advance directive for specifics. Advance Care Directive reviewed & complete.  Maria G Alcantara RN 3/21/2012    Patient states has Advance Directive and will bring in a copy to clinic. 1/23/2012          Osteoarthritis 01/23/2012     Priority: Medium     Hyperlipidemia LDL goal <160 03/16/2011     Priority: Medium     S/P mastectomy 03/16/2011     Priority: Medium     Osteoporosis 03/15/2010     Priority:  Medium     Atopic rhinitis 03/15/2010     Priority: Medium     (Problem list name updated by automated process. Provider to review and confirm.)       Past Medical History:   Diagnosis Date     Breast cancer (H) 2000    Lt breast     Glaucoma     bilateral     Hyperlipidemia      Hypertension      Macular degeneration      Osteoarthritis      Osteoporosis      Past Surgical History:   Procedure Laterality Date     C MASTECTOMY,SIMPLE  3/2000    left     COLONOSCOPY  2006    Q 10 years     COLONOSCOPY N/A 10/4/2021    Procedure: COLONOSCOPY;  Surgeon: Guru Lyla Ruby MD;  Location: [facility] OR     ESOPHAGOSCOPY, GASTROSCOPY, DUODENOSCOPY (EGD), COMBINED N/A 10/4/2021    Procedure: ENDOSCOPIC ULTRASOUND, ESOPHAGOSCOPY / UPPER GASTROINTESTINAL TRACT (GI), Esophagogastroduodenoscopy,  Fine Needle Biopsy of liver;  Surgeon: Guru Lyla Ruby MD;  Location: [facility] OR     IR LYMPH NODE BIOPSY  1/20/2023     SURGICAL HISTORY OF -  Left 8/2015    tissue expander placed in left breast area     SURGICAL HISTORY OF -  Left 2/2016    left breast implant and right mammoplasty     Current Outpatient Medications   Medication Sig Dispense Refill     carbidopa-levodopa (SINEMET)  MG tablet Take 2.5 tablets by mouth 3 times daily 675 tablet 3     dorzolamide-timolol (COSOPT) 2-0.5 % ophthalmic solution Place 1 drop into both eyes 2 times daily       latanoprost (XALATAN) 0.005 % ophthalmic solution Place 1 drop into both eyes daily        losartan (COZAAR) 100 MG tablet TAKE 1 TABLET BY MOUTH EVERY DAY 90 tablet 0     rosuvastatin (CRESTOR) 10 MG tablet TAKE 1 TABLET (10 MG) BY MOUTH DAILY. (Patient taking differently: Take 10 mg by mouth every 7 days) 90 tablet 3       Allergies   Allergen Reactions     Compazine      Mental confusion     Hydrochlorothiazide      Dryness mouth     Prochlorperazine Visual Disturbance     Simvastatin Other (See Comments)     Muscle aches        Social History  "    Tobacco Use     Smoking status: Former     Packs/day: 1.00     Years: 17.00     Pack years: 17.00     Types: Cigarettes     Quit date: 3/15/1999     Years since quittin.0     Smokeless tobacco: Never   Substance Use Topics     Alcohol use: Not Currently     Alcohol/week: 4.0 standard drinks     Types: 4 Glasses of wine per week     Comment: occasional     Family History   Problem Relation Age of Onset     Cancer Mother         bone cancer     Other Cancer Mother      Heart Disease Father      Coronary Artery Disease Father      Diabetes Father      Arthritis Sister      Breast Cancer Sister         age 75     Diabetes Maternal Grandmother      Diabetes Paternal Grandmother      History   Drug Use No         Objective     /78 (BP Location: Right arm, Patient Position: Sitting, Cuff Size: Adult Regular)   Pulse 77   Temp 97.5  F (36.4  C) (Temporal)   Resp 16   Ht 1.6 m (5' 3\")   Wt 66 kg (145 lb 9.6 oz)   SpO2 98%   BMI 25.79 kg/m      Physical Exam    GENERAL APPEARANCE: healthy, alert and no distress     EYES: EOMI, PERRL     HENT: ear canals and TM's normal and nose and mouth without ulcers or lesions     NECK: no adenopathy, no asymmetry, masses, or scars and thyroid normal to palpation     RESP: lungs clear to auscultation - no rales, rhonchi or wheezes     CV: regular rates and rhythm, normal S1 S2, no S3 or S4 and no murmur, click or rub     ABDOMEN:  soft, nontender, no HSM or masses and bowel sounds normal     MS: extremities normal- no gross deformities noted, no evidence of inflammation in joints, FROM in all extremities.     SKIN: no suspicious lesions or rashes     NEURO: Normal strength and tone, sensory exam grossly normal, mentation intact and speech normal     PSYCH: mentation appears normal. and affect normal/bright     LYMPHATICS: No cervical adenopathy    Recent Labs   Lab Test 23  0937 22  1254 22  1256   HGB  --  12.9 13.2   PLT  --  422 391   INR 1.09  -- "   --    NA  --  140 137   POTASSIUM  --  4.8 4.2   CR  --  0.43* 0.53        Diagnostics:  Labs pending at this time.  Results will be reviewed when available.   EKG: no change-done on 1-20-23    Revised Cardiac Risk Index (RCRI):  The patient has the following serious cardiovascular risks for perioperative complications:   - No serious cardiac risks = 0 points     RCRI Interpretation: 0 points: Class I (very low risk - 0.4% complication rate)           Signed Electronically by: Chuyita Zaidi MD  Copy of this evaluation report is provided to requesting physician.

## 2023-03-27 NOTE — PROGRESS NOTES
Pt is seeing Oncology and Neurology  Has severe arthritis  Hips  Also  has appointment with Rheumatology  She does not want pain meds  Await all work Up  Has been advised to see me back in 1 months to ensure she has had follow up as discussed   Pt states she raudel louie over   Has had Extensive work up    ? PMR   vs due to her medical condition  Maybe do a Trial of Prednisone and see if this helps  Chuyita Zaidi MD

## 2023-03-28 ENCOUNTER — TELEPHONE (OUTPATIENT)
Dept: FAMILY MEDICINE | Facility: CLINIC | Age: 73
End: 2023-03-28
Payer: MEDICARE

## 2023-03-28 DIAGNOSIS — Z53.9 DIAGNOSIS NOT YET DEFINED: Primary | ICD-10-CM

## 2023-03-28 PROCEDURE — G0180 MD CERTIFICATION HHA PATIENT: HCPCS | Performed by: FAMILY MEDICINE

## 2023-03-28 NOTE — TELEPHONE ENCOUNTER
Reason for Call:  Form, our goal is to have forms completed with 72 hours, however, some forms may require a visit or additional information.    Type of letter, form or note:  Home Health Certification    Who is the form from?: Home care    Where did the form come from: form was faxed in    What clinic location was the form placed at?: [facility]    Where the form was placed: Given to physician    What number is listed as a contact on the form?: [phone]       Call taken on 3/28/2023 at 2:48 PM by Sangeetha Cabrera

## 2023-03-31 ENCOUNTER — LAB (OUTPATIENT)
Dept: LAB | Facility: CLINIC | Age: 73
End: 2023-03-31
Payer: MEDICARE

## 2023-03-31 DIAGNOSIS — Z01.818 PREOP GENERAL PHYSICAL EXAM: ICD-10-CM

## 2023-03-31 DIAGNOSIS — R70.0 ELEVATED SED RATE: ICD-10-CM

## 2023-03-31 DIAGNOSIS — M25.50 ARTHRALGIA, UNSPECIFIED JOINT: ICD-10-CM

## 2023-03-31 LAB
CCP AB SER IA-ACNC: 0.8 U/ML
ERYTHROCYTE [SEDIMENTATION RATE] IN BLOOD BY WESTERGREN METHOD: 49 MM/HR (ref 0–30)

## 2023-03-31 PROCEDURE — 85652 RBC SED RATE AUTOMATED: CPT

## 2023-03-31 PROCEDURE — 36415 COLL VENOUS BLD VENIPUNCTURE: CPT

## 2023-03-31 PROCEDURE — 86039 ANTINUCLEAR ANTIBODIES (ANA): CPT

## 2023-03-31 PROCEDURE — 86431 RHEUMATOID FACTOR QUANT: CPT

## 2023-03-31 PROCEDURE — 86140 C-REACTIVE PROTEIN: CPT

## 2023-03-31 PROCEDURE — 86038 ANTINUCLEAR ANTIBODIES: CPT

## 2023-03-31 PROCEDURE — 86618 LYME DISEASE ANTIBODY: CPT

## 2023-03-31 NOTE — PROGRESS NOTES
ASSESSMENT & PLAN    Virginia was seen today for pain and pain.    Diagnoses and all orders for this visit:    Primary osteoarthritis of both hips  -     Orthopedic  Referral  -     XR Pelvis and Hip Bilateral 2 Views; Future  -     Orthopedic  Referral; Future      End stage hip OA with significant disability requiring use of walker.  We discussed primarily physical therapy, steroid injection, and referral on to ortho surgery.  Offered referral for imaging guided steroid injection, though I'm not sure how much relief she would get, and if doing injection, would preclude her from ability to have surgery for up to 3 months. Will go ahead and refer to ortho surgery for further discussion.  Questions answered. Discussed signs and symptoms that may indicate more serious issues; the patient was instructed to seek appropriate care if noted. Virginia indicates understanding of these issues and agrees with the plan.        See Patient Instructions  Patient Instructions   Discussed nature of degenerative arthrosis of the hip.  Symptom treatment generally includes over-the-counter medications, ice or heat, topical treatments, and rest if needed.  There are potential benefits of rehabilitation, to maintain or improve function at the hip. Keep up with home exercises from previous physical therapy as able.  Discussed injection therapy, specifically steroid injection.  Additionally, referral to orthopedic surgery for further evaluation and discussion of additional treatment options is a consideration, particularly if conservative management has been exhausted.  Given end stage bilateral hip osteoarthritis, will go ahead and refer on to orthopedic surgery next. Will defer injection for now, given plan for referral.  Regarding recent lab studies, there is elevation in the inflammatory markers. For now, plan to keep rheumatology appointment, but will defer other evaluation/treatment to Dr Zaidi.  Follow-up here is open  "ended. Contact clinic if any questions/concerns.    If you have any further questions for your physician or physician s care team you can contact them thru MyChart or by calling  [phone] and use option 3 to leave a voice message.   Messages received during business hours will be returned same day.            DO EVAN Mcpherson [facility]      CC: Chuyita Zaidi    -----  Chief Complaint   Patient presents with     Right Hip - Pain     Left Hip - Pain       SUBJECTIVE  Virginia Byers is a/an 72 year old female who is seen in consultation at the request of  Chuyita Zaidi M.D. for evaluation of bilateral hip pain. Virginia states that she has been to therapy and feels like her hips have been getting worse. She has started doing home exercises a few days ago with Home Therapy. Virginia has a Rheumatologist appt. Scheduled for July.    The patient is seen with their friend.      Onset: Many years(s) ago. Reports insidious onset without acute precipitating event.  Location of Pain: bilateral hip pain Left more than right  Worsened by: walking, stairs, sitting  Better with: NA  Treatments tried: rest/activity avoidance and physical therapy  Associated symptoms: no distal numbness or tingling; denies swelling or warmth    Orthopedic/Surgical history: NO  Social History/Occupation: Retired    **  Generally no hip pain at rest, including currently. Notes pain with standing, walking, pivot while WB, basically any motion.    Pain L > R. Pain groin, into left anterior thigh.  If standing, has difficulty with flexing hip.    **  Chart reviewed. Has seen neurosurgery, neurology related to gait issues.      REVIEW OF SYSTEMS:  Review of Systems      OBJECTIVE:  /78   Ht 1.6 m (5' 3\")   Wt 65.8 kg (145 lb)   BMI 25.69 kg/m     General: alert and in no distress  HEENT: no scleral icterus or conjunctival erythema  Skin: no suspicious lesions or rash. No jaundice.  CV: distal perfusion " intact   Resp: normal respiratory effort without conversational dyspnea   Psych: normal mood and affect  Gait: antalgic and walker        Bilateral hip exam    ROM: actively limited with stiffness, some pain  Audible crepitus with active motion  Pain with passive rotation, IR > ER    Strength: pain with resisted flexion L > R    Sensation:      grossly intact in hip and thigh    Special Tests:      positive (+) FADIR       Log roll pos        Slump test no radicular symptoms         RADIOLOGY:  I independently ordered, visualized and reviewed these images with the patient  Significant degenerative change bilateral hips.      Recent Results (from the past 24 hour(s))   XR Pelvis and Hip Bilateral 2 Views    Narrative    XR PELVIS AND HIP BILATERAL 2 VIEWS   4/3/2023 2:48 PM     HISTORY: Primary osteoarthritis of both hips  COMPARISON: None.       Impression    IMPRESSION: No acute fracture or dislocation. There is bilateral  advanced hip osteoarthritis. Sclerotic lesions in the sacrum and iliac  bones are better appreciated on the prior CT 12/28/2022. There is  diffuse osseous demineralization. There are degenerative changes in  the lower lumbar spine.    KADEN PRUETT MD         SYSTEM ID:  [id number]         Visualized the MRI study noted below, and reviewed the images and the report with the patient.  Severe bilateral hip degenerative change.          Addendum    Addendum:     Impression:  1. Severe bilateral hip degenerative changes with synovitis and  extensive subchondral cystic changes. Findings may potentially  indicate inflammatory component presence with secondary  osteoarthrosis.  2. Enhancing marrow signal abnormality of the right iliac bone  extending from the superior acetabulum subchondral location proximally  almost to the level of the sacroiliac joint caudal aspect. No  associated fracture line. Underlying marrow infiltrative process such  as from metastasis cannot be excluded especially given  the degree of  T1 hypointensity and history of primary tumor. Other consideration  include stress reaction.  3. Mildly increased left external iliac chain, and the right medial  thigh lymphadenopathy. Metastasis cannot be excluded.  4. Left posterior iliac enhancing lesion, similar in size to prior CT  9/2021 and previously not visible on CT, focal enhancing lesion in the  right greater trochanter laterally. Findings are concerning for  metastasis.  5. Nonspecific T1 hyperintensity in sacral perineural region, possibly  enhancing. In a provided clinical setting, leptomeningeal disease  cannot be entirely excluded. Consider follow up with lumbar spine MR  without with contrast in 3-6 months or lumbar puncture if clinically  indicated.     HARSH MOREL         SYSTEM ID:  [id number]   Addended by Harsh Morel MD on 12/20/2022  2:01 PM     Study Result    Narrative & Impression   MR pelvis with and withoutcontrast 12/20/2022 10:13 AM     Techniques: Multiplanar multisequence imaging of the pelvis was  obtained with and without administration of intravenous contrast using  routing [facility] protocol.     History: Chronic midline low back pain without sciatica; Chronic  midline low back pain without sciatica      Comparison: MRI lumbar spine same date, CT 9/21/2021     Findings:     Low signal-to-noise ratio on the fluid sensitive sequences partially  compromising assessment.     Osseous structures  Osseous structures: Severe bilateral hip degenerative changes with  complete joint space loss, extensive subchondral cystlike changes and  subchondral edema like marrow signal intensities. No fracture     T1 hypointense, T2 hyperintense, enhancing marrow signal abnormality  of the right iliac bone extending from the superior acetabulum  subchondral location proximally almost to the level of the sacroiliac  joint caudal aspect. Degree of T1 hypointensity reaches mildly  hypointense to regional muscle. No associated  fracture line.     Enhancing lesion in the left posterior iliac bone, measuring  approximately 2.1 cm, similar to comparison where previously  associated sclerotic lesion was present. Findings most concerning for  metastatic lesion.     Previously not visible on CT, focal enhancing lesion in the right  greater trochanter laterally (image 43 axial). Nonspecific cystlike  change at the right iliac bone adjacent to some sacroiliac joint.     Degenerative changes of pubic symphysis.     Degenerative changes of visualized lower lumbar spine. Lumbosacral  transitional anatomy.     Internal derangement of joints are not well assessed owing to chosen  field of view.     Joint and Periarticular soft tissue:     Sacroiliac joints and pubic symphysis are congruent.     Joint effusion: Bilateral moderate hip joint effusions with extensive  synovitis.     Bursal effusion: Minimal nonspecific edema over the greater  trochanter. No substantial iliopsoas or trochanteric bursal effusion.     Muscles and tendons  Muscles and tendons: Small left ischial tuberosity bursal fluid.  Severe left hamstring conjoined portion tendinosis. Bilateral adductor  muscle edema, likely related to muscle strain.     The rectus femoris, sartorius, and iliopsoas tendons intact  bilaterally. The hip abductors are intact bilaterally.     Nerves:  The visualized course of the sciatic nerves are unremarkable  bilaterally.     Other Findings:     Persistent, mildly increased left external iliac chain  lymphadenopathy, measuring 1.8 x 3.2, previously 1.5 x 2.7 cm using  similar measurement technique. Presumed 1.7 cm short axis right medial  thigh lymphadenopathy, mildly increased since comparison.     T1 hyperintense presumed perineural cysts at sacrum.                                                                      Impression:  1. Severe bilateral hip degenerative changes with synovitis and  extensive subchondral cystic changes. Findings may  potentially  indicate inflammatory component presence with secondary  osteoarthrosis.  2. Enhancing marrow signal abnormality of the right iliac bone  extending from the superior acetabulum subchondral location proximally  almost to the level of the sacroiliac joint caudal aspect. No  associated fracture line. Underlying marrow infiltrative process such  as from metastasis cannot be excluded especially given the degree of  T1 hypointensity and history of primary tumor. Other consideration  include stress reaction.  3. Mildly increased left external iliac chain, and the right medial  thigh lymphadenopathy. Metastasis cannot be excluded.  4. Left posterior iliac enhancing lesion, similar in size to prior CT  9/2021 and previously not visible on CT, focal enhancing lesion in the  right greater trochanter laterally. Findings are concerning for  metastasis.     I have personally reviewed the examination and initial interpretation  and I agree with the findings.     HARSH MOREL               EXAM: CT CHEST/ABDOMEN/PELVIS W CONTRAST  LOCATION: [facility]  DATE/TIME: 12/28/2022 2:20 PM     INDICATION: Hx of breast cancer.  Now with abdominal adenopathy  COMPARISON: MRI pelvis 12/20/2022 and CT abdomen pelvis 9/21/2021.  TECHNIQUE: CT scan of the chest, abdomen, and pelvis was performed following injection of IV contrast. Multiplanar reformats were obtained. Dose reduction techniques were used.   CONTRAST: 89 ml isovue 370     FINDINGS:   LUNGS AND PLEURA: Calcified right upper lobe granuloma. No focal consolidation, suspicious pulmonary nodule or pleural effusion.     MEDIASTINUM/AXILLAE: Mastectomies with implant reconstructions. No lymphadenopathy. Small hiatal hernia.     CORONARY ARTERY CALCIFICATION: Moderate.     HEPATOBILIARY: Few tiny hepatic cysts, no follow-up needed. No suspicious hepatic mass. No biliary dilatation.     PANCREAS: Normal.     SPLEEN: Normal.     ADRENAL GLANDS:  Normal.     KIDNEYS/BLADDER: Normal.     BOWEL: Diverticulosis of the colon. No acute inflammatory change. No obstruction.      LYMPH NODES: There is an new or increased left common iliac, external iliac and pelvic sidewall lymphadenopathy since prior CT. Distal left common iliac lymph nodes measure up to 1.2 cm on this exam (image 28) compared to 0.7 cm on prior CT. Left pelvic   sidewall lymph node measures 2.1 cm short axis on this exam (image 260) compared to 1.2 cm on prior CT.     VASCULATURE: Diffuse moderate atherosclerosis..     PELVIC ORGANS: Normal.     MUSCULOSKELETAL: Sclerotic lesions in the left sacrum (image 6) and left posterior ilium (image 125) are unchanged from prior CT. Advanced interstitial changes in the hips..                                                                      IMPRESSION:  1.  New/increased pelvic sidewall lymphadenopathy since 9/21/2021 consistent with metastatic disease.  2.  Sclerotic lesions in the left sacrum and left posterior ilium unchanged and may be metastatic.  3.  No evidence of metastatic disease in the chest.  4.  Colonic diverticulosis.             Lab:  Lab on 03/31/2023   Component Date Value Ref Range Status     Erythrocyte Sedimentation Rate 03/31/2023 49 (H)  0 - 30 mm/hr Final     DEVON interpretation 03/31/2023 Positive (A)  Negative Final     DEVON pattern 1 03/31/2023 Speckled   Final     DEVON titer 1 03/31/2023 1:160   Final     Rheumatoid Factor 03/31/2023 <7  <12 IU/mL Final     CRP Inflammation 03/31/2023 14.90 (H)  <5.00 mg/L Final     Lyme Disease Antibodies Total 03/31/2023 0.13  <0.90 Final                 Review of prior external note(s) from - previous evaluation and care as noted  Review of the result(s) of each unique test - imaging  Independent interpretation of a test performed by another physician/other qualified health care professional (not separately reported) - imaging  Ordering of each unique test  45 minutes spent by me on the date of  the encounter doing chart review, history and exam, documentation and further activities per the note

## 2023-04-03 ENCOUNTER — OFFICE VISIT (OUTPATIENT)
Dept: ORTHOPEDICS | Facility: CLINIC | Age: 73
End: 2023-04-03
Attending: FAMILY MEDICINE
Payer: MEDICARE

## 2023-04-03 ENCOUNTER — ANCILLARY PROCEDURE (OUTPATIENT)
Dept: GENERAL RADIOLOGY | Facility: CLINIC | Age: 73
End: 2023-04-03
Attending: PEDIATRICS
Payer: MEDICARE

## 2023-04-03 VITALS
HEIGHT: 63 IN | BODY MASS INDEX: 25.69 KG/M2 | SYSTOLIC BLOOD PRESSURE: 128 MMHG | WEIGHT: 145 LBS | DIASTOLIC BLOOD PRESSURE: 78 MMHG

## 2023-04-03 DIAGNOSIS — M16.0 PRIMARY OSTEOARTHRITIS OF BOTH HIPS: ICD-10-CM

## 2023-04-03 DIAGNOSIS — M16.0 PRIMARY OSTEOARTHRITIS OF BOTH HIPS: Primary | ICD-10-CM

## 2023-04-03 PROBLEM — M06.9 RHEUMATOID ARTHRITIS (H): Status: RESOLVED | Noted: 2023-01-10 | Resolved: 2023-04-03

## 2023-04-03 PROBLEM — M35.3 PMR (POLYMYALGIA RHEUMATICA) (H): Status: ACTIVE | Noted: 2023-04-03

## 2023-04-03 PROBLEM — M25.50 ARTHRALGIA, UNSPECIFIED JOINT: Status: ACTIVE | Noted: 2023-04-03

## 2023-04-03 LAB
ANA PAT SER IF-IMP: ABNORMAL
ANA SER QL IF: POSITIVE
ANA TITR SER IF: ABNORMAL {TITER}
B BURGDOR IGG+IGM SER QL: 0.13
CRP SERPL-MCNC: 14.9 MG/L
RHEUMATOID FACT SER NEPH-ACNC: <7 IU/ML

## 2023-04-03 PROCEDURE — 99204 OFFICE O/P NEW MOD 45 MIN: CPT | Performed by: PEDIATRICS

## 2023-04-03 PROCEDURE — 73522 X-RAY EXAM HIPS BI 3-4 VIEWS: CPT | Mod: TC | Performed by: RADIOLOGY

## 2023-04-03 RX ORDER — PREDNISONE 20 MG/1
20 TABLET ORAL DAILY
Qty: 30 TABLET | Refills: 0 | Status: SHIPPED | OUTPATIENT
Start: 2023-04-03 | End: 2023-06-02

## 2023-04-03 NOTE — LETTER
4/3/2023         RE: Virginia Byers  [address]  [place] [address]        Dear Colleague,    Thank you for referring your patient, Virginia Byers, to the [facility]. Please see a copy of my visit note below.    ASSESSMENT & PLAN    Virginia was seen today for pain and pain.    Diagnoses and all orders for this visit:    Primary osteoarthritis of both hips  -     Orthopedic  Referral  -     XR Pelvis and Hip Bilateral 2 Views; Future  -     Orthopedic  Referral; Future      End stage hip OA with significant disability requiring use of walker.  We discussed primarily physical therapy, steroid injection, and referral on to ortho surgery.  Offered referral for imaging guided steroid injection, though I'm not sure how much relief she would get, and if doing injection, would preclude her from ability to have surgery for up to 3 months. Will go ahead and refer to ortho surgery for further discussion.  Questions answered. Discussed signs and symptoms that may indicate more serious issues; the patient was instructed to seek appropriate care if noted. Virginia indicates understanding of these issues and agrees with the plan.        See Patient Instructions  Patient Instructions   Discussed nature of degenerative arthrosis of the hip.  Symptom treatment generally includes over-the-counter medications, ice or heat, topical treatments, and rest if needed.  There are potential benefits of rehabilitation, to maintain or improve function at the hip. Keep up with home exercises from previous physical therapy as able.  Discussed injection therapy, specifically steroid injection.  Additionally, referral to orthopedic surgery for further evaluation and discussion of additional treatment options is a consideration, particularly if conservative management has been exhausted.  Given end stage bilateral hip osteoarthritis, will go ahead and refer on to orthopedic surgery next.  Will defer injection for now, given plan for referral.  Regarding recent lab studies, there is elevation in the inflammatory markers. For now, plan to keep rheumatology appointment, but will defer other evaluation/treatment to Dr Zaidi.  Follow-up here is open ended. Contact clinic if any questions/concerns.    If you have any further questions for your physician or physician s care team you can contact them thru MyChart or by calling  [phone] and use option 3 to leave a voice message.   Messages received during business hours will be returned same day.            Vlad Duran [facility]      CC: Chuyita Zaidi    -----  Chief Complaint   Patient presents with     Right Hip - Pain     Left Hip - Pain       SUBJECTIVE  Virginia Byers is a/an 72 year old female who is seen in consultation at the request of  Chuyita Zaidi M.D. for evaluation of bilateral hip pain. Virginia states that she has been to therapy and feels like her hips have been getting worse. She has started doing home exercises a few days ago with Home Therapy. Virginia has a Rheumatologist appt. Scheduled for July.    The patient is seen with their friend.      Onset: Many years(s) ago. Reports insidious onset without acute precipitating event.  Location of Pain: bilateral hip pain Left more than right  Worsened by: walking, stairs, sitting  Better with: NA  Treatments tried: rest/activity avoidance and physical therapy  Associated symptoms: no distal numbness or tingling; denies swelling or warmth    Orthopedic/Surgical history: NO  Social History/Occupation: Retired    **  Generally no hip pain at rest, including currently. Notes pain with standing, walking, pivot while WB, basically any motion.    Pain L > R. Pain groin, into left anterior thigh.  If standing, has difficulty with flexing hip.    **  Chart reviewed. Has seen neurosurgery, neurology related to gait issues.      REVIEW OF SYSTEMS:  Review of  "Systems      OBJECTIVE:  /78   Ht 1.6 m (5' 3\")   Wt 65.8 kg (145 lb)   BMI 25.69 kg/m     General: alert and in no distress  HEENT: no scleral icterus or conjunctival erythema  Skin: no suspicious lesions or rash. No jaundice.  CV: distal perfusion intact   Resp: normal respiratory effort without conversational dyspnea   Psych: normal mood and affect  Gait: antalgic and walker        Bilateral hip exam    ROM: actively limited with stiffness, some pain  Audible crepitus with active motion  Pain with passive rotation, IR > ER    Strength: pain with resisted flexion L > R    Sensation:      grossly intact in hip and thigh    Special Tests:      positive (+) FADIR       Log roll pos        Slump test no radicular symptoms         RADIOLOGY:  I independently ordered, visualized and reviewed these images with the patient  Significant degenerative change bilateral hips.      Recent Results (from the past 24 hour(s))   XR Pelvis and Hip Bilateral 2 Views    Narrative    XR PELVIS AND HIP BILATERAL 2 VIEWS   4/3/2023 2:48 PM     HISTORY: Primary osteoarthritis of both hips  COMPARISON: None.       Impression    IMPRESSION: No acute fracture or dislocation. There is bilateral  advanced hip osteoarthritis. Sclerotic lesions in the sacrum and iliac  bones are better appreciated on the prior CT 12/28/2022. There is  diffuse osseous demineralization. There are degenerative changes in  the lower lumbar spine.    KADEN PRUETT MD         SYSTEM ID:  [id number]         Visualized the MRI study noted below, and reviewed the images and the report with the patient.  Severe bilateral hip degenerative change.          Addendum    Addendum:     Impression:  1. Severe bilateral hip degenerative changes with synovitis and  extensive subchondral cystic changes. Findings may potentially  indicate inflammatory component presence with secondary  osteoarthrosis.  2. Enhancing marrow signal abnormality of the right iliac " bone  extending from the superior acetabulum subchondral location proximally  almost to the level of the sacroiliac joint caudal aspect. No  associated fracture line. Underlying marrow infiltrative process such  as from metastasis cannot be excluded especially given the degree of  T1 hypointensity and history of primary tumor. Other consideration  include stress reaction.  3. Mildly increased left external iliac chain, and the right medial  thigh lymphadenopathy. Metastasis cannot be excluded.  4. Left posterior iliac enhancing lesion, similar in size to prior CT  9/2021 and previously not visible on CT, focal enhancing lesion in the  right greater trochanter laterally. Findings are concerning for  metastasis.  5. Nonspecific T1 hyperintensity in sacral perineural region, possibly  enhancing. In a provided clinical setting, leptomeningeal disease  cannot be entirely excluded. Consider follow up with lumbar spine MR  without with contrast in 3-6 months or lumbar puncture if clinically  indicated.     HARSH MOREL         SYSTEM ID:  [id number]   Addended by Harsh Morel MD on 12/20/2022  2:01 PM     Study Result    Narrative & Impression   MR pelvis with and withoutcontrast 12/20/2022 10:13 AM     Techniques: Multiplanar multisequence imaging of the pelvis was  obtained with and without administration of intravenous contrast using  routing MSK protocol.     History: Chronic midline low back pain without sciatica; Chronic  midline low back pain without sciatica      Comparison: MRI lumbar spine same date, CT 9/21/2021     Findings:     Low signal-to-noise ratio on the fluid sensitive sequences partially  compromising assessment.     Osseous structures  Osseous structures: Severe bilateral hip degenerative changes with  complete joint space loss, extensive subchondral cystlike changes and  subchondral edema like marrow signal intensities. No fracture     T1 hypointense, T2 hyperintense, enhancing marrow  signal abnormality  of the right iliac bone extending from the superior acetabulum  subchondral location proximally almost to the level of the sacroiliac  joint caudal aspect. Degree of T1 hypointensity reaches mildly  hypointense to regional muscle. No associated fracture line.     Enhancing lesion in the left posterior iliac bone, measuring  approximately 2.1 cm, similar to comparison where previously  associated sclerotic lesion was present. Findings most concerning for  metastatic lesion.     Previously not visible on CT, focal enhancing lesion in the right  greater trochanter laterally (image 43 axial). Nonspecific cystlike  change at the right iliac bone adjacent to some sacroiliac joint.     Degenerative changes of pubic symphysis.     Degenerative changes of visualized lower lumbar spine. Lumbosacral  transitional anatomy.     Internal derangement of joints are not well assessed owing to chosen  field of view.     Joint and Periarticular soft tissue:     Sacroiliac joints and pubic symphysis are congruent.     Joint effusion: Bilateral moderate hip joint effusions with extensive  synovitis.     Bursal effusion: Minimal nonspecific edema over the greater  trochanter. No substantial iliopsoas or trochanteric bursal effusion.     Muscles and tendons  Muscles and tendons: Small left ischial tuberosity bursal fluid.  Severe left hamstring conjoined portion tendinosis. Bilateral adductor  muscle edema, likely related to muscle strain.     The rectus femoris, sartorius, and iliopsoas tendons intact  bilaterally. The hip abductors are intact bilaterally.     Nerves:  The visualized course of the sciatic nerves are unremarkable  bilaterally.     Other Findings:     Persistent, mildly increased left external iliac chain  lymphadenopathy, measuring 1.8 x 3.2, previously 1.5 x 2.7 cm using  similar measurement technique. Presumed 1.7 cm short axis right medial  thigh lymphadenopathy, mildly increased since  comparison.     T1 hyperintense presumed perineural cysts at sacrum.                                                                      Impression:  1. Severe bilateral hip degenerative changes with synovitis and  extensive subchondral cystic changes. Findings may potentially  indicate inflammatory component presence with secondary  osteoarthrosis.  2. Enhancing marrow signal abnormality of the right iliac bone  extending from the superior acetabulum subchondral location proximally  almost to the level of the sacroiliac joint caudal aspect. No  associated fracture line. Underlying marrow infiltrative process such  as from metastasis cannot be excluded especially given the degree of  T1 hypointensity and history of primary tumor. Other consideration  include stress reaction.  3. Mildly increased left external iliac chain, and the right medial  thigh lymphadenopathy. Metastasis cannot be excluded.  4. Left posterior iliac enhancing lesion, similar in size to prior CT  9/2021 and previously not visible on CT, focal enhancing lesion in the  right greater trochanter laterally. Findings are concerning for  metastasis.     I have personally reviewed the examination and initial interpretation  and I agree with the findings.     HARSH MOREL               EXAM: CT CHEST/ABDOMEN/PELVIS W CONTRAST  LOCATION: [facility]  DATE/TIME: 12/28/2022 2:20 PM     INDICATION: Hx of breast cancer.  Now with abdominal adenopathy  COMPARISON: MRI pelvis 12/20/2022 and CT abdomen pelvis 9/21/2021.  TECHNIQUE: CT scan of the chest, abdomen, and pelvis was performed following injection of IV contrast. Multiplanar reformats were obtained. Dose reduction techniques were used.   CONTRAST: 89 ml isovue 370     FINDINGS:   LUNGS AND PLEURA: Calcified right upper lobe granuloma. No focal consolidation, suspicious pulmonary nodule or pleural effusion.     MEDIASTINUM/AXILLAE: Mastectomies with implant reconstructions.  No lymphadenopathy. Small hiatal hernia.     CORONARY ARTERY CALCIFICATION: Moderate.     HEPATOBILIARY: Few tiny hepatic cysts, no follow-up needed. No suspicious hepatic mass. No biliary dilatation.     PANCREAS: Normal.     SPLEEN: Normal.     ADRENAL GLANDS: Normal.     KIDNEYS/BLADDER: Normal.     BOWEL: Diverticulosis of the colon. No acute inflammatory change. No obstruction.      LYMPH NODES: There is an new or increased left common iliac, external iliac and pelvic sidewall lymphadenopathy since prior CT. Distal left common iliac lymph nodes measure up to 1.2 cm on this exam (image 28) compared to 0.7 cm on prior CT. Left pelvic   sidewall lymph node measures 2.1 cm short axis on this exam (image 260) compared to 1.2 cm on prior CT.     VASCULATURE: Diffuse moderate atherosclerosis..     PELVIC ORGANS: Normal.     MUSCULOSKELETAL: Sclerotic lesions in the left sacrum (image 6) and left posterior ilium (image 125) are unchanged from prior CT. Advanced interstitial changes in the hips..                                                                      IMPRESSION:  1.  New/increased pelvic sidewall lymphadenopathy since 9/21/2021 consistent with metastatic disease.  2.  Sclerotic lesions in the left sacrum and left posterior ilium unchanged and may be metastatic.  3.  No evidence of metastatic disease in the chest.  4.  Colonic diverticulosis.             Lab:  Lab on 03/31/2023   Component Date Value Ref Range Status     Erythrocyte Sedimentation Rate 03/31/2023 49 (H)  0 - 30 mm/hr Final     DEVON interpretation 03/31/2023 Positive (A)  Negative Final     DEVON pattern 1 03/31/2023 Speckled   Final     DEVON titer 1 03/31/2023 1:160   Final     Rheumatoid Factor 03/31/2023 <7  <12 IU/mL Final     CRP Inflammation 03/31/2023 14.90 (H)  <5.00 mg/L Final     Lyme Disease Antibodies Total 03/31/2023 0.13  <0.90 Final                 Review of prior external note(s) from - previous evaluation and care as  noted  Review of the result(s) of each unique test - imaging  Independent interpretation of a test performed by another physician/other qualified health care professional (not separately reported) - imaging  Ordering of each unique test  45 minutes spent by me on the date of the encounter doing chart review, history and exam, documentation and further activities per the note         Again, thank you for allowing me to participate in the care of your patient.        Sincerely,        Vlad Duran, DO

## 2023-04-03 NOTE — PATIENT INSTRUCTIONS
Discussed nature of degenerative arthrosis of the hip.  Symptom treatment generally includes over-the-counter medications, ice or heat, topical treatments, and rest if needed.  There are potential benefits of rehabilitation, to maintain or improve function at the hip. Keep up with home exercises from previous physical therapy as able.  Discussed injection therapy, specifically steroid injection.  Additionally, referral to orthopedic surgery for further evaluation and discussion of additional treatment options is a consideration, particularly if conservative management has been exhausted.  Given end stage bilateral hip osteoarthritis, will go ahead and refer on to orthopedic surgery next. Will defer injection for now, given plan for referral.  Regarding recent lab studies, there is elevation in the inflammatory markers. For now, plan to keep rheumatology appointment, but will defer other evaluation/treatment to Dr Zaidi.  Follow-up here is open ended. Contact clinic if any questions/concerns.    If you have any further questions for your physician or physician s care team you can contact them thru [employer] or by calling  [phone] and use option 3 to leave a voice message.   Messages received during business hours will be returned same day.

## 2023-04-04 ENCOUNTER — DOCUMENTATION ONLY (OUTPATIENT)
Dept: OTHER | Facility: CLINIC | Age: 73
End: 2023-04-04
Payer: MEDICARE

## 2023-04-04 NOTE — TELEPHONE ENCOUNTER
DIAGNOSIS: Primary osteoarthritis of both hips [M16.0]    APPOINTMENT DATE: 04/25/2023   NOTES STATUS DETAILS   OFFICE NOTE from referring provider Internal 04/03/2023 Dr Duran [facility]    OFFICE NOTE from other specialist Internal 03/27/2023 Dr Zaidi [facility]   03/20/2023 Dr Miranda [facility]   12/13/2022 Dr Murphy [facility]   12/10/2021 PT [facility]    DISCHARGE SUMMARY from hospital N/A    DISCHARGE REPORT from the ER N/A    OPERATIVE REPORT N/A    MEDICATION LIST N/A    EMG (for Spine) N/A    IMPLANT RECORD/STICKER N/A    LABS     CBC/DIFF N/A    CULTURES N/A    INJECTIONS DONE IN RADIOLOGY N/A    MRI Internal 12/20/2022 pelvis    CT SCAN N/A    XRAYS (IMAGES & REPORTS) Internal 04/03/2023 pelvis hip   TUMOR     PATHOLOGY  Slides & report N/A

## 2023-04-06 ENCOUNTER — TRANSFERRED RECORDS (OUTPATIENT)
Dept: HEALTH INFORMATION MANAGEMENT | Facility: CLINIC | Age: 73
End: 2023-04-06

## 2023-04-10 ENCOUNTER — DOCUMENTATION ONLY (OUTPATIENT)
Dept: OTHER | Facility: CLINIC | Age: 73
End: 2023-04-10
Payer: MEDICARE

## 2023-04-11 ENCOUNTER — MYC REFILL (OUTPATIENT)
Dept: FAMILY MEDICINE | Facility: CLINIC | Age: 73
End: 2023-04-11
Payer: MEDICARE

## 2023-04-11 DIAGNOSIS — I10 HYPERTENSION GOAL BP (BLOOD PRESSURE) < 140/90: ICD-10-CM

## 2023-04-11 RX ORDER — LOSARTAN POTASSIUM 100 MG/1
100 TABLET ORAL DAILY
Qty: 90 TABLET | Refills: 0 | Status: SHIPPED | OUTPATIENT
Start: 2023-04-11 | End: 2023-07-07

## 2023-04-13 ENCOUNTER — TELEPHONE (OUTPATIENT)
Dept: FAMILY MEDICINE | Facility: CLINIC | Age: 73
End: 2023-04-13
Payer: MEDICARE

## 2023-04-13 ASSESSMENT — ANXIETY QUESTIONNAIRES
5. BEING SO RESTLESS THAT IT IS HARD TO SIT STILL: NOT AT ALL
3. WORRYING TOO MUCH ABOUT DIFFERENT THINGS: SEVERAL DAYS
2. NOT BEING ABLE TO STOP OR CONTROL WORRYING: SEVERAL DAYS
IF YOU CHECKED OFF ANY PROBLEMS ON THIS QUESTIONNAIRE, HOW DIFFICULT HAVE THESE PROBLEMS MADE IT FOR YOU TO DO YOUR WORK, TAKE CARE OF THINGS AT HOME, OR GET ALONG WITH OTHER PEOPLE: NOT DIFFICULT AT ALL
7. FEELING AFRAID AS IF SOMETHING AWFUL MIGHT HAPPEN: NOT AT ALL
1. FEELING NERVOUS, ANXIOUS, OR ON EDGE: SEVERAL DAYS
GAD7 TOTAL SCORE: 5
6. BECOMING EASILY ANNOYED OR IRRITABLE: SEVERAL DAYS
7. FEELING AFRAID AS IF SOMETHING AWFUL MIGHT HAPPEN: NOT AT ALL
GAD7 TOTAL SCORE: 5
8. IF YOU CHECKED OFF ANY PROBLEMS, HOW DIFFICULT HAVE THESE MADE IT FOR YOU TO DO YOUR WORK, TAKE CARE OF THINGS AT HOME, OR GET ALONG WITH OTHER PEOPLE?: NOT DIFFICULT AT ALL
4. TROUBLE RELAXING: SEVERAL DAYS

## 2023-04-13 ASSESSMENT — PAIN SCALES - PAIN ENJOYMENT GENERAL ACTIVITY SCALE (PEG)
INTERFERED_GENERAL_ACTIVITY: 8
PEG_TOTALSCORE: 7.33
AVG_PAIN_PASTWEEK: 6
INTERFERED_ENJOYMENT_LIFE: 8
AVG_PAIN_PASTWEEK: 6
PEG_TOTALSCORE: 7.33
INTERFERED_GENERAL_ACTIVITY: 8
INTERFERED_ENJOYMENT_LIFE: 8

## 2023-04-13 NOTE — TELEPHONE ENCOUNTER
Reason for Call:  Home Health Care    CHANTEL with  Homecare called regarding (reason for call): VERBAL ORDERS    Orders are needed for this patient. PT    PT: CANCEL PT VISIT FOR THIS WEEK DUE TO MEDICAL APPOINTMENT    OT:     Skilled Nursing:     Pt Provider: MAGALI AHUMADA    Phone Number Homecare Nurse can be reached at: [phone]    Can we leave a detailed message on this number? YES    Phone number patient can be reached at: Home number on file [phone] (home)    Best Time: ANYTIME    Call taken on 4/13/2023 at 1:44 PM by Chanelle Gonzalez

## 2023-04-13 NOTE — TELEPHONE ENCOUNTER
Called Bibi ([facility]) and relayed ok for requested verbal orders per protocol and Dr. Chuyita Zaidi and to please call back with any further questions at [phone].    KEITH GunterN RN  [facility]

## 2023-04-14 ENCOUNTER — DOCUMENTATION ONLY (OUTPATIENT)
Dept: ONCOLOGY | Facility: CLINIC | Age: 73
End: 2023-04-14
Payer: MEDICARE

## 2023-04-18 ASSESSMENT — HOOS JR
LYING IN BED (TURNING OVER, MAINTAINING HIP POSITION): EXTREME
RISING FROM SITTING: EXTREME
HOOS JR TOTAL INTERVAL SCORE: 8.1
SITTING: SEVERE
WALKING ON UNEVEN SURFACE: EXTREME
GOING UP OR DOWN STAIRS: EXTREME
BENDING TO THE FLOOR TO PICK UP OBJECT: EXTREME

## 2023-04-20 ENCOUNTER — OFFICE VISIT (OUTPATIENT)
Dept: PALLIATIVE MEDICINE | Facility: CLINIC | Age: 73
End: 2023-04-20
Attending: FAMILY MEDICINE
Payer: MEDICARE

## 2023-04-20 ENCOUNTER — LAB (OUTPATIENT)
Dept: LAB | Facility: CLINIC | Age: 73
End: 2023-04-20
Payer: MEDICARE

## 2023-04-20 VITALS — DIASTOLIC BLOOD PRESSURE: 66 MMHG | HEART RATE: 78 BPM | SYSTOLIC BLOOD PRESSURE: 121 MMHG

## 2023-04-20 DIAGNOSIS — M51.369 DDD (DEGENERATIVE DISC DISEASE), LUMBAR: ICD-10-CM

## 2023-04-20 DIAGNOSIS — M16.0 PRIMARY OSTEOARTHRITIS OF BOTH HIPS: ICD-10-CM

## 2023-04-20 DIAGNOSIS — M79.605 BILATERAL LEG PAIN: Primary | ICD-10-CM

## 2023-04-20 DIAGNOSIS — M79.10 MYALGIA: ICD-10-CM

## 2023-04-20 DIAGNOSIS — C82.90 FOLLICULAR LYMPHOMA (H): Primary | ICD-10-CM

## 2023-04-20 DIAGNOSIS — M79.604 BILATERAL LEG PAIN: Primary | ICD-10-CM

## 2023-04-20 PROCEDURE — 88321 CONSLTJ&REPRT SLD PREP ELSWR: CPT | Performed by: PATHOLOGY

## 2023-04-20 PROCEDURE — 99205 OFFICE O/P NEW HI 60 MIN: CPT

## 2023-04-20 RX ORDER — DULOXETIN HYDROCHLORIDE 20 MG/1
CAPSULE, DELAYED RELEASE ORAL
Qty: 60 CAPSULE | Refills: 1 | Status: SHIPPED | OUTPATIENT
Start: 2023-04-20 | End: 2023-06-01 | Stop reason: SINTOL

## 2023-04-20 ASSESSMENT — PAIN SCALES - GENERAL: PAINLEVEL: SEVERE PAIN (7)

## 2023-04-20 NOTE — PATIENT INSTRUCTIONS
Pain Physical Therapy:  YES   I am referring for stretching, strengthening, and home exercise plan to support functional goals/ADLs.     Pain Psychologist to address relaxation, behavioral change, coping style, and other factors important to improvement.  NO    Diagnostic Studies:  Reviewed lumbar MRI from 2022 - multilevel degenerative changes. Hip xray 4/3/23 demonstrated advanced degenerative joint changes.     Medication Management:   Start duloxetine 20 mg daily x 2 weeks, then increase to 40 mg daily. Common side effects when starting this medication include nausea/GI upset and headache, but should resolve within 1-2 weeks of starting medication/increasing dosage.   Continue Tylenol 1000 mg three times daily.     Potential procedures:   None     Other Orders/Referrals:   Pain PT    Follow up with KOSTA Cross CNP in 6-8 weeks

## 2023-04-20 NOTE — PROGRESS NOTES
"  [facility] Management     Date of visit: 4/20/2023    Assessment:  Virginia Byers is a 72 year old female with a past medical history significant for polymyalgia rheumatica, arthralgia, lumbar DDD, BLE pain, follicular lymphoma (iliac, occular), HTN, hx of breast cancer, osteoporosis, s/p  who presents with complaints of bilateral hip and leg pain.     1. Bilateral hip and leg - Onset of pain many years ago, with progressive worsening over time. She describes pain as stiffness and aching, leg pain feels \"deep\", that significantly worsens with standing and walking. Of note, she reports severely limited mobility due to pain, has difficulty with ADLs. She has hx of LBP, lumbar MRIs (2021 and 2022) demonstrated multilevel degenerative changes, underwent facet joint injections and ROS that were not helpful. Recent workup through PCP, positive DEVON, ESR and CRP. Per PCP result note, suspects polyarthralgia rheumatica, has upcoming appointment with Dr. Robin in Rheumatology 7/10/23. Also recently saw Dr. Duran in Sports Med on 4/3/23 for advanced hip OA, referred to Dr. Blood for surgical evaluation coming up on 4/25/23. Physical exam was very limited due to poor mobility and pain. Weakness with hip flexion and abduction noted, myofascial TTP of BLE, sensory intact. Etiology is likely multifactorial - degenerative changes of hip joints, possible rheumatological process (PMR per PCP), underlying degenerative changes of lumbar spine may be contributing to some extent, and prominent overlying myofascial component.     Visit diagnoses:   1. Bilateral leg pain    2. Myalgia    3. Primary osteoarthritis of both hips    4. DDD (degenerative disc disease), lumbar        Plan:  The following recommendations were given to the patient. Diagnosis, treatment options, risks, benefits, and alternatives were discussed, and all questions were answered. The patient expressed understanding of the plan for management. "     I am recommending a multidisciplinary treatment plan to help this patient better manage her pain.  This includes:     Pain Physical Therapy:  YES   I am referring for stretching, strengthening, and home exercise plan to support functional goals/ADLs.     Pain Psychologist to address relaxation, behavioral change, coping style, and other factors important to improvement.  NO    Diagnostic Studies:  Reviewed lumbar MRI from 2022 - multilevel degenerative changes. Hip xray 4/3/23 demonstrated advanced degenerative joint changes.     Medication Management:     Start duloxetine 20 mg daily x 2 weeks, then increase to 40 mg daily. Common side effects when starting this medication include nausea/GI upset and headache, but should resolve within 1-2 weeks of starting medication/increasing dosage. Also advised on black box warning. Pending outcome, we may consider alternatives - possibly pregabalin, buprenorphine.     Continue Tylenol 1000 mg three times daily.     Potential procedures:     None     Other Orders/Referrals:     Pain PT    Follow up with KOSTA Cross CNP in 6-8 weeks     Review of Electronic Chart: Today I have also reviewed available medical information in the patient's medical record at [facility] ([facility]) and Care Everywhere (if available), including relevant provider notes, laboratory work, and imaging.     Janett Noland, SANDY, KOSTA, AGNP-C  [facility]         -------------------------------------------------------------------    Subjective     Reason for consultation:    Virginia Byers is a 72 year old female who is seen in consultation today at the request of PCP, Dr. Chuyita Zaidi, for evaluation of her pain issues and recommendations for management, with specific emphasis on  DDD (degenerative disc disease), lumbar [M51.36]     Please see the [facility] health questionnaire which the patient completed and reviewed with me in detail (if available).      Review of [place] Prescription Monitoring Program (): No concern for abuse or misuse of controlled medications based on this report. Reviewed - norco 5 #16 tabs on 4/5/23 from Dr. Brody David, liana and tramadol small qty from PCP in last 12 months.     Review of Electronic Chart: Today I have also reviewed available medical information in the patient's medical record at [facility] ([facility]), including relevant provider notes, laboratory work, and imaging.     Pain medications are being prescribed by PCP/outside provider most recently.     Chief Complaint:    Chief Complaint   Patient presents with     Pain         HPI:     Virginia Byers is a 72 year old female presents with a chief complaint of bilateral hip and BLE pain, L>R.     The pain has been present for many years.    The pain is Severe Pain (7) in severity.    The pain is described as numbness, aching, dull, pressure, stiffness.   The pain is alleviated by meds (acetaminophen), distraction.    It is exacerbated by prolonged sitting, standing, walking.    Modalities that have been utilized in the past which were helpful include meds.    Things that were not helpful, but tried ,include injections (facet joint injections,  L5-S1 TFESI), PT, gabapentin.    The patient has never tried pain PT.  Denies red flag symptoms.   Virginia Byers has been seen at a pain clinic in the past.  She saw Dr. Maldonado for clinic eval in 2021. Facet joint injections on 6/14/21, bilat L5-S1 TFESI 7/9/21  -She has onset of pain many years ago, with progressive worsening over time.   -She is using a rolling walker for about a year now.   -She cannot drive any longer, does not trust herself to do this.   -She does not like to take medications.   -She is in PT right now, sounds like traditional PT (stretching and strengthening), this is home PT.   -She describes pain in legs as stiffness.   -Painful to walk.   -It is hard for her to get in/out of car.   -Friend  Aurelio lives with her, helps care for her.       Pain Questionnaire    What number best describes your pain right now: 7  (0 = No pain to 10 = Worst pain imaginable)    How would you describe the pain? numbness, dull, aching, pressure    Which of the following worsen your pain? standing, walking, exercise    Which of the following improve or reduce your pain? thinking about something else    What number best describes your average pain for the past week: 7  (0 = No pain to 10 = Worst pain imaginable)    What number best describes your LOWEST pain in past 24 hours: 6  (0 = No pain to 10 = Worst pain imaginable)    What number best describes your WORST pain in past 24 hours: 6  (0 = No pain to 10 = Worst pain imaginable)    When is your pain worst? Constant    What non-medicine treatments have you already had for your pain? pain clinic, physical therapy, spine injections (shots), counseling    Have you tried treating your pain with medication? Yes    If yes, please answer the below questions -     What topical medications have you tried in the past but are no longer taking?      What anti-convulsants (seizure medicines) have you tried in the past but are no longer taking?      What anti-depressant medication have you tried in the past but are no longer taking?      What sleep aid medications have you tried in the past but are no longer taking?      What opioid medications have you tried in the past but are no longer taking?      What NSAID medications have you tried in the past but are no longer taking?      What muscle relaxer medications have you tried in the past but are no longer taking?      What anti-migraine medications have you tried in the past but are no longer taking?        Are you currently taking medications for your pain? Yes    If yes, please answer below -     During the past month, list all the medications that you have used for pain. Please list drug name, dose, and frequency taking: Gabapentin  200mg tramadol 50mg both once a day [id number] two three to four times a day        Current Pain Treatments:    1. Medications:    Tylenol 1000 mg TID PRN   Duloxetine 20 mg daily x 2 weeks, then increase to 40 mg daily     2. Other therapies:    Home PT (2 more visits)   Pain PT - referred today       Current Outpatient Medications   Medication     dorzolamide-timolol (COSOPT) 2-0.5 % ophthalmic solution     DULoxetine (CYMBALTA) 20 MG capsule     latanoprost (XALATAN) 0.005 % ophthalmic solution     losartan (COZAAR) 100 MG tablet     rosuvastatin (CRESTOR) 10 MG tablet     carbidopa-levodopa (SINEMET)  MG tablet     predniSONE (DELTASONE) 20 MG tablet     No current facility-administered medications for this visit.     Allergies   Allergen Reactions     Compazine      Mental confusion     Hydrochlorothiazide      Dryness mouth     Prochlorperazine Visual Disturbance     Simvastatin Other (See Comments)     Muscle aches      Past Medical History:   Diagnosis Date     Breast cancer (H) 2000    Lt breast     Glaucoma     bilateral     Hyperlipidemia      Hypertension      Macular degeneration      Osteoarthritis      Osteoporosis      Past Surgical History:   Procedure Laterality Date     C MASTECTOMY,SIMPLE  3/2000    left     COLONOSCOPY  2006    Q 10 years     COLONOSCOPY N/A 10/4/2021    Procedure: COLONOSCOPY;  Surgeon: Guru Lyla Ruby MD;  Location: [facility] OR     ESOPHAGOSCOPY, GASTROSCOPY, DUODENOSCOPY (EGD), COMBINED N/A 10/4/2021    Procedure: ENDOSCOPIC ULTRASOUND, ESOPHAGOSCOPY / UPPER GASTROINTESTINAL TRACT (GI), Esophagogastroduodenoscopy,  Fine Needle Biopsy of liver;  Surgeon: Guru Lyla Ruby MD;  Location: [facility] OR     IR LYMPH NODE BIOPSY  1/20/2023     SURGICAL HISTORY OF -  Left 8/2015    tissue expander placed in left breast area     SURGICAL HISTORY OF -  Left 2/2016    left breast implant and right mammoplasty     Family History   Problem Relation Age  of Onset     Cancer Mother         bone cancer     Other Cancer Mother      Heart Disease Father      Coronary Artery Disease Father      Diabetes Father      Arthritis Sister      Breast Cancer Sister         age 75     Diabetes Maternal Grandmother      Diabetes Paternal Grandmother      Social History     Socioeconomic History     Marital status: Single     Number of children: 0   Occupational History     Employer: [employer]     Occupation: REtired   Tobacco Use     Smoking status: Former     Packs/day: 1.00     Years: 17.00     Pack years: 17.00     Types: Cigarettes     Quit date: 3/15/1999     Years since quittin.1     Smokeless tobacco: Never   Substance and Sexual Activity     Alcohol use: Not Currently     Alcohol/week: 4.0 standard drinks of alcohol     Types: 4 Glasses of wine per week     Comment: occasional     Drug use: No     Sexual activity: Not Currently     Birth control/protection: None   Other Topics Concern     Parent/sibling w/ CABG, MI or angioplasty before 65F 55M? Yes     Comment: Father heart attack      ROS: 10 point ROS neg other than the symptoms noted above in the HPI.      Objective      Diagnostic Testing - Imaging/Labs:    Labs:    CMP 22 - hepatic and renal function okay, GFR >90    Imaging:   MRI LUMBAR SPINE WITHOUT CONTRAST   2022 10:48 AM      HISTORY: Low back pain     TECHNIQUE: Multiplanar multisequence MRI of the lumbar spine without  contrast.      COMPARISON: Lumbar spine MR 2021.      FINDINGS: Presumed transitional lumbosacral anatomy with lumbarization  of S1. Tip of the conus terminates at the level of L1-L2. Spinal  numbering is the same as prior MR 2021.     Mild rightward listhesis of L3 on L4. Mild grade 1 anterolisthesis of  L3 on L4. No loss of vertebral body height. No focal destructive bony  lesions. Mild STIR hyperintense endplate edema (Modic type I changes)  on the right at L5-S1. Several presumed Schmorl's node deformities  throughout  the lumbar spine.     Level by level as follows:      T12-L1: No loss of disc height. No significant disc herniation. Normal  facets. No spinal canal or neural foraminal narrowing.      L1-L2: No loss of disc height. No significant disc herniation. Normal  facets. No spinal canal or neural foraminal narrowing.      L2-L3: No loss of disc height. No significant disc herniation. Normal  facets. No spinal canal or neural foraminal narrowing.      L3-L4: Mild grade 1 anterolisthesis with uncovering of the disc. Disc  bulge posteriorly through the left foraminal region with left uncinate  spurring. Mild facet hypertrophy left greater than right with facet  tropism. No significant spinal canal narrowing. No right neural  foraminal narrowing. Mild left neural foraminal narrowing.      L4-L5: Moderate loss of disc height and signal with degenerative  endplate changes more pronounced towards the right. Disc bulge in the  right foraminal and far lateral region with mild right uncinate  spurring. Normal facets. No spinal canal narrowing. Moderate right  neural foraminal narrowing. No significant left neural foraminal  narrowing.      L5-S1: Mild loss of disc height and signal. Posterior disc bulge with  endplate osteophytic spurring more pronounced towards the right  foraminal region. Normal facets. No spinal canal narrowing. Moderate  right neural foraminal narrowing. No significant left neural foraminal  narrowing.      Paraspinous soft tissues: Left iliac lymph nodes appear enlarged.  Abnormal T1 hypointensity of the right iliac bone which is only  partially visualized.                                                                      IMPRESSION:    1. 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  2. Degenerative changes in the lumbar spine as detailed above.  3. Moderate neural foraminal narrowings on the right at L4-L5 and  L5-S1. Mild left neural foraminal narrowing at L3-L4.  4. Abnormal T1  hypointensity of the right iliac bone which is  incompletely evaluated.  5. Prominent left iliac lymph nodes.    MR pelvis with and withoutcontrast 12/20/2022 10:13 AM     Techniques: Multiplanar multisequence imaging of the pelvis was  obtained with and without administration of intravenous contrast using  routing MS protocol.     History: Chronic midline low back pain without sciatica; Chronic  midline low back pain without sciatica      Comparison: MRI lumbar spine same date, CT 9/21/2021     Findings:     Low signal-to-noise ratio on the fluid sensitive sequences partially  compromising assessment.     Osseous structures  Osseous structures: Severe bilateral hip degenerative changes with  complete joint space loss, extensive subchondral cystlike changes and  subchondral edema like marrow signal intensities. No fracture     T1 hypointense, T2 hyperintense, enhancing marrow signal abnormality  of the right iliac bone extending from the superior acetabulum  subchondral location proximally almost to the level of the sacroiliac  joint caudal aspect. Degree of T1 hypointensity reaches mildly  hypointense to regional muscle. No associated fracture line.     Enhancing lesion in the left posterior iliac bone, measuring  approximately 2.1 cm, similar to comparison where previously  associated sclerotic lesion was present. Findings most concerning for  metastatic lesion.     Previously not visible on CT, focal enhancing lesion in the right  greater trochanter laterally (image 43 axial). Nonspecific cystlike  change at the right iliac bone adjacent to some sacroiliac joint.     Degenerative changes of pubic symphysis.     Degenerative changes of visualized lower lumbar spine. Lumbosacral  transitional anatomy.     Internal derangement of joints are not well assessed owing to chosen  field of view.     Joint and Periarticular soft tissue:     Sacroiliac joints and pubic symphysis are congruent.     Joint effusion: Bilateral  moderate hip joint effusions with extensive  synovitis.     Bursal effusion: Minimal nonspecific edema over the greater  trochanter. No substantial iliopsoas or trochanteric bursal effusion.     Muscles and tendons  Muscles and tendons: Small left ischial tuberosity bursal fluid.  Severe left hamstring conjoined portion tendinosis. Bilateral adductor  muscle edema, likely related to muscle strain.     The rectus femoris, sartorius, and iliopsoas tendons intact  bilaterally. The hip abductors are intact bilaterally.     Nerves:  The visualized course of the sciatic nerves are unremarkable  bilaterally.     Other Findings:     Persistent, mildly increased left external iliac chain  lymphadenopathy, measuring 1.8 x 3.2, previously 1.5 x 2.7 cm using  similar measurement technique. Presumed 1.7 cm short axis right medial  thigh lymphadenopathy, mildly increased since comparison.     T1 hyperintense presumed perineural cysts at sacrum.                                                                      Impression:  1. Severe bilateral hip degenerative changes with synovitis and  extensive subchondral cystic changes. Findings may potentially  indicate inflammatory component presence with secondary  osteoarthrosis.  2. Enhancing marrow signal abnormality of the right iliac bone  extending from the superior acetabulum subchondral location proximally  almost to the level of the sacroiliac joint caudal aspect. No  associated fracture line. Underlying marrow infiltrative process such  as from metastasis cannot be excluded especially given the degree of  T1 hypointensity and history of primary tumor. Other consideration  include stress reaction.  3. Mildly increased left external iliac chain, and the right medial  thigh lymphadenopathy. Metastasis cannot be excluded.  4. Left posterior iliac enhancing lesion, similar in size to prior CT  9/2021 and previously not visible on CT, focal enhancing lesion in the  right greater  trochanter laterally. Findings are concerning for  metastasis.      Physical Exam  HENT:      Head: Normocephalic.   Musculoskeletal:      Right hip: Tenderness present. Decreased range of motion.      Left hip: Tenderness present. Decreased range of motion.      Right upper leg: Tenderness present.      Left upper leg: Tenderness present.      Right lower leg: Tenderness present.      Left lower leg: Tenderness present.   Neurological:      Mental Status: She is alert.      Cranial Nerves: Cranial nerves 2-12 are intact.      Sensory: Sensation is intact.      Motor: Weakness present.      Comments: Weakness with hip flexion and abduction. Slow, antalgic gait. Ambulates with rolling walker.    Psychiatric:         Mood and Affect: Mood normal.           BILLING TIME DOCUMENTATION:   The total TIME spent on this patient on the date of the encounter/appointment was 80 minutes.      TOTAL TIME includes:   Time spent preparing to see the patient (reviewing records and tests)   Time spent face to face (or over the phone) with the patient   Time spent ordering tests, medications, procedures and referrals   Time spent Referring and communicating with other healthcare professionals   Time spent documenting clinical information in Epic

## 2023-04-20 NOTE — NURSING NOTE
Spoke to [facility] and they stated that slides were sent out to us on 4/18. Will keep an eye out for results.    Lucy Babin, RN, BSN  RN Care Coordinator - Oncology  [facility]

## 2023-04-21 ENCOUNTER — TELEPHONE (OUTPATIENT)
Dept: FAMILY MEDICINE | Facility: CLINIC | Age: 73
End: 2023-04-21
Payer: MEDICARE

## 2023-04-21 ENCOUNTER — DOCUMENTATION ONLY (OUTPATIENT)
Dept: OTHER | Facility: CLINIC | Age: 73
End: 2023-04-21
Payer: MEDICARE

## 2023-04-21 ENCOUNTER — TELEPHONE (OUTPATIENT)
Dept: FAMILY MEDICINE | Facility: CLINIC | Age: 73
End: 2023-04-21

## 2023-04-21 NOTE — TELEPHONE ENCOUNTER
Pattie GRIDER [facility]    Orders: additional follow up visit week of May 1st. Verbal given. FYI to provider only.     Thanks,  CHEO Morris  [facility]

## 2023-04-21 NOTE — TELEPHONE ENCOUNTER
Patient dropped off her Health Care Directive to be entered into her chart. It is at the  for  by the [facility].    Thank you,  Hermann Rosen

## 2023-04-23 ENCOUNTER — HEALTH MAINTENANCE LETTER (OUTPATIENT)
Age: 73
End: 2023-04-23

## 2023-04-25 ENCOUNTER — OFFICE VISIT (OUTPATIENT)
Dept: ORTHOPEDICS | Facility: CLINIC | Age: 73
End: 2023-04-25
Payer: MEDICARE

## 2023-04-25 ENCOUNTER — PRE VISIT (OUTPATIENT)
Dept: ORTHOPEDICS | Facility: CLINIC | Age: 73
End: 2023-04-25

## 2023-04-25 DIAGNOSIS — M16.0 PRIMARY OSTEOARTHRITIS OF BOTH HIPS: ICD-10-CM

## 2023-04-25 LAB
PATH REPORT.COMMENTS IMP SPEC: ABNORMAL
PATH REPORT.COMMENTS IMP SPEC: YES
PATH REPORT.FINAL DX SPEC: ABNORMAL
PATH REPORT.GROSS SPEC: ABNORMAL
PATH REPORT.MICROSCOPIC SPEC OTHER STN: ABNORMAL
PATH REPORT.RELEVANT HX SPEC: ABNORMAL
PATH REPORT.SITE OF ORIGIN SPEC: ABNORMAL

## 2023-04-25 PROCEDURE — 99203 OFFICE O/P NEW LOW 30 MIN: CPT | Performed by: ORTHOPAEDIC SURGERY

## 2023-04-25 ASSESSMENT — PAIN SCALES - GENERAL: PAINLEVEL: SEVERE PAIN (7)

## 2023-04-25 NOTE — PROGRESS NOTES
Assessment: Virginia has had a recent diagnosis of lymphoma.  She has she is meeting with her oncologist next week.  We discussed this at length.  While she clearly has severe osteoarthritis with severe limitations in mobility, it is difficult to adequately assess the risks of surgery.  I have suggested that we delay this discussion until a plan is in place for her lymphoma.  All the patient's questions were answered to the best my ability.    Plan: Follow-up after receiving recommendations from her oncologist.      Chief Complaint: Consult (BL OA of hips, L side worse than R)      Physician:  Vlad Duran    HPI: Virginia Byers is a 72 year old female who presents today for evaluation of  Her hips   Location of symptoms:  Groin   Onset: insidious  Duration of symptoms: about a year of severe symptoms   Quality of symptoms: aching and sharp  Severity: severe  Alleviating: activity modification  Exacerbating: activities, walking   Previous Treatments: Previous treatments include activity modification, oral pain medication, using a walker   Spine injections without improvement  Pain management     HOOS Jr: 8.1  PROMIS Mental: (P) 8  PROMIS Physical: (P) 8  PROMIS Total: (P) 16  [place] Activity Scale: 2    MEDICAL HISTORY:   Past Medical History:   Diagnosis Date     Breast cancer (H) 2000    Lt breast     Glaucoma     bilateral     Hyperlipidemia      Hypertension      Macular degeneration      Osteoarthritis      Osteoporosis    BRCA 2000    Pertinent negatives:  Patient has no history of DVT or PE. Discussed risk factors.    Medications:     Current Outpatient Medications:      carbidopa-levodopa (SINEMET)  MG tablet, Take 2.5 tablets by mouth 3 times daily, Disp: 675 tablet, Rfl: 3     dorzolamide-timolol (COSOPT) 2-0.5 % ophthalmic solution, Place 1 drop into both eyes 2 times daily, Disp: , Rfl:      DULoxetine (CYMBALTA) 20 MG capsule, Start 20 mg daily x 2 weeks, then increase to 40 mg daily., Disp:  60 capsule, Rfl: 1     latanoprost (XALATAN) 0.005 % ophthalmic solution, Place 1 drop into both eyes daily , Disp: , Rfl:      losartan (COZAAR) 100 MG tablet, Take 1 tablet (100 mg) by mouth daily, Disp: 90 tablet, Rfl: 0     predniSONE (DELTASONE) 20 MG tablet, Take 1 tablet (20 mg) by mouth daily, Disp: 30 tablet, Rfl: 0     rosuvastatin (CRESTOR) 10 MG tablet, TAKE 1 TABLET (10 MG) BY MOUTH DAILY. (Patient taking differently: Take 10 mg by mouth every 7 days), Disp: 90 tablet, Rfl: 3    Allergies: Compazine, Hydrochlorothiazide, Prochlorperazine, and Simvastatin    SURGICAL HISTORY:   Past Surgical History:   Procedure Laterality Date     C MASTECTOMY,SIMPLE  3/2000    left     COLONOSCOPY      Q 10 years     COLONOSCOPY N/A 10/4/2021    Procedure: COLONOSCOPY;  Surgeon: Guru Lyla Ruby MD;  Location: [facility] OR     ESOPHAGOSCOPY, GASTROSCOPY, DUODENOSCOPY (EGD), COMBINED N/A 10/4/2021    Procedure: ENDOSCOPIC ULTRASOUND, ESOPHAGOSCOPY / UPPER GASTROINTESTINAL TRACT (GI), Esophagogastroduodenoscopy,  Fine Needle Biopsy of liver;  Surgeon: Guru Lyla Ruby MD;  Location: [facility] OR     IR LYMPH NODE BIOPSY  2023     SURGICAL HISTORY OF -  Left 2015    tissue expander placed in left breast area     SURGICAL HISTORY OF -  Left 2016    left breast implant and right mammoplasty       HISTORY:   Family History   Problem Relation Age of Onset     Cancer Mother         bone cancer     Other Cancer Mother      Heart Disease Father      Coronary Artery Disease Father      Diabetes Father      Arthritis Sister      Breast Cancer Sister         age 75     Diabetes Maternal Grandmother      Diabetes Paternal Grandmother        SOCIAL HISTORY:   Social History     Tobacco Use     Smoking status: Former     Packs/day: 1.00     Years: 17.00     Pack years: 17.00     Types: Cigarettes     Quit date: 3/15/1999     Years since quittin.1     Smokeless tobacco: Never   Vaping Use      Vaping status: Not on file   Substance Use Topics     Alcohol use: Not Currently     Alcohol/week: 4.0 standard drinks of alcohol     Types: 4 Glasses of wine per week     Comment: occasional       REVIEW OF SYSTEMS:  The comprehensive review of systems from the intake form was reviewed with the patient.  No fever, weight change or fatigue. No dry eyes. No oral ulcers, sore throat or voice change. No palpitations, syncope, angina or edema.  No chest pain, excessive sleepiness, shortness of breath or hemoptysis.   No abdominal pain, nausea, vomiting, diarrhea or heartburn.  No skin rash. No focal weakness or numbness. No bleeding or lymphadenopathy. No rhinitis or hives.     Exam:  On physical examination the patient appears the stated age, is in no acute distress, alert and oriented, affect is appropriate, and breathing is non-labored.  Vitals are documented in the EMR and have been reviewed:    There were no vitals taken for this visit.  Data Unavailable  There is no height or weight on file to calculate BMI.    Rises from chair: With effort.  Slowly about the room with a walker  Gait:, Wheeled.  Trendelenburg test:  Gains the exam table: Deferred.    30 minutes were spent with the patient and 10 minutes on radiograph review x-ray templating, and documentation.

## 2023-04-25 NOTE — NURSING NOTE
Virginia Byers's chief complaint for this visit includes:  Chief Complaint   Patient presents with     Consult     BL OA of hips, L side worse than R       Referring Provider:  Vlad Duran [place]  [address] [facility] [place] [address]    There were no vitals taken for this visit.  Severe Pain (7)   Global Mental Health Score: (P) 8  Global Physical Health Score: (P) 8  PROMIS TOTAL - SUBSCORES: (P) 16  UCLA: 2, HOOSJR: 8.1    Pain increases with: Walking, standing, putting on socks/shoes  Previous surgeries: No,   Previous injections within last 6 months: NO  Treatments done: PT - didn't seem to help, Injections for back.   Imaging completed: XR 4/3/23  Pain: 6/10  Concerns: Wants to know she can get better and walk without relying on a walker/device. Has no independent movement and wants it back.     Valentin Díaz, ATC

## 2023-04-25 NOTE — LETTER
4/25/2023         RE: Virginia Byers  [address] [facility] [address]        Dear Colleague,    Thank you for referring your patient, Virginia Byers, to the [facility]. Please see a copy of my visit note below.    Assessment: Virginia has had a recent diagnosis of lymphoma.  She has she is meeting with her oncologist next week.  We discussed this at length.  While she clearly has severe osteoarthritis with severe limitations in mobility, it is difficult to adequately assess the risks of surgery.  I have suggested that we delay this discussion until a plan is in place for her lymphoma.  All the patient's questions were answered to the best my ability.    Plan: Follow-up after receiving recommendations from her oncologist.      Chief Complaint: Consult (BL OA of hips, L side worse than R)      Physician:  Vlad Duran    HPI: Virginia Byers is a 72 year old female who presents today for evaluation of  Her hips   Location of symptoms:  Groin   Onset: insidious  Duration of symptoms: about a year of severe symptoms   Quality of symptoms: aching and sharp  Severity: severe  Alleviating: activity modification  Exacerbating: activities, walking   Previous Treatments: Previous treatments include activity modification, oral pain medication, using a walker   Spine injections without improvement  Pain management     HOOS Jr: 8.1  PROMIS Mental: (P) 8  PROMIS Physical: (P) 8  PROMIS Total: (P) 16  UCLA Activity Scale: 2    MEDICAL HISTORY:   Past Medical History:   Diagnosis Date     Breast cancer (H) 2000    Lt breast     Glaucoma     bilateral     Hyperlipidemia      Hypertension      Macular degeneration      Osteoarthritis      Osteoporosis    BRCA 2000    Pertinent negatives:  Patient has no history of DVT or PE. Discussed risk factors.    Medications:     Current Outpatient Medications:      carbidopa-levodopa (SINEMET)  MG tablet, Take 2.5 tablets by mouth 3 times daily,  Disp: 675 tablet, Rfl: 3     dorzolamide-timolol (COSOPT) 2-0.5 % ophthalmic solution, Place 1 drop into both eyes 2 times daily, Disp: , Rfl:      DULoxetine (CYMBALTA) 20 MG capsule, Start 20 mg daily x 2 weeks, then increase to 40 mg daily., Disp: 60 capsule, Rfl: 1     latanoprost (XALATAN) 0.005 % ophthalmic solution, Place 1 drop into both eyes daily , Disp: , Rfl:      losartan (COZAAR) 100 MG tablet, Take 1 tablet (100 mg) by mouth daily, Disp: 90 tablet, Rfl: 0     predniSONE (DELTASONE) 20 MG tablet, Take 1 tablet (20 mg) by mouth daily, Disp: 30 tablet, Rfl: 0     rosuvastatin (CRESTOR) 10 MG tablet, TAKE 1 TABLET (10 MG) BY MOUTH DAILY. (Patient taking differently: Take 10 mg by mouth every 7 days), Disp: 90 tablet, Rfl: 3    Allergies: Compazine, Hydrochlorothiazide, Prochlorperazine, and Simvastatin    SURGICAL HISTORY:   Past Surgical History:   Procedure Laterality Date     C MASTECTOMY,SIMPLE  3/2000    left     COLONOSCOPY  2006    Q 10 years     COLONOSCOPY N/A 10/4/2021    Procedure: COLONOSCOPY;  Surgeon: Guru Lyla Ruby MD;  Location: [facility] OR     ESOPHAGOSCOPY, GASTROSCOPY, DUODENOSCOPY (EGD), COMBINED N/A 10/4/2021    Procedure: ENDOSCOPIC ULTRASOUND, ESOPHAGOSCOPY / UPPER GASTROINTESTINAL TRACT (GI), Esophagogastroduodenoscopy,  Fine Needle Biopsy of liver;  Surgeon: Guru Lyla Ruby MD;  Location: [facility] OR     IR LYMPH NODE BIOPSY  1/20/2023     SURGICAL HISTORY OF -  Left 8/2015    tissue expander placed in left breast area     SURGICAL HISTORY OF -  Left 2/2016    left breast implant and right mammoplasty       HISTORY:   Family History   Problem Relation Age of Onset     Cancer Mother         bone cancer     Other Cancer Mother      Heart Disease Father      Coronary Artery Disease Father      Diabetes Father      Arthritis Sister      Breast Cancer Sister         age 75     Diabetes Maternal Grandmother      Diabetes Paternal Grandmother        SOCIAL  HISTORY:   Social History     Tobacco Use     Smoking status: Former     Packs/day: 1.00     Years: 17.00     Pack years: 17.00     Types: Cigarettes     Quit date: 3/15/1999     Years since quittin.1     Smokeless tobacco: Never   Vaping Use     Vaping status: Not on file   Substance Use Topics     Alcohol use: Not Currently     Alcohol/week: 4.0 standard drinks of alcohol     Types: 4 Glasses of wine per week     Comment: occasional       REVIEW OF SYSTEMS:  The comprehensive review of systems from the intake form was reviewed with the patient.  No fever, weight change or fatigue. No dry eyes. No oral ulcers, sore throat or voice change. No palpitations, syncope, angina or edema.  No chest pain, excessive sleepiness, shortness of breath or hemoptysis.   No abdominal pain, nausea, vomiting, diarrhea or heartburn.  No skin rash. No focal weakness or numbness. No bleeding or lymphadenopathy. No rhinitis or hives.     Exam:  On physical examination the patient appears the stated age, is in no acute distress, alert and oriented, affect is appropriate, and breathing is non-labored.  Vitals are documented in the EMR and have been reviewed:    There were no vitals taken for this visit.  Data Unavailable  There is no height or weight on file to calculate BMI.    Rises from chair: With effort.  Slowly about the room with a walker  Gait:, Wheeled.  Trendelenburg test:  Gains the exam table: Deferred.    30 minutes were spent with the patient and 10 minutes on radiograph review x-ray templating, and documentation.        Again, thank you for allowing me to participate in the care of your patient.        Sincerely,        Austin Blood MD

## 2023-05-03 DIAGNOSIS — M35.3 PMR (POLYMYALGIA RHEUMATICA) (H): ICD-10-CM

## 2023-05-03 RX ORDER — PREDNISONE 20 MG/1
TABLET ORAL
Qty: 30 TABLET | Refills: 0 | OUTPATIENT
Start: 2023-05-03

## 2023-05-03 ASSESSMENT — HOOS JR
WALKING ON UNEVEN SURFACE: SEVERE
GOING UP OR DOWN STAIRS: EXTREME
BENDING TO THE FLOOR TO PICK UP OBJECT: EXTREME
RISING FROM SITTING: SEVERE
LYING IN BED (TURNING OVER, MAINTAINING HIP POSITION): SEVERE
HOOS JR TOTAL INTERVAL SCORE: 25.1
SITTING: SEVERE

## 2023-05-04 ENCOUNTER — DOCUMENTATION ONLY (OUTPATIENT)
Dept: OTHER | Facility: CLINIC | Age: 73
End: 2023-05-04
Payer: MEDICARE

## 2023-05-04 NOTE — TELEPHONE ENCOUNTER
1st attempt: Called and left message for patient to return call to clinic.     -if patient return call to clinic please inform patient that refill of predniSONE (DELTASONE) 20 MG tablet was denied by provider and patient will need an visit for further refills. Please assist patient with scheduling an appointment with provider.  Karon Salmeron, CMA

## 2023-05-05 ENCOUNTER — TELEPHONE (OUTPATIENT)
Dept: ORTHOPEDICS | Facility: CLINIC | Age: 73
End: 2023-05-05

## 2023-05-05 ENCOUNTER — TELEPHONE (OUTPATIENT)
Dept: ONCOLOGY | Facility: CLINIC | Age: 73
End: 2023-05-05

## 2023-05-05 NOTE — TELEPHONE ENCOUNTER
Janelle, nurse from [facility] calling and wanted to let Dr. Blood and care team know patient is clear to proceed with surgery with Dr. Blood per Dr. Zaidi.  For questions give Janelle a call [phone] - direct line okay to leave message

## 2023-05-05 NOTE — TELEPHONE ENCOUNTER
----- Message from Naomy Zaidi DO sent at 5/5/2023 12:36 PM CDT -----  Regarding: RE: surgery  Hi Dr. Blood,    I am Virginia's medical oncologist. 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 I had offered her rituximab in the past but she did not wish to proceed due to some associated possible side effects so we have been monitoring for now. A recent lacrimal gland biopsy showed lymphoma involvement so if she has symptoms related to that, I can certainly offer therapy again but at our last conversation she did not have any major eye related complaints. I am going to order a repeat PET when I see her for surveillance.  I have unfortunately had to reschedule my appt with her today because I am out ill and have lost my voice.     Her lymphoma was actually incidentally found when she was having workup for her acute complaints of new LE weakness and immobility. Since her lymphoma is indolent and because she is not on active therapy and does not wish to be on therapy per our last visit, there is no contraindications from oncology perspective to proceed with necessary surgery that will improve quality of life. Right now her main concern is she is that she is immobile due to her severe bilateral hip osteoarthritis and I would not withhold necessary orthopedic surgery for this.     Please let me know if you need any additional information. I would be happy to speak over the phone if that is easier once my voice returns as well!    Best,  Naomy Zaidi D.O.  Hematology/Oncology  [facility]      ----- Message -----  From: Pura Babin RN  Sent: 5/5/2023  12:29 PM CDT  To: Naomy Zaidi DO; Austin Blood MD  Subject: surgery                                          Hi Dr. Blood and Dr. Zaidi -   This patient is scheduled to have surgery with Dr. Blood on 5/9 and per the patient Dr. Blood may need the go ahead from Dr. Zaidi to proceed!  Just wanting to connect you via staff message to confirm that surgery is okay to proceed.  Thank you!  Lucy Babin, RN, BSN  RN Care Coordinator - Oncology  [facility]

## 2023-05-05 NOTE — TELEPHONE ENCOUNTER
Spoke with Magalie via telephone regarding Virginia's upcoming appointment and potential surgery as documented below.    Lucy Babin, RN, BSN  RN Care Coordinator - Oncology  [facility]

## 2023-05-05 NOTE — TELEPHONE ENCOUNTER
Discussed with Lucy, she will put notes from Dr. Zaidi in a phone encounter. Patient has an appt with Dr. Reyes on 5/9 to discuss surgery options.  Magalie Calvert RN

## 2023-05-09 ENCOUNTER — TELEPHONE (OUTPATIENT)
Dept: ORTHOPEDICS | Facility: CLINIC | Age: 73
End: 2023-05-09

## 2023-05-09 ENCOUNTER — OFFICE VISIT (OUTPATIENT)
Dept: ORTHOPEDICS | Facility: CLINIC | Age: 73
End: 2023-05-09
Payer: MEDICARE

## 2023-05-09 DIAGNOSIS — M16.12 PRIMARY OSTEOARTHRITIS OF LEFT HIP: Primary | ICD-10-CM

## 2023-05-09 PROCEDURE — 99214 OFFICE O/P EST MOD 30 MIN: CPT | Performed by: ORTHOPAEDIC SURGERY

## 2023-05-09 NOTE — TELEPHONE ENCOUNTER
Procedure: Left total hip arthroplasty  Facility: [facility]  Length: ? minutes  Anesthesia: General  Post-op appointments needed: 2 weeks nurse only, 6 weeks with provider only.  Surgery packet/instructions given to patient?  Yes     Neal Grullon RN

## 2023-05-09 NOTE — LETTER
5/9/2023         RE: Virginia Byers  [address] [facility] [address]        Dear Colleague,    Thank you for referring your patient, Virginia Byers, to the [facility]. Please see a copy of my visit note below.    Virginia returns today to discuss her hip.  She is here with a friend.  We discussed her orthopedic and oncology history at length.  We reviewed messages that I have had with her oncologist.  She has been provisionally cleared to schedule an arthroplasty however she does have test outstanding which may impact this in the future.  We discussed reasonable to proceed with total hip based on this and her desire to go forward as obviously understandable given her level of symptoms.. We spent twenty minutes discussing total hip arthroplasty.  We discussed the implants, the procedure, the risks and benefits, and the post-operative course.  We discussed blood clots, blood clots to the lungs, injury to blood vessels and nerves, dislocation, infection, and leg length difference.  We discussed that as part of the routine part of surgical care a qualified assist may finish closing the wound after I have left the room. All the patients questions were answered to the best of my ability.  Assessment: Severe bilateral hip osteoarthritis.    Plan: Staged total hip arthroplasties with the left side done first.  Orders have been placed.      Again, thank you for allowing me to participate in the care of your patient.        Sincerely,        Austin Blood MD

## 2023-05-09 NOTE — NURSING NOTE
Virginia Byers's chief complaint for this visit includes:  Chief Complaint   Patient presents with     Consult     Discuss TACOS after clearance from oncologist.        Referring Provider:  No referring provider defined for this encounter.    There were no vitals taken for this visit.  Severe Pain (7)   Global Mental Health Score: (P) 8  Global Physical Health Score: (P) 8  PROMIS TOTAL - SUBSCORES: (P) 16  UCLA: 2, HOOSJR: 25.1    Spoke with her Oncologist and was given all clear to proceed with surgery if needed.     Concerns: Going to stop Cymbalta, giving blurred vision and increasing BP.     Valentin Díaz, ATC

## 2023-05-09 NOTE — NURSING NOTE
Pre-Operative Teaching Flowsheet     Person(s) involved in teaching: Patient     Motivation Level:  Receptive (willing/able to accept information) and asks appropriate questions where applicable: Yes  Any cultural factors/Zoroastrianism beliefs that may influence understanding or compliance? No     Patient demonstrates understanding of the following:  Pre-operative planning, including the necessary appointments and preparation needed prior to surgery: Yes  Which situations necessitate calling provider and whom to contact: Yes  Pain management techniques pre and post op: Yes  Stoplight tool introduced, questions answered, patient expressed understanding: Yes  How, and when, to access community resources: Yes    Additional Teaching Concerns Addressed:  Post-operative living arrangements and necessary adaptations to living environment.  Instructional Materials Used/Given: Yes, pre-op packet given to patient with additional system forms added as needed depending on type of surgery. Pre-op soap given (if in clinic).     Time spent with patient: 20 minutes.    Neal Grullon RN

## 2023-05-10 NOTE — PROGRESS NOTES
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    Patient has medical history including LEFT breast cancer in 2000, hypertension, hyperlipidemia, osteoporosis, osteoarthritis, degenerative disc disease, spinal stenosis of lumbar region,  s/p bilateral L5-S1 transforaminal ROS by Dr. Garcia on 7/9/2021, s/p bilateral L4-5 and L5-S1 facet joint injections on 6/14/2021, glaucoma and macular degeneration, cataracts s/p surgery, history of tobacco use (quit 1999)     Regarding previous history of breast cancer:  She was previously treated by Dr. Sakina Brandt at [facility]      In summary, diagnosed at age 49 with LEFT breast IDC ER positive, OH positive, HER2 positive on FISH. 3/2000 left breast lumpectomy and left axillary node dissection (IDC, grade 3, 1.45 cm incompletely excised tumor, DCIS, positive margins with infiltrating carcinoma, 1 of 17 lymph nodes positive (0.5 cm metastatic focus), ER positive, OH positive, HER2 positive).  4/2020 left breast simple mastectomy with no residual carcinoma. S/p ACx4, taxol x4, no anti-her2 treatment, no RT. S/p 5 years adjuvant endocrine therapy (tamoxifen and anastrozole). Delayed left breast reconstruction and right breast augmentation.      3/2/2000  Excisional biopsy of Left breast mass -   Infiltrating ductal carcinoma with associated DCIS   ER positive (60%), OH positive (22%), HER2 by FISH POSITIVE     3/10/2000 - Left lumpectomy and left axillary node  Invasive ductal carcinoma, Grade 3, 1.45 cm (incompletely excised), negative for LVI  DCIS, greatest histologic dimension of tumor (DCIS PLUS invasive tumor) = 2.9 cm (estimate 50% DCIS)  Positive margins- infiltrating carcinoma  extends to margin  1 of 17 axillary lymph nodes sampled was positive for metastatic focus of 0.5 cm.  ER/VT+     Sections of lumpectomy left breast showing residual 1.1 cm focus of ductal carcinoma in situ (DCIS), present within microns of the linked margin of resection.       4/11/2000 - Left breast simple mastectomy, negative for residual carcinoma  Left mastectomy on 4/11/2000 performed by Dr. Cristian Mcdonald at [facility].      Adjuvant treatment:  - 5/15/2000-7/24/2000 4 cycles of Adriamycin and Cytoxan   - 8/16/2000-10/18/2000 4 cycles of paclitaxel   - Appears she may have been on some kind of trial/protocol, was not given any anti-HER2 targeted treatment  - No adjuvant radiation  - 12/2000- 10/2002 tamoxifen  - 10/2002-5/2006 switched to anastrozole (due to in vitro studies showing anastrozole may be slightly better for patients were HER2 positive) with Fosamax for osteopenia     8/31/2015 Left delayed reconstruction with tissue expander    2/3/2016 Left 2nd stage 450 cc high profile silicone implant; right augmentation 150 cc Moderate classic profile silicone implant; cresent mastopexy    10/18/2021: Screening mammogram right breast ZACKARY    OTHER:  Of note, patient has documentation of anemia (iron deficiency and anemia of chronic disease), elevated LFTs, nausea and vomiting of all solids and liquids, fatigue with 26 pound unintentional weight loss from 7/20/2021 - 10/20/2021 with mildly elevated LFTs which led to evaluation with mammogram, endoscopy with EUS and colonoscopy as detailed below. Pt did associate these with some spinal injections she had gotten, due to overlapping times. Pt did regain all of the weight and hand improvement of anemia within 2 months, without significant intervention.     -10/2021: Endoscopy with EUS normal EGD and no stigmata or source of upper GI bleeding, visualized bile duct, gallbladder, liver, pancreas, left adrenal gland normal.  No lymphadenopathy. LIVER, RANDOM  NEEDLE BIOPSY: Benign parenchyma with congestion; no significant fibrosis or other abnormalities     -10/2021 colonoscopy: Hemorrhoids, diverticulosis in sigmoid colon and splenic flexure    Current history:  -Ongoing right knee pain not improved with brace and physical therapy, s/p bilateral L5-S1 transforaminal ROS by Dr. Garcia on 7/9/2021, s/p bilateral L4-5 and L5-S1 facet joint injections on 6/14/2021. ongoing b/l hip and leg pain, worse with position changes (ie sitting to standing), constant, 8-9/10, tylenol brings pain down to 6/10 (using tylenol bid). ECOG 3.   -Patient has seen neurosurgery Dr. Jose Alexander, occupational medicine specialist Dr.Orrin Murphy, with plan to see PM&R Dr. Margie Agudelo and movement disorder clinic  - 11/22 MRI lumbar spine:    prominent left iliac lymph nodes among other findings related to degenerative changes and neural foraminal stenosis throughout lumbar spine    - 12/22 MRI pelvis:  1. Enhancing marrow signal abnormality of the right iliac bone extending from the superior acetabulum subchondral location proximally almost to the level of the sacroiliac joint caudal aspect. No associated fracture line. Underlying marrow infiltrative process such as from metastasis cannot be excluded especially given the degree of T1 hypointensity and history of primary tumor. Other consideration include stress reaction.  2. Mildly increased left external iliac chain, and the right medial thigh lymphadenopathy. Metastasis cannot be excluded.  3. Left posterior iliac enhancing lesion, similar in size to prior CT 9/2021 and previously not visible on CT, focal enhancing lesion in the right greater trochanter laterally. Findings are concerning for Metastasis.    -12/22 CT chest abdomen pelvis with contrast:  COMPARISON: MRI pelvis 12/20/2022 and CT abdomen pelvis 9/21/2021.  1. Mastectomies with implant reconstructions. No lymphadenopathy  2.  There is an new or increased left common iliac, external  iliac and pelvic sidewall lymphadenopathy since prior CT. Distal left common iliac lymph nodes measure up to 1.2 cm on this exam compared to 0.7 cm on prior CT. Left pelvic sidewall lymph node measures 2.1 cm short axis on this exam compared to 1.2 cm on prior CT . consistent with metastatic disease.  3. Sclerotic lesions in the left sacrum and left posterior ilium are unchanged from prior CT.  may be metastatic.  4. No evidence of metastatic disease in the chest.     -1/2023 MRI brain w/wo contrast:  IMPRESSION:  1. No findings of intracranial metastatic disease.  2. Indeterminate ovoid T2 hyperintense enhancing mass in the region of the  left lacrimal gland measuring approximately 2.4 cm AP by 1.4 cm  transverse by 2.3 cm craniocaudal associated with/replacing the left  lacrimal gland. Consider correlation with tissue sampling.  3. A few indeterminate partially visualized heterogeneous enhancing  Nodules measuring up to approximately 1.5 cm in the bilateral parotid glands.   Consider soft tissue neck CT with contrast for further evaluation.    -1/20/2023: IR US Guided biopsy of a right external iliac LN  Final Diagnosis   A.  LYMPH NODE, RIGHT EXTERNAL ILIAC, CORE NEEDLE BIOPSY AND TOUCH IMPRINT:  -Involved by follicular lymphoma, low-grade (WHO grade 1-2)  -Negative for metastatic carcinoma     The morphologic and immunophenotypic patterns are consistent with follicular lymphoma.  There is no high-grade lymphoma present in this limited specimen.  Concurrent flow study ([id number]) demonstrated CD10-positive lambda monotypic B cells (22%), rare kappa monotypic B cells (1.3%), and no aberrant immunophenotype on T cells.  A FISH study for Bcl-2 is pending and will be reported separately.     The case was initially reviewed by Dr. Cifuentes (breast pathology).     This case was reviewed in part at our Hematopathology Faculty Consensus conference at which Girish Nolasco, Narciso, and Kayode were present in addition  to myself.  This is a small needle core.  In some of the areas, the CD10+ B cells are confined to follicles.  Though the possibility of in situ follicular neoplasia was discussed, we still favor a diagnosis of follicular lymphoma - there are a number of CD10+ B cells outside of follicles at the base of the biopsy, and the clinical history supports a greater extent of involvement.     There was a tiny kappa monotypic B cell population identified by flow (separate from the CD10+ clone).  We looked for the morphologic correlate in this case by IHC, but it is not readily apparent.  This may represent a monoclonal B lymphocytosis - consider sending a peirpheral blood sample for flow cytometry.    FISH:  RESULTS:     NORMAL  - No rearrangement of BCL2     INTERPRETATION:  No evidence was found by FISH of rearrangement of the BCL2 (18q21) locus. These findings are not helpful for confirming or further characterizing the reported pathologic diagnosis of follicular lymphoma.    Flow Interpretation   A. Lymph Node(s), :  -CD10-positive lambda monotypic B cells (22%)  -Rare kappa monotypic B cells (1.3%)  -No aberrant immunophenotype on T cells  See comment   Electronically signed by Krista Headley MD on 1/24/2023 at  3:10 PM   Comment     The CD10-positive population is consistent with a clonal B cell population and a CD10 positive B-cell lymphoma is favored.      In addition, a second clonal B-cell population is identified which has a CLL-like immunophenotype except that CD5 expression is equivocal, this may represent a monoclonal B-cell lymphocytosis (MBL).     Addendum   INTERPRETATION  The diagnosis remains the same (see comment)     REASON FOR ADDENDUM  This addendum is issued to reflect additional testing performed on this case. See Comment.      COMMENT  Additional multi-color flow analysis was performed for the following markers:   CD23, CD79b     The CD5 (dimly) positive B cells mostly lack CD23 and CD79b         1/23/23 CT neck:  A) Heterogeneous nodule in the right thyroid gland measuring up  to 1.6 cm.   B) Soft tissue nodule in the superficial left parotid  gland measuring 1.2 cm. Additional 0.8 cm nodule in the deep portion  of the left parotid gland. Soft tissue nodule in the superficial right  parotid gland measuring 0.8 cm. These may represent benign or malignant parotid lesions. Multiplicity of lesions raises concern for Warthin tumors. Otolaryngology consultation is  recommended.  C) Prominent soft tissue around the major arteries in the neck  particularly the common and internal carotid arteries. This is  atypical for atherosclerotic disease and vasculopathy/vasculitis such  as giant cell arteritis should be considered. Probable superimposed  atherosclerotic disease at the carotid bifurcations right greater than  left without definite high-grade stenosis.    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  And B-cell cohort #2 which is described as negative for CD5 and showing dim CD20 and dim kappa staining.  This population is listed a CD5 negative and CD10 negative also listed as negative for  and .  Of note the population of monoclonal B-cell lymphocytosis found in peripheral blood in our department and in the lymph node biopsy in January showed partial/equivocal dim CD5 and may correspond to the cohort #2.     We essentially agree with the  diagnosis of follicular lymphoma, which is consistent with prior diagnosis of follicular lymphoma low grade  established on core biopsy of right external iliac lymph node January 20, 2023 (case US [id number]) in our Department . The concurrent flow cytometry case [id number] [id number] in January showed in addition to CD10 positive B-cell population  also a partial/equivocal CD5 positive separate kappa monotypic B-cell population, which most likely represents monoclonal B-cell lymphocytosis of CLL/SLL type, since it was also documented in the peripheral blood flow cytometry on February 16, 2023 (case number [facility] ) at a low frequency.  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 Areas with infiltrates of small B cells with coexpression of CD5 and CD23 are seen in the current biopsy, but the size of the biopsy is too small to comprehensively evaluate for the presence of proliferation centers and the extent of involvement by the CD5 positive B cell lymphoproliferative process.   This case has been discussed at intradepartmental hematopathology conference with participation by THELMA Petersen, THELMA Davis and myself.          -3/23 neurology movement d/o consult- multifactorial gait problem w/orthopedic troubles/arthritis; cannot r/o underlying parkinsonism so will do carbidopa/levodopa challenge and f/u in 4 months     - 4/23 sports medicine consult- referral for steroid injection but not given as this would preclude her from getting surgery for up to 3 months    - 4/23 pain management consult- rec PT, started on duloxetine- pt does not wish to take this     - 4/23 orthopedic surgery consult- severe b/l hip osteoarthritis, planned hip replacement 7/23    - pt reports no changes in left  eye- vision normal, no pain; pt denies B sx, sx associated w/splenomegaly, no dyspnea, ascites, abdominal or pelvic pain       Upcoming appts:  Rheumatology appt 7/10/23, ESR and CRP elevated, DEVON positive 1:160 speckled; PCP suspects pt may have PMR     REVIEW OF SYSTEMS:   A 14 point ROS was reviewed with pertinent positives and negatives in the HPI.       HOME MEDICATIONS:  Current Outpatient Medications   Medication Sig Dispense Refill     dorzolamide-timolol (COSOPT) 2-0.5 % ophthalmic solution Place 1 drop into both eyes 2 times daily       DULoxetine (CYMBALTA) 20 MG capsule Start 20 mg daily x 2 weeks, then increase to 40 mg daily. 60 capsule 1     latanoprost (XALATAN) 0.005 % ophthalmic solution Place 1 drop into both eyes daily        losartan (COZAAR) 100 MG tablet Take 1 tablet (100 mg) by mouth daily 90 tablet 0     rosuvastatin (CRESTOR) 10 MG tablet TAKE 1 TABLET (10 MG) BY MOUTH DAILY. (Patient taking differently: Take 10 mg by mouth every 7 days) 90 tablet 3     carbidopa-levodopa (SINEMET)  MG tablet Take 2.5 tablets by mouth 3 times daily 675 tablet 3     predniSONE (DELTASONE) 20 MG tablet Take 1 tablet (20 mg) by mouth daily 30 tablet 0         ALLERGIES:  Allergies   Allergen Reactions     Compazine      Mental confusion     Hydrochlorothiazide      Dryness mouth     Prochlorperazine Visual Disturbance     Simvastatin Other (See Comments)     Muscle aches         PAST MEDICAL HISTORY:  Past Medical History:   Diagnosis Date     Breast cancer (H) 2000    Lt breast     Glaucoma     bilateral     Hyperlipidemia      Hypertension      Macular degeneration      Osteoarthritis      Osteoporosis          PAST SURGICAL HISTORY:  Past Surgical History:   Procedure Laterality Date     C MASTECTOMY,SIMPLE  3/2000    left     COLONOSCOPY  2006    Q 10 years     COLONOSCOPY N/A 10/4/2021    Procedure: COLONOSCOPY;  Surgeon: Guru Lyla Ruby MD;  Location: [facility] OR     ESOPHAGOSCOPY,  GASTROSCOPY, DUODENOSCOPY (EGD), COMBINED N/A 10/4/2021    Procedure: ENDOSCOPIC ULTRASOUND, ESOPHAGOSCOPY / UPPER GASTROINTESTINAL TRACT (GI), Esophagogastroduodenoscopy,  Fine Needle Biopsy of liver;  Surgeon: Guru Lyla Ruby MD;  Location: [facility] OR     IR LYMPH NODE BIOPSY  2023     SURGICAL HISTORY OF -  Left 2015    tissue expander placed in left breast area     SURGICAL HISTORY OF -  Left 2016    left breast implant and right mammoplasty         SOCIAL HISTORY:  Social History     Socioeconomic History     Marital status: Single     Spouse name: Not on file     Number of children: 0     Years of education: Not on file     Highest education level: Not on file   Occupational History     Employer: [employer]     Occupation: REtired   Tobacco Use     Smoking status: Former     Packs/day: 1.00     Years: 17.00     Pack years: 17.00     Types: Cigarettes     Quit date: 3/15/1999     Years since quittin.1     Smokeless tobacco: Never   Vaping Use     Vaping status: Not on file   Substance and Sexual Activity     Alcohol use: Not Currently     Alcohol/week: 4.0 standard drinks of alcohol     Types: 4 Glasses of wine per week     Comment: occasional     Drug use: No     Sexual activity: Not Currently     Birth control/protection: None   Other Topics Concern     Parent/sibling w/ CABG, MI or angioplasty before 65F 55M? Yes     Comment: Father heart attack   Social History Narrative     Not on file     Social Determinants of Health     Financial Resource Strain: Not on file   Food Insecurity: Not on file   Transportation Needs: Not on file   Physical Activity: Not on file   Stress: Not on file   Social Connections: Not on file   Intimate Partner Violence: Not on file   Housing Stability: Not on file       FAMILY HISTORY:  Family History   Problem Relation Age of Onset     Cancer Mother         bone cancer     Other Cancer Mother      Heart Disease Father      Coronary Artery Disease Father       "Diabetes Father      Arthritis Sister      Breast Cancer Sister         age 75     Diabetes Maternal Grandmother      Diabetes Paternal Grandmother      Family history of cancer- sister breast cancer- dx at age 77 y/o, localized breast cancer, surgical resection, RT, no adjuvant chemo or endocrine therapy that pt is aware of     PHYSICAL EXAM:  Vital signs:  /81 (BP Location: Right arm)   Pulse 82   Temp 97.7  F (36.5  C) (Oral)   Resp 16   Ht 1.6 m (5' 2.99\")   Wt 65 kg (143 lb 3.2 oz)   SpO2 95%   BMI 25.37 kg/m       GENERAL/CONSTITUTIONAL: No acute distress.  EYES: Pupils are equal and round. Extraocular movements intact without nystagmus.  No scleral icterus. Minimal ptosis left eye  RESPIRATORY: Equal chest rise.   MUSCULOSKELETAL: Warm and well-perfused, no cyanosis, clubbing, or edema.   NEUROLOGIC: Cranial nerves are grossly intact. Alert, oriented to person, place and time, answers questions appropriately.  INTEGUMENTARY: No rashes or jaundice.  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  And B-cell cohort #2 which is described as negative for CD5 and showing dim CD20 and dim kappa staining.  This population is listed a CD5 negative and CD10 negative also listed as negative for  and .  Of note the population of monoclonal B-cell lymphocytosis found in peripheral blood in our department and in the lymph node biopsy in January showed " partial/equivocal dim CD5 and may correspond to the cohort #2.     We essentially agree with the diagnosis of follicular lymphoma, which is consistent with prior diagnosis of follicular lymphoma low grade  established on core biopsy of right external iliac lymph node January 20, 2023 (case US [id number]) in our Department . The concurrent flow cytometry case [id number] in January showed in addition to CD10 positive B-cell population  also a partial/equivocal CD5 positive separate kappa monotypic B-cell population, which most likely represents monoclonal B-cell lymphocytosis of CLL/SLL type, since it was also documented in the peripheral blood flow cytometry on February 16, 2023 (case number [facility] ) at a low frequency.  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 Areas with infiltrates of small B cells with coexpression of CD5 and CD23 are seen in the current biopsy, but the size of the biopsy is too small to comprehensively evaluate for the presence of proliferation centers and the extent of involvement by the CD5 positive B cell lymphoproliferative process.   This case has been discussed at intradepartmental hematopathology conference with participation by THELMA Petersen, THELMA Davis and myself.        IMAGING:    ASSESSMENT/PLAN:  Virginia Byers is a 72 year old female with:    # 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 INCLUDING LN and left orbital mass/lacrimal gland  -11/22 MRI lumbar spine in 12/22 MRI pelvis show prominent left iliac lymph nodes (measuring 1.8 x 3.2, previously 1.5 x 2.7 cm), right medial thigh lymphadenopathy (1.7cm), right iliac bone enhancing marrow signal, left posterior iliac enhancing lesion  -12/22 CT chest abdomen pelvis shows new or increased  "left common iliac (1.2cm), external iliac, pelvic sidewall lymphadenopathy (2.1cm) and sclerotic lesions in left sacrum and left posterior ilium. No splenomegaly  - 1/23 brain MRI: Indeterminate ovoid T2 hyperintense enhancing mass in the region of the left lacrimal gland measuring approximately 2.4 cm AP by 1.4 cm transverse by 2.3 cm craniocaudal associated with/replacing the left lacrimal gland. Consider correlation with tissue sampling    -1/20/2023: IR US Guided core needle biopsy of a right external iliac LN:   -follicular lymphoma, low grade WHO grade 1-2 (negative for metastatic carcinoma),  - no high grade lymphoma present,   - FISH negative for BCL2 rearrangement;   - IHC: neoplastic cells demonstrate expression of CD20, BCL-6 (in follicles), and CD10 (bright). CD5 stains T cells  - Ki-67 proliferative index is approximately 10%, also low in the follicles.  - flow cytometry \"CD10-positive population is consistent with a clonal B cell population and a CD10 positive B-cell lymphoma is favored.\"    - 4/6/23 left orbital mass/lacrimal gland excisional biopsy: PATHOLOGY: - Follicular Lymphoma, low grade. 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  And B-cell cohort #2 which is described as negative for CD5 and showing dim CD20 and dim kappa staining.  This population is listed a CD5 negative and CD10 negative also listed as negative for  and .  Of note the population of monoclonal B-cell lymphocytosis found in peripheral blood in our department and in the lymph node biopsy in January showed partial/equivocal dim CD5 and may correspond to the cohort #2.     We essentially agree with the diagnosis of follicular lymphoma, which is consistent " with prior diagnosis of follicular lymphoma low grade  established on core biopsy of right external iliac lymph node January 20, 2023 (case US [id number]) in our Department . The concurrent flow cytometry case [id number] [id number] in January showed in addition to CD10 positive B-cell population  also a partial/equivocal CD5 positive separate kappa monotypic B-cell population, which most likely represents monoclonal B-cell lymphocytosis of CLL/SLL type, since it was also documented in the peripheral blood flow cytometry on February 16, 2023 (case number [facility] ) at a low frequency.  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 Areas with infiltrates of small B cells with coexpression of CD5 and CD23 are seen in the current biopsy, but the size of the biopsy is too small to comprehensively evaluate for the presence of proliferation centers and the extent of involvement by the CD5 positive B cell lymphoproliferative process.     Staging and prognostication:  - Stage: 4 given right medial thigh SURINDER and left common iliac, external iliac, pelvic sidewall; biopsy proven left orbital mass involvement; there was some concern from ENT that patients parotid lesions were related to pts follicular lymphoma   - FLIPI: score 2 (age>60),  stage 3-4; intermediate risk  - pertinent labs: , 2/23 beta 2 microglobulin 1.7, hepatitis B and C negative    PLAN  -  given pt has LN below diaphragm and lacrimal gland involvement, would have stage 4 disease rather than non-contiguous stage 2 disease; and at least intermediate risk disease  - check PET/CT NCAP for restaging and to look for indication of transformation, compare to 12/22 CT CAP to determine stead vs progression of disease  - repeat CBC, CMP, LDH, beta 2  "microglobulin; if cytopenias will do bone marrow biopsy  - regarding tx- GELF criteria to indicate treatment (involvement of 3 or more bj sites each with a diameter greater than or equal to 3 cm, any bj or extranodal tumor mass with a diameter of greater than or equal to 7 cm, B symptoms, splenomegaly, pleural effusions, peritoneal ascites, clinically progressive or significant cytopenias, leukemia, symptoms, threatened end organ function, clinically significant bulky disease, steady or rapid progression of disease)  - pt was previously offered single agent weekly rituximab x4 weeks but pt is concerned about AE of rituximab and would like to hold off on this    #b/l parotid gland nodules  - 1/23 brain MRI:  A few indeterminate partially visualized heterogeneous enhancing nodules measuring up to approximately 1.5 cm in the bilateral parotid glands.  Consider soft tissue neck CT with contrast for further evaluation.  - 1/23 CT soft tissue neck: Soft tissue nodule in the superficial left parotid gland measuring 1.2 cm. Additional 0.8 cm nodule in the deep portion of the left parotid gland. Soft tissue nodule in the superficial right parotid gland measuring 0.8 cm. These may represent benign or malignant parotid lesions. Multiplicity of lesions raises concern for Warthin tumors  - 1/23/23 case reviewed by Dr. Wilmar Morales  - 1/27/23 note from Dr. Wilmar Morales- \"Based on this diagnosis, I think the 2 nodules in her parotid gland are likely atypical lymph nodes containing lymphoma.  She will probably require some staging imaging, such as PET scan and we can take a look at that as well.  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 The CD5 (dimly) positive B cells mostly lack CD23 and CD79b. We looked for the morphologic correlate in this case by IHC, but it is not readily apparent.   - of note pt does not have leukocytosis or lymphocytosis, ALC 2.2, no splenomegaly, LN biopsy did not demonstrate IHC consistent with SLL   -  peripheral blood flow cytometry: CD5 positive kappa-monotypic B cells (2.3%), consistent with a CD5 positive monotypic B cell population. The differential diagnosis includes chronic lymphocytic leukemia/small lymphocytic lymphoma (CLL/SLL), mantle cell lymphoma, and rare other entities; the immunophenotype favors CLL/SLL. based on the most recent reported WBC of 1.8 x10^9/L (2022), the findings are consistent with low count monoclonal B cell lymphocytosis. Clinical correlation to evaluate for lymph node involvement is recommended. The monotypic B cell population is negative for CD38 and positive for CD49d. CD38 and CD49d are potential prognostic markers in CLL/SLL   - 23 left orbital mass/lacrimal gland excisional biopsy: PATHOLOGY: - Follicular Lymphoma, low grade. 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  SIFE: trace amount of IgG kappa monoclonal protein  2.  SPEP: no monoclonal protein seen  3.  UIFE: no monoclonal protein seen  4.  UPEP: no monoclonal protein seen  5.  Kappa/lambda ratio: kappa 2.17, lambda 1.67, kappa/lambda ratio 1.30   6.  Quantitative immunoglobulins: Ig    IgA: 187     IgM: 165  - repeat protein studies    #History of LEFT breast IDC ER positive, NH positive, HER2 positive on FISH  -Diagnosed at age 49  - 3/2000 left breast lumpectomy and left axillary node dissection (IDC, grade 3, 1.45 cm incompletely excised tumor, DCIS, positive margins with infiltrating carcinoma, 1 of 17 lymph nodes positive (0.5 cm metastatic focus), ER positive, NH positive, HER2 positive).    -4/2020 left breast simple mastectomy with no residual carcinoma.   -S/p ACx4, taxol x4, no anti-her2 treatment, no RT.   -S/p 5 years adjuvant endocrine therapy (tamoxifen and anastrozole).  -Delayed left breast reconstruction and right breast augmentation.     #Stable sclerotic lesions in left sacrum and left posterior ilium  -10/21 mammogram, endoscopy with EUS and colonoscopy WNL  -12/22 CT chest abdomen pelvis shows new or increased left common iliac, external iliac, pelvic sidewall lymphadenopathy and sclerotic lesions in left sacrum and left posterior ilium.  No metastatic disease in chest.  No abnormal findings in breast.  - 12/22 tumor markers: CA 15-3 24,  29, CEA 3.1, CA 19-9 4, AFP 5.9,   - 1/2023 MRI brain w/wo contrast: no metastases   - 1/2023 right external iliac LN core need biopsy- no metastatic carcinoma     RTC 3 weeks for f/u with elizabet Zaidi DO  Hematology/Oncology  [facility]

## 2023-05-10 NOTE — TELEPHONE ENCOUNTER
Date Scheduled: 7-11-23  Facility: Surgery Locations: [facility]  Surgeon: Dr. Blood   Post-op appointment scheduled:    scheduled?: No  Surgery packet/instructions confirmed received?  Yes  Pre op physical/PAC appointment:   Special Considerations:     Cheyenne Cervantes  Surgery Scheduling Coordinator  [phone]

## 2023-05-11 ENCOUNTER — ONCOLOGY VISIT (OUTPATIENT)
Dept: ONCOLOGY | Facility: CLINIC | Age: 73
End: 2023-05-11
Payer: MEDICARE

## 2023-05-11 VITALS
HEIGHT: 63 IN | OXYGEN SATURATION: 95 % | RESPIRATION RATE: 16 BRPM | DIASTOLIC BLOOD PRESSURE: 81 MMHG | HEART RATE: 82 BPM | SYSTOLIC BLOOD PRESSURE: 136 MMHG | WEIGHT: 143.2 LBS | TEMPERATURE: 97.7 F | BODY MASS INDEX: 25.37 KG/M2

## 2023-05-11 DIAGNOSIS — C82.00 FOLLICULAR LYMPHOMA GRADE I, UNSPECIFIED BODY REGION (H): Primary | ICD-10-CM

## 2023-05-11 DIAGNOSIS — D47.2 MGUS (MONOCLONAL GAMMOPATHY OF UNKNOWN SIGNIFICANCE): ICD-10-CM

## 2023-05-11 DIAGNOSIS — C82.09 FOLLICULAR LYMPHOMA GRADE I, EXTRANODAL AND SOLID ORGAN SITES (H): ICD-10-CM

## 2023-05-11 LAB
ALBUMIN SERPL-MCNC: 3.7 G/DL (ref 3.4–5)
ALP SERPL-CCNC: 122 U/L (ref 40–150)
ALT SERPL W P-5'-P-CCNC: 20 U/L (ref 0–50)
ANION GAP SERPL CALCULATED.3IONS-SCNC: 6 MMOL/L (ref 3–14)
AST SERPL W P-5'-P-CCNC: 13 U/L (ref 0–45)
BASOPHILS # BLD AUTO: 0.1 10E3/UL (ref 0–0.2)
BASOPHILS NFR BLD AUTO: 1 %
BILIRUB SERPL-MCNC: 0.7 MG/DL (ref 0.2–1.3)
BUN SERPL-MCNC: 21 MG/DL (ref 7–30)
CALCIUM SERPL-MCNC: 10.3 MG/DL (ref 8.5–10.1)
CHLORIDE BLD-SCNC: 103 MMOL/L (ref 94–109)
CO2 SERPL-SCNC: 27 MMOL/L (ref 20–32)
CREAT SERPL-MCNC: 0.5 MG/DL (ref 0.52–1.04)
EOSINOPHIL # BLD AUTO: 0.2 10E3/UL (ref 0–0.7)
EOSINOPHIL NFR BLD AUTO: 2 %
ERYTHROCYTE [DISTWIDTH] IN BLOOD BY AUTOMATED COUNT: 15.2 % (ref 10–15)
GFR SERPL CREATININE-BSD FRML MDRD: >90 ML/MIN/1.73M2
GLUCOSE BLD-MCNC: 118 MG/DL (ref 70–99)
HCT VFR BLD AUTO: 44.3 % (ref 35–47)
HGB BLD-MCNC: 14.4 G/DL (ref 11.7–15.7)
IMM GRANULOCYTES # BLD: 0 10E3/UL
IMM GRANULOCYTES NFR BLD: 0 %
LDH SERPL L TO P-CCNC: 526 U/L (ref 81–234)
LYMPHOCYTES # BLD AUTO: 1.9 10E3/UL (ref 0.8–5.3)
LYMPHOCYTES NFR BLD AUTO: 19 %
MCH RBC QN AUTO: 27.7 PG (ref 26.5–33)
MCHC RBC AUTO-ENTMCNC: 32.5 G/DL (ref 31.5–36.5)
MCV RBC AUTO: 85 FL (ref 78–100)
MONOCYTES # BLD AUTO: 0.7 10E3/UL (ref 0–1.3)
MONOCYTES NFR BLD AUTO: 7 %
NEUTROPHILS # BLD AUTO: 7 10E3/UL (ref 1.6–8.3)
NEUTROPHILS NFR BLD AUTO: 71 %
NRBC # BLD AUTO: 0 10E3/UL
NRBC BLD AUTO-RTO: 0 /100
PLATELET # BLD AUTO: 357 10E3/UL (ref 150–450)
POTASSIUM BLD-SCNC: 4.2 MMOL/L (ref 3.4–5.3)
PROT SERPL-MCNC: 8.8 G/DL (ref 6.8–8.8)
RBC # BLD AUTO: 5.19 10E6/UL (ref 3.8–5.2)
SODIUM SERPL-SCNC: 136 MMOL/L (ref 133–144)
TOTAL PROTEIN SERUM FOR ELP: 8 G/DL (ref 6.4–8.3)
WBC # BLD AUTO: 10 10E3/UL (ref 4–11)

## 2023-05-11 PROCEDURE — 85025 COMPLETE CBC W/AUTO DIFF WBC: CPT | Performed by: INTERNAL MEDICINE

## 2023-05-11 PROCEDURE — 84165 PROTEIN E-PHORESIS SERUM: CPT

## 2023-05-11 PROCEDURE — 36415 COLL VENOUS BLD VENIPUNCTURE: CPT | Performed by: INTERNAL MEDICINE

## 2023-05-11 PROCEDURE — 83521 IG LIGHT CHAINS FREE EACH: CPT | Performed by: INTERNAL MEDICINE

## 2023-05-11 PROCEDURE — 86334 IMMUNOFIX E-PHORESIS SERUM: CPT

## 2023-05-11 PROCEDURE — 80053 COMPREHEN METABOLIC PANEL: CPT | Performed by: INTERNAL MEDICINE

## 2023-05-11 PROCEDURE — 82784 ASSAY IGA/IGD/IGG/IGM EACH: CPT | Performed by: INTERNAL MEDICINE

## 2023-05-11 PROCEDURE — 99214 OFFICE O/P EST MOD 30 MIN: CPT | Performed by: INTERNAL MEDICINE

## 2023-05-11 PROCEDURE — 82232 ASSAY OF BETA-2 PROTEIN: CPT | Performed by: INTERNAL MEDICINE

## 2023-05-11 PROCEDURE — 83615 LACTATE (LD) (LDH) ENZYME: CPT | Performed by: INTERNAL MEDICINE

## 2023-05-11 PROCEDURE — 84155 ASSAY OF PROTEIN SERUM: CPT | Mod: 59 | Performed by: INTERNAL MEDICINE

## 2023-05-11 ASSESSMENT — PAIN SCALES - GENERAL: PAINLEVEL: SEVERE PAIN (7)

## 2023-05-11 NOTE — LETTER
5/11/2023         RE: Virginia Byers  [id number] [facility] [address]        Dear Colleague,    Thank you for referring your patient, Virginia Byers, to the [facility]. Please see a copy of my visit note below.  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    Patient has medical history including LEFT breast cancer in 2000, hypertension, hyperlipidemia, osteoporosis, osteoarthritis, degenerative disc disease, spinal stenosis of lumbar region,  s/p bilateral L5-S1 transforaminal ROS by Dr. Garcia on 7/9/2021, s/p bilateral L4-5 and L5-S1 facet joint injections on 6/14/2021, glaucoma and macular degeneration, cataracts s/p surgery, history of tobacco use (quit 1999)     Regarding previous history of breast cancer:  She was previously treated by Dr. Sakina Brandt at [facility]      In summary, diagnosed at age 49 with LEFT breast IDC ER positive, LA positive, HER2 positive on FISH. 3/2000 left breast lumpectomy and left axillary node dissection (IDC, grade 3, 1.45 cm incompletely excised tumor, DCIS, positive margins with infiltrating carcinoma, 1 of 17 lymph nodes positive (0.5 cm metastatic focus), ER positive, LA positive, HER2 positive).  4/2020 left breast simple mastectomy with no residual carcinoma. S/p ACx4, taxol x4, no anti-her2 treatment, no RT. S/p 5 years adjuvant endocrine therapy (tamoxifen and anastrozole). Delayed left breast reconstruction and right breast augmentation.      3/2/2000  Excisional biopsy of Left breast mass -   Infiltrating ductal carcinoma with associated DCIS   ER positive (60%), LA positive (22%), HER2 by FISH POSITIVE     3/10/2000 - Left lumpectomy  and left axillary node  Invasive ductal carcinoma, Grade 3, 1.45 cm (incompletely excised), negative for LVI  DCIS, greatest histologic dimension of tumor (DCIS PLUS invasive tumor) = 2.9 cm (estimate 50% DCIS)  Positive margins- infiltrating carcinoma extends to margin  1 of 17 axillary lymph nodes sampled was positive for metastatic focus of 0.5 cm.  ER/OK+     Sections of lumpectomy left breast showing residual 1.1 cm focus of ductal carcinoma in situ (DCIS), present within microns of the linked margin of resection.       4/11/2000 - Left breast simple mastectomy, negative for residual carcinoma  Left mastectomy on 4/11/2000 performed by Dr. Cristian Mcdonald at [facility].      Adjuvant treatment:  - 5/15/2000-7/24/2000 4 cycles of Adriamycin and Cytoxan   - 8/16/2000-10/18/2000 4 cycles of paclitaxel   - Appears she may have been on some kind of trial/protocol, was not given any anti-HER2 targeted treatment  - No adjuvant radiation  - 12/2000- 10/2002 tamoxifen  - 10/2002-5/2006 switched to anastrozole (due to in vitro studies showing anastrozole may be slightly better for patients were HER2 positive) with Fosamax for osteopenia     8/31/2015 Left delayed reconstruction with tissue expander    2/3/2016 Left 2nd stage 450 cc high profile silicone implant; right augmentation 150 cc Moderate classic profile silicone implant; cresent mastopexy    10/18/2021: Screening mammogram right breast ZACKARY    OTHER:  Of note, patient has documentation of anemia (iron deficiency and anemia of chronic disease), elevated LFTs, nausea and vomiting of all solids and liquids, fatigue with 26 pound unintentional weight loss from 7/20/2021 - 10/20/2021 with mildly elevated LFTs which led to evaluation with mammogram, endoscopy with EUS and colonoscopy as detailed below. Pt did associate these with some spinal injections she had gotten, due to overlapping times. Pt did regain all of the weight and hand improvement of anemia within  2 months, without significant intervention.     -10/2021: Endoscopy with EUS normal EGD and no stigmata or source of upper GI bleeding, visualized bile duct, gallbladder, liver, pancreas, left adrenal gland normal.  No lymphadenopathy. LIVER, RANDOM NEEDLE BIOPSY: Benign parenchyma with congestion; no significant fibrosis or other abnormalities     -10/2021 colonoscopy: Hemorrhoids, diverticulosis in sigmoid colon and splenic flexure    Current history:  -Ongoing right knee pain not improved with brace and physical therapy, s/p bilateral L5-S1 transforaminal ROS by Dr. Garcia on 7/9/2021, s/p bilateral L4-5 and L5-S1 facet joint injections on 6/14/2021. ongoing b/l hip and leg pain, worse with position changes (ie sitting to standing), constant, 8-9/10, tylenol brings pain down to 6/10 (using tylenol bid). ECOG 3.   -Patient has seen neurosurgery Dr. Jose Alexander, occupational medicine specialist Dr.Orrin Murphy, with plan to see PM&R Dr. Margie Agudelo and movement disorder clinic  - 11/22 MRI lumbar spine:    prominent left iliac lymph nodes among other findings related to degenerative changes and neural foraminal stenosis throughout lumbar spine    - 12/22 MRI pelvis:  1. Enhancing marrow signal abnormality of the right iliac bone extending from the superior acetabulum subchondral location proximally almost to the level of the sacroiliac joint caudal aspect. No associated fracture line. Underlying marrow infiltrative process such as from metastasis cannot be excluded especially given the degree of T1 hypointensity and history of primary tumor. Other consideration include stress reaction.  2. Mildly increased left external iliac chain, and the right medial thigh lymphadenopathy. Metastasis cannot be excluded.  3. Left posterior iliac enhancing lesion, similar in size to prior CT 9/2021 and previously not visible on CT, focal enhancing lesion in the right greater trochanter laterally. Findings are concerning for  Metastasis.    -12/22 CT chest abdomen pelvis with contrast:  COMPARISON: MRI pelvis 12/20/2022 and CT abdomen pelvis 9/21/2021.  1. Mastectomies with implant reconstructions. No lymphadenopathy  2.  There is an new or increased left common iliac, external iliac and pelvic sidewall lymphadenopathy since prior CT. Distal left common iliac lymph nodes measure up to 1.2 cm on this exam compared to 0.7 cm on prior CT. Left pelvic sidewall lymph node measures 2.1 cm short axis on this exam compared to 1.2 cm on prior CT . consistent with metastatic disease.  3. Sclerotic lesions in the left sacrum and left posterior ilium are unchanged from prior CT.  may be metastatic.  4. No evidence of metastatic disease in the chest.     -1/2023 MRI brain w/wo contrast:  IMPRESSION:  1. No findings of intracranial metastatic disease.  2. Indeterminate ovoid T2 hyperintense enhancing mass in the region of the  left lacrimal gland measuring approximately 2.4 cm AP by 1.4 cm  transverse by 2.3 cm craniocaudal associated with/replacing the left  lacrimal gland. Consider correlation with tissue sampling.  3. A few indeterminate partially visualized heterogeneous enhancing  Nodules measuring up to approximately 1.5 cm in the bilateral parotid glands.   Consider soft tissue neck CT with contrast for further evaluation.    -1/20/2023: IR US Guided biopsy of a right external iliac LN  Final Diagnosis   A.  LYMPH NODE, RIGHT EXTERNAL ILIAC, CORE NEEDLE BIOPSY AND TOUCH IMPRINT:  -Involved by follicular lymphoma, low-grade (WHO grade 1-2)  -Negative for metastatic carcinoma     The morphologic and immunophenotypic patterns are consistent with follicular lymphoma.  There is no high-grade lymphoma present in this limited specimen.  Concurrent flow study ([id number]) demonstrated CD10-positive lambda monotypic B cells (22%), rare kappa monotypic B cells (1.3%), and no aberrant immunophenotype on T cells.  A FISH study for Bcl-2 is pending and will  be reported separately.     The case was initially reviewed by Dr. Cifuentes (breast pathology).     This case was reviewed in part at our Hematopathology Faculty Consensus conference at which Girish Nolasco, Narciso, and Kayode were present in addition to myself.  This is a small needle core.  In some of the areas, the CD10+ B cells are confined to follicles.  Though the possibility of in situ follicular neoplasia was discussed, we still favor a diagnosis of follicular lymphoma - there are a number of CD10+ B cells outside of follicles at the base of the biopsy, and the clinical history supports a greater extent of involvement.     There was a tiny kappa monotypic B cell population identified by flow (separate from the CD10+ clone).  We looked for the morphologic correlate in this case by IHC, but it is not readily apparent.  This may represent a monoclonal B lymphocytosis - consider sending a peirpheral blood sample for flow cytometry.    FISH:  RESULTS:     NORMAL  - No rearrangement of BCL2     INTERPRETATION:  No evidence was found by FISH of rearrangement of the BCL2 (18q21) locus. These findings are not helpful for confirming or further characterizing the reported pathologic diagnosis of follicular lymphoma.    Flow Interpretation   A. Lymph Node(s), :  -CD10-positive lambda monotypic B cells (22%)  -Rare kappa monotypic B cells (1.3%)  -No aberrant immunophenotype on T cells  See comment   Electronically signed by Krista Headley MD on 1/24/2023 at  3:10 PM   Comment     The CD10-positive population is consistent with a clonal B cell population and a CD10 positive B-cell lymphoma is favored.      In addition, a second clonal B-cell population is identified which has a CLL-like immunophenotype except that CD5 expression is equivocal, this may represent a monoclonal B-cell lymphocytosis (MBL).     Addendum   INTERPRETATION  The diagnosis remains the same (see comment)     REASON FOR ADDENDUM  This addendum  is issued to reflect additional testing performed on this case. See Comment.      COMMENT  Additional multi-color flow analysis was performed for the following markers:   CD23, CD79b     The CD5 (dimly) positive B cells mostly lack CD23 and CD79b        1/23/23 CT neck:  A) Heterogeneous nodule in the right thyroid gland measuring up  to 1.6 cm.   B) Soft tissue nodule in the superficial left parotid  gland measuring 1.2 cm. Additional 0.8 cm nodule in the deep portion  of the left parotid gland. Soft tissue nodule in the superficial right  parotid gland measuring 0.8 cm. These may represent benign or malignant parotid lesions. Multiplicity of lesions raises concern for Warthin tumors. Otolaryngology consultation is  recommended.  C) Prominent soft tissue around the major arteries in the neck  particularly the common and internal carotid arteries. This is  atypical for atherosclerotic disease and vasculopathy/vasculitis such  as giant cell arteritis should be considered. Probable superimposed  atherosclerotic disease at the carotid bifurcations right greater than  left without definite high-grade stenosis.    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  And B-cell cohort #2 which is described as negative for CD5 and showing dim CD20 and dim kappa staining.  This population is listed a CD5 negative and CD10 negative also listed as negative for  and  .  Of note the population of monoclonal B-cell lymphocytosis found in peripheral blood in our department and in the lymph node biopsy in January showed partial/equivocal dim CD5 and may correspond to the cohort #2.     We essentially agree with the diagnosis of follicular lymphoma, which is consistent with prior diagnosis of follicular lymphoma low grade  established on core biopsy of right external iliac lymph node January 20, 2023 (case US [id number]) in our Department . The concurrent flow cytometry case [id number] in January showed in addition to CD10 positive B-cell population  also a partial/equivocal CD5 positive separate kappa monotypic B-cell population, which most likely represents monoclonal B-cell lymphocytosis of CLL/SLL type, since it was also documented in the peripheral blood flow cytometry on February 16, 2023 (case number [facility] ) at a low frequency.  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 Areas with infiltrates of small B cells with coexpression of CD5 and CD23 are seen in the current biopsy, but the size of the biopsy is too small to comprehensively evaluate for the presence of proliferation centers and the extent of involvement by the CD5 positive B cell lymphoproliferative process.   This case has been discussed at intradepartmental hematopathology conference with participation by THELMA Petersen, THELMA Davis and myself.          -3/23 neurology movement d/o consult- multifactorial gait problem w/orthopedic troubles/arthritis; cannot r/o underlying parkinsonism so will do carbidopa/levodopa challenge and f/u in 4 months     - 4/23 sports medicine consult- referral for steroid injection but not given as this would preclude her from getting  surgery for up to 3 months    - 4/23 pain management consult- rec PT, started on duloxetine- pt does not wish to take this     - 4/23 orthopedic surgery consult- severe b/l hip osteoarthritis, planned hip replacement 7/23    - pt reports no changes in left eye- vision normal, no pain; pt denies B sx, sx associated w/splenomegaly, no dyspnea, ascites, abdominal or pelvic pain       Upcoming appts:  Rheumatology appt 7/10/23, ESR and CRP elevated, DEVON positive 1:160 speckled; PCP suspects pt may have PMR     REVIEW OF SYSTEMS:   A 14 point ROS was reviewed with pertinent positives and negatives in the HPI.       HOME MEDICATIONS:  Current Outpatient Medications   Medication Sig Dispense Refill     dorzolamide-timolol (COSOPT) 2-0.5 % ophthalmic solution Place 1 drop into both eyes 2 times daily       DULoxetine (CYMBALTA) 20 MG capsule Start 20 mg daily x 2 weeks, then increase to 40 mg daily. 60 capsule 1     latanoprost (XALATAN) 0.005 % ophthalmic solution Place 1 drop into both eyes daily        losartan (COZAAR) 100 MG tablet Take 1 tablet (100 mg) by mouth daily 90 tablet 0     rosuvastatin (CRESTOR) 10 MG tablet TAKE 1 TABLET (10 MG) BY MOUTH DAILY. (Patient taking differently: Take 10 mg by mouth every 7 days) 90 tablet 3     carbidopa-levodopa (SINEMET)  MG tablet Take 2.5 tablets by mouth 3 times daily 675 tablet 3     predniSONE (DELTASONE) 20 MG tablet Take 1 tablet (20 mg) by mouth daily 30 tablet 0         ALLERGIES:  Allergies   Allergen Reactions     Compazine      Mental confusion     Hydrochlorothiazide      Dryness mouth     Prochlorperazine Visual Disturbance     Simvastatin Other (See Comments)     Muscle aches         PAST MEDICAL HISTORY:  Past Medical History:   Diagnosis Date     Breast cancer (H) 2000    Lt breast     Glaucoma     bilateral     Hyperlipidemia      Hypertension      Macular degeneration      Osteoarthritis      Osteoporosis          PAST SURGICAL HISTORY:  Past Surgical  History:   Procedure Laterality Date     C MASTECTOMY,SIMPLE  3/2000    left     COLONOSCOPY  2006    Q 10 years     COLONOSCOPY N/A 10/4/2021    Procedure: COLONOSCOPY;  Surgeon: Guru Lyla Ruby MD;  Location: [facility] OR     ESOPHAGOSCOPY, GASTROSCOPY, DUODENOSCOPY (EGD), COMBINED N/A 10/4/2021    Procedure: ENDOSCOPIC ULTRASOUND, ESOPHAGOSCOPY / UPPER GASTROINTESTINAL TRACT (GI), Esophagogastroduodenoscopy,  Fine Needle Biopsy of liver;  Surgeon: Guru Lyla Ruby MD;  Location: [facility] OR     IR LYMPH NODE BIOPSY  2023     SURGICAL HISTORY OF -  Left 2015    tissue expander placed in left breast area     SURGICAL HISTORY OF -  Left 2016    left breast implant and right mammoplasty         SOCIAL HISTORY:  Social History     Socioeconomic History     Marital status: Single     Spouse name: Not on file     Number of children: 0     Years of education: Not on file     Highest education level: Not on file   Occupational History     Employer: [employer]     Occupation: REtired   Tobacco Use     Smoking status: Former     Packs/day: 1.00     Years: 17.00     Pack years: 17.00     Types: Cigarettes     Quit date: 3/15/1999     Years since quittin.1     Smokeless tobacco: Never   Vaping Use     Vaping status: Not on file   Substance and Sexual Activity     Alcohol use: Not Currently     Alcohol/week: 4.0 standard drinks of alcohol     Types: 4 Glasses of wine per week     Comment: occasional     Drug use: No     Sexual activity: Not Currently     Birth control/protection: None   Other Topics Concern     Parent/sibling w/ CABG, MI or angioplasty before 65F 55M? Yes     Comment: Father heart attack   Social History Narrative     Not on file     Social Determinants of Health     Financial Resource Strain: Not on file   Food Insecurity: Not on file   Transportation Needs: Not on file   Physical Activity: Not on file   Stress: Not on file   Social Connections: Not on file   Intimate  "Partner Violence: Not on file   Housing Stability: Not on file       FAMILY HISTORY:  Family History   Problem Relation Age of Onset     Cancer Mother         bone cancer     Other Cancer Mother      Heart Disease Father      Coronary Artery Disease Father      Diabetes Father      Arthritis Sister      Breast Cancer Sister         age 75     Diabetes Maternal Grandmother      Diabetes Paternal Grandmother      Family history of cancer- sister breast cancer- dx at age 75 y/o, localized breast cancer, surgical resection, RT, no adjuvant chemo or endocrine therapy that pt is aware of     PHYSICAL EXAM:  Vital signs:  /81 (BP Location: Right arm)   Pulse 82   Temp 97.7  F (36.5  C) (Oral)   Resp 16   Ht 1.6 m (5' 2.99\")   Wt 65 kg (143 lb 3.2 oz)   SpO2 95%   BMI 25.37 kg/m       GENERAL/CONSTITUTIONAL: No acute distress.  EYES: Pupils are equal and round. Extraocular movements intact without nystagmus.  No scleral icterus. Minimal ptosis left eye  RESPIRATORY: Equal chest rise.   MUSCULOSKELETAL: Warm and well-perfused, no cyanosis, clubbing, or edema.   NEUROLOGIC: Cranial nerves are grossly intact. Alert, oriented to person, place and time, answers questions appropriately.  INTEGUMENTARY: No rashes or jaundice.  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  And B-cell cohort #2 which is described as negative for CD5 and showing dim CD20 and dim kappa staining.  " This population is listed a CD5 negative and CD10 negative also listed as negative for  and .  Of note the population of monoclonal B-cell lymphocytosis found in peripheral blood in our department and in the lymph node biopsy in January showed partial/equivocal dim CD5 and may correspond to the cohort #2.     We essentially agree with the diagnosis of follicular lymphoma, which is consistent with prior diagnosis of follicular lymphoma low grade  established on core biopsy of right external iliac lymph node January 20, 2023 (case US [id number]) in our Department . The concurrent flow cytometry case [id number] in January showed in addition to CD10 positive B-cell population  also a partial/equivocal CD5 positive separate kappa monotypic B-cell population, which most likely represents monoclonal B-cell lymphocytosis of CLL/SLL type, since it was also documented in the peripheral blood flow cytometry on February 16, 2023 (case number UF ) at a low frequency.  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 Areas with infiltrates of small B cells with coexpression of CD5 and CD23 are seen in the current biopsy, but the size of the biopsy is too small to comprehensively evaluate for the presence of proliferation centers and the extent of involvement by the CD5 positive B cell lymphoproliferative process.   This case has been discussed at intradepartmental hematopathology conference with participation by THELMA Petersen, THELMA Davis and myself.        IMAGING:    ASSESSMENT/PLAN:  Virginia Byers is a 72 year old female with:    # 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 INCLUDING LN and left orbital mass/lacrimal gland  -11/22 MRI lumbar spine in 12/22 MRI pelvis show  "prominent left iliac lymph nodes (measuring 1.8 x 3.2, previously 1.5 x 2.7 cm), right medial thigh lymphadenopathy (1.7cm), right iliac bone enhancing marrow signal, left posterior iliac enhancing lesion  -12/22 CT chest abdomen pelvis shows new or increased left common iliac (1.2cm), external iliac, pelvic sidewall lymphadenopathy (2.1cm) and sclerotic lesions in left sacrum and left posterior ilium. No splenomegaly  - 1/23 brain MRI: Indeterminate ovoid T2 hyperintense enhancing mass in the region of the left lacrimal gland measuring approximately 2.4 cm AP by 1.4 cm transverse by 2.3 cm craniocaudal associated with/replacing the left lacrimal gland. Consider correlation with tissue sampling    -1/20/2023: IR US Guided core needle biopsy of a right external iliac LN:   -follicular lymphoma, low grade WHO grade 1-2 (negative for metastatic carcinoma),  - no high grade lymphoma present,   - FISH negative for BCL2 rearrangement;   - IHC: neoplastic cells demonstrate expression of CD20, BCL-6 (in follicles), and CD10 (bright). CD5 stains T cells  - Ki-67 proliferative index is approximately 10%, also low in the follicles.  - flow cytometry \"CD10-positive population is consistent with a clonal B cell population and a CD10 positive B-cell lymphoma is favored.\"    - 4/6/23 left orbital mass/lacrimal gland excisional biopsy: PATHOLOGY: - Follicular Lymphoma, low grade. 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  And B-cell cohort #2 which is described as negative for CD5 and showing dim CD20 and dim kappa staining.  This population is listed a CD5 negative and CD10 negative also listed as negative for  and .  Of note the population of monoclonal " B-cell lymphocytosis found in peripheral blood in our department and in the lymph node biopsy in January showed partial/equivocal dim CD5 and may correspond to the cohort #2.     We essentially agree with the diagnosis of follicular lymphoma, which is consistent with prior diagnosis of follicular lymphoma low grade  established on core biopsy of right external iliac lymph node January 20, 2023 (case US [id number]) in our Department . The concurrent flow cytometry case [id number] in January showed in addition to CD10 positive B-cell population  also a partial/equivocal CD5 positive separate kappa monotypic B-cell population, which most likely represents monoclonal B-cell lymphocytosis of CLL/SLL type, since it was also documented in the peripheral blood flow cytometry on February 16, 2023 (case number UF ) at a low frequency.  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 Areas with infiltrates of small B cells with coexpression of CD5 and CD23 are seen in the current biopsy, but the size of the biopsy is too small to comprehensively evaluate for the presence of proliferation centers and the extent of involvement by the CD5 positive B cell lymphoproliferative process.     Staging and prognostication:  - Stage: 4 given right medial thigh SURINDER and left common iliac, external iliac, pelvic sidewall; biopsy proven left orbital mass involvement; there was some concern from ENT that patients parotid lesions were related to pts follicular lymphoma   - FLIPI: score 2 (age>60),  stage 3-4; intermediate risk  - pertinent labs: , 2/23 beta 2 microglobulin 1.7, hepatitis B and C negative    PLAN  -  given pt has LN below diaphragm and lacrimal gland involvement, would have stage 4 disease rather  "than non-contiguous stage 2 disease; and at least intermediate risk disease  - check PET/CT NCAP for restaging and to look for indication of transformation, compare to 12/22 CT CAP to determine stead vs progression of disease  - repeat CBC, CMP, LDH, beta 2 microglobulin; if cytopenias will do bone marrow biopsy  - regarding tx- GELF criteria to indicate treatment (involvement of 3 or more bj sites each with a diameter greater than or equal to 3 cm, any bj or extranodal tumor mass with a diameter of greater than or equal to 7 cm, B symptoms, splenomegaly, pleural effusions, peritoneal ascites, clinically progressive or significant cytopenias, leukemia, symptoms, threatened end organ function, clinically significant bulky disease, steady or rapid progression of disease)  - pt was previously offered single agent weekly rituximab x4 weeks but pt is concerned about AE of rituximab and would like to hold off on this    #b/l parotid gland nodules  - 1/23 brain MRI:  A few indeterminate partially visualized heterogeneous enhancing nodules measuring up to approximately 1.5 cm in the bilateral parotid glands.  Consider soft tissue neck CT with contrast for further evaluation.  - 1/23 CT soft tissue neck: Soft tissue nodule in the superficial left parotid gland measuring 1.2 cm. Additional 0.8 cm nodule in the deep portion of the left parotid gland. Soft tissue nodule in the superficial right parotid gland measuring 0.8 cm. These may represent benign or malignant parotid lesions. Multiplicity of lesions raises concern for Warthin tumors  - 1/23/23 case reviewed by Dr. Wilmar Morales  - 1/27/23 note from Dr. Wilmar Morales- \"Based on this diagnosis, I think the 2 nodules in her parotid gland are likely atypical lymph nodes containing lymphoma.  She will probably require some staging imaging, such as PET scan and we can take a look at that as well.  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 The CD5 (dimly) positive B cells mostly lack CD23 and CD79b. We looked for the morphologic correlate in this case by IHC, but it is not readily apparent.   - of note pt does not have leukocytosis or lymphocytosis, ALC 2.2, no splenomegaly, LN biopsy did not demonstrate IHC consistent with SLL   - 2/23 peripheral blood flow cytometry: CD5 positive kappa-monotypic B cells (2.3%), consistent with a CD5 positive monotypic B cell population. The differential diagnosis includes chronic lymphocytic leukemia/small lymphocytic lymphoma (CLL/SLL), mantle cell lymphoma, and rare other entities; the immunophenotype favors CLL/SLL. based on the most recent reported WBC of 1.8 x10^9/L (12/28/2022), the findings are consistent with low count monoclonal B cell lymphocytosis. Clinical correlation to evaluate for lymph node involvement is recommended. The monotypic B cell population is negative for CD38 and positive for CD49d. CD38 and CD49d are potential prognostic markers in CLL/SLL   - 4/6/23 left orbital mass/lacrimal gland excisional biopsy: PATHOLOGY: - Follicular Lymphoma, low grade. 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  SIFE: trace amount of IgG kappa monoclonal " protein  2.  SPEP: no monoclonal protein seen  3.  UIFE: no monoclonal protein seen  4.  UPEP: no monoclonal protein seen  5.  Kappa/lambda ratio: kappa 2.17, lambda 1.67, kappa/lambda ratio 1.30   6.  Quantitative immunoglobulins: Ig    IgA: 187    IgM: 165  - repeat protein studies    #History of LEFT breast IDC ER positive, SC positive, 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    -2020 left breast simple mastectomy with no residual carcinoma.   -S/p ACx4, taxol x4, no anti-her2 treatment, no RT.   -S/p 5 years adjuvant endocrine therapy (tamoxifen and anastrozole).  -Delayed left breast reconstruction and right breast augmentation.     #Stable sclerotic lesions in left sacrum and left posterior ilium  -10/21 mammogram, endoscopy with EUS and colonoscopy WNL  - CT chest abdomen pelvis shows new or increased left common iliac, external iliac, pelvic sidewall lymphadenopathy and sclerotic lesions in left sacrum and left posterior ilium.  No metastatic disease in chest.  No abnormal findings in breast.  -  tumor markers: CA 15-3 24,  29, CEA 3.1, CA 19-9 4, AFP 5.9,   - 2023 MRI brain w/wo contrast: no metastases   - 2023 right external iliac LN core need biopsy- no metastatic carcinoma     RTC 3 weeks for f/u with me    Arlene Ahumada DO  Hematology/Oncology  [facility]    Again, thank you for allowing me to participate in the care of your patient.        Sincerely,        ARLENE AHUMADA DO

## 2023-05-11 NOTE — NURSING NOTE
"Oncology Rooming Note    May 11, 2023 8:45 AM   Virginia Byers is a 72 year old female who presents for:    Chief Complaint   Patient presents with     Oncology Clinic Visit     3 month follow up     Initial Vitals: /81 (BP Location: Right arm)   Pulse 82   Temp 97.7  F (36.5  C) (Oral)   Resp 16   Ht 1.6 m (5' 2.99\")   Wt 65 kg (143 lb 3.2 oz)   SpO2 95%   BMI 25.37 kg/m   Estimated body mass index is 25.37 kg/m  as calculated from the following:    Height as of this encounter: 1.6 m (5' 2.99\").    Weight as of this encounter: 65 kg (143 lb 3.2 oz). Body surface area is 1.7 meters squared.  Severe Pain (7) Comment: Data Unavailable   No LMP recorded. Patient is postmenopausal.  Allergies reviewed: Yes  Medications reviewed: Yes    Medications: Medication refills not needed today.  Pharmacy name entered into [employer]: [id number] - [place], [place] - [address]     Clinical concerns: No new concerns         Eri Garber LPN            "

## 2023-05-12 LAB
ALBUMIN SERPL ELPH-MCNC: 4.3 G/DL (ref 3.7–5.1)
ALPHA1 GLOB SERPL ELPH-MCNC: 0.4 G/DL (ref 0.2–0.4)
ALPHA2 GLOB SERPL ELPH-MCNC: 1.1 G/DL (ref 0.5–0.9)
B-GLOBULIN SERPL ELPH-MCNC: 1 G/DL (ref 0.6–1)
B2 MICROGLOB TUMOR MARKER SER-MCNC: 1.7 MG/L
GAMMA GLOB SERPL ELPH-MCNC: 1.3 G/DL (ref 0.7–1.6)
IGA SERPL-MCNC: 198 MG/DL (ref 84–499)
IGG SERPL-MCNC: 1284 MG/DL (ref 610–1616)
IGM SERPL-MCNC: 201 MG/DL (ref 35–242)
KAPPA LC FREE SER-MCNC: 2.03 MG/DL (ref 0.33–1.94)
KAPPA LC FREE/LAMBDA FREE SER NEPH: 1.11 {RATIO} (ref 0.26–1.65)
LAMBDA LC FREE SERPL-MCNC: 1.83 MG/DL (ref 0.57–2.63)
M PROTEIN SERPL ELPH-MCNC: 0 G/DL
PROT PATTERN SERPL ELPH-IMP: ABNORMAL
PROT PATTERN SERPL IFE-IMP: NORMAL

## 2023-05-18 ENCOUNTER — ANCILLARY ORDERS (OUTPATIENT)
Dept: ONCOLOGY | Facility: CLINIC | Age: 73
End: 2023-05-18

## 2023-05-18 ENCOUNTER — HOSPITAL ENCOUNTER (OUTPATIENT)
Dept: PET IMAGING | Facility: CLINIC | Age: 73
Discharge: HOME | End: 2023-05-18
Attending: INTERNAL MEDICINE
Payer: MEDICARE

## 2023-05-18 DIAGNOSIS — C82.00 FOLLICULAR LYMPHOMA GRADE I, UNSPECIFIED BODY REGION (H): ICD-10-CM

## 2023-05-18 DIAGNOSIS — C82.09 FOLLICULAR LYMPHOMA GRADE I, EXTRANODAL AND SOLID ORGAN SITES (H): ICD-10-CM

## 2023-05-18 PROCEDURE — 343N000001 HC RX 343: Performed by: INTERNAL MEDICINE

## 2023-05-18 PROCEDURE — 250N000011 HC RX IP 250 OP 636: Performed by: INTERNAL MEDICINE

## 2023-05-18 PROCEDURE — A9552 F18 FDG: HCPCS | Performed by: INTERNAL MEDICINE

## 2023-05-18 PROCEDURE — 70491 CT SOFT TISSUE NECK W/DYE: CPT

## 2023-05-18 PROCEDURE — G1010 CDSM STANSON: HCPCS | Mod: PI

## 2023-05-18 PROCEDURE — 74177 CT ABD & PELVIS W/CONTRAST: CPT

## 2023-05-18 RX ORDER — IOPAMIDOL 755 MG/ML
10-135 INJECTION, SOLUTION INTRAVASCULAR ONCE
Status: COMPLETED | OUTPATIENT
Start: 2023-05-18 | End: 2023-05-18

## 2023-05-18 RX ADMIN — FLUDEOXYGLUCOSE F-18 13.01 MILLICURIE: 500 INJECTION, SOLUTION INTRAVENOUS at 08:16

## 2023-05-18 RX ADMIN — IOPAMIDOL 80 ML: 755 INJECTION, SOLUTION INTRAVENOUS at 08:19

## 2023-05-22 NOTE — PROGRESS NOTES
Virginia returns today to discuss her hip.  She is here with a friend.  We discussed her orthopedic and oncology history at length.  We reviewed messages that I have had with her oncologist.  She has been provisionally cleared to schedule an arthroplasty however she does have test outstanding which may impact this in the future.  We discussed reasonable to proceed with total hip based on this and her desire to go forward as obviously understandable given her level of symptoms.. We spent twenty minutes discussing total hip arthroplasty.  We discussed the implants, the procedure, the risks and benefits, and the post-operative course.  We discussed blood clots, blood clots to the lungs, injury to blood vessels and nerves, dislocation, infection, and leg length difference.  We discussed that as part of the routine part of surgical care a qualified assist may finish closing the wound after I have left the room. All the patients questions were answered to the best of my ability.  Assessment: Severe bilateral hip osteoarthritis.    Plan: Staged total hip arthroplasties with the left side done first.  Orders have been placed.

## 2023-06-01 ENCOUNTER — OFFICE VISIT (OUTPATIENT)
Dept: PALLIATIVE MEDICINE | Facility: CLINIC | Age: 73
End: 2023-06-01
Payer: MEDICARE

## 2023-06-01 VITALS — DIASTOLIC BLOOD PRESSURE: 72 MMHG | SYSTOLIC BLOOD PRESSURE: 150 MMHG | HEART RATE: 82 BPM

## 2023-06-01 DIAGNOSIS — M79.10 MYALGIA: ICD-10-CM

## 2023-06-01 DIAGNOSIS — M16.0 PRIMARY OSTEOARTHRITIS OF BOTH HIPS: ICD-10-CM

## 2023-06-01 DIAGNOSIS — M79.604 BILATERAL LEG PAIN: Primary | ICD-10-CM

## 2023-06-01 DIAGNOSIS — M79.605 BILATERAL LEG PAIN: Primary | ICD-10-CM

## 2023-06-01 DIAGNOSIS — M51.369 DDD (DEGENERATIVE DISC DISEASE), LUMBAR: ICD-10-CM

## 2023-06-01 PROCEDURE — 99215 OFFICE O/P EST HI 40 MIN: CPT

## 2023-06-01 ASSESSMENT — PAIN SCALES - GENERAL: PAINLEVEL: SEVERE PAIN (7)

## 2023-06-01 NOTE — PATIENT INSTRUCTIONS
Pain Physical Therapy:  YES   - referred at last visit, was engaging in home PT. Initial visit with Yovani Preera PT on 6/29/23.      Pain Psychologist to address relaxation, behavioral change, coping style, and other factors important to improvement.  NO     Diagnostic Studies:  Reviewed lumbar MRI from 2022 - multilevel degenerative changes. Hip xray 4/3/23 demonstrated advanced degenerative joint changes. No new orders.      Medication Management:   Duloxetine stopped in between visits. Reports increased BP and vision changes (has hx of glaucoma). We discussed possible alternatives pregabalin, buprenorphine. Advised she may contact clinic after oncology appointment tomorrow and let me know if she would like to pursue one of two options:  Lyrica 50 mg twice daily - start 25 mg at bedtime and titrate to goal dose. If she decides to continue with plan for TACOS, I recommend we start with this medication first. Should she prefer to trial Butrans first, recommend we consider adding Lyrica adjunctively at some point if appropriate.   Butrans - we discuss buprenorphine today, Belbuca versus Butrans. She prefers transdermal option. If she decides to delay surgery, I advised that we can start with either Lyrica or buprenorphine. Will plan to start 5 mcg/hr patch, then have her follow up 3-4 weeks in person to assess for benefit and establish controlled substance agreement   Continue Tylenol 1000 mg three times daily.      Potential procedures:   None      Other Orders/Referrals:   Pain PT     Follow up with KOSTA Cross CNP as needed - if medications started in between visits, will advise on follow up interval.     ----------------------------------------------------------------  Clinic Number:  [phone]   Call with any questions about your care and for scheduling assistance.   Calls are returned Monday through Friday between 8 AM and 4:30 PM. We usually get back to you within 2 business days depending on the  issue/request.    If we are prescribing your medications:  For opioid medication refills, call the clinic or send a [employer] message 7 days in advance.  Please include:  Name of requested medication  Name of the pharmacy.  For non-opioid medications, call your pharmacy directly to request a refill. Please allow 3-4 days to be processed.   Per [place] State Law:  All controlled substance prescriptions must be filled within 30 days of being written.    For those controlled substances allowing refills, pickup must occur within 30 days of last fill.      We believe regular attendance is key to your success in our program!    Any time you are unable to keep your appointment we ask that you call us at least 24 hours in advance to cancel.This will allow us to offer the appointment time to another patient.   Multiple missed appointments may lead to dismissal from the clinic.

## 2023-06-01 NOTE — PROGRESS NOTES
"[facility]     Date of visit: 6/1/2023      Assessment:   Virginia Byers is a 72 year old female with a past medical history significant for polymyalgia rheumatica, arthralgia, lumbar DDD, BLE pain, follicular lymphoma (iliac, occular), HTN, hx of breast cancer, osteoporosis, s/p  who presents with complaints of bilateral hip and leg pain.      1. Bilateral hip and leg - Onset of pain many years ago, with progressive worsening over time. She describes pain as stiffness and aching, leg pain feels \"deep\", that significantly worsens with standing and walking. Of note, she reports severely limited mobility due to pain, has difficulty with ADLs. She has hx of LBP, lumbar MRIs (2021 and 2022) demonstrated multilevel degenerative changes, underwent facet joint injections and ROS that were not helpful. Recent workup through PCP, positive DEVON, ESR and CRP. Per PCP result note, suspects polyarthralgia rheumatica, has upcoming appointment with Dr. Robin in Rheumatology 7/10/23. Also recently saw Dr. Duran in Sports Med on 4/3/23 for advanced hip OA, referred to Dr. Bolod for surgical evaluation coming up on 4/25/23. Physical exam was very limited due to poor mobility and pain. Weakness with hip flexion and abduction noted, myofascial TTP of BLE, sensory intact. Etiology is likely multifactorial - degenerative changes of hip joints, possible rheumatological process (PMR per PCP), underlying degenerative changes of lumbar spine may be contributing to some extent, and prominent overlying myofascial component.       Visit Diagnoses:  1. Bilateral leg pain    2. Primary osteoarthritis of both hips    3. Myalgia    4. DDD (degenerative disc disease), lumbar        Plan:  Diagnosis reviewed, treatment option addressed, and risk/benifits discussed.  Self-care instructions given.  I am recommending a multidisciplinary treatment plan to help this patient better manage their pain.      Pain Physical " Therapy:  YES   - referred at last visit, was engaging in home PT. Initial visit with Yovani Perera PT on 6/29/23.      Pain Psychologist to address relaxation, behavioral change, coping style, and other factors important to improvement.  NO     Diagnostic Studies:  Reviewed lumbar MRI from 2022 - multilevel degenerative changes. Hip xray 4/3/23 demonstrated advanced degenerative joint changes. No new orders.      Medication Management:     Duloxetine stopped in between visits. Reports increased BP and vision changes (has hx of glaucoma). We discussed possible alternatives pregabalin, buprenorphine. Advised she may contact clinic after oncology appointment tomorrow and let me know if she would like to pursue one of two options:    Lyrica 50 mg twice daily - start 25 mg at bedtime and titrate to goal dose. If she decides to continue with plan for TACOS, I recommend we start with this medication first. Should she prefer to trial Butrans first, recommend we consider adding Lyrica adjunctively at some point if appropriate.     Butrans - we discuss buprenorphine today, Belbuca versus Butrans. She prefers transdermal option. If she decides to delay surgery, I advised that we can start with either Lyrica or buprenorphine. Will plan to start 5 mcg/hr patch, then have her follow up 3-4 weeks in person to assess for benefit and establish controlled substance agreement     Continue Tylenol 1000 mg three times daily.      Potential procedures:     None      Other Orders/Referrals:     Pain PT     Follow up with KOSTA Cross CNP as needed - if medications started in between visits, will advise on follow up interval.       Review of Electronic Chart: Today I have also reviewed available medical information in the patient's medical record at [facility] ([facility]) and Care Everywhere (if available), including relevant provider notes, laboratory work, and imaging.     Janett Noland, SANDY, APRN, AGNP-C  [facility]     -------------------------------------------------    Subjective:    Chief complaint:   Chief Complaint   Patient presents with     Pain       Interval history:  Virginia Byers is a 72 year old female last seen on 4/20/23.  They are a patient of mine seen in follow up.     Recommendations/plan at the last visit included:  Pain Physical Therapy:  YES   I am referring for stretching, strengthening, and home exercise plan to support functional goals/ADLs.      Pain Psychologist to address relaxation, behavioral change, coping style, and other factors important to improvement.  NO     Diagnostic Studies:  Reviewed lumbar MRI from 2022 - multilevel degenerative changes. Hip xray 4/3/23 demonstrated advanced degenerative joint changes.      Medication Management:     Start duloxetine 20 mg daily x 2 weeks, then increase to 40 mg daily. Common side effects when starting this medication include nausea/GI upset and headache, but should resolve within 1-2 weeks of starting medication/increasing dosage. Also advised on black box warning. Pending outcome, we may consider alternatives - possibly pregabalin, buprenorphine.     Continue Tylenol 1000 mg three times daily.      Potential procedures:     None      Other Orders/Referrals:     Pain PT     Follow up with KOSTA Cross CNP in 6-8 weeks       Since her last visit, Virginia Byers reports:  -her pain is about the same as it was at last visit.   -She was started on duloxetine 20 mg at last visit, advised to increase to 40 mg as tolerated.   -Per chart reviewed, TE on 5/12 indicated she is no longer taking medication.   -She states her BP increased after she started duloxetine, also have increased glaucoma.   -She states side effects improved after stopping duloxetine.   -She is scheduled for left TACOS on 7/11/23 with Dr. Austin Blood; however, she also has oncology follow up tomorrow to discuss recent test results.   -Of note, pending outcome with  oncology visit, she may consider delaying TACOS, particularly if she will need to start cancer tx.   -We decided to discuss possible medication options for her to consider, advised to let me know how visit with oncology goes and if she decides to trial medication.   -She is interested in possible trial of pregabalin or buprenorphine.  -Advised we would likely start BID dosing with pregaba, recommend we trial first if she plans to follow through with left hip arthroplasty on 7/11/23.   -Discussed buprenorphine options, Belbuca or Butrans.   -She prefers transdermal delivery.       Pain Information:   Pain rating: averages 7/10 on a 0-10 scale.        HPI from initial visit with me on 4/20/23:  Virginia Byers is a 72 year old female presents with a chief complaint of bilateral hip and BLE pain, L>R.      The pain has been present for many years.    The pain is Severe Pain (7) in severity.    The pain is described as numbness, aching, dull, pressure, stiffness.   The pain is alleviated by meds (acetaminophen), distraction.    It is exacerbated by prolonged sitting, standing, walking.    Modalities that have been utilized in the past which were helpful include meds.    Things that were not helpful, but tried ,include injections (facet joint injections,  L5-S1 TFESI), PT, gabapentin.    The patient has never tried pain PT.  Denies red flag symptoms.   Virginia Byers has been seen at a pain clinic in the past.  She saw Dr. Maldonado for clinic eval in 2021. Facet joint injections on 6/14/21, bilat L5-S1 TFESI 7/9/21  -She has onset of pain many years ago, with progressive worsening over time.   -She is using a rolling walker for about a year now.   -She cannot drive any longer, does not trust herself to do this.   -She does not like to take medications.   -She is in PT right now, sounds like traditional PT (stretching and strengthening), this is home PT.   -She describes pain in legs as stiffness.   -Painful to walk.    -It is hard for her to get in/out of car.   -Friend Aurelio lives with her, helps care for her.           Current Pain Treatments:    5. Medications:               Tylenol 1000 mg TID PRN              Duloxetine 20 mg daily x 2 weeks, then increase to 40 mg daily      2. Other therapies:               Home PT (2 more visits)              Pain PT - referred at last visit.       Current MME: 0    Review of [place] Prescription Monitoring Program (): No concern for abuse or misuse of controlled medications based on this report. Reviewed - appears appropriate.     Past pain treatments:  Injections - lumbar facet injections, LTFESI      Medications:  Current Outpatient Medications   Medication Sig Dispense Refill     carbidopa-levodopa (SINEMET)  MG tablet Take 2.5 tablets by mouth 3 times daily 675 tablet 3     dorzolamide-timolol (COSOPT) 2-0.5 % ophthalmic solution Place 1 drop into both eyes 2 times daily       latanoprost (XALATAN) 0.005 % ophthalmic solution Place 1 drop into both eyes daily        losartan (COZAAR) 100 MG tablet Take 1 tablet (100 mg) by mouth daily 90 tablet 0     predniSONE (DELTASONE) 20 MG tablet Take 1 tablet (20 mg) by mouth daily 30 tablet 0     rosuvastatin (CRESTOR) 10 MG tablet TAKE 1 TABLET (10 MG) BY MOUTH DAILY. (Patient taking differently: Take 10 mg by mouth every 7 days) 90 tablet 3       Medical History: any changes in medical history since they were last seen? Yes - oncology work up, has follow up tomorrow to discuss test results.       Objective:    Physical Exam:  Blood pressure (!) 150/72, pulse 82, not currently breastfeeding.  Constitutional: Well developed, well nourished, appears stated age.  Gait: Antalgic, ambulates with walker.   HEENT: Head atraumatic, normocephalic. Eyes without conjunctival injection or jaundice. Oropharynx clear. Neck supple. No obvious neck masses.  Skin: No rash, lesions, or petechiae of exposed skin.   Psychiatric/mental status:  Alert, without lethargy or stupor. Speech fluent. Appropriate affect. Mood normal. Able to follow commands without difficulty.     Diagnostic Tests/Imaging/Labs:  None     BILLING TIME DOCUMENTATION:   The total TIME spent on this patient on the date of the encounter/appointment was 40 minutes.      TOTAL TIME includes:   Time spent preparing to see the patient (reviewing records and tests)   Time spent face to face (or over the phone) with the patient   Time spent ordering tests, medications, procedures and referrals   Time spent Referring and communicating with other healthcare professionals   Time spent documenting clinical information in Epic

## 2023-06-02 ENCOUNTER — ONCOLOGY VISIT (OUTPATIENT)
Dept: ONCOLOGY | Facility: CLINIC | Age: 73
End: 2023-06-02
Attending: INTERNAL MEDICINE
Payer: MEDICARE

## 2023-06-02 VITALS
DIASTOLIC BLOOD PRESSURE: 71 MMHG | TEMPERATURE: 97.4 F | WEIGHT: 145.1 LBS | SYSTOLIC BLOOD PRESSURE: 130 MMHG | HEART RATE: 72 BPM | BODY MASS INDEX: 25.71 KG/M2 | OXYGEN SATURATION: 100 % | RESPIRATION RATE: 16 BRPM | HEIGHT: 63 IN

## 2023-06-02 DIAGNOSIS — D72.9 DISORDER OF WHITE BLOOD CELLS, UNSPECIFIED: ICD-10-CM

## 2023-06-02 DIAGNOSIS — Z85.3 PERSONAL HISTORY OF MALIGNANT NEOPLASM OF BREAST: ICD-10-CM

## 2023-06-02 DIAGNOSIS — M89.8X5 LYTIC BONE LESION OF HIP: Primary | ICD-10-CM

## 2023-06-02 DIAGNOSIS — C82.00 FOLLICULAR LYMPHOMA GRADE I, UNSPECIFIED BODY REGION (H): Primary | ICD-10-CM

## 2023-06-02 DIAGNOSIS — C91.10 MONOCLONAL B-CELL LYMPHOCYTOSIS WITH IMMUNOPHENOTYPE LIKE CHRONIC LYMPHOCYTIC LEUKEMIA (CLL) (H): ICD-10-CM

## 2023-06-02 DIAGNOSIS — C82.00 FOLLICULAR LYMPHOMA GRADE I, UNSPECIFIED BODY REGION (H): ICD-10-CM

## 2023-06-02 DIAGNOSIS — C82.90 FOLLICULAR LYMPHOMA, UNSPECIFIED FOLLICULAR LYMPHOMA TYPE, UNSPECIFIED BODY REGION (H): ICD-10-CM

## 2023-06-02 DIAGNOSIS — D72.820 MONOCLONAL B-CELL LYMPHOCYTOSIS WITH IMMUNOPHENOTYPE LIKE CHRONIC LYMPHOCYTIC LEUKEMIA (CLL) (H): ICD-10-CM

## 2023-06-02 DIAGNOSIS — M89.9 BONE LESION: ICD-10-CM

## 2023-06-02 PROCEDURE — 99214 OFFICE O/P EST MOD 30 MIN: CPT | Performed by: INTERNAL MEDICINE

## 2023-06-02 ASSESSMENT — PAIN SCALES - GENERAL: PAINLEVEL: SEVERE PAIN (7)

## 2023-06-02 NOTE — RESULT ENCOUNTER NOTE
Virginia,    Your cholesterol, anemia, potassium and rheumatoid arthritis tests are normal.     Sharlene Bishop MD

## 2023-06-02 NOTE — NURSING NOTE
"Oncology Rooming Note    June 2, 2023 9:43 AM   Virginia Byers is a 72 year old female who presents for:    Chief Complaint   Patient presents with     Oncology Clinic Visit     3 week follow up     Initial Vitals: /71 (BP Location: Right arm)   Pulse 72   Temp 97.4  F (36.3  C) (Oral)   Resp 16   Ht 1.6 m (5' 2.99\")   Wt 65.8 kg (145 lb 1.6 oz)   SpO2 100%   BMI 25.71 kg/m   Estimated body mass index is 25.71 kg/m  as calculated from the following:    Height as of this encounter: 1.6 m (5' 2.99\").    Weight as of this encounter: 65.8 kg (145 lb 1.6 oz). Body surface area is 1.71 meters squared.  Severe Pain (7) Comment: Data Unavailable   No LMP recorded. Patient is postmenopausal.  Allergies reviewed: Yes  Medications reviewed: Yes    Medications: Medication refills not needed today.  Pharmacy name entered into [employer]: [id number] - [place] - [address] [place]    Clinical concerns: No new concerns         Eri Garber LPN            "

## 2023-06-02 NOTE — PROGRESS NOTES
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    Patient has medical history including LEFT breast cancer in 2000, hypertension, hyperlipidemia, osteoporosis, osteoarthritis, degenerative disc disease, spinal stenosis of lumbar region,  s/p bilateral L5-S1 transforaminal ROS by Dr. Garcia on 7/9/2021, s/p bilateral L4-5 and L5-S1 facet joint injections on 6/14/2021, glaucoma and macular degeneration, cataracts s/p surgery, history of tobacco use (quit 1999)     Regarding previous history of breast cancer:  She was previously treated by Dr. Sakina Brandt at [facility]      In summary, diagnosed at age 49 with LEFT breast IDC ER positive, ND positive, HER2 positive on FISH. 3/2000 left breast lumpectomy and left axillary node dissection (IDC, grade 3, 1.45 cm incompletely excised tumor, DCIS, positive margins with infiltrating carcinoma, 1 of 17 lymph nodes positive (0.5 cm metastatic focus), ER positive, ND positive, HER2 positive).  4/2020 left breast simple mastectomy with no residual carcinoma. S/p ACx4, taxol x4, no anti-her2 treatment, no RT. S/p 5 years adjuvant endocrine therapy (tamoxifen and anastrozole). Delayed left breast reconstruction and right breast augmentation.      3/2/2000  Excisional biopsy of Left breast mass -   Infiltrating ductal carcinoma with associated DCIS   ER positive (60%), ND positive (22%), HER2 by FISH POSITIVE     3/10/2000 - Left lumpectomy and left axillary node  Invasive ductal carcinoma, Grade 3, 1.45 cm (incompletely excised), negative for LVI  DCIS, greatest histologic dimension of tumor (DCIS PLUS invasive tumor) = 2.9 cm (estimate 50% DCIS)  Positive margins- infiltrating carcinoma  extends to margin  1 of 17 axillary lymph nodes sampled was positive for metastatic focus of 0.5 cm.  ER/WV+     Sections of lumpectomy left breast showing residual 1.1 cm focus of ductal carcinoma in situ (DCIS), present within microns of the linked margin of resection.       4/11/2000 - Left breast simple mastectomy, negative for residual carcinoma  Left mastectomy on 4/11/2000 performed by Dr. Cristian Mcdonald at [facility].      Adjuvant treatment:  - 5/15/2000-7/24/2000 4 cycles of Adriamycin and Cytoxan   - 8/16/2000-10/18/2000 4 cycles of paclitaxel   - Appears she may have been on some kind of trial/protocol, was not given any anti-HER2 targeted treatment  - No adjuvant radiation  - 12/2000- 10/2002 tamoxifen  - 10/2002-5/2006 switched to anastrozole (due to in vitro studies showing anastrozole may be slightly better for patients were HER2 positive) with Fosamax for osteopenia     8/31/2015 Left delayed reconstruction with tissue expander    2/3/2016 Left 2nd stage 450 cc high profile silicone implant; right augmentation 150 cc Moderate classic profile silicone implant; cresent mastopexy    10/18/2021: Screening mammogram right breast ZACKARY    OTHER:  Of note, patient has documentation of anemia (iron deficiency and anemia of chronic disease), elevated LFTs, nausea and vomiting of all solids and liquids, fatigue with 26 pound unintentional weight loss from 7/20/2021 - 10/20/2021 with mildly elevated LFTs which led to evaluation with mammogram, endoscopy with EUS and colonoscopy as detailed below. Pt did associate these with some spinal injections she had gotten, due to overlapping times. Pt did regain all of the weight and hand improvement of anemia within 2 months, without significant intervention.     -10/2021: Endoscopy with EUS normal EGD and no stigmata or source of upper GI bleeding, visualized bile duct, gallbladder, liver, pancreas, left adrenal gland normal.  No lymphadenopathy. LIVER, RANDOM  NEEDLE BIOPSY: Benign parenchyma with congestion; no significant fibrosis or other abnormalities     -10/2021 colonoscopy: Hemorrhoids, diverticulosis in sigmoid colon and splenic flexure    Current history:  -Ongoing right knee pain not improved with brace and physical therapy, s/p bilateral L5-S1 transforaminal ROS by Dr. Garcia on 7/9/2021, s/p bilateral L4-5 and L5-S1 facet joint injections on 6/14/2021. ongoing b/l hip and leg pain, worse with position changes (ie sitting to standing), constant, 8-9/10, tylenol brings pain down to 6/10 (using tylenol bid). ECOG 3.   -Patient has seen neurosurgery Dr. Jose Alexander, occupational medicine specialist Dr.Orrin Murphy, with plan to see PM&R Dr. Margie Agudelo and movement disorder clinic  - 11/22 MRI lumbar spine:    prominent left iliac lymph nodes among other findings related to degenerative changes and neural foraminal stenosis throughout lumbar spine    - 12/22 MRI pelvis:  1. Enhancing marrow signal abnormality of the right iliac bone extending from the superior acetabulum subchondral location proximally almost to the level of the sacroiliac joint caudal aspect. No associated fracture line. Underlying marrow infiltrative process such as from metastasis cannot be excluded especially given the degree of T1 hypointensity and history of primary tumor. Other consideration include stress reaction.  2. Mildly increased left external iliac chain, and the right medial thigh lymphadenopathy. Metastasis cannot be excluded.  3. Left posterior iliac enhancing lesion, similar in size to prior CT 9/2021 and previously not visible on CT, focal enhancing lesion in the right greater trochanter laterally. Findings are concerning for Metastasis.    -12/22 CT chest abdomen pelvis with contrast:  COMPARISON: MRI pelvis 12/20/2022 and CT abdomen pelvis 9/21/2021.  1. Mastectomies with implant reconstructions. No lymphadenopathy  2.  There is an new or increased left common iliac, external  iliac and pelvic sidewall lymphadenopathy since prior CT. Distal left common iliac lymph nodes measure up to 1.2 cm on this exam compared to 0.7 cm on prior CT. Left pelvic sidewall lymph node measures 2.1 cm short axis on this exam compared to 1.2 cm on prior CT . consistent with metastatic disease.  3. Sclerotic lesions in the left sacrum and left posterior ilium are unchanged from prior CT.  may be metastatic.  4. No evidence of metastatic disease in the chest.     -1/2023 MRI brain w/wo contrast:  IMPRESSION:  1. No findings of intracranial metastatic disease.  2. Indeterminate ovoid T2 hyperintense enhancing mass in the region of the  left lacrimal gland measuring approximately 2.4 cm AP by 1.4 cm  transverse by 2.3 cm craniocaudal associated with/replacing the left  lacrimal gland. Consider correlation with tissue sampling.  3. A few indeterminate partially visualized heterogeneous enhancing  Nodules measuring up to approximately 1.5 cm in the bilateral parotid glands.   Consider soft tissue neck CT with contrast for further evaluation.    -1/20/2023: IR US Guided biopsy of a right external iliac LN  Final Diagnosis   A.  LYMPH NODE, RIGHT EXTERNAL ILIAC, CORE NEEDLE BIOPSY AND TOUCH IMPRINT:  -Involved by follicular lymphoma, low-grade (WHO grade 1-2)  -Negative for metastatic carcinoma     The morphologic and immunophenotypic patterns are consistent with follicular lymphoma.  There is no high-grade lymphoma present in this limited specimen.  Concurrent flow study ([id number]) demonstrated CD10-positive lambda monotypic B cells (22%), rare kappa monotypic B cells (1.3%), and no aberrant immunophenotype on T cells.  A FISH study for Bcl-2 is pending and will be reported separately.     The case was initially reviewed by Dr. Cifuentes (breast pathology).     This case was reviewed in part at our Hematopathology Faculty Consensus conference at which Girish Nolasco, Narciso, and Kayode were present in addition  to myself.  This is a small needle core.  In some of the areas, the CD10+ B cells are confined to follicles.  Though the possibility of in situ follicular neoplasia was discussed, we still favor a diagnosis of follicular lymphoma - there are a number of CD10+ B cells outside of follicles at the base of the biopsy, and the clinical history supports a greater extent of involvement.     There was a tiny kappa monotypic B cell population identified by flow (separate from the CD10+ clone).  We looked for the morphologic correlate in this case by IHC, but it is not readily apparent.  This may represent a monoclonal B lymphocytosis - consider sending a peirpheral blood sample for flow cytometry.    FISH:  RESULTS:     NORMAL  - No rearrangement of BCL2     INTERPRETATION:  No evidence was found by FISH of rearrangement of the BCL2 (18q21) locus. These findings are not helpful for confirming or further characterizing the reported pathologic diagnosis of follicular lymphoma.    Flow Interpretation   A. Lymph Node(s), :  -CD10-positive lambda monotypic B cells (22%)  -Rare kappa monotypic B cells (1.3%)  -No aberrant immunophenotype on T cells  See comment   Electronically signed by Krista Headley MD on 1/24/2023 at  3:10 PM   Comment     The CD10-positive population is consistent with a clonal B cell population and a CD10 positive B-cell lymphoma is favored.      In addition, a second clonal B-cell population is identified which has a CLL-like immunophenotype except that CD5 expression is equivocal, this may represent a monoclonal B-cell lymphocytosis (MBL).     Addendum   INTERPRETATION  The diagnosis remains the same (see comment)     REASON FOR ADDENDUM  This addendum is issued to reflect additional testing performed on this case. See Comment.      COMMENT  Additional multi-color flow analysis was performed for the following markers:   CD23, CD79b     The CD5 (dimly) positive B cells mostly lack CD23 and CD79b         1/23/23 CT neck:  A) Heterogeneous nodule in the right thyroid gland measuring up  to 1.6 cm.   B) Soft tissue nodule in the superficial left parotid  gland measuring 1.2 cm. Additional 0.8 cm nodule in the deep portion  of the left parotid gland. Soft tissue nodule in the superficial right  parotid gland measuring 0.8 cm. These may represent benign or malignant parotid lesions. Multiplicity of lesions raises concern for Warthin tumors. Otolaryngology consultation is  recommended.  C) Prominent soft tissue around the major arteries in the neck  particularly the common and internal carotid arteries. This is  atypical for atherosclerotic disease and vasculopathy/vasculitis such  as giant cell arteritis should be considered. Probable superimposed  atherosclerotic disease at the carotid bifurcations right greater than  left without definite high-grade stenosis.    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  And B-cell cohort #2 which is described as negative for CD5 and showing dim CD20 and dim kappa staining.  This population is listed a CD5 negative and CD10 negative also listed as negative for  and .  Of note the population of monoclonal B-cell lymphocytosis found in peripheral blood in our department and in the lymph node biopsy in January showed partial/equivocal dim CD5 and may correspond to the cohort #2.     We essentially agree with the diagnosis of  follicular lymphoma, which is consistent with prior diagnosis of follicular lymphoma low grade  established on core biopsy of right external iliac lymph node January 20, 2023 (case [id number]) in our Department . The concurrent flow cytometry case [id number] in January showed in addition to CD10 positive B-cell population  also a partial/equivocal CD5 positive separate kappa monotypic B-cell population, which most likely represents monoclonal B-cell lymphocytosis of CLL/SLL type, since it was also documented in the peripheral blood flow cytometry on February 16, 2023 (case number [facility] ) at a low frequency.  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 Areas with infiltrates of small B cells with coexpression of CD5 and CD23 are seen in the current biopsy, but the size of the biopsy is too small to comprehensively evaluate for the presence of proliferation centers and the extent of involvement by the CD5 positive B cell lymphoproliferative process.   This case has been discussed at intradepartmental hematopathology conference with participation by THELMA Petersen, THELMA Davis and myself.        INTERIM HISTORY:  -5/23 PET/CT NCAP:   1. An ovoid mass in the left lateral orbit measures 2.5 x 1.2 cm, slightly larger than 01/09/2023, and demonstrates marked uptake (SUVmax 9.3), consistent with biopsy-proven lymphoma  2. A few small FDG avid bilateral intraparotid nodules are present, one on the right and two on the left, including representative left parotid nodule measuring 0.9 x 1.4 cm with moderate uptake (SUVmax 6.7).  3.  Asymmetric prominent right posterior lower cervical node measures 0.8 cm associated with moderate focal   uptake (SUVmax 5.1).  4. A single mildly  enlarged left axillary lymph node measures 1.0 cm associated with mild uptake (SUVmax 3.0)  5. Some scattered FDG avid lymph nodes below the diaphragm involve the retroperitoneal retrocaval, bilateral common iliac, left pelvic sidewall and left inguinal regions. Some of these have increased slightly in size since 12/28/2022, such as a conglomerate left pelvic sidewall bj mass measuring approximately 2.2 x 4.1 cm (previously 2.2 x 3.4 cm) associated with marked uptake (SUVmax 7.6).  6. FDG avid sclerotic skeletal lesion involves the right superior acetabulum (SUVmax 12.9) as well as a couple of separate smaller sclerotic lesions in the right iliac bone uptake (SUVmax 12.2).       - pt reports no changes in left eye- vision normal, no pain; pt denies B sx, sx associated w/splenomegaly, no dyspnea, ascites, abdominal or pelvic pain         Upcoming appts:  - 7/10/23 Rheumatology consult, ESR and CRP elevated, DEVON positive 1:160 speckled; PCP suspects pt may have PMR   - 7/23 severe b/l hip osteoarthritis, planned hip replacement   - 7/23 neurology movement d/o follow up- multifactorial gait problem w/orthopedic troubles/arthritis; cannot r/o underlying parkinsonism so will do carbidopa/levodopa challenge      REVIEW OF SYSTEMS:   A 14 point ROS was reviewed with pertinent positives and negatives in the HPI.       HOME MEDICATIONS:  Current Outpatient Medications   Medication Sig Dispense Refill     dorzolamide-timolol (COSOPT) 2-0.5 % ophthalmic solution Place 1 drop into both eyes 2 times daily       latanoprost (XALATAN) 0.005 % ophthalmic solution Place 1 drop into both eyes daily        losartan (COZAAR) 100 MG tablet Take 1 tablet (100 mg) by mouth daily 90 tablet 0     rosuvastatin (CRESTOR) 10 MG tablet TAKE 1 TABLET (10 MG) BY MOUTH DAILY. (Patient taking differently: Take 10 mg by mouth every 7 days) 90 tablet 3         ALLERGIES:  Allergies   Allergen Reactions     Compazine      Mental confusion      Hydrochlorothiazide      Dryness mouth     Prochlorperazine Visual Disturbance     Simvastatin Other (See Comments)     Muscle aches         PAST MEDICAL HISTORY:  Past Medical History:   Diagnosis Date     Breast cancer (H) 2000    Left breast, s/p mastectomy, chemotherapy and endocrine therapy     Follicular lymphoma (H) 01/20/2023     Glaucoma     bilateral - Dr. Moore - [facility]     Hyperlipidemia      Hypertension      Macular degeneration     Dr. Toscano - [facility]     Osteoarthritis      Osteoporosis      Personal history of chemotherapy 2000    A/C + Taxol     S/P radiation therapy     400 cGy to left orbit completed on 6/15/2023 [facility]         PAST SURGICAL HISTORY:  Past Surgical History:   Procedure Laterality Date     BIOPSY Left 04/06/2023    Left Orbital Biopsy     C MASTECTOMY,SIMPLE  03/2000    left     COLONOSCOPY  2006    Q 10 years     COLONOSCOPY N/A 10/04/2021    Procedure: COLONOSCOPY;  Surgeon: Guru Lyla Ruby MD;  Location: [facility] OR     ESOPHAGOSCOPY, GASTROSCOPY, DUODENOSCOPY (EGD), COMBINED N/A 10/04/2021    Procedure: ENDOSCOPIC ULTRASOUND, ESOPHAGOSCOPY / UPPER GASTROINTESTINAL TRACT (GI), Esophagogastroduodenoscopy,  Fine Needle Biopsy of liver;  Surgeon: Guru Lyla Ruby MD;  Location: [facility] OR     IR LYMPH NODE BIOPSY  01/20/2023     SURGICAL HISTORY OF -  Left 08/2015    tissue expander placed in left breast area     SURGICAL HISTORY OF -  Left 02/2016    left breast implant and right mammoplasty         SOCIAL HISTORY:  Social History     Socioeconomic History     Marital status: Single     Spouse name: Not on file     Number of children: 0     Years of education: Not on file     Highest education level: Not on file   Occupational History     Employer: [employer]     Occupation: REtired   Tobacco Use     Smoking status: Former     Packs/day: 1.00     Years: 30.00     Pack years: 30.00  "    Types: Cigarettes     Quit date: 3/15/1999     Years since quittin.2     Smokeless tobacco: Never   Vaping Use     Vaping status: Not on file   Substance and Sexual Activity     Alcohol use: Not Currently     Alcohol/week: 4.0 standard drinks of alcohol     Types: 4 Glasses of wine per week     Drug use: No     Sexual activity: Not Currently     Birth control/protection: None   Other Topics Concern     Parent/sibling w/ CABG, MI or angioplasty before 65F 55M? Yes     Comment: Father heart attack   Social History Narrative     Not on file     Social Determinants of Health     Financial Resource Strain: Not on file   Food Insecurity: Not on file   Transportation Needs: Not on file   Physical Activity: Not on file   Stress: Not on file   Social Connections: Not on file   Intimate Partner Violence: Not on file   Housing Stability: Not on file       FAMILY HISTORY:  Family History   Problem Relation Age of Onset     Cancer Mother         bone cancer     Heart Disease Father      Coronary Artery Disease Father      Diabetes Father      Arthritis Sister      Breast Cancer Sister         age 75     Diabetes Maternal Grandmother      Diabetes Paternal Grandmother      Family history of cancer- sister breast cancer- dx at age 77 y/o, localized breast cancer, surgical resection, RT, no adjuvant chemo or endocrine therapy that pt is aware of     PHYSICAL EXAM:  Vital signs:  /71 (BP Location: Right arm)   Pulse 72   Temp 97.4  F (36.3  C) (Oral)   Resp 16   Ht 1.6 m (5' 2.99\")   Wt 65.8 kg (145 lb 1.6 oz)   SpO2 100%   BMI 25.71 kg/m       GENERAL/CONSTITUTIONAL: No acute distress.  EYES: Pupils are equal and round. Extraocular movements intact without nystagmus.  No scleral icterus. Minimal ptosis left eye at site of surgery  RESPIRATORY: Equal chest rise.   MUSCULOSKELETAL: Warm and well-perfused, no cyanosis, clubbing, or edema.   NEUROLOGIC: Cranial nerves are grossly intact. Alert, oriented to person, " place and time, answers questions appropriately.  INTEGUMENTARY: No rashes or jaundice.  GAIT: ambulates with walker        LABS:   Latest Reference Range & Units 05/11/23 09:38   Sodium 133 - 144 mmol/L 136   Potassium 3.4 - 5.3 mmol/L 4.2   Chloride 94 - 109 mmol/L 103   Carbon Dioxide 20 - 32 mmol/L 27   Urea Nitrogen 7 - 30 mg/dL 21   Creatinine 0.52 - 1.04 mg/dL 0.50 (L)   GFR Estimate >60 mL/min/1.73m2 >90   Calcium 8.5 - 10.1 mg/dL 10.3 (H)   Anion Gap 3 - 14 mmol/L 6   Albumin 3.4 - 5.0 g/dL 3.7   Protein Total 6.8 - 8.8 g/dL 8.8   Alkaline Phosphatase 40 - 150 U/L 122   ALT 0 - 50 U/L 20   AST 0 - 45 U/L 13   Beta-2-Microglobulin <2.3 mg/L 1.7   Bilirubin Total 0.2 - 1.3 mg/dL 0.7   Lactate Dehydrogenase 81 - 234 U/L 526 (H)   Glucose 70 - 99 mg/dL 118 (H)   WBC 4.0 - 11.0 10e3/uL 10.0   Hemoglobin 11.7 - 15.7 g/dL 14.4   Hematocrit 35.0 - 47.0 % 44.3   Platelet Count 150 - 450 10e3/uL 357   RBC Count 3.80 - 5.20 10e6/uL 5.19   MCV 78 - 100 fL 85   MCH 26.5 - 33.0 pg 27.7   MCHC 31.5 - 36.5 g/dL 32.5   RDW 10.0 - 15.0 % 15.2 (H)   % Neutrophils % 71   % Lymphocytes % 19   % Monocytes % 7   % Eosinophils % 2   % Basophils % 1   Absolute Basophils 0.0 - 0.2 10e3/uL 0.1   Absolute Eosinophils 0.0 - 0.7 10e3/uL 0.2   Absolute Immature Granulocytes <=0.4 10e3/uL 0.0   Absolute Lymphocytes 0.8 - 5.3 10e3/uL 1.9   Absolute Monocytes 0.0 - 1.3 10e3/uL 0.7   % Immature Granulocytes % 0   Absolute Neutrophils 1.6 - 8.3 10e3/uL 7.0   Absolute NRBCs 10e3/uL 0.0   NRBCs per 100 WBC <1 /100 0   Albumin Fraction 3.7 - 5.1 g/dL 4.3   Alpha 1 Fraction 0.2 - 0.4 g/dL 0.4   Alpha 2 Fraction 0.5 - 0.9 g/dL 1.1 (H)   Beta Fraction 0.6 - 1.0 g/dL 1.0   ELP Interpretation:  Increased alpha 2 fraction with an otherwise essentially normal electrophoretic pattern. No monoclonal protein seen. Pathologic significance requires clinical correlation. MISSY Moran M.D., Ph.D., Pathologist.   Gamma Fraction 0.7 - 1.6 g/dL 1.3   IGA 84 -  499 mg/dL 198    - 1,616 mg/dL 1,284   IGM 35 - 242 mg/dL 201   Immunofixation ELP  No monoclonal protein seen on immunofixation. Pathologic significance requires clinical correlation. MISSY Moran M.D., Ph.D., Pathologist   Kappa Free Lt Chain 0.33 - 1.94 mg/dL 2.03 (H)   Kappa Lambda Ratio 0.26 - 1.65  1.11   Lambda Free Lt Chain 0.57 - 2.63 mg/dL 1.83   Monoclonal Peak <=0.0 g/dL 0.0   Total Protein Serum for ELP 6.4 - 8.3 g/dL 8.0   (L): Data is abnormally low  (H): Data is abnormally high    PATHOLOGY:    IMAGING:  EXAM: PET ONCOLOGY WHOLE BODY, CT SOFT TISSUE NECK W CONTRAST, CT CHEST/ABDOMEN/PELVIS W CONTRAST  LOCATION: [facility]  DATE/TIME: 5/18/2023 9:38 AM CDT     INDICATION: Lymphoma, non-Hodgkin, staging. Orbital mass. Follicular lymphoma grade I, extranodal and solid organ sites. CLL/SLL. Monoclonal gammopathy of undetermined significance. Abnormality of plasma protein, unspecified. Personal history of breast   cancer, left. Initial treatment strategy.  COMPARISON: CT chest abdomen pelvis 12/28/2022, MRI head 01/09/2023, CT neck 01/23/2023 reviewed.  CONTRAST: 80 mL [id number]  TECHNIQUE: Serum glucose level 86 mg/dL. One hour post intravenous administration of 13.0 mCi F-18 FDG, PET imaging was performed from the skull vertex to feet, utilizing attenuation correction with concurrent axial CT and PET/CT image fusion. Separate   diagnostic CT of the neck, chest, abdomen, and pelvis was performed. Dose reduction techniques were used.     PET/CT FINDINGS: An ovoid mass in the left lateral orbit measures 2.5 x 1.2 cm, slightly larger than 01/09/2023, and demonstrates marked uptake (SUVmax 9.3), consistent with biopsy-proven lymphoma. A few small FDG avid bilateral intraparotid nodules are   present, one on the right and two on the left, including representative left parotid nodule measuring 0.9 x 1.4 cm with moderate uptake (SUVmax 6.7). Asymmetric prominent right  posterior lower cervical node measures 0.8 cm associated with moderate focal   uptake (SUVmax 5.1). A single mildly enlarged left axillary lymph node measures 1.0 cm associated with mild uptake (SUVmax 3.0). Some scattered FDG avid lymph nodes below the diaphragm involve the retroperitoneal retrocaval, bilateral common iliac, left   pelvic sidewall and left inguinal regions. Some of these have increased slightly in size since 12/28/2022, such as a conglomerate left pelvic sidewall bj mass measuring approximately 2.2 x 4.1 cm (previously 2.2 x 3.4 cm) associated with marked uptake   (SUVmax 7.6). FDG avid sclerotic skeletal lesion involves the right superior acetabulum (SUVmax 12.9) as well as a couple of separate smaller sclerotic lesions in the right iliac bone uptake (SUVmax 12.2). Marked inflammatory uptake posterior lower   cervical spine. Advanced degenerative changes both hips with moderate surrounding inflammatory uptake. Moderate inflammatory uptake about the right great toe first MTP joint.     CT FINDINGS: Non-FDG avid right thyroid nodule. Left mastectomy with implant reconstruction. Right breast implant. Mild reticular scarring or atelectasis in the lower lungs. Few scattered small benign liver cysts. Pelvic phleboliths. Mild colonic   diverticulosis. Bony demineralization. Mild scattered degenerative changes in the spine.                                                                      IMPRESSION:  1. FDG avid left orbital mass consistent with lymphoma, which is slightly larger than 01/09/2023.  2. Scattered FDG avid lymph nodes above and below the diaphragm suspicious for additional sites of lymphoma, some sites of which in the pelvis are slightly larger than 12/28/2022. A single mildly enlarged FDG avid left axillary node could also represent   lymphoma although given the patient's history of left breast cancer, a breast cancer metastasis is also a possibility.  3. FDG avid bilateral  "intraparotid nodules, which could also represent sites of lymphoma or be seen in the setting of bilateral parotid Warthin tumors.  4. FDG avid skeletal lesions in the right bony pelvis, suspicious for sites of lymphoma although could also potentially represent breast cancer metastases given history.    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 No splenomegaly  - 1/23 brain MRI: Indeterminate ovoid T2 hyperintense enhancing mass in the region of the left lacrimal gland measuring approximately 2.4 cm AP by 1.4 cm transverse by 2.3 cm craniocaudal associated with/replacing the left lacrimal gland. Consider correlation with tissue sampling    -1/20/2023: IR US Guided core needle biopsy of a right external iliac LN:   -follicular lymphoma, low grade WHO grade 1-2 (negative for metastatic carcinoma),  - no high grade lymphoma present,   - FISH negative for BCL2 rearrangement;   - IHC: neoplastic cells demonstrate expression of CD20, BCL-6 (in follicles), and CD10 (bright). CD5 stains T cells  - Ki-67 proliferative index is approximately 10%, also low in the follicles.  - flow cytometry \"CD10-positive population is consistent with a clonal B cell population and a CD10 positive B-cell lymphoma is favored.\"    - 4/6/23 left orbital mass/lacrimal gland excisional biopsy: PATHOLOGY: - Follicular Lymphoma, low grade. 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  And B-cell cohort #2 which is described as negative for CD5 and showing dim CD20 and dim kappa staining.  This population is listed a CD5 negative and CD10 negative also listed as negative for  and .  Of note the population of monoclonal B-cell lymphocytosis found in peripheral blood in our department and in the lymph node biopsy in January showed partial/equivocal dim CD5 and may correspond to the cohort #2.     We essentially agree with the diagnosis of follicular lymphoma, which is consistent with prior diagnosis of follicular lymphoma low grade  established on core biopsy of right external iliac lymph node January 20, 2023 (case [id number]) in our Department . The concurrent flow cytometry case [id number] in January showed in addition to CD10 positive B-cell population  also a partial/equivocal CD5 positive separate kappa monotypic B-cell population, which most likely represents monoclonal B-cell lymphocytosis of CLL/SLL type, since it was also documented in the peripheral blood flow cytometry on February 16, 2023 (case number UF ) at a low frequency.  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 Areas with infiltrates of small B cells with coexpression of CD5 and CD23 are seen in the current biopsy, but the size of the biopsy is too small to comprehensively evaluate for the presence of proliferation centers and the extent of involvement by the CD5 positive B cell lymphoproliferative process.     -5/23 PET/CT NCAP:   1.  An ovoid mass in the left lateral orbit measures 2.5 x 1.2 cm, slightly larger than 01/09/2023, and demonstrates marked uptake (SUVmax 9.3), consistent with biopsy-proven lymphoma  2. A few small FDG avid bilateral intraparotid nodules are present, one on the right and two on the left, including representative left parotid nodule measuring 0.9 x 1.4 cm with moderate uptake (SUVmax 6.7).  3.  Asymmetric prominent right posterior lower cervical node measures 0.8 cm associated with moderate focal uptake (SUVmax 5.1).  4. A single mildly enlarged left axillary lymph node measures 1.0 cm associated with mild uptake (SUVmax 3.0)  5. Some scattered FDG avid lymph nodes below the diaphragm involve the retroperitoneal retrocaval, bilateral common iliac, left pelvic sidewall and left inguinal regions. Some of these have increased slightly in size since 12/28/2022, such as a conglomerate left pelvic sidewall bj mass measuring approximately 2.2 x 4.1 cm (previously 2.2 x 3.4 cm) associated with marked uptake (SUVmax 7.6).  6. FDG avid sclerotic skeletal lesion involves the right superior acetabulum (SUVmax 12.9) as well as a couple of separate smaller sclerotic lesions in the right iliac bone uptake (SUVmax 12.2).     Staging and prognostication:  - Stage: 4 given right medial thigh SURINDER and left common iliac, external iliac, pelvic sidewall; biopsy proven left orbital mass involvement; there was some concern from ENT that patients parotid lesions were related to pts follicular lymphoma   - FLIPI: score 2 (age>60),  stage 3-4; intermediate risk  - pertinent labs: 12/22  and 5/23 ; 2/23 and 5/23 beta 2 microglobulin 1.7, hepatitis B and C negative    PLAN  - IR referral for deep bone biopsy ASAP  - bone marrow biopsy ASAP   - radiation oncology referral for possible radiation to orbital mass  - regarding tx- GELF criteria to indicate treatment (involvement of 3 or more bj sites each with a diameter greater  "than or equal to 3 cm, any bj or extranodal tumor mass with a diameter of greater than or equal to 7 cm, B symptoms, splenomegaly, pleural effusions, peritoneal ascites, clinically progressive or significant cytopenias, leukemia, symptoms, threatened end organ function, clinically significant bulky disease, steady or rapid progression of disease)  - Discussed possible treatment with single agent weekly rituximab x4 weeks; information provided today   - Patient pending hip replacement 7/23, the above results may affect whether or not she can have this planned surgery. We will await results and if needed, I will reach out to her surgery team Dr. Austin Blood to discuss again    #b/l parotid gland nodules  - 1/23 brain MRI:  A few indeterminate partially visualized heterogeneous enhancing nodules measuring up to approximately 1.5 cm in the bilateral parotid glands.  Consider soft tissue neck CT with contrast for further evaluation.  - 1/23 CT soft tissue neck: Soft tissue nodule in the superficial left parotid gland measuring 1.2 cm. Additional 0.8 cm nodule in the deep portion of the left parotid gland. Soft tissue nodule in the superficial right parotid gland measuring 0.8 cm. These may represent benign or malignant parotid lesions. Multiplicity of lesions raises concern for Warthin tumors  - 1/23/23 case reviewed by Dr. Wilmar Morales  - 1/27/23 note from Dr. Wilmar Morales- \"Based on this diagnosis, I think the 2 nodules in her parotid gland are likely atypical lymph nodes containing lymphoma.  She will probably require some staging imaging, such as PET scan and we can take a look at that as well.  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 The CD5 (dimly) positive B cells mostly lack CD23 and CD79b. We looked for the morphologic correlate in this case by IHC, but it is not readily apparent.   - of note pt does not have leukocytosis or lymphocytosis, ALC 2.2, no splenomegaly, LN biopsy did not demonstrate IHC consistent with SLL   -  peripheral blood flow cytometry: CD5 positive kappa-monotypic B cells (2.3%), consistent with a CD5 positive monotypic B cell population. The differential diagnosis includes chronic lymphocytic leukemia/small lymphocytic lymphoma (CLL/SLL), mantle cell lymphoma, and rare other entities; the immunophenotype favors CLL/SLL. based on the most recent reported WBC of 1.8 x10^9/L (2022), the findings are consistent with low count monoclonal B cell lymphocytosis. Clinical correlation to evaluate for lymph node involvement is recommended. The monotypic B cell population is negative for CD38 and positive for CD49d. CD38 and CD49d are potential prognostic markers in CLL/SLL   - 23 left orbital mass/lacrimal gland excisional biopsy: PATHOLOGY: - Follicular Lymphoma, low grade. 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  SIFE: trace amount of IgG kappa monoclonal protein  2.  SPEP, UIFE, UPEP: no monoclonal protein seen  3.  Kappa/lambda ratio: kappa 2.17, lambda 1.67, kappa/lambda ratio 1.30   4.  Quantitative immunoglobulins: Ig    IgA: 187    IgM: 165  - labs:  1.  SIFE: no monoclonal protein seen  2.  SPEP:no  monoclonal protein seen  5.  Kappa/lambda ratio: kappa 2.03, lambda 1.83, k/l ratio 1.11  6.  Quantitative immunoglobulins: Ig     IgA: 198    IgM: 201     PLAN:  No further follow up, trace IgG kappa monoclonal protein resolved    #History of LEFT breast IDC ER positive, [place] positive, HER2 positive on FISH  -Diagnosed at age 49  - 3/2000 left breast lumpectomy and left axillary node dissection (IDC, grade 3, 1.45 cm incompletely excised tumor, DCIS, positive margins with infiltrating carcinoma, 1 of 17 lymph nodes positive (0.5 cm metastatic focus), ER positive, [place] positive, HER2 positive).    -2020 left breast simple mastectomy with no residual carcinoma.   -S/p ACx4, taxol x4, no anti-her2 treatment, no RT.   -S/p 5 years adjuvant endocrine therapy (tamoxifen and anastrozole).  -Delayed left breast reconstruction and right breast augmentation.     PLAN:   - Distant history of breast cancer, continue age appropriate screening w/PCP  -  PET/CT notes single enlarged left axillary lymph node with SUV 3 in the setting of known lymphoma and breast tumor markers normal     #Stable sclerotic lesions in left sacrum and left posterior ilium  -10/21 mammogram, endoscopy with EUS and colonoscopy WNL  - CT chest abdomen pelvis shows new or increased left common iliac, external iliac, pelvic sidewall lymphadenopathy and sclerotic lesions in left sacrum and left posterior ilium.  No metastatic disease in chest.  No abnormal findings in breast.  -  tumor markers: CA 15-3 24,  29, CEA 3.1, CA 19-9 4, AFP 5.9,   - 2023 MRI brain w/wo contrast: no metastases   - 2023 right external iliac LN core need biopsy- no metastatic carcinoma   -  PET avid bone lesions, deep bone biopsy as above    RTC  for f/u with me (if pathology from bone marrow biopsy and deep bone biopsy is not back, will reschedule appt)     ADDENDUM:  Multiple discussions with IR team, they are unable to do the bone  marrow biopsy at the time of deep bone biopsy due to concern of use of resources; please see their note for details   We will schedule bone marrow with our oncology team instead  D/w Dr. Horvath- plan for RT 2Gy x2 fractions to orbit 6/15 and 6/16     Naomy Zaidi DO  Hematology/Oncology  [facility]

## 2023-06-02 NOTE — LETTER
6/2/2023         RE: Virginia Byers  [id number] [facility] [id number]        Dear Colleague,    Thank you for referring your patient, Virginia Byers, to the [facility]. Please see a copy of my visit note below.    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    Patient has medical history including LEFT breast cancer in 2000, hypertension, hyperlipidemia, osteoporosis, osteoarthritis, degenerative disc disease, spinal stenosis of lumbar region,  s/p bilateral L5-S1 transforaminal ROS by Dr. Garcia on 7/9/2021, s/p bilateral L4-5 and L5-S1 facet joint injections on 6/14/2021, glaucoma and macular degeneration, cataracts s/p surgery, history of tobacco use (quit 1999)     Regarding previous history of breast cancer:  She was previously treated by Dr. Sakina Brandt at [facility]      In summary, diagnosed at age 49 with LEFT breast IDC ER positive, AZ positive, HER2 positive on FISH. 3/2000 left breast lumpectomy and left axillary node dissection (IDC, grade 3, 1.45 cm incompletely excised tumor, DCIS, positive margins with infiltrating carcinoma, 1 of 17 lymph nodes positive (0.5 cm metastatic focus), ER positive, AZ positive, HER2 positive).  4/2020 left breast simple mastectomy with no residual carcinoma. S/p ACx4, taxol x4, no anti-her2 treatment, no RT. S/p 5 years adjuvant endocrine therapy (tamoxifen and anastrozole). Delayed left breast reconstruction and right breast augmentation.      3/2/2000  Excisional biopsy of Left breast mass -   Infiltrating ductal carcinoma with associated DCIS   ER positive (60%), AZ positive (22%), HER2 by FISH POSITIVE     3/10/2000 - Left lumpectomy  and left axillary node  Invasive ductal carcinoma, Grade 3, 1.45 cm (incompletely excised), negative for LVI  DCIS, greatest histologic dimension of tumor (DCIS PLUS invasive tumor) = 2.9 cm (estimate 50% DCIS)  Positive margins- infiltrating carcinoma extends to margin  1 of 17 axillary lymph nodes sampled was positive for metastatic focus of 0.5 cm.  ER/DC+     Sections of lumpectomy left breast showing residual 1.1 cm focus of ductal carcinoma in situ (DCIS), present within microns of the linked margin of resection.       4/11/2000 - Left breast simple mastectomy, negative for residual carcinoma  Left mastectomy on 4/11/2000 performed by Dr. Cristian Mcdonald at [facility].      Adjuvant treatment:  - 5/15/2000-7/24/2000 4 cycles of Adriamycin and Cytoxan   - 8/16/2000-10/18/2000 4 cycles of paclitaxel   - Appears she may have been on some kind of trial/protocol, was not given any anti-HER2 targeted treatment  - No adjuvant radiation  - 12/2000- 10/2002 tamoxifen  - 10/2002-5/2006 switched to anastrozole (due to in vitro studies showing anastrozole may be slightly better for patients were HER2 positive) with Fosamax for osteopenia     8/31/2015 Left delayed reconstruction with tissue expander    2/3/2016 Left 2nd stage 450 cc high profile silicone implant; right augmentation 150 cc Moderate classic profile silicone implant; cresent mastopexy    10/18/2021: Screening mammogram right breast ZACKARY    OTHER:  Of note, patient has documentation of anemia (iron deficiency and anemia of chronic disease), elevated LFTs, nausea and vomiting of all solids and liquids, fatigue with 26 pound unintentional weight loss from 7/20/2021 - 10/20/2021 with mildly elevated LFTs which led to evaluation with mammogram, endoscopy with EUS and colonoscopy as detailed below. Pt did associate these with some spinal injections she had gotten, due to overlapping times. Pt did regain all of the weight and hand improvement of anemia within  2 months, without significant intervention.     -10/2021: Endoscopy with EUS normal EGD and no stigmata or source of upper GI bleeding, visualized bile duct, gallbladder, liver, pancreas, left adrenal gland normal.  No lymphadenopathy. LIVER, RANDOM NEEDLE BIOPSY: Benign parenchyma with congestion; no significant fibrosis or other abnormalities     -10/2021 colonoscopy: Hemorrhoids, diverticulosis in sigmoid colon and splenic flexure    Current history:  -Ongoing right knee pain not improved with brace and physical therapy, s/p bilateral L5-S1 transforaminal ROS by Dr. Garcia on 7/9/2021, s/p bilateral L4-5 and L5-S1 facet joint injections on 6/14/2021. ongoing b/l hip and leg pain, worse with position changes (ie sitting to standing), constant, 8-9/10, tylenol brings pain down to 6/10 (using tylenol bid). ECOG 3.   -Patient has seen neurosurgery Dr. Jose Alexander, occupational medicine specialist Dr.Orrin Murphy, with plan to see PM&R Dr. Margie Agudelo and movement disorder clinic  - 11/22 MRI lumbar spine:    prominent left iliac lymph nodes among other findings related to degenerative changes and neural foraminal stenosis throughout lumbar spine    - 12/22 MRI pelvis:  1. Enhancing marrow signal abnormality of the right iliac bone extending from the superior acetabulum subchondral location proximally almost to the level of the sacroiliac joint caudal aspect. No associated fracture line. Underlying marrow infiltrative process such as from metastasis cannot be excluded especially given the degree of T1 hypointensity and history of primary tumor. Other consideration include stress reaction.  2. Mildly increased left external iliac chain, and the right medial thigh lymphadenopathy. Metastasis cannot be excluded.  3. Left posterior iliac enhancing lesion, similar in size to prior CT 9/2021 and previously not visible on CT, focal enhancing lesion in the right greater trochanter laterally. Findings are concerning for  Metastasis.    -12/22 CT chest abdomen pelvis with contrast:  COMPARISON: MRI pelvis 12/20/2022 and CT abdomen pelvis 9/21/2021.  1. Mastectomies with implant reconstructions. No lymphadenopathy  2.  There is an new or increased left common iliac, external iliac and pelvic sidewall lymphadenopathy since prior CT. Distal left common iliac lymph nodes measure up to 1.2 cm on this exam compared to 0.7 cm on prior CT. Left pelvic sidewall lymph node measures 2.1 cm short axis on this exam compared to 1.2 cm on prior CT . consistent with metastatic disease.  3. Sclerotic lesions in the left sacrum and left posterior ilium are unchanged from prior CT.  may be metastatic.  4. No evidence of metastatic disease in the chest.     -1/2023 MRI brain w/wo contrast:  IMPRESSION:  1. No findings of intracranial metastatic disease.  2. Indeterminate ovoid T2 hyperintense enhancing mass in the region of the  left lacrimal gland measuring approximately 2.4 cm AP by 1.4 cm  transverse by 2.3 cm craniocaudal associated with/replacing the left  lacrimal gland. Consider correlation with tissue sampling.  3. A few indeterminate partially visualized heterogeneous enhancing  Nodules measuring up to approximately 1.5 cm in the bilateral parotid glands.   Consider soft tissue neck CT with contrast for further evaluation.    -1/20/2023: IR US Guided biopsy of a right external iliac LN  Final Diagnosis   A.  LYMPH NODE, RIGHT EXTERNAL ILIAC, CORE NEEDLE BIOPSY AND TOUCH IMPRINT:  -Involved by follicular lymphoma, low-grade (WHO grade 1-2)  -Negative for metastatic carcinoma     The morphologic and immunophenotypic patterns are consistent with follicular lymphoma.  There is no high-grade lymphoma present in this limited specimen.  Concurrent flow study ([id number]) demonstrated CD10-positive lambda monotypic B cells (22%), rare kappa monotypic B cells (1.3%), and no aberrant immunophenotype on T cells.  A FISH study for Bcl-2 is pending and will  be reported separately.     The case was initially reviewed by Dr. Cifuentes (breast pathology).     This case was reviewed in part at our Hematopathology Faculty Consensus conference at which Girish Nolasco, Narciso, and Kayode were present in addition to myself.  This is a small needle core.  In some of the areas, the CD10+ B cells are confined to follicles.  Though the possibility of in situ follicular neoplasia was discussed, we still favor a diagnosis of follicular lymphoma - there are a number of CD10+ B cells outside of follicles at the base of the biopsy, and the clinical history supports a greater extent of involvement.     There was a tiny kappa monotypic B cell population identified by flow (separate from the CD10+ clone).  We looked for the morphologic correlate in this case by IHC, but it is not readily apparent.  This may represent a monoclonal B lymphocytosis - consider sending a peirpheral blood sample for flow cytometry.    FISH:  RESULTS:     NORMAL  - No rearrangement of BCL2     INTERPRETATION:  No evidence was found by FISH of rearrangement of the BCL2 (18q21) locus. These findings are not helpful for confirming or further characterizing the reported pathologic diagnosis of follicular lymphoma.    Flow Interpretation   A. Lymph Node(s), :  -CD10-positive lambda monotypic B cells (22%)  -Rare kappa monotypic B cells (1.3%)  -No aberrant immunophenotype on T cells  See comment   Electronically signed by Krista Headley MD on 1/24/2023 at  3:10 PM   Comment     The CD10-positive population is consistent with a clonal B cell population and a CD10 positive B-cell lymphoma is favored.      In addition, a second clonal B-cell population is identified which has a CLL-like immunophenotype except that CD5 expression is equivocal, this may represent a monoclonal B-cell lymphocytosis (MBL).     Addendum   INTERPRETATION  The diagnosis remains the same (see comment)     REASON FOR ADDENDUM  This addendum  is issued to reflect additional testing performed on this case. See Comment.      COMMENT  Additional multi-color flow analysis was performed for the following markers:   CD23, CD79b     The CD5 (dimly) positive B cells mostly lack CD23 and CD79b        1/23/23 CT neck:  A) Heterogeneous nodule in the right thyroid gland measuring up  to 1.6 cm.   B) Soft tissue nodule in the superficial left parotid  gland measuring 1.2 cm. Additional 0.8 cm nodule in the deep portion  of the left parotid gland. Soft tissue nodule in the superficial right  parotid gland measuring 0.8 cm. These may represent benign or malignant parotid lesions. Multiplicity of lesions raises concern for Warthin tumors. Otolaryngology consultation is  recommended.  C) Prominent soft tissue around the major arteries in the neck  particularly the common and internal carotid arteries. This is  atypical for atherosclerotic disease and vasculopathy/vasculitis such  as giant cell arteritis should be considered. Probable superimposed  atherosclerotic disease at the carotid bifurcations right greater than  left without definite high-grade stenosis.    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  And B-cell cohort #2 which is described as negative for CD5 and showing dim CD20 and dim kappa staining.  This population is listed a CD5 negative and CD10 negative also listed as negative for  and .  Of note the  population of monoclonal B-cell lymphocytosis found in peripheral blood in our department and in the lymph node biopsy in January showed partial/equivocal dim CD5 and may correspond to the cohort #2.     We essentially agree with the diagnosis of follicular lymphoma, which is consistent with prior diagnosis of follicular lymphoma low grade  established on core biopsy of right external iliac lymph node January 20, 2023 (case [id number]) in our Department . The concurrent flow cytometry case [id number] in January showed in addition to CD10 positive B-cell population  also a partial/equivocal CD5 positive separate kappa monotypic B-cell population, which most likely represents monoclonal B-cell lymphocytosis of CLL/SLL type, since it was also documented in the peripheral blood flow cytometry on February 16, 2023 (case number [facility] ) at a low frequency.  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 Areas with infiltrates of small B cells with coexpression of CD5 and CD23 are seen in the current biopsy, but the size of the biopsy is too small to comprehensively evaluate for the presence of proliferation centers and the extent of involvement by the CD5 positive B cell lymphoproliferative process.   This case has been discussed at intradepartmental hematopathology conference with participation by THELMA Petersen, THELMA Davis and myself.        INTERIM HISTORY:  -5/23 PET/CT NCAP:   1. An ovoid mass in the left lateral orbit measures 2.5 x 1.2 cm, slightly larger than 01/09/2023, and demonstrates marked uptake (SUVmax 9.3), consistent with biopsy-proven lymphoma  2. A few small FDG avid bilateral intraparotid nodules are present, one on the right and two on the left, including  representative left parotid nodule measuring 0.9 x 1.4 cm with moderate uptake (SUVmax 6.7).  3.  Asymmetric prominent right posterior lower cervical node measures 0.8 cm associated with moderate focal   uptake (SUVmax 5.1).  4. A single mildly enlarged left axillary lymph node measures 1.0 cm associated with mild uptake (SUVmax 3.0)  5. Some scattered FDG avid lymph nodes below the diaphragm involve the retroperitoneal retrocaval, bilateral common iliac, left pelvic sidewall and left inguinal regions. Some of these have increased slightly in size since 12/28/2022, such as a conglomerate left pelvic sidewall bj mass measuring approximately 2.2 x 4.1 cm (previously 2.2 x 3.4 cm) associated with marked uptake (SUVmax 7.6).  6. FDG avid sclerotic skeletal lesion involves the right superior acetabulum (SUVmax 12.9) as well as a couple of separate smaller sclerotic lesions in the right iliac bone uptake (SUVmax 12.2).       - pt reports no changes in left eye- vision normal, no pain; pt denies B sx, sx associated w/splenomegaly, no dyspnea, ascites, abdominal or pelvic pain         Upcoming appts:  - 7/10/23 Rheumatology consult, ESR and CRP elevated, DEVON positive 1:160 speckled; PCP suspects pt may have PMR   - 7/23 severe b/l hip osteoarthritis, planned hip replacement   - 7/23 neurology movement d/o follow up- multifactorial gait problem w/orthopedic troubles/arthritis; cannot r/o underlying parkinsonism so will do carbidopa/levodopa challenge      REVIEW OF SYSTEMS:   A 14 point ROS was reviewed with pertinent positives and negatives in the HPI.       HOME MEDICATIONS:  Current Outpatient Medications   Medication Sig Dispense Refill     dorzolamide-timolol (COSOPT) 2-0.5 % ophthalmic solution Place 1 drop into both eyes 2 times daily       latanoprost (XALATAN) 0.005 % ophthalmic solution Place 1 drop into both eyes daily        losartan (COZAAR) 100 MG tablet Take 1 tablet (100 mg) by mouth daily 90 tablet 0      rosuvastatin (CRESTOR) 10 MG tablet TAKE 1 TABLET (10 MG) BY MOUTH DAILY. (Patient taking differently: Take 10 mg by mouth every 7 days) 90 tablet 3         ALLERGIES:  Allergies   Allergen Reactions     Compazine      Mental confusion     Hydrochlorothiazide      Dryness mouth     Prochlorperazine Visual Disturbance     Simvastatin Other (See Comments)     Muscle aches         PAST MEDICAL HISTORY:  Past Medical History:   Diagnosis Date     Breast cancer (H) 2000    Left breast, s/p mastectomy, chemotherapy and endocrine therapy     Follicular lymphoma (H) 01/20/2023     Glaucoma     bilateral - Dr. Moore [facility]     Hyperlipidemia      Hypertension      Macular degeneration     Dr. Toscano - [facility]     Osteoarthritis      Osteoporosis      Personal history of chemotherapy 2000    A/C + Taxol     S/P radiation therapy     400 cGy to left orbit completed on 6/15/2023 [facility]         PAST SURGICAL HISTORY:  Past Surgical History:   Procedure Laterality Date     BIOPSY Left 04/06/2023    Left Orbital Biopsy     C MASTECTOMY,SIMPLE  03/2000    left     COLONOSCOPY  2006    Q 10 years     COLONOSCOPY N/A 10/04/2021    Procedure: COLONOSCOPY;  Surgeon: Guru Lyla Ruby MD;  Location: [facility] OR     ESOPHAGOSCOPY, GASTROSCOPY, DUODENOSCOPY (EGD), COMBINED N/A 10/04/2021    Procedure: ENDOSCOPIC ULTRASOUND, ESOPHAGOSCOPY / UPPER GASTROINTESTINAL TRACT (GI), Esophagogastroduodenoscopy,  Fine Needle Biopsy of liver;  Surgeon: Guru Lyla Ruby MD;  Location: [facility] OR     IR LYMPH NODE BIOPSY  01/20/2023     SURGICAL HISTORY OF -  Left 08/2015    tissue expander placed in left breast area     SURGICAL HISTORY OF -  Left 02/2016    left breast implant and right mammoplasty         SOCIAL HISTORY:  Social History     Socioeconomic History     Marital status: Single     Spouse name: Not on file     Number of children: 0  "    Years of education: Not on file     Highest education level: Not on file   Occupational History     Employer: [employer]     Occupation: REtired   Tobacco Use     Smoking status: Former     Packs/day: 1.00     Years: 30.00     Pack years: 30.00     Types: Cigarettes     Quit date: 3/15/1999     Years since quittin.2     Smokeless tobacco: Never   Vaping Use     Vaping status: Not on file   Substance and Sexual Activity     Alcohol use: Not Currently     Alcohol/week: 4.0 standard drinks of alcohol     Types: 4 Glasses of wine per week     Drug use: No     Sexual activity: Not Currently     Birth control/protection: None   Other Topics Concern     Parent/sibling w/ CABG, MI or angioplasty before 65F 55M? Yes     Comment: Father heart attack   Social History Narrative     Not on file     Social Determinants of Health     Financial Resource Strain: Not on file   Food Insecurity: Not on file   Transportation Needs: Not on file   Physical Activity: Not on file   Stress: Not on file   Social Connections: Not on file   Intimate Partner Violence: Not on file   Housing Stability: Not on file       FAMILY HISTORY:  Family History   Problem Relation Age of Onset     Cancer Mother         bone cancer     Heart Disease Father      Coronary Artery Disease Father      Diabetes Father      Arthritis Sister      Breast Cancer Sister         age 75     Diabetes Maternal Grandmother      Diabetes Paternal Grandmother      Family history of cancer- sister breast cancer- dx at age 77 y/o, localized breast cancer, surgical resection, RT, no adjuvant chemo or endocrine therapy that pt is aware of     PHYSICAL EXAM:  Vital signs:  /71 (BP Location: Right arm)   Pulse 72   Temp 97.4  F (36.3  C) (Oral)   Resp 16   Ht 1.6 m (5' 2.99\")   Wt 65.8 kg (145 lb 1.6 oz)   SpO2 100%   BMI 25.71 kg/m       GENERAL/CONSTITUTIONAL: No acute distress.  EYES: Pupils are equal and round. Extraocular movements intact without nystagmus.  No " scleral icterus. Minimal ptosis left eye at site of surgery  RESPIRATORY: Equal chest rise.   MUSCULOSKELETAL: Warm and well-perfused, no cyanosis, clubbing, or edema.   NEUROLOGIC: Cranial nerves are grossly intact. Alert, oriented to person, place and time, answers questions appropriately.  INTEGUMENTARY: No rashes or jaundice.  GAIT: ambulates with walker        LABS:   Latest Reference Range & Units 05/11/23 09:38   Sodium 133 - 144 mmol/L 136   Potassium 3.4 - 5.3 mmol/L 4.2   Chloride 94 - 109 mmol/L 103   Carbon Dioxide 20 - 32 mmol/L 27   Urea Nitrogen 7 - 30 mg/dL 21   Creatinine 0.52 - 1.04 mg/dL 0.50 (L)   GFR Estimate >60 mL/min/1.73m2 >90   Calcium 8.5 - 10.1 mg/dL 10.3 (H)   Anion Gap 3 - 14 mmol/L 6   Albumin 3.4 - 5.0 g/dL 3.7   Protein Total 6.8 - 8.8 g/dL 8.8   Alkaline Phosphatase 40 - 150 U/L 122   ALT 0 - 50 U/L 20   AST 0 - 45 U/L 13   Beta-2-Microglobulin <2.3 mg/L 1.7   Bilirubin Total 0.2 - 1.3 mg/dL 0.7   Lactate Dehydrogenase 81 - 234 U/L 526 (H)   Glucose 70 - 99 mg/dL 118 (H)   WBC 4.0 - 11.0 10e3/uL 10.0   Hemoglobin 11.7 - 15.7 g/dL 14.4   Hematocrit 35.0 - 47.0 % 44.3   Platelet Count 150 - 450 10e3/uL 357   RBC Count 3.80 - 5.20 10e6/uL 5.19   MCV 78 - 100 fL 85   MCH 26.5 - 33.0 pg 27.7   MCHC 31.5 - 36.5 g/dL 32.5   RDW 10.0 - 15.0 % 15.2 (H)   % Neutrophils % 71   % Lymphocytes % 19   % Monocytes % 7   % Eosinophils % 2   % Basophils % 1   Absolute Basophils 0.0 - 0.2 10e3/uL 0.1   Absolute Eosinophils 0.0 - 0.7 10e3/uL 0.2   Absolute Immature Granulocytes <=0.4 10e3/uL 0.0   Absolute Lymphocytes 0.8 - 5.3 10e3/uL 1.9   Absolute Monocytes 0.0 - 1.3 10e3/uL 0.7   % Immature Granulocytes % 0   Absolute Neutrophils 1.6 - 8.3 10e3/uL 7.0   Absolute NRBCs 10e3/uL 0.0   NRBCs per 100 WBC <1 /100 0   Albumin Fraction 3.7 - 5.1 g/dL 4.3   Alpha 1 Fraction 0.2 - 0.4 g/dL 0.4   Alpha 2 Fraction 0.5 - 0.9 g/dL 1.1 (H)   Beta Fraction 0.6 - 1.0 g/dL 1.0   ELP Interpretation:  Increased alpha  2 fraction with an otherwise essentially normal electrophoretic pattern. No monoclonal protein seen. Pathologic significance requires clinical correlation. MISSY Moran M.D., Ph.D., Pathologist.   Gamma Fraction 0.7 - 1.6 g/dL 1.3   IGA 84 - 499 mg/dL 198    - 1,616 mg/dL 1,284   IGM 35 - 242 mg/dL 201   Immunofixation ELP  No monoclonal protein seen on immunofixation. Pathologic significance requires clinical correlation. MISSY Moran M.D., Ph.D., Pathologist   Kappa Free Lt Chain 0.33 - 1.94 mg/dL 2.03 (H)   Kappa Lambda Ratio 0.26 - 1.65  1.11   Lambda Free Lt Chain 0.57 - 2.63 mg/dL 1.83   Monoclonal Peak <=0.0 g/dL 0.0   Total Protein Serum for ELP 6.4 - 8.3 g/dL 8.0   (L): Data is abnormally low  (H): Data is abnormally high    PATHOLOGY:    IMAGING:  EXAM: PET ONCOLOGY WHOLE BODY, CT SOFT TISSUE NECK W CONTRAST, CT CHEST/ABDOMEN/PELVIS W CONTRAST  LOCATION: [facility]  DATE/TIME: 5/18/2023 9:38 AM CDT     INDICATION: Lymphoma, non-Hodgkin, staging. Orbital mass. Follicular lymphoma grade I, extranodal and solid organ sites. CLL/SLL. Monoclonal gammopathy of undetermined significance. Abnormality of plasma protein, unspecified. Personal history of breast   cancer, left. Initial treatment strategy.  COMPARISON: CT chest abdomen pelvis 12/28/2022, MRI head 01/09/2023, CT neck 01/23/2023 reviewed.  CONTRAST: 80 mL [id number]  TECHNIQUE: Serum glucose level 86 mg/dL. One hour post intravenous administration of 13.0 mCi F-18 FDG, PET imaging was performed from the skull vertex to feet, utilizing attenuation correction with concurrent axial CT and PET/CT image fusion. Separate   diagnostic CT of the neck, chest, abdomen, and pelvis was performed. Dose reduction techniques were used.     PET/CT FINDINGS: An ovoid mass in the left lateral orbit measures 2.5 x 1.2 cm, slightly larger than 01/09/2023, and demonstrates marked uptake (SUVmax 9.3), consistent with biopsy-proven  lymphoma. A few small FDG avid bilateral intraparotid nodules are   present, one on the right and two on the left, including representative left parotid nodule measuring 0.9 x 1.4 cm with moderate uptake (SUVmax 6.7). Asymmetric prominent right posterior lower cervical node measures 0.8 cm associated with moderate focal   uptake (SUVmax 5.1). A single mildly enlarged left axillary lymph node measures 1.0 cm associated with mild uptake (SUVmax 3.0). Some scattered FDG avid lymph nodes below the diaphragm involve the retroperitoneal retrocaval, bilateral common iliac, left   pelvic sidewall and left inguinal regions. Some of these have increased slightly in size since 12/28/2022, such as a conglomerate left pelvic sidewall bj mass measuring approximately 2.2 x 4.1 cm (previously 2.2 x 3.4 cm) associated with marked uptake   (SUVmax 7.6). FDG avid sclerotic skeletal lesion involves the right superior acetabulum (SUVmax 12.9) as well as a couple of separate smaller sclerotic lesions in the right iliac bone uptake (SUVmax 12.2). Marked inflammatory uptake posterior lower   cervical spine. Advanced degenerative changes both hips with moderate surrounding inflammatory uptake. Moderate inflammatory uptake about the right great toe first MTP joint.     CT FINDINGS: Non-FDG avid right thyroid nodule. Left mastectomy with implant reconstruction. Right breast implant. Mild reticular scarring or atelectasis in the lower lungs. Few scattered small benign liver cysts. Pelvic phleboliths. Mild colonic   diverticulosis. Bony demineralization. Mild scattered degenerative changes in the spine.                                                                      IMPRESSION:  1. FDG avid left orbital mass consistent with lymphoma, which is slightly larger than 01/09/2023.  2. Scattered FDG avid lymph nodes above and below the diaphragm suspicious for additional sites of lymphoma, some sites of which in the pelvis are slightly larger  "than 12/28/2022. A single mildly enlarged FDG avid left axillary node could also represent   lymphoma although given the patient's history of left breast cancer, a breast cancer metastasis is also a possibility.  3. FDG avid bilateral intraparotid nodules, which could also represent sites of lymphoma or be seen in the setting of bilateral parotid Warthin tumors.  4. FDG avid skeletal lesions in the right bony pelvis, suspicious for sites of lymphoma although could also potentially represent breast cancer metastases given history.    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 No splenomegaly  - 1/23 brain MRI: Indeterminate ovoid T2 hyperintense enhancing mass in the region of the left lacrimal gland measuring approximately 2.4 cm AP by 1.4 cm transverse by 2.3 cm craniocaudal associated with/replacing the left lacrimal gland. Consider correlation with tissue sampling    -1/20/2023: IR US Guided core needle biopsy of a right external iliac LN:   -follicular lymphoma, low grade WHO grade 1-2 (negative for metastatic carcinoma),  - no high grade lymphoma present,   - FISH negative for BCL2 rearrangement;   - IHC: neoplastic cells demonstrate expression of CD20, BCL-6 (in follicles), and CD10 (bright). CD5 stains T cells  - Ki-67 proliferative index is approximately 10%, also low in the follicles.  - flow cytometry \"CD10-positive population is consistent with a " "clonal B cell population and a CD10 positive B-cell lymphoma is favored.\"    - 4/6/23 left orbital mass/lacrimal gland excisional biopsy: PATHOLOGY: - Follicular Lymphoma, low grade. 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  And B-cell cohort #2 which is described as negative for CD5 and showing dim CD20 and dim kappa staining.  This population is listed a CD5 negative and CD10 negative also listed as negative for  and .  Of note the population of monoclonal B-cell lymphocytosis found in peripheral blood in our department and in the lymph node biopsy in January showed partial/equivocal dim CD5 and may correspond to the cohort #2.     We essentially agree with the diagnosis of follicular lymphoma, which is consistent with prior diagnosis of follicular lymphoma low grade  established on core biopsy of right external iliac lymph node January 20, 2023 (case [id number]) in our Department . The concurrent flow cytometry case [id number] in January showed in addition to CD10 positive B-cell population  also a partial/equivocal CD5 positive separate kappa monotypic B-cell population, which most likely represents monoclonal B-cell lymphocytosis of CLL/SLL type, since it was also documented in the peripheral blood flow cytometry on February 16, 2023 (case number UF ) at a low frequency.  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 Areas with infiltrates of small B cells with coexpression of CD5 and CD23 are seen in the " current biopsy, but the size of the biopsy is too small to comprehensively evaluate for the presence of proliferation centers and the extent of involvement by the CD5 positive B cell lymphoproliferative process.     -5/23 PET/CT NCAP:   1. An ovoid mass in the left lateral orbit measures 2.5 x 1.2 cm, slightly larger than 01/09/2023, and demonstrates marked uptake (SUVmax 9.3), consistent with biopsy-proven lymphoma  2. A few small FDG avid bilateral intraparotid nodules are present, one on the right and two on the left, including representative left parotid nodule measuring 0.9 x 1.4 cm with moderate uptake (SUVmax 6.7).  3.  Asymmetric prominent right posterior lower cervical node measures 0.8 cm associated with moderate focal uptake (SUVmax 5.1).  4. A single mildly enlarged left axillary lymph node measures 1.0 cm associated with mild uptake (SUVmax 3.0)  5. Some scattered FDG avid lymph nodes below the diaphragm involve the retroperitoneal retrocaval, bilateral common iliac, left pelvic sidewall and left inguinal regions. Some of these have increased slightly in size since 12/28/2022, such as a conglomerate left pelvic sidewall bj mass measuring approximately 2.2 x 4.1 cm (previously 2.2 x 3.4 cm) associated with marked uptake (SUVmax 7.6).  6. FDG avid sclerotic skeletal lesion involves the right superior acetabulum (SUVmax 12.9) as well as a couple of separate smaller sclerotic lesions in the right iliac bone uptake (SUVmax 12.2).     Staging and prognostication:  - Stage: 4 given right medial thigh SURINDER and left common iliac, external iliac, pelvic sidewall; biopsy proven left orbital mass involvement; there was some concern from ENT that patients parotid lesions were related to pts follicular lymphoma   - FLIPI: score 2 (age>60),  stage 3-4; intermediate risk  - pertinent labs: 12/22  and 5/23 ; 2/23 and 5/23 beta 2 microglobulin 1.7, hepatitis B and C negative    PLAN  - IR referral for  "deep bone biopsy ASAP  - bone marrow biopsy ASAP   - radiation oncology referral for possible radiation to orbital mass  - regarding tx- GELF criteria to indicate treatment (involvement of 3 or more bj sites each with a diameter greater than or equal to 3 cm, any bj or extranodal tumor mass with a diameter of greater than or equal to 7 cm, B symptoms, splenomegaly, pleural effusions, peritoneal ascites, clinically progressive or significant cytopenias, leukemia, symptoms, threatened end organ function, clinically significant bulky disease, steady or rapid progression of disease)  - Discussed possible treatment with single agent weekly rituximab x4 weeks; information provided today   - Patient pending hip replacement 7/23, the above results may affect whether or not she can have this planned surgery. We will await results and if needed, I will reach out to her surgery team Dr. Austin Blood to discuss again    #b/l parotid gland nodules  - 1/23 brain MRI:  A few indeterminate partially visualized heterogeneous enhancing nodules measuring up to approximately 1.5 cm in the bilateral parotid glands.  Consider soft tissue neck CT with contrast for further evaluation.  - 1/23 CT soft tissue neck: Soft tissue nodule in the superficial left parotid gland measuring 1.2 cm. Additional 0.8 cm nodule in the deep portion of the left parotid gland. Soft tissue nodule in the superficial right parotid gland measuring 0.8 cm. These may represent benign or malignant parotid lesions. Multiplicity of lesions raises concern for Warthin tumors  - 1/23/23 case reviewed by Dr. Wilmar Morales  - 1/27/23 note from Dr. Wilmar Morales- \"Based on this diagnosis, I think the 2 nodules in her parotid gland are likely atypical lymph nodes containing lymphoma.  She will probably require some staging imaging, such as PET scan and we can take a look at that as well.  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 The CD5 (dimly) positive B cells mostly lack CD23 and CD79b. We looked for the morphologic correlate in this case by IHC, but it is not readily apparent.   - of note pt does not have leukocytosis or lymphocytosis, ALC 2.2, no splenomegaly, LN biopsy did not demonstrate IHC consistent with SLL   - 2/23 peripheral blood flow cytometry: CD5 positive kappa-monotypic B cells (2.3%), consistent with a CD5 positive monotypic B cell population. The differential diagnosis includes chronic lymphocytic leukemia/small lymphocytic lymphoma (CLL/SLL), mantle cell lymphoma, and rare other entities; the immunophenotype favors CLL/SLL. based on the most recent reported WBC of 1.8 x10^9/L (12/28/2022), the findings are consistent with low count monoclonal B cell lymphocytosis. Clinical correlation to evaluate for lymph node involvement is recommended. The monotypic B cell population is negative for CD38 and positive for CD49d. CD38 and CD49d are potential prognostic markers in CLL/SLL   - 4/6/23 left orbital mass/lacrimal gland excisional biopsy: PATHOLOGY: - Follicular Lymphoma, low grade. 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  SIFE: trace amount of IgG kappa monoclonal protein  2.  SPEP, " UIFE, UPEP: no monoclonal protein seen  3.  Kappa/lambda ratio: kappa 2.17, lambda 1.67, kappa/lambda ratio 1.30   4.  Quantitative immunoglobulins: Ig    IgA: 187    IgM: 165  - labs:  1.  SIFE: no monoclonal protein seen  2.  SPEP:no monoclonal protein seen  5.  Kappa/lambda ratio: kappa 2.03, lambda 1.83, k/l ratio 1.11  6.  Quantitative immunoglobulins: Ig     IgA: 198    IgM: 201     PLAN:  No further follow up, trace IgG kappa monoclonal protein resolved    #History of LEFT breast IDC ER positive, AK positive, HER2 positive on FISH  -Diagnosed at age 49  - 3/2000 left breast lumpectomy and left axillary node dissection (IDC, grade 3, 1.45 cm incompletely excised tumor, DCIS, positive margins with infiltrating carcinoma, 1 of 17 lymph nodes positive (0.5 cm metastatic focus), ER positive, AK positive, HER2 positive).    -2020 left breast simple mastectomy with no residual carcinoma.   -S/p ACx4, taxol x4, no anti-her2 treatment, no RT.   -S/p 5 years adjuvant endocrine therapy (tamoxifen and anastrozole).  -Delayed left breast reconstruction and right breast augmentation.     PLAN:   - Distant history of breast cancer, continue age appropriate screening w/PCP  -  PET/CT notes single enlarged left axillary lymph node with SUV 3 in the setting of known lymphoma and breast tumor markers normal     #Stable sclerotic lesions in left sacrum and left posterior ilium  -10/21 mammogram, endoscopy with EUS and colonoscopy WNL  - CT chest abdomen pelvis shows new or increased left common iliac, external iliac, pelvic sidewall lymphadenopathy and sclerotic lesions in left sacrum and left posterior ilium.  No metastatic disease in chest.  No abnormal findings in breast.  -  tumor markers: CA 15-3 24,  29, CEA 3.1, CA 19-9 4, AFP 5.9,   - 2023 MRI brain w/wo contrast: no metastases   - 2023 right external iliac LN core need biopsy- no metastatic carcinoma   -  PET avid  bone lesions, deep bone biopsy as above    RTC 6/22 for f/u with me (if pathology from bone marrow biopsy and deep bone biopsy is not back, will reschedule appt)     ADDENDUM:  Multiple discussions with IR team, they are unable to do the bone marrow biopsy at the time of deep bone biopsy due to concern of use of resources; please see their note for details   We will schedule bone marrow with our oncology team instead  D/w Dr. Horvath- plan for RT 2Gy x2 fractions to orbit 6/15 and 6/16     Arlene Ahumada DO  Hematology/Oncology  [facility]        Again, thank you for allowing me to participate in the care of your patient.        Sincerely,        ARLENE AHUMADA DO

## 2023-06-02 NOTE — CONSULTS
"    Outpatient IR Referral  06/02/23    Referring Provider: Naomy Zaidi MD  IR referral Request: Right acetabular bone lesion biopsy, bone marrow biopsy    Case and imaging was reviewed with Dr. Vinayak Harvey from IR and biopsy is recommended.   Surg path and flow cytometry if possible  IR will defer bone marrow biopsy, prefer not to commit resources for a bone marrow biopsy that can be done without imaging in this patient    Brief History:    Virginia Byers is a 72 year old female with history of left breast cancer in 2000, right iliac bone marrow signal lymphoma now with orbital mass and bony lesions concerning for metastasis    Pertinent Imaging Reviewed:        Procedure order and surgical pathology order placed.    If requesting team would like sample sent for anything else please use the IR order set \"IR RAD Biopsy or Fluid Aspirate Specimens\" to select your necessary diagnostic labs and pend for admission.   If there are labs you desire that are not found in this order set or you have questions regarding specific diagnostic labs please call the associated lab personnel.     Requesting team, Dr. Zaidi, made aware of IR recommendations via Epic messaging.    Josr Saldaña PA-C  Interventional Radiology   IR on-call pager: [phone]      "

## 2023-06-05 ENCOUNTER — PATIENT OUTREACH (OUTPATIENT)
Dept: ONCOLOGY | Facility: CLINIC | Age: 73
End: 2023-06-05
Payer: MEDICARE

## 2023-06-05 NOTE — PROGRESS NOTES
[facility]: Cancer Care                                                                   Hem/Onc  referral for Radiation Oncology services reviewed  Referred By  Referred To   Naomy Zaidi DO  [facility] MED ONC      [address]   [facility] [address]   Phone: [phone]   Fax: [phone]    Diagnoses: Follicular lymphoma grade I, unspecified body region (H)   Order: Radiation Therapy Referral   Additional Information:follicular lymphoma, biopsy proven orbital mass involvement, slight increase in size on PET, increasing LDH and pt c/o focal symptoms, option to give local radiation to orbit    [facility] [place]: [phone]        ASSESSMENT      Clinical History (per Nurse review of records provided):    72 year old female patient, see above    [address] [facility] [address]  Payor: MEDICARE / Plan: MEDICARE / Product Type: Medicare /   PCP: Chuyita Zaidi  [facility]     Pertinent labs -- BOOKMARKED    Pertinent imaging -- BOOKMARKED    Referring provider note(s) / not completed signed at time of NN review-- BOOKMARKED    INTERVENTION(S)                                                      June 2, 2023 Referral message to Rad onc RN team to advise re: which MDs/locations are appropriate for consult/tx  June 5, 2023 2nd message re: same, dept rec is Dr CORDERO  June 5, 2023 OUTGOING CALL to pt: Introduced my role as nurse navigator with [facility] Hematology/Oncology dept and that we have recd the referral for radiation consult  from Dr Zaidi  Pt confirms they are aware of the referral and ready to schedule, reviewed what to expect at the consult, was transferred pt to Hem/Onc New Patient Access line (number below) to schedule the consultation.    PLAN                                                      Radiation Oncology consult with DR CORDERO at [facility]    Lilly Robins, RN, BSN, OCN  Hematology/Oncology  New Patient Nurse Navigator   [facility] [place]  [phone]

## 2023-06-07 NOTE — TELEPHONE ENCOUNTER
MEDICAL RECORDS REQUEST   Radiation Oncology  [address] [place] [address]  PHONE: [phone]  Fax: [phone]        FUTURE VISIT INFORMATION                                                   LAURO Villasenor: 1950 scheduled for future visit at [facility] Radiation Oncology    APPOINTMENT INFORMATION:    Date: 2023    Provider:  Dr. Horvath     Reason for Visit/Diagnosis:   follicular lymphoma, biopsy proven orbital mass involvement, slight increase in size on PET, increasing LDH and pt c/o focal symptoms, option to give local radiation to orbit?    REFERRAL INFORMATION:    Referring provider: ref Dejuan, Naomy, DO, recs Epic    RECORDS REQUESTED FOR VISIT                                                     Action    Action Taken 2023 1:36pm KEMARLY     I called pt Virginia - all records are internal. No hx of rad onc.      Follicular lymphoma, biopsy proven orbital mass involvement, slight increase in size on PET, increasing LDH and pt c/o focal symptoms, option to give local radiation to orbit?      CT, MRI, PET/CT AND REPORTS yes   ANY RECENT LABS yes   SURGICAL REPORTS yes Sign& Held   PATHOLOGY REPORTS yes   CHEMO & MEDICAL ONCOLOGY NOTES yes   CURRENT MEDICATION LIST yes   *Send all radiation Prior radiation records for both [facility] and [facility] Radiation Oncology patients to [facility] with  ATTN: DEE/Elda Dosi/Physics

## 2023-06-08 ENCOUNTER — OFFICE VISIT (OUTPATIENT)
Dept: RADIATION ONCOLOGY | Facility: CLINIC | Age: 73
End: 2023-06-08
Payer: MEDICARE

## 2023-06-08 ENCOUNTER — PRE VISIT (OUTPATIENT)
Dept: RADIATION ONCOLOGY | Facility: CLINIC | Age: 73
End: 2023-06-08

## 2023-06-08 ENCOUNTER — OFFICE VISIT (OUTPATIENT)
Dept: RADIATION ONCOLOGY | Facility: CLINIC | Age: 73
End: 2023-06-08
Attending: INTERNAL MEDICINE
Payer: MEDICARE

## 2023-06-08 VITALS
BODY MASS INDEX: 25.69 KG/M2 | RESPIRATION RATE: 16 BRPM | DIASTOLIC BLOOD PRESSURE: 71 MMHG | SYSTOLIC BLOOD PRESSURE: 129 MMHG | WEIGHT: 145 LBS | HEART RATE: 71 BPM | OXYGEN SATURATION: 96 % | TEMPERATURE: 98.2 F

## 2023-06-08 DIAGNOSIS — C82.00 FOLLICULAR LYMPHOMA GRADE I, UNSPECIFIED BODY REGION (H): Primary | ICD-10-CM

## 2023-06-08 DIAGNOSIS — C82.00 FOLLICULAR LYMPHOMA GRADE I, UNSPECIFIED BODY REGION (H): ICD-10-CM

## 2023-06-08 PROCEDURE — 77334 RADIATION TREATMENT AID(S): CPT | Performed by: SURGERY

## 2023-06-08 PROCEDURE — 99205 OFFICE O/P NEW HI 60 MIN: CPT | Mod: 25 | Performed by: SURGERY

## 2023-06-08 PROCEDURE — 77263 THER RADIOLOGY TX PLNG CPLX: CPT | Performed by: SURGERY

## 2023-06-08 ASSESSMENT — PAIN SCALES - GENERAL: PAINLEVEL: NO PAIN (0)

## 2023-06-08 NOTE — LETTER
2023         RE: Virginia Byers  [address] [facility] [address]        Dear Colleague,    Thank you for referring your patient, Virginia Byers, to the [facility] RADIATION ONCOLOGY [place]. Please see a copy of my visit note below.       Department of Radiation Oncology  [facility]  [facility]  [address] [address] [place] [address]  [phone]       Consultation Note    Name: Virginia Byers MRN: [id number]   : 1950   Date of Service: 2023   Referring: Dr. 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    Briefly, her oncology history is as follows:    Patient does have a history of left-sided breast cancer diagnosed in , ER positive [place] 5/HER2 positive.  In  she had a left breast lumpectomy and axillary dissection with pathology revealing an IDC, grade 3, with incompletely excised tumor with positive margins, 1 of 17 lymph nodes positive, and underwent adjuvant chemotherapy without any radiation followed by endocrine therapy.  She did not receive any radiation.    She has been having some pain in the lower extremities prompting MRI evaluation  which demonstrated prominent iliac lymph nodes.  There is evidence of enhancing marrow signal abnormality in the right iliac bone as well as increased left external iliac chain adenopathy.    In addition she had an MRI of the brain performed which demonstrated an indeterminate T2 hyperintense mass in the left lacrimal gland/lateral orbital wall.    She eventually underwent IR guided biopsy of the right external iliac node on 2023 with pathology returning as a follicular lymphoma, low-grade, grade 1-2.    As part of her continued work-up, she has  had biopsy of left orbital mass on 4/6/2023 which returned positive for follicular lymphoma, low-grade.    PET CT scan on 5/18/2023 demonstrating disease above and below the diaphragm as well as left orbital/lacrimal gland involvement.    She is bone marrow biopsy and is recently under the care of Dr. Zaidi and is pending initiation of systemic therapy.    However, left orbital involvement, she was referred for palliative radiotherapy.  Of note, she has not had any prior radiation.  However, she does have a history of bilateral macular degeneration (wet type on the left, dry type on the right) and has been undergoing injections for this.  She is also has a history of cataracts and has had lens replacement.  Of note she does have a history of glaucoma and has had bilateral stents under care of Dr. Rolando Toscano.  The glaucoma stents were placed bilaterally about 5 years ago, the last injection for macular degeneration was approximately 2 years ago and she has a follow-up in July 2023.  Of note, she is pending a hip surgery in July 2023.    Today, she denies any headaches, vision changes, neurologic changes.  She denies any active weight loss, fevers, night sweats.  No prior radiation.    Past Medical History:   Past Medical History:   Diagnosis Date     Breast cancer (H) 2000    Left breast, s/p mastectomy, chemotherapy and endocrine therapy     Follicular lymphoma (H) 01/20/2023     Glaucoma     bilateral - Dr. Moore - [facility]     Hyperlipidemia      Hypertension      Macular degeneration     Dr. Toscano - [facility]     Osteoarthritis      Osteoporosis      Personal history of chemotherapy 2000    A/C + Taxol       Past Surgical History:   Past Surgical History:   Procedure Laterality Date     BIOPSY Left 04/06/2023    Left Orbital Biopsy     C MASTECTOMY,SIMPLE  03/2000    left     COLONOSCOPY  2006    Q 10 years     COLONOSCOPY N/A 10/04/2021    Procedure: COLONOSCOPY;   Surgeon: Guru Lyla Ruby MD;  Location: [facility] OR     ESOPHAGOSCOPY, GASTROSCOPY, DUODENOSCOPY (EGD), COMBINED N/A 10/04/2021    Procedure: ENDOSCOPIC ULTRASOUND, ESOPHAGOSCOPY / UPPER GASTROINTESTINAL TRACT (GI), Esophagogastroduodenoscopy,  Fine Needle Biopsy of liver;  Surgeon: Guru Lyla Ruby MD;  Location: [facility] OR     IR LYMPH NODE BIOPSY  01/20/2023     SURGICAL HISTORY OF -  Left 08/2015    tissue expander placed in left breast area     SURGICAL HISTORY OF -  Left 02/2016    left breast implant and right mammoplasty       Chemotherapy History:  Per HPI    Radiation History:  No prior radiation    Pregnant: No  Implanted Cardiac Devices: No    Medications:  Current Outpatient Medications   Medication     dorzolamide-timolol (COSOPT) 2-0.5 % ophthalmic solution     latanoprost (XALATAN) 0.005 % ophthalmic solution     losartan (COZAAR) 100 MG tablet     rosuvastatin (CRESTOR) 10 MG tablet     No current facility-administered medications for this visit.         Allergies:     Allergies   Allergen Reactions     Compazine      Mental confusion     Hydrochlorothiazide      Dryness mouth     Prochlorperazine Visual Disturbance     Simvastatin Other (See Comments)     Muscle aches       Social History:  Tobacco: Not currently smoking  Alcohol: No alcohol  Employment: Retired    Family History:  Family History   Problem Relation Age of Onset     Cancer Mother         bone cancer     Heart Disease Father      Coronary Artery Disease Father      Diabetes Father      Arthritis Sister      Breast Cancer Sister         age 75     Diabetes Maternal Grandmother      Diabetes Paternal Grandmother        Review of Systems   A 10-point review of systems was performed. Pertinent findings are noted in the HPI.    Physical Exam   ECOG Status: 2    Vitals:  /71 (BP Location: Right arm, Patient Position: Chair, Cuff Size: Adult Regular)   Pulse 71   Temp 98.2  F (36.8  C) (Oral)   Resp  "16   Wt 65.8 kg (145 lb)   SpO2 96%   BMI 25.69 kg/m      Gen: Alert, in NAD  Head: NC/AT  Eyes: PERRL, EOMI, sclera anicteric, possible left ptosis(mild)   Ears: No external auricular lesions  Nose/sinus: No rhinorrhea or epistaxis  Oral cavity/oropharynx: MMM, no visible oral cavity lesions,   Neck: Full ROM, supple, no palpable adenopathy  Pulm: No wheezing, stridor or respiratory distress  CV: Extremities are warm and well-perfused, no cyanosis, no pedal edema  Abdominal: Normal bowel sounds, soft, nontender, no masses  Musculoskeletal: Normal bulk and tone  Skin: Normal color and turgor  Neuro: A/Ox3, CN II-XII intact, normal gait    Imaging/Path/Labs   Imaging: per HPI, personally reviewed and in agreement    Path: per HPI, personally reviewed and in agreement    Labs: per HPI, personally reviewed and in agreement    Assessment      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    We discussed NCCN guidelines for management of stage IV follicular lymphoma.  In general, systemic therapy is usually recommended and she she has not yet initiated any treatment under care of Dr. Zaidi yet and is pending a bone marrow biopsy in near future.    However we did discuss that given orbital involvement on the left side, we did recommend a palliative dose of radiation of 2Gy x 2 fx (\"boom boom\" regimen).      Plan     1. Given her complex occular history of macular degeneration, gluacoma with stent placement, and cataracts, I did speak with Dr. Toscano ([facility]) who does not feel that her history is a contraindiction to left orbital radiation, and who agrees to proceed with palliative left orbital radiation .    2. CT simulation was performed today, with plan for 2Gy x2fx.       All benefits and risks discussed, and patient is in agreement with the oncologic plan discussed above.     Thank you for allowing me to participate in your patient's care.  If you should require any additional " information, please do not hesitate in contacting me.     Julian Horvath MD  Department of Radiation Oncology  [facility]         Again, thank you for allowing me to participate in the care of your patient.        Sincerely,        Julian Horvath MD

## 2023-06-08 NOTE — PROGRESS NOTES
A CT simulation was performed today for radiation therapy planning. See Mosaic for additional details.     Julian Horvath M.D.  Department of Radiation Oncology  [facility]

## 2023-06-08 NOTE — NURSING NOTE
INITIAL PATIENT ASSESSMENT      Diagnosis: follicular lymphoma    Prior radiation therapy: None    Prior chemotherapy:   Protocol: Adriamycin/Cytoxan + Taxol (breast cancer)  Dates: 2000       Prior hormonal therapy:Yes: patient reports receiving Tamoxifen and then Anastrozole in 2000.    Pain Eval:  Denies at current visit, reports intermittent bilateral hip pain and reports taking Tylenol 500 mg po as needed    Psychosocial  Living arrangements: lives at home in [place], presents to clinic with friend Aurelio  Fall Risk: ambulates with assistive device  MIPS Falls Risk Screening Completed: Yes Result: Negative   referral needs: Not needed    Advanced Directive: Yes - Location: on file with [facility]  Implantable Cardiac Device: No  Authorization To Share Protected Health Information: YES - Date: 3/20/2023      Onset of menopause: patient reports menopause at age 50  Abnormal vaginal bleeding/discharge: No  Are you pregnant? No  Urine Pregnancy Testing Needed: No, post-menopausal    Review of Systems     Constitutional: Negative.    HENT: Negative.    Eyes: Negative for blurred vision, double vision, photophobia, pain, discharge and redness.        Patient reports dry eye L > R.   Respiratory: Positive for cough. Negative for hemoptysis, sputum production, shortness of breath and wheezing.         Patient reports intermittent dry cough.   Cardiovascular: Negative.    Gastrointestinal: Negative.    Genitourinary: Negative.    Musculoskeletal: Positive for joint pain. Negative for back pain, falls, myalgias and neck pain.        See pain note.   Skin: Negative.    Neurological: Negative.    Endo/Heme/Allergies: Negative.    Psychiatric/Behavioral: Negative.        Nurse face-to-face time: Level 5:  over 15 min face to face time.    Natasha Srinivasan RN BSN OCN CBCN

## 2023-06-08 NOTE — LETTER
6/8/2023         RE: Virginia Byers  [address] [facility] [address]        Dear Colleague,    Thank you for referring your patient, Virginia Byers, to the [facility] RADIATION ONCOLOGY [place]. Please see a copy of my visit note below.    A CT simulation was performed today for radiation therapy planning. See Mosaic for additional details.     Julian Horvath M.D.  Department of Radiation Oncology  [facility]         Again, thank you for allowing me to participate in the care of your patient.        Sincerely,        Julian Horvath MD

## 2023-06-08 NOTE — PATIENT INSTRUCTIONS
Please contact [facility] RN with questions or concerns following today's appointment: [phone].       Please feel free to leave a detailed message if your call is not answered.    If your call is not received before 3:00 PM, it may not be returned until the following business day.    If you are receiving radiation treatment and need assistance after 3:00 PM or on the weekends, please call [phone] and ask to speak to the radiation oncologist on-call.    Thank you!    Natasha GRIDER

## 2023-06-08 NOTE — PROGRESS NOTES
Department of Radiation Oncology  [facility] Cancer Center  [facility]  [address] [place] [address]  [phone]       Consultation Note    Name: Virginia Byers MRN: [id number]   : 1950   Date of Service: 2023   Referring: Dr. 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    Briefly, her oncology history is as follows:    Patient does have a history of left-sided breast cancer diagnosed in , ER positive [place] 5/HER2 positive.  In  she had a left breast lumpectomy and axillary dissection with pathology revealing an IDC, grade 3, with incompletely excised tumor with positive margins, 1 of 17 lymph nodes positive, and underwent adjuvant chemotherapy without any radiation followed by endocrine therapy.  She did not receive any radiation.    She has been having some pain in the lower extremities prompting MRI evaluation  which demonstrated prominent iliac lymph nodes.  There is evidence of enhancing marrow signal abnormality in the right iliac bone as well as increased left external iliac chain adenopathy.    In addition she had an MRI of the brain performed which demonstrated an indeterminate T2 hyperintense mass in the left lacrimal gland/lateral orbital wall.    She eventually underwent IR guided biopsy of the right external iliac node on 2023 with pathology returning as a follicular lymphoma, low-grade, grade 1-2.    As part of her continued work-up, she has had biopsy of left orbital mass on 2023 which returned positive for follicular lymphoma, low-grade.    PET CT scan on 2023 demonstrating disease above and below the diaphragm as well as left orbital/lacrimal gland involvement.    She is bone marrow  biopsy and is recently under the care of Dr. Zaidi and is pending initiation of systemic therapy.    However, left orbital involvement, she was referred for palliative radiotherapy.  Of note, she has not had any prior radiation.  However, she does have a history of bilateral macular degeneration (wet type on the left, dry type on the right) and has been undergoing injections for this.  She is also has a history of cataracts and has had lens replacement.  Of note she does have a history of glaucoma and has had bilateral stents under care of Dr. Rolando Toscano.  The glaucoma stents were placed bilaterally about 5 years ago, the last injection for macular degeneration was approximately 2 years ago and she has a follow-up in July 2023.  Of note, she is pending a hip surgery in July 2023.    Today, she denies any headaches, vision changes, neurologic changes.  She denies any active weight loss, fevers, night sweats.  No prior radiation.    Past Medical History:   Past Medical History:   Diagnosis Date     Breast cancer (H) 2000    Left breast, s/p mastectomy, chemotherapy and endocrine therapy     Follicular lymphoma (H) 01/20/2023     Glaucoma     bilateral - Dr. Moore - [facility]     Hyperlipidemia      Hypertension      Macular degeneration     Dr. Toscano - [facility]     Osteoarthritis      Osteoporosis      Personal history of chemotherapy 2000    A/C + Taxol       Past Surgical History:   Past Surgical History:   Procedure Laterality Date     BIOPSY Left 04/06/2023    Left Orbital Biopsy     C MASTECTOMY,SIMPLE  03/2000    left     COLONOSCOPY  2006    Q 10 years     COLONOSCOPY N/A 10/04/2021    Procedure: COLONOSCOPY;  Surgeon: Guru Lyla Ruby MD;  [facility]     ESOPHAGOSCOPY, GASTROSCOPY, DUODENOSCOPY (EGD), COMBINED N/A 10/04/2021    Procedure: ENDOSCOPIC ULTRASOUND, ESOPHAGOSCOPY / UPPER GASTROINTESTINAL TRACT (GI),  Esophagogastroduodenoscopy,  Fine Needle Biopsy of liver;  Surgeon: Guru Lyla Ruby MD;  Location: [facility] OR     IR LYMPH NODE BIOPSY  01/20/2023     SURGICAL HISTORY OF -  Left 08/2015    tissue expander placed in left breast area     SURGICAL HISTORY OF -  Left 02/2016    left breast implant and right mammoplasty       Chemotherapy History:  Per HPI    Radiation History:  No prior radiation    Pregnant: No  Implanted Cardiac Devices: No    Medications:  Current Outpatient Medications   Medication     dorzolamide-timolol (COSOPT) 2-0.5 % ophthalmic solution     latanoprost (XALATAN) 0.005 % ophthalmic solution     losartan (COZAAR) 100 MG tablet     rosuvastatin (CRESTOR) 10 MG tablet     No current facility-administered medications for this visit.         Allergies:     Allergies   Allergen Reactions     Compazine      Mental confusion     Hydrochlorothiazide      Dryness mouth     Prochlorperazine Visual Disturbance     Simvastatin Other (See Comments)     Muscle aches       Social History:  Tobacco: Not currently smoking  Alcohol: No alcohol  Employment: Retired    Family History:  Family History   Problem Relation Age of Onset     Cancer Mother         bone cancer     Heart Disease Father      Coronary Artery Disease Father      Diabetes Father      Arthritis Sister      Breast Cancer Sister         age 75     Diabetes Maternal Grandmother      Diabetes Paternal Grandmother        Review of Systems   A 10-point review of systems was performed. Pertinent findings are noted in the HPI.    Physical Exam   ECOG Status: 2    Vitals:  /71 (BP Location: Right arm, Patient Position: Chair, Cuff Size: Adult Regular)   Pulse 71   Temp 98.2  F (36.8  C) (Oral)   Resp 16   Wt 65.8 kg (145 lb)   SpO2 96%   BMI 25.69 kg/m      Gen: Alert, in NAD  Head: NC/AT  Eyes: PERRL, EOMI, sclera anicteric, possible left ptosis(mild)   Ears: No external auricular lesions  Nose/sinus: No rhinorrhea or  "epistaxis  Oral cavity/oropharynx: MMM, no visible oral cavity lesions,   Neck: Full ROM, supple, no palpable adenopathy  Pulm: No wheezing, stridor or respiratory distress  CV: Extremities are warm and well-perfused, no cyanosis, no pedal edema  Abdominal: Normal bowel sounds, soft, nontender, no masses  Musculoskeletal: Normal bulk and tone  Skin: Normal color and turgor  Neuro: A/Ox3, CN II-XII intact, normal gait    Imaging/Path/Labs   Imaging: per HPI, personally reviewed and in agreement    Path: per HPI, personally reviewed and in agreement    Labs: per HPI, personally reviewed and in agreement    Assessment      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    We discussed NCCN guidelines for management of stage IV follicular lymphoma.  In general, systemic therapy is usually recommended and she she has not yet initiated any treatment under care of Dr. Zaidi yet and is pending a bone marrow biopsy in near future.    However we did discuss that given orbital involvement on the left side, we did recommend a palliative dose of radiation of 2Gy x 2 fx (\"boom boom\" regimen).      Plan     1. Given her complex occular history of macular degeneration, gluacoma with stent placement, and cataracts, I did speak with Dr. Toscano ([facility]) who does not feel that her history is a contraindiction to left orbital radiation, and who agrees to proceed with palliative left orbital radiation .    2. CT simulation was performed today, with plan for 2Gy x2fx.       All benefits and risks discussed, and patient is in agreement with the oncologic plan discussed above.     Thank you for allowing me to participate in your patient's care.  If you should require any additional information, please do not hesitate in contacting me.     Julian Horvath MD  Department of Radiation Oncology  [facility]     "

## 2023-06-09 ENCOUNTER — MYC MEDICAL ADVICE (OUTPATIENT)
Dept: INTERVENTIONAL RADIOLOGY/VASCULAR | Facility: CLINIC | Age: 73
End: 2023-06-09
Payer: MEDICARE

## 2023-06-12 ENCOUNTER — TELEPHONE (OUTPATIENT)
Dept: ONCOLOGY | Facility: CLINIC | Age: 73
End: 2023-06-12
Payer: MEDICARE

## 2023-06-12 NOTE — TELEPHONE ENCOUNTER
[facility]   Bone Marrow Biopsy Pre-Procedure Education    Called Patient  to review instructions prior to their scheduled bone marrow biopsy.      Reviewed the following instructions: wear loose fitting clothing, eat a light breakfast, the process will take approximately 90 minutes to 2 hours and a  is required.    Patient is on blood thinner(s): No    Answered questions to Patient's satisfaction.  Provided them with clinic number [phone]) if they have any additional questions or issues prior to the procedure.     **Patient does have radiation therapy appointment on 6/15 at 7:40am. Virginia is aware of the gap in her appointments and does not plan to leave the building.      Elle Murphy, RN, OCN

## 2023-06-13 ENCOUNTER — APPOINTMENT (OUTPATIENT)
Dept: RADIATION ONCOLOGY | Facility: CLINIC | Age: 73
End: 2023-06-13
Payer: MEDICARE

## 2023-06-13 PROCEDURE — 77300 RADIATION THERAPY DOSE PLAN: CPT | Performed by: SURGERY

## 2023-06-13 PROCEDURE — 77338 DESIGN MLC DEVICE FOR IMRT: CPT | Performed by: SURGERY

## 2023-06-13 PROCEDURE — 77301 RADIOTHERAPY DOSE PLAN IMRT: CPT | Performed by: SURGERY

## 2023-06-14 ENCOUNTER — OFFICE VISIT (OUTPATIENT)
Dept: RADIATION ONCOLOGY | Facility: CLINIC | Age: 73
End: 2023-06-14
Payer: MEDICARE

## 2023-06-14 ENCOUNTER — APPOINTMENT (OUTPATIENT)
Dept: RADIATION ONCOLOGY | Facility: CLINIC | Age: 73
End: 2023-06-14
Payer: MEDICARE

## 2023-06-14 VITALS
SYSTOLIC BLOOD PRESSURE: 159 MMHG | WEIGHT: 146.2 LBS | HEART RATE: 78 BPM | DIASTOLIC BLOOD PRESSURE: 79 MMHG | OXYGEN SATURATION: 99 % | BODY MASS INDEX: 25.9 KG/M2 | TEMPERATURE: 98.2 F | RESPIRATION RATE: 18 BRPM

## 2023-06-14 DIAGNOSIS — C82.00 FOLLICULAR LYMPHOMA GRADE I, UNSPECIFIED BODY REGION (H): Primary | ICD-10-CM

## 2023-06-14 PROCEDURE — 77386 PR IMRT TREATMENT DELIVERY, COMPLEX: CPT | Performed by: SURGERY

## 2023-06-14 PROCEDURE — 77014 PR CT GUIDE FOR PLACEMENT RADIATION THERAPY FIELDS: CPT | Performed by: SURGERY

## 2023-06-14 PROCEDURE — 99207 PR DROP WITH A PROCEDURE: CPT | Performed by: SURGERY

## 2023-06-14 ASSESSMENT — PAIN SCALES - GENERAL: PAINLEVEL: NO PAIN (0)

## 2023-06-14 NOTE — PATIENT INSTRUCTIONS
Please contact [facility] RN with questions or concerns following today's appointment: [phone].       Please feel free to leave a detailed message if your call is not answered.    If your call is not received before 3:00 PM, it may not be returned until the following business day.    If you are receiving radiation treatment and need assistance after 3:00 PM or on the weekends, please call [phone] and ask to speak to the radiation oncologist on-call.    Thank you!    Natasha GRIDER

## 2023-06-14 NOTE — TELEPHONE ENCOUNTER
Writer has spoken with Renetta regarding planned procedure with IR via telephone.      Renetta acknowledges understanding of pre-procedure instructions.         I have provided renetta with IR number ([phone]) for questions or concerns.    Julia GUZMAN  Interventional Radiology RN   [phone]

## 2023-06-14 NOTE — PROGRESS NOTES
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    Reason for Visit:  On Radiation Treatment Visit       Treatment Summary to Date  Treatment Site: left orbit Current Dose: 200/400 cGy Fractions: 1/2      Chemotherapy  Chemo concurrent with radx?: No (Dr. Zaidi)    Subjective:   No complaints, tolerating RT well overall.    Nursing ROS:   Nutrition Alteration  Diet Type: Patient's Preference  Skin  Skin Reaction: 0 - No changes  CNS Alteration  CNS note: denies headaches, denies vision changes     Cardiovascular  Respiratory effort: 1 - Normal - without distress        Psychosocial  Mood - Anxiety: 0 - Normal  Mood - Depression: 0 - Normal  Psychosocial Note: energy level at baseline  Pain Assessment  0-10 Pain Scale: 0      Objective:   BP (!) 159/79 (BP Location: Right arm, Patient Position: Chair, Cuff Size: Adult Regular)   Pulse 78   Temp 98.2  F (36.8  C) (Oral)   Resp 18   Wt 66.3 kg (146 lb 3.2 oz)   SpO2 99%   BMI 25.90 kg/m    Gen: Appears well, in no acute distress  Skin: No erythema  CV/Resp: rrr, breathing comfortably on room air  Neuro: CN 2-12 grossly intact, UE/LE full strength     Labs:  CBC RESULTS: Recent Labs   Lab Test 23  0938   WBC 10.0   RBC 5.19   HGB 14.4   HCT 44.3   MCV 85   MCH 27.7   MCHC 32.5   RDW 15.2*        ELECTROLYTES:  Recent Labs   Lab Test 23  0938      POTASSIUM 4.2   CHLORIDE 103   COURTNEY 10.3*   CO2 27   BUN 21   CR 0.50*   *       Assessment:    Tolerating radiation therapy well.  All questions and concerns addressed.      Plan:   1. Continue current therapy.    2. Completes RT this week,follow up on as needed basis  3. Follow up with Dr. Zaidi  for systemic therapy       Mosaiq chart and setup information reviewed  Ports checked and MVCT/IGRT images checked    Medication Review  Med list reviewed with patient?: Yes  Med list printed and given: Offered and declined    Educational Topic Discussed  Education Instructions: radiation therapy side effects reviewed    Julian Horvath MD  Radiation Oncology   [facility]  Clinic: [phone]

## 2023-06-14 NOTE — LETTER
2023         RE: Virginia Byers  [address] [facility] [address]        Dear Colleague,    Thank you for referring your patient, Virginia Byers, to the [facility] RADIATION ONCOLOGY [place]. Please see a copy of my visit note below.    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    Reason for Visit:  On Radiation Treatment Visit       Treatment Summary to Date  Treatment Site: left orbit Current Dose: 200/400 cGy Fractions: 1/2      Chemotherapy  Chemo concurrent with radx?: No (Dr. Zaidi)    Subjective:   No complaints, tolerating RT well overall.    Nursing ROS:   Nutrition Alteration  Diet Type: Patient's Preference  Skin  Skin Reaction: 0 - No changes  CNS Alteration  CNS note: denies headaches, denies vision changes     Cardiovascular  Respiratory effort: 1 - Normal - without distress        Psychosocial  Mood - Anxiety: 0 - Normal  Mood - Depression: 0 - Normal  Psychosocial Note: energy level at baseline  Pain Assessment  0-10 Pain Scale: 0      Objective:   BP (!) 159/79 (BP Location: Right arm, Patient Position: Chair, Cuff Size: Adult Regular)   Pulse 78   Temp 98.2  F (36.8  C) (Oral)   Resp 18   Wt 66.3 kg (146 lb 3.2 oz)   SpO2 99%   BMI 25.90 kg/m    Gen: Appears well, in no acute distress  Skin: No erythema  CV/Resp: rrr, breathing comfortably on room air  Neuro: CN 2-12 grossly intact, UE/LE full strength     Labs:  CBC RESULTS: Recent Labs   Lab Test 23  0938   WBC 10.0   RBC 5.19   HGB 14.4   HCT 44.3   MCV 85   MCH 27.7   MCHC 32.5   RDW 15.2*        ELECTROLYTES:  Recent Labs   Lab Test 23  0938      POTASSIUM 4.2   CHLORIDE 103   COURTNEY 10.3*    CO2 27   BUN 21   CR 0.50*   *       Assessment:    Tolerating radiation therapy well.  All questions and concerns addressed.      Plan:   1. Continue current therapy.    2. Completes RT this week,follow up on as needed basis      Mosaiq chart and setup information reviewed  Ports checked and MVCT/IGRT images checked    Medication Review  Med list reviewed with patient?: Yes  Med list printed and given: Offered and declined    Educational Topic Discussed  Education Instructions: radiation therapy side effects reviewed    Julian Horvath MD  Radiation Oncology   [facility]: [phone]          Again, thank you for allowing me to participate in the care of your patient.        Sincerely,        Julian Horvath MD

## 2023-06-14 NOTE — PROGRESS NOTES
ONC Adult Bone Marrow Biopsy Procedure Note  Nargis 15, 2023  BP (P) 134/71   Pulse (P) 80   Temp (P) 97  F (36.1  C) (Oral)   Resp (P) 18   SpO2 (P) 99%      Results for orders placed or performed in visit on 06/15/23   CBC with platelets and differential     Status: None   Result Value Ref Range    WBC Count 8.7 4.0 - 11.0 10e3/uL    RBC Count 4.40 3.80 - 5.20 10e6/uL    Hemoglobin 12.2 11.7 - 15.7 g/dL    Hematocrit 37.9 35.0 - 47.0 %    MCV 86 78 - 100 fL    MCH 27.7 26.5 - 33.0 pg    MCHC 32.2 31.5 - 36.5 g/dL    RDW 14.9 10.0 - 15.0 %    Platelet Count 288 150 - 450 10e3/uL    % Neutrophils 71 %    % Lymphocytes 19 %    % Monocytes 7 %    % Eosinophils 2 %    % Basophils 1 %    % Immature Granulocytes 0 %    NRBCs per 100 WBC 0 <1 /100    Absolute Neutrophils 6.1 1.6 - 8.3 10e3/uL    Absolute Lymphocytes 1.7 0.8 - 5.3 10e3/uL    Absolute Monocytes 0.6 0.0 - 1.3 10e3/uL    Absolute Eosinophils 0.2 0.0 - 0.7 10e3/uL    Absolute Basophils 0.1 0.0 - 0.2 10e3/uL    Absolute Immature Granulocytes 0.0 <=0.4 10e3/uL    Absolute NRBCs 0.0 10e3/uL   CBC with platelets differential     Status: None    Narrative    The following orders were created for panel order CBC with platelets differential.  Procedure                               Abnormality         Status                     ---------                               -----------         ------                     CBC with platelets and d...[[id number]]                      Final result                 Please view results for these tests on the individual orders.       DIAGNOSIS: Follicular Lymphoma    PROCEDURE: Unilateral Bone Marrow Biopsy and Unilateral Aspirate    LOCATION: [facility]    Patient s identification was positively verified by verbal identification and invasive procedure safety checklist was completed. Informed consent was obtained. Following the administration of Midazolam 0.5 mg as pre-medication, patient was placed in the  prone position and prepped and draped in a sterile manner. Approximately 9 cc of 1% Lidocaine was used over the left posterior iliac spine. Following this a 3 mm incision was made. Trephine bone marrow core(s) was (were) obtained from the [facility]. Bone marrow aspirates were obtained from the [facility]. Aspirates were sent for morphology, immunophenotyping, cytogenetics and molecular diagnostics. A total of approximately 20 ml of marrow was aspirated. Following this procedure a sterile dressing was applied to the bone marrow biopsy site(s). The patient was placed in the supine position to maintain pressure on the biopsy site. Post-procedure wound care instructions were given.     Complications: NO    Pre-procedural pain: 0 out of 10 on the numeric pain rating scale.     Post-procedural pain assessment: 0 out of 10 on the numeric pain rating scale.     Interventions: NO    Length of procedure:20 minutes or less      Procedure performed by: Urvashi Yee CNP

## 2023-06-15 ENCOUNTER — DOCUMENTATION ONLY (OUTPATIENT)
Dept: RADIATION ONCOLOGY | Facility: CLINIC | Age: 73
End: 2023-06-15
Payer: MEDICARE

## 2023-06-15 ENCOUNTER — APPOINTMENT (OUTPATIENT)
Dept: RADIATION ONCOLOGY | Facility: CLINIC | Age: 73
End: 2023-06-15
Payer: MEDICARE

## 2023-06-15 ENCOUNTER — ONCOLOGY VISIT (OUTPATIENT)
Dept: ONCOLOGY | Facility: CLINIC | Age: 73
End: 2023-06-15
Payer: MEDICARE

## 2023-06-15 ENCOUNTER — INFUSION THERAPY VISIT (OUTPATIENT)
Dept: INFUSION THERAPY | Facility: CLINIC | Age: 73
End: 2023-06-15
Payer: MEDICARE

## 2023-06-15 VITALS
SYSTOLIC BLOOD PRESSURE: 139 MMHG | RESPIRATION RATE: 16 BRPM | DIASTOLIC BLOOD PRESSURE: 68 MMHG | TEMPERATURE: 97.8 F | HEART RATE: 83 BPM | OXYGEN SATURATION: 98 %

## 2023-06-15 DIAGNOSIS — C82.90 FOLLICULAR LYMPHOMA, UNSPECIFIED FOLLICULAR LYMPHOMA TYPE, UNSPECIFIED BODY REGION (H): Primary | ICD-10-CM

## 2023-06-15 DIAGNOSIS — C82.00 FOLLICULAR LYMPHOMA GRADE I, UNSPECIFIED BODY REGION (H): ICD-10-CM

## 2023-06-15 LAB
BASOPHILS # BLD AUTO: 0.1 10E3/UL (ref 0–0.2)
BASOPHILS NFR BLD AUTO: 1 %
EOSINOPHIL # BLD AUTO: 0.2 10E3/UL (ref 0–0.7)
EOSINOPHIL NFR BLD AUTO: 2 %
ERYTHROCYTE [DISTWIDTH] IN BLOOD BY AUTOMATED COUNT: 14.9 % (ref 10–15)
HCT VFR BLD AUTO: 37.9 % (ref 35–47)
HGB BLD-MCNC: 12.2 G/DL (ref 11.7–15.7)
IMM GRANULOCYTES # BLD: 0 10E3/UL
IMM GRANULOCYTES NFR BLD: 0 %
INTERPRETATION: NORMAL
LYMPHOCYTES # BLD AUTO: 1.7 10E3/UL (ref 0.8–5.3)
LYMPHOCYTES NFR BLD AUTO: 19 %
MCH RBC QN AUTO: 27.7 PG (ref 26.5–33)
MCHC RBC AUTO-ENTMCNC: 32.2 G/DL (ref 31.5–36.5)
MCV RBC AUTO: 86 FL (ref 78–100)
MONOCYTES # BLD AUTO: 0.6 10E3/UL (ref 0–1.3)
MONOCYTES NFR BLD AUTO: 7 %
NEUTROPHILS # BLD AUTO: 6.1 10E3/UL (ref 1.6–8.3)
NEUTROPHILS NFR BLD AUTO: 71 %
NRBC # BLD AUTO: 0 10E3/UL
NRBC BLD AUTO-RTO: 0 /100
PLATELET # BLD AUTO: 288 10E3/UL (ref 150–450)
RBC # BLD AUTO: 4.4 10E6/UL (ref 3.8–5.2)
WBC # BLD AUTO: 8.7 10E3/UL (ref 4–11)

## 2023-06-15 PROCEDURE — 77336 RADIATION PHYSICS CONSULT: CPT | Performed by: SURGERY

## 2023-06-15 PROCEDURE — 88280 CHROMOSOME KARYOTYPE STUDY: CPT | Performed by: NURSE PRACTITIONER

## 2023-06-15 PROCEDURE — 99207 PR NO CHARGE LOS: CPT

## 2023-06-15 PROCEDURE — 88311 DECALCIFY TISSUE: CPT | Mod: GC | Performed by: PATHOLOGY

## 2023-06-15 PROCEDURE — 88305 TISSUE EXAM BY PATHOLOGIST: CPT | Mod: GC | Performed by: PATHOLOGY

## 2023-06-15 PROCEDURE — 88342 IMHCHEM/IMCYTCHM 1ST ANTB: CPT | Mod: XE | Performed by: PATHOLOGY

## 2023-06-15 PROCEDURE — 77386 PR IMRT TREATMENT DELIVERY, COMPLEX: CPT | Performed by: SURGERY

## 2023-06-15 PROCEDURE — 88264 CHROMOSOME ANALYSIS 20-25: CPT | Performed by: NURSE PRACTITIONER

## 2023-06-15 PROCEDURE — 88184 FLOWCYTOMETRY/ TC 1 MARKER: CPT | Performed by: NURSE PRACTITIONER

## 2023-06-15 PROCEDURE — 88341 IMHCHEM/IMCYTCHM EA ADD ANTB: CPT | Mod: GC | Performed by: PATHOLOGY

## 2023-06-15 PROCEDURE — 88313 SPECIAL STAINS GROUP 2: CPT | Mod: GC | Performed by: PATHOLOGY

## 2023-06-15 PROCEDURE — 85025 COMPLETE CBC W/AUTO DIFF WBC: CPT | Performed by: NURSE PRACTITIONER

## 2023-06-15 PROCEDURE — 38222 DX BONE MARROW BX & ASPIR: CPT | Performed by: NURSE PRACTITIONER

## 2023-06-15 PROCEDURE — 88237 TISSUE CULTURE BONE MARROW: CPT | Performed by: NURSE PRACTITIONER

## 2023-06-15 PROCEDURE — 85097 BONE MARROW INTERPRETATION: CPT | Mod: GC | Performed by: PATHOLOGY

## 2023-06-15 PROCEDURE — 88188 FLOWCYTOMETRY/READ 9-15: CPT | Performed by: PATHOLOGY

## 2023-06-15 PROCEDURE — 88185 FLOWCYTOMETRY/TC ADD-ON: CPT | Performed by: NURSE PRACTITIONER

## 2023-06-15 PROCEDURE — 77014 PR CT GUIDE FOR PLACEMENT RADIATION THERAPY FIELDS: CPT | Performed by: SURGERY

## 2023-06-15 PROCEDURE — 99207 PR NO CHARGE LOS: CPT | Performed by: SURGERY

## 2023-06-15 PROCEDURE — 77431 RADIATION THERAPY MANAGEMENT: CPT | Performed by: SURGERY

## 2023-06-15 PROCEDURE — 96374 THER/PROPH/DIAG INJ IV PUSH: CPT | Mod: 59 | Performed by: NURSE PRACTITIONER

## 2023-06-15 ASSESSMENT — PAIN SCALES - GENERAL: PAINLEVEL: SEVERE PAIN (6)

## 2023-06-15 NOTE — LETTER
6/15/2023         RE: Virginia Byers  [address] [facility] [address]        Dear Colleague,    Thank you for referring your patient, Virginia Byers, to the [facility]. Please see a copy of my visit note below.    ONC Adult Bone Marrow Biopsy Procedure Note  Nargis 15, 2023  BP (P) 134/71   Pulse (P) 80   Temp (P) 97  F (36.1  C) (Oral)   Resp (P) 18   SpO2 (P) 99%      Results for orders placed or performed in visit on 06/15/23   CBC with platelets and differential     Status: None   Result Value Ref Range    WBC Count 8.7 4.0 - 11.0 10e3/uL    RBC Count 4.40 3.80 - 5.20 10e6/uL    Hemoglobin 12.2 11.7 - 15.7 g/dL    Hematocrit 37.9 35.0 - 47.0 %    MCV 86 78 - 100 fL    MCH 27.7 26.5 - 33.0 pg    MCHC 32.2 31.5 - 36.5 g/dL    RDW 14.9 10.0 - 15.0 %    Platelet Count 288 150 - 450 10e3/uL    % Neutrophils 71 %    % Lymphocytes 19 %    % Monocytes 7 %    % Eosinophils 2 %    % Basophils 1 %    % Immature Granulocytes 0 %    NRBCs per 100 WBC 0 <1 /100    Absolute Neutrophils 6.1 1.6 - 8.3 10e3/uL    Absolute Lymphocytes 1.7 0.8 - 5.3 10e3/uL    Absolute Monocytes 0.6 0.0 - 1.3 10e3/uL    Absolute Eosinophils 0.2 0.0 - 0.7 10e3/uL    Absolute Basophils 0.1 0.0 - 0.2 10e3/uL    Absolute Immature Granulocytes 0.0 <=0.4 10e3/uL    Absolute NRBCs 0.0 10e3/uL   CBC with platelets differential     Status: None    Narrative    The following orders were created for panel order CBC with platelets differential.  Procedure                               Abnormality         Status                     ---------                               -----------         ------                     CBC with platelets and d...[[id number]]                      Final result                 Please view results for these tests on the individual orders.       DIAGNOSIS: Follicular Lymphoma    PROCEDURE: Unilateral Bone Marrow Biopsy and Unilateral Aspirate    LOCATION: [facility]    Patient s identification was positively verified by verbal identification and invasive procedure safety checklist was completed. Informed consent was obtained. Following the administration of Midazolam 0.5 mg as pre-medication, patient was placed in the prone position and prepped and draped in a sterile manner. Approximately 9 cc of 1% Lidocaine was used over the left posterior iliac spine. Following this a 3 mm incision was made. Trephine bone marrow core(s) was (were) obtained from the [facility]. Bone marrow aspirates were obtained from the [facility]. Aspirates were sent for morphology, immunophenotyping, cytogenetics and molecular diagnostics. A total of approximately 20 ml of marrow was aspirated. Following this procedure a sterile dressing was applied to the bone marrow biopsy site(s). The patient was placed in the supine position to maintain pressure on the biopsy site. Post-procedure wound care instructions were given.     Complications: NO    Pre-procedural pain: 0 out of 10 on the numeric pain rating scale.     Post-procedural pain assessment: 0 out of 10 on the numeric pain rating scale.     Interventions: NO    Length of procedure:20 minutes or less      Procedure performed by: Urvashi Yee CNP        Again, thank you for allowing me to participate in the care of your patient.        Sincerely,        Urvashi Yee NP, APRN CNP

## 2023-06-15 NOTE — PROGRESS NOTES
"Patient presents for bone marrow biopsy.  Discussed procedure with patient and friend.  Vitals obtained and stable.  Lab staff present during PIV start and lab drawn.  Urvashi Yee CNP met with patient and consent was signed.  Pre-medications administered, patient positioned in prone position.  Patient tolerated procedure well.  Post observation x 30\", water and snack provided. PIV D/C'd.  Dressing assessed; no bleeding. Discharge instructions reviewed with patient and her friend, both understanding.  Friend accompanied patient to the car and will drive patient home.  Patient discharged at 1050.       "

## 2023-06-16 LAB
PATH REPORT.COMMENTS IMP SPEC: NORMAL
PATH REPORT.FINAL DX SPEC: NORMAL
PATH REPORT.FINAL DX SPEC: NORMAL
PATH REPORT.GROSS SPEC: NORMAL
PATH REPORT.MICROSCOPIC SPEC OTHER STN: NORMAL
PATH REPORT.RELEVANT HX SPEC: NORMAL
PATH REPORT.RELEVANT HX SPEC: NORMAL

## 2023-06-19 ENCOUNTER — HOSPITAL ENCOUNTER (OUTPATIENT)
Facility: CLINIC | Age: 73
Discharge: HOME | End: 2023-06-19
Attending: RADIOLOGY | Admitting: RADIOLOGY
Payer: MEDICARE

## 2023-06-19 ENCOUNTER — APPOINTMENT (OUTPATIENT)
Dept: MEDSURG UNIT | Facility: CLINIC | Age: 73
End: 2023-06-19
Attending: RADIOLOGY
Payer: MEDICARE

## 2023-06-19 ENCOUNTER — APPOINTMENT (OUTPATIENT)
Dept: INTERVENTIONAL RADIOLOGY/VASCULAR | Facility: CLINIC | Age: 73
End: 2023-06-19
Attending: PHYSICIAN ASSISTANT
Payer: MEDICARE

## 2023-06-19 VITALS
SYSTOLIC BLOOD PRESSURE: 139 MMHG | HEART RATE: 81 BPM | OXYGEN SATURATION: 91 % | WEIGHT: 145 LBS | RESPIRATION RATE: 16 BRPM | BODY MASS INDEX: 25.69 KG/M2 | TEMPERATURE: 98 F | DIASTOLIC BLOOD PRESSURE: 55 MMHG

## 2023-06-19 DIAGNOSIS — M89.8X5 LYTIC BONE LESION OF HIP: ICD-10-CM

## 2023-06-19 DIAGNOSIS — C82.90 FOLLICULAR LYMPHOMA, UNSPECIFIED FOLLICULAR LYMPHOMA TYPE, UNSPECIFIED BODY REGION (H): ICD-10-CM

## 2023-06-19 DIAGNOSIS — Z85.3 PERSONAL HISTORY OF MALIGNANT NEOPLASM OF BREAST: ICD-10-CM

## 2023-06-19 LAB
ERYTHROCYTE [DISTWIDTH] IN BLOOD BY AUTOMATED COUNT: 15.1 % (ref 10–15)
HCT VFR BLD AUTO: 41.8 % (ref 35–47)
HGB BLD-MCNC: 13 G/DL (ref 11.7–15.7)
INR BLD: 1.2 (ref 2–3)
MCH RBC QN AUTO: 27.5 PG (ref 26.5–33)
MCHC RBC AUTO-ENTMCNC: 31.1 G/DL (ref 31.5–36.5)
MCV RBC AUTO: 89 FL (ref 78–100)
PLATELET # BLD AUTO: 229 10E3/UL (ref 150–450)
RBC # BLD AUTO: 4.72 10E6/UL (ref 3.8–5.2)
WBC # BLD AUTO: 7.7 10E3/UL (ref 4–11)

## 2023-06-19 PROCEDURE — 88365 INSITU HYBRIDIZATION (FISH): CPT | Mod: 26 | Performed by: STUDENT IN AN ORGANIZED HEALTH CARE EDUCATION/TRAINING PROGRAM

## 2023-06-19 PROCEDURE — 88360 TUMOR IMMUNOHISTOCHEM/MANUAL: CPT | Mod: 26 | Performed by: STUDENT IN AN ORGANIZED HEALTH CARE EDUCATION/TRAINING PROGRAM

## 2023-06-19 PROCEDURE — 272N000155 HC KIT CR15

## 2023-06-19 PROCEDURE — 999N000142 HC STATISTIC PROCEDURE PREP ONLY

## 2023-06-19 PROCEDURE — 999N000132 HC STATISTIC PP CARE STAGE 1

## 2023-06-19 PROCEDURE — 88311 DECALCIFY TISSUE: CPT | Mod: 26 | Performed by: STUDENT IN AN ORGANIZED HEALTH CARE EDUCATION/TRAINING PROGRAM

## 2023-06-19 PROCEDURE — 99152 MOD SED SAME PHYS/QHP 5/>YRS: CPT | Mod: GC | Performed by: RADIOLOGY

## 2023-06-19 PROCEDURE — 99152 MOD SED SAME PHYS/QHP 5/>YRS: CPT

## 2023-06-19 PROCEDURE — 36415 COLL VENOUS BLD VENIPUNCTURE: CPT | Performed by: NURSE PRACTITIONER

## 2023-06-19 PROCEDURE — 88185 FLOWCYTOMETRY/TC ADD-ON: CPT | Performed by: INTERNAL MEDICINE

## 2023-06-19 PROCEDURE — 77012 CT SCAN FOR NEEDLE BIOPSY: CPT | Mod: 26 | Performed by: RADIOLOGY

## 2023-06-19 PROCEDURE — 85610 PROTHROMBIN TIME: CPT

## 2023-06-19 PROCEDURE — 20225 BONE BIOPSY TROCAR/NDL DEEP: CPT | Mod: RT | Performed by: RADIOLOGY

## 2023-06-19 PROCEDURE — 88364 INSITU HYBRIDIZATION (FISH): CPT | Mod: TC | Performed by: INTERNAL MEDICINE

## 2023-06-19 PROCEDURE — 250N000011 HC RX IP 250 OP 636

## 2023-06-19 PROCEDURE — 85027 COMPLETE CBC AUTOMATED: CPT | Performed by: NURSE PRACTITIONER

## 2023-06-19 PROCEDURE — 88307 TISSUE EXAM BY PATHOLOGIST: CPT | Mod: 26 | Performed by: STUDENT IN AN ORGANIZED HEALTH CARE EDUCATION/TRAINING PROGRAM

## 2023-06-19 PROCEDURE — 88188 FLOWCYTOMETRY/READ 9-15: CPT | Mod: GC | Performed by: PATHOLOGY

## 2023-06-19 PROCEDURE — 88364 INSITU HYBRIDIZATION (FISH): CPT | Mod: 26 | Performed by: STUDENT IN AN ORGANIZED HEALTH CARE EDUCATION/TRAINING PROGRAM

## 2023-06-19 PROCEDURE — 88184 FLOWCYTOMETRY/ TC 1 MARKER: CPT | Performed by: INTERNAL MEDICINE

## 2023-06-19 PROCEDURE — 77012 CT SCAN FOR NEEDLE BIOPSY: CPT

## 2023-06-19 PROCEDURE — 258N000003 HC RX IP 258 OP 636: Performed by: NURSE PRACTITIONER

## 2023-06-19 PROCEDURE — 88342 IMHCHEM/IMCYTCHM 1ST ANTB: CPT | Mod: 26 | Performed by: STUDENT IN AN ORGANIZED HEALTH CARE EDUCATION/TRAINING PROGRAM

## 2023-06-19 PROCEDURE — 88341 IMHCHEM/IMCYTCHM EA ADD ANTB: CPT | Mod: 26 | Performed by: STUDENT IN AN ORGANIZED HEALTH CARE EDUCATION/TRAINING PROGRAM

## 2023-06-19 RX ORDER — SODIUM CHLORIDE 9 MG/ML
INJECTION, SOLUTION INTRAVENOUS CONTINUOUS
Status: DISCONTINUED | OUTPATIENT
Start: 2023-06-19 | End: 2023-06-19 | Stop reason: HOSPADM

## 2023-06-19 RX ORDER — FENTANYL CITRATE 50 UG/ML
25-50 INJECTION, SOLUTION INTRAMUSCULAR; INTRAVENOUS EVERY 5 MIN PRN
Status: DISCONTINUED | OUTPATIENT
Start: 2023-06-19 | End: 2023-06-19 | Stop reason: HOSPADM

## 2023-06-19 RX ORDER — FLUMAZENIL 0.1 MG/ML
0.2 INJECTION, SOLUTION INTRAVENOUS
Status: DISCONTINUED | OUTPATIENT
Start: 2023-06-19 | End: 2023-06-19 | Stop reason: HOSPADM

## 2023-06-19 RX ORDER — NALOXONE HYDROCHLORIDE 0.4 MG/ML
0.2 INJECTION, SOLUTION INTRAMUSCULAR; INTRAVENOUS; SUBCUTANEOUS
Status: DISCONTINUED | OUTPATIENT
Start: 2023-06-19 | End: 2023-06-19 | Stop reason: HOSPADM

## 2023-06-19 RX ORDER — LIDOCAINE 40 MG/G
CREAM TOPICAL
Status: DISCONTINUED | OUTPATIENT
Start: 2023-06-19 | End: 2023-06-19 | Stop reason: HOSPADM

## 2023-06-19 RX ORDER — NALOXONE HYDROCHLORIDE 0.4 MG/ML
0.4 INJECTION, SOLUTION INTRAMUSCULAR; INTRAVENOUS; SUBCUTANEOUS
Status: DISCONTINUED | OUTPATIENT
Start: 2023-06-19 | End: 2023-06-19 | Stop reason: HOSPADM

## 2023-06-19 RX ADMIN — SODIUM CHLORIDE: 9 INJECTION, SOLUTION INTRAVENOUS at 12:22

## 2023-06-19 RX ADMIN — FENTANYL CITRATE 25 MCG: 50 INJECTION, SOLUTION INTRAMUSCULAR; INTRAVENOUS at 13:10

## 2023-06-19 RX ADMIN — MIDAZOLAM 0.5 MG: 1 INJECTION INTRAMUSCULAR; INTRAVENOUS at 13:19

## 2023-06-19 RX ADMIN — MIDAZOLAM 0.5 MG: 1 INJECTION INTRAMUSCULAR; INTRAVENOUS at 13:09

## 2023-06-19 RX ADMIN — FENTANYL CITRATE 25 MCG: 50 INJECTION, SOLUTION INTRAMUSCULAR; INTRAVENOUS at 13:19

## 2023-06-19 ASSESSMENT — ACTIVITIES OF DAILY LIVING (ADL)
ADLS_ACUITY_SCORE: 35
ADLS_ACUITY_SCORE: 35

## 2023-06-19 NOTE — PROGRESS NOTES
2A prep for Bone Biopsy. Pt alert and oriented. VSS. C/o pain on right hip- baseline. Appropriately NPO. Friend at bedside will transport pt home.

## 2023-06-19 NOTE — PRE-PROCEDURE
GENERAL PRE-PROCEDURE:   Procedure:  CT guided right acetabulum bone biopsy  Date/Time:  6/19/2023 12:40 PM    Written consent obtained?: Yes    Risks and benefits: Risks, benefits and alternatives were discussed    Consent given by:  Patient  Patient states understanding of procedure being performed: Yes    Patient's understanding of procedure matches consent: Yes    Procedure consent matches procedure scheduled: Yes    Expected level of sedation:  Moderate  Appropriately NPO:  Yes  ASA Class:  3  Mallampati  :  Grade 2- soft palate, base of uvula, tonsillar pillars, and portion of posterior pharyngeal wall visible  Lungs:  Lungs clear with good breath sounds bilaterally  Heart:  Normal heart sounds and rate  History & Physical reviewed:  History and physical reviewed and no updates needed  Statement of review:  I have reviewed the lab findings, diagnostic data, medications, and the plan for sedation

## 2023-06-19 NOTE — PROCEDURES
[facility]    Procedure: CT guided bone biopsy, right acetabulum    Date/Time: 6/19/2023 1:43 PM    Performed by: Adam Sanchez MD  Authorized by: Joseph Harvey MD  IR Fellow Physician:  Radiology Resident Physician: Cecilio Rodriguez        UNIVERSAL PROTOCOL   Site Marked: NA  Prior Images Obtained and Reviewed:  Yes  Required items: Required blood products, implants, devices and special equipment available    Patient identity confirmed:  Verbally with patient, arm band, provided demographic data and hospital-assigned identification number  Patient was reevaluated immediately before administering moderate or deep sedation or anesthesia  Confirmation Checklist:  Patient's identity using two indicators, relevant allergies, procedure was appropriate and matched the consent or emergent situation and correct equipment/implants were available  Time out: Immediately prior to the procedure a time out was called    Universal Protocol: the Joint Commission Universal Protocol was followed    Preparation: Patient was prepped and draped in usual sterile fashion    ESBL (mL):  0     ANESTHESIA    Anesthesia: Local infiltration  Local Anesthetic:  Lidocaine 1% without epinephrine  Anesthetic Total (mL):  15      SEDATION  Patient Sedated: Yes    Sedation:  Fentanyl and midazolam  Vital signs: Vital signs monitored during sedation    See dictated procedure note for full details.  Findings: Unchanged appearance of the right hip    Specimens: none    Complications: None    Condition: Stable    Plan: Patient can discharge after 1 hour      PROCEDURE  Describe Procedure: Uncomplicated right acetabulum bone biopsy with 2 cores taken, 1 for flow and 1 for surg/path.  Patient Tolerance:  Patient tolerated the procedure well with no immediate complications  Length of time physician/provider present for 1:1 monitoring during sedation: 15

## 2023-06-19 NOTE — PROGRESS NOTES
discharge criteria met. Pt tolerates food, drinking and activity. Denies pain. Right hip site intact. Reviewed discharge instructions with pt . Pt verbalized  Understanding and signed papers. PIV removed. Pushed to front door in wheelchair. Accompanied by friend.

## 2023-06-19 NOTE — PROGRESS NOTES
Patient Name: Virginia Byers  Medical Record Number: [id number]  Today's Date: 6/19/2023    Procedure: CT guided bone biopsy (Right acetabulum  Proceduralist: Dr. Sanchez, Dr. Rodriguez  Pathology present: No    Procedure Start: 1320  Procedure end: 1332  Sedation medications administered: Fentanyl 50mcg and Versed 1mg     Report given to: HAM, RN  : CHRISTINE    Other Notes: Pt arrived to IR room CT 2 from . Consent reviewed. Pt denies any questions or concerns regarding procedure. Pt positioned Supine and monitored per protocol. Pt tolerated procedure without any noted complications. Pt transferred back to .

## 2023-06-19 NOTE — DISCHARGE INSTRUCTIONS
[facility]    Interventional Radiology  Patient Instructions Following Bone Biopsy    AFTER YOU GO HOME  If you were given sedation DO NOT drive or operate machinery at home or at work for at least 24 hours  DO relax and take it easy for 48 hours, no strenuous activity for 24 hours  DO drink plenty of fluids  DO resume your regular diet, unless otherwise instructed by your Primary Physician  Keep the dressing dry and in place for 24 hours.  DO NOT SMOKE FOR AT LEAST 24 HOURS, if you have been given any medications that were to help you relax or sedate you during your procedure  DO NOT drink alcoholic beverages the day of your procedure  DO NOT do any strenuous exercise or lifting (> 10 lbs) for at least 7 days following your procedure  DO NOT take a bath or shower for at least 12 hours following your procedure  Remove dressing after shower the next day. Replace with Band aid for 2 days.  Never leave a wet dressing in place.  DO NOT make any important or legal decisions for 24 hours following your procedure  There should be minimum drainage from the biopsy site    CALL THE PHYSICIAN IF:  You start bleeding from the procedure site.  If you do start to bleed from that site, lie down flat and hold pressure on the site for a minimum of 10 minutes.  Your physician will tell you if you need to return to the hospital  You develop nausea or vomiting  You have excessive swelling, redness, or tenderness at the site  You have drainage that looks like it is infected.  You experience severe pain  You develop hives or a rash or unexplained itching  You develop shortness of breath  You develop a temperature of 101 degrees F or greater          [facility] INTERVENTIONAL RADIOLOGY DEPARTMENT  Procedure Physician:   Date of procedure: June 19, 2023  Telephone Numbers: [phone]      Monday-Friday 7:30 am to 4:00 pm  [phone] After 4:00 pm Monday-Friday, Weekends & Holidays.   Ask for the Interventional  Radiologist on call.  Someone is on call 24 hrs/day  [facility] toll free number: [phone] Monday-Friday 8:00 am to 4:30 pm  [facility] Emergency Dept: [phone]

## 2023-06-20 ENCOUNTER — TELEPHONE (OUTPATIENT)
Dept: INTERVENTIONAL RADIOLOGY/VASCULAR | Facility: CLINIC | Age: 73
End: 2023-06-20
Payer: MEDICARE

## 2023-06-20 LAB
PATH REPORT.COMMENTS IMP SPEC: ABNORMAL
PATH REPORT.COMMENTS IMP SPEC: YES
PATH REPORT.FINAL DX SPEC: ABNORMAL
PATH REPORT.MICROSCOPIC SPEC OTHER STN: ABNORMAL
PATH REPORT.RELEVANT HX SPEC: ABNORMAL

## 2023-06-20 NOTE — TELEPHONE ENCOUNTER
POST CALL    Spoke with: renetta Byers  Call attempt: 1  Any pain: No  Any fever: No  Any redness/swelling/ abnormal drainage around puncture site: No  Were you instructed well enough to take care of yourself at home: Yes  Are you satisfied with the care you received: Yes  Any additional concerns or questions: No  IR nurse triage line number provided: Yes    Post call completed.   June 20, 2023 10:16 AM  Treasure Munson RN

## 2023-06-22 LAB
ADDITIONAL COMMENTS: NORMAL
CULTURE HARVEST COMPLETE DATE: NORMAL
INTERPRETATION: NORMAL
ISCN: NORMAL
METHODS: NORMAL

## 2023-06-23 DIAGNOSIS — Z96.649 STATUS POST TOTAL REPLACEMENT OF HIP, UNSPECIFIED LATERALITY: Primary | ICD-10-CM

## 2023-06-23 NOTE — PROGRESS NOTES
Radiotherapy Treatment Summary              PATIENT: Virginia Byers  MEDICAL RECORD NO: [id number]   : 1950    DIAGNOSIS / ID: [id number] with 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     INTENT OF RADIOTHERAPY: Palliative    CONCURRENT SYSTEMIC THERAPY: No             SITE OF TREATMENT: left orbit    DATES  OF TREATMENT: 23- 6/15/23    TOTAL DOSE OF TREATMENT / FRACTIONS: 4Gy/2fx                                         FOLLOW UP PLAN:  1. Follow up with Radiation Oncology on as needed basis  2. Follow up with Dr. Zaidi for systemic therapy    CC  Patient Care Team:  Chuyita Zaidi MD as PCP - General (Family Practice)  Chuyita Zaidi MD as Assigned PCP  Marlena Rogers PA-C (Inactive) as Physician Assistant (Gastroenterology)  Jennifer Jose MD as MD (Medical Oncology)  Naomy Zaidi DO as Physician (Internal Medicine)  Sami Murphy MD as MD (Occupational Medicine)  Naomy Zaidi DO as Assigned Cancer Care Provider  Hosea Minaya MD as Physician (Neurology)  Hosea Minaya MD as Assigned Neuroscience Provider  Austin Blood MD as Assigned Musculoskeletal Provider  Julian Horvath MD as MD (Radiation Oncology)  Naomy Zaidi DO as Physician (Internal Medicine)       Julian Horvath M.D.  Department of Radiation Oncology  [facility]

## 2023-06-27 LAB
PATH REPORT.COMMENTS IMP SPEC: NORMAL
PATH REPORT.FINAL DX SPEC: NORMAL
PATH REPORT.GROSS SPEC: NORMAL
PATH REPORT.MICROSCOPIC SPEC OTHER STN: NORMAL
PATH REPORT.RELEVANT HX SPEC: NORMAL
PHOTO IMAGE: NORMAL

## 2023-06-28 NOTE — PROGRESS NOTES
Ortho Navigator Note      Pre-op Date 7/5/23 [facility]      Medical Clearance  Cleared     Labs Stable      COVID Test Date No longer indicated      Skin  Intact      Activity: Ambulates independently      Equipment Need Patient will likely need a walker for discharge. Defer to PT/OT for recs.       Meds to Hold Held all supplements 14 days prior to surgery  Hold Losartan DOS  * Medication recommendations are not intended to be exhaustive; they are limited to common medications that are potentially dangerous if incorrectly managed   NPO Instructions  Defer to PAN RN     Pre-op Joint Education Complete? Complete   Discharge Plan Patient has plan to discharge home on morning of POD 1.   Friend (Aurelio) will arrive at hospital at 0800 to participate in therapy and discharge education. They will then transport patient home    /Transportation Aurelio will be  and transportation.  is physically able to care for patient.      Barriers to Discharge No barriers to discharge.      Additional Info/   Special Needs : Patient had no unanswered questions or concerns.            06/28/23 1507   Discharge Planning   Concerns to be Addressed no discharge needs identified   Living Arrangements   People in Home other (see comments)   Type of Residence Private Residence   Is your private residence a single family home or apartment? Single family home   Number of Stairs, Within Home, Primary none   Bathroom Shower/Tub Walk-in shower   Equipment Currently Used at Home walker, standard   Support System   Support Systems Other (Comment)   Do you have someone available to stay with you one or two nights once you are home? Yes   Medical Clearance   Clinic Name [facility]   It is recommended that you call and check with any specialty providers before surgery to see if you need surgical clearance.  Do you see any specialty providers outside of your primary care provider? Yes   Blood   Known Bleeding Disorder or Coagulopathy No   Does the  patient have any Gnosticism/cultural preferences related to blood products? No   Education   Has the patient scheduled or completed pre-op total joint education, either in class or online, in the last 12 months? Yes   Patient attended total joint pre-op class/received pre-op teaching  online

## 2023-06-29 ENCOUNTER — ONCOLOGY VISIT (OUTPATIENT)
Dept: ONCOLOGY | Facility: CLINIC | Age: 73
End: 2023-06-29
Attending: INTERNAL MEDICINE
Payer: MEDICARE

## 2023-06-29 VITALS
SYSTOLIC BLOOD PRESSURE: 133 MMHG | DIASTOLIC BLOOD PRESSURE: 72 MMHG | HEART RATE: 74 BPM | RESPIRATION RATE: 18 BRPM | WEIGHT: 147.8 LBS | OXYGEN SATURATION: 96 % | BODY MASS INDEX: 26.19 KG/M2 | TEMPERATURE: 98.1 F

## 2023-06-29 DIAGNOSIS — R59.1 GENERALIZED ENLARGED LYMPH NODES: ICD-10-CM

## 2023-06-29 DIAGNOSIS — C82.00 FOLLICULAR LYMPHOMA GRADE I, UNSPECIFIED BODY REGION (H): Primary | ICD-10-CM

## 2023-06-29 DIAGNOSIS — Z85.3 HISTORY OF BREAST CANCER IN ADULTHOOD: ICD-10-CM

## 2023-06-29 DIAGNOSIS — Z12.31 ENCOUNTER FOR SCREENING MAMMOGRAM FOR MALIGNANT NEOPLASM OF BREAST: ICD-10-CM

## 2023-06-29 PROCEDURE — 99214 OFFICE O/P EST MOD 30 MIN: CPT | Performed by: INTERNAL MEDICINE

## 2023-06-29 ASSESSMENT — PAIN SCALES - GENERAL: PAINLEVEL: NO PAIN (1)

## 2023-06-29 NOTE — PROGRESS NOTES
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    Patient has medical history including LEFT breast cancer in 2000, hypertension, hyperlipidemia, osteoporosis, osteoarthritis, degenerative disc disease, spinal stenosis of lumbar region,  s/p bilateral L5-S1 transforaminal ROS by Dr. Garcia on 7/9/2021, s/p bilateral L4-5 and L5-S1 facet joint injections on 6/14/2021, glaucoma and macular degeneration, cataracts s/p surgery, history of tobacco use (quit 1999)     Regarding previous history of breast cancer:  She was previously treated by Dr. Sakina Brandt at [facility]      In summary, diagnosed at age 49 with LEFT breast IDC ER positive, FL positive, HER2 positive on FISH. 3/2000 left breast lumpectomy and left axillary node dissection (IDC, grade 3, 1.45 cm incompletely excised tumor, DCIS, positive margins with infiltrating carcinoma, 1 of 17 lymph nodes positive (0.5 cm metastatic focus), ER positive, FL positive, HER2 positive).  4/2020 left breast simple mastectomy with no residual carcinoma. S/p ACx4, taxol x4, no anti-her2 treatment, no RT. S/p 5 years adjuvant endocrine therapy (tamoxifen and anastrozole). Delayed left breast reconstruction and right breast augmentation.      3/2/2000  Excisional biopsy of Left breast mass -   Infiltrating ductal carcinoma with associated DCIS   ER positive (60%), FL positive (22%), HER2 by FISH POSITIVE     3/10/2000 - Left lumpectomy and left axillary node  Invasive ductal carcinoma, Grade 3, 1.45 cm (incompletely excised), negative for LVI  DCIS, greatest histologic dimension of tumor (DCIS PLUS invasive tumor) = 2.9 cm (estimate 50% DCIS)  Positive margins- infiltrating carcinoma  extends to margin  1 of 17 axillary lymph nodes sampled was positive for metastatic focus of 0.5 cm.  ER/[place]+     Sections of lumpectomy left breast showing residual 1.1 cm focus of ductal carcinoma in situ (DCIS), present within microns of the linked margin of resection.       4/11/2000 - Left breast simple mastectomy, negative for residual carcinoma  Left mastectomy on 4/11/2000 performed by Dr. Cristian Mcdonald at [facility].      Adjuvant treatment:  - 5/15/2000-7/24/2000 4 cycles of Adriamycin and Cytoxan   - 8/16/2000-10/18/2000 4 cycles of paclitaxel   - Appears she may have been on some kind of trial/protocol, was not given any anti-HER2 targeted treatment  - No adjuvant radiation  - 12/2000- 10/2002 tamoxifen  - 10/2002-5/2006 switched to anastrozole (due to in vitro studies showing anastrozole may be slightly better for patients were HER2 positive) with Fosamax for osteopenia     8/31/2015 Left delayed reconstruction with tissue expander    2/3/2016 Left 2nd stage 450 cc high profile silicone implant; right augmentation 150 cc Moderate classic profile silicone implant; cresent mastopexy    10/18/2021: Screening mammogram right breast ZACKARY    OTHER:  Of note, patient has documentation of anemia (iron deficiency and anemia of chronic disease), elevated LFTs, nausea and vomiting of all solids and liquids, fatigue with 26 pound unintentional weight loss from 7/20/2021 - 10/20/2021 with mildly elevated LFTs which led to evaluation with mammogram, endoscopy with EUS and colonoscopy as detailed below. Pt did associate these with some spinal injections she had gotten, due to overlapping times. Pt did regain all of the weight and hand improvement of anemia within 2 months, without significant intervention.     -10/2021: Endoscopy with EUS normal EGD and no stigmata or source of upper GI bleeding, visualized bile duct, gallbladder, liver, pancreas, left adrenal gland normal.  No lymphadenopathy. LIVER, RANDOM  NEEDLE BIOPSY: Benign parenchyma with congestion; no significant fibrosis or other abnormalities     -10/2021 colonoscopy: Hemorrhoids, diverticulosis in sigmoid colon and splenic flexure    Current history:  -Ongoing right knee pain not improved with brace and physical therapy, s/p bilateral L5-S1 transforaminal ROS by Dr. Garcia on 7/9/2021, s/p bilateral L4-5 and L5-S1 facet joint injections on 6/14/2021. ongoing b/l hip and leg pain, worse with position changes (ie sitting to standing), constant, 8-9/10, tylenol brings pain down to 6/10 (using tylenol bid). ECOG 3.   -Patient has seen neurosurgery Dr. Jose Alexander, occupational medicine specialist Dr.Orrin Murphy, with plan to see PM&R Dr. Margie Agudelo and movement disorder clinic  - 11/22 MRI lumbar spine:    prominent left iliac lymph nodes among other findings related to degenerative changes and neural foraminal stenosis throughout lumbar spine    - 12/22 MRI pelvis:  1. Enhancing marrow signal abnormality of the right iliac bone extending from the superior acetabulum subchondral location proximally almost to the level of the sacroiliac joint caudal aspect. No associated fracture line. Underlying marrow infiltrative process such as from metastasis cannot be excluded especially given the degree of T1 hypointensity and history of primary tumor. Other consideration include stress reaction.  2. Mildly increased left external iliac chain, and the right medial thigh lymphadenopathy. Metastasis cannot be excluded.  3. Left posterior iliac enhancing lesion, similar in size to prior CT 9/2021 and previously not visible on CT, focal enhancing lesion in the right greater trochanter laterally. Findings are concerning for Metastasis.    -12/22 CT chest abdomen pelvis with contrast:  COMPARISON: MRI pelvis 12/20/2022 and CT abdomen pelvis 9/21/2021.  1. Mastectomies with implant reconstructions. No lymphadenopathy  2.  There is an new or increased left common iliac, external  iliac and pelvic sidewall lymphadenopathy since prior CT. Distal left common iliac lymph nodes measure up to 1.2 cm on this exam compared to 0.7 cm on prior CT. Left pelvic sidewall lymph node measures 2.1 cm short axis on this exam compared to 1.2 cm on prior CT . consistent with metastatic disease.  3. Sclerotic lesions in the left sacrum and left posterior ilium are unchanged from prior CT.  may be metastatic.  4. No evidence of metastatic disease in the chest.     -1/2023 MRI brain w/wo contrast:  IMPRESSION:  1. No findings of intracranial metastatic disease.  2. Indeterminate ovoid T2 hyperintense enhancing mass in the region of the  left lacrimal gland measuring approximately 2.4 cm AP by 1.4 cm  transverse by 2.3 cm craniocaudal associated with/replacing the left  lacrimal gland. Consider correlation with tissue sampling.  3. A few indeterminate partially visualized heterogeneous enhancing  Nodules measuring up to approximately 1.5 cm in the bilateral parotid glands.   Consider soft tissue neck CT with contrast for further evaluation.    -1/20/2023: IR US Guided biopsy of a right external iliac LN  Final Diagnosis   A.  LYMPH NODE, RIGHT EXTERNAL ILIAC, CORE NEEDLE BIOPSY AND TOUCH IMPRINT:  -Involved by follicular lymphoma, low-grade (WHO grade 1-2)  -Negative for metastatic carcinoma     The morphologic and immunophenotypic patterns are consistent with follicular lymphoma.  There is no high-grade lymphoma present in this limited specimen.  Concurrent flow study ([id number]) demonstrated CD10-positive lambda monotypic B cells (22%), rare kappa monotypic B cells (1.3%), and no aberrant immunophenotype on T cells.  A FISH study for Bcl-2 is pending and will be reported separately.     The case was initially reviewed by Dr. Cifuentes (breast pathology).     This case was reviewed in part at our Hematopathology Faculty Consensus conference at which Girish Nolasco, Narciso, and Kayode were present in addition  to myself.  This is a small needle core.  In some of the areas, the CD10+ B cells are confined to follicles.  Though the possibility of in situ follicular neoplasia was discussed, we still favor a diagnosis of follicular lymphoma - there are a number of CD10+ B cells outside of follicles at the base of the biopsy, and the clinical history supports a greater extent of involvement.     There was a tiny kappa monotypic B cell population identified by flow (separate from the CD10+ clone).  We looked for the morphologic correlate in this case by IHC, but it is not readily apparent.  This may represent a monoclonal B lymphocytosis - consider sending a peirpheral blood sample for flow cytometry.    FISH:  RESULTS:     NORMAL  - No rearrangement of BCL2     INTERPRETATION:  No evidence was found by FISH of rearrangement of the BCL2 (18q21) locus. These findings are not helpful for confirming or further characterizing the reported pathologic diagnosis of follicular lymphoma.    Flow Interpretation   A. Lymph Node(s), :  -CD10-positive lambda monotypic B cells (22%)  -Rare kappa monotypic B cells (1.3%)  -No aberrant immunophenotype on T cells  See comment   Electronically signed by Krista Headley MD on 1/24/2023 at  3:10 PM   Comment     The CD10-positive population is consistent with a clonal B cell population and a CD10 positive B-cell lymphoma is favored.      In addition, a second clonal B-cell population is identified which has a CLL-like immunophenotype except that CD5 expression is equivocal, this may represent a monoclonal B-cell lymphocytosis (MBL).     Addendum   INTERPRETATION  The diagnosis remains the same (see comment)     REASON FOR ADDENDUM  This addendum is issued to reflect additional testing performed on this case. See Comment.      COMMENT  Additional multi-color flow analysis was performed for the following markers:   CD23, CD79b     The CD5 (dimly) positive B cells mostly lack CD23 and CD79b         1/23/23 CT neck:  A) Heterogeneous nodule in the right thyroid gland measuring up  to 1.6 cm.   B) Soft tissue nodule in the superficial left parotid  gland measuring 1.2 cm. Additional 0.8 cm nodule in the deep portion  of the left parotid gland. Soft tissue nodule in the superficial right  parotid gland measuring 0.8 cm. These may represent benign or malignant parotid lesions. Multiplicity of lesions raises concern for Warthin tumors. Otolaryngology consultation is  recommended.  C) Prominent soft tissue around the major arteries in the neck  particularly the common and internal carotid arteries. This is  atypical for atherosclerotic disease and vasculopathy/vasculitis such  as giant cell arteritis should be considered. Probable superimposed  atherosclerotic disease at the carotid bifurcations right greater than  left without definite high-grade stenosis.    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  And B-cell cohort #2 which is described as negative for CD5 and showing dim CD20 and dim kappa staining.  This population is listed a CD5 negative and CD10 negative also listed as negative for  and .  Of note the population of monoclonal B-cell lymphocytosis found in peripheral blood in our department and in the lymph node biopsy in January showed partial/equivocal dim CD5 and may correspond to the cohort #2.     We essentially agree with the diagnosis of  follicular lymphoma, which is consistent with prior diagnosis of follicular lymphoma low grade  established on core biopsy of right external iliac lymph node January 20, 2023 (case [id number]) in our Department . The concurrent flow cytometry case [id number] in January showed in addition to CD10 positive B-cell population  also a partial/equivocal CD5 positive separate kappa monotypic B-cell population, which most likely represents monoclonal B-cell lymphocytosis of CLL/SLL type, since it was also documented in the peripheral blood flow cytometry on February 16, 2023 (case number [facility] ) at a low frequency.  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 Areas with infiltrates of small B cells with coexpression of CD5 and CD23 are seen in the current biopsy, but the size of the biopsy is too small to comprehensively evaluate for the presence of proliferation centers and the extent of involvement by the CD5 positive B cell lymphoproliferative process.   This case has been discussed at intradepartmental hematopathology conference with participation by THELMA Petersen, THELMA Davis and myself.      -5/23 PET/CT NCAP:   1. An ovoid mass in the left lateral orbit measures 2.5 x 1.2 cm, slightly larger than 01/09/2023, and demonstrates marked uptake (SUVmax 9.3), consistent with biopsy-proven lymphoma  2. A few small FDG avid bilateral intraparotid nodules are present, one on the right and two on the left, including representative left parotid nodule measuring 0.9 x 1.4 cm with moderate uptake (SUVmax 6.7).  3.  Asymmetric prominent right posterior lower cervical node measures 0.8 cm associated with moderate focal   uptake (SUVmax 5.1).  4. A single mildly enlarged left axillary  lymph node measures 1.0 cm associated with mild uptake (SUVmax 3.0)  5. Some scattered FDG avid lymph nodes below the diaphragm involve the retroperitoneal retrocaval, bilateral common iliac, left pelvic sidewall and left inguinal regions. Some of these have increased slightly in size since 12/28/2022, such as a conglomerate left pelvic sidewall bj mass measuring approximately 2.2 x 4.1 cm (previously 2.2 x 3.4 cm) associated with marked uptake (SUVmax 7.6).  6. FDG avid sclerotic skeletal lesion involves the right superior acetabulum (SUVmax 12.9) as well as a couple of separate smaller sclerotic lesions in the right iliac bone uptake (SUVmax 12.2).       INTERIM HISTORY:    - 6/14/2023- 6/15/2023 palliative radiation to left orbit follicular lymphoma 4Gy/2fx  - 6/15/2023 bone marrow biopsy  MORPHOLOGY:  - No morphologic or immunophenotypic evidence of follicular lymphoma  - Flow cytometry showing a small population of CD5 positive monotypic B cells; most compatible with monoclonal B lymphocytosis (There is no definitive morphologic correlate for this finding. There is no morphologic or immunophenotypic evidence of involvement by follicular lymphoma.   The CD5-positive B cell clone may best represent monoclonal B lymphocytosis, provided that bj SLL is excluded.)  - Normocellular marrow (cellularity estimated at 30%) with trilineage hematopoietic maturation and no increase in blasts  FLOW CYTOMETRY:  CD5 positive kappa-monotypic B cells (0.8%)  0.8% B cells which express CD5, CD19, CD20 (dim to negative) and monotypic kappa immunoglobulin light chains (dim) and lack CD10 and CD38. The B cells have similar forward scatter relative to background T cells suggestive of similar size; however, precise size determination is deferred to morphology  CYTOGENETICS:  46,XX    -6/19/2023 CT-guided bone biopsy, right acetabulum  PATHOLOGY:  - Atypical lymphoid infiltrate in a suboptimal biopsy  - No histologic evidence of  metastatic carcinoma  - Two small monoclonal B-cell populations detected by flow cytometry  This biopsy is suboptimal for evaluation due to fragmentation, crush artifact, and decalcification. In this limited biopsy, there are fragments of bone and marrow showing a mixed infiltrate that includes both B and T cells. Notably, 2 small clonal B-cell populations were detected by flow cytometry which resemble B-cell populations detected in this patient's past lymph node (1/20/23) and blood and bone marrow (2/16/23, 6/15/23) flow cytometry studies. A definitive morphologic correlate to these cell populations is not appreciated, and the overall features are insufficient for a diagnosis of lymphoma.    FLOW CYTOMETRY:  A. Pelvis, Right:  -CD5 positive kappa-monotypic B cells ( 2.8 %) - see comment A  -CD10 positive lambda-monotypic B cells ( 0.4 %) - see comment B      Electronically signed by Arnaldo Alvarado MD on 6/20/2023 at  1:31 PM   Comment     A) Clonal B cells with the immunophenotype of CLL/SLL are present in this sample.  The differential includes peripheral blood contamination (with an MBL), the tissue based correlate to MBL, versus involvement by SLL.  2.8% B cells which express CD5 (partial, dim), CD19, CD20 (dim to negative) and monotypic kappa immunoglobulin light chains (dim) and lack CD10. The B cells have similar forward scatter relative to background T cells suggestive of similar size; however, precise size determination is deferred to morphology.     B) A CD10-positive B cell clone is also present - involvement by the patient's previously diagnosed CD10+ B cell lymphoma is in the differential (follicular lymphoma versus other (transformation to a higher grade lymphoma). 0.4% B cells which express CD10, CD19 (slightly dim), CD20, and monotypic lambda immunoglobulin light chains and lack CD5.  The B cells have increased forward scatter relative to background T cells suggestive of increased size;  however, precise size determination is deferred to morphology.          Upcoming appts:  - 7/10/23 Rheumatology consult, ESR and CRP elevated, DEVON positive 1:160 speckled; PCP suspects pt may have PMR   - 7/23 severe b/l hip osteoarthritis, planned hip replacement   - 7/23 neurology movement d/o follow up- multifactorial gait problem w/orthopedic troubles/arthritis; cannot r/o underlying parkinsonism so will do carbidopa/levodopa challenge      REVIEW OF SYSTEMS:   A 14 point ROS was reviewed with pertinent positives and negatives in the HPI.       HOME MEDICATIONS:  Current Outpatient Medications   Medication Sig Dispense Refill     dorzolamide-timolol (COSOPT) 2-0.5 % ophthalmic solution Place 1 drop into both eyes 2 times daily       latanoprost (XALATAN) 0.005 % ophthalmic solution Place 1 drop into both eyes daily        losartan (COZAAR) 100 MG tablet Take 1 tablet (100 mg) by mouth daily 90 tablet 0     rosuvastatin (CRESTOR) 10 MG tablet TAKE 1 TABLET (10 MG) BY MOUTH DAILY. (Patient taking differently: Take 10 mg by mouth every 7 days) 90 tablet 3         ALLERGIES:  Allergies   Allergen Reactions     Compazine      Mental confusion     Hydrochlorothiazide      Dryness mouth     Prochlorperazine Visual Disturbance     Simvastatin Other (See Comments)     Muscle aches         PAST MEDICAL HISTORY:  Past Medical History:   Diagnosis Date     Breast cancer (H) 2000    Left breast, s/p mastectomy, chemotherapy and endocrine therapy     Follicular lymphoma (H) 01/20/2023     Glaucoma     bilateral - Dr. Moore - [facility]     Hyperlipidemia      Hypertension      Macular degeneration     Dr. Toscano - [facility]     Osteoarthritis      Osteoporosis      Personal history of chemotherapy 2000    A/C + Taxol     S/P radiation therapy     400 cGy to left orbit completed on 6/15/2023 - [facility]         PAST SURGICAL HISTORY:  Past Surgical History:    Procedure Laterality Date     BIOPSY Left 2023    Left Orbital Biopsy     C MASTECTOMY,SIMPLE  2000    left     COLONOSCOPY  2006    Q 10 years     COLONOSCOPY N/A 10/04/2021    Procedure: COLONOSCOPY;  Surgeon: Guru Lyla Ruby MD;  Location: [facility] OR     ESOPHAGOSCOPY, GASTROSCOPY, DUODENOSCOPY (EGD), COMBINED N/A 10/04/2021    Procedure: ENDOSCOPIC ULTRASOUND, ESOPHAGOSCOPY / UPPER GASTROINTESTINAL TRACT (GI), Esophagogastroduodenoscopy,  Fine Needle Biopsy of liver;  Surgeon: Guru Lyla Ruby MD;  Location: [facility] OR     IR LYMPH NODE BIOPSY  2023     SURGICAL HISTORY OF -  Left 2015    tissue expander placed in left breast area     SURGICAL HISTORY OF -  Left 2016    left breast implant and right mammoplasty         SOCIAL HISTORY:  Social History     Socioeconomic History     Marital status: Single     Spouse name: Not on file     Number of children: 0     Years of education: Not on file     Highest education level: Not on file   Occupational History     Employer: [employer]     Occupation: REtired   Tobacco Use     Smoking status: Former     Packs/day: 1.00     Years: 30.00     Pack years: 30.00     Types: Cigarettes     Quit date: 3/15/1999     Years since quittin.3     Smokeless tobacco: Never   Vaping Use     Vaping Use: Not on file   Substance and Sexual Activity     Alcohol use: Not Currently     Alcohol/week: 4.0 standard drinks of alcohol     Types: 4 Glasses of wine per week     Drug use: No     Sexual activity: Not Currently     Birth control/protection: None   Other Topics Concern     Parent/sibling w/ CABG, MI or angioplasty before 65F 55M? Yes     Comment: Father heart attack   Social History Narrative     Not on file     Social Determinants of Health     Financial Resource Strain: Not on file   Food Insecurity: Not on file   Transportation Needs: Not on file   Physical Activity: Not on file   Stress: Not on file   Social Connections: Not on  file   Intimate Partner Violence: Not on file   Housing Stability: Not on file       FAMILY HISTORY:  Family History   Problem Relation Age of Onset     Cancer Mother         bone cancer     Heart Disease Father      Coronary Artery Disease Father      Diabetes Father      Arthritis Sister      Breast Cancer Sister         age 75     Diabetes Maternal Grandmother      Diabetes Paternal Grandmother      Family history of cancer- sister breast cancer- dx at age 77 y/o, localized breast cancer, surgical resection, RT, no adjuvant chemo or endocrine therapy that pt is aware of     PHYSICAL EXAM:  Vital signs:  /72 (BP Location: Right arm)   Pulse 74   Temp 98.1  F (36.7  C) (Oral)   Resp 18   Wt 67 kg (147 lb 12.8 oz)   SpO2 96%   BMI 26.19 kg/m       GENERAL/CONSTITUTIONAL: No acute distress.  EYES: Pupils are equal and round. Extraocular movements intact without nystagmus.  No scleral icterus. Minimal ptosis left eye at site of surgery  RESPIRATORY: Equal chest rise.   MUSCULOSKELETAL: Warm and well-perfused, no cyanosis, clubbing, or edema.   NEUROLOGIC: Cranial nerves are grossly intact. Alert, oriented to person, place and time, answers questions appropriately.  INTEGUMENTARY: No rashes or jaundice.  GAIT: ambulates with walker        LABS:   Latest Reference Range & Units 06/19/23 11:48   WBC 4.0 - 11.0 10e3/uL 7.7   Hemoglobin 11.7 - 15.7 g/dL 13.0   Hematocrit 35.0 - 47.0 % 41.8   Platelet Count 150 - 450 10e3/uL 229   RBC Count 3.80 - 5.20 10e6/uL 4.72   MCV 78 - 100 fL 89   MCH 26.5 - 33.0 pg 27.5   MCHC 31.5 - 36.5 g/dL 31.1 (L)   RDW 10.0 - 15.0 % 15.1 (H)   (L): Data is abnormally low  (H): Data is abnormally high    PATHOLOGY:  See above    IMAGING:    ASSESSMENT/PLAN:  Virginia Byers is a 72 year old female with:    # 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 INCLUDING biopsy proven LN and left orbital mass/lacrimal gland involvement and suspected parotid gland involvement  -11/22 MRI lumbar spine  "in 12/22 MRI pelvis show prominent left iliac lymph nodes (measuring 1.8 x 3.2, previously 1.5 x 2.7 cm), right medial thigh lymphadenopathy (1.7cm), right iliac bone enhancing marrow signal, left posterior iliac enhancing lesion  -12/22 CT chest abdomen pelvis shows new or increased left common iliac (1.2cm), external iliac, pelvic sidewall lymphadenopathy (2.1cm) and sclerotic lesions in left sacrum and left posterior ilium. No splenomegaly  - 1/23 brain MRI: Indeterminate ovoid T2 hyperintense enhancing mass in the region of the left lacrimal gland measuring approximately 2.4 cm AP by 1.4 cm transverse by 2.3 cm craniocaudal associated with/replacing the left lacrimal gland. Consider correlation with tissue sampling  - 1/23 CT soft tissue neck: Soft tissue nodule in the superficial left parotid gland measuring 1.2 cm. Additional 0.8 cm nodule in the deep portion of the left parotid gland. Soft tissue nodule in the superficial right parotid gland measuring 0.8 cm. These may represent benign or malignant parotid lesions. Multiplicity of lesions raises concern for Warthin tumors  - 1/27/23 note from Dr. Wilmar Morales- \"Based on this diagnosis, I think the 2 nodules in her parotid gland are likely atypical lymph nodes containing lymphoma.  She will probably require some staging imaging, such as PET scan and we can take a look at that as well.  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 CD5 stains T cells  - Ki-67 proliferative index is approximately 10%, also low in the follicles.  - " "flow cytometry \"CD10-positive population is consistent with a clonal B cell population and a CD10 positive B-cell lymphoma is favored.\"    - 4/6/23 left orbital mass/lacrimal gland excisional biopsy: PATHOLOGY: - Follicular Lymphoma, low grade ([facility] negative for BCL2 rearrangement). 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  The flow cytometry from 1/20/23 right external iliac LN biopsy showed in addition to CD10 positive B-cell population  also a partial/equivocal CD5 positive separate kappa monotypic B-cell population, which most likely represents monoclonal B-cell lymphocytosis of CLL/SLL type, since it was also documented in the peripheral blood flow cytometry on February 16, 2023 (case number UF ) at a low frequency.  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  - Cohort 2 which is described as negative for CD5, CD10, ,  and showing dim CD20 and dim kappa staining   Of note the population of monoclonal B-cell lymphocytosis found in peripheral blood in our department and in the lymph node biopsy in January showed partial/equivocal dim CD5 and may correspond to the cohort #2.     -5/23 PET/CT NCAP:   1. An ovoid mass in the left lateral orbit measures 2.5 x 1.2 cm, slightly larger than 01/09/2023, and demonstrates marked uptake (SUVmax 9.3), consistent with biopsy-proven lymphoma  2. A few small FDG avid bilateral intraparotid nodules are present, one on the right and two on the left, including representative " left parotid nodule measuring 0.9 x 1.4 cm with moderate uptake (SUVmax 6.7).  3.  Asymmetric prominent right posterior lower cervical node measures 0.8 cm associated with moderate focal uptake (SUVmax 5.1).  4. A single mildly enlarged left axillary lymph node measures 1.0 cm associated with mild uptake (SUVmax 3.0)  5. Some scattered FDG avid lymph nodes below the diaphragm involve the retroperitoneal retrocaval, bilateral common iliac, left pelvic sidewall and left inguinal regions. Some of these have increased slightly in size since 12/28/2022, such as a conglomerate left pelvic sidewall bj mass measuring approximately 2.2 x 4.1 cm (previously 2.2 x 3.4 cm) associated with marked uptake (SUVmax 7.6).  6. FDG avid sclerotic skeletal lesion involves the right superior acetabulum (SUVmax 12.9) as well as a couple of separate smaller sclerotic lesions in the right iliac bone uptake (SUVmax 12.2).     - 6/23 bone marrow biopsy:  MORPHOLOGY:  - No morphologic or immunophenotypic evidence of follicular lymphoma  - Flow cytometry showing a small population of CD5 positive monotypic B cells; most compatible with monoclonal B lymphocytosis (There is no definitive morphologic correlate for this finding. There is no morphologic or immunophenotypic evidence of involvement by follicular lymphoma.  The CD5-positive B cell clone may best represent monoclonal B lymphocytosis, provided that bj SLL is excluded.)  - Normocellular marrow (cellularity estimated at 30%) with trilineage hematopoietic maturation and no increase in blasts  FLOW CYTOMETRY:  CD5 positive kappa-monotypic B cells (0.8%)  0.8% B cells which express CD5, CD19, CD20 (dim to negative) and monotypic kappa immunoglobulin light chains (dim) and lack CD10 and CD38. The B cells have similar forward scatter relative to background T cells suggestive of similar size; however, precise size determination is deferred to morphology  CYTOGENETICS:  46,XX    -6/19/2023  CT-guided bone biopsy, right acetabulum  PATHOLOGY:  - Atypical lymphoid infiltrate in a suboptimal biopsy  - No histologic evidence of metastatic carcinoma  - Two small monoclonal B-cell populations detected by flow cytometry: CD5 positive kappa-monotypic B cells ( 2.8 %, CD5 partial, dim, CD19 positive, CD30 dim to negative, CD10 negative); CD10 positive lambda-monotypic B cells ( 0.4 %, CD10 positive, CD19 slightly dim, CD20 positive, CD5 negative)     Staging and prognostication:  - Stage: 4 given right medial thigh SURINDER and left common iliac, external iliac, pelvic sidewall; biopsy proven left orbital mass involvement; there was some concern from ENT that patients parotid lesions were related to pts follicular lymphoma   - FLIPI: score 2 (age>60),  stage 3-4; intermediate risk  - pertinent labs: 12/22  and 5/23 ; 2/23 and 5/23 beta 2 microglobulin 1.7, hepatitis B and C negative    Treatment:  - GELF criteria to indicate treatment (involvement of 3 or more bj sites each with a diameter greater than or equal to 3 cm, any bj or extranodal tumor mass with a diameter of greater than or equal to 7 cm, B symptoms, splenomegaly, pleural effusions, peritoneal ascites, clinically progressive or significant cytopenias, leukemia, symptoms, threatened end organ function, clinically significant bulky disease, steady or rapid progression of disease)  - option of systemic treatment including rituximab discussed previously on multiple occassions particularly w/left orbital involvement, see my previous notes for details  - 6/14/2023- 6/15/2023 palliative radiation to left orbit follicular lymphoma 4Gy/2fx    PLAN:  - bone marrow biopsy negative for lymphoma and right acetabulum bone biopsy atypical lymphoid infiltrate seen but biopsy suboptimal and flow cytometry shows 2.8% CD5 positive kappa monotypic B cells consistent w/monoclonal B cell lymphocytosis and 0.4% CD10 positive lambda-monotypic B cells  consistent w/lymphoma- ie follicular vs other  - systemic tx including rituximab discussed on multiple prior visit, held as detailed in my previous notes   - pt completed palliative RT to left orbit  - pt has upcoming left hip replacement 7/23 (I have discussed the above w/Dr. 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   - 1/23 right external iliac LN core needle biopsy- FLOW CYTOMETRY: 1.3% rare monoclonal B cell population CD5 equivocal, CD23 negative, CD 79b negative, possible MBL; no morphological correlate  -2/23 peripheral blood- FLOW CYTOMETRY: 2.3% kappa monoclonal B cell population, CD5 positive, CD38 negative, CD49d negative, possible MBL  - 4/6/23 left orbital mass/lacrimal gland excisional biopsy: FLOW CYTOMETRY: 15.1% kappa monotypic B cell population, bright positive: CD45, CD19, CD22, moderately positive kappa, CD20, CD79b, dim positive CD23, negative CD 2,3,103, lambda, 5, 10, 200, 38, 43  - 6/23 bone marrow biopsy: FLOW CYTOMETRY: 0.8% kappa monotypic B cell population, positive for CD5, CD19, dim to negative CD20, negative for CD38 and CD10  - 6/23 right acetabulum bone biopsy: FLOW CYTOMETRY: 2.8% kappa monotypic B cell population, CD5 partial dim, CD 19 positive, CD20 dim negative, CD10 negative, possible MBL    - of note pt does not have leukocytosis or lymphocytosis, ALC consistently <5000, no splenomegaly, LN biopsy did not demonstrate IHC consistent with SLL  - 5/23 PET/CT as above    PLAN:  - 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    -4/2020 left breast simple mastectomy with no residual carcinoma.   -S/p ACx4, taxol x4, no anti-her2 treatment, no RT.   -S/p 5 years adjuvant endocrine therapy (tamoxifen and anastrozole).  -Delayed left breast reconstruction and right breast augmentation.     PLAN:   - 5/23 PET/CT notes single enlarged left axillary lymph node with SUV 3 in the setting of known lymphoma and breast tumor markers normal   - right breast mammogram and left axillary US ordered today     #Stable sclerotic lesions in left sacrum and left posterior ilium  -10/21 mammogram, endoscopy with EUS and colonoscopy WNL  -12/22 CT chest abdomen pelvis shows new or increased left common iliac, external iliac, pelvic sidewall lymphadenopathy and sclerotic lesions in left sacrum and left posterior ilium.  No metastatic disease in chest.  No abnormal findings in breast.  - 12/22 tumor markers: CA 15-3 24,  29, CEA 3.1, CA 19-9 4, AFP 5.9,   - 1/2023 MRI brain w/wo contrast: no metastases   - 1/2023 right external iliac LN core need biopsy- no metastatic carcinoma   - 5/23 PET avid bone lesions  -6/19/2023 CT-guided bone biopsy, right acetabulum  PATHOLOGY:  - Atypical lymphoid infiltrate in a suboptimal biopsy  - No histologic evidence of metastatic carcinoma  - Two small monoclonal B-cell populations detected by flow cytometry: CD5 positive kappa-monotypic B cells ( 2.8 %, CD5 partial, dim, CD19 positive, CD30 dim to negative, CD10 negative); CD10 positive lambda-monotypic B cells ( 0.4 %, CD10 positive, CD19 slightly dim, CD20 positive, CD5 negative)     PLAN:    - no proven malignancy on bone biopsy  - monitor     RTC 3 months for follow up with me, labs prior to appt      Naomy DO Dejuan  Hematology/Oncology  [facility]

## 2023-06-29 NOTE — LETTER
6/29/2023         RE: Virginia Byers  [id number] [facility] [address]        Dear Colleague,    Thank you for referring your patient, Virginia Byers, to the [facility]. Please see a copy of my visit note below.    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    Patient has medical history including LEFT breast cancer in 2000, hypertension, hyperlipidemia, osteoporosis, osteoarthritis, degenerative disc disease, spinal stenosis of lumbar region,  s/p bilateral L5-S1 transforaminal ROS by Dr. Garcia on 7/9/2021, s/p bilateral L4-5 and L5-S1 facet joint injections on 6/14/2021, glaucoma and macular degeneration, cataracts s/p surgery, history of tobacco use (quit 1999)     Regarding previous history of breast cancer:  She was previously treated by Dr. Sakina Brandt at [facility]      In summary, diagnosed at age 49 with LEFT breast IDC ER positive, MS positive, HER2 positive on FISH. 3/2000 left breast lumpectomy and left axillary node dissection (IDC, grade 3, 1.45 cm incompletely excised tumor, DCIS, positive margins with infiltrating carcinoma, 1 of 17 lymph nodes positive (0.5 cm metastatic focus), ER positive, MS positive, HER2 positive).  4/2020 left breast simple mastectomy with no residual carcinoma. S/p ACx4, taxol x4, no anti-her2 treatment, no RT. S/p 5 years adjuvant endocrine therapy (tamoxifen and anastrozole). Delayed left breast reconstruction and right breast augmentation.      3/2/2000  Excisional biopsy of Left breast mass -   Infiltrating ductal carcinoma with associated DCIS   ER positive (60%), MS positive (22%), HER2 by FISH POSITIVE     3/10/2000 - Left  lumpectomy and left axillary node  Invasive ductal carcinoma, Grade 3, 1.45 cm (incompletely excised), negative for LVI  DCIS, greatest histologic dimension of tumor (DCIS PLUS invasive tumor) = 2.9 cm (estimate 50% DCIS)  Positive margins- infiltrating carcinoma extends to margin  1 of 17 axillary lymph nodes sampled was positive for metastatic focus of 0.5 cm.  ER/MT+     Sections of lumpectomy left breast showing residual 1.1 cm focus of ductal carcinoma in situ (DCIS), present within microns of the linked margin of resection.       4/11/2000 - Left breast simple mastectomy, negative for residual carcinoma  Left mastectomy on 4/11/2000 performed by Dr. Cristian Mcdonald at [facility].      Adjuvant treatment:  - 5/15/2000-7/24/2000 4 cycles of Adriamycin and Cytoxan   - 8/16/2000-10/18/2000 4 cycles of paclitaxel   - Appears she may have been on some kind of trial/protocol, was not given any anti-HER2 targeted treatment  - No adjuvant radiation  - 12/2000- 10/2002 tamoxifen  - 10/2002-5/2006 switched to anastrozole (due to in vitro studies showing anastrozole may be slightly better for patients were HER2 positive) with Fosamax for osteopenia     8/31/2015 Left delayed reconstruction with tissue expander    2/3/2016 Left 2nd stage 450 cc high profile silicone implant; right augmentation 150 cc Moderate classic profile silicone implant; cresent mastopexy    10/18/2021: Screening mammogram right breast ZACKARY    OTHER:  Of note, patient has documentation of anemia (iron deficiency and anemia of chronic disease), elevated LFTs, nausea and vomiting of all solids and liquids, fatigue with 26 pound unintentional weight loss from 7/20/2021 - 10/20/2021 with mildly elevated LFTs which led to evaluation with mammogram, endoscopy with EUS and colonoscopy as detailed below. Pt did associate these with some spinal injections she had gotten, due to overlapping times. Pt did regain all of the weight and hand improvement of  anemia within 2 months, without significant intervention.     -10/2021: Endoscopy with EUS normal EGD and no stigmata or source of upper GI bleeding, visualized bile duct, gallbladder, liver, pancreas, left adrenal gland normal.  No lymphadenopathy. LIVER, RANDOM NEEDLE BIOPSY: Benign parenchyma with congestion; no significant fibrosis or other abnormalities     -10/2021 colonoscopy: Hemorrhoids, diverticulosis in sigmoid colon and splenic flexure    Current history:  -Ongoing right knee pain not improved with brace and physical therapy, s/p bilateral L5-S1 transforaminal ROS by Dr. Garcia on 7/9/2021, s/p bilateral L4-5 and L5-S1 facet joint injections on 6/14/2021. ongoing b/l hip and leg pain, worse with position changes (ie sitting to standing), constant, 8-9/10, tylenol brings pain down to 6/10 (using tylenol bid). ECOG 3.   -Patient has seen neurosurgery Dr. Jose Alexander, occupational medicine specialist Dr.Orrin Murphy, with plan to see PM&R Dr. Margie Agudelo and movement disorder clinic  - 11/22 MRI lumbar spine:    prominent left iliac lymph nodes among other findings related to degenerative changes and neural foraminal stenosis throughout lumbar spine    - 12/22 MRI pelvis:  1. Enhancing marrow signal abnormality of the right iliac bone extending from the superior acetabulum subchondral location proximally almost to the level of the sacroiliac joint caudal aspect. No associated fracture line. Underlying marrow infiltrative process such as from metastasis cannot be excluded especially given the degree of T1 hypointensity and history of primary tumor. Other consideration include stress reaction.  2. Mildly increased left external iliac chain, and the right medial thigh lymphadenopathy. Metastasis cannot be excluded.  3. Left posterior iliac enhancing lesion, similar in size to prior CT 9/2021 and previously not visible on CT, focal enhancing lesion in the right greater trochanter laterally. Findings are  concerning for Metastasis.    -12/22 CT chest abdomen pelvis with contrast:  COMPARISON: MRI pelvis 12/20/2022 and CT abdomen pelvis 9/21/2021.  1. Mastectomies with implant reconstructions. No lymphadenopathy  2.  There is an new or increased left common iliac, external iliac and pelvic sidewall lymphadenopathy since prior CT. Distal left common iliac lymph nodes measure up to 1.2 cm on this exam compared to 0.7 cm on prior CT. Left pelvic sidewall lymph node measures 2.1 cm short axis on this exam compared to 1.2 cm on prior CT . consistent with metastatic disease.  3. Sclerotic lesions in the left sacrum and left posterior ilium are unchanged from prior CT.  may be metastatic.  4. No evidence of metastatic disease in the chest.     -1/2023 MRI brain w/wo contrast:  IMPRESSION:  1. No findings of intracranial metastatic disease.  2. Indeterminate ovoid T2 hyperintense enhancing mass in the region of the  left lacrimal gland measuring approximately 2.4 cm AP by 1.4 cm  transverse by 2.3 cm craniocaudal associated with/replacing the left  lacrimal gland. Consider correlation with tissue sampling.  3. A few indeterminate partially visualized heterogeneous enhancing  Nodules measuring up to approximately 1.5 cm in the bilateral parotid glands.   Consider soft tissue neck CT with contrast for further evaluation.    -1/20/2023: IR US Guided biopsy of a right external iliac LN  Final Diagnosis   A.  LYMPH NODE, RIGHT EXTERNAL ILIAC, CORE NEEDLE BIOPSY AND TOUCH IMPRINT:  -Involved by follicular lymphoma, low-grade (WHO grade 1-2)  -Negative for metastatic carcinoma     The morphologic and immunophenotypic patterns are consistent with follicular lymphoma.  There is no high-grade lymphoma present in this limited specimen.  Concurrent flow study ([id number]) demonstrated CD10-positive lambda monotypic B cells (22%), rare kappa monotypic B cells (1.3%), and no aberrant immunophenotype on T cells.  A FISH study for Bcl-2 is  pending and will be reported separately.     The case was initially reviewed by Dr. Cifuentes (breast pathology).     This case was reviewed in part at our Hematopathology Faculty Consensus conference at which Girish Nolasco, Narciso, and Kayode were present in addition to myself.  This is a small needle core.  In some of the areas, the CD10+ B cells are confined to follicles.  Though the possibility of in situ follicular neoplasia was discussed, we still favor a diagnosis of follicular lymphoma - there are a number of CD10+ B cells outside of follicles at the base of the biopsy, and the clinical history supports a greater extent of involvement.     There was a tiny kappa monotypic B cell population identified by flow (separate from the CD10+ clone).  We looked for the morphologic correlate in this case by IHC, but it is not readily apparent.  This may represent a monoclonal B lymphocytosis - consider sending a peirpheral blood sample for flow cytometry.    FISH:  RESULTS:     NORMAL  - No rearrangement of BCL2     INTERPRETATION:  No evidence was found by FISH of rearrangement of the BCL2 (18q21) locus. These findings are not helpful for confirming or further characterizing the reported pathologic diagnosis of follicular lymphoma.    Flow Interpretation   A. Lymph Node(s), :  -CD10-positive lambda monotypic B cells (22%)  -Rare kappa monotypic B cells (1.3%)  -No aberrant immunophenotype on T cells  See comment   Electronically signed by Krista Headley MD on 1/24/2023 at  3:10 PM   Comment     The CD10-positive population is consistent with a clonal B cell population and a CD10 positive B-cell lymphoma is favored.      In addition, a second clonal B-cell population is identified which has a CLL-like immunophenotype except that CD5 expression is equivocal, this may represent a monoclonal B-cell lymphocytosis (MBL).     Addendum   INTERPRETATION  The diagnosis remains the same (see comment)     REASON FOR  ADDENDUM  This addendum is issued to reflect additional testing performed on this case. See Comment.      COMMENT  Additional multi-color flow analysis was performed for the following markers:   CD23, CD79b     The CD5 (dimly) positive B cells mostly lack CD23 and CD79b        1/23/23 CT neck:  A) Heterogeneous nodule in the right thyroid gland measuring up  to 1.6 cm.   B) Soft tissue nodule in the superficial left parotid  gland measuring 1.2 cm. Additional 0.8 cm nodule in the deep portion  of the left parotid gland. Soft tissue nodule in the superficial right  parotid gland measuring 0.8 cm. These may represent benign or malignant parotid lesions. Multiplicity of lesions raises concern for Warthin tumors. Otolaryngology consultation is  recommended.  C) Prominent soft tissue around the major arteries in the neck  particularly the common and internal carotid arteries. This is  atypical for atherosclerotic disease and vasculopathy/vasculitis such  as giant cell arteritis should be considered. Probable superimposed  atherosclerotic disease at the carotid bifurcations right greater than  left without definite high-grade stenosis.    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  And B-cell cohort #2 which is described as negative for CD5 and showing dim CD20 and dim kappa staining.  This population is listed a CD5 negative and CD10 negative also listed as negative for   and .  Of note the population of monoclonal B-cell lymphocytosis found in peripheral blood in our department and in the lymph node biopsy in January showed partial/equivocal dim CD5 and may correspond to the cohort #2.     We essentially agree with the diagnosis of follicular lymphoma, which is consistent with prior diagnosis of follicular lymphoma low grade  established on core biopsy of right external iliac lymph node January 20, 2023 (case [id number]) in our Department . The concurrent flow cytometry case [id number] in January showed in addition to CD10 positive B-cell population  also a partial/equivocal CD5 positive separate kappa monotypic B-cell population, which most likely represents monoclonal B-cell lymphocytosis of CLL/SLL type, since it was also documented in the peripheral blood flow cytometry on February 16, 2023 (case number [facility] ) at a low frequency.  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 Areas with infiltrates of small B cells with coexpression of CD5 and CD23 are seen in the current biopsy, but the size of the biopsy is too small to comprehensively evaluate for the presence of proliferation centers and the extent of involvement by the CD5 positive B cell lymphoproliferative process.   This case has been discussed at intradepartmental hematopathology conference with participation by THELMA Petersen, THELMA Davis and myself.      -5/23 PET/CT NCAP:   1. An ovoid mass in the left lateral orbit measures 2.5 x 1.2 cm, slightly larger than 01/09/2023, and demonstrates marked uptake (SUVmax 9.3), consistent with biopsy-proven lymphoma  2. A few small FDG avid bilateral intraparotid nodules are present, one on the right and two on the left,  including representative left parotid nodule measuring 0.9 x 1.4 cm with moderate uptake (SUVmax 6.7).  3.  Asymmetric prominent right posterior lower cervical node measures 0.8 cm associated with moderate focal   uptake (SUVmax 5.1).  4. A single mildly enlarged left axillary lymph node measures 1.0 cm associated with mild uptake (SUVmax 3.0)  5. Some scattered FDG avid lymph nodes below the diaphragm involve the retroperitoneal retrocaval, bilateral common iliac, left pelvic sidewall and left inguinal regions. Some of these have increased slightly in size since 12/28/2022, such as a conglomerate left pelvic sidewall bj mass measuring approximately 2.2 x 4.1 cm (previously 2.2 x 3.4 cm) associated with marked uptake (SUVmax 7.6).  6. FDG avid sclerotic skeletal lesion involves the right superior acetabulum (SUVmax 12.9) as well as a couple of separate smaller sclerotic lesions in the right iliac bone uptake (SUVmax 12.2).       INTERIM HISTORY:    - 6/14/2023- 6/15/2023 palliative radiation to left orbit follicular lymphoma 4Gy/2fx  - 6/15/2023 bone marrow biopsy  MORPHOLOGY:  - No morphologic or immunophenotypic evidence of follicular lymphoma  - Flow cytometry showing a small population of CD5 positive monotypic B cells; most compatible with monoclonal B lymphocytosis (There is no definitive morphologic correlate for this finding. There is no morphologic or immunophenotypic evidence of involvement by follicular lymphoma.   The CD5-positive B cell clone may best represent monoclonal B lymphocytosis, provided that bj SLL is excluded.)  - Normocellular marrow (cellularity estimated at 30%) with trilineage hematopoietic maturation and no increase in blasts  FLOW CYTOMETRY:  CD5 positive kappa-monotypic B cells (0.8%)  0.8% B cells which express CD5, CD19, CD20 (dim to negative) and monotypic kappa immunoglobulin light chains (dim) and lack CD10 and CD38. The B cells have similar forward scatter relative to  background T cells suggestive of similar size; however, precise size determination is deferred to morphology  CYTOGENETICS:  46,XX    -6/19/2023 CT-guided bone biopsy, right acetabulum  PATHOLOGY:  - Atypical lymphoid infiltrate in a suboptimal biopsy  - No histologic evidence of metastatic carcinoma  - Two small monoclonal B-cell populations detected by flow cytometry  This biopsy is suboptimal for evaluation due to fragmentation, crush artifact, and decalcification. In this limited biopsy, there are fragments of bone and marrow showing a mixed infiltrate that includes both B and T cells. Notably, 2 small clonal B-cell populations were detected by flow cytometry which resemble B-cell populations detected in this patient's past lymph node (1/20/23) and blood and bone marrow (2/16/23, 6/15/23) flow cytometry studies. A definitive morphologic correlate to these cell populations is not appreciated, and the overall features are insufficient for a diagnosis of lymphoma.    FLOW CYTOMETRY:  A. Pelvis, Right:  -CD5 positive kappa-monotypic B cells ( 2.8 %) - see comment A  -CD10 positive lambda-monotypic B cells ( 0.4 %) - see comment B      Electronically signed by Arnaldo Alvarado MD on 6/20/2023 at  1:31 PM   Comment     A) Clonal B cells with the immunophenotype of CLL/SLL are present in this sample.  The differential includes peripheral blood contamination (with an MBL), the tissue based correlate to MBL, versus involvement by SLL.  2.8% B cells which express CD5 (partial, dim), CD19, CD20 (dim to negative) and monotypic kappa immunoglobulin light chains (dim) and lack CD10. The B cells have similar forward scatter relative to background T cells suggestive of similar size; however, precise size determination is deferred to morphology.     B) A CD10-positive B cell clone is also present - involvement by the patient's previously diagnosed CD10+ B cell lymphoma is in the differential (follicular lymphoma versus  other (transformation to a higher grade lymphoma). 0.4% B cells which express CD10, CD19 (slightly dim), CD20, and monotypic lambda immunoglobulin light chains and lack CD5.  The B cells have increased forward scatter relative to background T cells suggestive of increased size; however, precise size determination is deferred to morphology.          Upcoming appts:  - 7/10/23 Rheumatology consult, ESR and CRP elevated, DEVON positive 1:160 speckled; PCP suspects pt may have PMR   - 7/23 severe b/l hip osteoarthritis, planned hip replacement   - 7/23 neurology movement d/o follow up- multifactorial gait problem w/orthopedic troubles/arthritis; cannot r/o underlying parkinsonism so will do carbidopa/levodopa challenge      REVIEW OF SYSTEMS:   A 14 point ROS was reviewed with pertinent positives and negatives in the HPI.       HOME MEDICATIONS:  Current Outpatient Medications   Medication Sig Dispense Refill     dorzolamide-timolol (COSOPT) 2-0.5 % ophthalmic solution Place 1 drop into both eyes 2 times daily       latanoprost (XALATAN) 0.005 % ophthalmic solution Place 1 drop into both eyes daily        losartan (COZAAR) 100 MG tablet Take 1 tablet (100 mg) by mouth daily 90 tablet 0     rosuvastatin (CRESTOR) 10 MG tablet TAKE 1 TABLET (10 MG) BY MOUTH DAILY. (Patient taking differently: Take 10 mg by mouth every 7 days) 90 tablet 3         ALLERGIES:  Allergies   Allergen Reactions     Compazine      Mental confusion     Hydrochlorothiazide      Dryness mouth     Prochlorperazine Visual Disturbance     Simvastatin Other (See Comments)     Muscle aches         PAST MEDICAL HISTORY:  Past Medical History:   Diagnosis Date     Breast cancer (H) 2000    Left breast, s/p mastectomy, chemotherapy and endocrine therapy     Follicular lymphoma (H) 01/20/2023     Glaucoma     bilateral - Dr. Moore - [facility]     Hyperlipidemia      Hypertension      Macular degeneration     [facility]. Tosc[facility] - [facility]     Osteoarthritis      Osteoporosis      Personal history of chemotherapy     A/C + Taxol     S/P radiation therapy     400 cGy to left orbit completed on 6/15/2023 [facility]         PAST SURGICAL HISTORY:  Past Surgical History:   Procedure Laterality Date     BIOPSY Left 2023    Left Orbital Biopsy     C MASTECTOMY,SIMPLE  2000    left     COLONOSCOPY  2006    Q 10 years     COLONOSCOPY N/A 10/04/2021    Procedure: COLONOSCOPY;  Surgeon: Guru Lyla Ruby MD;  Location: [facility] OR     ESOPHAGOSCOPY, GASTROSCOPY, DUODENOSCOPY (EGD), COMBINED N/A 10/04/2021    Procedure: ENDOSCOPIC ULTRASOUND, ESOPHAGOSCOPY / UPPER GASTROINTESTINAL TRACT (GI), Esophagogastroduodenoscopy,  Fine Needle Biopsy of liver;  Surgeon: Guru Lyla Ruby MD;  Location: [facility] OR     IR LYMPH NODE BIOPSY  2023     SURGICAL HISTORY OF -  Left 2015    tissue expander placed in left breast area     SURGICAL HISTORY OF -  Left 2016    left breast implant and right mammoplasty         SOCIAL HISTORY:  Social History     Socioeconomic History     Marital status: Single     Spouse name: Not on file     Number of children: 0     Years of education: Not on file     Highest education level: Not on file   Occupational History     Employer: [employer]     Occupation: REtired   Tobacco Use     Smoking status: Former     Packs/day: 1.00     Years: 30.00     Pack years: 30.00     Types: Cigarettes     Quit date: 3/15/1999     Years since quittin.3     Smokeless tobacco: Never   Vaping Use     Vaping Use: Not on file   Substance and Sexual Activity     Alcohol use: Not Currently     Alcohol/week: 4.0 standard drinks of alcohol     Types: 4 Glasses of wine per week     Drug use: No     Sexual activity: Not Currently     Birth control/protection: None   Other Topics Concern     Parent/sibling w/ CABG, MI or angioplasty before 65F 55M? Yes     Comment: Father heart attack    Social History Narrative     Not on file     Social Determinants of Health     Financial Resource Strain: Not on file   Food Insecurity: Not on file   Transportation Needs: Not on file   Physical Activity: Not on file   Stress: Not on file   Social Connections: Not on file   Intimate Partner Violence: Not on file   Housing Stability: Not on file       FAMILY HISTORY:  Family History   Problem Relation Age of Onset     Cancer Mother         bone cancer     Heart Disease Father      Coronary Artery Disease Father      Diabetes Father      Arthritis Sister      Breast Cancer Sister         age 75     Diabetes Maternal Grandmother      Diabetes Paternal Grandmother      Family history of cancer- sister breast cancer- dx at age 77 y/o, localized breast cancer, surgical resection, RT, no adjuvant chemo or endocrine therapy that pt is aware of     PHYSICAL EXAM:  Vital signs:  /72 (BP Location: Right arm)   Pulse 74   Temp 98.1  F (36.7  C) (Oral)   Resp 18   Wt 67 kg (147 lb 12.8 oz)   SpO2 96%   BMI 26.19 kg/m       GENERAL/CONSTITUTIONAL: No acute distress.  EYES: Pupils are equal and round. Extraocular movements intact without nystagmus.  No scleral icterus. Minimal ptosis left eye at site of surgery  RESPIRATORY: Equal chest rise.   MUSCULOSKELETAL: Warm and well-perfused, no cyanosis, clubbing, or edema.   NEUROLOGIC: Cranial nerves are grossly intact. Alert, oriented to person, place and time, answers questions appropriately.  INTEGUMENTARY: No rashes or jaundice.  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 No splenomegaly  - 1/23 brain MRI: Indeterminate ovoid T2 hyperintense enhancing mass in the region of the left lacrimal gland measuring approximately 2.4 cm AP by 1.4 cm transverse by 2.3 cm craniocaudal associated with/replacing the left lacrimal gland. Consider correlation with tissue sampling  - 1/23 CT soft tissue neck: Soft tissue nodule in the superficial left parotid gland measuring 1.2 cm. Additional 0.8 cm nodule in the deep portion of the left parotid gland. Soft tissue nodule in the superficial right parotid gland measuring 0.8 cm. These may represent benign or malignant parotid lesions. Multiplicity of lesions raises concern for Warthin tumors  - 1/27/23 note from Dr. Wilmar Morales- \"Based on this diagnosis, I think the 2 nodules in her parotid gland are likely atypical lymph nodes containing lymphoma.  She will probably require some staging imaging, such as PET scan and we can take a look at that as well.  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 CD5 stains T cells  - Ki-67 proliferative index is approximately 10%, also low in the follicles.  - flow cytometry \"CD10-positive population is consistent with a clonal B cell population and a CD10 positive B-cell lymphoma is favored.\"    - 4/6/23 left orbital mass/lacrimal gland excisional biopsy: PATHOLOGY: - Follicular Lymphoma, low grade ([facility] negative for BCL2 rearrangement). 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  The flow cytometry from 1/20/23 right external iliac LN biopsy showed in addition to CD10 positive B-cell population  also a partial/equivocal CD5 positive separate kappa monotypic B-cell population, which most likely represents monoclonal B-cell lymphocytosis of CLL/SLL type, since it was also documented in the peripheral blood flow cytometry on February 16, 2023 (case number UF ) at a low frequency.  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  - Cohort 2 which is described as negative for CD5, CD10, ,  and showing dim CD20 and dim kappa staining   Of note the population of monoclonal B-cell lymphocytosis found in peripheral blood in our department and in the lymph node biopsy in January showed partial/equivocal dim CD5 and may correspond to the cohort #2.     -5/23 PET/CT NCAP:   1. An ovoid mass " in the left lateral orbit measures 2.5 x 1.2 cm, slightly larger than 01/09/2023, and demonstrates marked uptake (SUVmax 9.3), consistent with biopsy-proven lymphoma  2. A few small FDG avid bilateral intraparotid nodules are present, one on the right and two on the left, including representative left parotid nodule measuring 0.9 x 1.4 cm with moderate uptake (SUVmax 6.7).  3.  Asymmetric prominent right posterior lower cervical node measures 0.8 cm associated with moderate focal uptake (SUVmax 5.1).  4. A single mildly enlarged left axillary lymph node measures 1.0 cm associated with mild uptake (SUVmax 3.0)  5. Some scattered FDG avid lymph nodes below the diaphragm involve the retroperitoneal retrocaval, bilateral common iliac, left pelvic sidewall and left inguinal regions. Some of these have increased slightly in size since 12/28/2022, such as a conglomerate left pelvic sidewall bj mass measuring approximately 2.2 x 4.1 cm (previously 2.2 x 3.4 cm) associated with marked uptake (SUVmax 7.6).  6. FDG avid sclerotic skeletal lesion involves the right superior acetabulum (SUVmax 12.9) as well as a couple of separate smaller sclerotic lesions in the right iliac bone uptake (SUVmax 12.2).     - 6/23 bone marrow biopsy:  MORPHOLOGY:  - No morphologic or immunophenotypic evidence of follicular lymphoma  - Flow cytometry showing a small population of CD5 positive monotypic B cells; most compatible with monoclonal B lymphocytosis (There is no definitive morphologic correlate for this finding. There is no morphologic or immunophenotypic evidence of involvement by follicular lymphoma.  The CD5-positive B cell clone may best represent monoclonal B lymphocytosis, provided that bj SLL is excluded.)  - Normocellular marrow (cellularity estimated at 30%) with trilineage hematopoietic maturation and no increase in blasts  FLOW CYTOMETRY:  CD5 positive kappa-monotypic B cells (0.8%)  0.8% B cells which express CD5, CD19,  CD20 (dim to negative) and monotypic kappa immunoglobulin light chains (dim) and lack CD10 and CD38. The B cells have similar forward scatter relative to background T cells suggestive of similar size; however, precise size determination is deferred to morphology  CYTOGENETICS:  46,XX    -6/19/2023 CT-guided bone biopsy, right acetabulum  PATHOLOGY:  - Atypical lymphoid infiltrate in a suboptimal biopsy  - No histologic evidence of metastatic carcinoma  - Two small monoclonal B-cell populations detected by flow cytometry: CD5 positive kappa-monotypic B cells ( 2.8 %, CD5 partial, dim, CD19 positive, CD30 dim to negative, CD10 negative); CD10 positive lambda-monotypic B cells ( 0.4 %, CD10 positive, CD19 slightly dim, CD20 positive, CD5 negative)     Staging and prognostication:  - Stage: 4 given right medial thigh SURINDER and left common iliac, external iliac, pelvic sidewall; biopsy proven left orbital mass involvement; there was some concern from ENT that patients parotid lesions were related to pts follicular lymphoma   - FLIPI: score 2 (age>60),  stage 3-4; intermediate risk  - pertinent labs: 12/22  and 5/23 ; 2/23 and 5/23 beta 2 microglobulin 1.7, hepatitis B and C negative    Treatment:  - GELF criteria to indicate treatment (involvement of 3 or more bj sites each with a diameter greater than or equal to 3 cm, any bj or extranodal tumor mass with a diameter of greater than or equal to 7 cm, B symptoms, splenomegaly, pleural effusions, peritoneal ascites, clinically progressive or significant cytopenias, leukemia, symptoms, threatened end organ function, clinically significant bulky disease, steady or rapid progression of disease)  - option of systemic treatment including rituximab discussed previously on multiple occassions particularly w/left orbital involvement, see my previous notes for details  - 6/14/2023- 6/15/2023 palliative radiation to left orbit follicular lymphoma  4Gy/2fx    PLAN:  - bone marrow biopsy negative for lymphoma and right acetabulum bone biopsy atypical lymphoid infiltrate seen but biopsy suboptimal and flow cytometry shows 2.8% CD5 positive kappa monotypic B cells consistent w/monoclonal B cell lymphocytosis and 0.4% CD10 positive lambda-monotypic B cells consistent w/lymphoma- ie follicular vs other  - systemic tx including rituximab discussed on multiple prior visit, held as detailed in my previous notes   - pt completed palliative RT to left orbit  - pt has upcoming left hip replacement 7/23 (I have discussed the above w/Dr. 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   - 1/23 right external iliac LN core needle biopsy- FLOW CYTOMETRY: 1.3% rare monoclonal B cell population CD5 equivocal, CD23 negative, CD 79b negative, possible MBL; no morphological correlate  -2/23 peripheral blood- FLOW CYTOMETRY: 2.3% kappa monoclonal B cell population, CD5 positive, CD38 negative, CD49d negative, possible MBL  - 4/6/23 left orbital mass/lacrimal gland excisional biopsy: FLOW CYTOMETRY: 15.1% kappa monotypic B cell population, bright positive: CD45, CD19, CD22, moderately positive kappa, CD20, CD79b, dim positive CD23, negative CD 2,3,103, lambda, 5, 10, 200, 38, 43  - 6/23 bone marrow biopsy: FLOW CYTOMETRY: 0.8% kappa monotypic B cell population, positive for CD5, CD19, dim to negative CD20, negative for CD38 and CD10  - 6/23 right acetabulum bone biopsy: FLOW CYTOMETRY: 2.8% kappa monotypic B cell population, CD5 partial dim, CD 19 positive, CD20 dim negative, CD10 negative, possible MBL    - of note pt does not have leukocytosis or lymphocytosis, ALC consistently <5000, no splenomegaly, LN biopsy did not demonstrate IHC consistent with SLL  - 5/23 PET/CT as above    PLAN:  - pt likely has monoclonal B cell lymphocytosis,  though cannot definitively rule out presence of SLL as pt  has multiple lymph nodes, though at least the external iliac and lacrimal gland regions were biopsied and consistent with low grade follicular lymphoma. Bone marrow biopsy negative for involvement of lymphoma  - will monitor ALC q3 months and PET if needed    #History of LEFT breast IDC ER positive, [place] positive, HER2 positive on FISH  -Diagnosed at age 49  - 3/2000 left breast lumpectomy and left axillary node dissection (IDC, grade 3, 1.45 cm incompletely excised tumor, DCIS, positive margins with infiltrating carcinoma, 1 of 17 lymph nodes positive (0.5 cm metastatic focus), ER positive, [place] positive, HER2 positive).    -4/2020 left breast simple mastectomy with no residual carcinoma.   -S/p ACx4, taxol x4, no anti-her2 treatment, no RT.   -S/p 5 years adjuvant endocrine therapy (tamoxifen and anastrozole).  -Delayed left breast reconstruction and right breast augmentation.     PLAN:   - 5/23 PET/CT notes single enlarged left axillary lymph node with SUV 3 in the setting of known lymphoma and breast tumor markers normal   - right breast mammogram and left axillary US ordered today     #Stable sclerotic lesions in left sacrum and left posterior ilium  -10/21 mammogram, endoscopy with EUS and colonoscopy WNL  -12/22 CT chest abdomen pelvis shows new or increased left common iliac, external iliac, pelvic sidewall lymphadenopathy and sclerotic lesions in left sacrum and left posterior ilium.  No metastatic disease in chest.  No abnormal findings in breast.  - 12/22 tumor markers: CA 15-3 24,  29, CEA 3.1, CA 19-9 4, AFP 5.9,   - 1/2023 MRI brain w/wo contrast: no metastases   - 1/2023 right external iliac LN core need biopsy- no metastatic carcinoma   - 5/23 PET avid bone lesions  -6/19/2023 CT-guided bone biopsy, right acetabulum  PATHOLOGY:  - Atypical lymphoid infiltrate in a suboptimal biopsy  - No histologic evidence of metastatic carcinoma  -  Two small monoclonal B-cell populations detected by flow cytometry: CD5 positive kappa-monotypic B cells ( 2.8 %, CD5 partial, dim, CD19 positive, CD30 dim to negative, CD10 negative); CD10 positive lambda-monotypic B cells ( 0.4 %, CD10 positive, CD19 slightly dim, CD20 positive, CD5 negative)     PLAN:   - no proven malignancy on bone biopsy  - monitor     RTC 3 months for follow up with me, labs prior to appt      Arlene Ahumada DO  Hematology/Oncology  [facility]        Again, thank you for allowing me to participate in the care of your patient.        Sincerely,        ARLENE AHUMADA DO

## 2023-06-29 NOTE — NURSING NOTE
"Oncology Rooming Note    June 29, 2023 3:17 PM   Virginia Byers is a 72 year old female who presents for:    Chief Complaint   Patient presents with     Oncology Clinic Visit     Follow up     Initial Vitals: /72 (BP Location: Right arm)   Pulse 74   Temp 98.1  F (36.7  C) (Oral)   Resp 18   Wt 67 kg (147 lb 12.8 oz)   SpO2 96%   BMI 26.19 kg/m   Estimated body mass index is 26.19 kg/m  as calculated from the following:    Height as of 6/2/23: 1.6 m (5' 2.99\").    Weight as of this encounter: 67 kg (147 lb 12.8 oz). Body surface area is 1.73 meters squared.  No Pain (1) Comment: Data Unavailable   No LMP recorded. Patient is postmenopausal.  Allergies reviewed: Yes  Medications reviewed: Yes    Medications: Medication refills not needed today.  Pharmacy name entered into [employer]: [id number] - [place], [place] - [address]    Clinical concerns: results       Eri Garber LPN            "

## 2023-07-05 ENCOUNTER — OFFICE VISIT (OUTPATIENT)
Dept: FAMILY MEDICINE | Facility: CLINIC | Age: 73
End: 2023-07-05
Payer: MEDICARE

## 2023-07-05 VITALS
BODY MASS INDEX: 26.08 KG/M2 | DIASTOLIC BLOOD PRESSURE: 71 MMHG | OXYGEN SATURATION: 100 % | WEIGHT: 147.2 LBS | SYSTOLIC BLOOD PRESSURE: 130 MMHG | TEMPERATURE: 97.4 F | HEART RATE: 73 BPM

## 2023-07-05 DIAGNOSIS — I10 HYPERTENSION GOAL BP (BLOOD PRESSURE) < 140/90: ICD-10-CM

## 2023-07-05 DIAGNOSIS — C82.90 FOLLICULAR LYMPHOMA, UNSPECIFIED FOLLICULAR LYMPHOMA TYPE, UNSPECIFIED BODY REGION (H): ICD-10-CM

## 2023-07-05 DIAGNOSIS — M48.061 SPINAL STENOSIS OF LUMBAR REGION, UNSPECIFIED WHETHER NEUROGENIC CLAUDICATION PRESENT: ICD-10-CM

## 2023-07-05 DIAGNOSIS — Z01.818 PREOP GENERAL PHYSICAL EXAM: Primary | ICD-10-CM

## 2023-07-05 DIAGNOSIS — M51.369 DDD (DEGENERATIVE DISC DISEASE), LUMBAR: ICD-10-CM

## 2023-07-05 DIAGNOSIS — Z85.3 PERSONAL HISTORY OF MALIGNANT NEOPLASM OF BREAST: ICD-10-CM

## 2023-07-05 DIAGNOSIS — E78.5 HYPERLIPIDEMIA LDL GOAL <160: ICD-10-CM

## 2023-07-05 PROBLEM — M35.3 PMR (POLYMYALGIA RHEUMATICA) (H): Status: RESOLVED | Noted: 2023-04-03 | Resolved: 2023-07-05

## 2023-07-05 LAB
HGB BLD-MCNC: 12.7 G/DL (ref 11.7–15.7)
POTASSIUM SERPL-SCNC: 4.9 MMOL/L (ref 3.4–5.3)

## 2023-07-05 PROCEDURE — 85018 HEMOGLOBIN: CPT | Performed by: FAMILY MEDICINE

## 2023-07-05 PROCEDURE — 84132 ASSAY OF SERUM POTASSIUM: CPT | Performed by: FAMILY MEDICINE

## 2023-07-05 PROCEDURE — 36415 COLL VENOUS BLD VENIPUNCTURE: CPT | Performed by: FAMILY MEDICINE

## 2023-07-05 PROCEDURE — 99214 OFFICE O/P EST MOD 30 MIN: CPT | Performed by: FAMILY MEDICINE

## 2023-07-05 RX ORDER — ACETAMINOPHEN 500 MG
500 TABLET ORAL EVERY 6 HOURS PRN
Status: ON HOLD | COMMUNITY
End: 2023-07-12

## 2023-07-05 ASSESSMENT — PAIN SCALES - GENERAL: PAINLEVEL: SEVERE PAIN (7)

## 2023-07-05 NOTE — PROGRESS NOTES
[facility]  [address]  [place] [address]  Phone: [phone]  Primary Provider: Chuyita Ahumada  Pre-op Performing Provider: CHUYITA AHUMADA      PREOPERATIVE EVALUATION:  Today's date: 7/5/2023    Virginia Byers is a 72 year old female who presents for a preoperative evaluation.      7/5/2023     3:03 PM   Additional Questions   Roomed by Barbie     Surgical Information:  Surgery/Procedure: ARTHROPLASTY, HIP, TOTAL LEFT  Surgery Location: [facility]  Surgeon: Dr. Austin Blood  Surgery Date: July 11, 2023  Time of Surgery: 12:50 pm  Where patient plans to recover: At home with family  Fax number for surgical facility: Note does not need to be faxed, will be available electronically in Epic.  Past medical history, family history, medications and social history reviewed today and updated in EPIC.      Assessment & Plan     The proposed surgical procedure is considered INTERMEDIATE risk.    Preop general physical exam    - Hemoglobin; Future  - Potassium; Future  - Hemoglobin  - Potassium    Hyperlipidemia LDL goal <160  Stable     Hypertension goal BP (blood pressure) < 140/90  Stable     DDD (degenerative disc disease), lumbar      Spinal stenosis of lumbar region, unspecified whether neurogenic claudication present    Follicular Lymphoma and History of Breast cancer-sees Oncologist  Notes reviewed in epic              - No identified additional risk factors other than previously addressed    Antiplatelet or Anticoagulation Medication Instructions:   - Patient is on no antiplatelet or anticoagulation medications.    Additional Medication Instructions:  Patient is to take all scheduled medications on the day of surgery EXCEPT for modifications listed below:   - ACE/ARB: HOLD on day of surgery (minimum 11 hours for general anesthesia).   - Statins: Continue taking on the day of surgery.     RECOMMENDATION:  APPROVAL GIVEN to proceed with proposed procedure, without  further diagnostic evaluation.  Subjective       HPI related to upcoming procedure: pt is scheduled for above surgery  She has DJD        7/5/2023     9:39 AM   Preop Questions   1. Have you ever had a heart attack or stroke? No   2. Have you ever had surgery on your heart or blood vessels, such as a stent placement, a coronary artery bypass, or surgery on an artery in your head, neck, heart, or legs? No   3. Do you have chest pain with activity? No   4. Do you have a history of  heart failure? No   5. Do you currently have a cold, bronchitis or symptoms of other infection? No   6. Do you have a cough, shortness of breath, or wheezing? No   7. Do you or anyone in your family have previous history of blood clots? UNKNOWN -none per pt   8. Do you or does anyone in your family have a serious bleeding problem such as prolonged bleeding following surgeries or cuts? UNKNOWN - none per pt   9. Have you ever had problems with anemia or been told to take iron pills? UNKNOWN - pt has had Problems with anemia   10. Have you had any abnormal blood loss such as black, tarry or bloody stools, or abnormal vaginal bleeding? No   11. Have you ever had a blood transfusion? No   12. Are you willing to have a blood transfusion if it is medically needed before, during, or after your surgery? Yes   13. Have you or any of your relatives ever had problems with anesthesia? No   14. Do you have sleep apnea, excessive snoring or daytime drowsiness? No   15. Do you have any artifical heart valves or other implanted medical devices like a pacemaker, defibrillator, or continuous glucose monitor? No   16. Do you have artificial joints? No   17. Are you allergic to latex? No       Health Care Directive:  Patient has a Health Care Directive on file      Preoperative Review of :   reviewed - no record of controlled substances prescribed.      Status of Chronic Conditions:  HYPERLIPIDEMIA - Patient has a long history of significant  Hyperlipidemia requiring medication for treatment with recent good control. Patient reports no problems or side effects with the medication.     HYPERTENSION - Patient has longstanding history of HTN , currently denies any symptoms referable to elevated blood pressure. Specifically denies chest pain, palpitations, dyspnea, orthopnea, PND or peripheral edema. Blood pressure readings have been in normal range. Current medication regimen is as listed below. Patient denies any side effects of medication.       Review of Systems  CONSTITUTIONAL: NEGATIVE for fever, chills, change in weight  INTEGUMENTARY/SKIN: NEGATIVE for worrisome rashes, moles or lesions  EYES: NEGATIVE for vision changes or irritation  ENT/MOUTH: NEGATIVE for ear, mouth and throat problems  RESP: NEGATIVE for significant cough or SOB  CV: NEGATIVE for chest pain, palpitations or peripheral edema  GI: NEGATIVE for nausea, abdominal pain, heartburn, or change in bowel habits  : NEGATIVE for frequency, dysuria, or hematuria  MUSCULOSKELETAL:DJD  NEURO: NEGATIVE for weakness, dizziness or paresthesias  ENDOCRINE: NEGATIVE for temperature intolerance, skin/hair changes  HEME: NEGATIVE for bleeding problems  PSYCHIATRIC: NEGATIVE for changes in mood or affect    Patient Active Problem List    Diagnosis Date Noted     PMR (polymyalgia rheumatica) (H) 04/03/2023     Priority: Medium     Arthralgia, unspecified joint 04/03/2023     Priority: Medium     Follicular lymphoma, unspecified follicular lymphoma type, unspecified body region (H) 04/03/2023     Priority: Medium     Anemia, unspecified type 09/20/2021     Priority: Medium     Dry mouth 09/20/2021     Priority: Medium     Spinal stenosis of lumbar region, unspecified whether neurogenic claudication present 06/02/2021     Priority: Medium     DDD (degenerative disc disease), lumbar 06/02/2021     Priority: Medium     Lumbago 07/02/2019     Priority: Medium     Pain in both lower legs 07/02/2019      Priority: Medium     Hypertension goal BP (blood pressure) < 140/90 04/22/2019     Priority: Medium     Personal history of malignant neoplasm of breast 03/28/2016     Priority: Medium     2000       Family history of diabetes mellitus 03/19/2012     Priority: Medium     Glaucoma 03/19/2012     Priority: Medium     Osteoarthritis 01/23/2012     Priority: Medium     Hyperlipidemia LDL goal <160 03/16/2011     Priority: Medium     S/P mastectomy 03/16/2011     Priority: Medium     Osteoporosis 03/15/2010     Priority: Medium     Atopic rhinitis 03/15/2010     Priority: Medium     (Problem list name updated by automated process. Provider to review and confirm.)        Past Medical History:   Diagnosis Date     Breast cancer (H) 2000    Left breast, s/p mastectomy, chemotherapy and endocrine therapy     Follicular lymphoma (H) 01/20/2023     Glaucoma     bilateral - Dr. Moore - [facility]     Hyperlipidemia      Hypertension      Macular degeneration     Dr. Toscano - [facility]     Osteoarthritis      Osteoporosis      Personal history of chemotherapy 2000    A/C + Taxol     S/P radiation therapy     400 cGy to left orbit completed on 6/15/2023 [facility]     Past Surgical History:   Procedure Laterality Date     BIOPSY Left 04/06/2023    Left Orbital Biopsy     C MASTECTOMY,SIMPLE  03/2000    left     COLONOSCOPY  2006    Q 10 years     COLONOSCOPY N/A 10/04/2021    Procedure: COLONOSCOPY;  Surgeon: Guru Lyla Ruby MD;  Location:  OR     ESOPHAGOSCOPY, GASTROSCOPY, DUODENOSCOPY (EGD), COMBINED N/A 10/04/2021    Procedure: ENDOSCOPIC ULTRASOUND, ESOPHAGOSCOPY / UPPER GASTROINTESTINAL TRACT (GI), Esophagogastroduodenoscopy,  Fine Needle Biopsy of liver;  Surgeon: Guru Lyla Ruby MD;  Location: [facility] OR     IR LYMPH NODE BIOPSY  01/20/2023     SURGICAL HISTORY OF -  Left 08/2015    tissue expander placed in left  breast area     SURGICAL HISTORY OF -  Left 2016    left breast implant and right mammoplasty     Current Outpatient Medications   Medication Sig Dispense Refill     acetaminophen (TYLENOL) 500 MG tablet Take 500 mg by mouth every 6 hours as needed for mild pain       dorzolamide-timolol (COSOPT) 2-0.5 % ophthalmic solution Place 1 drop into both eyes 2 times daily       latanoprost (XALATAN) 0.005 % ophthalmic solution Place 1 drop into both eyes daily        losartan (COZAAR) 100 MG tablet Take 1 tablet (100 mg) by mouth daily 90 tablet 0     Multiple Vitamins-Minerals (ICAPS AREDS 2 PO)        rosuvastatin (CRESTOR) 10 MG tablet TAKE 1 TABLET (10 MG) BY MOUTH DAILY. (Patient taking differently: Take 10 mg by mouth every 7 days) 90 tablet 3       Allergies   Allergen Reactions     Compazine      Mental confusion     Hydrochlorothiazide      Dryness mouth     Prochlorperazine Visual Disturbance     Simvastatin Other (See Comments)     Muscle aches        Social History     Tobacco Use     Smoking status: Former     Packs/day: 1.00     Years: 17.00     Pack years: 17.00     Types: Cigarettes     Quit date: 3/15/1999     Years since quittin.3     Smokeless tobacco: Never   Substance Use Topics     Alcohol use: Not Currently     Alcohol/week: 4.0 standard drinks of alcohol     Types: 4 Glasses of wine per week     Family History   Problem Relation Age of Onset     Cancer Mother         bone cancer     Heart Disease Father      Coronary Artery Disease Father      Diabetes Father      Arthritis Sister      Breast Cancer Sister         age 75     Diabetes Maternal Grandmother      Diabetes Maternal Grandfather      Diabetes Paternal Grandmother      History   Drug Use No         Objective     /71   Pulse 73   Temp 97.4  F (36.3  C) (Temporal)   Wt 66.8 kg (147 lb 3.2 oz)   SpO2 100%   BMI 26.08 kg/m      Physical Exam    GENERAL APPEARANCE: healthy, alert and no distress     EYES: EOMI, PERRL     HENT:  ear canals and TM's normal and nose and mouth without ulcers or lesions     NECK: no adenopathy, no asymmetry, masses, or scars and thyroid normal to palpation     RESP: lungs clear to auscultation - no rales, rhonchi or wheezes     CV: regular rates and rhythm, normal S1 S2, no S3 or S4 and no murmur, click or rub     ABDOMEN:  soft, nontender, no HSM or masses and bowel sounds normal     MS: extremities normal- no gross deformities noted, no evidence of inflammation in joints, FROM in all extremities.     SKIN: no suspicious lesions or rashes     NEURO: Normal strength and tone, sensory exam grossly normal, mentation intact and speech normal     PSYCH: mentation appears normal. and affect normal/bright     LYMPHATICS: No cervical adenopathy    Recent Labs   Lab Test 06/19/23  1220 06/19/23  1148 06/15/23  0914 05/11/23  0938 03/27/23  1109 01/20/23  0937 12/28/22  1254   HGB  --  13.0 12.2 14.4 14.4  --  12.9   PLT  --  229 288 357  --   --  422   INR 1.2*  --   --   --   --  1.09  --    NA  --   --   --  136  --   --  140   POTASSIUM  --   --   --  4.2 3.9  --  4.8   CR  --   --   --  0.50*  --   --  0.43*        Diagnostics:  Labs pending at this time.  Results will be reviewed when available.   EKG: done January 2023-see epic    Revised Cardiac Risk Index (RCRI):  The patient has the following serious cardiovascular risks for perioperative complications:   - No serious cardiac risks = 0 points     RCRI Interpretation: 0 points: Class I (very low risk - 0.4% complication rate)           Signed Electronically by: Chuyita Zaidi MD  Copy of this evaluation report is provided to requesting physician.

## 2023-07-06 ENCOUNTER — ANCILLARY ORDERS (OUTPATIENT)
Dept: ONCOLOGY | Facility: CLINIC | Age: 73
End: 2023-07-06

## 2023-07-06 DIAGNOSIS — R59.1 GENERALIZED ENLARGED LYMPH NODES: ICD-10-CM

## 2023-07-06 DIAGNOSIS — Z85.3 HISTORY OF BREAST CANCER IN ADULTHOOD: ICD-10-CM

## 2023-07-07 DIAGNOSIS — I10 HYPERTENSION GOAL BP (BLOOD PRESSURE) < 140/90: ICD-10-CM

## 2023-07-07 RX ORDER — LOSARTAN POTASSIUM 100 MG/1
TABLET ORAL
Qty: 90 TABLET | Refills: 0 | Status: SHIPPED | OUTPATIENT
Start: 2023-07-07 | End: 2023-09-26

## 2023-07-10 ENCOUNTER — OFFICE VISIT (OUTPATIENT)
Dept: RHEUMATOLOGY | Facility: CLINIC | Age: 73
End: 2023-07-10
Attending: PREVENTIVE MEDICINE
Payer: MEDICARE

## 2023-07-10 VITALS
SYSTOLIC BLOOD PRESSURE: 147 MMHG | DIASTOLIC BLOOD PRESSURE: 67 MMHG | HEART RATE: 76 BPM | OXYGEN SATURATION: 98 % | BODY MASS INDEX: 25.9 KG/M2 | WEIGHT: 146.2 LBS

## 2023-07-10 DIAGNOSIS — M54.50 CHRONIC MIDLINE LOW BACK PAIN WITHOUT SCIATICA: ICD-10-CM

## 2023-07-10 DIAGNOSIS — M25.552 LEFT HIP PAIN: ICD-10-CM

## 2023-07-10 DIAGNOSIS — M47.816 LUMBAR SPONDYLOSIS: ICD-10-CM

## 2023-07-10 DIAGNOSIS — G89.29 CHRONIC MIDLINE LOW BACK PAIN WITHOUT SCIATICA: ICD-10-CM

## 2023-07-10 PROCEDURE — 99203 OFFICE O/P NEW LOW 30 MIN: CPT | Performed by: INTERNAL MEDICINE

## 2023-07-10 NOTE — PATIENT INSTRUCTIONS
RHEUMATOLOGY    [facility]  [address] [place] [address]  [place] [address]    Phone number: [phone]  Fax number: [phone]    If you need a medication refill, please contact us as you may need lab work and/or a follow up visit prior to your refill.      Thank you for choosing [facility]!    Fatemeh Mcdonald CMA Rheumatology

## 2023-07-10 NOTE — PROGRESS NOTES
Rheumatology Clinic Visit      Virginia Byers MRN# [id number]   YOB: 1950 Age: 72 year old      Date of visit: 7/10/23   Referring provider: Dr. Sami Murphy  PCP: Dr. Chuyita Zaidi    Chief Complaint   Patient presents with:  Consult: Chronic low back pain and left hip pain. Having hip surgery done Tuesday 07/11/2023    Assessment and Plan     1. Chronic low back pain / DDD of the lumbar spine; bilateral hip pain / osteoarthritis; DEVON 1:160 speckled, Dry mouth but no dry eyes: degenerative hip and low back symptoms at this time.  Symptoms are not suggestive of GCA or PMR.  No tongue or jaw claudication; no scalp tenderness; no vision change/loss.  Able to raise arms above head and stand up from a chair unassisted.  No serositis, thromboembolic event, photosensitivity, photophobia, orals/nasal sore, or inflammatory arthritis; does have mild dry mouth but no dry eyes.  Overall low suspicion for an DEVON-associated rheumatologic disorder, but possible that the dry mouth is associated; SSA negative previously and could check SSB and consider minor salivary gland biopsy but because she says that the dry mouth is well controlled and therefore does not see a benefit for additional testing at this time. Agree with plan for total hip arthoplasty.  I suspect that the bilateral hip OA is altering gait that then results in worsening of low back pain.  No further rheumatology workup needed at this time.     .2. Elevated blood pressure:  Virginia to follow up with Primary Care provider regarding elevated blood pressure.     Total minutes spent in evaluation with patient, documentation, , and review of pertinent studies and chart notes: 42     Ms. Byers verbalized agreement with and understanding of the rational for the diagnosis and treatment plan.  All questions were answered to best of my ability and the patient's satisfaction. Ms. Byers was advised to contact the clinic with any questions that  may arise after the clinic visit.    Thank you for involving me in the care of the patient    Return to clinic: DARLYN YUN   Virginia Byers is a 72 year old female with a past medical history significant for hypertension, hyperlipidemia, dry mouth, spinal stenosis of the lumbar region, DDD of the lumbar spine, bilateral hip osteoarthritis, and follicular lymphoma who presents for initial rheumatology evaluation of joint pain.     Today, 7/10/2023: patient reports L>R hip pain with radiation to the groin and down the leg; pain is worse with activity and improves with rest.  Reports having seen 2 spine surgeons who she says indicate that the pain is due to the bilateral hip OA.  Has seen orthopedics and is planning to have left TACOS tomorrow, and possible right TACOS in a few months.  Bilateral hip pain has been significantly worse this past 1 year and is now needing to use a 4 wheel walker to assist with ambulation.  Since using the 4 wheel walker she has had mild pain at the base of the thumbs and she attributes to leaning on the 4 wheel walker; no swelling at the 1st CMC joints and pain is improved when she is not leaning on the 4 wheel walker.  Has morning stiffness all day; no change with time or activity.  Macular degeneration. Glaucoma.  No difficulty raising arms above her head.  Was given prednisone in April for a dx of PMR but she never took it because she was due for a procedure and didn't want the prednisone to interfere.  Lymphoma that has not been treated; is being monitored by oncology.  Dry mouth treated successfully with frequent sips of water. States that she has had much dental work in the past with crowns and caps now, but no worsening dentition recently.  No dry eyes. Denies fevers, chills, nausea, vomiting, constipation, diarrhea. No abdominal pain. No chest pain/pressure, palpitations, or shortness of breath. No LE swelling. No neck pain. No oral or nasal sores.  No rash.  No photosensitivity  or photophobia. No eye pain or redness. No history of inflammatory eye or bowel disease.  No history of DVT, pulmonary embolism, or miscarriage.   No history of serositis.  No history of Raynaud's Phenomenon.  No known seizure disorder.  No known renal disorder.  No new headache. No vision change or loss.  No scalp tenderness.  No jaw or tongue claudication    Tobacco: former cigarette smoker  EtOH: none  Drugs: none    ROS   12 point review of system was completed and negative except as noted in the HPI     Active Problem List     Patient Active Problem List   Diagnosis     Osteoporosis     Atopic rhinitis     Hyperlipidemia LDL goal <160     S/P mastectomy     Osteoarthritis     Family history of diabetes mellitus     Glaucoma     Personal history of malignant neoplasm of breast     Hypertension goal BP (blood pressure) < 140/90     Lumbago     Pain in both lower legs     Spinal stenosis of lumbar region, unspecified whether neurogenic claudication present     DDD (degenerative disc disease), lumbar     Anemia, unspecified type     Dry mouth     Arthralgia, unspecified joint     Follicular lymphoma, unspecified follicular lymphoma type, unspecified body region (H)     Past Medical History     Past Medical History:   Diagnosis Date     Breast cancer (H) 2000    Left breast, s/p mastectomy, chemotherapy and endocrine therapy     Follicular lymphoma (H) 01/20/2023     Glaucoma     bilateral - Dr. Moore - [facility]     Hyperlipidemia      Hypertension      Macular degeneration     Dr. Toscano - [facility]     Osteoarthritis      Osteoporosis      Personal history of chemotherapy 2000    A/C + Taxol     S/P radiation therapy     400 cGy to left orbit completed on 6/15/2023 - [facility]     Past Surgical History     Past Surgical History:   Procedure Laterality Date     BIOPSY Left 04/06/2023    Left Orbital Biopsy     C MASTECTOMY,SIMPLE  03/2000    left      COLONOSCOPY  2006    Q 10 years     COLONOSCOPY N/A 10/04/2021    Procedure: COLONOSCOPY;  Surgeon: Guru Lyla Ruby MD;  Location: [facility] OR     ESOPHAGOSCOPY, GASTROSCOPY, DUODENOSCOPY (EGD), COMBINED N/A 10/04/2021    Procedure: ENDOSCOPIC ULTRASOUND, ESOPHAGOSCOPY / UPPER GASTROINTESTINAL TRACT (GI), Esophagogastroduodenoscopy,  Fine Needle Biopsy of liver;  Surgeon: Guru Lyla Ruby MD;  Location: [facility] OR     IR LYMPH NODE BIOPSY  01/20/2023     SURGICAL HISTORY OF -  Left 08/2015    tissue expander placed in left breast area     SURGICAL HISTORY OF -  Left 02/2016    left breast implant and right mammoplasty     Allergy     Allergies   Allergen Reactions     Compazine      Mental confusion     Hydrochlorothiazide      Dryness mouth     Prochlorperazine Visual Disturbance     Simvastatin Other (See Comments)     Muscle aches     Current Medication List     Current Outpatient Medications   Medication Sig     acetaminophen (TYLENOL) 500 MG tablet Take 500 mg by mouth every 6 hours as needed for mild pain     dorzolamide-timolol (COSOPT) 2-0.5 % ophthalmic solution Place 1 drop into both eyes 2 times daily     latanoprost (XALATAN) 0.005 % ophthalmic solution Place 1 drop into both eyes At Bedtime     losartan (COZAAR) 100 MG tablet TAKE 1 TABLET BY MOUTH EVERY DAY     rosuvastatin (CRESTOR) 10 MG tablet TAKE 1 TABLET (10 MG) BY MOUTH DAILY.     Multiple Vitamins-Minerals (ICAPS AREDS 2 PO)      No current facility-administered medications for this visit.     Social History   See HPI    Family History     Family History   Problem Relation Age of Onset     Cancer Mother         bone cancer     Heart Disease Father      Coronary Artery Disease Father      Diabetes Father      Arthritis Sister      Breast Cancer Sister         age 75     Diabetes Maternal Grandmother      Diabetes Maternal Grandfather      Diabetes Paternal Grandmother      Sister: RA    Physical Exam     Temp  "Readings from Last 3 Encounters:   07/05/23 97.4  F (36.3  C) (Temporal)   06/29/23 98.1  F (36.7  C) (Oral)   06/19/23 98  F (36.7  C)     BP Readings from Last 5 Encounters:   07/10/23 (!) 147/67   07/05/23 130/71   06/29/23 133/72   06/19/23 139/55   06/15/23 139/68     Pulse Readings from Last 1 Encounters:   07/10/23 76     Resp Readings from Last 1 Encounters:   06/29/23 18     Estimated body mass index is 25.9 kg/m  as calculated from the following:    Height as of 6/2/23: 1.6 m (5' 2.99\").    Weight as of this encounter: 66.3 kg (146 lb 3.2 oz).      GEN: NAD.   HEENT:  Anicteric, noninjected sclera. No obvious external lesions of the ear and nose. Hearing intact.  CV: S1, S2. RRR. No m/r/g  PULM: No increased work of breathing. CTA bilaterally   MSK: MCPs, PIPs, DIPs without swelling or tenderness to palpation.  Wrists without swelling or tenderness to palpation.  Elbows and shoulders without swelling or tenderness to palpation.  Knees, ankles, and MTPs without swelling or tenderness to palpation.  Able to stand up unassisted from a chair.  Able to raise hands above head without difficulty.   SKIN: No rash or jaundice seen  PSYCH: Alert. Appropriate.       Labs / Imaging (select studies)     RF/CCP  Recent Labs   Lab Test 03/31/23  1131 03/27/23  1109 09/20/21  0800   CCPIGG  --  0.8  --    RHF <7  --  12*     DEVON  Recent Labs   Lab Test 03/31/23  1132 10/14/21  1252 09/20/21  0800   CJ Positive* Borderline Positive* Borderline Positive*   ANAP1 Speckled Speckled Speckled   ANAT1 1:160 1:40 1:80     RNP/Sm/SSA/SSB  Recent Labs   Lab Test 09/20/21  0800   SSAIGG Negative   SCLIGG Negative     CBC  Recent Labs   Lab Test 07/05/23  1542 06/19/23  1148 06/15/23  0914 05/11/23  0938 03/27/23  1109 12/28/22  1254   WBC  --  7.7 8.7 10.0  --  10.8   RBC  --  4.72 4.40 5.19  --  4.80   HGB 12.7 13.0 12.2 14.4   < > 12.9   HCT  --  41.8 37.9 44.3  --  40.9   MCV  --  89 86 85  --  85   RDW  --  15.1* 14.9 15.2*  " --  14.3   PLT  --  229 288 357  --  422   MCH  --  27.5 27.7 27.7  --  26.9   MCHC  --  31.1* 32.2 32.5  --  31.5   NEUTROPHIL  --   --  71 71  --  69   LYMPH  --   --  19 19  --  21   MONOCYTE  --   --  7 7  --  7   EOSINOPHIL  --   --  2 2  --  2   BASOPHIL  --   --  1 1  --  1   ANEUTAUTO  --   --  6.1 7.0  --  7.6   ALYMPAUTO  --   --  1.7 1.9  --  2.2   AMONOAUTO  --   --  0.6 0.7  --  0.7   AEOSAUTO  --   --  0.2 0.2  --  0.2   ABSBASO  --   --  0.1 0.1  --  0.1    < > = values in this interval not displayed.     CMP  Recent Labs   Lab Test 07/05/23  1542 05/11/23  0938 03/27/23  1109 12/28/22  1254 09/21/22  1256 12/14/21  1634 10/14/21  1252 10/04/21  0749 09/30/21  1311 09/20/21  0800 08/12/21  1158 08/12/20  0824 04/22/19  0835 04/08/19  0849 04/04/18  1018 03/29/17  0842   NA  --  136  --  140 137 134  --   --   --  132*   < > 138  --  137 138 138   POTASSIUM 4.9 4.2 3.9 4.8 4.2 3.9  --   --    < > 4.5   < > 4.1   < > 3.8 4.3 4.2   CHLORIDE  --  103  --  106 105 100  --   --   --  95   < > 102  --  104 103 103   CO2  --  27  --  30 23 29  --   --   --  29   < > 30  --  23 31 29   ANIONGAP  --  6  --  4 9 5  --   --   --  8   < > 6  --  10 4 6   GLC  --  118*  --  111* 88 91  --  123*  --  114*   < > 103*  --  116* 106* 108*   BUN  --  21  --  14 20 20  --   --   --  16   < > 14  --  15 16 16   CR  --  0.50*  --  0.43* 0.53 0.41*  --   --   --  0.56   < > 0.63  --  0.54 0.65 0.62   GFRESTIMATED  --  >90  --  >90 >90 >90  --   --   --  >90   < > >90  --  >90 >90 >90  Non African American GFR Calc     GFRESTBLACK  --   --   --   --   --   --   --   --   --   --   --  >90  --  >90 >90 >90  African American GFR Calc     COURTNEY  --  10.3*  --  10.4* 10.1 9.6  --   --   --  9.6   < > 9.5  --  9.2 9.5 9.6   BILITOTAL  --  0.7  --  0.4 0.4 0.2   < >  --   --  0.5  --   --   --   --  0.5  --    ALBUMIN  --  3.7  --  3.3* 3.5 2.8*   < >  --   --  2.3*  --   --   --   --  3.9  --    PROTTOTAL  --  8.8  --  8.4 8.5 7.9    < >  --   --  7.2  --   --   --   --  8.0  --    ALKPHOS  --  122  --  141 132 123   < >  --   --  235*  --   --   --   --  92  --    AST  --  13  --  12 14 14   < >  --   --  39  --   --   --  24 26  --    ALT  --  20  --  22 20 25   < >  --   --  108*  --   --   --  30 33  --     < > = values in this interval not displayed.     Uric Acid  Recent Labs   Lab Test 09/20/21  0800   URIC 3.7     ESR/CRP  Recent Labs   Lab Test 03/31/23  1132 03/31/23  1131 09/20/21  0800   SED 49*  --  106*   CRPI  --  14.90*  --      CK/Aldolase  Recent Labs   Lab Test 04/08/19  0849   CKT 85     TSH/T4  Recent Labs   Lab Test 09/20/21  0800 04/08/19  0849   TSH 1.86 1.25     Lipid Panel  Recent Labs   Lab Test 03/27/23  1109 08/12/21  1158 12/17/20  0706   CHOL 191 138 156   TRIG 238* 85 162*   HDL 50 38* 52   LDL 93 83 72   NHDL 141* 100 104     Hepatitis B  Recent Labs   Lab Test 02/16/23  1105 10/14/21  1252   AUSAB 1.54  --    HBCAB Nonreactive  --    HEPBANG Nonreactive Nonreactive     Hepatitis C  Recent Labs   Lab Test 02/16/23  1105 10/14/21  1252 03/29/17  0842   HCVAB Nonreactive Nonreactive Nonreactive   Assay performance characteristics have not been established for newborns,   infants, and children     HCVRNA Not Detected  --   --      Lyme ab screening  Recent Labs   Lab Test 03/31/23  1131 12/20/22  0818   LYMEGM 0.13 0.17     UA  Recent Labs   Lab Test 09/21/22  1258 08/12/21  1158 05/24/21  0915   COLOR Yellow Yellow Yellow   APPEARANCE Clear Clear Clear   URINEGLC Negative Negative Negative   URINEBILI Small* Negative Negative   SG 1.025 >=1.030 <=1.005   URINEPH 5.5 6.0 6.5   PROTEIN Negative Negative Negative   UROBILINOGEN 0.2 0.2 0.2   NITRITE Negative Negative Negative   UBLD Trace* Trace* Negative   LEUKEST Negative Negative Trace*   WBCU 0-5 0-5 0 - 5   RBCU 0-2 0-2 O - 2   SQUAMOUSEPI  --   --  Few   BACTERIA  --  Moderate*  --      Urine Microscopic  Recent Labs   Lab Test 09/21/22  1258 08/12/21  1158  05/24/21  0915   WBCU 0-5 0-5 0 - 5   RBCU 0-2 0-2 O - 2   SQUAMOUSEPI  --   --  Few   BACTERIA  --  Moderate*  --      Urine Protein  GHUTP and UTP= Urine protein (random), GHUTPG and UTPG = urine protein:creatinine ratio (random), UCRR = urine creatinine (random)  Recent Labs   Lab Test 01/09/23  0947   GHUTP <6.0     Immunization History     Immunization History   Administered Date(s) Administered     COVID-19 Bivalent 12+ (Pfizer) 09/21/2022     COVID-19 MONOVALENT 12+ (Pfizer) 02/17/2021, 03/10/2021, 10/14/2021     COVID-19 Monovalent 12+ (Pfizer 2022) 05/06/2022     HEPA 03/07/2008, 03/13/2009     Influenza (High Dose) 3 valent vaccine 09/22/2015, 09/24/2016, 10/01/2017, 10/12/2018, 10/04/2019     Influenza (IIV3) PF 10/20/2010, 10/15/2011, 09/28/2013     Influenza Vaccine 65+ (Fluzone HD) 09/17/2020, 09/20/2021, 09/21/2022     Pneumo Conj 13-V (2010&after) 03/25/2015     Pneumococcal 23 valent 08/17/2016     TD,PF 7+ (Tenivac) 12/30/1997     TDAP Vaccine (Adacel) 03/03/2006, 03/28/2016     Zoster recombinant adjuvanted (SHINGRIX) 07/25/2022, 09/30/2022     Zoster vaccine, live 03/15/2010          Chart documentation done in part with Dragon Voice recognition Software. Although reviewed after completion, some word and grammatical error may remain.    Dusty Robin MD

## 2023-07-10 NOTE — NURSING NOTE
RAPID3 (0-30) Cumulative Score  16.2          RAPID3 Weighted Score (divide #4 by 3 and that is the weighted score)  5.4

## 2023-07-11 ENCOUNTER — ANESTHESIA EVENT (OUTPATIENT)
Dept: SURGERY | Facility: CLINIC | Age: 73
End: 2023-07-11
Payer: MEDICARE

## 2023-07-11 ENCOUNTER — ANESTHESIA (OUTPATIENT)
Dept: SURGERY | Facility: CLINIC | Age: 73
End: 2023-07-11
Payer: MEDICARE

## 2023-07-11 ENCOUNTER — APPOINTMENT (OUTPATIENT)
Dept: GENERAL RADIOLOGY | Facility: CLINIC | Age: 73
End: 2023-07-11
Attending: STUDENT IN AN ORGANIZED HEALTH CARE EDUCATION/TRAINING PROGRAM
Payer: MEDICARE

## 2023-07-11 ENCOUNTER — HOSPITAL ENCOUNTER (OUTPATIENT)
Facility: CLINIC | Age: 73
Discharge: HOME | End: 2023-07-12
Attending: ORTHOPAEDIC SURGERY | Admitting: ORTHOPAEDIC SURGERY
Payer: MEDICARE

## 2023-07-11 DIAGNOSIS — Z96.642 STATUS POST TOTAL REPLACEMENT OF LEFT HIP: ICD-10-CM

## 2023-07-11 DIAGNOSIS — M16.12 PRIMARY OSTEOARTHRITIS OF LEFT HIP: Primary | ICD-10-CM

## 2023-07-11 LAB
CREAT SERPL-MCNC: 0.43 MG/DL (ref 0.51–0.95)
GFR SERPL CREATININE-BSD FRML MDRD: >90 ML/MIN/1.73M2
GLUCOSE BLDC GLUCOMTR-MCNC: 99 MG/DL (ref 70–99)
PLATELET # BLD AUTO: 286 10E3/UL (ref 150–450)

## 2023-07-11 PROCEDURE — 250N000013 HC RX MED GY IP 250 OP 250 PS 637

## 2023-07-11 PROCEDURE — 27130 TOTAL HIP ARTHROPLASTY: CPT | Mod: LT | Performed by: ORTHOPAEDIC SURGERY

## 2023-07-11 PROCEDURE — C1713 ANCHOR/SCREW BN/BN,TIS/BN: HCPCS | Performed by: ORTHOPAEDIC SURGERY

## 2023-07-11 PROCEDURE — 999N000141 HC STATISTIC PRE-PROCEDURE NURSING ASSESSMENT: Performed by: ORTHOPAEDIC SURGERY

## 2023-07-11 PROCEDURE — 710N000010 HC RECOVERY PHASE 1, LEVEL 2, PER MIN: Performed by: ORTHOPAEDIC SURGERY

## 2023-07-11 PROCEDURE — 250N000011 HC RX IP 250 OP 636: Performed by: ANESTHESIOLOGY

## 2023-07-11 PROCEDURE — 360N000077 HC SURGERY LEVEL 4, PER MIN: Performed by: ORTHOPAEDIC SURGERY

## 2023-07-11 PROCEDURE — 250N000011 HC RX IP 250 OP 636: Performed by: ORTHOPAEDIC SURGERY

## 2023-07-11 PROCEDURE — 250N000011 HC RX IP 250 OP 636: Performed by: NURSE ANESTHETIST, CERTIFIED REGISTERED

## 2023-07-11 PROCEDURE — 85049 AUTOMATED PLATELET COUNT: CPT | Performed by: ORTHOPAEDIC SURGERY

## 2023-07-11 PROCEDURE — 250N000011 HC RX IP 250 OP 636

## 2023-07-11 PROCEDURE — 258N000003 HC RX IP 258 OP 636: Performed by: NURSE ANESTHETIST, CERTIFIED REGISTERED

## 2023-07-11 PROCEDURE — 999N000065 XR PELVIS AND HIP LEFT 1 VIEW

## 2023-07-11 PROCEDURE — 250N000013 HC RX MED GY IP 250 OP 250 PS 637: Performed by: ORTHOPAEDIC SURGERY

## 2023-07-11 PROCEDURE — C1776 JOINT DEVICE (IMPLANTABLE): HCPCS | Performed by: ORTHOPAEDIC SURGERY

## 2023-07-11 PROCEDURE — 36415 COLL VENOUS BLD VENIPUNCTURE: CPT | Performed by: ORTHOPAEDIC SURGERY

## 2023-07-11 PROCEDURE — 258N000003 HC RX IP 258 OP 636: Performed by: ORTHOPAEDIC SURGERY

## 2023-07-11 PROCEDURE — 250N000009 HC RX 250: Performed by: NURSE ANESTHETIST, CERTIFIED REGISTERED

## 2023-07-11 PROCEDURE — 99214 OFFICE O/P EST MOD 30 MIN: CPT | Performed by: PHYSICIAN ASSISTANT

## 2023-07-11 PROCEDURE — 370N000017 HC ANESTHESIA TECHNICAL FEE, PER MIN: Performed by: ORTHOPAEDIC SURGERY

## 2023-07-11 PROCEDURE — 272N000001 HC OR GENERAL SUPPLY STERILE: Performed by: ORTHOPAEDIC SURGERY

## 2023-07-11 PROCEDURE — 82565 ASSAY OF CREATININE: CPT | Performed by: ORTHOPAEDIC SURGERY

## 2023-07-11 PROCEDURE — 258N000001 HC RX 258: Performed by: ORTHOPAEDIC SURGERY

## 2023-07-11 DEVICE — REFLECTION SPHERICAL HEAD SCREW 25MM
Type: IMPLANTABLE DEVICE | Site: HIP | Status: FUNCTIONAL
Brand: REFLECTION

## 2023-07-11 DEVICE — REFLECTION SPHERICAL HEAD SCREW 20MM
Type: IMPLANTABLE DEVICE | Site: HIP | Status: FUNCTIONAL
Brand: REFLECTION

## 2023-07-11 DEVICE — R3 0 DEGREE XLPE ACETABULAR LINER                                    32MM ID X OD 48MM
Type: IMPLANTABLE DEVICE | Site: HIP | Status: FUNCTIONAL
Brand: R3

## 2023-07-11 DEVICE — REFLECTION SPHERICAL HEAD SCREW 45MM
Type: IMPLANTABLE DEVICE | Site: HIP | Status: FUNCTIONAL
Brand: REFLECTION

## 2023-07-11 DEVICE — BUCK FEMORAL CEMENT RESTRICTOR 25MM
Type: IMPLANTABLE DEVICE | Site: HIP | Status: FUNCTIONAL
Brand: BUCK

## 2023-07-11 DEVICE — SPECTRON EF PRIMARY HIGH OFFSET                                    12/14 FEM SZ 1
Type: IMPLANTABLE DEVICE | Site: HIP | Status: FUNCTIONAL
Brand: SPECTRON

## 2023-07-11 DEVICE — R3 3 HOLE ACETABULAR SHELL 48MM
Type: IMPLANTABLE DEVICE | Site: HIP | Status: FUNCTIONAL
Brand: R3 ACETABULAR

## 2023-07-11 DEVICE — COBALT CHROME 12/14 TAPER FEMORAL                                    HEAD 32MM + 8: Type: IMPLANTABLE DEVICE | Site: HIP | Status: FUNCTIONAL

## 2023-07-11 DEVICE — BONE CEMENT SIMPLEX W/TOBRAMYCIN 6197-9-001: Type: IMPLANTABLE DEVICE | Site: HIP | Status: FUNCTIONAL

## 2023-07-11 RX ORDER — LIDOCAINE HYDROCHLORIDE 20 MG/ML
INJECTION, SOLUTION INFILTRATION; PERINEURAL PRN
Status: DISCONTINUED | OUTPATIENT
Start: 2023-07-11 | End: 2023-07-11

## 2023-07-11 RX ORDER — POLYETHYLENE GLYCOL 3350 17 G/17G
17 POWDER, FOR SOLUTION ORAL DAILY
Status: DISCONTINUED | OUTPATIENT
Start: 2023-07-12 | End: 2023-07-12 | Stop reason: HOSPADM

## 2023-07-11 RX ORDER — ONDANSETRON 2 MG/ML
INJECTION INTRAMUSCULAR; INTRAVENOUS PRN
Status: DISCONTINUED | OUTPATIENT
Start: 2023-07-11 | End: 2023-07-11

## 2023-07-11 RX ORDER — LIDOCAINE 40 MG/G
CREAM TOPICAL
Status: DISCONTINUED | OUTPATIENT
Start: 2023-07-11 | End: 2023-07-12 | Stop reason: HOSPADM

## 2023-07-11 RX ORDER — BUPIVACAINE HYDROCHLORIDE 7.5 MG/ML
INJECTION, SOLUTION INTRASPINAL
Status: COMPLETED | OUTPATIENT
Start: 2023-07-11 | End: 2023-07-11

## 2023-07-11 RX ORDER — ACETAMINOPHEN 325 MG/1
975 TABLET ORAL ONCE
Status: COMPLETED | OUTPATIENT
Start: 2023-07-11 | End: 2023-07-11

## 2023-07-11 RX ORDER — LOSARTAN POTASSIUM 50 MG/1
100 TABLET ORAL DAILY
Status: DISCONTINUED | OUTPATIENT
Start: 2023-07-11 | End: 2023-07-12 | Stop reason: HOSPADM

## 2023-07-11 RX ORDER — ACETAMINOPHEN 325 MG/1
975 TABLET ORAL EVERY 8 HOURS
Status: DISCONTINUED | OUTPATIENT
Start: 2023-07-11 | End: 2023-07-12 | Stop reason: HOSPADM

## 2023-07-11 RX ORDER — NALOXONE HYDROCHLORIDE 0.4 MG/ML
0.2 INJECTION, SOLUTION INTRAMUSCULAR; INTRAVENOUS; SUBCUTANEOUS
Status: DISCONTINUED | OUTPATIENT
Start: 2023-07-11 | End: 2023-07-12 | Stop reason: HOSPADM

## 2023-07-11 RX ORDER — NALOXONE HYDROCHLORIDE 0.4 MG/ML
0.4 INJECTION, SOLUTION INTRAMUSCULAR; INTRAVENOUS; SUBCUTANEOUS
Status: DISCONTINUED | OUTPATIENT
Start: 2023-07-11 | End: 2023-07-12 | Stop reason: HOSPADM

## 2023-07-11 RX ORDER — ENOXAPARIN SODIUM 100 MG/ML
40 INJECTION SUBCUTANEOUS EVERY 24 HOURS
Status: DISCONTINUED | OUTPATIENT
Start: 2023-07-12 | End: 2023-07-12 | Stop reason: HOSPADM

## 2023-07-11 RX ORDER — OXYCODONE HYDROCHLORIDE 5 MG/1
5 TABLET ORAL EVERY 4 HOURS PRN
Status: DISCONTINUED | OUTPATIENT
Start: 2023-07-11 | End: 2023-07-12 | Stop reason: HOSPADM

## 2023-07-11 RX ORDER — SODIUM CHLORIDE, SODIUM LACTATE, POTASSIUM CHLORIDE, CALCIUM CHLORIDE 600; 310; 30; 20 MG/100ML; MG/100ML; MG/100ML; MG/100ML
INJECTION, SOLUTION INTRAVENOUS CONTINUOUS PRN
Status: DISCONTINUED | OUTPATIENT
Start: 2023-07-11 | End: 2023-07-11

## 2023-07-11 RX ORDER — LATANOPROST 50 UG/ML
1 SOLUTION/ DROPS OPHTHALMIC AT BEDTIME
Status: DISCONTINUED | OUTPATIENT
Start: 2023-07-11 | End: 2023-07-12 | Stop reason: HOSPADM

## 2023-07-11 RX ORDER — EPINEPHRINE 1 MG/ML
INJECTION, SOLUTION, CONCENTRATE INTRAVENOUS PRN
Status: DISCONTINUED | OUTPATIENT
Start: 2023-07-11 | End: 2023-07-11 | Stop reason: HOSPADM

## 2023-07-11 RX ORDER — ONDANSETRON 4 MG/1
4 TABLET, ORALLY DISINTEGRATING ORAL EVERY 6 HOURS PRN
Status: DISCONTINUED | OUTPATIENT
Start: 2023-07-11 | End: 2023-07-12 | Stop reason: HOSPADM

## 2023-07-11 RX ORDER — DORZOLAMIDE HYDROCHLORIDE AND TIMOLOL MALEATE 20; 5 MG/ML; MG/ML
1 SOLUTION/ DROPS OPHTHALMIC 2 TIMES DAILY
Status: DISCONTINUED | OUTPATIENT
Start: 2023-07-11 | End: 2023-07-12 | Stop reason: HOSPADM

## 2023-07-11 RX ORDER — HYDROMORPHONE HYDROCHLORIDE 1 MG/ML
0.2 INJECTION, SOLUTION INTRAMUSCULAR; INTRAVENOUS; SUBCUTANEOUS
Status: DISCONTINUED | OUTPATIENT
Start: 2023-07-11 | End: 2023-07-12 | Stop reason: HOSPADM

## 2023-07-11 RX ORDER — BISACODYL 10 MG
10 SUPPOSITORY, RECTAL RECTAL DAILY PRN
Status: DISCONTINUED | OUTPATIENT
Start: 2023-07-11 | End: 2023-07-12 | Stop reason: HOSPADM

## 2023-07-11 RX ORDER — SODIUM CHLORIDE, SODIUM LACTATE, POTASSIUM CHLORIDE, CALCIUM CHLORIDE 600; 310; 30; 20 MG/100ML; MG/100ML; MG/100ML; MG/100ML
INJECTION, SOLUTION INTRAVENOUS CONTINUOUS
Status: DISCONTINUED | OUTPATIENT
Start: 2023-07-11 | End: 2023-07-12 | Stop reason: HOSPADM

## 2023-07-11 RX ORDER — TRANEXAMIC ACID 650 MG/1
1950 TABLET ORAL ONCE
Status: COMPLETED | OUTPATIENT
Start: 2023-07-11 | End: 2023-07-11

## 2023-07-11 RX ORDER — CEFAZOLIN SODIUM/WATER 2 G/20 ML
2 SYRINGE (ML) INTRAVENOUS SEE ADMIN INSTRUCTIONS
Status: DISCONTINUED | OUTPATIENT
Start: 2023-07-11 | End: 2023-07-11 | Stop reason: HOSPADM

## 2023-07-11 RX ORDER — CEFAZOLIN SODIUM 1 G/3ML
1 INJECTION, POWDER, FOR SOLUTION INTRAMUSCULAR; INTRAVENOUS EVERY 8 HOURS
Status: COMPLETED | OUTPATIENT
Start: 2023-07-11 | End: 2023-07-12

## 2023-07-11 RX ORDER — CEFAZOLIN SODIUM/WATER 2 G/20 ML
2 SYRINGE (ML) INTRAVENOUS
Status: COMPLETED | OUTPATIENT
Start: 2023-07-11 | End: 2023-07-11

## 2023-07-11 RX ORDER — HYDROMORPHONE HYDROCHLORIDE 1 MG/ML
0.4 INJECTION, SOLUTION INTRAMUSCULAR; INTRAVENOUS; SUBCUTANEOUS
Status: DISCONTINUED | OUTPATIENT
Start: 2023-07-11 | End: 2023-07-12 | Stop reason: HOSPADM

## 2023-07-11 RX ORDER — OXYCODONE HYDROCHLORIDE 10 MG/1
10 TABLET ORAL EVERY 4 HOURS PRN
Status: DISCONTINUED | OUTPATIENT
Start: 2023-07-11 | End: 2023-07-12 | Stop reason: HOSPADM

## 2023-07-11 RX ORDER — PROPOFOL 10 MG/ML
INJECTION, EMULSION INTRAVENOUS CONTINUOUS PRN
Status: DISCONTINUED | OUTPATIENT
Start: 2023-07-11 | End: 2023-07-11

## 2023-07-11 RX ORDER — DEXAMETHASONE SODIUM PHOSPHATE 4 MG/ML
INJECTION, SOLUTION INTRA-ARTICULAR; INTRALESIONAL; INTRAMUSCULAR; INTRAVENOUS; SOFT TISSUE PRN
Status: DISCONTINUED | OUTPATIENT
Start: 2023-07-11 | End: 2023-07-11

## 2023-07-11 RX ORDER — ROSUVASTATIN CALCIUM 10 MG/1
10 TABLET, COATED ORAL DAILY
Status: DISCONTINUED | OUTPATIENT
Start: 2023-07-12 | End: 2023-07-12 | Stop reason: HOSPADM

## 2023-07-11 RX ORDER — CALCIUM CARBONATE 500 MG/1
500 TABLET, CHEWABLE ORAL 4 TIMES DAILY PRN
Status: DISCONTINUED | OUTPATIENT
Start: 2023-07-11 | End: 2023-07-12 | Stop reason: HOSPADM

## 2023-07-11 RX ORDER — AMOXICILLIN 250 MG
1 CAPSULE ORAL 2 TIMES DAILY
Status: DISCONTINUED | OUTPATIENT
Start: 2023-07-11 | End: 2023-07-12 | Stop reason: HOSPADM

## 2023-07-11 RX ORDER — ONDANSETRON 2 MG/ML
4 INJECTION INTRAMUSCULAR; INTRAVENOUS EVERY 6 HOURS PRN
Status: DISCONTINUED | OUTPATIENT
Start: 2023-07-11 | End: 2023-07-12 | Stop reason: HOSPADM

## 2023-07-11 RX ORDER — ACETAMINOPHEN 325 MG/1
650 TABLET ORAL EVERY 4 HOURS PRN
Status: DISCONTINUED | OUTPATIENT
Start: 2023-07-14 | End: 2023-07-12 | Stop reason: HOSPADM

## 2023-07-11 RX ORDER — FENTANYL CITRATE 50 UG/ML
INJECTION, SOLUTION INTRAMUSCULAR; INTRAVENOUS PRN
Status: DISCONTINUED | OUTPATIENT
Start: 2023-07-11 | End: 2023-07-11

## 2023-07-11 RX ADMIN — OXYCODONE HYDROCHLORIDE 5 MG: 5 TABLET ORAL at 16:39

## 2023-07-11 RX ADMIN — OXYCODONE HYDROCHLORIDE 5 MG: 5 TABLET ORAL at 20:08

## 2023-07-11 RX ADMIN — FENTANYL CITRATE 25 MCG: 50 INJECTION, SOLUTION INTRAMUSCULAR; INTRAVENOUS at 15:33

## 2023-07-11 RX ADMIN — BUPIVACAINE HYDROCHLORIDE IN DEXTROSE 1.6 ML: 7.5 INJECTION, SOLUTION SUBARACHNOID at 13:03

## 2023-07-11 RX ADMIN — PHENYLEPHRINE HYDROCHLORIDE 150 MCG: 10 INJECTION INTRAVENOUS at 14:13

## 2023-07-11 RX ADMIN — HYDROMORPHONE HYDROCHLORIDE 0.2 MG: 1 INJECTION, SOLUTION INTRAMUSCULAR; INTRAVENOUS; SUBCUTANEOUS at 16:17

## 2023-07-11 RX ADMIN — SODIUM CHLORIDE, POTASSIUM CHLORIDE, SODIUM LACTATE AND CALCIUM CHLORIDE: 600; 310; 30; 20 INJECTION, SOLUTION INTRAVENOUS at 14:21

## 2023-07-11 RX ADMIN — TRANEXAMIC ACID 1950 MG: 650 TABLET ORAL at 11:23

## 2023-07-11 RX ADMIN — HYDROMORPHONE HYDROCHLORIDE 0.2 MG: 1 INJECTION, SOLUTION INTRAMUSCULAR; INTRAVENOUS; SUBCUTANEOUS at 16:35

## 2023-07-11 RX ADMIN — PROPOFOL 100 MCG/KG/MIN: 10 INJECTION, EMULSION INTRAVENOUS at 13:20

## 2023-07-11 RX ADMIN — PHENYLEPHRINE HYDROCHLORIDE 100 MCG: 10 INJECTION INTRAVENOUS at 13:27

## 2023-07-11 RX ADMIN — LATANOPROST 1 DROP: 50 SOLUTION OPHTHALMIC at 22:29

## 2023-07-11 RX ADMIN — SODIUM CHLORIDE, POTASSIUM CHLORIDE, SODIUM LACTATE AND CALCIUM CHLORIDE: 600; 310; 30; 20 INJECTION, SOLUTION INTRAVENOUS at 17:32

## 2023-07-11 RX ADMIN — ACETAMINOPHEN 975 MG: 325 TABLET, FILM COATED ORAL at 20:08

## 2023-07-11 RX ADMIN — CEFAZOLIN 1 G: 1 INJECTION, POWDER, FOR SOLUTION INTRAMUSCULAR; INTRAVENOUS at 21:25

## 2023-07-11 RX ADMIN — PHENYLEPHRINE HYDROCHLORIDE 100 MCG: 10 INJECTION INTRAVENOUS at 14:05

## 2023-07-11 RX ADMIN — FENTANYL CITRATE 25 MCG: 50 INJECTION, SOLUTION INTRAMUSCULAR; INTRAVENOUS at 13:02

## 2023-07-11 RX ADMIN — PHENYLEPHRINE HYDROCHLORIDE 0.5 MCG/KG/MIN: 10 INJECTION INTRAVENOUS at 13:21

## 2023-07-11 RX ADMIN — Medication 2 G: at 13:06

## 2023-07-11 RX ADMIN — DORZOLAMIDE HYDROCHLORIDE AND TIMOLOL MALEATE 1 DROP: 22.3; 6.8 SOLUTION/ DROPS OPHTHALMIC at 20:09

## 2023-07-11 RX ADMIN — SENNOSIDES AND DOCUSATE SODIUM 1 TABLET: 50; 8.6 TABLET ORAL at 20:08

## 2023-07-11 RX ADMIN — PHENYLEPHRINE HYDROCHLORIDE 100 MCG: 10 INJECTION INTRAVENOUS at 14:28

## 2023-07-11 RX ADMIN — LIDOCAINE HYDROCHLORIDE 30 MG: 20 INJECTION, SOLUTION INFILTRATION; PERINEURAL at 13:05

## 2023-07-11 RX ADMIN — ACETAMINOPHEN 975 MG: 325 TABLET, FILM COATED ORAL at 11:23

## 2023-07-11 RX ADMIN — SODIUM CHLORIDE, POTASSIUM CHLORIDE, SODIUM LACTATE AND CALCIUM CHLORIDE: 600; 310; 30; 20 INJECTION, SOLUTION INTRAVENOUS at 12:54

## 2023-07-11 RX ADMIN — DEXAMETHASONE SODIUM PHOSPHATE 4 MG: 4 INJECTION, SOLUTION INTRA-ARTICULAR; INTRALESIONAL; INTRAMUSCULAR; INTRAVENOUS; SOFT TISSUE at 13:32

## 2023-07-11 RX ADMIN — ONDANSETRON 4 MG: 2 INJECTION INTRAMUSCULAR; INTRAVENOUS at 13:05

## 2023-07-11 ASSESSMENT — ACTIVITIES OF DAILY LIVING (ADL)
ADLS_ACUITY_SCORE: 24
ADLS_ACUITY_SCORE: 22
ADLS_ACUITY_SCORE: 24
ADLS_ACUITY_SCORE: 24

## 2023-07-11 NOTE — ANESTHESIA CARE TRANSFER NOTE
Patient: Virginia Byers    Procedure: Procedure(s):  ARTHROPLASTY, HIP, TOTAL LEFT       Diagnosis: Primary osteoarthritis of left hip [M16.12]  Diagnosis Additional Information: No value filed.    Anesthesia Type:   Spinal     Note:    Oropharynx: oropharynx clear of all foreign objects and spontaneously breathing  Level of Consciousness: awake  Oxygen Supplementation: face mask  Level of Supplemental Oxygen (L/min / FiO2): 6  Independent Airway: airway patency satisfactory and stable  Dentition: dentition unchanged  Vital Signs Stable: post-procedure vital signs reviewed and stable  Report to RN Given: handoff report given  Patient transferred to: PACU    Handoff Report: Identifed the Patient, Identified the Reponsible Provider, Reviewed the pertinent medical history, Discussed the surgical course, Reviewed Intra-OP anesthesia mangement and issues during anesthesia, Set expectations for post-procedure period and Allowed opportunity for questions and acknowledgement of understanding      Vitals:  Vitals Value Taken Time   /54 07/11/23 1601   Temp 36.7    Pulse 86 07/11/23 1603   Resp 10 07/11/23 1603   SpO2 99 % 07/11/23 1603   Vitals shown include unvalidated device data.    Electronically Signed By: DIANE IBRAHIM APRN CRNA  July 11, 2023  4:04 PM

## 2023-07-11 NOTE — ANESTHESIA PROCEDURE NOTES
"Intrathecal injection Procedure Note    Pre-Procedure   Staff -        Anesthesiologist:  Chalo Lenz MD       Performed By: anesthesiologist       Location: OR       Procedure Start/Stop Times: 7/11/2023 1:03 PM and 7/11/2023 1:10 PM       Pre-Anesthestic Checklist: patient identified, IV checked, risks and benefits discussed, informed consent, monitors and equipment checked, pre-op evaluation, at physician/surgeon's request and post-op pain management  Timeout:       Correct Patient: Yes        Correct Procedure: Yes        Correct Site: Yes        Correct Position: Yes   Procedure Documentation  Procedure: intrathecal injection       Patient Position: sitting       Patient Prep/Sterile Barriers: sterile gloves, mask, patient draped       Skin prep: Chloraprep       Insertion Site: L3-4. (midline approach).       Needle Gauge: 25.        Needle Length (Inches): 3        Introducer used       # of attempts: 2 and  # of redirects:  1    Assessment/Narrative         Paresthesias: No.       CSF fluid: clear.    Medication(s) Administered   0.75% Hyperbaric Bupivacaine (Intrathecal) - Intrathecal   1.6 mL - 7/11/2023 1:03:00 PM  Medication Administration Time: 7/11/2023 1:03 PM      FOR [facility] ([facility]) ONLY:   Pain Team Contact information: please page the Pain Team Via [employer]. Search \"Pain\". During daytime hours, please page the attending first. At night please page the resident first.      "

## 2023-07-11 NOTE — ANESTHESIA PREPROCEDURE EVALUATION
Anesthesia Pre-Procedure Evaluation    Patient: Virginia Byers   MRN: [id number] : 1950        Procedure : Procedure(s):  ARTHROPLASTY, HIP, TOTAL LEFT          Past Medical History:   Diagnosis Date     Breast cancer (H)     Left breast, s/p mastectomy, chemotherapy and endocrine therapy     Follicular lymphoma (H) 2023     Glaucoma     bilateral - Dr. Moore - [facility]     Hyperlipidemia      Hypertension      Macular degeneration     Dr. Toscano - [facility]     Osteoarthritis      Osteoporosis      Personal history of chemotherapy     A/C + Taxol     S/P radiation therapy     400 cGy to left orbit completed on 6/15/2023 [facility]      Past Surgical History:   Procedure Laterality Date     BIOPSY Left 2023    Left Orbital Biopsy     C MASTECTOMY,SIMPLE  2000    left     COLONOSCOPY      Q 10 years     COLONOSCOPY N/A 10/04/2021    Procedure: COLONOSCOPY;  Surgeon: Guru Lyla Ruby MD;  Location: [facility] OR     ESOPHAGOSCOPY, GASTROSCOPY, DUODENOSCOPY (EGD), COMBINED N/A 10/04/2021    Procedure: ENDOSCOPIC ULTRASOUND, ESOPHAGOSCOPY / UPPER GASTROINTESTINAL TRACT (GI), Esophagogastroduodenoscopy,  Fine Needle Biopsy of liver;  Surgeon: Guru Lyla Ruby MD;  Location: [facility] OR     IR LYMPH NODE BIOPSY  2023     SURGICAL HISTORY OF -  Left 2015    tissue expander placed in left breast area     SURGICAL HISTORY OF -  Left 2016    left breast implant and right mammoplasty      Allergies   Allergen Reactions     Compazine      Mental confusion     Hydrochlorothiazide      Dryness mouth     Prochlorperazine Visual Disturbance     Simvastatin Other (See Comments)     Muscle aches      Social History     Tobacco Use     Smoking status: Former     Packs/day: 1.00     Years: 17.00     Pack years: 17.00     Types: Cigarettes     Quit date: 3/15/1999     Years since quittin.3      Smokeless tobacco: Never   Substance Use Topics     Alcohol use: Not Currently     Alcohol/week: 4.0 standard drinks of alcohol     Types: 4 Glasses of wine per week      Wt Readings from Last 1 Encounters:   07/11/23 65.2 kg (143 lb 11.8 oz)        Anesthesia Evaluation            ROS/MED HX  ENT/Pulmonary:  - neg pulmonary ROS     Neurologic:  - neg neurologic ROS     Cardiovascular:     (+) hypertension-----    METS/Exercise Tolerance: >4 METS    Hematologic:  - neg hematologic  ROS     Musculoskeletal:  - neg musculoskeletal ROS     GI/Hepatic:  - neg GI/hepatic ROS     Renal/Genitourinary:  - neg Renal ROS     Endo:  - neg endo ROS     Psychiatric/Substance Use:  - neg psychiatric ROS     Infectious Disease:  - neg infectious disease ROS     Malignancy:   (+) Malignancy,     Other:  - neg other ROS          Physical Exam    Airway  airway exam normal           Respiratory Devices and Support         Dental       (+) Modest Abnormalities - crowns, retainers, 1 or 2 missing teeth      Cardiovascular   cardiovascular exam normal          Pulmonary   pulmonary exam normal                OUTSIDE LABS:  CBC:   Lab Results   Component Value Date    WBC 7.7 06/19/2023    WBC 8.7 06/15/2023    HGB 12.7 07/05/2023    HGB 13.0 06/19/2023    HCT 41.8 06/19/2023    HCT 37.9 06/15/2023     06/19/2023     06/15/2023     BMP:   Lab Results   Component Value Date     05/11/2023     12/28/2022    POTASSIUM 4.9 07/05/2023    POTASSIUM 4.2 05/11/2023    CHLORIDE 103 05/11/2023    CHLORIDE 106 12/28/2022    CO2 27 05/11/2023    CO2 30 12/28/2022    BUN 21 05/11/2023    BUN 14 12/28/2022    CR 0.50 (L) 05/11/2023    CR 0.43 (L) 12/28/2022    GLC 99 07/11/2023     (H) 05/11/2023     COAGS:   Lab Results   Component Value Date    INR 1.2 (L) 06/19/2023     POC: No results found for: BGM, HCG, HCGS  HEPATIC:   Lab Results   Component Value Date    ALBUMIN 3.7 05/11/2023    PROTTOTAL 8.8 05/11/2023     ALT 20 05/11/2023    AST 13 05/11/2023    ALKPHOS 122 05/11/2023    BILITOTAL 0.7 05/11/2023     OTHER:   Lab Results   Component Value Date    COURTNEY 10.3 (H) 05/11/2023    TSH 1.86 09/20/2021    T4 1.14 03/15/2010    SED 49 (H) 03/31/2023       Anesthesia Plan    ASA Status:  2   NPO Status:  NPO Appropriate    Anesthesia Type: Spinal.   Induction: Intravenous.   Maintenance: TIVA.        Consents    Anesthesia Plan(s) and associated risks, benefits, and realistic alternatives discussed. Questions answered and patient/representative(s) expressed understanding.     - Discussed: Risks, Benefits and Alternatives for BOTH SEDATION and the PROCEDURE were discussed     - Discussed with:  Patient         Postoperative Care    Pain management: Oral pain medications.   PONV prophylaxis: Ondansetron (or other 5HT-3)     Comments:                Chalo Lenz MD, MD

## 2023-07-11 NOTE — BRIEF OP NOTE
[facility]    Brief Operative Note    Pre-operative diagnosis: Primary osteoarthritis of left hip [M16.12]  Post-operative diagnosis Same as pre-operative diagnosis    Procedure: Procedure(s):  ARTHROPLASTY, HIP, TOTAL LEFT  Surgeon: Surgeon(s) and Role:     * Austin Blood MD - Primary     * Eda Kruger MD - Resident - Assisting  Anesthesia: Choice   Estimated Blood Loss: 200 ml    Drains: None  Specimens: * No specimens in log *  Findings:   please see full operative report..  Complications: None.  Implants:   Implant Name Type Inv. Item Serial No.  Lot No. LRB No. Used Action   BONE CEMENT SIMPLEX W/TOBRAMYCIN 6197-9-001 - [id number] Cement, Bone BONE CEMENT SIMPLEX W/TOBRAMYCIN 6197-9-001  [employer] [id number] Left 2 Implanted   IMP SHELL SNR ACET R3 3H 48MM [id number] - [id number] Total Joint Component/Insert IMP SHELL SNR ACET R3 3H 48MM [id number]  [employer] [id number] Left 1 Implanted   IMP LINER S&N ACET R3 XLPE [id number] 0DEG [id number] - [id number] Total Joint Component/Insert IMP LINER S&N ACET R3 XLPE [id number] 0DEG [id number]  [employer] [id number] Left 1 Implanted   BONE CEMENT RESTRICTOR WERNER FEMORAL 25MM [id number] - [id number] Cement, Bone BONE CEMENT RESTRICTOR WERNER FEMORAL 25MM [id number]  [employer] [id number] Left 1 Implanted   IMP SCR ACET SNN SPHERICAL HEAD 6.5X45MM [id number] - [id number] Metallic Hardware/[place] IMP SCR ACET SNN SPHERICAL HEAD 6.5X45MM [id number]  [employer] [id number] Left 1 Implanted   IMP SCR ACET SNN SPHERICAL HEAD 6.5X25MM 24924889 - [id number] Metallic Hardware/[place] IMP SCR ACET SNN SPHERICAL HEAD 6.5X25MM [id number]  [employer] [id number] Left 1 Implanted   IMP SCR ACET SNN SPHERICAL HEAD 6.5X20MM 35679956 - [id number] Metallic Hardware/[place] IMP SCR ACET SNN SPHERICAL HEAD 6.5X20MM [id number]  [employer] [id number] Left 1 Implanted   IMP STEM FEMORAL SNR BIPOLAR  SPECTRON HO SIZE 1 [id number] - [id number] Total Joint Component/Insert IMP STEM FEMORAL SNR BIPOLAR SPECTRON HO SIZE 1 [id number]  [employer] [id number] Left 1 Implanted   IMP HEAD FEMORAL SNR [place] 32MM +8 [id number] - [id number] Total Joint Component/Insert IMP HEAD FEMORAL SNR [place] 32MM +8 [id number]  [employer] [id number] Left 1 Implanted         Orthopedics Primary  Activity: Up with assist and assistive devices as needed until independent. Posterior hip precautions to operative side x 3 months:  1) No hip flexion beyond 90 degrees  2) No adduction beyond midline  3) No internal rotation beyond neutral   Weight bearing status: WBAT  Antibiotics: Cefazolin x 24 hours  Diet: Begin with clear fluids and progress diet as tolerated. Bowel regimen. Anti-emetics PRN.    DVT prophylaxis:  Mechanical and lovenox 40 qday x 2 weeks to start POD1. Then start aspirin 162 qday x 2 weeks.  Elevation: Elevate heels off of bed on pillows   Wound Care: Tegaderm and alginate x 2 weeks   Drains: none  Pain management: Orals PRN, IV for breakthrough only  X-rays: AP Pelvis and Lateral operative hip in PACU.   Physical Therapy: Mobilization, ROM, ADL's, Posterior hip precautions.   Occupational Therapy: ADL's  Labs: Trend Hgb on POD #1, 2  Consults: PT, OT. Hospitalist, appreciate assistance in caring for this patient throughout the perioperative period  Follow Up: 2 weeks with Dr. Blood's team.     Disposition: Pending progress with therapies, pain control on orals, and medical stability, anticipate discharge to Home on POD #1-2.     Orthopedic Surgery staff for this patient is Dr. Blood.    Eda Kruger MD, PGY-4  Orthopedics  Pager: [phone]

## 2023-07-11 NOTE — OP NOTE
OPERATIVE REPORT    DATE OF SERVICE: 7/11/23    SURGEON: Austin Blood MD.    ASSISTANT(S):  Kimberlee Kruger MD and Donal Lane MD     PREOPERATIVE DIAGNOSIS:  Osteoarthritis    POSTOPERATIVE DIAGNOSIS:  Osteoarthritis    OPERATION PERFORMED:  Left total hip arthroplasty    IMPLANTS:    Implant Name Type Inv. Item Serial No.  Lot No. LRB No. Used Action   BONE CEMENT SIMPLEX W/TOBRAMYCIN 6197-9-001 - [id number] Cement, Bone BONE CEMENT SIMPLEX W/TOBRAMYCIN 6197-9-001  [employer] [id number] Left 2 Implanted   IMP SHELL SNR ACET R3 3H 48MM [id number] - [id number] Total Joint Component/Insert IMP SHELL SNR ACET R3 3H 48MM [id number]  [employer] [id number] Left 1 Implanted   IMP LINER S&N ACET R3 XLPE [id number] 0DEG [id number] - [id number] Total Joint Component/Insert IMP LINER S&N ACET R3 XLPE [id number] 0DEG [id number]  [employer] [id number] Left 1 Implanted   BONE CEMENT RESTRICTOR WERNER FEMORAL 25MM 704860 - [id number] Cement, Bone BONE CEMENT RESTRICTOR WERNER FEMORAL 25MM [id number]  [employer] [id number] Left 1 Implanted   IMP SCR ACET SNN SPHERICAL HEAD 6.5X45MM 34501050 - [id number] Metallic Hardware/[place] IMP SCR ACET SNN SPHERICAL HEAD 6.5X45MM [id number]  [employer] [id number] Left 1 Implanted   IMP SCR ACET SNN SPHERICAL HEAD 6.5X25MM 07139303 - [id number] Metallic Hardware/[place] IMP SCR ACET SNN SPHERICAL HEAD 6.5X25MM [id number]  [employer] [id number] Left 1 Implanted   IMP SCR ACET SNN SPHERICAL HEAD 6.5X20MM 22831371 - [id number] Metallic Hardware/[place] IMP SCR ACET SNN SPHERICAL HEAD 6.5X20MM [id number]  [employer] [id number] Left 1 Implanted   IMP STEM FEMORAL SNR BIPOLAR SPECTRON HO SIZE 1 47415674 - [id number] Total Joint Component/Insert IMP STEM FEMORAL SNR BIPOLAR SPECTRON HO SIZE 1 [id number]  [employer] [id number] Left 1 Implanted   IMP HEAD FEMORAL SNR [place] 32MM +8 [id number] - [id number] Total Joint Component/Insert IMP HEAD FEMORAL SNR  [place] 32MM +8 [id number]  [employer] [id number] Left 1 Implanted       ANESTHETIC: spinal     OPERATIVE FINDINGS:  End stage arthrosis of the hip, superior acetabular bone loss.     BLOOD LOSS about 200 ml     COMPLICATIONS:  None apparent    OPERATIVE INDICATIONS:  The patient has a long history of debilitating pain secondary to ostearthritis of the hip.  Despite comprehensive non-operative management these symptoms continued to interfere with activities of daily living.  After discussion of further treatment options including the risks and benefits that patient elected to proceed with a total hip.    DESCRIPTION OF THE PROCEDURE:  The patient was identified in the preoperative holding area.  The consent form including the risks and benefits were reviewed with the patient.  The operative limb was identified and marked.  The patient was brought back to the operating room and placed supine on the operating table.  An anesthetic was induced by the anesthesia team.   The patient was placed in the lateral decubitus position and prepped and draped in the normal standard fashion for a hip replacement.  A time-out was called.  Antibiotics were given.  We utilized an approximately 15 cm curvilinear incision, centered on the vastus ridge, and performed a standard posterior approach to the hip.  The tensor fascia was split.  A small portion of gluteus magno was split in line with its fibers.  The sciatic nerve was palpated.  The east-west retractor was placed.  The posterior border of gluteus medius was exposed and retracted.  The tendon of piriformis and that of the obturators was released from their attachments.  A trapdoor posterior capsulotomy was performed.  The hip was dislocated.  The lesser trochanter was exposed.  A ruler was used to measure and electrocautery was used to elbert our neck cut as preoperatively templated.  The head was measured with a caliper and found to be 44.  This measurement was used to  choose our first reamer.  The neck cut was re-measured. The femur was elevated.  A Hohmann was placed over the anterior rim of the acetabulum and the femur was subluxed anterior.  A split was made in the inferior capsule.  The transverse acetabular ligament was left intact and used a guide for the anterversion of the acetabular component.  Circumferential retractors were placed.  We began reaming and went up by two until sufficient contact was made with the acetabular rim.  We then went up by one millimeter for a one millimeter press-fit.  We were within one size of our preoperative plan.   A trail was placed.  It had an excellent press fit.  We then placed out final component in 40 degrees of inclination and approximately 20 degrees of anteversion, parallel to the transverse ligament.  The press fit was excellent.  Screws were placed for additional initial fixation.  A flat liner was then placed. It locked into place. Th superior acetabular ap as bone grafted with bone taken from the femoral head.  Attention was turned to the femur.  Retractors were placed to elevated the proximal femur and to protect the tendon of gluteus medius.  Remaining lateral neck was removed and the piriformis fossa was cleared of soft-tissue.  A box osteotome and canal finder were used to prepare for broaching.  A sharp broach was used to lateralize slightly.  We then broached up sequentially to a size 1.  It was rotationally stable and sat up 1-2 millimeters from the neck-cut.  Preoperatively the patient had templated to a high offset stem.  The high offset stem appropriately tensioned the abductors.  We trialed the following femoral heads: +4 and +8 .  The +8 appropriately tensioned the abductors and clinically equalized the leg-lengths.  The stability exam was excellent.  The hip was stable and there was no impingement posteriorly with hyper-extension and maximal external rotation.  With full extension, the knee could be fixed to bring  "the foot nearly to the buttock.  With the hip in ninety degrees of flexion and neutral rotation there was greater than 60 degrees of internal rotation before subluxation.  There appropriate movement with a \"beatrice\" test.  Happy with our stability exam, the final implant was cemented into placed using 4th generation cement technique. It was placed in approximately twenty degrees of anteversion.  It sat within 1 mm of the broach.  We then trailed with a +8 head.  The stability exam was identical.  We then placed the final head on a clean, dry neck and impacted it into place. The hip was reduced after directly visualizing the entire acetabulum.  The wound was then irrigated.  The posterior capsule and short external rotators were sutured to the greater trochanter with non-absorbable suture through bone tunnels.The fascia was closed with interrupted Vicryl, the dermis with interrupted Vicryl, and skin with running monocryl, Dermabond and steri-strips.  At the end of the procedure the sponge and needle counts were correct times two.  The patient tolerated the procedure well and returned to the PAR extubated and stable.    POSTOPERATIVE PLAN:  1. Weight bearing as tolerated  2. Standard posterior hip precautions  3. DVT prophylaxis   4. 24 hours of prophylactic antibiotics  5. Follow-up:  Wound clinic in 2 weeks and with Jeremi in clinic in 6 weeks for x-rays and a rehabilitation check.    "

## 2023-07-11 NOTE — OR NURSING
PACU to Inpatient Nursing Handoff    Patient Virginia Byers is a 72 year old female who speaks English.   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    Anesthesia Spinal   Dermatome Level     Preop Meds acetaminophen (Tylenol) - time given: 1123  TXA 1,950 mg - time given: 1123   Nerve block Not applicable   Intraop Meds fentaNYL 50 mcg/mL (mcg)  Total dose:  50 mcg    Date/Time Rate/Dose/Volume Action Route Admin User Audit    07/11/23 1302 25 mcg Given Intravenous Janett Hunter APRN CRNA     1533 25 mcg Given Intravenous Janett Hunter APRN CRNA       lidocaine 2% (mg)  Total dose:  30 mg    Date/Time Rate/Dose/Volume Action Route Admin User Audit    07/11/23 1305 30 mg Given Intravenous Janett Hunter APRN CRNA       propofol drip mcg/kg/min (mcg/kg/min)  Total dose:  775.23 mg Dosing weight:  65.2    Date/Time Rate/Dose/Volume Action Route Admin User Audit    07/11/23 1320 100 mcg/kg/min - 39.12 mL/hr New Bag Intravenous Janett Hunter APRN CRNA     1401 80 mcg/kg/min - 31.296 mL/hr Rate Change Intravenous Janett Hunter APRN CRNA     1410 70 mcg/kg/min - 27.384 mL/hr Rate Change Intravenous Janett Hunter APRN CRNA     1551  Stopped Intravenous Janett Hunter APRN CRNA       ondansetron 2 mg/mL (mg)  Total dose:  4 mg    Date/Time Rate/Dose/Volume Action Route Admin User Audit    07/11/23 1305 4 mg Given Intravenous Dale,  KOSTA Moon CRNA       ceFAZolin Sodium (ANCEF) injection 2 g (g)  Total dose:  2 g Dosing weight:  67    Date/Time Rate/Dose/Volume Action Route Admin User Audit    07/11/23 1306 2 g Given Intravenous Janett Hunter APRN CRNA       0.75% Hyperbaric Bupivacaine (Intrathecal) (mL)  Total volume:  1.6 mL    Date/Time Rate/Dose/Volume Action Route Admin User Audit    07/11/23 1303 1.6 mL Given Intrathecal Chalo Lenz MD       phenylephrine 0.1 mg/mL infusion (mcg/kg/min) (mcg/kg/min)  Total dose:  4.38 mg Dosing weight:  65.2    Date/Time Rate/Dose/Volume Action Route Admin User Audit    07/11/23 1321 0.5 mcg/kg/min - 19.56 mL/hr New Bag Intravenous Janett Hunter APRN CRNA     1410 0.7 mcg/kg/min - 27.384 mL/hr Rate Change Intravenous Janett Hunter APRN CRNA     1418 0.5 mcg/kg/min - 19.56 mL/hr Rate Change Intravenous NyboJanett APRN CRNA     1428 0.7 mcg/kg/min - 27.384 mL/hr Rate Change Intravenous NyboJanett APRN CRNA     1506 0.5 mcg/kg/min - 19.56 mL/hr Rate Change Intravenous NyboJanett APRN CRNA     1517  Stopped Intravenous Janett Hunter APRN CRNA       phenylephrine (TOO-SYNEPHRINE) injection (mcg)  Total dose:  450 mcg    Date/Time Rate/Dose/Volume Action Route Admin User Audit    07/11/23 1327 100 mcg New Bag Intravenous NyboJanett APRN CRNA     1405 100 mcg Bolus Intravenous NyboJanett APRN CRNA     1413 150 mcg Bolus Intravenous NyboJanett APRN CRNA     1428 100 mcg Bolus Intravenous NyboJanett APRN CRNA       dexamethasone (DECADRON) 4 mg/mL (mg)  Total dose:  4 mg    Date/Time Rate/Dose/Volume Action Route Admin User Audit    07/11/23 1332 4 mg Given Intravenous Janett Hunter APRN CRNA       LR (mL)  Total volume:  1,400 mL    Date/Time Rate/Dose/Volume Action Route Admin User Audit    07/11/23 1254  New Bag Intravenous Janett Hunter, APRN CRNA     1421 1,000 mL New Bag Intravenous Janett Hunter, APRN CRNA     1557 400 mL Anesthesia Volume Adjustment Intravenous Janett Hunter,  APRN CRNA          Local Meds Yes - Local Cocktail (morphine, ropivacaine, epinephrine, Toradol)   Antibiotics cefazolin (Ancef) - last given at 1306     Pain Patient Currently in Pain: denies   PACU meds  hydromorphone (Dilaudid): 0.4 mg (total dose) last given at 1635   oxycodone (Roxicodone): 5 mg (total dose) last given at 1639    PCA / epidural No   Capnography     Telemetry     Inpatient Telemetry Monitor Ordered? No        Labs Glucose Lab Results   Component Value Date    GLC 99 07/11/2023     05/11/2023     08/12/2020       Hgb Lab Results   Component Value Date    HGB 12.7 07/05/2023    HGB 13.6 08/27/2015       INR Lab Results   Component Value Date    INR 1.2 06/19/2023    INR 1.09 01/20/2023      PACU Imaging Completed     Wound/Incision Incision/Surgical Site 07/11/23 Left Hip (Active)   Incision Assessment WDL 07/11/23 1600   Closure CARLO;Adhesive strip(s);Liquid bandage 07/11/23 1600   Dressing Intervention Clean, dry, intact;New dressing applied 07/11/23 1600   Number of days: 0      CMS        Equipment ice pack and abductor pillow   Other LDA       IV Access Peripheral IV 07/11/23 Right Lower forearm (Active)   Site Assessment WD 07/11/23 1600   Line Status Infusing 07/11/23 1600   Dressing Transparent 07/11/23 1600   Dressing Status clean;dry;intact 07/11/23 1600   Phlebitis Scale 0-->no symptoms 07/11/23 1600   Infiltration? no 07/11/23 1600   Number of days: 0      Blood Products Not applicable  mL   Intake/Output Date 07/11/23 0700 - 07/12/23 0659   Shift 7381-9095 8570-9059 8795-6073 24 Hour Total   INTAKE   I.V. 1000 400  1400   Shift Total(mL/kg) 1000(15.34) 400(6.14)  1400(21.47)   OUTPUT   Blood  200  200   Shift Total(mL/kg)  200(3.07)  200(3.07)   Weight (kg) 65.2 65.2 65.2 65.2      Drains / Lopez     Time of void PreOp Time of Void Prior to Procedure: 1000 (07/11/23 1138)    PostOp      Diapered? No   Bladder Scan Bladder Scan Volume (mL): 180 ml (07/11/23 1622)    PO    tolerating sips     Vitals    B/P: 119/66  T: 98.1  F (36.7  C)    Temp src: Oral  P:  Pulse: 84 (07/11/23 1630)          R: 17  O2:  SpO2: 97 %    O2 Device: Nasal cannula (07/11/23 1630)    Oxygen Delivery: 1 LPM (07/11/23 1630)         Family/support present Friend   Patient belongings     Patient transported on bed   DC meds/scripts (obs/outpt) Not applicable   Inpatient Pain Meds Released? Yes       Special needs/considerations None   Tasks needing completion None       Ruth Argueta, RN  [facility] [id number]

## 2023-07-11 NOTE — CONSULTS
[facility]  Consult Note - Hospitalist Service, GOLD TEAM   Date of Admission:  7/11/2023  Consult Requested by: Austin Blood MD  Reason for Consult: Medical Co-management    Assessment & Plan   Virginia yBers is a 72 year old female with past medical history significant for osteoarthritis of left hip, hypertension, hyperlipidemia, follicular lymphoma, breast cancer (left breast, s/p mastectomy, chemotherapy, and endocrine therapy in 2000), spinal stenosis of lumbar region, DDD of the lumbar spine, glaucoma, and macular degeneration admitted on 7/11/2023. She had a left total hip arthroplasty with Dr. Austin Blood 7/11/23. EBL ~200cc. No apparent intra-op complications.    S/p Left total hip arthroplasty 7/11/23  Osteoarthritis of left hip  EBL 200cc. No apparent complications intra-op. Pt feeling good. Pain controlled when seen in PACU.  -Management per primary orthopedics team:   Activity: Up with assist and assistive devices as needed until independent.     Posterior hip precautions to operative side x 3 months:   1) No hip flexion beyond 90 degrees   2) No adduction beyond midline   3) No internal rotation beyond neutral    Weight bearing status: WBAT   Antibiotics: Cefazolin x 24 hours   Diet: Begin with clear fluids and progress diet as tolerated. Bowel regimen.     Anti-emetics PRN.     DVT prophylaxis:  Mechanical and ASA 81 mg BID x 4 weeks to start POD1.   Elevation: Elevate heels off of bed on pillows    Wound Care: Tegaderm and alginate x 2 weeks    Pain management: Orals PRN, IV for breakthrough only   X-rays: AP Pelvis and Lateral operative hip in PACU.    Physical Therapy: Mobilization, ROM, ADL's, Posterior hip precautions.    Occupational Therapy: ADL's   Labs: Trend Hgb on POD #1, 2   Consults: PT, OT. Hospitalist, appreciate assistance in caring for this patient throughout the perioperative period   Follow Up: 2 weeks with Dr. Blood's  "team    Hyperlipidemia   - Continue PTA rosuvastatin    Hypertension   - Hold PTA losartan in the immediate post-op setting. Consider resume in 1-2 days if BP stable.    Follicular Lymphoma  Biopsy of right external iliac lymph node 1/20/23. Low-grade (WHO grade 1-2). Negative for metatstatic carcinoma. Left orbital mass, received palliative radiation to this area on 6/14/23-6/15/23. See detailed oncology note from Dr. Naomy Zaidi on 6/29/23.   - Rituximab on hold per Dr. Zaidi   - Follow up appointment in 3 months outpatient    Bilateral Glaucoma  Macular Degeneration  Follows with Dr. Moore and Dr. Toscano at [facility].   - Continue PTA latanoprost and dorzolamide-timolol drops     The patient's care was discussed with the attending medicine physician Dr. Villegas.    Clinically Significant Risk Factors Present on Admission                  # Hypertension: Noted on problem list      # Overweight: Estimated body mass index is 25.47 kg/m  as calculated from the following:    Height as of this encounter: 1.6 m (5' 2.99\").    Weight as of this encounter: 65.2 kg (143 lb 11.8 oz).            Jeremi Greco PA-C  Hospitalist Service, GOLD TEAM   Securely message with [employer] (more info)  Text page via [employer] Paging/Directory   See signed in provider for up to date coverage information  ______________________________________________________________________    Chief Complaint   Left total hip arthroplasty    History is obtained from the patient and EMR.    History of Present Illness   Virginia Byers is a 72 year old female who is being admitted to the medicine service as a consult for co-management post left total hip arthroplasty today with Dr. Austin Blood. Virginia appeared comfortable when seen in the PACU. She denies having any pain. She was still experiencing some numbness in her lower extremities after receiving a spinal block during surgery, but it appears to be wearing off as she can feel " applied pressure on her toes and wiggle them. No fevers, chills, chest pain/pressure, shortness of breath, nausea, vomiting, abdominal pain, urinary complaints.     Past Medical History    Past Medical History:   Diagnosis Date     Breast cancer (H) 2000    Left breast, s/p mastectomy, chemotherapy and endocrine therapy     Follicular lymphoma (H) 01/20/2023     Glaucoma     bilateral - Dr. Moore - [facility]     Hyperlipidemia      Hypertension      Macular degeneration     Dr. Toscano - [facility]     Osteoarthritis      Osteoporosis      Personal history of chemotherapy 2000    A/C + Taxol     S/P radiation therapy     400 cGy to left orbit completed on 6/15/2023 [facility]       Past Surgical History   Past Surgical History:   Procedure Laterality Date     BIOPSY Left 04/06/2023    Left Orbital Biopsy     C MASTECTOMY,SIMPLE  03/2000    left     COLONOSCOPY  2006    Q 10 years     COLONOSCOPY N/A 10/04/2021    Procedure: COLONOSCOPY;  Surgeon: Guru Lyla Ruby MD;  Location: [facility] OR     ESOPHAGOSCOPY, GASTROSCOPY, DUODENOSCOPY (EGD), COMBINED N/A 10/04/2021    Procedure: ENDOSCOPIC ULTRASOUND, ESOPHAGOSCOPY / UPPER GASTROINTESTINAL TRACT (GI), Esophagogastroduodenoscopy,  Fine Needle Biopsy of liver;  Surgeon: Guru Lyla Ruby MD;  Location: [facility] OR     IR LYMPH NODE BIOPSY  01/20/2023     SURGICAL HISTORY OF -  Left 08/2015    tissue expander placed in left breast area     SURGICAL HISTORY OF -  Left 02/2016    left breast implant and right mammoplasty       Medications   Medications Prior to Admission   Medication Sig Dispense Refill Last Dose     acetaminophen (TYLENOL) 500 MG tablet Take 500 mg by mouth every 6 hours as needed for mild pain   7/11/2023 at 0500     dorzolamide-timolol (COSOPT) 2-0.5 % ophthalmic solution Place 1 drop into both eyes 2 times daily   7/11/2023     latanoprost (XALATAN) 0.005 %  ophthalmic solution Place 1 drop into both eyes At Bedtime   7/10/2023     losartan (COZAAR) 100 MG tablet TAKE 1 TABLET BY MOUTH EVERY DAY 90 tablet 0 Past Week     Multiple Vitamins-Minerals (ICAPS AREDS 2 PO)    Past Month     rosuvastatin (CRESTOR) 10 MG tablet TAKE 1 TABLET (10 MG) BY MOUTH DAILY. 90 tablet 3 Past Week        Review of Systems    The 10 point Review of Systems is negative other than noted in the HPI or here.     Social History   I have reviewed this patient's social history and updated it with pertinent information if needed.  Social History     Tobacco Use     Smoking status: Former     Packs/day: 1.00     Years: 17.00     Pack years: 17.00     Types: Cigarettes     Quit date: 3/15/1999     Years since quittin.3     Smokeless tobacco: Never   Substance Use Topics     Alcohol use: Not Currently     Alcohol/week: 4.0 standard drinks of alcohol     Types: 4 Glasses of wine per week     Drug use: No       Family History   I have reviewed this patient's family history and updated it with pertinent information if needed.  Family History   Problem Relation Age of Onset     Cancer Mother         bone cancer     Heart Disease Father      Coronary Artery Disease Father      Diabetes Father      Arthritis Sister      Breast Cancer Sister         age 75     Diabetes Maternal Grandmother      Diabetes Maternal Grandfather      Diabetes Paternal Grandmother        Allergies   Allergies   Allergen Reactions     Compazine      Mental confusion     Hydrochlorothiazide      Dryness mouth     Prochlorperazine Visual Disturbance     Simvastatin Other (See Comments)     Muscle aches        Physical Exam   Vital Signs: Temp: 97.6  F (36.4  C) Temp src: Oral BP: 128/69 Pulse: 90   Resp: 16 SpO2: 96 % O2 Device: Nasal cannula Oxygen Delivery: 1 LPM  Weight: 143 lbs 11.84 oz     GENERAL: Alert and oriented x 3. Well nourished, well developed.  No acute distress.    HEENT: Normocephalic, atraumatic. Anicteric  sclera. Mucous membranes moist.   CV: RRR. S1, S2. No murmurs appreciated.   RESPIRATORY: Effort normal on 1L. Lungs CTAB with no wheezing, rales, or rhonchi.   GI: Abdomen soft and non distended, bowel sounds present x all 4 quadrants. No tenderness, rebound, or guarding.   NEUROLOGICAL: No focal deficits. Follows commands.  Strength 5 in upper extremities and 1 in lower extremities due to the spinal block given during surgery. Able to wiggle toes bilaterally. Pedal pulses 2+ and intact.  MUSCULOSKELETAL: No joint swelling or tenderness. Moves all extremities. Surgical dressing CDI.  EXTREMITIES: No gross deformities. No peripheral edema.   SKIN: Grossly warm, dry, and intact. No jaundice. No rashes.     Medical Decision Making        45 MINUTES SPENT BY ME on the date of service doing chart review, history, exam, documentation & further activities per the note.      Data   Imaging results reviewed over the past 24 hrs:   No results found for this or any previous visit (from the past 24 hour(s)).  Recent Labs   Lab 07/11/23  1111 07/05/23  1542   HGB  --  12.7   POTASSIUM  --  4.9   GLC 99  --

## 2023-07-11 NOTE — DISCHARGE SUMMARY
[facility]  Orthopedic Surgery Discharge Summary     Date of Admission: 7/11/2023  Date of Discharge: 7/12/2023  3:20 PM  Disposition: Home  Staff Physician: Austin Blood MD  Primary Care Provider: Chuyita Zaidi    ADMISSION DIAGNOSIS:  Primary osteoarthritis of left hip    DISCHARGE DIAGNOSIS:  Primary osteoarthritis of left hip     PROCEDURES: Procedure(s):  ARTHROPLASTY, HIP, TOTAL LEFT on 7/11/2023    BRIEF HISTORY:  Ms. Gil has a history of follicular lymphoma, previous breast cancer s/p chemo and mastectomy, HLD, HTN and a long history of debilitating pain secondary to ostearthritis of the hip.  Despite comprehensive non-operative management these symptoms continued to interfere with activities of daily living.  After discussion of further treatment options including the risks and benefits that patient elected to proceed with a total hip.    HOSPITAL COURSE:  The patient was admitted following the above listed procedures for pain control and rehabilitation. Virginia Byers did well post-operatively. Medicine was consulted post operatively to aid in management of medical co-morbidities. The patient received routine nursing cares and at the time of discharge was medically stable. Vital signs were stable throughout admission. She did get a bit lightheaded with low BP during therapy on POD1. However, this resolved completely with a fluid bolus. The patient is tolerating a regular diet and is voiding spontaneously. All PT/OT goals have been met for safe mobility. Pain is now controlled on oral medications which will be available on discharge. Stool softeners have been used while taking pain medications to help prevent constipation. Virginia Byers is deemed medically safe to discharge on POD1.     Antibiotics:  Ancef given periop and 24 hours postop.   DVT Prophylaxis:  Mechanical prophylaxis and lovenox 40 qday initiated after surgery.   PT Progress:  Has met PT/OT goals  for safe mobility.    Pain Meds:  Weaned off all IV pain meds by discharge.  Inpatient Events: No significant events or complications.     PHYSICAL EXAM:  Please see daily progress note.    FOLLOWUP:  Follow up with Dr. Blood's team at 2 weeks postoperatively.    Future Appointments   Date Time Provider Department Center   7/12/2023  8:15 AM Cristal Hopper, PT [facility] [place]   7/12/2023 10:45 AM Jennie Sweeney, OT [facility] [place]   7/12/2023  1:00 PM Cristal Hopper, PT [facility] [place]   7/18/2023  9:30 AM Kaitlyn Estevez, PT IFRPT [facility]   7/25/2023 11:30 AM [facility]   7/26/2023  9:30 AM Joseluis Barrett, PT IFSBEP [facility]   8/1/2023  9:20 AM Kaitlynn Salas, PT IFS[facility]P [facility]   8/22/2023 11:00 AM Austin Blood MD [facility]   9/18/2023 10:00 AM W[facility]MA2 [facility]   9/18/2023 10:30 AM WYUS2 W[facility]US [facility]   9/26/2023  9:00 AM Chuyita Zaidi MD [facility]   9/28/2023 10:15 AM Naomy Zaidi DO [facility]     Orthopedic surgery appointments are at the [facility] [place] ([address] [place] [address]). Call [phone] to schedule a follow-up appointment at this location with your provider.     PLANNED DISCHARGE ORDERS:     DVT Prophylaxis: lovenox 40 qday x 2 weeks, followed by aspirin 162 for 2 weeks.     Activity: See below.     Wound Care: See below.      Discharge Medication List as of 7/12/2023  2:45 PM      START taking these medications    Details   ondansetron (ZOFRAN ODT) 4 MG ODT tab Take 1 tablet (4 mg) by mouth every 6 hours as needed for nausea or vomiting, Disp-8 tablet, R-0, E-Prescribe         CONTINUE these medications which have CHANGED    Details   acetaminophen (TYLENOL) 325 MG tablet Take 2 tablets (650 mg) by mouth every 4 hours as needed for other (For optimal non-opioid multimodal pain management to improve pain control.), Disp-100 tablet, R-0, E-PrescribeMax 3,000mg acetaminophen in  "24 hour period.      aspirin 81 MG EC tablet Take 1 tablet (81 mg) by mouth 2 times daily (with meals) for 14 days, Disp-28 tablet, R-0, E-PrescribeDo not start until finished with lovenox course on 7/26.      enoxaparin ANTICOAGULANT (LOVENOX) 40 MG/0.4ML syringe Inject 0.4 mLs (40 mg) Subcutaneous every 24 hours for 13 days, Disp-5.2 mL, R-0, E-Prescribe      oxyCODONE (ROXICODONE) 5 MG tablet Take 1-2 tablets (5-10 mg) by mouth every 4 hours as needed for moderate to severe pain, Disp-26 tablet, R-0, E-Prescribe      polyethylene glycol (MIRALAX) 17 GM/Dose powder Take 17 g by mouth daily, Disp-510 g, R-0, E-Prescribe      senna-docusate (SENOKOT-S/PERICOLACE) 8.6-50 MG tablet Take 1 tablet by mouth 2 times daily, Disp-30 tablet, R-0, E-Prescribe         CONTINUE these medications which have NOT CHANGED    Details   dorzolamide-timolol (COSOPT) 2-0.5 % ophthalmic solution Place 1 drop into both eyes 2 times daily, Historical      latanoprost (XALATAN) 0.005 % ophthalmic solution Place 1 drop into both eyes At Bedtime, Historical      losartan (COZAAR) 100 MG tablet TAKE 1 TABLET BY MOUTH EVERY DAY, Disp-90 tablet, R-0, E-Prescribe      Multiple Vitamins-Minerals (ICAPS AREDS 2 PO) Historical      rosuvastatin (CRESTOR) 10 MG tablet TAKE 1 TABLET (10 MG) BY MOUTH DAILY., Disp-90 tablet, R-3, E-Prescribe               Discharge Procedure Orders   Reason for your hospital stay   Order Comments: Total hip arthroplasty with Dr. Blood     When to call - Contact Surgeon Team   Order Comments: You may experience symptoms that require follow-up before your scheduled appointment. Refer to the \"[employer]\" for instructions on when to contact your Surgeon Team if you are concerned about pain control, blood clots, constipation, or if you are unable to urinate.     When to call - Reach out to Urgent Care   Order Comments: If you are not able to reach your Surgeon Team and you need immediate care, go to the Orthopedic " Walk-in Clinic or Urgent Care at your Surgeon's office.  Do NOT go to the Emergency Room unless you have shortness of breath, chest pain, or other signs of a medical emergency.     When to call - Reasons to Call 911   Order Comments: Call 911 immediately if you experience sudden-onset chest pain, arm weakness/numbness, slurred speech, or shortness of breath     Discharge Instruction - Breathing exercises   Order Comments: Perform breathing exercises using your Incentive Spirometer 10 times per hour while awake for 2 weeks.     Symptoms - Fever Management   Order Comments: A low grade fever can be expected after surgery.  Use acetaminophen (TYLENOL) as needed for fever management.  Contact your Surgeon Team if you have a fever greater than 101.5 F, chills, and/or night sweats.     Symptoms - Constipation management   Order Comments: Constipation (hard, dry bowel movements) is expected after surgery due to the combination of being less active, the anesthetic, and the opioid pain medication.  You can do the following to help reduce constipation:  ~  FLUIDS:  Drink clear liquids (water or Gatorade), or juice (apple/prune).  ~  DIET:  Eat a fiber rich diet.    ~  ACTIVITY:  Get up and move around several times a day.  Increase your activity as you are able.  MEDICATIONS:  Reduce the risk of constipation by starting medications before you are constipated.  You can take Miralax   (1 packet as directed) and/or a stool softener (Senokot 1-2 tablets 1-2 times a day).  If you already have constipation and these medications are not working, you can get magnesium citrate and use as directed.  If you continue to have constipation you can try an over the counter suppository or enema.  Call your Surgeon Team if it has been greater than 3 days since your last bowel movement.     Symptoms - Reduced Urine Output   Order Comments: Changes in the amount of fluids you drank before and after surgery may result in problems urinating.  It  is important to stay well-hydrated after surgery and drink plenty of water. If it has been greater than 8 hours since you have urinated despite drinking plenty of water, call your Surgeon Team.     Activity - Exercises to prevent blood clots   Order Comments: Unless otherwise directed by your Surgeon team, perform the following exercises at least three times per day for the first four weeks after surgery to prevent blood clots in your legs: 1) Point and flex your feet (Ankle Pumps), 2) Move your ankle around in big circles, 3) Wiggle your toes, 4) Walk, even for short distances, several times a day, will help decrease the risk of blood clots.     Order Specific Question Answer Comments   Is discharge order? Yes      Comfort and Pain Management - Pain after Surgery   Order Comments: Pain after surgery is normal and expected.  You will have some amount of pain for several weeks after surgery.  Your pain will improve with time.  There are several things you can do to help reduce your pain including: rest, ice, elevation, and using pain medications as needed. Contact your Surgeon Team if you have pain that persists or worsens after surgery despite rest, ice, elevation, and taking your medication(s) as prescribed. Contact your Surgeon Team if you have new numbness, tingling, or weakness in your operative extremity.     Comfort and Pain Management - Swelling after Surgery   Order Comments: Swelling and/or bruising of the surgical extremity is common and may persist for several months after surgery. In addition to frequent icing and elevation, gentle compressive support with an ACE wrap or tubigrip may help with swelling. Apply compression regularly, removing at least twice daily to perform skin checks. Contact your Surgeon Team if your swelling increases and is NOT associated with an increase in your activity level, or if your swelling increases and is associated with redness and pain.     Comfort and Pain Management -  "LOWER Extremity Elevation   Order Comments: Swelling is expected for several months after surgery. This type of swelling is usually associated with gravity and activity, and can be improved with elevation.   The best way to do this is to get your \"toes above your nose\" by laying down and placing several pillows lengthwise under your calf and heel. When elevating your leg keep your knee completely straight. Perform this elevation as often as possible especially for the first two weeks after surgery.     Comfort and Pain Management - Cold therapy   Order Comments: Ice can be used to control swelling and discomfort after surgery. Place a thin towel over your operative site and apply the ice pack overtop. Leave ice pack in place for 20 minutes, then remove for 20 minutes. Repeat this 20 minutes on/20 minutes off routine as often as tolerated.     Medication Instructions - Acetaminophen (TYLENOL) Instructions   Order Comments: You were discharged with acetaminophen (TYLENOL) for pain management after surgery. Acetaminophen most effectively manages pain symptoms when it is taken on a schedule without missing doses (every four, six, or eight hours). Your Provider will prescribe a safe daily dose between 3000 - 4000 mg.  Do NOT exceed this daily dose. Most patients use acetaminophen for pain control for the first four weeks after surgery.  You can wean from this medication as your pain decreases.     Medication Instructions - NSAID Instructions   Order Comments: You were discharged with an anti-inflammatory medication for pain management to use in combination with acetaminophen (TYLENOL) and the narcotic pain medication.  Take this medication exactly as directed.  You should only take one anti-inflammatory at a time.  Some common anti-inflammatories include: ibuprofen (ADVIL, MOTRIN), naproxen (ALEVE, NAPROSYN), celecoxib (CELEBREX), meloxicam (MOBIC), ketorolac (TORADOL).  Take this medication with food and water. "     Medication Instructions - Opioids - Tapering Instructions   Order Comments: In the first three days following surgery, your symptoms may warrant use of the narcotic pain medication every four to six hours as prescribed. This is normal. As your pain symptoms improve, focus your efforts on decreasing (tapering) use of narcotic medications. The most successful tapering strategy is to first, decrease the number of tablets you take every 4-6 hours to the minimum prescribed. Then, increase the amount of time between doses.  For example:  First, taper to   or 1 tablet every 4-6 hours.  Then, taper to   or 1 tablet every 6-8 hours.  Then, taper to   or 1 tablet every 8-10 hours.  Then, taper to   or 1 tablet every 10-12 hours.  Then, taper to   or 1 tablet at bedtime.  The bedtime dose can help with comfort during sleep and is typically the last dose to be discontinued after surgery.     Follow Up Care   Order Comments: Follow-up with your Surgeon Team in 2 for wound check.     Brief Discharge Instructions   Order Comments: Review outpatient procedure discharge instructions with patient as directed by Provider    -Wound Care: Your incision was closed with sutures underneath the skin. If you have a clear plastic dressing applied to your surgical site, you may shower over this and pat dry afterward. Leave dressing in place until clinic follow-up      -Pain control: Take medication as prescribed, but only as often as necessary. You have been prescribed a narcotic pain medication for assistance with pain control. Narcotic pain medications have numerous side effects which can include nausea, constipation, drowsiness, and itching. If you experience nausea, this may be relieved by taking th e medication with food. To avoid constipation, you should take a stool softener, as well as drink plenty of water and eat fruits and vegetables. Do not drive or drink alcohol while you are taking narcotic pain medication.  As soon as you  are able, you should try to wean off the narcotic pain medication. Remember that Tylenol can be used for pain relief as well, as you wean off the narcotics. Do not exceed 4,000 mg of Tylenol in 24 hours.     -Please ice and elevate your affected area as much as possible to reduce swelling. Swelling is one of the most common causes of pain after surgery.   -Activity: Weight bear as tolerated. Posterior hip precautions (no hip flexion passed 90deg, no adduction passed midline, no internal rotation)    -DVT prophylaxis: aspirin 162mg Daily x 4 weeks    F/U: at 2 weeks with Dr. Blood for wound check    -CALL OUR CLINIC ([phone] during regular office hours, or [phone] for the on-call resident MD for questions - RESIDENT DOES NOT HAVE ABILITY TO PRESCRIBE NARCOTICS) IF: Pain in your surgical area persists or worsens in the first few days after surgery. Excessive redness or drainage of cloudy or bloody material from the wounds (clear red tinted fluid and some mild drainage should be expected). Drainage of any kind > one week after surgery should be reported to the doctor. You have a temperature elevation greater than 101.5  You have pain, swelling or redness in your calf. You have numbness or weakness in your leg or foot.    -Call your primary doctor or seek medical care if you have any of the following: temperature greater than 101.4, chest pain, increased shortness of breath, nausea or vomiting.     Medication instructions - Anticoagulation - other   Order Comments: Take the lovenox 40 mg every day as prescribed for a total of 2 weeks after surgery.  This is given to help minimize your risk of blood clot.  After 2 weeks you will start aspirin 162 every day for 2 weeks.     Medication Instructions - Opioid Instructions (Greater than or equal to 65 years)   Order Comments: You were discharged with an opioid medication (hydromorphone, oxycodone, hydrocodone, or tramadol). This medication should only be taken for  "breakthrough pain that is not controlled with acetaminophen (TYLENOL). If you rate your pain less than 3 you do not need this medication.  Pain rating 0-3:  You do not need this medication  Pain rating 4-6:  Take 1/2 tablet every 4-6 hours as needed  Pain rating 7-10:  Take 1 tablet every 4-6 hours as needed  Do not exceed 4 tablets per day     Activity - Total Hip Arthroplasty   Order Comments: Refer to the [facility] \"Your Guide to Total Joint Replacement\" for recommendations on activities and Exercises.     Order Specific Question Answer Comments   Is discharge order? Yes      Return to Driving   Order Comments: Return to driving - Driving is NOT permitted until directed by your provider. Under no circumstance are you permitted to drive while using narcotic pain medications.     Order Specific Question Answer Comments   Is discharge order? Yes      Dressing / Wound Care - Wound   Order Comments: You have a clean dressing on your surgical wound. Dressing change instructions as follows: dressing will be removed at your follow-up appointment. Contact your Surgeon Team if you have increased redness, warmth around the surgical wound, and/or drainage from the surgical wound.     Dressing / Wound Care - NO Tub Bathing   Order Comments: Tub bathing, swimming, or any other activities that will cause your incision to be submerged in water should be avoided until allowed by your Surgeon.     No flexion past 90 degrees   Order Comments: No bending at waist past 90 degrees.     Order Specific Question Answer Comments   Is discharge order? Yes      No internal rotation   Order Comments: No pivoting on your operative leg.     Order Specific Question Answer Comments   Is discharge order? Yes      No adduction past midline   Order Comments: No crossing your operative leg.     Order Specific Question Answer Comments   Is discharge order? Yes      Weight bearing as tolerated   Order Comments: Weight bearing as tolerated on " your operative extremity.     Order Specific Question Answer Comments   Is discharge order? Yes      Dressing Wound Care - Shower with wound/dressing NOT covered   Order Comments: You do not need to cover your dressing or incision in the shower, you may allow water and soap to run over top of the surgical dressing or incision. You may shower 3 days after surgery.  You are strictly prohibited from soaking or submerging the surgical wound underwater.     Crutches DME   Order Comments: DME Documentation: Describe the reason for need to support medical necessity: Impaired gait status post hip surgery. I, the undersigned, certify that the above prescribed supplies are medically necessary for this patient and is both reasonable and necessary in reference to accepted standards of medical practice in the treatment of this patient's condition and is not prescribed as a convenience.     Order Specific Question Answer Comments   DME Provider: [facility]    Crutch Type: Standard    Crutches Add On: NA    Length of Need: Lifetime      Cane DME   Order Comments: DME Documentation: Describe the reason for need to support medical necessity: Impaired gait status post hip surgery. I, the undersigned, certify that the above prescribed supplies are medically necessary for this patient and is both reasonable and necessary in reference to accepted standards of medical practice in the treatment of this patient's condition and is not prescribed as a convenience.     Order Specific Question Answer Comments   DME Provider: [facility]    Cane Type: Single Tip    Reminder: Patient can typically get 1 every 5 years      Walker DME   Order Comments: : DME Documentation: Describe the reason for need to support medical necessity: Impaired gait status post hip surgery. I, the undersigned, certify that the above prescribed supplies are medically necessary for this patient and is both reasonable and necessary in reference to accepted  standards of medical practice in the treatment of this patient's condition and is not prescribed as a convenience.     Order Specific Question Answer Comments   DME Provider: [employer]    Walker Type: Standard (2 Wheel)    Accessories: N/A      Discharge Instruction - Regular Diet Adult   Order Comments: Return to your pre-surgery diet unless instructed otherwise     Order Specific Question Answer Comments   Is discharge order? Yes        Eda Kruger MD, PGY-4  Orthopedics  Pager: [phone]

## 2023-07-12 ENCOUNTER — APPOINTMENT (OUTPATIENT)
Dept: PHYSICAL THERAPY | Facility: CLINIC | Age: 73
End: 2023-07-12
Attending: ORTHOPAEDIC SURGERY
Payer: MEDICARE

## 2023-07-12 ENCOUNTER — TELEPHONE (OUTPATIENT)
Dept: ORTHOPEDICS | Facility: CLINIC | Age: 73
End: 2023-07-12

## 2023-07-12 ENCOUNTER — APPOINTMENT (OUTPATIENT)
Dept: OCCUPATIONAL THERAPY | Facility: CLINIC | Age: 73
End: 2023-07-12
Attending: STUDENT IN AN ORGANIZED HEALTH CARE EDUCATION/TRAINING PROGRAM
Payer: MEDICARE

## 2023-07-12 VITALS
SYSTOLIC BLOOD PRESSURE: 105 MMHG | WEIGHT: 143.74 LBS | DIASTOLIC BLOOD PRESSURE: 59 MMHG | HEIGHT: 63 IN | RESPIRATION RATE: 22 BRPM | HEART RATE: 89 BPM | BODY MASS INDEX: 25.47 KG/M2 | OXYGEN SATURATION: 97 % | TEMPERATURE: 97.4 F

## 2023-07-12 LAB
HGB BLD-MCNC: 10.7 G/DL (ref 11.7–15.7)
HOLD SPECIMEN: NORMAL

## 2023-07-12 PROCEDURE — 36415 COLL VENOUS BLD VENIPUNCTURE: CPT | Performed by: ORTHOPAEDIC SURGERY

## 2023-07-12 PROCEDURE — 97535 SELF CARE MNGMENT TRAINING: CPT | Mod: GO

## 2023-07-12 PROCEDURE — 258N000003 HC RX IP 258 OP 636: Performed by: INTERNAL MEDICINE

## 2023-07-12 PROCEDURE — 97110 THERAPEUTIC EXERCISES: CPT | Mod: GP

## 2023-07-12 PROCEDURE — 85018 HEMOGLOBIN: CPT | Performed by: ORTHOPAEDIC SURGERY

## 2023-07-12 PROCEDURE — 97161 PT EVAL LOW COMPLEX 20 MIN: CPT | Mod: GP

## 2023-07-12 PROCEDURE — 96372 THER/PROPH/DIAG INJ SC/IM: CPT | Performed by: ORTHOPAEDIC SURGERY

## 2023-07-12 PROCEDURE — 97165 OT EVAL LOW COMPLEX 30 MIN: CPT | Mod: GO

## 2023-07-12 PROCEDURE — 250N000013 HC RX MED GY IP 250 OP 250 PS 637: Performed by: ORTHOPAEDIC SURGERY

## 2023-07-12 PROCEDURE — 250N000011 HC RX IP 250 OP 636: Mod: JZ | Performed by: ORTHOPAEDIC SURGERY

## 2023-07-12 PROCEDURE — 97530 THERAPEUTIC ACTIVITIES: CPT | Mod: GP

## 2023-07-12 PROCEDURE — 99214 OFFICE O/P EST MOD 30 MIN: CPT | Performed by: INTERNAL MEDICINE

## 2023-07-12 RX ORDER — ONDANSETRON 4 MG/1
4 TABLET, ORALLY DISINTEGRATING ORAL EVERY 6 HOURS PRN
Qty: 8 TABLET | Refills: 0 | Status: ON HOLD | OUTPATIENT
Start: 2023-07-12 | End: 2023-10-14

## 2023-07-12 RX ORDER — ACETAMINOPHEN 325 MG/1
650 TABLET ORAL EVERY 4 HOURS PRN
Qty: 100 TABLET | Refills: 0 | Status: SHIPPED | OUTPATIENT
Start: 2023-07-14 | End: 2023-07-20

## 2023-07-12 RX ORDER — ENOXAPARIN SODIUM 100 MG/ML
40 INJECTION SUBCUTANEOUS EVERY 24 HOURS
Qty: 5.2 ML | Refills: 0 | Status: SHIPPED | OUTPATIENT
Start: 2023-07-13 | End: 2023-07-26

## 2023-07-12 RX ORDER — ASPIRIN 81 MG/1
81 TABLET ORAL 2 TIMES DAILY WITH MEALS
Qty: 28 TABLET | Refills: 0 | Status: SHIPPED | OUTPATIENT
Start: 2023-07-27 | End: 2023-07-12

## 2023-07-12 RX ORDER — ACETAMINOPHEN 325 MG/1
650 TABLET ORAL EVERY 4 HOURS PRN
Qty: 100 TABLET | Refills: 0 | Status: SHIPPED | OUTPATIENT
Start: 2023-07-14 | End: 2023-07-12

## 2023-07-12 RX ORDER — POLYETHYLENE GLYCOL 3350 17 G/17G
17 POWDER, FOR SOLUTION ORAL DAILY
Qty: 510 G | Refills: 0 | Status: SHIPPED | OUTPATIENT
Start: 2023-07-12 | End: 2023-10-03

## 2023-07-12 RX ORDER — AMOXICILLIN 250 MG
1 CAPSULE ORAL 2 TIMES DAILY
Qty: 30 TABLET | Refills: 0 | Status: SHIPPED | OUTPATIENT
Start: 2023-07-12 | End: 2023-07-12

## 2023-07-12 RX ORDER — POLYETHYLENE GLYCOL 3350 17 G/17G
17 POWDER, FOR SOLUTION ORAL DAILY
Qty: 510 G | Refills: 0 | Status: SHIPPED | OUTPATIENT
Start: 2023-07-12 | End: 2023-07-12

## 2023-07-12 RX ORDER — ENOXAPARIN SODIUM 100 MG/ML
40 INJECTION SUBCUTANEOUS EVERY 24 HOURS
Qty: 5.2 ML | Refills: 0 | Status: SHIPPED | OUTPATIENT
Start: 2023-07-13 | End: 2023-07-12

## 2023-07-12 RX ORDER — AMOXICILLIN 250 MG
1 CAPSULE ORAL 2 TIMES DAILY
Qty: 30 TABLET | Refills: 0 | Status: SHIPPED | OUTPATIENT
Start: 2023-07-12 | End: 2023-10-03

## 2023-07-12 RX ORDER — OXYCODONE HYDROCHLORIDE 5 MG/1
5-10 TABLET ORAL EVERY 4 HOURS PRN
Qty: 26 TABLET | Refills: 0 | Status: SHIPPED | OUTPATIENT
Start: 2023-07-12 | End: 2023-07-14

## 2023-07-12 RX ORDER — OXYCODONE HYDROCHLORIDE 5 MG/1
5-10 TABLET ORAL EVERY 4 HOURS PRN
Qty: 26 TABLET | Refills: 0 | Status: SHIPPED | OUTPATIENT
Start: 2023-07-12 | End: 2023-07-12

## 2023-07-12 RX ORDER — ASPIRIN 81 MG/1
81 TABLET ORAL 2 TIMES DAILY WITH MEALS
Qty: 28 TABLET | Refills: 0 | Status: SHIPPED | OUTPATIENT
Start: 2023-07-27 | End: 2023-08-10

## 2023-07-12 RX ADMIN — CEFAZOLIN 1 G: 1 INJECTION, POWDER, FOR SOLUTION INTRAMUSCULAR; INTRAVENOUS at 05:45

## 2023-07-12 RX ADMIN — SENNOSIDES AND DOCUSATE SODIUM 1 TABLET: 50; 8.6 TABLET ORAL at 07:45

## 2023-07-12 RX ADMIN — DORZOLAMIDE HYDROCHLORIDE AND TIMOLOL MALEATE 1 DROP: 22.3; 6.8 SOLUTION/ DROPS OPHTHALMIC at 07:44

## 2023-07-12 RX ADMIN — ACETAMINOPHEN 975 MG: 325 TABLET, FILM COATED ORAL at 11:35

## 2023-07-12 RX ADMIN — POLYETHYLENE GLYCOL 3350 17 G: 17 POWDER, FOR SOLUTION ORAL at 07:45

## 2023-07-12 RX ADMIN — ACETAMINOPHEN 975 MG: 325 TABLET, FILM COATED ORAL at 04:04

## 2023-07-12 RX ADMIN — OXYCODONE HYDROCHLORIDE 10 MG: 10 TABLET ORAL at 07:45

## 2023-07-12 RX ADMIN — SODIUM CHLORIDE, POTASSIUM CHLORIDE, SODIUM LACTATE AND CALCIUM CHLORIDE 1000 ML: 600; 310; 30; 20 INJECTION, SOLUTION INTRAVENOUS at 09:47

## 2023-07-12 RX ADMIN — OXYCODONE HYDROCHLORIDE 5 MG: 5 TABLET ORAL at 12:39

## 2023-07-12 RX ADMIN — ENOXAPARIN SODIUM 40 MG: 40 INJECTION SUBCUTANEOUS at 07:45

## 2023-07-12 RX ADMIN — ROSUVASTATIN CALCIUM 10 MG: 10 TABLET, FILM COATED ORAL at 07:45

## 2023-07-12 ASSESSMENT — ACTIVITIES OF DAILY LIVING (ADL)
ADLS_ACUITY_SCORE: 24

## 2023-07-12 NOTE — PLAN OF CARE
Occupational Therapy Discharge Summary    Reason for therapy discharge:    All goals and outcomes met, no further needs identified.    Progress towards therapy goal(s). See goals on Care Plan in [facility] electronic health record for goal details.  Goals met    Therapy recommendation(s):    No further therapy is recommended.  Defer d/c recommendations to ortho

## 2023-07-12 NOTE — PROGRESS NOTES
07/12/23 1123   Appointment Info   Signing Clinician's Name / Credentials (OT) Leana Coleman, OTR/L   Rehab Comments (OT) LLE WBAT, posterior hip prec   Quick Adds   Quick Adds Certification   Living Environment   People in Home friend(s)   Current Living Arrangements house   Home Accessibility no concerns   Number of Stairs, Within Home, Primary none   Transportation Anticipated family or friend will provide   Living Environment Comments Pt lives with friend. Bathroom has walk in shower with shower chair and suction cup grab bars.   Self-Care   Usual Activity Tolerance good   Current Activity Tolerance moderate   Equipment Currently Used at Home walker, rolling;grab bar, tub/shower;shower chair;dressing device   Fall history within last six months no   Activity/Exercise/Self-Care Comment Reports assist with LB dressing but IND with all ADLs. Pt manages medications but friend assists with all other IADLs. Reports ambulating with FWW or 4WW at home, mostly using 4WW.   General Information   Onset of Illness/Injury or Date of Surgery 07/11/23   Referring Physician Austin Blood MD   Additional Occupational Profile Info/Pertinent History of Current Problem [place] Byers is a 72 year old female with past medical history significant for osteoarthritis of left hip, hypertension, hyperlipidemia, follicular lymphoma, breast cancer (left breast, s/p mastectomy, chemotherapy, and endocrine therapy in 2000), spinal stenosis of lumbar region, DDD of the lumbar spine, glaucoma, and macular degeneration admitted on 7/11/2023. She had a left total hip arthroplasty with Dr. Austin Blood 7/11/23. EBL ~200cc. No apparent intra-op complications.   Existing Precautions/Restrictions fall;no hip IR;no hip ADD past midline;90 degree hip flexion;no pivoting or twisting   Left Upper Extremity (Weight-bearing Status) full weight-bearing (FWB)   Right Upper Extremity (Weight-bearing Status) full weight-bearing (FWB)   Left Lower  Extremity (Weight-bearing Status) weight-bearing as tolerated (WBAT)   Right Lower Extremity (Weight-bearing Status) full weight-bearing (FWB)   Cognitive Status Examination   Orientation Status orientation to person, place and time   Cognitive Status Comments Appears WNL   Pain Assessment   Patient Currently in Pain Yes, see Vital Sign flowsheet   Range of Motion Comprehensive   General Range of Motion no range of motion deficits identified   Strength Comprehensive (MMT)   General Manual Muscle Testing (MMT) Assessment no strength deficits identified   Bed Mobility   Comment (Bed Mobility) SBA with increased time and leg    Transfers   Transfer Comments Min A STS with FWW   Activities of Daily Living   BADL Assessment/Intervention bathing;lower body dressing;grooming;toileting   Bathing Assessment/Intervention   Comment, (Bathing) Per clinical judgement, SBA   Lower Body Dressing Assessment/Training   Comment, (Lower Body Dressing) Max A to doff socks   Grooming Assessment/Training   Comment, (Grooming) Per clinical judgement, SBA   Toileting   Comment, (Toileting) SBA with FWW   Clinical Impression   Criteria for Skilled Therapeutic Interventions Met (OT) Yes, treatment indicated   OT Diagnosis Decline in ADL/IADL performance   Influenced by the following impairments Post-surgical precautions, pain, decreased strength, impaired balance, decreased endurance   OT Problem List-Impairments impacting ADL problems related to;activity tolerance impaired;balance;mobility;strength;pain;post-surgical precautions   Assessment of Occupational Performance 3-5 Performance Deficits   Identified Performance Deficits Dressing, toileting, showering   Planned Therapy Interventions (OT) ADL retraining;balance training;bed mobility training;ROM;strengthening;transfer training;home program guidelines;progressive activity/exercise;risk factor education   Clinical Decision Making Complexity (OT) low complexity   Anticipated  Equipment Needs Upon Discharge (OT) dressing equipment;hip kit   Risk & Benefits of therapy have been explained evaluation/treatment results reviewed;care plan/treatment goals reviewed;risks/benefits reviewed;current/potential barriers reviewed;participants voiced agreement with care plan;participants included;patient;friend   OT Total Evaluation Time   OT Eval, Low Complexity Minutes (79885) 6   Therapy Certification   Medical Diagnosis L TACOS   Start of Care Date 07/11/23   Certification date from 07/11/23   Certification date to 07/12/23   OT Goals   Therapy Frequency (OT) One time eval and treatment   OT Predicted Duration/Target Date for Goal Attainment 07/12/23   OT Goals Lower Body Dressing;Lower Body Bathing;Toilet Transfer/Toileting   OT: Lower Body Dressing Modified independent;using adaptive equipment;within precautions   OT: Lower Body Bathing Modified independent;using adaptive equipment;with precautions   OT: Toilet Transfer/Toileting Modified independent   Self-Care/Home Management   Self-Care/Home Mgmt/ADL, Compensatory, Meal Prep Minutes (97772) 40   Symptoms Noted During/After Treatment (Meal Preparation/Planning Training) fatigue;increased pain   Treatment Detail/Skilled Intervention OT: Upon completion of eval, treatment indicated. Pt able to recall post-surgical precautions from education during PT session. Educated pt on leg  and pt able to perform supine<->sit with SBA, leg , and increased time d/t pain. Educated pt on LB dressing AE, with pt able to don socks with Mod I using sock aid and underwear and pants with Mod I to thread BLE using reacher and SBA to pull over hips while standing. Required Min A to don shoes with pt reporting owning long handlded shoe horn at home. Performed STS from EOB with Min A and FWW. BP monitored t/o 110/52 in supine, 97/57 seated at EOB, and 97/61 in standing. Facilitated functional mobility and toilet transfer with SBA and FWW. Provided handout on  LB AE, with pt and friend verbalizing understanding of equipment and how to obtain. Pt remained supine in bed at end of session with all needs within reach and friend in room.   OT Discharge Planning   OT Plan D/c OT   OT Discharge Recommendation (DC Rec)   (defer to ortho)   OT Rationale for DC Rec Pt performing near functional baseline, limited by post-surgical precautions. Pt with good social support from friend at home to assist as needed. Anticipate pt safe to d/c home with assist as needed, but defer d/c recommendations to ortho. No futher IP OT needs. Will sign off at this time.   OT Brief overview of current status SBA with FWW   Total Session Time   Timed Code Treatment Minutes 40   Total Session Time (sum of timed and untimed services) 46                                                                                   [facility]      OUTPATIENT OCCUPATIONAL THERAPY  EVALUATION  PLAN OF TREATMENT FOR OUTPATIENT REHABILITATION  (COMPLETE FOR INITIAL CLAIMS ONLY)  Patient's Last Name, First Name, M.I.  YOB: 1950  Virginia Byers                          Provider's Name  [facility] Medical Record No.  [id number]                               Onset Date:  07/11/23   Start of Care Date:  07/11/23     Type:     ___PT   _X_OT   ___SLP Medical Diagnosis:  L TACOS                        OT Diagnosis:  Decline in ADL/IADL performance   Visits from SOC:  1   _________________________________________________________________________________  Plan of Treatment/Functional Goals    Planned Interventions: ADL retraining, balance training, bed mobility training, ROM, strengthening, transfer training, home program guidelines, progressive activity/exercise, risk factor education   Goals: See Occupational Therapy Goals on Care Plan in [facility] electronic health record.    Therapy Frequency: One time eval and treatment  Predicted Duration of Therapy  Intervention: 07/12/23  _________________________________________________________________________________    I CERTIFY THE NEED FOR THESE SERVICES FURNISHED UNDER        THIS PLAN OF TREATMENT AND WHILE UNDER MY CARE .             Physician Signature               Date    X_____________________________________________________                  Certification date from: 07/11/23, Certification date to: 07/12/23    Referring Physician: Austin Blood MD            Initial Assessment        See Occupational Therapy evaluation dated 07/11/23 in Epic electronic health record.

## 2023-07-12 NOTE — PROVIDER NOTIFICATION
Patient c/o of dizziness and lightheadedness with therapy. BP while sitting at the edge of the bed was 83/47 and 92/47. MD notified and ordered obtained.

## 2023-07-12 NOTE — PROGRESS NOTES
"VS: /52 (BP Location: Left arm)   Pulse 103   Temp 98.3  F (36.8  C) (Oral)   Resp 16   Ht 1.6 m (5' 2.99\")   Wt 65.2 kg (143 lb 11.8 oz)   SpO2 97%   BMI 25.47 kg/m         O2: 1L   Output: Voids spontaneously without difficulty to bathroom   Last BM:  7/11 per pt   Activity:  A X1; GB & walker   Skin:  L surgical incision   Pain:  3/10; relieved with scheduled tylenol   CMS:  A&O X4; denies numbness   Dressing:  CDI   Diet:  Regular   LDA:  R PIV SL   Equipment:  IV pole & personal belongings   Plan:  Possible discharge today pending PT    Additional Info:         "

## 2023-07-12 NOTE — PHARMACY-ADMISSION MEDICATION HISTORY
Pharmacist Admission Medication History    Admission medication history is complete. The information provided in this note is only as accurate as the sources available at the time of the update.    Medication reconciliation/reorder completed by provider prior to medication history? Yes    Information Source(s): Patient, Clinic records and CareEverywhere/SureScripts     Changes made to PTA medication list: none     Allergies reviewed with patient and updates made in EHR: no    Medication History Completed By: RASHID SIMON [facility] 7/11/2023 7:11 PM    Prior to Admission medications    Medication Sig Last Dose Taking? Auth Provider Long Term End Date   acetaminophen (TYLENOL) 500 MG tablet Take 500 mg by mouth every 6 hours as needed for mild pain 7/11/2023 at 0500 Yes Reported, Patient     dorzolamide-timolol (COSOPT) 2-0.5 % ophthalmic solution Place 1 drop into both eyes 2 times daily 7/11/2023 Yes Reported, Patient     latanoprost (XALATAN) 0.005 % ophthalmic solution Place 1 drop into both eyes At Bedtime 7/10/2023 Yes Reported, Patient     losartan (COZAAR) 100 MG tablet TAKE 1 TABLET BY MOUTH EVERY DAY Past Week Yes Chuyita Zaidi MD Yes    Multiple Vitamins-Minerals (ICAPS AREDS 2 PO)  Past Month Yes Reported, Patient     rosuvastatin (CRESTOR) 10 MG tablet TAKE 1 TABLET (10 MG) BY MOUTH DAILY. Past Week Yes Chuyita Zaidi MD Yes

## 2023-07-12 NOTE — PLAN OF CARE
"VS: /59 (BP Location: Left arm)   Pulse 89   Temp 97.4  F (36.3  C) (Oral)   Resp 22   Ht 1.6 m (5' 2.99\")   Wt 65.2 kg (143 lb 11.8 oz)   SpO2 97%   BMI 25.47 kg/m     O2: >90% on room air. Denies SOB/N/V/chest pain    Output: Voiding spontaneously without difficulties    Last BM: 07/11/23. Bowel sounds active in all quad    Activity: WBAT. Ax1 with walker and gait belt    Skin: Incision to left hip    Pain: Managed with schedule Tylenol, PRN Oxycodone, and ice applied    CMS: Alert and oriented x4. Denies numbness and tingling    Dressing: CDI   Diet: Regular diet    LDA: PIV saline locked    Equipment: IV pole/pump, walker, gait belt, PCDs, personal belongings, call light within patient reach    Plan: To discharge to home today.    Additional Info:        Patient vital signs are at baseline: Yes  Patient able to ambulate as they were prior to admission or with assist devices provided by therapies during their stay:  Yes  Patient MUST void prior to discharge:  Yes  Patient able to tolerate oral intake:  Yes  Pain has adequate pain control using Oral analgesics:  Yes  Does patient have an identified :  Yes  Has goal D/C date and time been discussed with patient:  Yes    Pt. discharged at 1520 via wheelchair to Home. Pt. was accompanied by SO and writer, and left with personal belongings. Prior to discharge, PIV was removed. Pt. received complete discharge paperwork and medications as filled by discharge pharmacy. Pt. was given times of last dose for all discharge medications in writing on discharge medication sheets.  Discharge teaching included stoplight tool,  medication, pain management, activity restrictions, dressing changes, and signs and symptoms of infection. Pt. to follow up with surgeon in 2 weeks. Pt. had no further questions at the time of discharge and no unmet needs were identified.      "

## 2023-07-12 NOTE — PROGRESS NOTES
[facility]    Medicine Progress Note - Hospitalist Service, GOLD TEAM 18    Date of Admission:  7/11/2023    Assessment & Plan   Virginia Byers is a 72 year old female with past medical history significant for osteoarthritis of left hip, hypertension, hyperlipidemia, follicular lymphoma, breast cancer (left breast, s/p mastectomy, chemotherapy, and endocrine therapy in 2000), spinal stenosis of lumbar region, DDD of the lumbar spine, glaucoma, and macular degeneration admitted on 7/11/2023. She had a left total hip arthroplasty with Dr. Austin Blood 7/11/23. EBL ~200cc. No apparent intra-op complications.    OK to discharge from medicine standpoint  Encourage incentive spirometry     S/p Left total hip arthroplasty 7/11/23  Osteoarthritis of left hip  EBL 200cc. No apparent complications intra-op. Pt feeling good. Pain controlled when seen in PACU.  -Management per primary orthopedics team:   Activity: Up with assist and assistive devices as needed until independent.     Posterior hip precautions to operative side x 3 months:   1) No hip flexion beyond 90 degrees   2) No adduction beyond midline   3) No internal rotation beyond neutral    Weight bearing status: WBAT   Antibiotics: Cefazolin x 24 hours   Diet: Begin with clear fluids and progress diet as tolerated. Bowel regimen.     Anti-emetics PRN.     DVT prophylaxis:  Mechanical and ASA 81 mg BID x 4 weeks to start POD1.   Elevation: Elevate heels off of bed on pillows    Wound Care: Tegaderm and alginate x 2 weeks    Pain management: Orals PRN, IV for breakthrough only   X-rays: AP Pelvis and Lateral operative hip in PACU.    Physical Therapy: Mobilization, ROM, ADL's, Posterior hip precautions.    Occupational Therapy: ADL's   Labs: Trend Hgb on POD #1, 2   Consults: PT, OT. Hospitalist, appreciate assistance in caring for this patient throughout the perioperative period   Follow Up: 2 weeks with Dr. Blood's  "team  -dvt ppx per primary  -pain control per primary  -ensure good bowel regimen/incentive spriometry  -check hemoglobin pod#1  -PT OT     Hyperlipidemia   - Continue PTA rosuvastatin    Hypertension   - Hold PTA losartan in the immediate post-op setting. Consider resume in 1-2 days if BP stable.    Follicular Lymphoma  Biopsy of right external iliac lymph node 1/20/23. Low-grade (WHO grade 1-2). Negative for metatstatic carcinoma. Left orbital mass, received palliative radiation to this area on 6/14/23-6/15/23. See detailed oncology note from Dr. Naomy Zaidi on 6/29/23.   - Rituximab on hold per Dr. Zaidi   - Follow up appointment in 3 months outpatient    Bilateral Glaucoma  Macular Degeneration  Follows with Dr. Moore and Dr. Toscano at [facility].   - Continue PTA latanoprost and dorzolamide-timolol drops         Diet: Advance Diet as Tolerated: Regular Diet Adult  Discharge Instruction - Regular Diet Adult    DVT Prophylaxis: Defer to primary service  Lopez Catheter: Not present  Lines: None     Cardiac Monitoring: None  Code Status: Full Code      Clinically Significant Risk Factors Present on Admission                  # Hypertension: Noted on problem list      # Overweight: Estimated body mass index is 25.47 kg/m  as calculated from the following:    Height as of this encounter: 1.6 m (5' 2.99\").    Weight as of this encounter: 65.2 kg (143 lb 11.8 oz).            Disposition Plan      Expected Discharge Date: 07/12/2023                  Kathy Harris MD  Hospitalist Service, GOLD TEAM 18  [facility]  Securely message with [employer] (more info)  Text page via [employer] Paging/Directory   See signed in provider for up to date coverage information  ______________________________________________________________________    Interval History   doingok  No chest pain sob  No NVD    Physical Exam   Vital Signs: Temp: 97.4  F (36.3  C) Temp src: " Oral BP: 106/52 Pulse: 93   Resp: 17 SpO2: 97 % O2 Device: None (Room air) Oxygen Delivery: 1 LPM  Weight: 143 lbs 11.84 oz    General: No acute distress, breathing comfortably on room air  Neuro: EOMI, PERRLA. Facial muscles symmetric, strength/sensation grossly intact.  HEENT: Anicteric sclera. Oropharynx is clear. No lymphadenopathy.  Chest/Lungs: No accessory respiratory muscle use. Adequate air movement throughout. CTAB.  CV: Normal rate, regular rhythm. nl S1/S2. No m/r/g. Cap refill &lt;2s.  Abd: BS+. Soft, NTND.  Ext: Warm, well-perfused. Strong distal pulses    Medical Decision Making       40 MINUTES SPENT BY ME on the date of service doing chart review, history, exam, documentation & further activities per the note.      Data   NOTE: Data reviewed over the past 24 hrs contributes toward MDM complexity

## 2023-07-12 NOTE — PLAN OF CARE
Physical Therapy Discharge Summary    Reason for therapy discharge:    All goals and outcomes met, no further needs identified.    Progress towards therapy goal(s). See goals on Care Plan in [facility] electronic health record for goal details.  Goals met    Therapy recommendation(s):    Continue home exercise program.

## 2023-07-12 NOTE — TELEPHONE ENCOUNTER
[facility] Call Center    Phone Message    May a detailed message be left on voicemail: yes     Reason for Call: Other: Aurelio calling from pts home re: medication that pt has been taking - pt has been currently taking tylenol - and on d/c paperwork it states pt to not take until 07/14.  Aurelio asking if pt should not be taking it - or to continue to take with pain medication.  Duong asking for call back at [phone]     Action Taken: Message routed to:  [facility] UMP: Dr. pat Blood     Travel Screening: Not Applicable

## 2023-07-12 NOTE — PLAN OF CARE
"Pt was admitted to unit @ 1715    Goal Outcome Evaluation:       Overall Patient Progress: improving  Outcome Evaluation: Pt got to unit @ 1715. Pt tolerated PO drink well & ordered dinner.  VS:  /69 (BP Location: Left arm)   Pulse 90   Temp 97.6  F (36.4  C) (Oral)   Resp 16   Ht 1.6 m (5' 2.99\")   Wt 65.2 kg (143 lb 11.8 oz)   SpO2 96%   BMI 25.47 kg/m      O2:  1L   Output:  Has not voided since admitted to unit   Last BM:  7/11 per pt   Activity:  A X1; GB & walker; pt was not OOB this shift   Skin:  L surgical incision   Pain:  Denied pain; pt received local cocktail @ 1510   CMS:  A&O X4; numbness present BLE    Dressing:  CDI   Diet:  Reg w/ thin; denied N/V   LDA:  R PIV infusing LR @ 75 mL/hr, can be stopped once pt tolerates PO drink   Equipment:  IV pole & personal belongings   Plan:  Continue POC   Additional Info:           "

## 2023-07-12 NOTE — PROGRESS NOTES
[facility]  OUTPATIENT PHYSICAL THERAPY EVALUATION  PLAN OF TREATMENT FOR OUTPATIENT REHABILITATION  (COMPLETE FOR INITIAL CLAIMS ONLY)  Patient's Last Name, First Name, M.I.  YOB: 1950  Virginia Byers                        Provider's Name  [facility] Medical Record No.  [id number]                             Onset Date:  07/11/23   Start of Care Date:      Type:     _X_PT   ___OT   ___SLP Medical Diagnosis:                 PT Diagnosis:  impaired functional mobility Visits from SOC:  1     See note for plan of treatment, functional goals and certification details    I CERTIFY THE NEED FOR THESE SERVICES FURNISHED UNDER        THIS PLAN OF TREATMENT AND WHILE UNDER MY CARE     (Physician co-signature of this document indicates review and certification of the therapy plan).                  07/12/23 0844   Appointment Info   Signing Clinician's Name / Credentials (PT) VIC Smith   Student Supervision On-site supervision provided;Direct supervision provided;Direct Patient Contact Provided;Therapy services provided with the co-signing licensed therapist guiding and directing the services, and providing the skilled judgement and assessment throughout the session       Present no   Language English   Living Environment   People in Home friend(s)  (lives in home full time)   Current Living Arrangements other (see comments)  (coyle)   Home Accessibility no concerns   Number of Stairs, Within Home, Primary none   Transportation Anticipated family or friend will provide   Living Environment Comments Friend will be around at all times to provide support as needed   Self-Care   Usual Activity Tolerance good   Current Activity Tolerance moderate   Regular Exercise Yes   Activity/Exercise Type other (see comments)  (PT for years on and off, HEP)   Exercise Amount/Frequency other (see comments)  (did not quantify)    Equipment Currently Used at Home walker, rolling  (owns standard walker)   Fall history within last six months no   Activity/Exercise/Self-Care Comment Pt. reports needing help with socks, difficult pants on occasion   General Information   Onset of Illness/Injury or Date of Surgery 07/11/23   Referring Physician Eda Kruger MD   Patient/Family Therapy Goals Statement (PT) get home   Pertinent History of Current Problem (include personal factors and/or comorbidities that impact the POC) s/p L TACOS   Existing Precautions/Restrictions fall;no hip IR;no hip ADD past midline;90 degree hip flexion;no pivoting or twisting   Weight-Bearing Status - LUE full weight-bearing   Weight-Bearing Status - RUE full weight-bearing   Weight-Bearing Status - LLE weight-bearing as tolerated   Weight-Bearing Status - RLE full weight-bearing   Cognition   Affect/Mental Status (Cognition) WNL   Orientation Status (Cognition) oriented x 3   Follows Commands (Cognition) WNL   Pain Assessment   Patient Currently in Pain Yes, see Vital Sign flowsheet   Integumentary/Edema   Integumentary/Edema no deficits were identifed   Integumentary/Edema Comments Surgical incision not observed, dressing in place   Posture    Posture Forward head position;Protracted shoulders   Range of Motion (ROM)   Range of Motion ROM deficits secondary to surgical procedure;ROM deficits secondary to pain   ROM Comment not formally assessed   Strength (Manual Muscle Testing)   Strength (Manual Muscle Testing) strength is WFL   Strength Comments not formally assessed   Bed Mobility   Comment, (Bed Mobility) pt. performs supine<>sit with SBA   Transfers   Comment, (Transfers) Pt. performs sit<>stand with CGA   Gait/Stairs (Locomotion)   Comment, (Gait/Stairs) Pt. does not ambulate this session secondary to low BP   Balance   Balance no deficits were identified   Balance Comments independent in sitting balance, no deficits in static standing balance noted   Sensory  Examination   Sensory Perception patient reports no sensory changes   Clinical Impression   Criteria for Skilled Therapeutic Intervention Yes, treatment indicated   PT Diagnosis (PT) impaired functional mobility   Influenced by the following impairments increased post-op pain, precautions   Functional limitations due to impairments impaired bed mobility, transfers, gait   Clinical Presentation (PT Evaluation Complexity) Stable/Uncomplicated   Clinical Presentation Rationale per clinical judgement   Clinical Decision Making (Complexity) low complexity   Planned Therapy Interventions (PT) bed mobility training;gait training;home exercise program;patient/family education;strengthening;transfer training;progressive activity/exercise;risk factor education;home program guidelines   Risk & Benefits of therapy have been explained evaluation/treatment results reviewed;risks/benefits reviewed;care plan/treatment goals reviewed;current/potential barriers reviewed   PT Total Evaluation Time   PT Eval, Low Complexity Minutes (31959) 10   Plan of Care Review   Plan of Care Reviewed With patient;friend   Physical Therapy Goals   PT Frequency 2x/day   PT Predicted Duration/Target Date for Goal Attainment 07/14/23   PT Goals Bed Mobility;Transfers;Gait   PT: Bed Mobility Supine to/from sit;Within precautions;Independent   PT: Transfers Modified independent;Assistive device;Bed to/from chair;Sit to/from stand;Within precautions   PT: Gait Modified independent;100 feet;Assistive device;Within precautions   Interventions   Interventions Quick Adds Therapeutic Activity;Therapeutic Procedure   Therapeutic Procedure/Exercise   Ther. Procedure: strength, endurance, ROM, flexibillity Minutes (79180) 10   Symptoms Noted During/After Treatment increased pain   Treatment Detail/Skilled Intervention Pt. instructed in HEP for TACOS including: AP, GS, QS, HS, heel slides, SAQ to be done bid. Pt. completes x10 each this session with good tolerance.  Pt. reports she was completing an HEP prior to surgery and demonstrates familiarity with exercises.   Therapeutic Activity   Therapeutic Activities: dynamic activities to improve functional performance Minutes (15339) 15   Treatment Detail/Skilled Intervention Pt. received supine in bed, agreeable to participating in PT session. Pt. educated on WB status (WBAT) and posterior hip precautions. Pt. acknowledges understanding. Pt. then performs supine to sit transfer with HOB slightly elevated, bed rail, verbal cues for technique, SBA. Pt. sits EOB with ind sitting balance and BP checked = 83/47. Pt. instructed to continue sitting EOB and another reading obtained after a few minutes, BP= 92/47 no c/o lightheadedness/dizziness. Pt. then performs sit to stand and stands near EOB with walker, CGA. Another BP attempted but system unable to read before pt. endorses lightheadedness and asks to sit down. Pt. performs sit to supine transfer with SBA and then is instructed in scoots towards HOB with bed rails. Pt. ends session with all needs met and call light in reach. Plan to assess ambulation at next session pending BP WNL.   PT Discharge Planning   PT Plan Bed mobility, transfers, gait with walker   PT Discharge Recommendation (DC Rec)   (defer to ortho)   PT Rationale for DC Rec per protocol   PT Brief overview of current status SBA for bed mobility, CGA with walker for all other mobility   Total Session Time   Timed Code Treatment Minutes 25   Total Session Time (sum of timed and untimed services) 35

## 2023-07-12 NOTE — PROGRESS NOTES
"Orthopedic Surgery Progress Note: 07/12/2023    Subjective:   No acute events overnight. Pain well-controlled. Tolerating a diet. Voiding independently. Has been OOB with RN. No therapy yet. + flatus. No new concerns or complaints. Discussed POC, showed her her xrays.   Lives in a home in [place].     Objective:   /52 (BP Location: Left arm)   Pulse 103   Temp 98.3  F (36.8  C) (Oral)   Resp 16   Ht 1.6 m (5' 2.99\")   Wt 65.2 kg (143 lb 11.8 oz)   SpO2 97%   BMI 25.47 kg/m    No intake/output data recorded.  General: NAD. Resting comfortably in bed.  Respiratory: Breathing comfortably on RA.  Drain Output: No drain.  Musculoskeletal:   LLE:  Dressings c/d/i   Fires EHL/FHL/AT/GaSC  SILT SP/DP/Saph/Sural/T  2+ DP, WWP      Laboratory Data:  Lab Results   Component Value Date    WBC 7.7 06/19/2023    HGB 10.7 (L) 07/12/2023     07/11/2023    INR 1.2 (L) 06/19/2023       Assessment & Plan:   Virginia Gil has a history of follicular lymphoma, previous breast cancer s/p chemo and mastectomy, HLD, HTN and a long history of debilitating pain secondary to ostearthritis of the hip and is now s/p L TACOS on 7/11 with Dr. Blood.    Goals for Today:  - Pain control  - therapies  - safe dispo planning     Okay to discharge from an ortho perspective today if cleared medically and by therapies.    Orthopedic Surgery primary  Activity: Up with assist and assistive devices as needed until independent. Posterior hip precautions to operative side x 3 months:  1) No hip flexion beyond 90 degrees  2) No adduction beyond midline  3) No internal rotation beyond neutral   Weight bearing status: WBAT  Antibiotics: Cefazolin x 24 hours  Diet: Begin with clear fluids and progress diet as tolerated. Bowel regimen. Anti-emetics PRN.    DVT prophylaxis:  Mechanical and lovenox 40 qday x 2 weeks to start POD1. Then start aspirin 162 qday x 2 weeks.  Elevation: Elevate heels off of bed on pillows   Wound Care: Tegaderm and " alginate x 2 weeks   Drains: none  Pain management: Orals PRN, IV for breakthrough only  X-rays: AP Pelvis and Lateral operative hip in PACU.   Physical Therapy: Mobilization, ROM, ADL's, Posterior hip precautions.   Occupational Therapy: ADL's  Labs: Trend Hgb on POD #1, 2  Consults: PT, OT. Hospitalist, appreciate assistance in caring for this patient throughout the perioperative period  Follow Up: 2 weeks with Dr. Blood's team.     Disposition: Pending progress with therapies, pain control on orals, and medical stability, anticipate discharge to Home on POD #1-2.     Orthopedic Surgery staff for this patient is Dr. Blood.  ------------------------------------------------------------------------------------------    Eda Kruger MD, PGY-4  Orthopedics  Pager: [phone]

## 2023-07-12 NOTE — ANESTHESIA POSTPROCEDURE EVALUATION
Patient: Virginia Byers    Procedure: Procedure(s):  ARTHROPLASTY, HIP, TOTAL LEFT       Anesthesia Type:  Spinal    Note:  Disposition: Inpatient   Postop Pain Control: Uneventful            Sign Out: Well controlled pain   PONV: No   Neuro/Psych: Uneventful            Sign Out: Acceptable/Baseline neuro status   Airway/Respiratory: Uneventful            Sign Out: Acceptable/Baseline resp. status   CV/Hemodynamics: Uneventful            Sign Out: Acceptable CV status; No obvious hypovolemia; No obvious fluid overload   Other NRE:    DID A NON-ROUTINE EVENT OCCUR?            Last vitals:  Vitals Value Taken Time   /76 07/11/23 1700   Temp 36.7  C (98.1  F) 07/11/23 1602   Pulse 87 07/11/23 1706   Resp 12 07/11/23 1707   SpO2 94 % 07/11/23 1708   Vitals shown include unvalidated device data.    Electronically Signed By: Hakan Michael MD  July 11, 2023  8:50 PM

## 2023-07-13 NOTE — TELEPHONE ENCOUNTER
Called and left message for Aurelio, let her know it is ok for her to start taking Tylenol.  Magalie Calvert RN

## 2023-07-17 ASSESSMENT — ACTIVITIES OF DAILY LIVING (ADL)
LIGHT_TO_MODERATE_WORK: MODERATE DIFFICULTY
HOS_ADL_ITEM_SCORE_TOTAL: 14
PUTTING ON SOCKS AND SHOES: UNABLE TO DO
STANDING FOR 15 MINUTES: EXTREME DIFFICULTY
WALKING_APPROXIMATELY_10_MINUTES: MODERATE DIFFICULTY
GETTING INTO AND OUT OF AN AVERAGE CAR: MODERATE DIFFICULTY
STEPPING UP AND DOWN CURBS: EXTREME DIFFICULTY
HOS_ADL_HIGHEST_POTENTIAL_SCORE: 52
RECREATIONAL ACTIVITIES: EXTREME DIFFICULTY
SITTING FOR 15 MINUTES: EXTREME DIFFICULTY
DEEP SQUATTING: UNABLE TO DO
TWISTING/PIVOTING ON INVOLVED LEG: EXTREME DIFFICULTY
HOW_WOULD_YOU_RATE_YOUR_CURRENT_LEVEL_OF_FUNCTION_DURING_YOUR_USUAL_ACTIVITIES_OF_DAILY_LIVING_FROM_0_TO_100_WITH_100_BEING_YOUR_LEVEL_OF_FUNCTION_PRIOR_TO_YOUR_HIP_PROBLEM_AND_0_BEING_THE_INABILITY_TO_PERFORM_ANY_OF_YOUR_USUAL_DAILY_ACTIVITIES?: 40
WALKING_UP_STEEP_HILLS: UNABLE TO DO
WALKING_DOWN_STEEP_HILLS: UNABLE TO DO
WALKING_INITIALLY: MODERATE DIFFICULTY
WALKING_15_MINUTES_OR_GREATER: EXTREME DIFFICULTY
HEAVY_WORK: EXTREME DIFFICULTY

## 2023-07-18 ENCOUNTER — THERAPY VISIT (OUTPATIENT)
Dept: PHYSICAL THERAPY | Facility: CLINIC | Age: 73
End: 2023-07-18
Attending: ORTHOPAEDIC SURGERY
Payer: MEDICARE

## 2023-07-18 DIAGNOSIS — Z96.642 AFTERCARE FOLLOWING LEFT HIP JOINT REPLACEMENT SURGERY: ICD-10-CM

## 2023-07-18 DIAGNOSIS — Z47.1 AFTERCARE FOLLOWING LEFT HIP JOINT REPLACEMENT SURGERY: ICD-10-CM

## 2023-07-18 DIAGNOSIS — Z96.649 STATUS POST TOTAL REPLACEMENT OF HIP, UNSPECIFIED LATERALITY: ICD-10-CM

## 2023-07-18 PROCEDURE — 97110 THERAPEUTIC EXERCISES: CPT | Mod: GP | Performed by: PHYSICAL THERAPIST

## 2023-07-18 PROCEDURE — 97161 PT EVAL LOW COMPLEX 20 MIN: CPT | Mod: GP | Performed by: PHYSICAL THERAPIST

## 2023-07-18 NOTE — PROGRESS NOTES
PHYSICAL THERAPY EVALUATION  Type of Visit: Evaluation    See electronic medical record for Abuse and Falls Screening details.    Subjective       Presenting condition or subjective complaint: post L TACOS  Date of onset: 07/11/23 (DOS)    Relevant medical history:     Dates & types of surgery: July 11 full hip replacement left    Prior diagnostic imaging/testing results: MRI; CT scan; X-ray; EMG; Bone scan     Prior therapy history for the same diagnosis, illness or injury: -- (at the hospital) Just had hip replacement surgery left    Prior Level of Function  Transfers: Assistive equipment  Ambulation: Assistive equipment  ADL: Assistive equipment  IADL: Driving, walking    Living Environment  Social support: -- (with her close friend)   Type of home: 1 level   Stairs to enter the home: No       Ramp: No   Stairs inside the home: No       Help at home: Other  Equipment owned: Walker; Walker with wheels; Bath bench; Raised toilet seat     Employment: No retired  Hobbies/Interests: walking, driving    Patient goals for therapy: Patient would like to get back to using no assistive device when walking around her house and doing basic ADLs. She wants to be able to tie her shoes and move around quicker.     Objective   HIP EVALUATION  PAIN: Pain is Exacerbated By: moving and post surgery  POSTURE: Standing Posture: Rounded shoulders, Forward head, knee and hip flexed on surgical side  GAIT:   Weightbearing Status: WBAT  Assistive Device(s): Walker (standard)  Gait Deviations: Base of support decreased  Stance time decreased  Stride length decreased  Camila decreased   Step-to gait  ROM: PROM L  - hip flexion limited due to surgical precautions, hip extension to neutral, IR/ER minimally tested due to surgical precautions // and R - flexion hard end feel, limited to approx 80 deg, hip extension to neutral, IR/ER limited approx 75% in log roll position  Knee and ankle ROM WFL for transfers observed today  TRANSFERS: Sit to  Stand - lacks anterior trunk translation, very slow, uses walker for assistance, when cued corrections were minimally made // bed mobility - moderate assist from PT, normally goes into and out of bed w/R side // long sitting - minimal assist with L leg from PT  MUSCLE ACTIVATION: isometric contraction from glutes, gastroc, and quad are good    Assessment & Plan   CLINICAL IMPRESSIONS  Medical Diagnosis: Status post total replacement of hip, unspecified laterality    Treatment Diagnosis: SP L TACOS, abnormal gait   Impression/Assessment: Patient is a 72 year old female with post surgical L TACOS 7/11/23.  The following significant findings have been identified: Decreased ROM/flexibility, Decreased joint mobility, Decreased strength, Impaired balance, Impaired gait, Impaired muscle performance, Decreased activity tolerance and Impaired posture. These impairments interfere with their ability to perform self care tasks, recreational activities, household chores, driving , household mobility and community mobility as compared to previous level of function.     Clinical Decision Making (Complexity):  Clinical Presentation: Stable/Uncomplicated  Clinical Presentation Rationale: based on medical and personal factors listed in PT evaluation  Clinical Decision Making (Complexity): Low complexity    PLAN OF CARE  Treatment Interventions:  Interventions: Gait Training, Manual Therapy, Neuromuscular Re-education, Therapeutic Activity, Therapeutic Exercise, Self-Care/Home Management    Long Term Goals     PT Goal 1  Goal Identifier: ambulation  Goal Description: Pt will demostrate independent normalized gait with LRAD 3 blocks and no increase in pain to surgical leg to increase safe independent mobility for ADLs  Target Date: 10/16/23      Frequency of Treatment: 1xweek tapering to every other week  Duration of Treatment: 12 weeks    Recommended Referrals to Other Professionals: Physical Therapy  Education Assessment:   Education  Comments: paper    Risks and benefits of evaluation/treatment have been explained.   Patient/Family/caregiver agrees with Plan of Care.     Evaluation Time:     PT Eval, Low Complexity Minutes (72723): 30  Signing Clinician: SHAR MANZO, PT      [facility]                                                                                   OUTPATIENT PHYSICAL THERAPY      PLAN OF TREATMENT FOR OUTPATIENT REHABILITATION   Patient's Last Name, First Name, Virginia Muse YOB: 1950   Provider's Name   [facility]   Medical Record No.  [id number]     Onset Date: 07/11/23 (DOS)  Start of Care Date: 07/18/23     Medical Diagnosis:  Status post total replacement of hip, unspecified laterality      PT Treatment Diagnosis:  SP L TACOS, abnormal gait Plan of Treatment  Frequency/Duration: 1xweek tapering to every other week/ 12 weeks    Certification date from 07/18/23 to 10/16/23         See note for plan of treatment details and functional goals     SHAR MANZO, PT                         I CERTIFY THE NEED FOR THESE SERVICES FURNISHED UNDER        THIS PLAN OF TREATMENT AND WHILE UNDER MY CARE     (Physician attestation of this document indicates review and certification of the therapy plan).                  Referring Provider:  Austin Blood      Initial Assessment  See Epic Evaluation- Start of Care Date: 07/18/23                  [facility]                                                                                   OUTPATIENT PHYSICAL THERAPY      PLAN OF TREATMENT FOR OUTPATIENT REHABILITATION   Patient's Last Name, First Name, Virginia Muse YOB: 1950   Provider's Name   [facility]   Medical Record No.  [id number]     Onset Date: 07/11/23 (DOS)  Start of Care Date: 07/18/23     Medical Diagnosis:  Status post total replacement of hip,  unspecified laterality      PT Treatment Diagnosis:  SP L TACOS, abnormal gait Plan of Treatment  Frequency/Duration: 1xweek tapering to every other week/ 12 weeks    Certification date from 07/18/23 to 10/16/23         See note for plan of treatment details and functional goals     SHAR MANZO, PT                         I CERTIFY THE NEED FOR THESE SERVICES FURNISHED UNDER        THIS PLAN OF TREATMENT AND WHILE UNDER MY CARE     (Physician attestation of this document indicates review and certification of the therapy plan).                  Referring Provider:  Austin Blood      Initial Assessment  See Epic Evaluation- Start of Care Date: 07/18/23

## 2023-07-20 ENCOUNTER — NURSE TRIAGE (OUTPATIENT)
Dept: NURSING | Facility: CLINIC | Age: 73
End: 2023-07-20
Payer: MEDICARE

## 2023-07-20 DIAGNOSIS — Z96.642 STATUS POST TOTAL REPLACEMENT OF LEFT HIP: ICD-10-CM

## 2023-07-20 RX ORDER — ACETAMINOPHEN 325 MG/1
650 TABLET ORAL EVERY 4 HOURS PRN
Qty: 100 TABLET | Refills: 0 | Status: ON HOLD | OUTPATIENT
Start: 2023-07-20 | End: 2023-10-14

## 2023-07-20 RX ORDER — OXYCODONE HYDROCHLORIDE 5 MG/1
5 TABLET ORAL EVERY 4 HOURS PRN
Qty: 26 TABLET | Refills: 0 | Status: SHIPPED | OUTPATIENT
Start: 2023-07-20 | End: 2023-07-25

## 2023-07-20 NOTE — TELEPHONE ENCOUNTER
Virginia is is requesting a refill for Oxycodone (Roxicodone) 5 mg tablet and is to take 1 tablet my mouth every 4 hours as needed for moderate to sever pain - oral.  Patient currently has 10 tablet left.  Patient is also needing refill for Acetaminophen (TYLENOL) 325 mg to take by mouth every 4 hours as needed  for other (For optimal non-opioid multimodal pain management to improve pain control.) oral.  Patient has 20 tablets left.  Patient is requesting message be sent to Lola Maravilla.    Reason for Disposition   Caller has medicine question only, adult not sick, and triager answers question    Additional Information   Negative: Intentional drug overdose and suicidal thoughts or ideas   Negative: MORE THAN A DOUBLE DOSE of a prescription or over-the-counter (OTC) drug   Negative: DOUBLE DOSE (an extra dose or lesser amount) of prescription drug and any symptoms (e.g., dizziness, nausea, pain, sleepiness)   Negative: DOUBLE DOSE (an extra dose or lesser amount) of over-the-counter (OTC) drug and any symptoms (e.g., dizziness, nausea, pain, sleepiness)   Negative: Took another person's prescription drug   Negative: DOUBLE DOSE (an extra dose or lesser amount) of prescription drug and NO symptoms  (Exception: A double dose of antibiotics.)   Negative: Diabetes drug error or overdose (e.g., took wrong type of insulin or took extra dose)   Negative: Caller has medication question about med NOT prescribed by PCP and triager unable to answer question (e.g., compatibility with other med, storage)   Negative: Prescription not at pharmacy and was prescribed by PCP recently  (Exception: triager has access to EMR and prescription is recorded there. Go to Home Care and confirm for pharmacy.)   Negative: Pharmacy calling with prescription question and triager unable to answer question   Negative: Caller has URGENT medicine question about med that PCP or specialist prescribed and triager unable to answer question    Negative: Caller has NON-URGENT medicine question about med that PCP or specialist prescribed and triager unable to answer question   Negative: Caller wants to use a complementary or alternative medicine   Negative: Medicine patch causing local rash or itching   Negative: Prescription request for new medicine (not a refill)   Negative: Prescription prescribed recently is not at pharmacy and triager has access to patient's EMR and prescription is recorded in the EMR   Negative: DOUBLE DOSE (an extra dose or lesser amount) of over-the-counter (OTC) drug and NO symptoms   Negative: DOUBLE DOSE (an extra dose or lesser amount) of antibiotic drug and NO symptoms    Protocols used: Medication Question Call-A-OH

## 2023-07-25 ENCOUNTER — ALLIED HEALTH/NURSE VISIT (OUTPATIENT)
Dept: NURSING | Facility: CLINIC | Age: 73
End: 2023-07-25
Payer: MEDICARE

## 2023-07-25 DIAGNOSIS — Z96.642 STATUS POST TOTAL REPLACEMENT OF LEFT HIP: Primary | ICD-10-CM

## 2023-07-25 DIAGNOSIS — Z96.642 STATUS POST TOTAL REPLACEMENT OF LEFT HIP: ICD-10-CM

## 2023-07-25 PROCEDURE — 99207 PR NO CHARGE NURSE ONLY: CPT

## 2023-07-25 RX ORDER — OXYCODONE HYDROCHLORIDE 5 MG/1
5 TABLET ORAL EVERY 4 HOURS PRN
Qty: 20 TABLET | Refills: 0 | Status: SHIPPED | OUTPATIENT
Start: 2023-07-25 | End: 2023-10-03

## 2023-07-25 NOTE — PATIENT INSTRUCTIONS
No NSAIDs post op while on aspirin. This includes Advil (ibuprofen), Aleve (naproxen), and Mobic (meloxicam).    Continue anticoagulation plan of:  Aspirin 81mg twice daily until prescribed bottle is gone.      If incision is dry, OK to leave open to air (no bandage). If incision is draining, cover with gauze and call the office. If steri strips (tapes) applied, let fall off on their own. Please do not apply any ointments or lotions to incision. No soaking in tubs or pools for 3 months after surgery.    If any swelling in leg(s), elevate surgical leg above heart (lay flat, elevate leg on blankets or pillows). Continue to wear compression wraps during the day as long you are having leg swelling. May stop wearing or wear intermittently if not having swelling.     *Continue with post-op hip precautions. These are strict for 3 months post surgery, but should be followed for life. As your muscles gain strength, you will notice that you will have more range of motion, but your risk of dislocating remains.    NO bending past 90 degrees at the waist (operative side)   NO crossing your operative leg over or under your non-operative leg   NO turning your operative leg inward     *Please call if a sharp increase in pain, fall, change in movement or sensation, chest pain, calf pain, shortness of breath, fevers greater than 101.4, redness around the incision sites.    *If needing refills on pain meds, please call clinic at least 3 days before you run out.    *Continue physical therapy as directed.  If needing orders for Outpatient Physical therapy, please call the clinic.    *Continue to use assistive device: cane or crutch until no limp. Discuss with therapist if questions.    *Dr. Blood advises no dental work or cleaning until 6 months after any surgery with implants to hips or knees unless emergent issue arises. This includes total joint surgeries. Once you are 6 months past your surgery, you will need prophylactic  "antibiotics for 2 years with any cleaning or dental work.  This is also for any other \"dirty\" procedures that you may have, such as a colonoscopy.    If you have any questions, call the clinic at [phone] and ask for Dr. Hercules team to leave a message with.     ----------------------------------------------------------------------       Thanks for coming today.  [facility]  [address] [place] [address]    To schedule future appointments in Ortho Clinic, you may call [phone].    To schedule ordered imaging or an injection ordered by your provider:  Call [facility] Injection scheduling line: [phone]    [employer] available online at:  [email or web address]    Please call if any further questions or concerns ([phone]).  Clinic hours 8 am to 5 pm.    Return to clinic (call) if symptoms worsen or fail to improve.  "

## 2023-07-25 NOTE — PROGRESS NOTES
Virginia Byers comes into clinic today at the request of Dr. Blood for suture removal and wound check. The patient is status post LTHA on 7/11/23.     Incision clean, dry and intact.  Steri-strips removed. Incision cleaned. Pt instructed on wound care and symptoms of infection to watch for.     Discussed anticoagulation. Pt is currently taking Lovenox, last dose today and will then start with Asa 162mg daily. Pt advised against any NSAIDs while on any anticoagulation med (ASA, Lovenox,or Coumadin).      Discussed current pain medication regime. Pt currently taking Oxycodone 1/2 tab every 3-4 hours and Tylenol 3000mg per day. We discussed weaning down on the Oxycodone and to take this as needed. She has a plan for decreasing her Oxycodone and will wean down again in 2 days. We also discussed increasing her Tylenol to 4000mg per day.      Discussed current physical therapy program. Pt is [facility] 1-2 times per week    Patient using walker to aid in ambulation. She is mostly bothered by her right hip and is using the walker to help with her right hip pain as well    Discussed edema. Patient has moderate edema in her hip, we discussed light massage around the incision    Reviewed Hip precautions.    Total time spent with Pt: 30 minutes.    Magalie Calvert RN

## 2023-07-26 ENCOUNTER — THERAPY VISIT (OUTPATIENT)
Dept: PHYSICAL THERAPY | Facility: CLINIC | Age: 73
End: 2023-07-26
Attending: ORTHOPAEDIC SURGERY
Payer: MEDICARE

## 2023-07-26 DIAGNOSIS — Z96.642 AFTERCARE FOLLOWING LEFT HIP JOINT REPLACEMENT SURGERY: Primary | ICD-10-CM

## 2023-07-26 DIAGNOSIS — Z47.1 AFTERCARE FOLLOWING LEFT HIP JOINT REPLACEMENT SURGERY: Primary | ICD-10-CM

## 2023-07-26 PROCEDURE — 97110 THERAPEUTIC EXERCISES: CPT | Mod: GP | Performed by: PHYSICAL THERAPIST

## 2023-08-01 ENCOUNTER — THERAPY VISIT (OUTPATIENT)
Dept: PHYSICAL THERAPY | Facility: CLINIC | Age: 73
End: 2023-08-01
Attending: ORTHOPAEDIC SURGERY
Payer: MEDICARE

## 2023-08-01 DIAGNOSIS — Z47.1 AFTERCARE FOLLOWING LEFT HIP JOINT REPLACEMENT SURGERY: Primary | ICD-10-CM

## 2023-08-01 DIAGNOSIS — Z96.642 AFTERCARE FOLLOWING LEFT HIP JOINT REPLACEMENT SURGERY: Primary | ICD-10-CM

## 2023-08-01 PROCEDURE — 97116 GAIT TRAINING THERAPY: CPT | Mod: GP | Performed by: PHYSICAL THERAPIST

## 2023-08-01 PROCEDURE — 97110 THERAPEUTIC EXERCISES: CPT | Mod: GP | Performed by: PHYSICAL THERAPIST

## 2023-08-13 NOTE — PROGRESS NOTES
Pt last seen in PT 12/10/2021.  She has not responded to [employer] message sent to check on status.  Consider note dated 12/10 to serve as final summary.   [facility]

## 2023-08-14 ENCOUNTER — THERAPY VISIT (OUTPATIENT)
Dept: PHYSICAL THERAPY | Facility: CLINIC | Age: 73
End: 2023-08-14
Payer: MEDICARE

## 2023-08-14 DIAGNOSIS — Z47.1 AFTERCARE FOLLOWING LEFT HIP JOINT REPLACEMENT SURGERY: Primary | ICD-10-CM

## 2023-08-14 DIAGNOSIS — Z96.642 AFTERCARE FOLLOWING LEFT HIP JOINT REPLACEMENT SURGERY: Primary | ICD-10-CM

## 2023-08-14 PROCEDURE — 97110 THERAPEUTIC EXERCISES: CPT | Mod: GP | Performed by: PHYSICAL THERAPIST

## 2023-08-14 PROCEDURE — 97112 NEUROMUSCULAR REEDUCATION: CPT | Mod: GP | Performed by: PHYSICAL THERAPIST

## 2023-08-16 DIAGNOSIS — Z96.642 STATUS POST TOTAL REPLACEMENT OF LEFT HIP: Primary | ICD-10-CM

## 2023-08-21 ENCOUNTER — THERAPY VISIT (OUTPATIENT)
Dept: PHYSICAL THERAPY | Facility: CLINIC | Age: 73
End: 2023-08-21
Payer: MEDICARE

## 2023-08-21 DIAGNOSIS — Z47.1 AFTERCARE FOLLOWING LEFT HIP JOINT REPLACEMENT SURGERY: Primary | ICD-10-CM

## 2023-08-21 DIAGNOSIS — Z96.642 AFTERCARE FOLLOWING LEFT HIP JOINT REPLACEMENT SURGERY: Primary | ICD-10-CM

## 2023-08-21 PROCEDURE — 97110 THERAPEUTIC EXERCISES: CPT | Mod: GP | Performed by: PHYSICAL THERAPIST

## 2023-08-21 PROCEDURE — 97112 NEUROMUSCULAR REEDUCATION: CPT | Mod: GP | Performed by: PHYSICAL THERAPIST

## 2023-08-21 NOTE — PROGRESS NOTES
08/21/23 0500   Appointment Info   Signing clinician's name / credentials Kaitlyn Estevez, PT, DPT, OCS   Total/Authorized Visits 8   Visits Used 5   Medical Diagnosis Status post total replacement of hip, unspecified laterality   PT Tx Diagnosis SP L TACOS, abnormal gait   Precautions/Limitations standard posterior hip precautions   Other pertinent information Accompanied by friend Aurelio Rodriguez Adds Certification   Progress Note/Certification   Start of Care Date 07/18/23   Onset of illness/injury or Date of Surgery 07/11/23  (DOS)   Therapy Frequency 1xweek tapering to every other week   Predicted Duration 12 weeks   Certification date from 07/18/23   Certification date to 10/16/23   Progress Note Due Date 08/22/23   Progress Note Completed Date 07/18/23   PT Goal 1   Goal Identifier ambulation   Goal Description Pt will demostrate independent normalized gait with LRAD 3 blocks and no increase in pain to surgical leg to increase safe independent mobility for ADLs   Goal Progress 4WW, somewhat crouched at hip and knee, lacking heelstrike but able to correct with heelstrike and TKE when cued. RLE hurts most   Target Date 10/16/23   Subjective Report   Subjective Report Hip precautions reviewed today. Pain is improving. Getting in and out of car is easier. R hip is most bothersome by a lot. Useing 4WW for most mobility. HEP daily; continuing to use Even up brace on R which I think is appropriate for now.   Objective Measures   Objective Measures Objective Measure 1;Objective Measure 2   Objective Measure 1   Objective Measure gait   Details With even up brace RLE, improved TKE L with ambulation. Tends to have crouch gait and decreased stance time R   Objective Measure 2   Objective Measure L hip AROM   Details sagittal: 0-0-85 (stopped due to precautions)   Treatment Interventions (PT)   Interventions Therapeutic Procedure/Exercise;Gait Training;Neuromuscular Re-education   Therapeutic Procedure/Exercise  "  Therapeutic Procedures: strength, endurance, ROM, flexibillity minutes (37635) 23   PTRx Ther Proc 1 Ankle Eversion/Inversion ROM   PTRx Ther Proc 1 - Details standing hip L ER from 4\" step for straight knee/hip neutral. Standing hip R IR/ER as able x 20 reps each   PTRx Ther Proc 2 Supine Heel Slides   PTRx Ther Proc 2 - Details continue at home   PTRx Ther Proc 3 Bridging #1   PTRx Ther Proc 3 - Details HEP   PTRx Ther Proc 4 Toe Raises   PTRx Ther Proc 4 - Details hold for now (subbed for step up)   PTRx Ther Proc 5 Hip AROM Standing Abduction   PTRx Ther Proc 5 - Details Slider R only LLE stance leg. x 20   PTRx Ther Proc 6 Osteoporosis Level 1 Lower Extremity Knee Extension With Dorsiflexion   PTRx Ther Proc 6 - Details HEP   PTRx Ther Proc 7 Stepdown Backward   PTRx Ther Proc 7 - Details 4\" step x 20 reps LLE only   Skilled Intervention targeting hip girdle strength and quad strength   Patient Response/Progress tolerated with mod cuing   Neuromuscular Re-education   Neuromuscular re-ed of mvmt, balance, coord, kinesthetic sense, posture, proprioception minutes (60801) 15   PTRx Neuro Re-ed 1 Sit to Stand   PTRx Neuro Re-ed 1 - Details x 15 reps with rocking emphasized (2-3 rocks each stand) high/med plinth   PTRx Neuro Re-ed 2 Quad Set Without Towel Roll Under Knee   PTRx Neuro Re-ed 2 - Details HEP   PTRx Neuro Re-ed 3 Hip Flexion Straight Leg Raise   PTRx Neuro Re-ed 3 - Details HEP only lift 2-3 inches   Skilled Intervention targeting hip flexor and form for sit to stand   Patient Response/Progress mod cuing throughout, very tiresome with hip flexion but no pain   PTRx Neuro Re-ed 4 Standing Hip Flexion   PTRx Neuro Re-ed 4 - Details toe tap at 4\" step BUE to UE assist bilaterally   Gait Training   Gait Training Minutes, includes stair climbing (82851) 1   Gait 1 with rolling walker   Gait 1 - Details x 20' - cues for LONG steps and use of even up most mobility   Skilled Intervention cues for gait "   Patient Response/Progress juve well   Education   Learner/Method Patient;Pictures/Video;No Barriers to Learning   Plan   Home program PTRx printed   Plan for next session progress strength, ROM, gait   Comments   Comments fatigued with session today   Total Session Time   Timed Code Treatment Minutes 39   Total Treatment Time (sum of timed and untimed services) 39         PLAN  Continue therapy per current plan of care.    Beginning/End Dates of Progress Note Reporting Period:  07/18/23 to 08/21/2023    Referring Provider:  Austin Blood     [Restricted in physically strenuous activity but ambulatory and able to carry out work of a light or sedentary nature] : Status 1- Restricted in physically strenuous activity but ambulatory and able to carry out work of a light or sedentary nature, e.g., light house work, office work [Normal] : affect appropriate

## 2023-08-22 ENCOUNTER — TELEPHONE (OUTPATIENT)
Dept: ORTHOPEDICS | Facility: CLINIC | Age: 73
End: 2023-08-22

## 2023-08-22 ENCOUNTER — ANCILLARY PROCEDURE (OUTPATIENT)
Dept: GENERAL RADIOLOGY | Facility: CLINIC | Age: 73
End: 2023-08-22
Attending: ORTHOPAEDIC SURGERY
Payer: MEDICARE

## 2023-08-22 ENCOUNTER — OFFICE VISIT (OUTPATIENT)
Dept: ORTHOPEDICS | Facility: CLINIC | Age: 73
End: 2023-08-22
Payer: MEDICARE

## 2023-08-22 DIAGNOSIS — Z96.642 STATUS POST TOTAL REPLACEMENT OF LEFT HIP: ICD-10-CM

## 2023-08-22 DIAGNOSIS — M16.11 PRIMARY LOCALIZED OSTEOARTHRITIS OF RIGHT HIP: Primary | ICD-10-CM

## 2023-08-22 PROCEDURE — 73502 X-RAY EXAM HIP UNI 2-3 VIEWS: CPT | Mod: LT | Performed by: RADIOLOGY

## 2023-08-22 PROCEDURE — 99024 POSTOP FOLLOW-UP VISIT: CPT | Performed by: ORTHOPAEDIC SURGERY

## 2023-08-22 ASSESSMENT — PAIN SCALES - GENERAL: PAINLEVEL: NO PAIN (0)

## 2023-08-22 NOTE — PROGRESS NOTES
Chief Complaint: Surgical Followup (6wk s.p LTHA 7/11/23)         HPI: Virginia Byers returns today in follow-up for her left hip. She reports that she is doing great. She is very happy with the pain relief and very relieved that she has done so well. 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 Gettign around much better than preop, using a walker and a shoe lift on the contra-lateral side. Would like to proceed with total hip on the right ASAP.     Current Status:  HOOS Jr:76.78  [facility]:  Global Mental Health Score - (P) 20  Global Physical Health Score - (P) 15  PROMIS TOTAL - SUBSCORES - (P) 35  Medications and allergies are documented in the EMR and have been reviewed.      Current Outpatient Medications:     acetaminophen (TYLENOL) 325 MG tablet, Take 2 tablets (650 mg) by mouth every 4 hours as needed for other (For optimal non-opioid multimodal pain management to improve pain control.), Disp: 100 tablet, Rfl: 0    dorzolamide-timolol (COSOPT) 2-0.5 % ophthalmic solution, Place 1 drop into both eyes 2 times daily, Disp: , Rfl:     latanoprost (XALATAN) 0.005 % ophthalmic solution, Place 1 drop into both eyes At Bedtime, Disp: , Rfl:     losartan (COZAAR) 100 MG tablet, TAKE 1 TABLET BY MOUTH EVERY DAY, Disp: 90 tablet, Rfl: 0    Multiple Vitamins-Minerals (ICAPS AREDS 2 PO), , Disp: , Rfl:     ondansetron (ZOFRAN ODT) 4 MG ODT tab, Take 1 tablet (4 mg) by mouth every 6 hours as needed for nausea or vomiting, Disp: 8 tablet, Rfl: 0    oxyCODONE (ROXICODONE) 5 MG tablet, Take 1 tablet (5 mg) by mouth every 4 hours as needed for moderate to severe pain, Disp: 20 tablet, Rfl: 0    polyethylene glycol (MIRALAX) 17 GM/Dose powder, Take 17 g by mouth daily, Disp: 510 g, Rfl: 0    rosuvastatin (CRESTOR) 10 MG tablet, TAKE 1 TABLET (10 MG) BY MOUTH DAILY., Disp: 90 tablet, Rfl: 3    senna-docusate  (SENOKOT-S/PERICOLACE) 8.6-50 MG tablet, Take 1 tablet by mouth 2 times daily, Disp: 30 tablet, Rfl: 0    Allergies: Compazine, Hydrochlorothiazide, Prochlorperazine, and Simvastatin        Physical Exam:  On physical examination the patient appears the stated age, is in no acute distress, affect is appropriate, and breathing is non-labored.    There were no vitals taken for this visit.  There is no height or weight on file to calculate BMI.    Rises from chair: using only the left leg  Gait:  walks rapidly about the room with her walker and a limp on the right   ROM:  right is fluid and painless  Incision is healed and benign  Sciatic motor function is normal and symmetric with 5/5 strength  We reviewed the radiographs together. These show the normal progression for a total hip arthroplasty without evidence of loosening or subsidence.       Assessment: doing very well 6 weeks s/p the first side of planned, staged bilateral total hip She would like to schedule the contra-lateral side and has met the milestones to do so.    Plan: right total hip.    No ref. provider found

## 2023-08-22 NOTE — TELEPHONE ENCOUNTER
Date Scheduled: 10-13-23  Facility: Surgery Locations: [facility]  Surgeon: Dr. Blood   Post-op appointment scheduled:    scheduled?: No  Surgery packet/instructions confirmed received?  Yes  Pre op physical/PAC appointment:   Special Considerations:     Cheyenne Cervantes  Surgery Scheduling Coordinator  Ph: [phone]

## 2023-08-22 NOTE — TELEPHONE ENCOUNTER
Procedure: Right total hip arthroplasty (at least 12 weeks from left)  Facility: [facility]  Length: ? minutes  Anesthesia: General  Post-op appointments needed: 2 weeks nurse or PA, 6 weeks with provider only.  Surgery packet/instructions given to patient?  Yes     Neal Grullon RN

## 2023-08-22 NOTE — LETTER
8/22/2023         RE: Virginia Byers  [id number] [address] [id number]        Dear Colleague,    Thank you for referring your patient, Virginia Byers, to the [facility]. Please see a copy of my visit note below.    Chief Complaint: Surgical Followup (6wk s.p LTHA 7/11/23)         HPI: Virginia Byers returns today in follow-up for her left hip. She reports that she is doing great. She is very happy with the pain relief and very relieved that she has done so well. 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 Gettign around much better than preop, using a walker and a shoe lift on the contra-lateral side. Would like to proceed with total hip on the right ASAP.     Current Status:  HOOS Jr:76.78  [facility]:  Global Mental Health Score - (P) 20  Global Physical Health Score - (P) 15  PROMIS TOTAL - SUBSCORES - (P) 35  Medications and allergies are documented in the EMR and have been reviewed.      Current Outpatient Medications:      acetaminophen (TYLENOL) 325 MG tablet, Take 2 tablets (650 mg) by mouth every 4 hours as needed for other (For optimal non-opioid multimodal pain management to improve pain control.), Disp: 100 tablet, Rfl: 0     dorzolamide-timolol (COSOPT) 2-0.5 % ophthalmic solution, Place 1 drop into both eyes 2 times daily, Disp: , Rfl:      latanoprost (XALATAN) 0.005 % ophthalmic solution, Place 1 drop into both eyes At Bedtime, Disp: , Rfl:      losartan (COZAAR) 100 MG tablet, TAKE 1 TABLET BY MOUTH EVERY DAY, Disp: 90 tablet, Rfl: 0     Multiple Vitamins-Minerals (ICAPS AREDS 2 PO), , Disp: , Rfl:      ondansetron (ZOFRAN ODT) 4 MG ODT tab, Take 1 tablet (4 mg) by mouth every 6 hours as needed for nausea or vomiting, Disp: 8 tablet, Rfl: 0     oxyCODONE (ROXICODONE) 5 MG tablet, Take 1 tablet (5 mg) by mouth every 4 hours as needed for moderate to severe pain,  Disp: 20 tablet, Rfl: 0     polyethylene glycol (MIRALAX) 17 GM/Dose powder, Take 17 g by mouth daily, Disp: 510 g, Rfl: 0     rosuvastatin (CRESTOR) 10 MG tablet, TAKE 1 TABLET (10 MG) BY MOUTH DAILY., Disp: 90 tablet, Rfl: 3     senna-docusate (SENOKOT-S/PERICOLACE) 8.6-50 MG tablet, Take 1 tablet by mouth 2 times daily, Disp: 30 tablet, Rfl: 0    Allergies: Compazine, Hydrochlorothiazide, Prochlorperazine, and Simvastatin        Physical Exam:  On physical examination the patient appears the stated age, is in no acute distress, affect is appropriate, and breathing is non-labored.    There were no vitals taken for this visit.  There is no height or weight on file to calculate BMI.    Rises from chair: using only the left leg  Gait:  walks rapidly about the room with her walker and a limp on the right   ROM:  right is fluid and painless  Incision is healed and benign  Sciatic motor function is normal and symmetric with 5/5 strength  We reviewed the radiographs together. These show the normal progression for a total hip arthroplasty without evidence of loosening or subsidence.       Assessment: doing very well 6 weeks s/p the first side of planned, staged bilateral total hip She would like to schedule the contra-lateral side and has met the milestones to do so.    Plan: right total hip.    No ref. provider found      Again, thank you for allowing me to participate in the care of your patient.        Sincerely,        Austin Blood MD

## 2023-08-22 NOTE — NURSING NOTE
Virginia Byers's chief complaint for this visit includes:  Chief Complaint   Patient presents with    Surgical Followup     6wk s.p [facility] 7/11/23       Referring Provider:  No referring provider defined for this encounter.    There were no vitals taken for this visit.  No Pain (0)   Global Mental Health Score: (P) 20  Global Physical Health Score: (P) 15  PROMIS TOTAL - SUBSCORES: (P) 35  [facility]: 4, HOOSJR: 76.78      Previous surgeries: [facility] 7/11/23  Previous injections within last 6 months: NO  Treatments done: PT - going well  Imaging completed: XR today  Pain: 0/10  Concerns: Has question about sleeping positions, question about injections regarding macular degenerations.     Valentin Díaz, ATC

## 2023-08-28 ENCOUNTER — THERAPY VISIT (OUTPATIENT)
Dept: PHYSICAL THERAPY | Facility: CLINIC | Age: 73
End: 2023-08-28
Payer: MEDICARE

## 2023-08-28 DIAGNOSIS — Z96.642 AFTERCARE FOLLOWING LEFT HIP JOINT REPLACEMENT SURGERY: Primary | ICD-10-CM

## 2023-08-28 DIAGNOSIS — Z47.1 AFTERCARE FOLLOWING LEFT HIP JOINT REPLACEMENT SURGERY: Primary | ICD-10-CM

## 2023-08-28 PROCEDURE — 97110 THERAPEUTIC EXERCISES: CPT | Mod: GP | Performed by: PHYSICAL THERAPIST

## 2023-09-18 ENCOUNTER — HOSPITAL ENCOUNTER (OUTPATIENT)
Dept: MAMMOGRAPHY | Facility: CLINIC | Age: 73
Discharge: HOME | End: 2023-09-18
Attending: INTERNAL MEDICINE
Payer: MEDICARE

## 2023-09-18 ENCOUNTER — HOSPITAL ENCOUNTER (OUTPATIENT)
Dept: ULTRASOUND IMAGING | Facility: CLINIC | Age: 73
Discharge: HOME | End: 2023-09-18
Attending: INTERNAL MEDICINE
Payer: MEDICARE

## 2023-09-18 ENCOUNTER — ANCILLARY ORDERS (OUTPATIENT)
Dept: ONCOLOGY | Facility: CLINIC | Age: 73
End: 2023-09-18

## 2023-09-18 DIAGNOSIS — R59.9 SWELLING OF LYMPH NODE: Primary | ICD-10-CM

## 2023-09-18 DIAGNOSIS — Z85.3 HISTORY OF BREAST CANCER IN ADULTHOOD: ICD-10-CM

## 2023-09-18 DIAGNOSIS — R59.1 GENERALIZED ENLARGED LYMPH NODES: ICD-10-CM

## 2023-09-18 DIAGNOSIS — M79.89 LEFT AXILLARY SWELLING: ICD-10-CM

## 2023-09-18 PROCEDURE — G0279 TOMOSYNTHESIS, MAMMO: HCPCS | Mod: RT

## 2023-09-18 PROCEDURE — 76882 US LMTD JT/FCL EVL NVASC XTR: CPT | Mod: LT

## 2023-09-19 ASSESSMENT — ENCOUNTER SYMPTOMS
NAUSEA: 0
CHILLS: 0
JOINT SWELLING: 1
SHORTNESS OF BREATH: 0
NERVOUS/ANXIOUS: 0
MYALGIAS: 1
HEARTBURN: 0
WEAKNESS: 1
PALPITATIONS: 0
ARTHRALGIAS: 1
COUGH: 0
FREQUENCY: 0
DYSURIA: 0
BREAST MASS: 0
HEADACHES: 0
HEMATURIA: 0
PARESTHESIAS: 0
SORE THROAT: 0
ABDOMINAL PAIN: 0
HEMATOCHEZIA: 0
DIARRHEA: 0
EYE PAIN: 0
DIZZINESS: 0
FEVER: 0
CONSTIPATION: 0

## 2023-09-19 ASSESSMENT — ACTIVITIES OF DAILY LIVING (ADL)
CURRENT_FUNCTION: SHOPPING REQUIRES ASSISTANCE
CURRENT_FUNCTION: LAUNDRY REQUIRES ASSISTANCE
CURRENT_FUNCTION: HOUSEWORK REQUIRES ASSISTANCE
CURRENT_FUNCTION: PREPARING MEALS REQUIRES ASSISTANCE
CURRENT_FUNCTION: TRANSPORTATION REQUIRES ASSISTANCE

## 2023-09-21 ENCOUNTER — HOSPITAL ENCOUNTER (OUTPATIENT)
Dept: ULTRASOUND IMAGING | Facility: CLINIC | Age: 73
Discharge: HOME | End: 2023-09-21
Attending: INTERNAL MEDICINE | Admitting: INTERNAL MEDICINE
Payer: MEDICARE

## 2023-09-21 VITALS — DIASTOLIC BLOOD PRESSURE: 74 MMHG | SYSTOLIC BLOOD PRESSURE: 140 MMHG

## 2023-09-21 DIAGNOSIS — M79.89 LEFT AXILLARY SWELLING: ICD-10-CM

## 2023-09-21 PROCEDURE — 38505 NEEDLE BIOPSY LYMPH NODES: CPT | Mod: LT

## 2023-09-21 PROCEDURE — A4648 IMPLANTABLE TISSUE MARKER: HCPCS

## 2023-09-21 PROCEDURE — 250N000009 HC RX 250: Performed by: RADIOLOGY

## 2023-09-21 PROCEDURE — 88305 TISSUE EXAM BY PATHOLOGIST: CPT | Mod: TC | Performed by: INTERNAL MEDICINE

## 2023-09-21 RX ADMIN — LIDOCAINE HYDROCHLORIDE 7 ML: 10 INJECTION, SOLUTION EPIDURAL; INFILTRATION; INTRACAUDAL; PERINEURAL at 08:09

## 2023-09-26 ENCOUNTER — TELEPHONE (OUTPATIENT)
Dept: MAMMOGRAPHY | Facility: CLINIC | Age: 73
End: 2023-09-26

## 2023-09-26 ENCOUNTER — OFFICE VISIT (OUTPATIENT)
Dept: FAMILY MEDICINE | Facility: CLINIC | Age: 73
End: 2023-09-26
Payer: MEDICARE

## 2023-09-26 VITALS
BODY MASS INDEX: 25.87 KG/M2 | SYSTOLIC BLOOD PRESSURE: 126 MMHG | RESPIRATION RATE: 18 BRPM | OXYGEN SATURATION: 98 % | TEMPERATURE: 97.8 F | WEIGHT: 146 LBS | DIASTOLIC BLOOD PRESSURE: 76 MMHG | HEART RATE: 82 BPM | HEIGHT: 63 IN

## 2023-09-26 DIAGNOSIS — Z85.3 PERSONAL HISTORY OF MALIGNANT NEOPLASM OF BREAST: ICD-10-CM

## 2023-09-26 DIAGNOSIS — M81.0 AGE-RELATED OSTEOPOROSIS WITHOUT CURRENT PATHOLOGICAL FRACTURE: ICD-10-CM

## 2023-09-26 DIAGNOSIS — E78.5 HYPERLIPIDEMIA LDL GOAL <160: ICD-10-CM

## 2023-09-26 DIAGNOSIS — D64.9 ANEMIA, UNSPECIFIED TYPE: ICD-10-CM

## 2023-09-26 DIAGNOSIS — C82.00 FOLLICULAR LYMPHOMA GRADE I, UNSPECIFIED BODY REGION (H): ICD-10-CM

## 2023-09-26 DIAGNOSIS — I10 HYPERTENSION GOAL BP (BLOOD PRESSURE) < 140/90: ICD-10-CM

## 2023-09-26 DIAGNOSIS — Z00.00 ENCOUNTER FOR MEDICARE ANNUAL WELLNESS EXAM: Primary | ICD-10-CM

## 2023-09-26 DIAGNOSIS — C82.90 FOLLICULAR LYMPHOMA, UNSPECIFIED FOLLICULAR LYMPHOMA TYPE, UNSPECIFIED BODY REGION (H): ICD-10-CM

## 2023-09-26 LAB
BASOPHILS # BLD AUTO: 0.1 10E3/UL (ref 0–0.2)
BASOPHILS NFR BLD AUTO: 1 %
CALCIUM SERPL-MCNC: 10.1 MG/DL (ref 8.8–10.2)
DEPRECATED CALCIDIOL+CALCIFEROL SERPL-MC: 22 UG/L (ref 20–75)
EOSINOPHIL # BLD AUTO: 0.2 10E3/UL (ref 0–0.7)
EOSINOPHIL NFR BLD AUTO: 3 %
ERYTHROCYTE [DISTWIDTH] IN BLOOD BY AUTOMATED COUNT: 13.3 % (ref 10–15)
FERRITIN SERPL-MCNC: 83 NG/ML (ref 11–328)
HCT VFR BLD AUTO: 41.5 % (ref 35–47)
HGB BLD-MCNC: 13.3 G/DL (ref 11.7–15.7)
HOLD SPECIMEN: NORMAL
IMM GRANULOCYTES # BLD: 0 10E3/UL
IMM GRANULOCYTES NFR BLD: 0 %
LDH SERPL L TO P-CCNC: 203 U/L (ref 0–250)
LYMPHOCYTES # BLD AUTO: 1.6 10E3/UL (ref 0.8–5.3)
LYMPHOCYTES NFR BLD AUTO: 18 %
MCH RBC QN AUTO: 27.4 PG (ref 26.5–33)
MCHC RBC AUTO-ENTMCNC: 32 G/DL (ref 31.5–36.5)
MCV RBC AUTO: 85 FL (ref 78–100)
MONOCYTES # BLD AUTO: 0.7 10E3/UL (ref 0–1.3)
MONOCYTES NFR BLD AUTO: 8 %
NEUTROPHILS # BLD AUTO: 6.6 10E3/UL (ref 1.6–8.3)
NEUTROPHILS NFR BLD AUTO: 72 %
PATH REPORT.COMMENTS IMP SPEC: NORMAL
PATH REPORT.FINAL DX SPEC: NORMAL
PATH REPORT.GROSS SPEC: NORMAL
PATH REPORT.MICROSCOPIC SPEC OTHER STN: NORMAL
PATH REPORT.MICROSCOPIC SPEC OTHER STN: NORMAL
PATH REPORT.RELEVANT HX SPEC: NORMAL
PHOSPHATE SERPL-MCNC: 3.9 MG/DL (ref 2.5–4.5)
PHOTO IMAGE: NORMAL
PLATELET # BLD AUTO: 317 10E3/UL (ref 150–450)
POTASSIUM SERPL-SCNC: 4.7 MMOL/L (ref 3.4–5.3)
RBC # BLD AUTO: 4.86 10E6/UL (ref 3.8–5.2)
WBC # BLD AUTO: 9.2 10E3/UL (ref 4–11)

## 2023-09-26 PROCEDURE — 88341 IMHCHEM/IMCYTCHM EA ADD ANTB: CPT | Mod: 26 | Performed by: PATHOLOGY

## 2023-09-26 PROCEDURE — 85025 COMPLETE CBC W/AUTO DIFF WBC: CPT | Performed by: FAMILY MEDICINE

## 2023-09-26 PROCEDURE — 84100 ASSAY OF PHOSPHORUS: CPT | Performed by: FAMILY MEDICINE

## 2023-09-26 PROCEDURE — 84132 ASSAY OF SERUM POTASSIUM: CPT | Performed by: FAMILY MEDICINE

## 2023-09-26 PROCEDURE — 88342 IMHCHEM/IMCYTCHM 1ST ANTB: CPT | Mod: 26 | Performed by: PATHOLOGY

## 2023-09-26 PROCEDURE — G0439 PPPS, SUBSEQ VISIT: HCPCS | Performed by: FAMILY MEDICINE

## 2023-09-26 PROCEDURE — 88305 TISSUE EXAM BY PATHOLOGIST: CPT | Mod: 26 | Performed by: PATHOLOGY

## 2023-09-26 PROCEDURE — 83615 LACTATE (LD) (LDH) ENZYME: CPT | Mod: 59 | Performed by: FAMILY MEDICINE

## 2023-09-26 PROCEDURE — 82306 VITAMIN D 25 HYDROXY: CPT | Performed by: FAMILY MEDICINE

## 2023-09-26 PROCEDURE — 82310 ASSAY OF CALCIUM: CPT | Performed by: FAMILY MEDICINE

## 2023-09-26 PROCEDURE — 82728 ASSAY OF FERRITIN: CPT | Performed by: FAMILY MEDICINE

## 2023-09-26 PROCEDURE — 83615 LACTATE (LD) (LDH) ENZYME: CPT | Performed by: FAMILY MEDICINE

## 2023-09-26 PROCEDURE — 36415 COLL VENOUS BLD VENIPUNCTURE: CPT | Performed by: FAMILY MEDICINE

## 2023-09-26 PROCEDURE — 83970 ASSAY OF PARATHORMONE: CPT | Performed by: FAMILY MEDICINE

## 2023-09-26 RX ORDER — LOSARTAN POTASSIUM 100 MG/1
100 TABLET ORAL DAILY
Qty: 90 TABLET | Refills: 3 | Status: SHIPPED | OUTPATIENT
Start: 2023-09-26 | End: 2024-08-02

## 2023-09-26 RX ORDER — ROSUVASTATIN CALCIUM 10 MG/1
10 TABLET, COATED ORAL DAILY
Qty: 90 TABLET | Refills: 3 | Status: SHIPPED | OUTPATIENT
Start: 2023-09-26

## 2023-09-26 ASSESSMENT — ENCOUNTER SYMPTOMS
NERVOUS/ANXIOUS: 0
PALPITATIONS: 0
SHORTNESS OF BREATH: 0
HEADACHES: 0
WEAKNESS: 0
ABDOMINAL PAIN: 0
FREQUENCY: 0
SORE THROAT: 0
DIZZINESS: 0
NAUSEA: 0
HEMATOCHEZIA: 0
CHILLS: 0
ARTHRALGIAS: 1
HEARTBURN: 0
HEMATURIA: 0
BREAST MASS: 0
EYE PAIN: 0
CONSTIPATION: 0
JOINT SWELLING: 1
FEVER: 0
COUGH: 0
MYALGIAS: 1
DIARRHEA: 0
PARESTHESIAS: 0
DYSURIA: 0

## 2023-09-26 ASSESSMENT — ACTIVITIES OF DAILY LIVING (ADL)
CURRENT_FUNCTION: HOUSEWORK REQUIRES ASSISTANCE
CURRENT_FUNCTION: PREPARING MEALS REQUIRES ASSISTANCE
CURRENT_FUNCTION: TRANSPORTATION REQUIRES ASSISTANCE
CURRENT_FUNCTION: SHOPPING REQUIRES ASSISTANCE
CURRENT_FUNCTION: LAUNDRY REQUIRES ASSISTANCE

## 2023-09-26 NOTE — PATIENT INSTRUCTIONS
Patient Education   Personalized Prevention Plan  You are due for the preventive services outlined below.  Your care team is available to assist you in scheduling these services.  If you have already completed any of these items, please share that information with your care team to update in your medical record.  Health Maintenance Due   Topic Date Due     URINE DRUG SCREEN  Never done     COVID-19 Vaccine (6 - Pfizer risk series) 11/16/2022     Flu Vaccine (1) 09/01/2023     ANNUAL REVIEW OF HM ORDERS  09/21/2023     Annual Wellness Visit  09/21/2023     Your Health Risk Assessment indicates you feel you are not in good health    A healthy lifestyle helps keep the body fit and the mind alert. It helps protect you from disease, helps you fight disease, and helps prevent chronic disease (disease that doesn't go away) from getting worse. This is important as you get older and begin to notice twinges in muscles and joints and a decline in the strength and stamina you once took for granted. A healthy lifestyle includes good healthcare, good nutrition, weight control, recreation, and regular exercise. Avoid harmful substances and do what you can to keep safe. Another part of a healthy lifestyle is stay mentally active and socially involved.    Good healthcare   Have a wellness visit every year.   If you have new symptoms, let us know right away. Don't wait until the next checkup.   Take medicines exactly as prescribed and keep your medicines in a safe place. Tell us if your medicine causes problems.   Healthy diet and weight control   Eat 3 or 4 small, nutritious, low-fat, high-fiber meals a day. Include a variety of fruits, vegetables, and whole-grain foods.   Make sure you get enough calcium in your diet. Calcium, vitamin D, and exercise help prevent osteoporosis (bone thinning).   If you live alone, try eating with others when you can. That way you get a good meal and have company while you eat it.   Try to keep a  healthy weight. If you eat more calories than your body uses for energy, it will be stored as fat and you will gain weight.     Recreation   Recreation is not limited to sports and team events. It includes any activity that provides relaxation, interest, enjoyment, and exercise. Recreation provides an outlet for physical, mental, and social energy. It can give a sense of worth and achievement. It can help you stay healthy.    Mental Exercise and Social Involvement  Mental and emotional health is as important as physical health. Keep in touch with friends and family. Stay as active as possible. Continue to learn and challenge yourself.   Things you can do to stay mentally active are:  Learn something new, like a foreign language or musical instrument.   Play SCRABBLE or do crossword puzzles. If you cannot find people to play these games with you at home, you can play them with others on your computer through the Internet.   Join a games club--anything from card games to chess or checkers or lawn bowling.   Start a new hobby.   Go back to school.   Volunteer.   Read.   Keep up with world events.  Activities of Daily Living    Your Health Risk Assessment indicates you have difficulties with activities of daily living such as housework, bathing, preparing meals, taking medication, etc. Please make a follow up appointment for us to address this issue in more detail.

## 2023-09-26 NOTE — PROGRESS NOTES
"SUBJECTIVE:   Virginia is a 72 year old who presents for Preventive Visit.      9/26/2023     8:29 AM   Additional Questions   Roomed by Cheyenne       Are you in the first 12 months of your Medicare coverage?  No    Healthy Habits:     In general, how would you rate your overall health?  Fair    Frequency of exercise:  4-5 days/week    Duration of exercise:  15-30 minutes    Do you usually eat at least 4 servings of fruit and vegetables a day, include whole grains    & fiber and avoid regularly eating high fat or \"junk\" foods?  Yes    Taking medications regularly:  Yes    Barriers to taking medications:  None    Medication side effects:  Not applicable    Ability to successfully perform activities of daily living:  Transportation requires assistance, shopping requires assistance, preparing meals requires assistance, housework requires assistance and laundry requires assistance    Home Safety:  No safety concerns identified    Hearing Impairment:  No hearing concerns    In the past 6 months, have you been bothered by leaking of urine?  No    In general, how would you rate your overall mental or emotional health?  Good    Additional concerns today:  Yes      Today's PHQ-2 Score:       9/25/2023    11:15 AM   PHQ-2 ( 1999 Pfizer)   Q1: Little interest or pleasure in doing things 0   Q2: Feeling down, depressed or hopeless 0   PHQ-2 Score 0   Q1: Little interest or pleasure in doing things Not at all   Q2: Feeling down, depressed or hopeless Not at all   PHQ-2 Score 0       Have you ever done Advance Care Planning? (For example, a Health Directive, POLST, or a discussion with a medical provider or your loved ones about your wishes): Yes, advance care planning is on file.       Fall risk  Fallen 2 or more times in the past year?: No  Any fall with injury in the past year?: No    Cognitive Screening   1) Repeat 3 items (Leader, Season, Table)    2) Clock draw: NORMAL  3) 3 item recall: Recalls 3 objects  Results: 3 items " recalled: COGNITIVE IMPAIRMENT LESS LIKELY    Mini-CogTM Copyright MARIA LUZ Mcginnis. Licensed by the author for use in [facility]; reprinted with permission (solayo@[facility]). All rights reserved.      Do you have sleep apnea, excessive snoring or daytime drowsiness? : no    Reviewed and updated as needed this visit by clinical staff   Tobacco  Allergies  Meds  Problems             Reviewed and updated as needed this visit by Provider      Problems            Social History     Tobacco Use    Smoking status: Former     Packs/day: 1.00     Years: 17.00     Pack years: 17.00     Types: Cigarettes     Quit date: 3/15/1999     Years since quittin.5     Passive exposure: Past    Smokeless tobacco: Never   Substance Use Topics    Alcohol use: Not Currently     Alcohol/week: 4.0 standard drinks of alcohol     Types: 4 Glasses of wine per week             2023     9:16 AM   Alcohol Use   Prescreen: >3 drinks/day or >7 drinks/week? Not Applicable     Do you have a current opioid prescription? No  Do you use any other controlled substances or medications that are not prescribed by a provider? None  Doing well on her meds  Had Hip surgery and pain is better  Has appointment to be seen for a follow up on Follicular Lymphoma  Doing well on her meds   No side effects    Current providers sharing in care for this patient include:   Patient Care Team:  Chuyita Zaidi MD as PCP - General (Family Practice)  Chuyita Zaidi MD as Assigned PCP  Marlena Rogers PA-C (Inactive) as Physician Assistant (Gastroenterology)  Jennifer Jose MD as MD (Medical Oncology)  Naomy Zaidi DO as Physician (Internal Medicine)  Sami Murphy MD as MD (Occupational Medicine)  Naomy Zaidi DO as Assigned Cancer Care Provider  Hosea Minaya MD as Physician (Neurology)  Hosea Minaya MD as Assigned Neuroscience Provider  Austin Blood MD as Assigned Musculoskeletal Provider  Alexandru  MD Julian as MD (Radiation Oncology)  Naomy Zaidi DO as Physician (Internal Medicine)  Chuyita Zaidi MD as Assigned Pain Medication Provider  Dusty Robin MD as Assigned Rheumatology Provider    The following health maintenance items are reviewed in Epic and correct as of today:  Health Maintenance   Topic Date Due    URINE DRUG SCREEN  Never done    COVID-19 Vaccine (6 - Pfizer risk series) 11/16/2022    INFLUENZA VACCINE (1) 09/01/2023    MEDICARE ANNUAL WELLNESS VISIT  09/21/2023    BMP  05/11/2024    MAMMO SCREENING  09/18/2024    ANNUAL REVIEW OF HM ORDERS  09/26/2024    FALL RISK ASSESSMENT  09/26/2024    DTAP/TDAP/TD IMMUNIZATION (3 - Td or Tdap) 03/28/2026    LIPID  03/27/2028    ADVANCE CARE PLANNING  09/26/2028    COLORECTAL CANCER SCREENING  10/04/2031    DEXA  10/14/2037    HEPATITIS C SCREENING  Completed    PHQ-2 (once per calendar year)  Completed    Pneumococcal Vaccine: 65+ Years  Completed    ZOSTER IMMUNIZATION  Completed    IPV IMMUNIZATION  Aged Out    HPV IMMUNIZATION  Aged Out    MENINGITIS IMMUNIZATION  Aged Out             Review of Systems   Constitutional:  Negative for chills and fever.   HENT:  Negative for congestion, ear pain, hearing loss and sore throat.    Eyes:  Positive for visual disturbance. Negative for pain.   Respiratory:  Negative for cough and shortness of breath.    Cardiovascular:  Negative for chest pain, palpitations and peripheral edema.   Gastrointestinal:  Negative for abdominal pain, constipation, diarrhea, heartburn, hematochezia and nausea.   Breasts:  Negative for tenderness, breast mass and discharge.   Genitourinary:  Negative for dysuria, frequency, genital sores, hematuria, pelvic pain, urgency, vaginal bleeding and vaginal discharge.   Musculoskeletal:  Positive for arthralgias, joint swelling and myalgias.   Skin:  Negative for rash.   Neurological:  Negative for dizziness, weakness, headaches and paresthesias.   Psychiatric/Behavioral:  Negative for  "mood changes. The patient is not nervous/anxious.    Walks slowly due to arthritis and due for the next Hip surgery in october  OBJECTIVE:   /76 (BP Location: Right arm, Patient Position: Chair, Cuff Size: Adult Regular)   Pulse 82   Temp 97.8  F (36.6  C)   Resp 18   Ht 1.598 m (5' 2.9\")   Wt 66.2 kg (146 lb)   SpO2 98%   BMI 25.95 kg/m   Estimated body mass index is 25.95 kg/m  as calculated from the following:    Height as of this encounter: 1.598 m (5' 2.9\").    Weight as of this encounter: 66.2 kg (146 lb).  Physical Exam  GENERAL APPEARANCE: alert and no distress  EYES: Eyes grossly normal to inspection, PERRL and conjunctivae and sclerae normal  HENT: ear canals and TM's normal, nose and mouth without ulcers or lesions, oropharynx clear and oral mucous membranes moist  NECK: no adenopathy, no asymmetry, masses, or scars and thyroid normal to palpation  RESP: lungs clear to auscultation - no rales, rhonchi or wheezes  BREAST: normal without masses, tenderness or nipple discharge and no palpable axillary masses or adenopathy  CV: regular rate and rhythm, normal S1 S2, no S3 or S4, no murmur, click or rub, no peripheral edema and peripheral pulses strong  ABDOMEN: soft, nontender, no hepatosplenomegaly, no masses and bowel sounds normal  MS: walks Slowly due to gait issues  SKIN: no suspicious lesions or rashes  NEURO: Normal strength and tone, sensory exam grossly normal, mentation intact and speech normal  PSYCH: mentation appears normal and affect normal/bright    Diagnostic Test Results:  Labs reviewed in Epic  Pending     ASSESSMENT / PLAN:   (Z00.00) Encounter for Medicare annual wellness exam  (primary encounter diagnosis)  Comment:   Plan: Potassium            (D64.9) Anemia, unspecified type  Comment: pending   Plan: Ferritin, CANCELED: CBC with platelets            (C82.90) Follicular lymphoma, unspecified follicular lymphoma type, unspecified body region (H)  Comment: sees Oncology  Plan: " "    (E78.5) Hyperlipidemia LDL goal <160  Comment: stable   Plan: rosuvastatin (CRESTOR) 10 MG tablet            (Z85.3) Personal history of malignant neoplasm of breast  Comment: doing well   Plan:     (I10) Hypertension goal BP (blood pressure) < 140/90  Comment: stable   Plan: losartan (COZAAR) 100 MG tablet            (M81.0) Age-related osteoporosis without current pathological fracture  Comment: advised meds as she is High Risk for Fractures  Pt declined Today  She will follow up when Readu for meds   Plan: Parathyroid Hormone Intact, Vitamin D         Deficiency, Calcium, Phosphorus            (C82.00) Follicular lymphoma grade I, unspecified body region (H)  Comment: as above   Plan: seees Oncology      COUNSELING:  Reviewed preventive health counseling, as reflected in patient instructions       Regular exercise       Healthy diet/nutrition       Vision screening       Hearing screening       Dental care       Bladder control       Fall risk prevention       Osteoporosis prevention/bone health       Advanced Planning       BMI:   Estimated body mass index is 25.95 kg/m  as calculated from the following:    Height as of this encounter: 1.598 m (5' 2.9\").    Weight as of this encounter: 66.2 kg (146 lb).         She reports that she quit smoking about 24 years ago. Her smoking use included cigarettes. She has a 17.00 pack-year smoking history. She has been exposed to tobacco smoke. She has never used smokeless tobacco.      Appropriate preventive services were discussed with this patient, including applicable screening as appropriate for cardiovascular disease, diabetes, osteopenia/osteoporosis, and glaucoma.  As appropriate for age/gender, discussed screening for colorectal cancer, prostate cancer, breast cancer, and cervical cancer. Checklist reviewing preventive services available has been given to the patient.    Reviewed patients plan of care and provided an AVS. The Intermediate Care Plan ( asthma " action plan, low back pain action plan, and migraine action plan) for Virginia meets the Care Plan requirement. This Care Plan has been established and reviewed with the Patient.          Chuyita Zaidi MD  [facility]    Identified Health Risks:

## 2023-09-26 NOTE — PROGRESS NOTES
"The patient was provided with suggestions to help her develop a healthy physical lifestyle.  The patient reports that she has difficulty with activities of daily living. I have asked that the patient make a follow up appointment in 24 weeks where this issue will be further evaluated and addressed.Answers submitted by the patient for this visit:  Annual Preventive Visit (Submitted on 9/19/2023)  Chief Complaint: Annual Exam:  In general, how would you rate your overall physical health?: fair  Frequency of exercise:: 4-5 days/week  Do you usually eat at least 4 servings of fruit and vegetables a day, include whole grains & fiber, and avoid regularly eating high fat or \"junk\" foods? : Yes  Taking medications regularly:: Yes  Medication side effects:: Not applicable  Activities of Daily Living: transportation requires assistance, shopping requires assistance, preparing meals requires assistance, housework requires assistance, laundry requires assistance  Home safety: no safety concerns identified  Hearing Impairment:: no hearing concerns  In the past 6 months, have you been bothered by leaking of urine?: No  abdominal pain: No  Blood in stool: No  Blood in urine: No  chest pain: No  chills: No  congestion: No  constipation: No  cough: No  diarrhea: No  dizziness: No  ear pain: No  eye pain: No  nervous/anxious: No  fever: No  frequency: No  genital sores: No  headaches: No  hearing loss: No  heartburn: No  arthralgias: Yes  joint swelling: Yes  peripheral edema: No  mood changes: No  myalgias: Yes  nausea: No  dysuria: No  palpitations: No  Skin sensation changes: No  sore throat: No  urgency: No  rash: No  shortness of breath: No  visual disturbance: Yes  weakness: Yes  pelvic pain: No  vaginal bleeding: No  vaginal discharge: No  tenderness: No  breast mass: No  breast discharge: No  In general, how would you rate your overall mental or emotional health?: good  Additional concerns today:: Yes  Exercise outside of work " (Submitted on 9/19/2023)  Chief Complaint: Annual Exam:  Duration of exercise:: 15-30 minutes

## 2023-09-26 NOTE — TELEPHONE ENCOUNTER
After review by Breast Center Radiologist, Dr. Live Sal, Virginia was called,  verified, and given her 2023 Left Axillary Lymph Node Biopsy results (No morphologic evidence of lymphoma) and recommended Follow up (Continued Oncologic Care with Dr. Naomy Zaidi).   Patient denies any concerns with the biopsy site. Ordering provider was informed of the results.  I encouraged Virginia to contact her doctor with any follow questions or concerns.  Patient verbalized understanding and agrees with the plan of care.        Smita Alan RN BSN  Procedure Nurse  [facility]  [phone]    ---------------------------------------------------------------------------------------------------------------------------    [facility]  Virginia Byers [id number]  F, 1950  Surgical Pathology Report (Final result) [id number]  Authorizing Provider: Naomy Zaidi DO Ordering Provider: Naomy Zaidi DO  Ordering Location: [facility]  Imaging  Collected: 2023 08:39 AM  Pathologist: Mickey Bustillo MD Received: 2023 09:05 AM  .  Specimens  A Lymph Node(s), Axillary, Left, 5 cores placed in 30 mL sodium chloride and 60 mL formalin by Dr. Kelley  .  .  Final Diagnosis  Left axillary lymph node, ultrasound guided 14 gauge needle core biopsies:  - No morphologic evidence of lymphoma. See comment.  Electronically signed by Mickey Bustillo MD on 2023 at 12:44 PM  .  Comment  The lymph node tissue was received in formalin and thus, lymph node biopsy tissue could not be sent for flow cytometry. No  separate fresh tissue was sent for flow cytometry.  Although there is no morphologic evidence of lymphoma in this biopsy, a small percentage of monoclonal B cells could be identified  only with flow cytometry.  Intradepartmental consultation concurs with the diagnosis.   Yes

## 2023-09-27 LAB — PTH-INTACT SERPL-MCNC: 38 PG/ML (ref 15–65)

## 2023-09-28 ENCOUNTER — ONCOLOGY VISIT (OUTPATIENT)
Dept: ONCOLOGY | Facility: CLINIC | Age: 73
End: 2023-09-28
Payer: MEDICARE

## 2023-09-28 VITALS
WEIGHT: 147 LBS | SYSTOLIC BLOOD PRESSURE: 145 MMHG | BODY MASS INDEX: 26.12 KG/M2 | DIASTOLIC BLOOD PRESSURE: 73 MMHG | OXYGEN SATURATION: 96 % | HEART RATE: 78 BPM

## 2023-09-28 DIAGNOSIS — M79.89 LEFT AXILLARY SWELLING: ICD-10-CM

## 2023-09-28 DIAGNOSIS — C82.08 FOLLICULAR LYMPHOMA GRADE I, LYMPH NODES OF MULTIPLE SITES (H): ICD-10-CM

## 2023-09-28 DIAGNOSIS — C91.10 MONOCLONAL B-CELL LYMPHOCYTOSIS WITH IMMUNOPHENOTYPE LIKE CHRONIC LYMPHOCYTIC LEUKEMIA (CLL) (H): ICD-10-CM

## 2023-09-28 DIAGNOSIS — D72.820 MONOCLONAL B-CELL LYMPHOCYTOSIS WITH IMMUNOPHENOTYPE LIKE CHRONIC LYMPHOCYTIC LEUKEMIA (CLL) (H): ICD-10-CM

## 2023-09-28 DIAGNOSIS — C82.00 FOLLICULAR LYMPHOMA GRADE I, UNSPECIFIED BODY REGION (H): Primary | ICD-10-CM

## 2023-09-28 LAB — LDH SERPL L TO P-CCNC: 232 U/L (ref 0–250)

## 2023-09-28 PROCEDURE — 99214 OFFICE O/P EST MOD 30 MIN: CPT | Performed by: INTERNAL MEDICINE

## 2023-09-28 ASSESSMENT — PAIN SCALES - GENERAL: PAINLEVEL: NO PAIN (0)

## 2023-09-28 NOTE — LETTER
9/28/2023         RE: Virginia Byers  [id number] [facility] [id number]        Dear Colleague,    Thank you for referring your patient, Virginia Byers, to the [facility]. Please see a copy of my visit note below.    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    Patient has medical history including LEFT breast cancer in 2000, hypertension, hyperlipidemia, osteoporosis, osteoarthritis, degenerative disc disease, spinal stenosis of lumbar region,  s/p bilateral L5-S1 transforaminal ROS by Dr. Garcia on 7/9/2021, s/p bilateral L4-5 and L5-S1 facet joint injections on 6/14/2021, glaucoma and macular degeneration, cataracts s/p surgery, history of tobacco use (quit 1999)     Regarding previous history of breast cancer:  She was previously treated by Dr. Sakina Brandt at [facility]      In summary, diagnosed at age 49 with LEFT breast IDC ER positive, DE positive, HER2 positive on FISH. 3/2000 left breast lumpectomy and left axillary node dissection (IDC, grade 3, 1.45 cm incompletely excised tumor, DCIS, positive margins with infiltrating carcinoma, 1 of 17 lymph nodes positive (0.5 cm metastatic focus), ER positive, DE positive, HER2 positive).  4/2020 left breast simple mastectomy with no residual carcinoma. S/p ACx4, taxol x4, no anti-her2 treatment, no RT. S/p 5 years adjuvant endocrine therapy (tamoxifen and anastrozole). Delayed left breast reconstruction and right breast augmentation.      3/2/2000  Excisional biopsy of Left breast mass -   Infiltrating ductal carcinoma with associated DCIS   ER positive (60%), DE positive (22%), HER2 by FISH POSITIVE     3/10/2000 - Left  lumpectomy and left axillary node  Invasive ductal carcinoma, Grade 3, 1.45 cm (incompletely excised), negative for LVI  DCIS, greatest histologic dimension of tumor (DCIS PLUS invasive tumor) = 2.9 cm (estimate 50% DCIS)  Positive margins- infiltrating carcinoma extends to margin  1 of 17 axillary lymph nodes sampled was positive for metastatic focus of 0.5 cm.  ER/MS+     Sections of lumpectomy left breast showing residual 1.1 cm focus of ductal carcinoma in situ (DCIS), present within microns of the linked margin of resection.       4/11/2000 - Left breast simple mastectomy, negative for residual carcinoma  Left mastectomy on 4/11/2000 performed by Dr. Cristian Mcdonald at [facility].      Adjuvant treatment:  - 5/15/2000-7/24/2000 4 cycles of Adriamycin and Cytoxan   - 8/16/2000-10/18/2000 4 cycles of paclitaxel   - Appears she may have been on some kind of trial/protocol, was not given any anti-HER2 targeted treatment  - No adjuvant radiation  - 12/2000- 10/2002 tamoxifen  - 10/2002-5/2006 switched to anastrozole (due to in vitro studies showing anastrozole may be slightly better for patients were HER2 positive) with Fosamax for osteopenia     8/31/2015 Left delayed reconstruction with tissue expander    2/3/2016 Left 2nd stage 450 cc high profile silicone implant; right augmentation 150 cc Moderate classic profile silicone implant; cresent mastopexy    10/18/2021: Screening mammogram right breast ZACKARY    OTHER:  Of note, patient has documentation of anemia (iron deficiency and anemia of chronic disease), elevated LFTs, nausea and vomiting of all solids and liquids, fatigue with 26 pound unintentional weight loss from 7/20/2021 - 10/20/2021 with mildly elevated LFTs which led to evaluation with mammogram, endoscopy with EUS and colonoscopy as detailed below. Pt did associate these with some spinal injections she had gotten, due to overlapping times. Pt did regain all of the weight and hand improvement of  anemia within 2 months, without significant intervention.     -10/2021: Endoscopy with EUS normal EGD and no stigmata or source of upper GI bleeding, visualized bile duct, gallbladder, liver, pancreas, left adrenal gland normal.  No lymphadenopathy. LIVER, RANDOM NEEDLE BIOPSY: Benign parenchyma with congestion; no significant fibrosis or other abnormalities     -10/2021 colonoscopy: Hemorrhoids, diverticulosis in sigmoid colon and splenic flexure    Current history:  -Ongoing right knee pain not improved with brace and physical therapy, s/p bilateral L5-S1 transforaminal ROS by Dr. Garcia on 7/9/2021, s/p bilateral L4-5 and L5-S1 facet joint injections on 6/14/2021. ongoing b/l hip and leg pain, worse with position changes (ie sitting to standing), constant, 8-9/10, tylenol brings pain down to 6/10 (using tylenol bid). ECOG 3.   -Patient has seen neurosurgery Dr. Jose Alexander, occupational medicine specialist Dr.Orrin Murphy, with plan to see PM&R Dr. Margie Agudelo and movement disorder clinic  - 11/22 MRI lumbar spine:    prominent left iliac lymph nodes among other findings related to degenerative changes and neural foraminal stenosis throughout lumbar spine    - 12/22 MRI pelvis:  1. Enhancing marrow signal abnormality of the right iliac bone extending from the superior acetabulum subchondral location proximally almost to the level of the sacroiliac joint caudal aspect. No associated fracture line. Underlying marrow infiltrative process such as from metastasis cannot be excluded especially given the degree of T1 hypointensity and history of primary tumor. Other consideration include stress reaction.  2. Mildly increased left external iliac chain, and the right medial thigh lymphadenopathy. Metastasis cannot be excluded.  3. Left posterior iliac enhancing lesion, similar in size to prior CT 9/2021 and previously not visible on CT, focal enhancing lesion in the right greater trochanter laterally. Findings are  concerning for Metastasis.    -12/22 CT chest abdomen pelvis with contrast:  COMPARISON: MRI pelvis 12/20/2022 and CT abdomen pelvis 9/21/2021.  1. Mastectomies with implant reconstructions. No lymphadenopathy  2.  There is an new or increased left common iliac, external iliac and pelvic sidewall lymphadenopathy since prior CT. Distal left common iliac lymph nodes measure up to 1.2 cm on this exam compared to 0.7 cm on prior CT. Left pelvic sidewall lymph node measures 2.1 cm short axis on this exam compared to 1.2 cm on prior CT . consistent with metastatic disease.  3. Sclerotic lesions in the left sacrum and left posterior ilium are unchanged from prior CT.  may be metastatic.  4. No evidence of metastatic disease in the chest.     -1/2023 MRI brain w/wo contrast:  IMPRESSION:  1. No findings of intracranial metastatic disease.  2. Indeterminate ovoid T2 hyperintense enhancing mass in the region of the  left lacrimal gland measuring approximately 2.4 cm AP by 1.4 cm  transverse by 2.3 cm craniocaudal associated with/replacing the left  lacrimal gland. Consider correlation with tissue sampling.  3. A few indeterminate partially visualized heterogeneous enhancing  Nodules measuring up to approximately 1.5 cm in the bilateral parotid glands.   Consider soft tissue neck CT with contrast for further evaluation.    -1/20/2023: IR US Guided biopsy of a right external iliac LN  Final Diagnosis   A.  LYMPH NODE, RIGHT EXTERNAL ILIAC, CORE NEEDLE BIOPSY AND TOUCH IMPRINT:  -Involved by follicular lymphoma, low-grade (WHO grade 1-2)  -Negative for metastatic carcinoma     The morphologic and immunophenotypic patterns are consistent with follicular lymphoma.  There is no high-grade lymphoma present in this limited specimen.  Concurrent flow study ([id number]) demonstrated CD10-positive lambda monotypic B cells (22%), rare kappa monotypic B cells (1.3%), and no aberrant immunophenotype on T cells.  A FISH study for Bcl-2 is  pending and will be reported separately.     The case was initially reviewed by Dr. Cifuentes (breast pathology).     This case was reviewed in part at our Hematopathology Faculty Consensus conference at which Girish Nolasco, Narciso, and Kayode were present in addition to myself.  This is a small needle core.  In some of the areas, the CD10+ B cells are confined to follicles.  Though the possibility of in situ follicular neoplasia was discussed, we still favor a diagnosis of follicular lymphoma - there are a number of CD10+ B cells outside of follicles at the base of the biopsy, and the clinical history supports a greater extent of involvement.     There was a tiny kappa monotypic B cell population identified by flow (separate from the CD10+ clone).  We looked for the morphologic correlate in this case by IHC, but it is not readily apparent.  This may represent a monoclonal B lymphocytosis - consider sending a peirpheral blood sample for flow cytometry.    FISH:  RESULTS:     NORMAL  - No rearrangement of BCL2     INTERPRETATION:  No evidence was found by FISH of rearrangement of the BCL2 (18q21) locus. These findings are not helpful for confirming or further characterizing the reported pathologic diagnosis of follicular lymphoma.    Flow Interpretation   A. Lymph Node(s), :  -CD10-positive lambda monotypic B cells (22%)  -Rare kappa monotypic B cells (1.3%)  -No aberrant immunophenotype on T cells  See comment   Electronically signed by Krista Headley MD on 1/24/2023 at  3:10 PM   Comment     The CD10-positive population is consistent with a clonal B cell population and a CD10 positive B-cell lymphoma is favored.      In addition, a second clonal B-cell population is identified which has a CLL-like immunophenotype except that CD5 expression is equivocal, this may represent a monoclonal B-cell lymphocytosis (MBL).     Addendum   INTERPRETATION  The diagnosis remains the same (see comment)     REASON FOR  ADDENDUM  This addendum is issued to reflect additional testing performed on this case. See Comment.      COMMENT  Additional multi-color flow analysis was performed for the following markers:   CD23, CD79b     The CD5 (dimly) positive B cells mostly lack CD23 and CD79b        1/23/23 CT neck:  A) Heterogeneous nodule in the right thyroid gland measuring up  to 1.6 cm.   B) Soft tissue nodule in the superficial left parotid  gland measuring 1.2 cm. Additional 0.8 cm nodule in the deep portion  of the left parotid gland. Soft tissue nodule in the superficial right  parotid gland measuring 0.8 cm. These may represent benign or malignant parotid lesions. Multiplicity of lesions raises concern for Warthin tumors. Otolaryngology consultation is  recommended.  C) Prominent soft tissue around the major arteries in the neck  particularly the common and internal carotid arteries. This is  atypical for atherosclerotic disease and vasculopathy/vasculitis such  as giant cell arteritis should be considered. Probable superimposed  atherosclerotic disease at the carotid bifurcations right greater than  left without definite high-grade stenosis.    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  And B-cell cohort #2 which is described as negative for CD5 and showing dim CD20 and dim kappa staining.  This population is listed a CD5 negative and CD10 negative also listed as negative for   and .  Of note the population of monoclonal B-cell lymphocytosis found in peripheral blood in our department and in the lymph node biopsy in January showed partial/equivocal dim CD5 and may correspond to the cohort #2.     We essentially agree with the diagnosis of follicular lymphoma, which is consistent with prior diagnosis of follicular lymphoma low grade  established on core biopsy of right external iliac lymph node January 20, 2023 (case [id number]) in our Department . The concurrent flow cytometry case [id number] in January showed in addition to CD10 positive B-cell population  also a partial/equivocal CD5 positive separate kappa monotypic B-cell population, which most likely represents monoclonal B-cell lymphocytosis of CLL/SLL type, since it was also documented in the peripheral blood flow cytometry on February 16, 2023 (case number [facility] ) at a low frequency.  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 Areas with infiltrates of small B cells with coexpression of CD5 and CD23 are seen in the current biopsy, but the size of the biopsy is too small to comprehensively evaluate for the presence of proliferation centers and the extent of involvement by the CD5 positive B cell lymphoproliferative process.   This case has been discussed at intradepartmental hematopathology conference with participation by THELMA Petersen, THELMA Davis and myself.      -5/23 PET/CT NCAP:   1. An ovoid mass in the left lateral orbit measures 2.5 x 1.2 cm, slightly larger than 01/09/2023, and demonstrates marked uptake (SUVmax 9.3), consistent with biopsy-proven lymphoma  2. A few small FDG avid bilateral intraparotid nodules are present, one on the right and two on the left,  including representative left parotid nodule measuring 0.9 x 1.4 cm with moderate uptake (SUVmax 6.7).  3.  Asymmetric prominent right posterior lower cervical node measures 0.8 cm associated with moderate focal   uptake (SUVmax 5.1).  4. A single mildly enlarged left axillary lymph node measures 1.0 cm associated with mild uptake (SUVmax 3.0)  5. Some scattered FDG avid lymph nodes below the diaphragm involve the retroperitoneal retrocaval, bilateral common iliac, left pelvic sidewall and left inguinal regions. Some of these have increased slightly in size since 12/28/2022, such as a conglomerate left pelvic sidewall bj mass measuring approximately 2.2 x 4.1 cm (previously 2.2 x 3.4 cm) associated with marked uptake (SUVmax 7.6).  6. FDG avid sclerotic skeletal lesion involves the right superior acetabulum (SUVmax 12.9) as well as a couple of separate smaller sclerotic lesions in the right iliac bone uptake (SUVmax 12.2).     - 6/14/2023- 6/15/2023 palliative radiation to left orbit follicular lymphoma 4Gy/2fx  - 6/15/2023 bone marrow biopsy  MORPHOLOGY:  - No morphologic or immunophenotypic evidence of follicular lymphoma  - Flow cytometry showing a small population of CD5 positive monotypic B cells; most compatible with monoclonal B lymphocytosis (There is no definitive morphologic correlate for this finding. There is no morphologic or immunophenotypic evidence of involvement by follicular lymphoma.   The CD5-positive B cell clone may best represent monoclonal B lymphocytosis, provided that bj SLL is excluded.)  - Normocellular marrow (cellularity estimated at 30%) with trilineage hematopoietic maturation and no increase in blasts  FLOW CYTOMETRY:  CD5 positive kappa-monotypic B cells (0.8%)  0.8% B cells which express CD5, CD19, CD20 (dim to negative) and monotypic kappa immunoglobulin light chains (dim) and lack CD10 and CD38. The B cells have similar forward scatter relative to background T cells  suggestive of similar size; however, precise size determination is deferred to morphology  CYTOGENETICS:  46,XX    -6/19/2023 CT-guided bone biopsy, right acetabulum  PATHOLOGY:  - Atypical lymphoid infiltrate in a suboptimal biopsy  - No histologic evidence of metastatic carcinoma  - Two small monoclonal B-cell populations detected by flow cytometry  This biopsy is suboptimal for evaluation due to fragmentation, crush artifact, and decalcification. In this limited biopsy, there are fragments of bone and marrow showing a mixed infiltrate that includes both B and T cells. Notably, 2 small clonal B-cell populations were detected by flow cytometry which resemble B-cell populations detected in this patient's past lymph node (1/20/23) and blood and bone marrow (2/16/23, 6/15/23) flow cytometry studies. A definitive morphologic correlate to these cell populations is not appreciated, and the overall features are insufficient for a diagnosis of lymphoma.    FLOW CYTOMETRY:  A. Pelvis, Right:  -CD5 positive kappa-monotypic B cells ( 2.8 %) - see comment A  -CD10 positive lambda-monotypic B cells ( 0.4 %) - see comment B      Electronically signed by Arnaldo Alvarado MD on 6/20/2023 at  1:31 PM   Comment     A) Clonal B cells with the immunophenotype of CLL/SLL are present in this sample.  The differential includes peripheral blood contamination (with an MBL), the tissue based correlate to MBL, versus involvement by SLL.  2.8% B cells which express CD5 (partial, dim), CD19, CD20 (dim to negative) and monotypic kappa immunoglobulin light chains (dim) and lack CD10. The B cells have similar forward scatter relative to background T cells suggestive of similar size; however, precise size determination is deferred to morphology.     B) A CD10-positive B cell clone is also present - involvement by the patient's previously diagnosed CD10+ B cell lymphoma is in the differential (follicular lymphoma versus other (transformation  "to a higher grade lymphoma). 0.4% B cells which express CD10, CD19 (slightly dim), CD20, and monotypic lambda immunoglobulin light chains and lack CD5.  The B cells have increased forward scatter relative to background T cells suggestive of increased size; however, precise size determination is deferred to morphology.        INTERIM HISTORY:  Regarding lymphoma:  Denies weight loss, night sweats, early satiety, RUQ or LUQ pain  Reports left eye blurriness is stable since surgery, not worsened since RT, using lubricating eye drops prn and working with eye doctor for management of glaucoma    - 7/10/23 Rheumatology consult for ESR and CRP elevated, DEVON positive 1:160 speckled; PCP suspects pt may have PMR; \"I suspect that the bilateral hip OA is altering gait that then results in worsening of low back pain. No further rheumatology workup needed at this time. \"    - 7/11/23 left hip total arthroplasty with severe b/l hip osteoarthritis with Dr. Austin Blood,  cc, discharged with DVT prophylaxis Lovenox 40 mg daily x2 weeks and aspirin 81 mg twice daily x2 weeks    - 8/22 orthopedic surgery follow-up, patient would like to proceed with total right hip arthroplasty ASAP (to be scheduled at least 12 weeks from left hip arthroplasty), tentatively 10/13/23    -Patient missed her 7/23 neurology movement d/o follow up-previously noted multifactorial gait problem w/orthopedic troubles/arthritis; cannot r/o underlying parkinsonism so will do carbidopa/levodopa challenge, does not appear patient did this        REVIEW OF SYSTEMS:   A 14 point ROS was reviewed with pertinent positives and negatives in the HPI.       HOME MEDICATIONS:  Current Outpatient Medications   Medication Sig Dispense Refill     acetaminophen (TYLENOL) 325 MG tablet Take 2 tablets (650 mg) by mouth every 4 hours as needed for other (For optimal non-opioid multimodal pain management to improve pain control.) 100 tablet 0     dorzolamide-timolol " (COSOPT) 2-0.5 % ophthalmic solution Place 1 drop into both eyes 2 times daily       latanoprost (XALATAN) 0.005 % ophthalmic solution Place 1 drop into both eyes At Bedtime       losartan (COZAAR) 100 MG tablet Take 1 tablet (100 mg) by mouth daily 90 tablet 3     Multiple Vitamins-Minerals (ICAPS AREDS 2 PO)        ondansetron (ZOFRAN ODT) 4 MG ODT tab Take 1 tablet (4 mg) by mouth every 6 hours as needed for nausea or vomiting 8 tablet 0     oxyCODONE (ROXICODONE) 5 MG tablet Take 1 tablet (5 mg) by mouth every 4 hours as needed for moderate to severe pain 20 tablet 0     polyethylene glycol (MIRALAX) 17 GM/Dose powder Take 17 g by mouth daily 510 g 0     rosuvastatin (CRESTOR) 10 MG tablet Take 1 tablet (10 mg) by mouth daily 90 tablet 3     senna-docusate (SENOKOT-S/PERICOLACE) 8.6-50 MG tablet Take 1 tablet by mouth 2 times daily 30 tablet 0         ALLERGIES:  Allergies   Allergen Reactions     Compazine      Mental confusion     Hydrochlorothiazide      Dryness mouth     Prochlorperazine Visual Disturbance     Simvastatin Other (See Comments)     Muscle aches         PAST MEDICAL HISTORY:  Past Medical History:   Diagnosis Date     Breast cancer (H) 2000    Left breast, s/p mastectomy, chemotherapy and endocrine therapy     Follicular lymphoma (H) 01/20/2023     Glaucoma     bilateral - Dr. Moore - [facility]     Hyperlipidemia      Hypertension      Macular degeneration     Dr. Toscano - [facility]     Osteoarthritis      Osteoporosis      Personal history of chemotherapy 2000    A/C + Taxol     S/P radiation therapy     400 cGy to left orbit completed on 6/15/2023 [facility]         PAST SURGICAL HISTORY:  Past Surgical History:   Procedure Laterality Date     ARTHROPLASTY HIP Left 7/11/2023    Procedure: ARTHROPLASTY, HIP, TOTAL LEFT;  Surgeon: Austin Blood MD;  Location: [facility] OR     BIOPSY Left 04/06/2023    Left Orbital Biopsy     C  MASTECTOMY,SIMPLE  2000    left     COLONOSCOPY  2006    Q 10 years     COLONOSCOPY N/A 10/04/2021    Procedure: COLONOSCOPY;  Surgeon: Guru Lyla Ruby MD;  Location: [facility] OR     ESOPHAGOSCOPY, GASTROSCOPY, DUODENOSCOPY (EGD), COMBINED N/A 10/04/2021    Procedure: ENDOSCOPIC ULTRASOUND, ESOPHAGOSCOPY / UPPER GASTROINTESTINAL TRACT (GI), Esophagogastroduodenoscopy,  Fine Needle Biopsy of liver;  Surgeon: Guru Lyla Ruby MD;  Location: [facility] OR     IR LYMPH NODE BIOPSY  2023     SURGICAL HISTORY OF -  Left 2015    tissue expander placed in left breast area     SURGICAL HISTORY OF -  Left 2016    left breast implant and right mammoplasty         SOCIAL HISTORY:  Social History     Socioeconomic History     Marital status: Single     Spouse name: Not on file     Number of children: 0     Years of education: Not on file     Highest education level: Not on file   Occupational History     Employer: [employer]     Occupation: REtired   Tobacco Use     Smoking status: Former     Packs/day: 1.00     Years: 17.00     Pack years: 17.00     Types: Cigarettes     Quit date: 3/15/1999     Years since quittin.5     Passive exposure: Past     Smokeless tobacco: Never   Vaping Use     Vaping Use: Never used   Substance and Sexual Activity     Alcohol use: Not Currently     Alcohol/week: 4.0 standard drinks of alcohol     Types: 4 Glasses of wine per week     Drug use: No     Sexual activity: Not Currently     Birth control/protection: None   Other Topics Concern     Parent/sibling w/ CABG, MI or angioplasty before 65F 55M? Yes     Comment: Father heart attack   Social History Narrative     Not on file     Social Determinants of Health     Financial Resource Strain: Unknown (2023)    Financial Resource Strain      Within the past 12 months, have you or your family members you live with been unable to get utilities (heat, electricity) when it was really needed?: Patient refused    Food Insecurity: Unknown (9/21/2023)    Food Insecurity      Within the past 12 months, did you worry that your food would run out before you got money to buy more?: Patient refused      Within the past 12 months, did the food you bought just not last and you didn t have money to get more?: Patient refused   Transportation Needs: Unknown (9/21/2023)    Transportation Needs      Within the past 12 months, has lack of transportation kept you from medical appointments, getting your medicines, non-medical meetings or appointments, work, or from getting things that you need?: Patient refused   Physical Activity: Not on file   Stress: Not on file   Social Connections: Not on file   Interpersonal Safety: Not on file   Housing Stability: Unknown (9/21/2023)    Housing Stability      Do you have housing? : Patient refused      Are you worried about losing your housing?: Patient refused       FAMILY HISTORY:  Family History   Problem Relation Age of Onset     Cancer Mother         bone cancer     Heart Disease Father      Coronary Artery Disease Father      Diabetes Father      Arthritis Sister      Breast Cancer Sister         age 75     Diabetes Maternal Grandmother      Diabetes Maternal Grandfather      Diabetes Paternal Grandmother      Family history of cancer- sister breast cancer- dx at age 75 y/o, localized breast cancer, surgical resection, RT, no adjuvant chemo or endocrine therapy that pt is aware of     PHYSICAL EXAM:  Vital signs:  BP (!) 145/73 (BP Location: Right arm, Patient Position: Chair, Cuff Size: Adult Regular)   Pulse 78   Wt 66.7 kg (147 lb)   SpO2 96%   BMI 26.12 kg/m       GENERAL/CONSTITUTIONAL: No acute distress.  EYES: Pupils are equal and round. Extraocular movements intact without nystagmus.  No scleral icterus. Minimal ptosis left eye at site of surgery  RESPIRATORY: Equal chest rise.   MUSCULOSKELETAL: Warm and well-perfused, no cyanosis, clubbing, or edema.   NEUROLOGIC: Cranial nerves  are grossly intact. Alert, oriented to person, place and time, answers questions appropriately.  INTEGUMENTARY: No rashes or jaundice.  GAIT: ambulates with walker with foot lift for right foot         LABS:   Latest Reference Range & Units 09/26/23 09:48   Potassium 3.4 - 5.3 mmol/L 4.7   Calcium 8.8 - 10.2 mg/dL 10.1   Phosphorus 2.5 - 4.5 mg/dL 3.9   Ferritin 11 - 328 ng/mL 83   Lactate Dehydrogenase 0 - 250 U/L 203   Vitamin D Deficiency screening 20 - 75 ug/L 22   WBC 4.0 - 11.0 10e3/uL 9.2   Hemoglobin 11.7 - 15.7 g/dL 13.3   Hematocrit 35.0 - 47.0 % 41.5   Platelet Count 150 - 450 10e3/uL 317   RBC Count 3.80 - 5.20 10e6/uL 4.86   MCV 78 - 100 fL 85   MCH 26.5 - 33.0 pg 27.4   MCHC 31.5 - 36.5 g/dL 32.0   RDW 10.0 - 15.0 % 13.3   % Neutrophils % 72   % Lymphocytes % 18   % Monocytes % 8   % Eosinophils % 3   % Basophils % 1   Absolute Basophils 0.0 - 0.2 10e3/uL 0.1   Absolute Eosinophils 0.0 - 0.7 10e3/uL 0.2   Absolute Immature Granulocytes <=0.4 10e3/uL 0.0   Absolute Lymphocytes 0.8 - 5.3 10e3/uL 1.6   Absolute Monocytes 0.0 - 1.3 10e3/uL 0.7   % Immature Granulocytes % 0   Absolute Neutrophils 1.6 - 8.3 10e3/uL 6.6   Parathyroid Hormone Intact 15 - 65 pg/mL 38     PATHOLOGY:  See above    IMAGING:    ASSESSMENT/PLAN:  Virginia Byers is a 72 year old female with:    # 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 INCLUDING biopsy proven LN and left orbital mass/lacrimal gland involvement and suspected parotid gland involvement (left axillary lymph node not involved)  -11/22 MRI lumbar spine in 12/22 MRI pelvis show prominent left iliac lymph nodes (measuring 1.8 x 3.2, previously 1.5 x 2.7 cm), right medial thigh lymphadenopathy (1.7cm), right iliac bone enhancing marrow signal, left posterior iliac enhancing lesion  -12/22 CT chest abdomen pelvis shows new or increased left common iliac (1.2cm), external iliac, pelvic sidewall lymphadenopathy (2.1cm) and sclerotic lesions in left sacrum and left posterior ilium.  "No splenomegaly  - 1/23 brain MRI: Indeterminate ovoid T2 hyperintense enhancing mass in the region of the left lacrimal gland measuring approximately 2.4 cm AP by 1.4 cm transverse by 2.3 cm craniocaudal associated with/replacing the left lacrimal gland. Consider correlation with tissue sampling  - 1/23 CT soft tissue neck: Soft tissue nodule in the superficial left parotid gland measuring 1.2 cm. Additional 0.8 cm nodule in the deep portion of the left parotid gland. Soft tissue nodule in the superficial right parotid gland measuring 0.8 cm. These may represent benign or malignant parotid lesions. Multiplicity of lesions raises concern for Warthin tumors  - 1/27/23 note from Dr. Wilmar Morales- \"Based on this diagnosis, I think the 2 nodules in her parotid gland are likely atypical lymph nodes containing lymphoma.  She will probably require some staging imaging, such as PET scan and we can take a look at that as well.  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 CD5 stains T cells  - Ki-67 proliferative index is approximately 10%, also low in the follicles.  - flow cytometry \"CD10-positive population is consistent with a clonal B cell population and a CD10 positive B-cell lymphoma is favored.\"    - 4/6/23 left orbital mass/lacrimal gland excisional biopsy: PATHOLOGY: - Follicular Lymphoma, low grade ([facility] negative for BCL2 rearrangement). 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  The flow cytometry from 1/20/23 right external iliac LN biopsy showed in addition to CD10 positive B-cell population  also a partial/equivocal CD5 positive separate kappa monotypic B-cell population, which most likely represents monoclonal B-cell lymphocytosis of CLL/SLL type, since it was also documented in the peripheral blood flow cytometry on February 16, 2023 (case number UF ) at a low frequency.  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  - Cohort 2 which is described as negative for CD5, CD10, ,  and showing dim CD20 and dim kappa staining   Of note the population of monoclonal B-cell lymphocytosis found in peripheral blood in our department and in the lymph node biopsy in January showed partial/equivocal dim CD5 and may correspond to the cohort #2.     -5/23 PET/CT NCAP:   1. An ovoid mass in the left lateral orbit measures 2.5 x 1.2 cm, slightly larger than 01/09/2023, and demonstrates marked uptake (SUVmax 9.3), consistent with biopsy-proven lymphoma  2. A few small FDG avid bilateral intraparotid nodules are present, one on the right and two on the left, including representative left parotid nodule measuring 0.9 x 1.4 cm with moderate uptake (SUVmax 6.7).  3.  Asymmetric prominent right posterior lower cervical node measures 0.8 cm associated with moderate focal uptake (SUVmax 5.1).  4. A single mildly enlarged left axillary lymph node measures 1.0 cm associated with mild uptake (SUVmax 3.0)  5. Some scattered FDG avid lymph nodes below the diaphragm involve the retroperitoneal retrocaval, bilateral  common iliac, left pelvic sidewall and left inguinal regions. Some of these have increased slightly in size since 12/28/2022, such as a conglomerate left pelvic sidewall bj mass measuring approximately 2.2 x 4.1 cm (previously 2.2 x 3.4 cm) associated with marked uptake (SUVmax 7.6).  6. FDG avid sclerotic skeletal lesion involves the right superior acetabulum (SUVmax 12.9) as well as a couple of separate smaller sclerotic lesions in the right iliac bone uptake (SUVmax 12.2).     - 6/23 bone marrow biopsy:  MORPHOLOGY:  - No morphologic or immunophenotypic evidence of follicular lymphoma  - Flow cytometry showing a small population of CD5 positive monotypic B cells; most compatible with monoclonal B lymphocytosis (There is no definitive morphologic correlate for this finding. There is no morphologic or immunophenotypic evidence of involvement by follicular lymphoma.  The CD5-positive B cell clone may best represent monoclonal B lymphocytosis, provided that bj SLL is excluded.)  - Normocellular marrow (cellularity estimated at 30%) with trilineage hematopoietic maturation and no increase in blasts  FLOW CYTOMETRY:  CD5 positive kappa-monotypic B cells (0.8%)  0.8% B cells which express CD5, CD19, CD20 (dim to negative) and monotypic kappa immunoglobulin light chains (dim) and lack CD10 and CD38. The B cells have similar forward scatter relative to background T cells suggestive of similar size; however, precise size determination is deferred to morphology  CYTOGENETICS:  46,XX    -6/19/2023 CT-guided bone biopsy, right acetabulum  PATHOLOGY:  - Atypical lymphoid infiltrate in a suboptimal biopsy  - No histologic evidence of metastatic carcinoma  - Two small monoclonal B-cell populations detected by flow cytometry: CD5 positive kappa-monotypic B cells ( 2.8 %, CD5 partial, dim, CD19 positive, CD30 dim to negative, CD10 negative); CD10 positive lambda-monotypic B cells ( 0.4 %, CD10 positive, CD19 slightly dim,  CD20 positive, CD5 negative)     Staging and prognostication:  - Stage: 4 given right medial thigh SURINDER and left common iliac, external iliac, pelvic sidewall; biopsy proven left orbital mass involvement; there was some concern from ENT that patients parotid lesions were related to pts follicular lymphoma   - FLIPI: score 2 (age>60),  stage 3-4; intermediate risk  - pertinent labs: 12/22  and 5/23 ; 2/23 and 5/23 beta 2 microglobulin 1.7, hepatitis B and C negative    Treatment:  - GELF criteria to indicate treatment (involvement of 3 or more bj sites each with a diameter greater than or equal to 3 cm, any bj or extranodal tumor mass with a diameter of greater than or equal to 7 cm, B symptoms, splenomegaly, pleural effusions, peritoneal ascites, clinically progressive or significant cytopenias, leukemia, symptoms, threatened end organ function, clinically significant bulky disease, steady or rapid progression of disease)  - option of systemic treatment including rituximab discussed previously on multiple occassions particularly w/left orbital involvement, see my previous notes for details  - 6/14/2023- 6/15/2023 palliative radiation to left orbit follicular lymphoma 4Gy/2fx    PLAN:  - bone marrow biopsy negative for lymphoma and right acetabulum bone biopsy atypical lymphoid infiltrate seen but biopsy suboptimal and flow cytometry shows 2.8% CD5 positive kappa monotypic B cells consistent w/monoclonal B cell lymphocytosis and 0.4% CD10 positive lambda-monotypic B cells consistent w/lymphoma- ie follicular vs other  - systemic tx including rituximab discussed on multiple prior visit, held as detailed in my previous notes   - pt completed palliative RT to left orbit; use lubricating eye drops daily for some persistent blurred vision (if also using eye drops for glaucoma)   -Overall clinically no evidence of rapid progression of disease, patient does not have B symptoms, no cytopenias,   -We  will plan for active surveillance with the followin.  H&P and labs q. 6 months for 5 years and then annually or as clinically indicated  2.  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   -  right external iliac LN core needle biopsy- FLOW CYTOMETRY: 1.3% rare monoclonal B cell population CD5 equivocal, CD23 negative, CD 79b negative, possible MBL; no morphological correlate  - peripheral blood- FLOW CYTOMETRY: 2.3% kappa monoclonal B cell population, CD5 positive, CD38 negative, CD49d negative, possible MBL  - 23 left orbital mass/lacrimal gland excisional biopsy: FLOW CYTOMETRY: 15.1% kappa monotypic B cell population, bright positive: CD45, CD19, CD22, moderately positive kappa, CD20, CD79b, dim positive CD23, negative CD 2,3,103, lambda, 5, 10, 200, 38, 43  -  bone marrow biopsy: FLOW CYTOMETRY: 0.8% kappa monotypic B cell population, positive for CD5, CD19, dim to negative CD20, negative for CD38 and CD10  -  right acetabulum bone biopsy: FLOW CYTOMETRY: 2.8% kappa monotypic B cell population, CD5 partial dim, CD 19 positive, CD20 dim negative, CD10 negative, possible MBL    - of note pt does not have leukocytosis or lymphocytosis, ALC consistently <5000, no splenomegaly, LN biopsy did not demonstrate IHC consistent with SLL  -  PET/CT as above    PLAN:  - 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    -4/2020 left breast simple mastectomy with no residual carcinoma.   -S/p ACx4, taxol x4, no anti-her2 treatment, no RT.   -S/p 5 years adjuvant endocrine therapy (tamoxifen and anastrozole).  -Delayed left breast reconstruction and right breast augmentation.   - 5/23 PET/CT notes single enlarged left axillary lymph node with SUV 3, \"could also represent lymphoma although given patient's history of left breast cancer, a breast cancer metastasis is also a possibility\"  - The above in the setting of known lymphoma and breast tumor markers normal   -9/23 right breast diagnostic mammogram: [facility]  - 9/23 ultrasound left axilla: 1.1 x 0.9 x 1.0 cm lymph node with mildly thickened cortex that correlates with abnormal lymph node on previous PET, recommend tissue diagnosis  - 9/23 ultrasound-guided left axillary lymph node core biopsy: No morphologic evidence of lymphoma.  Lymph node tissue was received in formalin, this could not be sent for flow cytometry.  No separate fresh tissue was sent for flow cytometry.    PLAN:   -With the limitations of tissue processed in formalin and no fresh tissue available for flow cytometry, appears left axilla lymph node is benign without lymphoma  - Monitor clinically    #Stable sclerotic lesions in left sacrum and left posterior ilium  -10/21 mammogram, endoscopy with EUS and colonoscopy WNL  -12/22 CT chest abdomen pelvis shows new or increased left common iliac, external iliac, pelvic sidewall lymphadenopathy and sclerotic lesions in left sacrum and left posterior ilium.  No metastatic disease in chest.  No abnormal findings in breast.  - 12/22 " tumor markers: CA 15-3 24,  29, CEA 3.1, CA 19-9 4, AFP 5.9,   - 1/2023 MRI brain w/wo contrast: no metastases   - 1/2023 right external iliac LN core need biopsy- no metastatic carcinoma   - 5/23 PET avid bone lesions  -6/19/2023 CT-guided bone biopsy, right acetabulum  PATHOLOGY:  - Atypical lymphoid infiltrate in a suboptimal biopsy  - No histologic evidence of metastatic carcinoma  - Two small monoclonal B-cell populations detected by flow cytometry: CD5 positive kappa-monotypic B cells ( 2.8 %, CD5 partial, dim, CD19 positive, CD30 dim to negative, CD10 negative); CD10 positive lambda-monotypic B cells ( 0.4 %, CD10 positive, CD19 slightly dim, CD20 positive, CD5 negative)     PLAN:   - no proven malignancy on bone biopsy  - monitor clinically    RTC 12/23 months for follow up with me, labs prior to appt (after PET completed)      Arlene Ahumada DO  Hematology/Oncology  [facility]      Again, thank you for allowing me to participate in the care of your patient.        Sincerely,        ARLENE AHUMADA DO

## 2023-09-28 NOTE — NURSING NOTE
"Oncology Rooming Note    September 28, 2023 10:24 AM   Virginia Byers is a 72 year old female who presents for:    Chief Complaint   Patient presents with    Oncology Clinic Visit     Follow up     Initial Vitals: BP (!) 145/73 (BP Location: Right arm, Patient Position: Chair, Cuff Size: Adult Regular)   Pulse 78   Wt 66.7 kg (147 lb)   SpO2 96%   BMI 26.12 kg/m   Estimated body mass index is 26.12 kg/m  as calculated from the following:    Height as of 9/26/23: 1.598 m (5' 2.9\").    Weight as of this encounter: 66.7 kg (147 lb). Body surface area is 1.72 meters squared.  No Pain (0) Comment: Data Unavailable   No LMP recorded. Patient is postmenopausal.  Allergies reviewed: Yes  Medications reviewed: Yes    Medications: Medication refills not needed today.  Pharmacy name entered into [employer]: [id number] - [place], [place] - [address] [facility]    Clinical concerns: NO  Dr. Zaidi was notified.      Linda Landry CMA              "

## 2023-09-28 NOTE — PROGRESS NOTES
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    Patient has medical history including LEFT breast cancer in 2000, hypertension, hyperlipidemia, osteoporosis, osteoarthritis, degenerative disc disease, spinal stenosis of lumbar region,  s/p bilateral L5-S1 transforaminal ROS by Dr. Garcia on 7/9/2021, s/p bilateral L4-5 and L5-S1 facet joint injections on 6/14/2021, glaucoma and macular degeneration, cataracts s/p surgery, history of tobacco use (quit 1999)     Regarding previous history of breast cancer:  She was previously treated by Dr. Sakina Brandt at [facility]      In summary, diagnosed at age 49 with LEFT breast IDC ER positive, IA positive, HER2 positive on FISH. 3/2000 left breast lumpectomy and left axillary node dissection (IDC, grade 3, 1.45 cm incompletely excised tumor, DCIS, positive margins with infiltrating carcinoma, 1 of 17 lymph nodes positive (0.5 cm metastatic focus), ER positive, IA positive, HER2 positive).  4/2020 left breast simple mastectomy with no residual carcinoma. S/p ACx4, taxol x4, no anti-her2 treatment, no RT. S/p 5 years adjuvant endocrine therapy (tamoxifen and anastrozole). Delayed left breast reconstruction and right breast augmentation.      3/2/2000  Excisional biopsy of Left breast mass -   Infiltrating ductal carcinoma with associated DCIS   ER positive (60%), IA positive (22%), HER2 by FISH POSITIVE     3/10/2000 - Left lumpectomy and left axillary node  Invasive ductal carcinoma, Grade 3, 1.45 cm (incompletely excised), negative for LVI  DCIS, greatest histologic dimension of tumor (DCIS PLUS invasive tumor) = 2.9 cm (estimate 50% DCIS)  Positive margins- infiltrating carcinoma  extends to margin  1 of 17 axillary lymph nodes sampled was positive for metastatic focus of 0.5 cm.  ER/SC+     Sections of lumpectomy left breast showing residual 1.1 cm focus of ductal carcinoma in situ (DCIS), present within microns of the linked margin of resection.       4/11/2000 - Left breast simple mastectomy, negative for residual carcinoma  Left mastectomy on 4/11/2000 performed by Dr. Cristian Mcdonald at [facility].      Adjuvant treatment:  - 5/15/2000-7/24/2000 4 cycles of Adriamycin and Cytoxan   - 8/16/2000-10/18/2000 4 cycles of paclitaxel   - Appears she may have been on some kind of trial/protocol, was not given any anti-HER2 targeted treatment  - No adjuvant radiation  - 12/2000- 10/2002 tamoxifen  - 10/2002-5/2006 switched to anastrozole (due to in vitro studies showing anastrozole may be slightly better for patients were HER2 positive) with Fosamax for osteopenia     8/31/2015 Left delayed reconstruction with tissue expander    2/3/2016 Left 2nd stage 450 cc high profile silicone implant; right augmentation 150 cc Moderate classic profile silicone implant; cresent mastopexy    10/18/2021: Screening mammogram right breast ZACKARY    OTHER:  Of note, patient has documentation of anemia (iron deficiency and anemia of chronic disease), elevated LFTs, nausea and vomiting of all solids and liquids, fatigue with 26 pound unintentional weight loss from 7/20/2021 - 10/20/2021 with mildly elevated LFTs which led to evaluation with mammogram, endoscopy with EUS and colonoscopy as detailed below. Pt did associate these with some spinal injections she had gotten, due to overlapping times. Pt did regain all of the weight and hand improvement of anemia within 2 months, without significant intervention.     -10/2021: Endoscopy with EUS normal EGD and no stigmata or source of upper GI bleeding, visualized bile duct, gallbladder, liver, pancreas, left adrenal gland normal.  No lymphadenopathy. LIVER, RANDOM  NEEDLE BIOPSY: Benign parenchyma with congestion; no significant fibrosis or other abnormalities     -10/2021 colonoscopy: Hemorrhoids, diverticulosis in sigmoid colon and splenic flexure    Current history:  -Ongoing right knee pain not improved with brace and physical therapy, s/p bilateral L5-S1 transforaminal ROS by Dr. Garcia on 7/9/2021, s/p bilateral L4-5 and L5-S1 facet joint injections on 6/14/2021. ongoing b/l hip and leg pain, worse with position changes (ie sitting to standing), constant, 8-9/10, tylenol brings pain down to 6/10 (using tylenol bid). ECOG 3.   -Patient has seen neurosurgery Dr. Jose Alexander, occupational medicine specialist Dr.Orrin Murphy, with plan to see PM&R Dr. Margie Agudelo and movement disorder clinic  - 11/22 MRI lumbar spine:    prominent left iliac lymph nodes among other findings related to degenerative changes and neural foraminal stenosis throughout lumbar spine    - 12/22 MRI pelvis:  1. Enhancing marrow signal abnormality of the right iliac bone extending from the superior acetabulum subchondral location proximally almost to the level of the sacroiliac joint caudal aspect. No associated fracture line. Underlying marrow infiltrative process such as from metastasis cannot be excluded especially given the degree of T1 hypointensity and history of primary tumor. Other consideration include stress reaction.  2. Mildly increased left external iliac chain, and the right medial thigh lymphadenopathy. Metastasis cannot be excluded.  3. Left posterior iliac enhancing lesion, similar in size to prior CT 9/2021 and previously not visible on CT, focal enhancing lesion in the right greater trochanter laterally. Findings are concerning for Metastasis.    -12/22 CT chest abdomen pelvis with contrast:  COMPARISON: MRI pelvis 12/20/2022 and CT abdomen pelvis 9/21/2021.  1. Mastectomies with implant reconstructions. No lymphadenopathy  2.  There is an new or increased left common iliac, external  iliac and pelvic sidewall lymphadenopathy since prior CT. Distal left common iliac lymph nodes measure up to 1.2 cm on this exam compared to 0.7 cm on prior CT. Left pelvic sidewall lymph node measures 2.1 cm short axis on this exam compared to 1.2 cm on prior CT . consistent with metastatic disease.  3. Sclerotic lesions in the left sacrum and left posterior ilium are unchanged from prior CT.  may be metastatic.  4. No evidence of metastatic disease in the chest.     -1/2023 MRI brain w/wo contrast:  IMPRESSION:  1. No findings of intracranial metastatic disease.  2. Indeterminate ovoid T2 hyperintense enhancing mass in the region of the  left lacrimal gland measuring approximately 2.4 cm AP by 1.4 cm  transverse by 2.3 cm craniocaudal associated with/replacing the left  lacrimal gland. Consider correlation with tissue sampling.  3. A few indeterminate partially visualized heterogeneous enhancing  Nodules measuring up to approximately 1.5 cm in the bilateral parotid glands.   Consider soft tissue neck CT with contrast for further evaluation.    -1/20/2023: IR US Guided biopsy of a right external iliac LN  Final Diagnosis   A.  LYMPH NODE, RIGHT EXTERNAL ILIAC, CORE NEEDLE BIOPSY AND TOUCH IMPRINT:  -Involved by follicular lymphoma, low-grade (WHO grade 1-2)  -Negative for metastatic carcinoma     The morphologic and immunophenotypic patterns are consistent with follicular lymphoma.  There is no high-grade lymphoma present in this limited specimen.  Concurrent flow study ([id number]) demonstrated CD10-positive lambda monotypic B cells (22%), rare kappa monotypic B cells (1.3%), and no aberrant immunophenotype on T cells.  A FISH study for Bcl-2 is pending and will be reported separately.     The case was initially reviewed by Dr. Cifuentes (breast pathology).     This case was reviewed in part at our Hematopathology Faculty Consensus conference at which Girish Nolasco, Narciso, and Kayode were present in addition  to myself.  This is a small needle core.  In some of the areas, the CD10+ B cells are confined to follicles.  Though the possibility of in situ follicular neoplasia was discussed, we still favor a diagnosis of follicular lymphoma - there are a number of CD10+ B cells outside of follicles at the base of the biopsy, and the clinical history supports a greater extent of involvement.     There was a tiny kappa monotypic B cell population identified by flow (separate from the CD10+ clone).  We looked for the morphologic correlate in this case by IHC, but it is not readily apparent.  This may represent a monoclonal B lymphocytosis - consider sending a peirpheral blood sample for flow cytometry.    FISH:  RESULTS:     NORMAL  - No rearrangement of BCL2     INTERPRETATION:  No evidence was found by FISH of rearrangement of the BCL2 (18q21) locus. These findings are not helpful for confirming or further characterizing the reported pathologic diagnosis of follicular lymphoma.    Flow Interpretation   A. Lymph Node(s), :  -CD10-positive lambda monotypic B cells (22%)  -Rare kappa monotypic B cells (1.3%)  -No aberrant immunophenotype on T cells  See comment   Electronically signed by Krista Headley MD on 1/24/2023 at  3:10 PM   Comment     The CD10-positive population is consistent with a clonal B cell population and a CD10 positive B-cell lymphoma is favored.      In addition, a second clonal B-cell population is identified which has a CLL-like immunophenotype except that CD5 expression is equivocal, this may represent a monoclonal B-cell lymphocytosis (MBL).     Addendum   INTERPRETATION  The diagnosis remains the same (see comment)     REASON FOR ADDENDUM  This addendum is issued to reflect additional testing performed on this case. See Comment.      COMMENT  Additional multi-color flow analysis was performed for the following markers:   CD23, CD79b     The CD5 (dimly) positive B cells mostly lack CD23 and CD79b         1/23/23 CT neck:  A) Heterogeneous nodule in the right thyroid gland measuring up  to 1.6 cm.   B) Soft tissue nodule in the superficial left parotid  gland measuring 1.2 cm. Additional 0.8 cm nodule in the deep portion  of the left parotid gland. Soft tissue nodule in the superficial right  parotid gland measuring 0.8 cm. These may represent benign or malignant parotid lesions. Multiplicity of lesions raises concern for Warthin tumors. Otolaryngology consultation is  recommended.  C) Prominent soft tissue around the major arteries in the neck  particularly the common and internal carotid arteries. This is  atypical for atherosclerotic disease and vasculopathy/vasculitis such  as giant cell arteritis should be considered. Probable superimposed  atherosclerotic disease at the carotid bifurcations right greater than  left without definite high-grade stenosis.    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  And B-cell cohort #2 which is described as negative for CD5 and showing dim CD20 and dim kappa staining.  This population is listed a CD5 negative and CD10 negative also listed as negative for  and .  Of note the population of monoclonal B-cell lymphocytosis found in peripheral blood in our department and in the lymph node biopsy in January showed partial/equivocal dim CD5 and may correspond to the cohort #2.     We essentially agree with the diagnosis of  follicular lymphoma, which is consistent with prior diagnosis of follicular lymphoma low grade  established on core biopsy of right external iliac lymph node January 20, 2023 (case [id number]) in our Department . The concurrent flow cytometry case [id number] in January showed in addition to CD10 positive B-cell population  also a partial/equivocal CD5 positive separate kappa monotypic B-cell population, which most likely represents monoclonal B-cell lymphocytosis of CLL/SLL type, since it was also documented in the peripheral blood flow cytometry on February 16, 2023 (case number [facility] ) at a low frequency.  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 Areas with infiltrates of small B cells with coexpression of CD5 and CD23 are seen in the current biopsy, but the size of the biopsy is too small to comprehensively evaluate for the presence of proliferation centers and the extent of involvement by the CD5 positive B cell lymphoproliferative process.   This case has been discussed at intradepartmental hematopathology conference with participation by THELMA Petesren, THELMA Davis and myself.      -5/23 PET/CT NCAP:   1. An ovoid mass in the left lateral orbit measures 2.5 x 1.2 cm, slightly larger than 01/09/2023, and demonstrates marked uptake (SUVmax 9.3), consistent with biopsy-proven lymphoma  2. A few small FDG avid bilateral intraparotid nodules are present, one on the right and two on the left, including representative left parotid nodule measuring 0.9 x 1.4 cm with moderate uptake (SUVmax 6.7).  3.  Asymmetric prominent right posterior lower cervical node measures 0.8 cm associated with moderate focal   uptake (SUVmax 5.1).  4. A single mildly enlarged left axillary  lymph node measures 1.0 cm associated with mild uptake (SUVmax 3.0)  5. Some scattered FDG avid lymph nodes below the diaphragm involve the retroperitoneal retrocaval, bilateral common iliac, left pelvic sidewall and left inguinal regions. Some of these have increased slightly in size since 12/28/2022, such as a conglomerate left pelvic sidewall bj mass measuring approximately 2.2 x 4.1 cm (previously 2.2 x 3.4 cm) associated with marked uptake (SUVmax 7.6).  6. FDG avid sclerotic skeletal lesion involves the right superior acetabulum (SUVmax 12.9) as well as a couple of separate smaller sclerotic lesions in the right iliac bone uptake (SUVmax 12.2).     - 6/14/2023- 6/15/2023 palliative radiation to left orbit follicular lymphoma 4Gy/2fx  - 6/15/2023 bone marrow biopsy  MORPHOLOGY:  - No morphologic or immunophenotypic evidence of follicular lymphoma  - Flow cytometry showing a small population of CD5 positive monotypic B cells; most compatible with monoclonal B lymphocytosis (There is no definitive morphologic correlate for this finding. There is no morphologic or immunophenotypic evidence of involvement by follicular lymphoma.   The CD5-positive B cell clone may best represent monoclonal B lymphocytosis, provided that bj SLL is excluded.)  - Normocellular marrow (cellularity estimated at 30%) with trilineage hematopoietic maturation and no increase in blasts  FLOW CYTOMETRY:  CD5 positive kappa-monotypic B cells (0.8%)  0.8% B cells which express CD5, CD19, CD20 (dim to negative) and monotypic kappa immunoglobulin light chains (dim) and lack CD10 and CD38. The B cells have similar forward scatter relative to background T cells suggestive of similar size; however, precise size determination is deferred to morphology  CYTOGENETICS:  46,XX    -6/19/2023 CT-guided bone biopsy, right acetabulum  PATHOLOGY:  - Atypical lymphoid infiltrate in a suboptimal biopsy  - No histologic evidence of metastatic carcinoma  -  Two small monoclonal B-cell populations detected by flow cytometry  This biopsy is suboptimal for evaluation due to fragmentation, crush artifact, and decalcification. In this limited biopsy, there are fragments of bone and marrow showing a mixed infiltrate that includes both B and T cells. Notably, 2 small clonal B-cell populations were detected by flow cytometry which resemble B-cell populations detected in this patient's past lymph node (1/20/23) and blood and bone marrow (2/16/23, 6/15/23) flow cytometry studies. A definitive morphologic correlate to these cell populations is not appreciated, and the overall features are insufficient for a diagnosis of lymphoma.    FLOW CYTOMETRY:  A. Pelvis, Right:  -CD5 positive kappa-monotypic B cells ( 2.8 %) - see comment A  -CD10 positive lambda-monotypic B cells ( 0.4 %) - see comment B      Electronically signed by Arnaldo Alvarado MD on 6/20/2023 at  1:31 PM   Comment     A) Clonal B cells with the immunophenotype of CLL/SLL are present in this sample.  The differential includes peripheral blood contamination (with an MBL), the tissue based correlate to MBL, versus involvement by SLL.  2.8% B cells which express CD5 (partial, dim), CD19, CD20 (dim to negative) and monotypic kappa immunoglobulin light chains (dim) and lack CD10. The B cells have similar forward scatter relative to background T cells suggestive of similar size; however, precise size determination is deferred to morphology.     B) A CD10-positive B cell clone is also present - involvement by the patient's previously diagnosed CD10+ B cell lymphoma is in the differential (follicular lymphoma versus other (transformation to a higher grade lymphoma). 0.4% B cells which express CD10, CD19 (slightly dim), CD20, and monotypic lambda immunoglobulin light chains and lack CD5.  The B cells have increased forward scatter relative to background T cells suggestive of increased size; however, precise size  "determination is deferred to morphology.        INTERIM HISTORY:  Regarding lymphoma:  Denies weight loss, night sweats, early satiety, RUQ or LUQ pain  Reports left eye blurriness is stable since surgery, not worsened since RT, using lubricating eye drops prn and working with eye doctor for management of glaucoma    - 7/10/23 Rheumatology consult for ESR and CRP elevated, DEVON positive 1:160 speckled; PCP suspects pt may have PMR; \"I suspect that the bilateral hip OA is altering gait that then results in worsening of low back pain. No further rheumatology workup needed at this time. \"    - 7/11/23 left hip total arthroplasty with severe b/l hip osteoarthritis with Dr. Austin Blood,  cc, discharged with DVT prophylaxis Lovenox 40 mg daily x2 weeks and aspirin 81 mg twice daily x2 weeks    - 8/22 orthopedic surgery follow-up, patient would like to proceed with total right hip arthroplasty ASAP (to be scheduled at least 12 weeks from left hip arthroplasty), tentatively 10/13/23    -Patient missed her 7/23 neurology movement d/o follow up-previously noted multifactorial gait problem w/orthopedic troubles/arthritis; cannot r/o underlying parkinsonism so will do carbidopa/levodopa challenge, does not appear patient did this        REVIEW OF SYSTEMS:   A 14 point ROS was reviewed with pertinent positives and negatives in the HPI.       HOME MEDICATIONS:  Current Outpatient Medications   Medication Sig Dispense Refill    acetaminophen (TYLENOL) 325 MG tablet Take 2 tablets (650 mg) by mouth every 4 hours as needed for other (For optimal non-opioid multimodal pain management to improve pain control.) 100 tablet 0    dorzolamide-timolol (COSOPT) 2-0.5 % ophthalmic solution Place 1 drop into both eyes 2 times daily      latanoprost (XALATAN) 0.005 % ophthalmic solution Place 1 drop into both eyes At Bedtime      losartan (COZAAR) 100 MG tablet Take 1 tablet (100 mg) by mouth daily 90 tablet 3    Multiple " Vitamins-Minerals (ICAPS AREDS 2 PO)       ondansetron (ZOFRAN ODT) 4 MG ODT tab Take 1 tablet (4 mg) by mouth every 6 hours as needed for nausea or vomiting 8 tablet 0    oxyCODONE (ROXICODONE) 5 MG tablet Take 1 tablet (5 mg) by mouth every 4 hours as needed for moderate to severe pain 20 tablet 0    polyethylene glycol (MIRALAX) 17 GM/Dose powder Take 17 g by mouth daily 510 g 0    rosuvastatin (CRESTOR) 10 MG tablet Take 1 tablet (10 mg) by mouth daily 90 tablet 3    senna-docusate (SENOKOT-S/PERICOLACE) 8.6-50 MG tablet Take 1 tablet by mouth 2 times daily 30 tablet 0         ALLERGIES:  Allergies   Allergen Reactions    Compazine      Mental confusion    Hydrochlorothiazide      Dryness mouth    Prochlorperazine Visual Disturbance    Simvastatin Other (See Comments)     Muscle aches         PAST MEDICAL HISTORY:  Past Medical History:   Diagnosis Date    Breast cancer (H) 2000    Left breast, s/p mastectomy, chemotherapy and endocrine therapy    Follicular lymphoma (H) 01/20/2023    Glaucoma     bilateral - Dr. Moore [facility]    Hyperlipidemia     Hypertension     Macular degeneration     Dr. Toscano [facility]    Osteoarthritis     Osteoporosis     Personal history of chemotherapy 2000    A/C + Taxol    S/P radiation therapy     400 cGy to left orbit completed on 6/15/2023 [facility]         PAST SURGICAL HISTORY:  Past Surgical History:   Procedure Laterality Date    ARTHROPLASTY HIP Left 7/11/2023    Procedure: ARTHROPLASTY, HIP, TOTAL LEFT;  Surgeon: Austin Blood MD;  Location: [facility] OR    BIOPSY Left 04/06/2023    Left Orbital Biopsy    C MASTECTOMY,SIMPLE  03/2000    left    COLONOSCOPY  2006    Q 10 years    COLONOSCOPY N/A 10/04/2021    Procedure: COLONOSCOPY;  Surgeon: Guru Lyla Ruby MD;  Location: [facility] OR    ESOPHAGOSCOPY, GASTROSCOPY, DUODENOSCOPY (EGD), COMBINED N/A 10/04/2021    Procedure: ENDOSCOPIC  ULTRASOUND, ESOPHAGOSCOPY / UPPER GASTROINTESTINAL TRACT (GI), Esophagogastroduodenoscopy,  Fine Needle Biopsy of liver;  Surgeon: Guru Lyla Ruby MD;  Location: [facility] OR    IR LYMPH NODE BIOPSY  2023    SURGICAL HISTORY OF -  Left 2015    tissue expander placed in left breast area    SURGICAL HISTORY OF -  Left 2016    left breast implant and right mammoplasty         SOCIAL HISTORY:  Social History     Socioeconomic History    Marital status: Single     Spouse name: Not on file    Number of children: 0    Years of education: Not on file    Highest education level: Not on file   Occupational History     Employer: [employer]    Occupation: REtired   Tobacco Use    Smoking status: Former     Packs/day: 1.00     Years: 17.00     Pack years: 17.00     Types: Cigarettes     Quit date: 3/15/1999     Years since quittin.5     Passive exposure: Past    Smokeless tobacco: Never   Vaping Use    Vaping Use: Never used   Substance and Sexual Activity    Alcohol use: Not Currently     Alcohol/week: 4.0 standard drinks of alcohol     Types: 4 Glasses of wine per week    Drug use: No    Sexual activity: Not Currently     Birth control/protection: None   Other Topics Concern    Parent/sibling w/ CABG, MI or angioplasty before 65F 55M? Yes     Comment: Father heart attack   Social History Narrative    Not on file     Social Determinants of Health     Financial Resource Strain: Unknown (2023)    Financial Resource Strain     Within the past 12 months, have you or your family members you live with been unable to get utilities (heat, electricity) when it was really needed?: Patient refused   Food Insecurity: Unknown (2023)    Food Insecurity     Within the past 12 months, did you worry that your food would run out before you got money to buy more?: Patient refused     Within the past 12 months, did the food you bought just not last and you didn t have money to get more?: Patient refused    Transportation Needs: Unknown (9/21/2023)    Transportation Needs     Within the past 12 months, has lack of transportation kept you from medical appointments, getting your medicines, non-medical meetings or appointments, work, or from getting things that you need?: Patient refused   Physical Activity: Not on file   Stress: Not on file   Social Connections: Not on file   Interpersonal Safety: Not on file   Housing Stability: Unknown (9/21/2023)    Housing Stability     Do you have housing? : Patient refused     Are you worried about losing your housing?: Patient refused       FAMILY HISTORY:  Family History   Problem Relation Age of Onset    Cancer Mother         bone cancer    Heart Disease Father     Coronary Artery Disease Father     Diabetes Father     Arthritis Sister     Breast Cancer Sister         age 75    Diabetes Maternal Grandmother     Diabetes Maternal Grandfather     Diabetes Paternal Grandmother      Family history of cancer- sister breast cancer- dx at age 75 y/o, localized breast cancer, surgical resection, RT, no adjuvant chemo or endocrine therapy that pt is aware of     PHYSICAL EXAM:  Vital signs:  BP (!) 145/73 (BP Location: Right arm, Patient Position: Chair, Cuff Size: Adult Regular)   Pulse 78   Wt 66.7 kg (147 lb)   SpO2 96%   BMI 26.12 kg/m       GENERAL/CONSTITUTIONAL: No acute distress.  EYES: Pupils are equal and round. Extraocular movements intact without nystagmus.  No scleral icterus. Minimal ptosis left eye at site of surgery  RESPIRATORY: Equal chest rise.   MUSCULOSKELETAL: Warm and well-perfused, no cyanosis, clubbing, or edema.   NEUROLOGIC: Cranial nerves are grossly intact. Alert, oriented to person, place and time, answers questions appropriately.  INTEGUMENTARY: No rashes or jaundice.  GAIT: ambulates with walker with foot lift for right foot         LABS:   Latest Reference Range & Units 09/26/23 09:48   Potassium 3.4 - 5.3 mmol/L 4.7   Calcium 8.8 - 10.2 mg/dL 10.1    Phosphorus 2.5 - 4.5 mg/dL 3.9   Ferritin 11 - 328 ng/mL 83   Lactate Dehydrogenase 0 - 250 U/L 203   Vitamin D Deficiency screening 20 - 75 ug/L 22   WBC 4.0 - 11.0 10e3/uL 9.2   Hemoglobin 11.7 - 15.7 g/dL 13.3   Hematocrit 35.0 - 47.0 % 41.5   Platelet Count 150 - 450 10e3/uL 317   RBC Count 3.80 - 5.20 10e6/uL 4.86   MCV 78 - 100 fL 85   MCH 26.5 - 33.0 pg 27.4   MCHC 31.5 - 36.5 g/dL 32.0   RDW 10.0 - 15.0 % 13.3   % Neutrophils % 72   % Lymphocytes % 18   % Monocytes % 8   % Eosinophils % 3   % Basophils % 1   Absolute Basophils 0.0 - 0.2 10e3/uL 0.1   Absolute Eosinophils 0.0 - 0.7 10e3/uL 0.2   Absolute Immature Granulocytes <=0.4 10e3/uL 0.0   Absolute Lymphocytes 0.8 - 5.3 10e3/uL 1.6   Absolute Monocytes 0.0 - 1.3 10e3/uL 0.7   % Immature Granulocytes % 0   Absolute Neutrophils 1.6 - 8.3 10e3/uL 6.6   Parathyroid Hormone Intact 15 - 65 pg/mL 38     PATHOLOGY:  See above    IMAGING:    ASSESSMENT/PLAN:  Virginia Byers is a 72 year old female with:    # 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 INCLUDING biopsy proven LN and left orbital mass/lacrimal gland involvement and suspected parotid gland involvement (left axillary lymph node not involved)  -11/22 MRI lumbar spine in 12/22 MRI pelvis show prominent left iliac lymph nodes (measuring 1.8 x 3.2, previously 1.5 x 2.7 cm), right medial thigh lymphadenopathy (1.7cm), right iliac bone enhancing marrow signal, left posterior iliac enhancing lesion  -12/22 CT chest abdomen pelvis shows new or increased left common iliac (1.2cm), external iliac, pelvic sidewall lymphadenopathy (2.1cm) and sclerotic lesions in left sacrum and left posterior ilium. No splenomegaly  - 1/23 brain MRI: Indeterminate ovoid T2 hyperintense enhancing mass in the region of the left lacrimal gland measuring approximately 2.4 cm AP by 1.4 cm transverse by 2.3 cm craniocaudal associated with/replacing the left lacrimal gland. Consider correlation with tissue sampling  - 1/23 CT soft tissue  "neck: Soft tissue nodule in the superficial left parotid gland measuring 1.2 cm. Additional 0.8 cm nodule in the deep portion of the left parotid gland. Soft tissue nodule in the superficial right parotid gland measuring 0.8 cm. These may represent benign or malignant parotid lesions. Multiplicity of lesions raises concern for Warthin tumors  - 1/27/23 note from Dr. Wilmar Morales- \"Based on this diagnosis, I think the 2 nodules in her parotid gland are likely atypical lymph nodes containing lymphoma.  She will probably require some staging imaging, such as PET scan and we can take a look at that as well.  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 CD5 stains T cells  - Ki-67 proliferative index is approximately 10%, also low in the follicles.  - flow cytometry \"CD10-positive population is consistent with a clonal B cell population and a CD10 positive B-cell lymphoma is favored.\"    - 4/6/23 left orbital mass/lacrimal gland excisional biopsy: PATHOLOGY: - Follicular Lymphoma, low grade ([facility] negative for BCL2 rearrangement). 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  The flow cytometry from 1/20/23 right external iliac LN biopsy showed in addition to CD10 positive B-cell population  also a partial/equivocal CD5 " positive separate kappa monotypic B-cell population, which most likely represents monoclonal B-cell lymphocytosis of CLL/SLL type, since it was also documented in the peripheral blood flow cytometry on February 16, 2023 (case number UF ) at a low frequency.  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  - Cohort 2 which is described as negative for CD5, CD10, ,  and showing dim CD20 and dim kappa staining   Of note the population of monoclonal B-cell lymphocytosis found in peripheral blood in our department and in the lymph node biopsy in January showed partial/equivocal dim CD5 and may correspond to the cohort #2.     -5/23 PET/CT NCAP:   1. An ovoid mass in the left lateral orbit measures 2.5 x 1.2 cm, slightly larger than 01/09/2023, and demonstrates marked uptake (SUVmax 9.3), consistent with biopsy-proven lymphoma  2. A few small FDG avid bilateral intraparotid nodules are present, one on the right and two on the left, including representative left parotid nodule measuring 0.9 x 1.4 cm with moderate uptake (SUVmax 6.7).  3.  Asymmetric prominent right posterior lower cervical node measures 0.8 cm associated with moderate focal uptake (SUVmax 5.1).  4. A single mildly enlarged left axillary lymph node measures 1.0 cm associated with mild uptake (SUVmax 3.0)  5. Some scattered FDG avid lymph nodes below the diaphragm involve the retroperitoneal retrocaval, bilateral common iliac, left pelvic sidewall and left inguinal regions. Some of these have increased slightly in size since 12/28/2022, such as a conglomerate left pelvic sidewall bj mass measuring approximately 2.2 x 4.1 cm (previously 2.2 x 3.4 cm) associated with marked uptake (SUVmax 7.6).  6. FDG avid sclerotic  skeletal lesion involves the right superior acetabulum (SUVmax 12.9) as well as a couple of separate smaller sclerotic lesions in the right iliac bone uptake (SUVmax 12.2).     - 6/23 bone marrow biopsy:  MORPHOLOGY:  - No morphologic or immunophenotypic evidence of follicular lymphoma  - Flow cytometry showing a small population of CD5 positive monotypic B cells; most compatible with monoclonal B lymphocytosis (There is no definitive morphologic correlate for this finding. There is no morphologic or immunophenotypic evidence of involvement by follicular lymphoma.  The CD5-positive B cell clone may best represent monoclonal B lymphocytosis, provided that bj SLL is excluded.)  - Normocellular marrow (cellularity estimated at 30%) with trilineage hematopoietic maturation and no increase in blasts  FLOW CYTOMETRY:  CD5 positive kappa-monotypic B cells (0.8%)  0.8% B cells which express CD5, CD19, CD20 (dim to negative) and monotypic kappa immunoglobulin light chains (dim) and lack CD10 and CD38. The B cells have similar forward scatter relative to background T cells suggestive of similar size; however, precise size determination is deferred to morphology  CYTOGENETICS:  46,XX    -6/19/2023 CT-guided bone biopsy, right acetabulum  PATHOLOGY:  - Atypical lymphoid infiltrate in a suboptimal biopsy  - No histologic evidence of metastatic carcinoma  - Two small monoclonal B-cell populations detected by flow cytometry: CD5 positive kappa-monotypic B cells ( 2.8 %, CD5 partial, dim, CD19 positive, CD30 dim to negative, CD10 negative); CD10 positive lambda-monotypic B cells ( 0.4 %, CD10 positive, CD19 slightly dim, CD20 positive, CD5 negative)     Staging and prognostication:  - Stage: 4 given right medial thigh SURINDER and left common iliac, external iliac, pelvic sidewall; biopsy proven left orbital mass involvement; there was some concern from ENT that patients parotid lesions were related to pts follicular lymphoma   -  FLIPI: score 2 (age>60),  stage 3-4; intermediate risk  - pertinent labs:   and  ;  and  beta 2 microglobulin 1.7, hepatitis B and C negative    Treatment:  - GELF criteria to indicate treatment (involvement of 3 or more bj sites each with a diameter greater than or equal to 3 cm, any bj or extranodal tumor mass with a diameter of greater than or equal to 7 cm, B symptoms, splenomegaly, pleural effusions, peritoneal ascites, clinically progressive or significant cytopenias, leukemia, symptoms, threatened end organ function, clinically significant bulky disease, steady or rapid progression of disease)  - option of systemic treatment including rituximab discussed previously on multiple occassions particularly w/left orbital involvement, see my previous notes for details  - 2023- 6/15/2023 palliative radiation to left orbit follicular lymphoma 4Gy/2fx    PLAN:  - bone marrow biopsy negative for lymphoma and right acetabulum bone biopsy atypical lymphoid infiltrate seen but biopsy suboptimal and flow cytometry shows 2.8% CD5 positive kappa monotypic B cells consistent w/monoclonal B cell lymphocytosis and 0.4% CD10 positive lambda-monotypic B cells consistent w/lymphoma- ie follicular vs other  - systemic tx including rituximab discussed on multiple prior visit, held as detailed in my previous notes   - pt completed palliative RT to left orbit; use lubricating eye drops daily for some persistent blurred vision (if also using eye drops for glaucoma)   -Overall clinically no evidence of rapid progression of disease, patient does not have B symptoms, no cytopenias,   -We will plan for active surveillance with the followin.  H&P and labs q. 6 months for 5 years and then annually or as clinically indicated  2.  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   - 1/23 right external iliac LN core needle biopsy- FLOW CYTOMETRY: 1.3% rare monoclonal B cell population CD5 equivocal, CD23 negative, CD 79b negative, possible MBL; no morphological correlate  -2/23 peripheral blood- FLOW CYTOMETRY: 2.3% kappa monoclonal B cell population, CD5 positive, CD38 negative, CD49d negative, possible MBL  - 4/6/23 left orbital mass/lacrimal gland excisional biopsy: FLOW CYTOMETRY: 15.1% kappa monotypic B cell population, bright positive: CD45, CD19, CD22, moderately positive kappa, CD20, CD79b, dim positive CD23, negative CD 2,3,103, lambda, 5, 10, 200, 38, 43  - 6/23 bone marrow biopsy: FLOW CYTOMETRY: 0.8% kappa monotypic B cell population, positive for CD5, CD19, dim to negative CD20, negative for CD38 and CD10  - 6/23 right acetabulum bone biopsy: FLOW CYTOMETRY: 2.8% kappa monotypic B cell population, CD5 partial dim, CD 19 positive, CD20 dim negative, CD10 negative, possible MBL    - of note pt does not have leukocytosis or lymphocytosis, ALC consistently <5000, no splenomegaly, LN biopsy did not demonstrate IHC consistent with SLL  - 5/23 PET/CT as above    PLAN:  - 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    -4/2020 left breast simple mastectomy with no residual carcinoma.   -S/p ACx4, taxol x4, no anti-her2 treatment, no RT.   -S/p 5 years adjuvant endocrine therapy (tamoxifen and anastrozole).  -Delayed left breast reconstruction and right breast augmentation.   - 5/23 PET/CT notes single enlarged left axillary lymph node with SUV 3, \"could also represent lymphoma although given patient's history of left breast cancer, a breast cancer metastasis is also a possibility\"  - The above in the setting of known lymphoma and breast tumor markers normal   -9/23 right breast diagnostic mammogram: [facility]  - 9/23 ultrasound left axilla: 1.1 x 0.9 x 1.0 cm lymph node with mildly thickened cortex that correlates with abnormal lymph node on previous PET, recommend tissue diagnosis  - 9/23 ultrasound-guided left axillary lymph node core biopsy: No morphologic evidence of lymphoma.  Lymph node tissue was received in formalin, this could not be sent for flow cytometry.  No separate fresh tissue was sent for flow cytometry.    PLAN:   -With the limitations of tissue processed in formalin and no fresh tissue available for flow cytometry, appears left axilla lymph node is benign without lymphoma  - Monitor clinically    #Stable sclerotic lesions in left sacrum and left posterior ilium  -10/21 mammogram, endoscopy with EUS and colonoscopy WNL  -12/22 CT chest abdomen pelvis shows new or increased left common iliac, external iliac, pelvic sidewall lymphadenopathy and sclerotic lesions in left sacrum and left posterior ilium.  No metastatic disease in chest.  No abnormal findings in breast.  - 12/22 tumor markers: CA 15-3 24,  29, CEA 3.1, CA 19-9 4, AFP 5.9,   - 1/2023 MRI brain w/wo contrast: no metastases   - 1/2023 right external iliac LN core need biopsy- no metastatic carcinoma   - 5/23 PET avid bone lesions  -6/19/2023 CT-guided bone biopsy, right acetabulum  PATHOLOGY:  - " Atypical lymphoid infiltrate in a suboptimal biopsy  - No histologic evidence of metastatic carcinoma  - Two small monoclonal B-cell populations detected by flow cytometry: CD5 positive kappa-monotypic B cells ( 2.8 %, CD5 partial, dim, CD19 positive, CD30 dim to negative, CD10 negative); CD10 positive lambda-monotypic B cells ( 0.4 %, CD10 positive, CD19 slightly dim, CD20 positive, CD5 negative)     PLAN:   - no proven malignancy on bone biopsy  - monitor clinically    RTC 12/23 months for follow up with me, labs prior to appt (after PET completed)      Naomy Zaidi DO  Hematology/Oncology  [facility]

## 2023-10-03 ENCOUNTER — OFFICE VISIT (OUTPATIENT)
Dept: FAMILY MEDICINE | Facility: CLINIC | Age: 73
End: 2023-10-03
Payer: MEDICARE

## 2023-10-03 VITALS
OXYGEN SATURATION: 99 % | TEMPERATURE: 98.2 F | HEART RATE: 79 BPM | BODY MASS INDEX: 26.02 KG/M2 | WEIGHT: 146.4 LBS | DIASTOLIC BLOOD PRESSURE: 86 MMHG | SYSTOLIC BLOOD PRESSURE: 132 MMHG

## 2023-10-03 DIAGNOSIS — I10 HYPERTENSION GOAL BP (BLOOD PRESSURE) < 140/90: ICD-10-CM

## 2023-10-03 DIAGNOSIS — Z85.3 PERSONAL HISTORY OF MALIGNANT NEOPLASM OF BREAST: ICD-10-CM

## 2023-10-03 DIAGNOSIS — M48.061 SPINAL STENOSIS OF LUMBAR REGION, UNSPECIFIED WHETHER NEUROGENIC CLAUDICATION PRESENT: ICD-10-CM

## 2023-10-03 DIAGNOSIS — C82.90 FOLLICULAR LYMPHOMA, UNSPECIFIED FOLLICULAR LYMPHOMA TYPE, UNSPECIFIED BODY REGION (H): ICD-10-CM

## 2023-10-03 DIAGNOSIS — Z01.818 PREOP GENERAL PHYSICAL EXAM: Primary | ICD-10-CM

## 2023-10-03 DIAGNOSIS — M81.0 AGE-RELATED OSTEOPOROSIS WITHOUT CURRENT PATHOLOGICAL FRACTURE: ICD-10-CM

## 2023-10-03 DIAGNOSIS — E78.5 HYPERLIPIDEMIA LDL GOAL <160: ICD-10-CM

## 2023-10-03 PROBLEM — D64.9 ANEMIA, UNSPECIFIED TYPE: Status: RESOLVED | Noted: 2021-09-20 | Resolved: 2023-10-03

## 2023-10-03 PROCEDURE — 99214 OFFICE O/P EST MOD 30 MIN: CPT | Performed by: FAMILY MEDICINE

## 2023-10-03 ASSESSMENT — PAIN SCALES - GENERAL: PAINLEVEL: NO PAIN (0)

## 2023-10-03 NOTE — PROGRESS NOTES
[facility]  [address]  [place] [address]  Phone: [phone]  Primary Provider: Chuyita Ahumada  Pre-op Performing Provider: CHUYITA AHUMADA      PREOPERATIVE EVALUATION:  Today's date: 10/3/2023    [place] is a 72 year old female who presents for a preoperative evaluation.      10/3/2023    10:36 AM   Additional Questions   Roomed by Barbie       Surgical Information:  Surgery/Procedure: ARTHROPLASTY, HIP, TOTAL- Right  Surgery Location: [facility]   Surgeon: Dr. Austin Blood  Surgery Date: October 13, 2023  Time of Surgery: TBD  Where patient plans to recover: At home with family  Fax number for surgical facility: Note does not need to be faxed, will be available electronically in Epic.    Assessment & Plan     The proposed surgical procedure is considered INTERMEDIATE risk.    Preop general physical exam      Follicular lymphoma, unspecified follicular lymphoma type, unspecified body region (H)  Sees Oncology     Hyperlipidemia LDL goal <160  Stable     Hypertension goal BP (blood pressure) < 140/90  Controlled     Spinal stenosis of lumbar region, unspecified whether neurogenic claudication present  better    Personal history of malignant neoplasm of breast      Age-related osteoporosis without current pathological fracture  She will follow up after her surgery to discuss Treatment  Declines any now            - No identified additional risk factors other than previously addressed    Antiplatelet or Anticoagulation Medication Instructions:   - Patient is on no antiplatelet or anticoagulation medications.    Additional Medication Instructions:  Patient is to take all scheduled medications on the day of surgery EXCEPT for modifications listed below:   - ACE/ARB: Continue without modification (e.g., MAC anesthesia, neurosurgery, spine surgery, heart failure, or labile hypertension with risk of hypertension).   - Statins: Continue taking on the day of surgery.    - Herbal  medications and vitamins: HOLD 14 days prior to surgery.    RECOMMENDATION:  APPROVAL GIVEN to proceed with proposed procedure, without further diagnostic evaluation.    Subjective       HPI related to upcoming procedure: Pt scheduled for above surgery  She has failed  conservative Treatment        9/26/2023    11:34 AM   Preop Questions   1. Have you ever had a heart attack or stroke? No   2. Have you ever had surgery on your heart or blood vessels, such as a stent placement, a coronary artery bypass, or surgery on an artery in your head, neck, heart, or legs? No   3. Do you have chest pain with activity? No   4. Do you have a history of  heart failure? No   5. Do you currently have a cold, bronchitis or symptoms of other infection? No   6. Do you have a cough, shortness of breath, or wheezing? No   7. Do you or anyone in your family have previous history of blood clots? No   8. Do you or does anyone in your family have a serious bleeding problem such as prolonged bleeding following surgeries or cuts? No   9. Have you ever had problems with anemia or been told to take iron pills? No   10. Have you had any abnormal blood loss such as black, tarry or bloody stools, or abnormal vaginal bleeding? No   11. Have you ever had a blood transfusion? No   12. Are you willing to have a blood transfusion if it is medically needed before, during, or after your surgery? Yes   13. Have you or any of your relatives ever had problems with anesthesia? No   14. Do you have sleep apnea, excessive snoring or daytime drowsiness? No   15. Do you have any artifical heart valves or other implanted medical devices like a pacemaker, defibrillator, or continuous glucose monitor? No   16. Do you have artificial joints? YES - Left Hip replacement   17. Are you allergic to latex? No       Health Care Directive:  Patient has a Health Care Directive on file      Preoperative Review of :   reviewed - no record of controlled substances  prescribed.      Status of Chronic Conditions:  HYPERLIPIDEMIA - Patient has a long history of significant Hyperlipidemia requiring medication for treatment with recent good control. Patient reports no problems or side effects with the medication.     HYPERTENSION - Patient has longstanding history of HTN , currently denies any symptoms referable to elevated blood pressure. Specifically denies chest pain, palpitations, dyspnea, orthopnea, PND or peripheral edema. Blood pressure readings have been in normal range. Current medication regimen is as listed below. Patient denies any side effects of medication.     Review of Systems  CONSTITUTIONAL: NEGATIVE for fever, chills, change in weight  INTEGUMENTARY/SKIN: NEGATIVE for worrisome rashes, moles or lesions  EYES: NEGATIVE for vision changes or irritation  ENT/MOUTH: NEGATIVE for ear, mouth and throat problems  RESP: NEGATIVE for significant cough or SOB  CV: NEGATIVE for chest pain, palpitations or peripheral edema  GI: NEGATIVE for nausea, abdominal pain, heartburn, or change in bowel habits  : NEGATIVE for frequency, dysuria, or hematuria  MUSCULOSKELETAL:arthritis  NEURO: NEGATIVE for weakness, dizziness or paresthesias  ENDOCRINE: NEGATIVE for temperature intolerance, skin/hair changes  HEME: NEGATIVE for bleeding problems  PSYCHIATRIC: NEGATIVE for changes in mood or affect    Patient Active Problem List    Diagnosis Date Noted    Aftercare following left hip joint replacement surgery 07/18/2023     Priority: Medium    Primary osteoarthritis of left hip 07/11/2023     Priority: Medium    Arthralgia, unspecified joint 04/03/2023     Priority: Medium    Follicular lymphoma, unspecified follicular lymphoma type, unspecified body region (H) 04/03/2023     Priority: Medium    Dry mouth 09/20/2021     Priority: Medium    Spinal stenosis of lumbar region, unspecified whether neurogenic claudication present 06/02/2021     Priority: Medium    DDD (degenerative disc  disease), lumbar 06/02/2021     Priority: Medium    Lumbago 07/02/2019     Priority: Medium    Pain in both lower legs 07/02/2019     Priority: Medium    Hypertension goal BP (blood pressure) < 140/90 04/22/2019     Priority: Medium    Personal history of malignant neoplasm of breast 03/28/2016     Priority: Medium     2000      Family history of diabetes mellitus 03/19/2012     Priority: Medium    Glaucoma 03/19/2012     Priority: Medium    Osteoarthritis 01/23/2012     Priority: Medium    Hyperlipidemia LDL goal <160 03/16/2011     Priority: Medium    S/P mastectomy 03/16/2011     Priority: Medium    Osteoporosis 03/15/2010     Priority: Medium    Atopic rhinitis 03/15/2010     Priority: Medium     (Problem list name updated by automated process. Provider to review and confirm.)        Past Medical History:   Diagnosis Date    Breast cancer (H) 2000    Left breast, s/p mastectomy, chemotherapy and endocrine therapy    Follicular lymphoma (H) 01/20/2023    Glaucoma     bilateral - Dr. Moore - [facility]    Hyperlipidemia     Hypertension     Macular degeneration     Dr. Toscano - [facility]    Osteoarthritis     Osteoporosis     Personal history of chemotherapy 2000    A/C + Taxol    S/P radiation therapy     400 cGy to left orbit completed on 6/15/2023 [facility]     Past Surgical History:   Procedure Laterality Date    ARTHROPLASTY HIP Left 07/11/2023    Procedure: ARTHROPLASTY, HIP, TOTAL LEFT;  Surgeon: Austin Blood MD;  Location: [facility] OR    BIOPSY Left 04/06/2023    Left Orbital Biopsy    C MASTECTOMY,SIMPLE  03/2000    left    COLONOSCOPY  2006    Q 10 years    COLONOSCOPY N/A 10/04/2021    Procedure: COLONOSCOPY;  Surgeon: Guru Lyla Ruby MD;  Location: [facility] OR    ESOPHAGOSCOPY, GASTROSCOPY, DUODENOSCOPY (EGD), COMBINED N/A 10/04/2021    Procedure: ENDOSCOPIC ULTRASOUND, ESOPHAGOSCOPY / UPPER GASTROINTESTINAL TRACT (GI),  Esophagogastroduodenoscopy,  Fine Needle Biopsy of liver;  Surgeon: Guru Lyla Ruby MD;  Location: [facility] OR    EYE SURGERY      IR LYMPH NODE BIOPSY  2023    SURGICAL HISTORY OF -  Left 2015    tissue expander placed in left breast area    SURGICAL HISTORY OF -  Left 2016    left breast implant and right mammoplasty     Current Outpatient Medications   Medication Sig Dispense Refill    acetaminophen (TYLENOL) 325 MG tablet Take 2 tablets (650 mg) by mouth every 4 hours as needed for other (For optimal non-opioid multimodal pain management to improve pain control.) 100 tablet 0    dorzolamide-timolol (COSOPT) 2-0.5 % ophthalmic solution Place 1 drop into both eyes 2 times daily      latanoprost (XALATAN) 0.005 % ophthalmic solution Place 1 drop into both eyes At Bedtime      losartan (COZAAR) 100 MG tablet Take 1 tablet (100 mg) by mouth daily 90 tablet 3    Multiple Vitamins-Minerals (ICAPS AREDS 2 PO)       ondansetron (ZOFRAN ODT) 4 MG ODT tab Take 1 tablet (4 mg) by mouth every 6 hours as needed for nausea or vomiting 8 tablet 0    rosuvastatin (CRESTOR) 10 MG tablet Take 1 tablet (10 mg) by mouth daily 90 tablet 3       Allergies   Allergen Reactions    Compazine      Mental confusion    Hydrochlorothiazide      Dryness mouth    Prochlorperazine Visual Disturbance    Simvastatin Other (See Comments)     Muscle aches        Social History     Tobacco Use    Smoking status: Former     Packs/day: 1.00     Years: 17.00     Pack years: 17.00     Types: Cigarettes     Quit date: 3/15/1999     Years since quittin.5     Passive exposure: Past    Smokeless tobacco: Never   Substance Use Topics    Alcohol use: Not Currently     Alcohol/week: 4.0 standard drinks of alcohol     Types: 4 Glasses of wine per week     Family History   Problem Relation Age of Onset    Cancer Mother         bone cancer    Heart Disease Father     Coronary Artery Disease Father     Diabetes Father     Arthritis  Sister     Breast Cancer Sister         age 75    Diabetes Maternal Grandmother     Diabetes Maternal Grandfather     Diabetes Paternal Grandmother      History   Drug Use No         Objective     /86   Pulse 79   Temp 98.2  F (36.8  C) (Temporal)   Wt 66.4 kg (146 lb 6.4 oz)   SpO2 99%   BMI 26.02 kg/m      Physical Exam    GENERAL APPEARANCE: healthy, alert and no distress     EYES: EOMI, PERRL     HENT: ear canals and TM's normal and nose and mouth without ulcers or lesions     NECK: no adenopathy, no asymmetry, masses, or scars and thyroid normal to palpation     RESP: lungs clear to auscultation - no rales, rhonchi or wheezes     CV: regular rates and rhythm, normal S1 S2, no S3 or S4 and no murmur, click or rub     ABDOMEN:  soft, nontender, no HSM or masses and bowel sounds normal     MS: extremities normal- no gross deformities noted, no evidence of inflammation in joints, FROM in all extremities.     SKIN: no suspicious lesions or rashes     NEURO: Normal strength and tone, sensory exam grossly normal, mentation intact and speech normal     PSYCH: mentation appears normal. and affect normal/bright     LYMPHATICS: No cervical adenopathy    Recent Labs   Lab Test 09/26/23  0948 07/12/23  0549 07/11/23  1812 07/05/23  1542 06/19/23  1220 06/15/23  0914 05/11/23  0938 03/27/23  1109 01/20/23  0937 12/28/22  1254   HGB 13.3 10.7*  --  12.7  --    < > 14.4   < >  --  12.9     --  286  --   --    < > 357  --   --  422   INR  --   --   --   --  1.2*  --   --   --  1.09  --    NA  --   --   --   --   --   --  136  --   --  140   POTASSIUM 4.7  --   --  4.9  --   --  4.2   < >  --  4.8   CR  --   --  0.43*  --   --   --  0.50*  --   --  0.43*    < > = values in this interval not displayed.        Diagnostics:  No results found for this or any previous visit (from the past 24 hour(s)).   EKG: no LVH by voltage criteria, Left Bundle Branch Block, unchanged from previous tracings, done in  january    Revised Cardiac Risk Index (RCRI):  The patient has the following serious cardiovascular risks for perioperative complications:   - No serious cardiac risks = 0 points     RCRI Interpretation: 0 points: Class I (very low risk - 0.4% complication rate)         Signed Electronically by: Chuyita Zaidi MD  Copy of this evaluation report is provided to requesting physician.

## 2023-10-04 NOTE — PROGRESS NOTES
Planning to discharge home on POD 1 in the morning with her friend, who lives with her, helping her. She has a 4-wheeled walker and cane at home.       10/04/23 1697   Discharge Planning   Patient/Family Anticipates Transition to home   Concerns to be Addressed no discharge needs identified   Living Arrangements   People in Home friend(s)   Type of Residence Private Residence   Is your private residence a single family home or apartment? Single family home   Number of Stairs, Within Home, Primary none   Once home, are you able to live on one level? Yes   Which level? Main Level   Bathroom Shower/Tub Walk-in shower   Equipment Currently Used at Home walker, rolling;grab bar, tub/shower;shower chair;dressing device;raised toilet seat;tub bench;cane, straight;grab bar, toilet   Support System   Support Systems Friends/Neighbors  (friend who lives with her, Aurelio Chong)   Do you have someone available to stay with you one or two nights once you are home? Yes   Education   Patient attended total joint pre-op class/received pre-op teaching  email/phone call

## 2023-10-12 ENCOUNTER — ANESTHESIA EVENT (OUTPATIENT)
Dept: SURGERY | Facility: CLINIC | Age: 73
End: 2023-10-12
Payer: MEDICARE

## 2023-10-13 ENCOUNTER — APPOINTMENT (OUTPATIENT)
Dept: RADIOLOGY | Facility: CLINIC | Age: 73
End: 2023-10-13
Attending: PHYSICIAN ASSISTANT
Payer: MEDICARE

## 2023-10-13 ENCOUNTER — APPOINTMENT (OUTPATIENT)
Dept: PHYSICAL THERAPY | Facility: CLINIC | Age: 73
End: 2023-10-13
Attending: ORTHOPAEDIC SURGERY
Payer: MEDICARE

## 2023-10-13 ENCOUNTER — ANESTHESIA (OUTPATIENT)
Dept: SURGERY | Facility: CLINIC | Age: 73
End: 2023-10-13
Payer: MEDICARE

## 2023-10-13 ENCOUNTER — HOSPITAL ENCOUNTER (OUTPATIENT)
Facility: CLINIC | Age: 73
Discharge: HOME | End: 2023-10-14
Attending: ORTHOPAEDIC SURGERY | Admitting: ORTHOPAEDIC SURGERY
Payer: MEDICARE

## 2023-10-13 DIAGNOSIS — Z96.641 S/P TOTAL RIGHT HIP ARTHROPLASTY: Primary | ICD-10-CM

## 2023-10-13 LAB
CREAT SERPL-MCNC: 0.44 MG/DL (ref 0.51–0.95)
EGFRCR SERPLBLD CKD-EPI 2021: >90 ML/MIN/1.73M2

## 2023-10-13 PROCEDURE — 250N000011 HC RX IP 250 OP 636: Performed by: PHYSICIAN ASSISTANT

## 2023-10-13 PROCEDURE — 272N000001 HC OR GENERAL SUPPLY STERILE: Performed by: ORTHOPAEDIC SURGERY

## 2023-10-13 PROCEDURE — 258N000003 HC RX IP 258 OP 636: Performed by: STUDENT IN AN ORGANIZED HEALTH CARE EDUCATION/TRAINING PROGRAM

## 2023-10-13 PROCEDURE — 999N000065 XR PELVIS PORT 1/2 VIEWS

## 2023-10-13 PROCEDURE — 250N000011 HC RX IP 250 OP 636: Mod: JZ | Performed by: ANESTHESIOLOGY

## 2023-10-13 PROCEDURE — 99214 OFFICE O/P EST MOD 30 MIN: CPT | Performed by: STUDENT IN AN ORGANIZED HEALTH CARE EDUCATION/TRAINING PROGRAM

## 2023-10-13 PROCEDURE — 360N000077 HC SURGERY LEVEL 4, PER MIN: Performed by: ORTHOPAEDIC SURGERY

## 2023-10-13 PROCEDURE — 250N000013 HC RX MED GY IP 250 OP 250 PS 637: Performed by: PHYSICIAN ASSISTANT

## 2023-10-13 PROCEDURE — 97161 PT EVAL LOW COMPLEX 20 MIN: CPT | Mod: GP

## 2023-10-13 PROCEDURE — 250N000013 HC RX MED GY IP 250 OP 250 PS 637

## 2023-10-13 PROCEDURE — 97116 GAIT TRAINING THERAPY: CPT | Mod: GP

## 2023-10-13 PROCEDURE — 258N000003 HC RX IP 258 OP 636: Performed by: NURSE ANESTHETIST, CERTIFIED REGISTERED

## 2023-10-13 PROCEDURE — 258N000003 HC RX IP 258 OP 636: Performed by: PHYSICIAN ASSISTANT

## 2023-10-13 PROCEDURE — C1776 JOINT DEVICE (IMPLANTABLE): HCPCS | Performed by: ORTHOPAEDIC SURGERY

## 2023-10-13 PROCEDURE — 250N000011 HC RX IP 250 OP 636

## 2023-10-13 PROCEDURE — 250N000011 HC RX IP 250 OP 636: Mod: JZ | Performed by: ORTHOPAEDIC SURGERY

## 2023-10-13 PROCEDURE — 999N000127 HC STATISTIC PERIPHERAL IV START W US GUIDANCE

## 2023-10-13 PROCEDURE — 710N000010 HC RECOVERY PHASE 1, LEVEL 2, PER MIN: Performed by: ORTHOPAEDIC SURGERY

## 2023-10-13 PROCEDURE — 250N000011 HC RX IP 250 OP 636: Mod: JZ | Performed by: NURSE ANESTHETIST, CERTIFIED REGISTERED

## 2023-10-13 PROCEDURE — 250N000009 HC RX 250: Performed by: NURSE ANESTHETIST, CERTIFIED REGISTERED

## 2023-10-13 PROCEDURE — 370N000017 HC ANESTHESIA TECHNICAL FEE, PER MIN: Performed by: ORTHOPAEDIC SURGERY

## 2023-10-13 PROCEDURE — 999N000141 HC STATISTIC PRE-PROCEDURE NURSING ASSESSMENT: Performed by: ORTHOPAEDIC SURGERY

## 2023-10-13 PROCEDURE — 258N000001 HC RX 258: Performed by: ORTHOPAEDIC SURGERY

## 2023-10-13 PROCEDURE — 27130 TOTAL HIP ARTHROPLASTY: CPT | Mod: RT | Performed by: ORTHOPAEDIC SURGERY

## 2023-10-13 PROCEDURE — C1713 ANCHOR/SCREW BN/BN,TIS/BN: HCPCS | Performed by: ORTHOPAEDIC SURGERY

## 2023-10-13 PROCEDURE — 250N000009 HC RX 250

## 2023-10-13 PROCEDURE — 82565 ASSAY OF CREATININE: CPT | Performed by: PHYSICIAN ASSISTANT

## 2023-10-13 PROCEDURE — 36415 COLL VENOUS BLD VENIPUNCTURE: CPT | Performed by: PHYSICIAN ASSISTANT

## 2023-10-13 PROCEDURE — P9045 ALBUMIN (HUMAN), 5%, 250 ML: HCPCS | Mod: JZ | Performed by: NURSE ANESTHETIST, CERTIFIED REGISTERED

## 2023-10-13 PROCEDURE — 97110 THERAPEUTIC EXERCISES: CPT | Mod: GP

## 2023-10-13 DEVICE — R3 0 DEGREE XLPE ACETABULAR LINER                                    32MM ID X OD 48MM
Type: IMPLANTABLE DEVICE | Site: HIP | Status: FUNCTIONAL
Brand: R3

## 2023-10-13 DEVICE — BONE CEMENT SIMPLEX W/TOBRAMYCIN 6197-9-001: Type: IMPLANTABLE DEVICE | Site: HIP | Status: FUNCTIONAL

## 2023-10-13 DEVICE — REFLECTION SPHERICAL HEAD SCREW 40MM
Type: IMPLANTABLE DEVICE | Site: HIP | Status: FUNCTIONAL
Brand: REFLECTION

## 2023-10-13 DEVICE — IMP SHELL SNR ACET R3 3H 48MM 71335548: Type: IMPLANTABLE DEVICE | Site: HIP | Status: FUNCTIONAL

## 2023-10-13 DEVICE — 25MM BUCK CEMENT PLUG WITH                                    DISPOSABLE INSERTER
Type: IMPLANTABLE DEVICE | Site: HIP | Status: FUNCTIONAL
Brand: BUCK

## 2023-10-13 DEVICE — COBALT CHROME 12/14 TAPER FEMORAL                                    HEAD 32MM + 4: Type: IMPLANTABLE DEVICE | Site: HIP | Status: FUNCTIONAL

## 2023-10-13 DEVICE — SPECTRON EF PRIMARY HIGH OFFSET                                    12/14 FEM SZ 2
Type: IMPLANTABLE DEVICE | Site: HIP | Status: FUNCTIONAL
Brand: SPECTRON

## 2023-10-13 DEVICE — REFLECTION SPHERICAL HEAD SCREW 25MM
Type: IMPLANTABLE DEVICE | Site: HIP | Status: FUNCTIONAL
Brand: REFLECTION

## 2023-10-13 RX ORDER — HYDROMORPHONE HCL IN WATER/PF 6 MG/30 ML
0.4 PATIENT CONTROLLED ANALGESIA SYRINGE INTRAVENOUS EVERY 5 MIN PRN
Status: DISCONTINUED | OUTPATIENT
Start: 2023-10-13 | End: 2023-10-13 | Stop reason: HOSPADM

## 2023-10-13 RX ORDER — PROPOFOL 10 MG/ML
INJECTION, EMULSION INTRAVENOUS CONTINUOUS PRN
Status: DISCONTINUED | OUTPATIENT
Start: 2023-10-13 | End: 2023-10-13

## 2023-10-13 RX ORDER — CEFAZOLIN SODIUM 1 G/3ML
1 INJECTION, POWDER, FOR SOLUTION INTRAMUSCULAR; INTRAVENOUS EVERY 8 HOURS
Qty: 15 ML | Refills: 0 | Status: COMPLETED | OUTPATIENT
Start: 2023-10-13 | End: 2023-10-14

## 2023-10-13 RX ORDER — NALOXONE HYDROCHLORIDE 0.4 MG/ML
0.2 INJECTION, SOLUTION INTRAMUSCULAR; INTRAVENOUS; SUBCUTANEOUS
Status: DISCONTINUED | OUTPATIENT
Start: 2023-10-13 | End: 2023-10-14 | Stop reason: HOSPADM

## 2023-10-13 RX ORDER — LIDOCAINE 40 MG/G
CREAM TOPICAL
Status: DISCONTINUED | OUTPATIENT
Start: 2023-10-13 | End: 2023-10-14 | Stop reason: HOSPADM

## 2023-10-13 RX ORDER — FENTANYL CITRATE 50 UG/ML
50 INJECTION, SOLUTION INTRAMUSCULAR; INTRAVENOUS EVERY 5 MIN PRN
Status: DISCONTINUED | OUTPATIENT
Start: 2023-10-13 | End: 2023-10-13 | Stop reason: HOSPADM

## 2023-10-13 RX ORDER — EPINEPHRINE 1 MG/ML
INJECTION, SOLUTION, CONCENTRATE INTRAVENOUS PRN
Status: DISCONTINUED | OUTPATIENT
Start: 2023-10-13 | End: 2023-10-13 | Stop reason: HOSPADM

## 2023-10-13 RX ORDER — HYDROMORPHONE HCL IN WATER/PF 6 MG/30 ML
0.2 PATIENT CONTROLLED ANALGESIA SYRINGE INTRAVENOUS
Status: DISCONTINUED | OUTPATIENT
Start: 2023-10-13 | End: 2023-10-14 | Stop reason: HOSPADM

## 2023-10-13 RX ORDER — CEFAZOLIN SODIUM/WATER 2 G/20 ML
2 SYRINGE (ML) INTRAVENOUS
Status: COMPLETED | OUTPATIENT
Start: 2023-10-13 | End: 2023-10-13

## 2023-10-13 RX ORDER — SODIUM CHLORIDE, SODIUM LACTATE, POTASSIUM CHLORIDE, CALCIUM CHLORIDE 600; 310; 30; 20 MG/100ML; MG/100ML; MG/100ML; MG/100ML
INJECTION, SOLUTION INTRAVENOUS CONTINUOUS
Status: DISCONTINUED | OUTPATIENT
Start: 2023-10-13 | End: 2023-10-13 | Stop reason: HOSPADM

## 2023-10-13 RX ORDER — ONDANSETRON 2 MG/ML
4 INJECTION INTRAMUSCULAR; INTRAVENOUS EVERY 6 HOURS PRN
Status: DISCONTINUED | OUTPATIENT
Start: 2023-10-13 | End: 2023-10-14 | Stop reason: HOSPADM

## 2023-10-13 RX ORDER — NALOXONE HYDROCHLORIDE 0.4 MG/ML
0.4 INJECTION, SOLUTION INTRAMUSCULAR; INTRAVENOUS; SUBCUTANEOUS
Status: DISCONTINUED | OUTPATIENT
Start: 2023-10-13 | End: 2023-10-14 | Stop reason: HOSPADM

## 2023-10-13 RX ORDER — PROPOFOL 10 MG/ML
INJECTION, EMULSION INTRAVENOUS
Status: DISCONTINUED
Start: 2023-10-13 | End: 2023-10-13 | Stop reason: HOSPADM

## 2023-10-13 RX ORDER — TRANEXAMIC ACID 650 MG/1
1950 TABLET ORAL ONCE
Status: COMPLETED | OUTPATIENT
Start: 2023-10-13 | End: 2023-10-13

## 2023-10-13 RX ORDER — ONDANSETRON 4 MG/1
4 TABLET, ORALLY DISINTEGRATING ORAL EVERY 30 MIN PRN
Status: DISCONTINUED | OUTPATIENT
Start: 2023-10-13 | End: 2023-10-13 | Stop reason: HOSPADM

## 2023-10-13 RX ORDER — SODIUM CHLORIDE, SODIUM LACTATE, POTASSIUM CHLORIDE, CALCIUM CHLORIDE 600; 310; 30; 20 MG/100ML; MG/100ML; MG/100ML; MG/100ML
INJECTION, SOLUTION INTRAVENOUS CONTINUOUS
Status: DISCONTINUED | OUTPATIENT
Start: 2023-10-13 | End: 2023-10-14 | Stop reason: HOSPADM

## 2023-10-13 RX ORDER — GLYCOPYRROLATE 0.2 MG/ML
INJECTION, SOLUTION INTRAMUSCULAR; INTRAVENOUS PRN
Status: DISCONTINUED | OUTPATIENT
Start: 2023-10-13 | End: 2023-10-13

## 2023-10-13 RX ORDER — HYDROXYZINE HYDROCHLORIDE 10 MG/1
10 TABLET, FILM COATED ORAL EVERY 6 HOURS PRN
Status: DISCONTINUED | OUTPATIENT
Start: 2023-10-13 | End: 2023-10-14 | Stop reason: HOSPADM

## 2023-10-13 RX ORDER — DORZOLAMIDE HYDROCHLORIDE AND TIMOLOL MALEATE 20; 5 MG/ML; MG/ML
1 SOLUTION/ DROPS OPHTHALMIC 2 TIMES DAILY
Status: DISCONTINUED | OUTPATIENT
Start: 2023-10-13 | End: 2023-10-14 | Stop reason: HOSPADM

## 2023-10-13 RX ORDER — HYDROMORPHONE HCL IN WATER/PF 6 MG/30 ML
0.2 PATIENT CONTROLLED ANALGESIA SYRINGE INTRAVENOUS EVERY 5 MIN PRN
Status: DISCONTINUED | OUTPATIENT
Start: 2023-10-13 | End: 2023-10-13 | Stop reason: HOSPADM

## 2023-10-13 RX ORDER — LATANOPROST 50 UG/ML
1 SOLUTION/ DROPS OPHTHALMIC AT BEDTIME
Status: DISCONTINUED | OUTPATIENT
Start: 2023-10-13 | End: 2023-10-14 | Stop reason: HOSPADM

## 2023-10-13 RX ORDER — MAGNESIUM HYDROXIDE/ALUMINUM HYDROXICE/SIMETHICONE 120; 1200; 1200 MG/30ML; MG/30ML; MG/30ML
30 SUSPENSION ORAL EVERY 4 HOURS PRN
Status: DISCONTINUED | OUTPATIENT
Start: 2023-10-13 | End: 2023-10-14 | Stop reason: HOSPADM

## 2023-10-13 RX ORDER — HYDROMORPHONE HCL IN WATER/PF 6 MG/30 ML
0.4 PATIENT CONTROLLED ANALGESIA SYRINGE INTRAVENOUS
Status: DISCONTINUED | OUTPATIENT
Start: 2023-10-13 | End: 2023-10-14 | Stop reason: HOSPADM

## 2023-10-13 RX ORDER — ACETAMINOPHEN 325 MG/1
975 TABLET ORAL EVERY 8 HOURS
Status: DISCONTINUED | OUTPATIENT
Start: 2023-10-13 | End: 2023-10-14 | Stop reason: HOSPADM

## 2023-10-13 RX ORDER — OXYCODONE HYDROCHLORIDE 5 MG/1
10 TABLET ORAL EVERY 4 HOURS PRN
Status: DISCONTINUED | OUTPATIENT
Start: 2023-10-13 | End: 2023-10-14 | Stop reason: HOSPADM

## 2023-10-13 RX ORDER — PHENYLEPHRINE HCL IN 0.9% NACL 50MG/250ML
PLASTIC BAG, INJECTION (ML) INTRAVENOUS
Status: DISCONTINUED
Start: 2023-10-13 | End: 2023-10-13 | Stop reason: HOSPADM

## 2023-10-13 RX ORDER — OXYCODONE HYDROCHLORIDE 5 MG/1
5 TABLET ORAL EVERY 4 HOURS PRN
Status: DISCONTINUED | OUTPATIENT
Start: 2023-10-13 | End: 2023-10-14 | Stop reason: HOSPADM

## 2023-10-13 RX ORDER — POLYETHYLENE GLYCOL 3350 17 G/17G
17 POWDER, FOR SOLUTION ORAL DAILY
Status: DISCONTINUED | OUTPATIENT
Start: 2023-10-14 | End: 2023-10-14 | Stop reason: HOSPADM

## 2023-10-13 RX ORDER — LOSARTAN POTASSIUM 50 MG/1
100 TABLET ORAL DAILY
Status: DISCONTINUED | OUTPATIENT
Start: 2023-10-13 | End: 2023-10-14 | Stop reason: HOSPADM

## 2023-10-13 RX ORDER — FENTANYL CITRATE 50 UG/ML
25 INJECTION, SOLUTION INTRAMUSCULAR; INTRAVENOUS EVERY 5 MIN PRN
Status: DISCONTINUED | OUTPATIENT
Start: 2023-10-13 | End: 2023-10-13 | Stop reason: HOSPADM

## 2023-10-13 RX ORDER — ONDANSETRON 2 MG/ML
4 INJECTION INTRAMUSCULAR; INTRAVENOUS EVERY 30 MIN PRN
Status: DISCONTINUED | OUTPATIENT
Start: 2023-10-13 | End: 2023-10-13 | Stop reason: HOSPADM

## 2023-10-13 RX ORDER — BISACODYL 10 MG
10 SUPPOSITORY, RECTAL RECTAL DAILY PRN
Status: DISCONTINUED | OUTPATIENT
Start: 2023-10-13 | End: 2023-10-14 | Stop reason: HOSPADM

## 2023-10-13 RX ORDER — VIT C/E/ZN/COPPR/LUTEIN/ZEAXAN 60 MG-6 MG
1 CAPSULE ORAL DAILY
Status: DISCONTINUED | OUTPATIENT
Start: 2023-10-14 | End: 2023-10-14 | Stop reason: HOSPADM

## 2023-10-13 RX ORDER — ACETAMINOPHEN 325 MG/1
975 TABLET ORAL ONCE
Status: COMPLETED | OUTPATIENT
Start: 2023-10-13 | End: 2023-10-13

## 2023-10-13 RX ORDER — ACETAMINOPHEN 325 MG/1
650 TABLET ORAL EVERY 4 HOURS PRN
Status: DISCONTINUED | OUTPATIENT
Start: 2023-10-16 | End: 2023-10-14 | Stop reason: HOSPADM

## 2023-10-13 RX ORDER — ROSUVASTATIN CALCIUM 10 MG/1
10 TABLET, COATED ORAL DAILY
Status: DISCONTINUED | OUTPATIENT
Start: 2023-10-14 | End: 2023-10-14 | Stop reason: HOSPADM

## 2023-10-13 RX ORDER — VIT A/VIT C/VIT E/ZINC/COPPER 2148-113
1 TABLET ORAL 2 TIMES DAILY
COMMUNITY

## 2023-10-13 RX ORDER — EPINEPHRINE 1 MG/ML
INJECTION, SOLUTION INTRAMUSCULAR; SUBCUTANEOUS
Status: DISCONTINUED
Start: 2023-10-13 | End: 2023-10-13 | Stop reason: HOSPADM

## 2023-10-13 RX ORDER — ENOXAPARIN SODIUM 100 MG/ML
40 INJECTION SUBCUTANEOUS EVERY 24 HOURS
Status: DISCONTINUED | OUTPATIENT
Start: 2023-10-14 | End: 2023-10-14 | Stop reason: HOSPADM

## 2023-10-13 RX ORDER — AMOXICILLIN 250 MG
1 CAPSULE ORAL 2 TIMES DAILY
Status: DISCONTINUED | OUTPATIENT
Start: 2023-10-13 | End: 2023-10-14 | Stop reason: HOSPADM

## 2023-10-13 RX ORDER — CEFAZOLIN SODIUM/WATER 2 G/20 ML
2 SYRINGE (ML) INTRAVENOUS SEE ADMIN INSTRUCTIONS
Status: DISCONTINUED | OUTPATIENT
Start: 2023-10-13 | End: 2023-10-13 | Stop reason: HOSPADM

## 2023-10-13 RX ORDER — ONDANSETRON 4 MG/1
4 TABLET, ORALLY DISINTEGRATING ORAL EVERY 6 HOURS PRN
Status: DISCONTINUED | OUTPATIENT
Start: 2023-10-13 | End: 2023-10-14 | Stop reason: HOSPADM

## 2023-10-13 RX ORDER — LIDOCAINE 40 MG/G
CREAM TOPICAL
Status: DISCONTINUED | OUTPATIENT
Start: 2023-10-13 | End: 2023-10-13 | Stop reason: HOSPADM

## 2023-10-13 RX ADMIN — CEFAZOLIN 1 G: 1 INJECTION, POWDER, FOR SOLUTION INTRAMUSCULAR; INTRAVENOUS at 14:46

## 2023-10-13 RX ADMIN — PHENYLEPHRINE HYDROCHLORIDE 200 MCG: 10 INJECTION INTRAVENOUS at 08:26

## 2023-10-13 RX ADMIN — FENTANYL CITRATE 50 MCG: 50 INJECTION, SOLUTION INTRAMUSCULAR; INTRAVENOUS at 10:15

## 2023-10-13 RX ADMIN — OXYCODONE HYDROCHLORIDE 5 MG: 5 TABLET ORAL at 20:20

## 2023-10-13 RX ADMIN — CEFAZOLIN 1 G: 1 INJECTION, POWDER, FOR SOLUTION INTRAMUSCULAR; INTRAVENOUS at 22:54

## 2023-10-13 RX ADMIN — ACETAMINOPHEN 975 MG: 325 TABLET ORAL at 06:05

## 2023-10-13 RX ADMIN — DORZOLAMIDE HYDROCHLORIDE AND TIMOLOL MALEATE 1 DROP: 20; 5 SOLUTION/ DROPS OPHTHALMIC at 22:10

## 2023-10-13 RX ADMIN — SODIUM CHLORIDE, POTASSIUM CHLORIDE, SODIUM LACTATE AND CALCIUM CHLORIDE: 600; 310; 30; 20 INJECTION, SOLUTION INTRAVENOUS at 08:15

## 2023-10-13 RX ADMIN — SODIUM CHLORIDE, POTASSIUM CHLORIDE, SODIUM LACTATE AND CALCIUM CHLORIDE: 600; 310; 30; 20 INJECTION, SOLUTION INTRAVENOUS at 20:15

## 2023-10-13 RX ADMIN — PHENYLEPHRINE HYDROCHLORIDE 200 MCG: 10 INJECTION INTRAVENOUS at 08:04

## 2023-10-13 RX ADMIN — HYDROMORPHONE HYDROCHLORIDE 0.4 MG: 0.2 INJECTION, SOLUTION INTRAMUSCULAR; INTRAVENOUS; SUBCUTANEOUS at 10:25

## 2023-10-13 RX ADMIN — SODIUM CHLORIDE, POTASSIUM CHLORIDE, SODIUM LACTATE AND CALCIUM CHLORIDE: 600; 310; 30; 20 INJECTION, SOLUTION INTRAVENOUS at 13:43

## 2023-10-13 RX ADMIN — PHENYLEPHRINE HYDROCHLORIDE 200 MCG: 10 INJECTION INTRAVENOUS at 07:54

## 2023-10-13 RX ADMIN — OXYCODONE HYDROCHLORIDE 5 MG: 5 TABLET ORAL at 20:00

## 2023-10-13 RX ADMIN — Medication 2 G: at 07:33

## 2023-10-13 RX ADMIN — LATANOPROST 1 DROP: 50 SOLUTION/ DROPS OPHTHALMIC at 22:11

## 2023-10-13 RX ADMIN — PROPOFOL 50 MCG/KG/MIN: 10 INJECTION, EMULSION INTRAVENOUS at 07:35

## 2023-10-13 RX ADMIN — PHENYLEPHRINE HYDROCHLORIDE 100 MCG: 10 INJECTION INTRAVENOUS at 09:49

## 2023-10-13 RX ADMIN — ACETAMINOPHEN 975 MG: 325 TABLET ORAL at 22:09

## 2023-10-13 RX ADMIN — GLYCOPYRROLATE 0.2 MG: 0.2 INJECTION INTRAMUSCULAR; INTRAVENOUS at 08:19

## 2023-10-13 RX ADMIN — PHENYLEPHRINE HYDROCHLORIDE 100 MCG: 10 INJECTION INTRAVENOUS at 08:36

## 2023-10-13 RX ADMIN — PHENYLEPHRINE HYDROCHLORIDE 0.5 MCG/KG/MIN: 10 INJECTION INTRAVENOUS at 07:55

## 2023-10-13 RX ADMIN — ACETAMINOPHEN 975 MG: 325 TABLET ORAL at 13:43

## 2023-10-13 RX ADMIN — FENTANYL CITRATE 50 MCG: 50 INJECTION, SOLUTION INTRAMUSCULAR; INTRAVENOUS at 10:10

## 2023-10-13 RX ADMIN — SODIUM CHLORIDE, POTASSIUM CHLORIDE, SODIUM LACTATE AND CALCIUM CHLORIDE: 600; 310; 30; 20 INJECTION, SOLUTION INTRAVENOUS at 06:26

## 2023-10-13 RX ADMIN — TRANEXAMIC ACID 1950 MG: 650 TABLET ORAL at 06:05

## 2023-10-13 RX ADMIN — ALBUMIN (HUMAN): 12.5 SOLUTION INTRAVENOUS at 08:35

## 2023-10-13 RX ADMIN — MEPIVACAINE HYDROCHLORIDE 3 ML: 15 INJECTION, SOLUTION EPIDURAL; INFILTRATION at 07:47

## 2023-10-13 RX ADMIN — OXYCODONE HYDROCHLORIDE 5 MG: 5 TABLET ORAL at 14:46

## 2023-10-13 ASSESSMENT — ACTIVITIES OF DAILY LIVING (ADL)
ADLS_ACUITY_SCORE: 26
ADLS_ACUITY_SCORE: 24
ADLS_ACUITY_SCORE: 26
ADLS_ACUITY_SCORE: 27
ADLS_ACUITY_SCORE: 27
ADLS_ACUITY_SCORE: 25
ADLS_ACUITY_SCORE: 27
ADLS_ACUITY_SCORE: 24
ADLS_ACUITY_SCORE: 25

## 2023-10-13 NOTE — PROGRESS NOTES
10/13/23 1440   Appointment Info   Signing Clinician's Name / Credentials (PT) VIC Kapoor   Student Supervision Therapy services provided with the co-signing licensed therapist guiding and directing the services, and providing the skilled judgement and assessment throughout the session   Quick Adds   Quick Adds Certification   Living Environment   People in Home friend(s)   Current Living Arrangements house   Home Accessibility no concerns   Number of Stairs, Within Home, Primary none   Transportation Anticipated family or friend will provide   Self-Care   Usual Activity Tolerance good   Current Activity Tolerance moderate   Equipment Currently Used at Home walker, rolling   Fall history within last six months no   General Information   Onset of Illness/Injury or Date of Surgery 10/13/23   Referring Physician Austin Blood MD   Patient/Family Therapy Goals Statement (PT) Get back home   Pertinent History of Current Problem (include personal factors and/or comorbidities that impact the POC) S/P R TACOS   Existing Precautions/Restrictions no hip IR;no hip ADD past midline;90 degree hip flexion;no pivoting or twisting;weight bearing   Weight-Bearing Status - LLE full weight-bearing   Weight-Bearing Status - RLE weight-bearing as tolerated   Bed Mobility   Bed Mobility supine-sit   Supine-Sit Mc[place]od (Bed Mobility) contact guard;verbal cues   Bed Mobility Limitations decreased ability to use legs for bridging/pushing   Impairments Contributing to Impaired Bed Mobility pain   Transfers   Transfers sit-stand transfer   Maintains Weight-bearing Status (Transfers) able to maintain   Impairments Contributing to Impaired Transfers pain   Sit-Stand Transfer   Sit-Stand Mc[place]od (Transfers) contact guard;verbal cues   Assistive Device (Sit-Stand Transfers) walker, front-wheeled   Gait/Stairs (Locomotion)   McLeod Level (Gait) contact guard   Assistive Device (Gait) walker, front-wheeled   Distance in  Feet 10   Distance in Feet (Gait) 175   Pattern (Gait) step-through   Deviations/Abnormal Patterns (Gait) gait speed decreased;ivonne decreased   Maintains Weight-bearing Status (Gait) able to maintain   Clinical Impression   Criteria for Skilled Therapeutic Intervention Yes, treatment indicated   PT Diagnosis (PT) Impaired functional mobility   Influenced by the following impairments Pain, weakness   Functional limitations due to impairments Gait, transfers   Clinical Presentation (PT Evaluation Complexity) stable   Clinical Presentation Rationale Pt presents as medically diagnosed   Clinical Decision Making (Complexity) low complexity   Planned Therapy Interventions (PT) gait training;home exercise program;patient/family education;transfer training   Risk & Benefits of therapy have been explained patient   PT Total Evaluation Time   PT Eval, Low Complexity Minutes (55670) 10   Therapy Certification   Start of care date 10/13/23   Certification date from 10/13/23   Certification date to 11/10/23   Medical Diagnosis S/P R TACOS   Physical Therapy Goals   PT Frequency Daily   PT Predicted Duration/Target Date for Goal Attainment 10/15/23   PT Goals Transfers;Gait;PT Goal 1   PT: Transfers Modified independent;Sit to/from stand;Assistive device   PT: Gait Modified independent;Rolling walker;Greater than 200 feet   PT: Goal 1 Pt will demonstrate knowledge of TH HEP with SBA and verbal cueing for instructions   Interventions   Interventions Quick Adds Gait Training;Therapeutic Activity;Therapeutic Procedure   Therapeutic Procedure/Exercise   Ther. Procedure: strength, endurance, ROM, flexibillity Minutes (74310) 14   Symptoms Noted During/After Treatment increased pain   Treatment Detail/Skilled Intervention Pt completed 10 reps of each exercise of the TH HEP with SBA and verbal cueing for instructions; education about daily exercise and precautions during exercise   Therapeutic Activity   Symptoms Noted During/After  Treatment None   Treatment Detail/Skilled Intervention Supine to sit with mod I and verbal cueing for safety and maintaining precautions; sit<>stand x2 with SBA and FWW, verbal cueing for safety and hand placement   Gait Training   Gait Training Minutes (51565) 11   Symptoms Noted During/After Treatment (Gait Training) fatigue;increased pain   Treatment Detail/Skilled Intervention Pt ambulated 175' with SBA and FWW, verbal cueing for safety and proper walker use; education about maintaining precautions when turning   [place] Level (Gait Training) stand-by assist   Physical Assistance Level (Gait Training) verbal cues   Weight Bearing (Gait Training) weight-bearing as tolerated   Assistive Device (Gait Training) rolling walker   Pattern Analysis (Gait Training) swing-through gait   Gait Analysis Deviations decreased ivonne;decreased step length;decreased stride length   Impairments (Gait Analysis/Training) pain;strength decreased   PT Discharge Planning   PT Plan Gait with 4WW, transfers, HEP   PT Discharge Recommendation (DC Rec)   (Defer to ortho)   PT Brief overview of current status Pt ambulated 175' with SBA and FWW, transfers with SBA and FWW, bed mobility with mod I   PT Equipment Needed at Discharge walker, rolling   Total Session Time   Timed Code Treatment Minutes 25   Total Session Time (sum of timed and untimed services) 35

## 2023-10-13 NOTE — OP NOTE
OPERATIVE REPORT    DATE OF SERVICE: 10/13/23    SURGEON: Austin Blood MD.    ASSISTANT(S): Paul Trejo PA-C    PREOPERATIVE DIAGNOSIS:  Osteoarthritis    POSTOPERATIVE DIAGNOSIS:  Osteoarthritis    OPERATION PERFORMED:  right total hip arthroplasty    IMPLANTS:    Implant Name Type Inv. Item Serial No.  Lot No. LRB No. Used Action   BONE CEMENT SIMPLEX W/TOBRAMYCIN 6197-9-001 - [id number] Cement, Bone BONE CEMENT SIMPLEX W/TOBRAMYCIN 6197-9-001  [employer] [id number] Right 2 Implanted   PLUG CEMENT WERNER W/INSERT 25MM 4690-0032 - [id number] Total Joint Component/Insert PLUG CEMENT WERNER W/INSERT 25MM 1586-3056  [employer] [id number] Right 1 Implanted   REFLECTION FLEXIBLE DRILL 35 MM    [employer] [id number] Right 1 Implanted   SPHERICAL HEAD CANCELLOUS SCREW 6.5 MM X 25 MM    [employer] [id number] Right 1 Implanted   THREE HOLE HEMISPHERICAL COATED SHELL 48 MM OD    [employer] [id number] Right 1 Implanted   IMP LINER S&N ACET R3 XLPE [id number] 0DEG [id number] - [id number] Total Joint Component/Insert IMP LINER S&N ACET R3 XLPE [id number] 0DEG [id number]  [employer] [id number] Right 1 Implanted   IMP SCR ACET SNN SPHERICAL HEAD 6.5X25MM [id number] - [id number] Metallic Hardware/[place] IMP SCR ACET SNN SPHERICAL HEAD 6.5X25MM [id number]  [employer]R [id number] Right 1 Implanted   IMP SCR ACET SNN SPHERICAL HEAD 6.5X40MM [id number] - [id number] Metallic Hardware/[place] IMP SCR ACET SNN SPHERICAL HEAD 6.5X40MM [id number]  [employer] [id number] Right 1 Implanted   IMP STEM FEMORAL SNR BIPOLAR SPECTRON HO SIZE 2 [id number] - [id number] Total Joint Component/Insert IMP STEM FEMORAL SNR BIPOLAR SPECTRON HO SIZE 2 [id number]  [employer] [id number] Right 1 Implanted   IMP HEAD FEMORAL SNR [place] 32MM +4 [id number] - [id number] Total Joint Component/Insert IMP HEAD FEMORAL SNR [place] 32MM +4 [id number]  [employer] [id number] Right 1 Implanted       ANESTHETIC:  spinal    OPERATIVE FINDINGS:  End stage arthrosis of the hip    BLOOD LOSS about 200 ml     COMPLICATIONS:  None apparent    OPERATIVE INDICATIONS:  The patient has a long history of debilitating pain secondary to ostearthritis of the hip.  Despite comprehensive non-operative management these symptoms continued to interfere with activities of daily living.  After discussion of further treatment options including the risks and benefits that patient elected to proceed with a total hip.    DESCRIPTION OF THE PROCEDURE:  The patient was identified in the preoperative holding area.  The consent form including the risks and benefits were reviewed with the patient.  The operative limb was identified and marked.  The patient was brought back to the operating room and placed on the operating table.  A spinal anesthetic was induced by the anesthesia team.   The patient was placed in the lateral decubitus position and prepped and draped in the normal standard fashion for a hip replacement.  A time-out was called.  Antibiotics were given.  We utilized an approximately 15 cm curvilinear incision, centered on the vastus ridge, and performed a standard posterior approach to the hip.  The tensor fascia was split.  A small portion of gluteus magno was split in line with its fibers.  The sciatic nerve was palpated.  The east-west retractor was placed.  The posterior border of gluteus medius was exposed and retracted.  The tendon of piriformis and that of the obturators was released from their attachments.  A trapdoor posterior capsulotomy was performed.  The hip was dislocated.  The lesser trochanter was exposed.  A ruler was used to measure and electrocautery was used to elbert our neck cut as preoperatively templated.  The head was measured with a caliper and found to be bout 44 mm though quite deformed from her degenerative changes.  This measurement was used to choose our first reamer.  The neck cut was re-measured. The femur  "was elevated.  A Hohmann was placed over the anterior rim of the acetabulum and the femur was subluxed anterior.  A split was made in the inferior capsule.  The transverse acetabular ligament was left intact and used a guide for the anterversion of the acetabular component.  Circumferential retractors were placed.  We began reaming and went up by two until sufficient contact was made with the acetabular rim.  We then went up by one millimeter for a one millimeter press-fit.  We were within one size of our preoperative plan.   A trail was placed.  It had an excellent press fit.  We then placed out final component in 40 degrees of inclination and approximately 20 degrees of anteversion, parallel to the transverse ligament.  The press fit was excellent.  Screws were placed for additional initial fixation.  A flat liner was then placed. It locked into place.  Attention was turned to the femur.  Retractors were placed to elevated the proximal femur and to protect the tendon of gluteus medius.  Remaining lateral neck was removed and the piriformis fossa was cleared of soft-tissue.  A box osteotome and canal finder were used to prepare for broaching.  A sharp broach was used to lateralize slightly.  We then broached up sequentially to a size 2.  It was rotationally stable.  Preoperatively the patient had templated to a high offset stem.  The high offset stem appropriately tensioned the abductors.  We trialed with a +4 and it appropriately tensioned the abductors and clinically equalized the leg-lengths.  The stability exam was excellent.  The hip was stable and there was no impingement posteriorly with hyper-extension and maximal external rotation.  With full extension, the knee could be fixed to bring the foot nearly to the buttock.  With the hip in ninety degrees of flexion and neutral rotation there was greater than 60 degrees of internal rotation before subluxation.  There appropriate movement with a \"beatrice\" test.  " Happy with our stability exam, the final implant was cemented into place using 4th generation cement technique. It was placed in approximately twenty degrees of anteversion.  It sat within 2 mm of the broach.  We then trailed with a +4  head.  The stability exam was identical.  We then placed the final head on a clean, dry neck and impacted it into place. The hip was reduced after directly visualizing the entire acetabulum.  The wound was then irrigated.  The posterior capsule and short external rotators were sutured to the greater trochanter with non-absorbable suture through bone tunnels.The fascia was closed with interrupted Vicryl, the dermis with interrupted Vicryl, and skin with running monocryl, Dermabond and steri-strips.  At the end of the procedure the sponge and needle counts were correct times two.  The patient tolerated the procedure well and returned to the PAR extubated and stable.    POSTOPERATIVE PLAN:  1. Weight bearing as tolerated  2. Standard posterior hip precautions  3. DVT prophylaxis lovenox for 2 weeks at 40 mg sc qday  4. 24 hours of prophylactic antibiotics  5. Follow-up:  Wound clinic in 2 weeks and manny Blood in clinic in 6 weeks for x-rays and a rehabilitation check.

## 2023-10-13 NOTE — ANESTHESIA POSTPROCEDURE EVALUATION
Patient: Virginia Byers    Procedure: Procedure(s):  ARTHROPLASTY, HIP, TOTAL       Anesthesia Type:  Spinal    Note:     Postop Pain Control: Uneventful            Sign Out: Well controlled pain   PONV: No   Neuro/Psych: Uneventful            Sign Out: Acceptable/Baseline neuro status   Airway/Respiratory: Uneventful            Sign Out: Acceptable/Baseline resp. status   CV/Hemodynamics: Uneventful            Sign Out: Acceptable CV status; No obvious hypovolemia; No obvious fluid overload   Other NRE: NONE   DID A NON-ROUTINE EVENT OCCUR? No           Last vitals:  Vitals Value Taken Time   /56 10/13/23 1300   Temp 36.8  C (98.2  F) 10/13/23 1045   Pulse 87 10/13/23 1312   Resp 14 10/13/23 1300   SpO2 98 % 10/13/23 1312   Vitals shown include unfiled device data.    Electronically Signed By: Joseph Mcdonald MD  October 13, 2023  2:33 PM

## 2023-10-13 NOTE — PLAN OF CARE
Problem: Plan of Care - These are the overarching goals to be used throughout the patient stay.    Goal: Absence of Hospital-Acquired Illness or Injury  Intervention: Identify and Manage Fall Risk  Recent Flowsheet Documentation  Taken 10/13/2023 1330 by Leana Daniel RN  Safety Promotion/Fall Prevention:   activity supervised   assistive device/personal items within reach   clutter free environment maintained   lighting adjusted   nonskid shoes/slippers when out of bed   safety round/check completed     Patient vital signs are at baseline: Yes  Patient able to ambulate as they were prior to admission or with assist devices provided by therapies during their stay:  Yes  Patient MUST void prior to discharge:  Yes  Patient able to tolerate oral intake:  Yes  Pain has adequate pain control using Oral analgesics:  Yes  Does patient have an identified :  Yes  Has goal D/C date and time been discussed with patient:  Yes    Patient A&O, VSS, and CMS intact. Pain is manageable. Dressing CDI. Tolerating oral intake, ambulating, and voiding. Call light appropriate and all alarms in place.   Leana Daniel, RN

## 2023-10-13 NOTE — ANESTHESIA CARE TRANSFER NOTE
Patient: Virginia Byers    Procedure: Procedure(s):  ARTHROPLASTY, HIP, TOTAL       Diagnosis: Primary localized osteoarthritis of right hip [M16.11]  Diagnosis Additional Information: No value filed.    Anesthesia Type:   Spinal     Note:    Oropharynx: oropharynx clear of all foreign objects  Level of Consciousness: drowsy  Oxygen Supplementation: face mask  Level of Supplemental Oxygen (L/min / FiO2): 8  Independent Airway: airway patency satisfactory and stable  Dentition: dentition unchanged  Vital Signs Stable: post-procedure vital signs reviewed and stable  Report to RN Given: handoff report given  Patient transferred to: PACU    Handoff Report: Identifed the Patient, Identified the Reponsible Provider, Reviewed the pertinent medical history, Discussed the surgical course, Reviewed Intra-OP anesthesia mangement and issues during anesthesia, Set expectations for post-procedure period and Allowed opportunity for questions and acknowledgement of understanding      Vitals:  Vitals Value Taken Time   /52 10/13/23 1004   Temp 98.1f    Pulse 82 10/13/23 1005   Resp 13 10/13/23 1005   SpO2 100 % 10/13/23 1005   Vitals shown include unfiled device data.    Electronically Signed By: KOSTA MELENDEZ CRNA  October 13, 2023  10:06 AM

## 2023-10-13 NOTE — PHARMACY-ADMISSION MEDICATION HISTORY
Pharmacist Admission Medication History    Admission medication history is complete. The information provided in this note is only as accurate as the sources available at the time of the update.    Information Source(s): Patient and CareEverywhere/SureScripts via in-person    Pertinent Information: Pt reports she has eye drops with her    Allergies reviewed with patient and updates made in EHR: yes    Medication History Completed By: Sherin Gould PharmD 10/13/2023 7:25 AM    Prior to Admission medications    Medication Sig Last Dose Taking? Auth Provider Long Term End Date   acetaminophen (TYLENOL) 325 MG tablet Take 2 tablets (650 mg) by mouth every 4 hours as needed for other (For optimal non-opioid multimodal pain management to improve pain control.) 10/11/2023 Yes Lola Mejia PA-C No    dorzolamide-timolol (COSOPT) 2-0.5 % ophthalmic solution Place 1 drop into both eyes 2 times daily 10/13/2023 at am x1 - has with Yes Reported, Patient     latanoprost (XALATAN) 0.005 % ophthalmic solution Place 1 drop into both eyes At Bedtime 10/12/2023 at hs - hs with Yes Reported, Patient     losartan (COZAAR) 100 MG tablet Take 1 tablet (100 mg) by mouth daily 10/12/2023 Yes Chuyita Zaidi MD Yes    Multiple Vitamins-Minerals (PRESERVISION AREDS) TABS Take 1 tablet by mouth 2 times daily Past Month Yes Unknown, Entered By History     ondansetron (ZOFRAN ODT) 4 MG ODT tab Take 1 tablet (4 mg) by mouth every 6 hours as needed for nausea or vomiting Unknown at prn Yes Lola Mejia PA-C     rosuvastatin (CRESTOR) 10 MG tablet Take 1 tablet (10 mg) by mouth daily 10/13/2023 Yes Chuyita Zaidi MD Yes

## 2023-10-13 NOTE — ANESTHESIA PROCEDURE NOTES
"Intrathecal Procedure Note    Pre-Procedure   Staff -        Anesthesiologist:  Joseph Mcdonald MD       Performed By: anesthesiologist       Location: OR       Procedure Start/Stop Times: 10/13/2023 7:47 AM and 10/13/2023 7:50 AM       Pre-Anesthestic Checklist: patient identified, IV checked, risks and benefits discussed, informed consent, monitors and equipment checked, pre-op evaluation and at physician/surgeon's request  Timeout:       Correct Patient: Yes        Correct Procedure: Yes        Correct Site: Yes        Correct Position: Yes   Procedure Documentation  Procedure: intrathecal       Patient Position: sitting       Patient Prep/Sterile Barriers: sterile gloves, mask, patient draped       Skin prep: Chloraprep       Insertion Site: L3-4. (midline approach).       Needle Gauge: 25.        Needle Length (Inches): 3.5        Spinal Needle Type: Rita tip       Introducer used       Introducer: 20 G       # of attempts: 1 and  # of redirects:     Assessment/Narrative         Paresthesias: No.       CSF fluid: clear.       Opening pressure was cmH2O while  Sitting.      Medication(s) Administered   1.5% Mepivacaine PF (Intrathecal) - Intrathecal   3 mL - 10/13/2023 7:47:00 AM  Medication Administration Time: 10/13/2023 7:47 AM      FOR [facility] ([facility]) ONLY:   Pain Team Contact information: please page the Pain Team Via [employer]. Search \"Pain\". During daytime hours, please page the attending first. At night please page the resident first.      "

## 2023-10-13 NOTE — ANESTHESIA PREPROCEDURE EVALUATION
Anesthesia Pre-Procedure Evaluation    Patient: Virginia Byers   MRN: [id number] : 1950        Procedure : Procedure(s):  ARTHROPLASTY, HIP, TOTAL LEFT          Past Medical History:   Diagnosis Date    Breast cancer (H)     Left breast, s/p mastectomy, chemotherapy and endocrine therapy    Follicular lymphoma (H) 2023    Glaucoma     bilateral - Dr. Moore - [facility]    History of spinal stenosis     Hyperlipidemia     Hypertension     Macular degeneration     Dr. Toscano - [facility]    Osteoarthritis     Osteoporosis     Personal history of chemotherapy     A/C + Taxol    S/P radiation therapy     400 cGy to left orbit completed on 6/15/2023 [facility]      Past Surgical History:   Procedure Laterality Date    ARTHROPLASTY HIP Left 2023    Procedure: ARTHROPLASTY, HIP, TOTAL LEFT;  Surgeon: Austin Blood MD;  Location: [facility] OR    BIOPSY Left 2023    Left Orbital Biopsy    C MASTECTOMY,SIMPLE  2000    left    COLONOSCOPY  2006    Q 10 years    COLONOSCOPY N/A 10/04/2021    Procedure: COLONOSCOPY;  Surgeon: Guru Lyla Ruby MD;  Location: [facility] OR    ESOPHAGOSCOPY, GASTROSCOPY, DUODENOSCOPY (EGD), COMBINED N/A 10/04/2021    Procedure: ENDOSCOPIC ULTRASOUND, ESOPHAGOSCOPY / UPPER GASTROINTESTINAL TRACT (GI), Esophagogastroduodenoscopy,  Fine Needle Biopsy of liver;  Surgeon: Guru Lyla Ruby MD;  Location: [facility] OR    EYE SURGERY      IR LYMPH NODE BIOPSY  2023    SURGICAL HISTORY OF -  Left 2015    tissue expander placed in left breast area    SURGICAL HISTORY OF -  Left 2016    left breast implant and right mammoplasty      Allergies   Allergen Reactions    Compazine      Mental confusion    Hydrochlorothiazide      Dryness mouth    Prochlorperazine Visual Disturbance    Simvastatin Other (See Comments)     Muscle aches      Social History     Tobacco Use     Smoking status: Former     Packs/day: 1.00     Years: 17.00     Additional pack years: 0.00     Total pack years: 17.00     Types: Cigarettes     Quit date: 3/15/1999     Years since quittin.5     Passive exposure: Past    Smokeless tobacco: Never   Substance Use Topics    Alcohol use: Not Currently     Alcohol/week: 4.0 standard drinks of alcohol     Types: 4 Glasses of wine per week      Wt Readings from Last 1 Encounters:   10/13/23 66.2 kg (146 lb)        Anesthesia Evaluation   Pt has had prior anesthetic.     No history of anesthetic complications       ROS/MED HX  ENT/Pulmonary:  - neg pulmonary ROS     Neurologic: Comment: Spinal stenosis listed but pt has no knowledge of this and denies any symptoms.      Cardiovascular:     (+)  hypertension- -   -  - -                                      METS/Exercise Tolerance: >4 METS    Hematologic:  - neg hematologic  ROS     Musculoskeletal:  - neg musculoskeletal ROS     GI/Hepatic:  - neg GI/hepatic ROS     Renal/Genitourinary:  - neg Renal ROS     Endo:  - neg endo ROS     Psychiatric/Substance Use:  - neg psychiatric ROS     Infectious Disease:  - neg infectious disease ROS     Malignancy:   (+) Malignancy,     Other:  - neg other ROS          Physical Exam    Airway        Mallampati: I   TM distance: > 3 FB   Neck ROM: full   Mouth opening: > 3 cm    Respiratory Devices and Support         Dental       (+) Modest Abnormalities - crowns, retainers, 1 or 2 missing teeth      Cardiovascular          Rhythm and rate: regular and normal     Pulmonary           breath sounds clear to auscultation           OUTSIDE LABS:  CBC:   Lab Results   Component Value Date    WBC 9.2 2023    WBC 7.7 2023    HGB 13.3 2023    HGB 10.7 (L) 2023    HCT 41.5 2023    HCT 41.8 2023     2023     2023     BMP:   Lab Results   Component Value Date     2023     2022    POTASSIUM 4.7 2023     "POTASSIUM 4.9 07/05/2023    CHLORIDE 103 05/11/2023    CHLORIDE 106 12/28/2022    CO2 27 05/11/2023    CO2 30 12/28/2022    BUN 21 05/11/2023    BUN 14 12/28/2022    CR 0.43 (L) 07/11/2023    CR 0.50 (L) 05/11/2023    GLC 99 07/11/2023     (H) 05/11/2023     COAGS:   Lab Results   Component Value Date    INR 1.2 (L) 06/19/2023     POC: No results found for: \"BGM\", \"HCG\", \"HCGS\"  HEPATIC:   Lab Results   Component Value Date    ALBUMIN 3.7 05/11/2023    PROTTOTAL 8.8 05/11/2023    ALT 20 05/11/2023    AST 13 05/11/2023    ALKPHOS 122 05/11/2023    BILITOTAL 0.7 05/11/2023     OTHER:   Lab Results   Component Value Date    COURTNEY 10.1 09/26/2023    PHOS 3.9 09/26/2023    TSH 1.86 09/20/2021    T4 1.14 03/15/2010    SED 49 (H) 03/31/2023       Anesthesia Plan    ASA Status:  2    NPO Status:  NPO Appropriate    Anesthesia Type: Spinal.              Consents    Anesthesia Plan(s) and associated risks, benefits, and realistic alternatives discussed. Questions answered and patient/representative(s) expressed understanding.     - Discussed: Risks, Benefits and Alternatives for BOTH SEDATION and the PROCEDURE were discussed     - Discussed with:  Patient            Postoperative Care    Pain management: Oral pain medications, IV analgesics.   PONV prophylaxis: Ondansetron (or other 5HT-3), Dexamethasone or Solumedrol     Comments:                Joseph Mcdonald MD  "

## 2023-10-13 NOTE — PROGRESS NOTES
The patient's medical history and physical was reviewed with the patient and there are no changes to make.

## 2023-10-14 ENCOUNTER — APPOINTMENT (OUTPATIENT)
Dept: PHYSICAL THERAPY | Facility: CLINIC | Age: 73
End: 2023-10-14
Attending: PHYSICIAN ASSISTANT
Payer: MEDICARE

## 2023-10-14 VITALS
SYSTOLIC BLOOD PRESSURE: 113 MMHG | HEART RATE: 81 BPM | DIASTOLIC BLOOD PRESSURE: 56 MMHG | TEMPERATURE: 98.1 F | OXYGEN SATURATION: 92 % | BODY MASS INDEX: 25.87 KG/M2 | RESPIRATION RATE: 16 BRPM | WEIGHT: 146 LBS | HEIGHT: 63 IN

## 2023-10-14 LAB
FASTING STATUS PATIENT QL REPORTED: YES
GLUCOSE SERPL-MCNC: 127 MG/DL (ref 70–99)
HGB BLD-MCNC: 9.6 G/DL (ref 11.7–15.7)

## 2023-10-14 PROCEDURE — 36415 COLL VENOUS BLD VENIPUNCTURE: CPT | Performed by: PHYSICIAN ASSISTANT

## 2023-10-14 PROCEDURE — 82947 ASSAY GLUCOSE BLOOD QUANT: CPT | Performed by: ORTHOPAEDIC SURGERY

## 2023-10-14 PROCEDURE — 250N000013 HC RX MED GY IP 250 OP 250 PS 637: Performed by: STUDENT IN AN ORGANIZED HEALTH CARE EDUCATION/TRAINING PROGRAM

## 2023-10-14 PROCEDURE — 250N000011 HC RX IP 250 OP 636: Mod: JZ | Performed by: PHYSICIAN ASSISTANT

## 2023-10-14 PROCEDURE — 85018 HEMOGLOBIN: CPT | Performed by: PHYSICIAN ASSISTANT

## 2023-10-14 PROCEDURE — 99232 SBSQ HOSP IP/OBS MODERATE 35: CPT | Performed by: FAMILY MEDICINE

## 2023-10-14 PROCEDURE — 97116 GAIT TRAINING THERAPY: CPT | Mod: GP

## 2023-10-14 PROCEDURE — 96372 THER/PROPH/DIAG INJ SC/IM: CPT | Performed by: PHYSICIAN ASSISTANT

## 2023-10-14 PROCEDURE — 250N000013 HC RX MED GY IP 250 OP 250 PS 637: Performed by: PHYSICIAN ASSISTANT

## 2023-10-14 PROCEDURE — 97110 THERAPEUTIC EXERCISES: CPT | Mod: GP

## 2023-10-14 RX ORDER — OXYCODONE HYDROCHLORIDE 5 MG/1
5 TABLET ORAL EVERY 4 HOURS PRN
Qty: 26 TABLET | Refills: 0 | Status: SHIPPED | OUTPATIENT
Start: 2023-10-14 | End: 2023-10-19

## 2023-10-14 RX ORDER — AMOXICILLIN 250 MG
1 CAPSULE ORAL 2 TIMES DAILY
Qty: 20 TABLET | Refills: 0 | Status: SHIPPED | OUTPATIENT
Start: 2023-10-14 | End: 2023-12-07

## 2023-10-14 RX ORDER — ACETAMINOPHEN 325 MG/1
650 TABLET ORAL EVERY 4 HOURS PRN
Qty: 100 TABLET | Refills: 0 | Status: SHIPPED | OUTPATIENT
Start: 2023-10-16 | End: 2023-10-24

## 2023-10-14 RX ORDER — ENOXAPARIN SODIUM 100 MG/ML
40 INJECTION SUBCUTANEOUS EVERY 24 HOURS
Qty: 5.6 ML | Refills: 0 | Status: SHIPPED | OUTPATIENT
Start: 2023-10-15 | End: 2023-10-29

## 2023-10-14 RX ADMIN — ENOXAPARIN SODIUM 40 MG: 100 INJECTION SUBCUTANEOUS at 06:59

## 2023-10-14 RX ADMIN — DORZOLAMIDE HYDROCHLORIDE AND TIMOLOL MALEATE 1 DROP: 20; 5 SOLUTION/ DROPS OPHTHALMIC at 08:24

## 2023-10-14 RX ADMIN — OXYCODONE HYDROCHLORIDE 5 MG: 5 TABLET ORAL at 11:41

## 2023-10-14 RX ADMIN — HYDROXYZINE HYDROCHLORIDE 10 MG: 10 TABLET ORAL at 03:12

## 2023-10-14 RX ADMIN — OXYCODONE HYDROCHLORIDE 10 MG: 5 TABLET ORAL at 00:45

## 2023-10-14 RX ADMIN — CEFAZOLIN 1 G: 1 INJECTION, POWDER, FOR SOLUTION INTRAMUSCULAR; INTRAVENOUS at 06:59

## 2023-10-14 RX ADMIN — OXYCODONE HYDROCHLORIDE 10 MG: 5 TABLET ORAL at 04:59

## 2023-10-14 RX ADMIN — SENNOSIDES AND DOCUSATE SODIUM 1 TABLET: 50; 8.6 TABLET ORAL at 08:25

## 2023-10-14 RX ADMIN — ACETAMINOPHEN 975 MG: 325 TABLET ORAL at 04:59

## 2023-10-14 ASSESSMENT — ACTIVITIES OF DAILY LIVING (ADL)
ADLS_ACUITY_SCORE: 27
ADLS_ACUITY_SCORE: 25

## 2023-10-14 NOTE — PROGRESS NOTES
"[facility] MEDICINE PROGRESS NOTE      Identification/Summary: Virginia Byers is a 73 year old female with a past medical history of ocular lymphoma, dyslipidemia, hypertension, underwent right total hip arthroplasty.  POD 1.  Right hip pain is stable.    Assessment and Plan:  Essential hypertension  Blood pressure is stable  Losartan 100 mg daily    Dyslipidemia  Crestor 10 mg daily    Follicular lymphoma  Stable and asymptomatic    Macular degeneration  Glaucoma  Cosopt and xalatan ophthalmic drop    Acute blood loss anemia  Hemoglobin 9.7 from 13.3    Status post right total hip arthroplasty  Resume routine postoperative care  Physical and Occupational Therapy  Use incentive spirometry frequently  DVT prophylaxis per orthopedics, Lovenox 40 mg subcu daily for 2 weeks  Pain control with Tylenol 975 mg every 8 hours, 650 mg every 4 hours as needed, oxycodone 5 to 10 mg every 4 hours as needed      Clinically Significant Risk Factors Present on Admission                  # Hypertension: Noted on problem list      # Overweight: Estimated body mass index is 25.93 kg/m  as calculated from the following:    Height as of this encounter: 1.598 m (5' 2.91\").    Weight as of this encounter: 66.2 kg (146 lb).                Greta Goodson MD  [facility] Medicine  [facility]  Phone: #[phone]  Securely message with the Vocera Web Console (learn more here)  Text page via [facility] Paging/Directory     Interval History/Subjective:  Resting comfortably.  Right hip pain is stable.  No new concern.  Denies headache, chest pain, breathing difficulty, palpitation, nausea, vomiting, abdominal pain or urinary symptoms    Physical Exam/Objective:  Temp:  [97.2  F (36.2  C)-98.2  F (36.8  C)] 98.1  F (36.7  C)  Pulse:  [] 81  Resp:  [14-18] 16  BP: ()/(53-64) 113/56  SpO2:  [92 %-100 %] 92 %  Body mass index is 25.93 kg/m .    GENERAL:  Alert, appears " comfortable, in no acute distress, appears stated age   HEAD:  Normocephalic, without obvious abnormality, atraumatic   EYES:  PERRL, conjunctiva clear, EOM's intact   NOSE: Nares normal, septum midline, mucosa normal, no drainage   THROAT: Lips, mucosa, gums normal, mouth moist   NECK: Supple, symmetrical, trachea midline   BACK:   Symmetric, no curvature, ROM normal   LUNGS:   Clear to auscultation bilaterally, no rales, rhonchi, or wheezing, symmetric chest rise on inhalation, respirations unlabored   CHEST WALL:  No tenderness or deformity   HEART:  Regular rate and rhythm, S1 and S2 normal, no murmur   ABDOMEN:   Soft, non-tender, bowel sounds active, no masses, no organomegaly, no rebound or guarding   EXTREMITIES: Status post right total hip arthroplasty   SKIN: No rashes   NEURO: Alert, oriented x3   PSYCH: Cooperative, behavior is appropriate      Data reviewed today: I personally reviewed all new medications, labs, imaging/diagnostics reports over the past 24 hours. Pertinent findings include:    Imaging:   Pelvis XRAY  Status post right total hip arthroplasty. Negative for postoperative purposes.     Labs:  Most Recent 3 CBC's:  Recent Labs   Lab Test 10/14/23  1014 09/26/23  0948 07/12/23  0549 07/11/23  1812 07/05/23  1542 06/19/23  1148 06/15/23  0914   WBC  --  9.2  --   --   --  7.7 8.7   HGB 9.6* 13.3 10.7*  --    < > 13.0 12.2   MCV  --  85  --   --   --  89 86   PLT  --  317  --  286  --  229 288    < > = values in this interval not displayed.     Most Recent 3 BMP's:  Recent Labs   Lab Test 10/14/23  1014 10/13/23  1343 09/26/23  0948 07/11/23  1812 07/11/23  1111 07/05/23  1542 05/11/23  0938 03/27/23  1109 12/28/22  1254 09/21/22  1256   NA  --   --   --   --   --   --  136  --  140 137   POTASSIUM  --   --  4.7  --   --  4.9 4.2   < > 4.8 4.2   CHLORIDE  --   --   --   --   --   --  103  --  106 105   CO2  --   --   --   --   --   --  27  --  30 23   BUN  --   --   --   --   --   --  21  --   14 20   CR  --  0.44*  --  0.43*  --   --  0.50*  --  0.43* 0.53   ANIONGAP  --   --   --   --   --   --  6  --  4 9   COURTNEY  --   --  10.1  --   --   --  10.3*  --  10.4* 10.1   *  --   --   --  99  --  118*  --  111* 88    < > = values in this interval not displayed.       Medications:   Personally Reviewed.  Medications      acetaminophen  975 mg Oral Q8H    dorzolamide-timolol  1 drop Both Eyes BID    enoxaparin ANTICOAGULANT  40 mg Subcutaneous Q24H    latanoprost  1 drop Both Eyes At Bedtime    [Held by provider] losartan  100 mg Oral Daily    multivitamin  with lutein  1 capsule Oral Daily    polyethylene glycol  17 g Oral Daily    rosuvastatin  10 mg Oral Daily    senna-docusate  1 tablet Oral BID    sodium chloride (PF)  3 mL Intracatheter Q8H      Total time spent 40 min

## 2023-10-14 NOTE — PLAN OF CARE
Problem: Plan of Care - These are the overarching goals to be used throughout the patient stay.    Goal: Absence of Hospital-Acquired Illness or Injury  Outcome: Met  Intervention: Identify and Manage Fall Risk  Recent Flowsheet Documentation  Taken 10/14/2023 0915 by Leana Daniel RN  Safety Promotion/Fall Prevention: safety round/check completed  Taken 10/14/2023 0825 by Leana Daniel RN  Safety Promotion/Fall Prevention:   activity supervised   assistive device/personal items within reach   clutter free environment maintained   lighting adjusted   nonskid shoes/slippers when out of bed  Taken 10/14/2023 0700 by Leana Daniel RN  Safety Promotion/Fall Prevention: safety round/check completed  Intervention: Prevent Skin Injury  Recent Flowsheet Documentation  Taken 10/14/2023 0825 by Leana Daniel RN  Body Position: position changed independently     Patient vital signs are at baseline: Yes  Patient able to ambulate as they were prior to admission or with assist devices provided by therapies during their stay:  Yes  Patient MUST void prior to discharge:  Yes  Patient able to tolerate oral intake:  Yes  Pain has adequate pain control using Oral analgesics:  Yes  Does patient have an identified :  Yes  Has goal D/C date and time been discussed with patient:  Yes    Patient A&O, VSS, and CMS intact. Dressing CDI. Pain is managed. Ambulating, voiding, and tolerating oral intake. Patient ready for discharge. Completed discharge paperwork with patient and family. Patient and family stated understanding and questions were answered. All belongings were sent with the patient. Discharged safely.  Leana Daniel RN

## 2023-10-14 NOTE — PROGRESS NOTES
"Orthopedic Surgery Progress Note   October 14, 2023    Subjective: No acute events overnight. Pain well controlled on PO pain medication. Has been up and out of bed.Tolerating diet. Voiding spontaneously. +Flatus, no BM. Denies fever or chills, CP, SOB, numbness or tingling, motor dysfunction or weakness.     Objective: /57 (BP Location: Left arm)   Pulse 93   Temp 98.2  F (36.8  C) (Oral)   Resp 18   Ht 1.598 m (5' 2.91\")   Wt 66.2 kg (146 lb)   SpO2 94%   BMI 25.93 kg/m        General: NAD, alert and oriented, cooperative with exam.   Cardio: RRR, extremities wwp.   Respiratory: Non-labored breathing.  MSK: Focused examination of RLE: Toes wwp, DP 2+, bcr in all toes. Compartments soft and tolerates passive stretch of toes. +EHL/FHL/GSC/TA with 5/5 strength. SILT SP/DP/Sa/Clarke/T. Aquacel dressing c/d/i.       Imaging: Complete in PACU. Postoperative changes, negative for acute abnormality.     Assessment and Plan: Virginia Byers is a 73 year old female with PMH including follicular lymphoma, hyperlipidemia, and hypertension now s/p right total hip arthroplasty on 10/13/23 with Dr. Blood. Doing well post-operatively.     Plan today:  Work with therapies  Discharge planning - anticipate discharge home this morning. Friend is coming to pick her up     Ortho Primary  Activity: Up with assist until independent.  Weight bearing status: Weightbearing as tolerated, posterior hip precautions x 12 weeks  Pain management: Transition from IV to PO as tolerated.   Antibiotics: Ancef x 24 hours.  Diet: Begin with clear fluids and progress diet as tolerated.   DVT prophylaxis: mechanical while in the hospital, discharge on subcutaneous Lovenox 40 mg  daily x 2 weeks.  Imaging: Complete in PACU  Dressings: Keep clean, dry and intact until follow up or x 2 weeks  Physical Therapy/Occupational Therapy: Eval and treat.  Follow-up: Clinic with Dr. Blood's team in 2 weeks    Disposition: Pending progress with " therapies, pain control on orals, and medical stability, anticipate discharge to home on POD #1.    Discussed with Dr. Jeremi Chavira PA-C  10/14/2023 7:56 AM  Orthopedic Surgery  Pager: [phone]     Thank you for allowing me to participate in this patient's care. Please page me at [phone] with any questions/concerns. If there is no response, if it is a weekend, or if it is during evening hours, please page the orthopaedic surgery resident on call.

## 2023-10-14 NOTE — CONSULTS
"[facility]  Consult Note - Hospitalist Service  Date of Admission:  10/13/2023  Consult Requested by:Ortho  Reason for Consult: post op    Assessment & Plan   Virginia Byers is a 73 year old female admitted on 10/13/2023. She has a past medical history significant for follicular lymphoma, hyperlipidemia, and hypertension who underwent a right total hip arthroplasty today    #Post Op  -Cares per ortho    #Hypertension-is usually well controlled on lisinopril monotherapy.  Blood pressure is controlled in the PACU and did have one episode of softer blood pressure given this we will hold her lisinopril and likely restart tomorrow.  -Hold PTA Lisinopril    #Hyperlipidemia  PTA statin    #Follicular lymphoma-follows with oncology    #Glaucoma  #Macular degeneration  -PTA eyedrops ordered patient's own supply       Clinically Significant Risk Factors Present on Admission                  # Hypertension: Noted on problem list      # Overweight: Estimated body mass index is 25.93 kg/m  as calculated from the following:    Height as of this encounter: 1.598 m (5' 2.91\").    Weight as of this encounter: 66.2 kg (146 lb).              Chalo Staples MD  Hospitalist Service  Securely message with GetSet (more info)  Text page via [facility] Paging/Directory   ______________________________________________________________________    Chief Complaint   Post op    History is obtained from the patient    History of Present Illness   Virginia Byers is a 73 year old female who presented to the hospital for a planned right total hip is otherwise feeling well.  Pain well controlled, denies any nausea or vomiting.  No shortness of breath or chest pain.      Past Medical History    Past Medical History:   Diagnosis Date    Breast cancer (H) 2000    Left breast, s/p mastectomy, chemotherapy and endocrine therapy    Follicular lymphoma (H) 01/20/2023    Glaucoma     bilateral - Dr. Moore - [facility]    History of spinal stenosis     Hyperlipidemia     Hypertension     Macular degeneration     Dr. Toscano - [facility]    Osteoarthritis     Osteoporosis     Personal history of chemotherapy 2000    A/C + Taxol    S/P radiation therapy     400 cGy to left orbit completed on 6/15/2023 [facility]       Past Surgical History   Past Surgical History:   Procedure Laterality Date    ARTHROPLASTY HIP Left 07/11/2023    Procedure: ARTHROPLASTY, HIP, TOTAL LEFT;  Surgeon: Austin Blood MD;  Location: [facility] OR    BIOPSY Left 04/06/2023    Left Orbital Biopsy    C MASTECTOMY,SIMPLE  03/2000    left    COLONOSCOPY  2006    Q 10 years    COLONOSCOPY N/A 10/04/2021    Procedure: COLONOSCOPY;  Surgeon: Guru Lyla Ruby MD;  Location: [facility] OR    ESOPHAGOSCOPY, GASTROSCOPY, DUODENOSCOPY (EGD), COMBINED N/A 10/04/2021    Procedure: ENDOSCOPIC ULTRASOUND, ESOPHAGOSCOPY / UPPER GASTROINTESTINAL TRACT (GI), Esophagogastroduodenoscopy,  Fine Needle Biopsy of liver;  Surgeon: Guru Lyla Ruby MD;  Location: [facility] OR    EYE SURGERY      IR LYMPH NODE BIOPSY  01/20/2023    SURGICAL HISTORY OF -  Left 08/2015    tissue expander placed in left breast area    SURGICAL HISTORY OF -  Left 02/2016    left breast implant and right mammoplasty       Medications   I have reviewed this patient's current medications          Physical Exam   Vital Signs: Temp: 97.2  F (36.2  C) Temp src: Oral BP: 130/58 Pulse: 72   Resp: 18 SpO2: 92 % O2 Device: None (Room air) Oxygen Delivery: 2 LPM  Weight: 146 lbs 0 oz    Gen:appears well NAD  Cards RRR  PULM breathing comfortably on RA        Medical Decision Making       35 MINUTES SPENT BY ME on the date of service doing chart review, history, exam, documentation & further activities per the note.      Data   ------------------------- PAST 24 HR DATA REVIEWED -----------------------------------------------    I have  personally reviewed the following data over the past 24 hrs:    N/A  \   N/A   / N/A     N/A N/A N/A /  N/A   N/A N/A 0.44 (L) \       Imaging results reviewed over the past 24 hrs:   Recent Results (from the past 24 hour(s))   XR Pelvis Port 1/2 Views    Narrative    EXAM: XR PELVIS PORT 1/2 VIEWS  LOCATION: [facility]  DATE: 10/13/2023    INDICATION: Status post Hip surgery  COMPARISON: 8/22/2023      Impression    IMPRESSION: Status post right total hip arthroplasty. Negative for postoperative purposes.

## 2023-10-14 NOTE — DISCHARGE SUMMARY
"ORTHOPAEDIC SURGERY DISCHARGE SUMMARY     Date of Admission: 10/13/2023  Date of Discharge: 10/14/2023  Disposition: Home  Staff Physician: Austin Blood MD  Primary Care Provider: Chuyita Zaidi    DISCHARGE DIAGNOSIS:  Primary localized osteoarthritis of right hip [M16.11]    PROCEDURES: Procedure(s):  ARTHROPLASTY, HIP, TOTAL  on 10/13/2023    BRIEF HISTORY:  The patient has a long history of debilitating pain secondary to ostearthritis of the hip.  Despite comprehensive non-operative management these symptoms continued to interfere with activities of daily living.  After discussion of further treatment options including the risks and benefits that patient elected to proceed with a total hip.     HOSPITAL COURSE:    The patient was admitted following the above listed procedures for pain control and rehabilitation. Virginia Byers did well post-operatively. Medicine was consulted post operatively to aid in management of medical co-morbidities. The patient received routine nursing cares and at the time of discharge was medically stable. Vital signs were stable throughout admission. The patient is tolerating a regular diet and is voiding spontaneously. All PT/OT goals have been met for safe mobility. Pain is now controlled on oral medications which will be available on discharge. Stool softeners have been used while taking pain medications to help prevent constipation. Virginia Byers is deemed medically safe to discharge.     Antibiotics:  Ancef given periop and 24 hours postop.   DVT prophylaxis:  Subcutaneous lovenox 4 mg initiated after surgery and will be continued for 2 weeks.   PT Progress:  Has met PT/OT goals for safe mobility.    Pain Meds:  Weaned off all IV pain meds by discharge.  Inpatient Events: No significant events or complications.     PHYSICAL EXAM:    Objective: /57 (BP Location: Left arm)   Pulse 93   Temp 98.2  F (36.8  C) (Oral)   Resp 18   Ht 1.598 m (5' 2.91\")   Wt 66.2 kg " (146 lb)   SpO2 94%   BMI 25.93 kg/m          General: NAD, alert and oriented, cooperative with exam.   Cardio: RRR, extremities wwp.   Respiratory: Non-labored breathing.  MSK: Focused examination of RLE: Toes wwp, DP 2+, bcr in all toes. Compartments soft and tolerates passive stretch of toes. +EHL/FHL/GSC/TA with 5/5 strength. SILT SP/DP/Sa/Clarke/T. Aquacel dressing c/d/i.     FOLLOWUP:    Follow up with Dr. Blood's team at 2 weeks postoperatively.    Future Appointments   Date Time Provider Department Center   10/14/2023 10:00 AM Paul Johnson, PT [facility]PTHR [facility]   10/24/2023 10:00 AM [facility] NURSE ONLY ORTHO [facility] [place]   10/25/2023 10:10 AM Kaitlyn Estevez, PT [facility]PT [facility]   11/1/2023 10:10 AM Kaitlyn Estevez, PT [facility]PT [facility] [place]   11/8/2023 10:10 AM Kaitlyn Estevez, PT [facility] [place]   11/15/2023 10:10 AM Kaitlyn Estevez, PT [facility] [place]   11/22/2023 10:10 AM Kaitlyn Estevez, PT [facility] [place]   11/28/2023 10:00 AM Austin Blood MD ORSU [place]   12/1/2023  8:00 AM LAB FIRST FLOOR [facility]GLAB[facility] [place]   12/1/2023  8:30 AM MGPET1 M[facility]ETC [place]   12/7/2023 10:15 AM Naomy Zaidi DO [facility]       Orthopaedic Surgery appointments are at the [facility] [place] ([address] [place] [id number]). Call [phone] to schedule a follow-up appointment at this location with your provider.     PLANNED DISCHARGE ORDERS:      Current Discharge Medication List        CONTINUE these medications which have NOT CHANGED    Details   acetaminophen (TYLENOL) 325 MG tablet Take 2 tablets (650 mg) by mouth every 4 hours as needed for other (For optimal non-opioid multimodal pain management to improve pain control.)  Qty: 100 tablet, Refills: 0    Associated Diagnoses: Status post total replacement of left hip      dorzolamide-timolol (COSOPT) 2-0.5 % ophthalmic solution Place 1 drop into both eyes 2 times daily      latanoprost (XALATAN) 0.005 %  ophthalmic solution Place 1 drop into both eyes At Bedtime      losartan (COZAAR) 100 MG tablet Take 1 tablet (100 mg) by mouth daily  Qty: 90 tablet, Refills: 3    Associated Diagnoses: Hypertension goal BP (blood pressure) < 140/90      Multiple Vitamins-Minerals (PRESERVISION AREDS) TABS Take 1 tablet by mouth 2 times daily      ondansetron (ZOFRAN ODT) 4 MG ODT tab Take 1 tablet (4 mg) by mouth every 6 hours as needed for nausea or vomiting  Qty: 8 tablet, Refills: 0    Associated Diagnoses: Status post total replacement of left hip      rosuvastatin (CRESTOR) 10 MG tablet Take 1 tablet (10 mg) by mouth daily  Qty: 90 tablet, Refills: 3    Associated Diagnoses: Hyperlipidemia LDL goal <160               Discharge Procedure Orders   Cane DME   Order Comments: DME Documentation: Describe the reason for need to support medical necessity: Impaired gait status post hip surgery. I, the undersigned, certify that the above prescribed supplies are medically necessary for this patient and is both reasonable and necessary in reference to accepted standards of medical practice in the treatment of this patient's condition and is not prescribed as a convenience.     Order Specific Question Answer Comments   DME Provider: [employer]    Cane Type: Single Tip    Reminder: Patient can typically get 1 every 5 years      Walker DME   Order Comments: : DME Documentation: Describe the reason for need to support medical necessity: Impaired gait status post hip surgery. I, the undersigned, certify that the above prescribed supplies are medically necessary for this patient and is both reasonable and necessary in reference to accepted standards of medical practice in the treatment of this patient's condition and is not prescribed as a convenience.     Order Specific Question Answer Comments   DME Provider: [employer]    Walker Type: Standard (2 Wheel)    Accessories: N/A        Leana Chavira PA-C  10/14/2023 8:34 AM   Orthopedic  Surgery

## 2023-10-14 NOTE — PROGRESS NOTES
Occupational therapy orders received, per PT and pt report pt does not have any OT needs at this time. Pt is familiar with total hip protocol and has necessary adaptive equipment.   Will defer OT evaluation and complete order.  Thank you.    Madeline Ferguson OTR/l

## 2023-10-14 NOTE — PLAN OF CARE
Patient vital signs are at baseline: Yes  Patient able to ambulate as they were prior to admission or with assist devices provided by therapies during their stay:  Yes  Patient MUST void prior to discharge:  Yes  Patient able to tolerate oral intake:  Yes  Pain has adequate pain control using Oral analgesics:  Yes  Does patient have an identified :  Yes  Has goal D/C date and time been discussed with patient:  Yes  Goal Outcome Evaluation:       Pt is alert and oriented X 4. VSS. Pain is managed with PRN and scheduled oral pain medications. Dressing is clean, dry, and intact. Tolerating regular diet, ambulating, and voiding. Possible discharge home today.

## 2023-10-18 PROBLEM — Z96.642 AFTERCARE FOLLOWING LEFT HIP JOINT REPLACEMENT SURGERY: Status: RESOLVED | Noted: 2023-07-18 | Resolved: 2023-10-18

## 2023-10-18 PROBLEM — Z47.1 AFTERCARE FOLLOWING LEFT HIP JOINT REPLACEMENT SURGERY: Status: RESOLVED | Noted: 2023-07-18 | Resolved: 2023-10-18

## 2023-10-18 NOTE — PROGRESS NOTES
08/28/23 0500   Appointment Info   Signing clinician's name / credentials Kaitlyn Estevez, PT, DPT, OCS   Total/Authorized Visits 8   Visits Used 6   Medical Diagnosis Status post total replacement of hip, unspecified laterality   PT Tx Diagnosis SP L TACOS, abnormal gait   Precautions/Limitations standard posterior hip precautions   Other pertinent information Accompanied by friend Aurelio Rodriguez Adds Certification   Progress Note/Certification   Start of Care Date 07/18/23   Onset of illness/injury or Date of Surgery 07/11/23  (DOS)   Therapy Frequency 1xweek tapering to every other week   Predicted Duration 12 weeks   Certification date from 07/18/23   Certification date to 10/16/23   Progress Note Due Date 10/20/23   Progress Note Completed Date 08/21/23   PT Goal 1   Goal Identifier ambulation   Goal Description Pt will demostrate independent normalized gait with LRAD 3 blocks and no increase in pain to surgical leg to increase safe independent mobility for ADLs   Goal Progress 4WW, somewhat crouched at hip and knee, lacking heelstrike but able to correct with heelstrike and TKE when cued. RLE hurts most   Target Date 10/16/23   Subjective Report   Subjective Report She had a good visit with Dr. Blood. Planning for R hip in October. L hip is doing very well.   Objective Measures   Objective Measures Objective Measure 1;Objective Measure 2   Objective Measure 1   Objective Measure gait   Details With even up brace RLE, improved TKE L with ambulation. Tends to have crouch gait and decreased stance time R   Objective Measure 2   Objective Measure L hip AROM   Details sagittal: 0-0-85 (stopped due to precautions)   Treatment Interventions (PT)   Interventions Therapeutic Procedure/Exercise;Gait Training;Neuromuscular Re-education   Therapeutic Procedure/Exercise   Therapeutic Procedures: strength, endurance, ROM, flexibillity minutes (30670) 25   PTRx Ther Proc 1 Ankle Eversion/Inversion ROM   PTRx Ther Proc 1 -  "Details standing hip L ER from 4\" step for straight knee/hip neutral. Standing hip R IR/ER as able x 20 reps each   PTRx Ther Proc 2 Supine Heel Slides   PTRx Ther Proc 2 - Details trialed for RLE but dc'd due to pain   PTRx Ther Proc 3 Bridging #1   PTRx Ther Proc 3 - Details x 15, emphasized importance   PTRx Ther Proc 4 step up   PTRx Ther Proc 4 - Details 2\" lateral step up - quad strength L and offloading R hip. x 30   PTRx Ther Proc 5 Hip AROM Standing Abduction   PTRx Ther Proc 5 - Details seated banded L hip abduction x 30, red TB. RLE anchoring for isometric abduction   PTRx Ther Proc 6 Osteoporosis Level 1 Lower Extremity Knee Extension With Dorsiflexion   PTRx Ther Proc 6 - Details HEP   PTRx Ther Proc 7 Stepdown Backward   PTRx Ther Proc 8 Sit to Stand   PTRx Ther Proc 8 - Details x 20 reps med plinth   Skilled Intervention targeting hip girdle strength and quad strength   Patient Response/Progress tolerated with mod cuing   Neuromuscular Re-education   Neuromuscular re-ed of mvmt, balance, coord, kinesthetic sense, posture, proprioception minutes (83944) 8   PTRx Neuro Re-ed 2 Quad Set Without Towel Roll Under Knee   PTRx Neuro Re-ed 2 - Details HEP   PTRx Neuro Re-ed 3 Hip Flexion Straight Leg Raise   PTRx Neuro Re-ed 3 - Details HEP only lift 2-3 inches - isometric R today 4 x 20s   PTRx Neuro Re-ed 4 Standing Hip Flexion   Skilled Intervention targeting hip flexor and form for sit to stand   Patient Response/Progress mod cuing throughout, very tiresome with hip flexion but no pain   Gait Training   Gait 1 with rolling walker   Gait 1 - Details x 20' - cues for LONG steps and use of even up most mobility   Skilled Intervention cues for gait   Patient Response/Progress juve well   Education   Learner/Method Patient;Pictures/Video;No Barriers to Learning   Plan   Home program PTRx printed   Plan for next session d/c PT   Comments   Comments fatigued with session today   Total Session Time   Timed Code " Treatment Minutes 33   Total Treatment Time (sum of timed and untimed services) 33       DISCHARGE  Reason for Discharge: Other surgery soon. Will continue HEP at home - confident to self manage.    Equipment Issued: none    Discharge Plan: Patient to continue home program.    Referring Provider:  Austin Blood

## 2023-10-19 DIAGNOSIS — Z96.641 S/P TOTAL RIGHT HIP ARTHROPLASTY: ICD-10-CM

## 2023-10-19 RX ORDER — OXYCODONE HYDROCHLORIDE 5 MG/1
5 TABLET ORAL EVERY 4 HOURS PRN
Qty: 26 TABLET | Refills: 0 | Status: SHIPPED | OUTPATIENT
Start: 2023-10-19 | End: 2023-10-24

## 2023-10-19 ASSESSMENT — ACTIVITIES OF DAILY LIVING (ADL)
WALKING_APPROXIMATELY_10_MINUTES: MODERATE DIFFICULTY
SITTING FOR 15 MINUTES: MODERATE DIFFICULTY
ROLLING OVER IN BED: UNABLE TO DO
HOS_ADL_SCORE(%): 38.24
GOING UP 1 FLIGHT OF STAIRS: EXTREME DIFFICULTY
STANDING FOR 15 MINUTES: MODERATE DIFFICULTY
TWISTING/PIVOTING ON INVOLVED LEG: MODERATE DIFFICULTY
WALKING_INITIALLY: MODERATE DIFFICULTY
GETTING INTO AND OUT OF AN AVERAGE CAR: MODERATE DIFFICULTY
HOS_ADL_ITEM_SCORE_TOTAL: 26
HOW_WOULD_YOU_RATE_YOUR_CURRENT_LEVEL_OF_FUNCTION_DURING_YOUR_USUAL_ACTIVITIES_OF_DAILY_LIVING_FROM_0_TO_100_WITH_100_BEING_YOUR_LEVEL_OF_FUNCTION_PRIOR_TO_YOUR_HIP_PROBLEM_AND_0_BEING_THE_INABILITY_TO_PERFORM_ANY_OF_YOUR_USUAL_DAILY_ACTIVITIES?: 20
DEEP SQUATTING: MODERATE DIFFICULTY
STEPPING UP AND DOWN CURBS: MODERATE DIFFICULTY
LIGHT_TO_MODERATE_WORK: MODERATE DIFFICULTY
PUTTING ON SOCKS AND SHOES: UNABLE TO DO
HEAVY_WORK: EXTREME DIFFICULTY
WALKING_UP_STEEP_HILLS: EXTREME DIFFICULTY
HOS_ADL_HIGHEST_POTENTIAL_SCORE: 68
GOING DOWN 1 FLIGHT OF STAIRS: EXTREME DIFFICULTY
GETTING_INTO_AND_OUT_OF_A_BATHTUB: MODERATE DIFFICULTY
RECREATIONAL ACTIVITIES: EXTREME DIFFICULTY
WALKING_15_MINUTES_OR_GREATER: MODERATE DIFFICULTY
WALKING_DOWN_STEEP_HILLS: EXTREME DIFFICULTY

## 2023-10-19 NOTE — TELEPHONE ENCOUNTER
[facility] Call Center    Phone Message    May a detailed message be left on voicemail: yes     Reason for Call: Medication Refill Request    Has the patient contacted the pharmacy for the refill? Yes   Name of medication being requested:   oxyCODONE (ROXICODONE) 5 MG tablet   Provider who prescribed the medication: Dr. Austin Blood  Pharmacy:   [id number] - [place], [place] - [address] [facility]   Date medication is needed: 10/19       Action Taken: Other: [facility] [place]    Travel Screening: Not Applicable

## 2023-10-19 NOTE — TELEPHONE ENCOUNTER
Prescription refill requested for:   oxyCODONE (ROXICODONE) 5 MG tablet       Last Written Prescription Date:  10/14/23  Last Fill Quantity: 26,   # refills: 0  Last Office Visit: 10/13/23 - Surgery date   Future Office visit:    Next 5 appointments (look out 90 days)      Oct 24, 2023 10:00 AM  Nurse Only with [facility] NURSE ONLY ORTHO  [facility] [id number] [address] [place] [id number] [phone]                 Valentin Díaz, ATC

## 2023-10-24 ENCOUNTER — ALLIED HEALTH/NURSE VISIT (OUTPATIENT)
Dept: NURSING | Facility: CLINIC | Age: 73
End: 2023-10-24
Payer: MEDICARE

## 2023-10-24 DIAGNOSIS — Z96.641 STATUS POST TOTAL REPLACEMENT OF RIGHT HIP: Primary | ICD-10-CM

## 2023-10-24 DIAGNOSIS — Z96.641 S/P TOTAL RIGHT HIP ARTHROPLASTY: ICD-10-CM

## 2023-10-24 PROCEDURE — 99207 PR NO CHARGE NURSE ONLY: CPT

## 2023-10-24 RX ORDER — ACETAMINOPHEN 325 MG/1
650 TABLET ORAL EVERY 4 HOURS PRN
Qty: 100 TABLET | Refills: 0 | Status: SHIPPED | OUTPATIENT
Start: 2023-10-24 | End: 2023-12-07

## 2023-10-24 RX ORDER — ASPIRIN 81 MG/1
81 TABLET ORAL 2 TIMES DAILY
Qty: 28 TABLET | Refills: 0 | Status: SHIPPED | OUTPATIENT
Start: 2023-10-24 | End: 2023-12-07

## 2023-10-24 RX ORDER — OXYCODONE HYDROCHLORIDE 5 MG/1
5 TABLET ORAL EVERY 4 HOURS PRN
Qty: 26 TABLET | Refills: 0 | Status: SHIPPED | OUTPATIENT
Start: 2023-10-24 | End: 2023-12-07

## 2023-10-24 NOTE — PROGRESS NOTES
Virginia Byers comes into clinic today at the request of Dr. Blood for suture removal and wound check. The patient is status post RTHA on 10/13/23.     Incision clean, dry and intact.  Steri-strips removed. Incision cleaned. Pt instructed on wound care and symptoms of infection to watch for.     Discussed anticoagulation, patient currently doing Lovenox injections once a day for 14 days from surgery and then will change to aspirin 162mg daily for 2 weeks. . Pt advised against any NSAIDs while on any anticoagulation med (ASA, Lovenox,or Coumadin).      Discussed current pain medication regime. Pt currently taking Pt is currently taking Oxycodone 1/2 tab eery 2-4 hours and Tylenol 325mg 2 tabs every 6 hours. This is controlling pain well.    Discussed current physical therapy program. Pt is starting tomorrow. She has been working on a home program and is walking around without much limp and without much difficulity    Patient using walker to aid in ambulation    Discussed edema. Patient has mild edema in foot and moderate at hip site.    Reviewed Hip precautions.    Total time spent with Pt: 30 minutes.    Magalie Calvert RN

## 2023-10-24 NOTE — PATIENT INSTRUCTIONS
No NSAIDs post op while on aspirin. This includes Advil (ibuprofen), Aleve (naproxen), and Mobic (meloxicam).    Continue anticoagulation plan of:  Lovenox until 2 weeks from surgery then start Aspirin 81mg twice daily until prescribed bottle is gone.      If incision is dry, OK to leave open to air (no bandage). If incision is draining, cover with gauze and call the office. If steri strips (tapes) applied, let fall off on their own. Please do not apply any ointments or lotions to incision. No soaking in tubs or pools for 3 months after surgery.    If any swelling in leg(s), elevate surgical leg above heart (lay flat, elevate leg on blankets or pillows). Continue to wear compression wraps during the day as long you are having leg swelling. May stop wearing or wear intermittently if not having swelling.     *Continue with post-op hip precautions. These are strict for 3 months post surgery, but should be followed for life. As your muscles gain strength, you will notice that you will have more range of motion, but your risk of dislocating remains.    NO bending past 90 degrees at the waist (operative side)   NO crossing your operative leg over or under your non-operative leg   NO turning your operative leg inward     *Please call if a sharp increase in pain, fall, change in movement or sensation, chest pain, calf pain, shortness of breath, fevers greater than 101.4, redness around the incision sites.    *If needing refills on pain meds, please call clinic at least 3 days before you run out.    *Continue physical therapy as directed.  If needing orders for Outpatient Physical therapy, please call the clinic.    *Continue to use assistive device: cane or crutch until no limp. Discuss with therapist if questions.    *Dr. Blood advises no dental work or cleaning until 6 months after any surgery with implants to hips or knees unless emergent issue arises. This includes total joint surgeries. Once you are 6 months past your  "surgery, you will need prophylactic antibiotics for 2 years with any cleaning or dental work.  This is also for any other \"dirty\" procedures that you may have, such as a colonoscopy.    If you have any questions, call the clinic at [phone] and ask for Dr. Hercules team to leave a message with.     ----------------------------------------------------------------------        Thanks for coming today.  [facility]  [address] [place] [address]    To schedule future appointments in Ortho Clinic, you may call [phone].    To schedule ordered imaging or an injection ordered by your provider:  Call [facility] Injection scheduling line: [phone]    [email or web address] available online at:  [email or web address]    Please call if any further questions or concerns ([phone]).  Clinic hours 8 am to 5 pm.    Return to clinic (call) if symptoms worsen or fail to improve.  "

## 2023-10-25 ENCOUNTER — THERAPY VISIT (OUTPATIENT)
Dept: PHYSICAL THERAPY | Facility: CLINIC | Age: 73
End: 2023-10-25
Payer: MEDICARE

## 2023-10-25 DIAGNOSIS — Z96.641 AFTERCARE FOLLOWING RIGHT HIP JOINT REPLACEMENT SURGERY: Primary | ICD-10-CM

## 2023-10-25 DIAGNOSIS — Z47.1 AFTERCARE FOLLOWING RIGHT HIP JOINT REPLACEMENT SURGERY: Primary | ICD-10-CM

## 2023-10-25 DIAGNOSIS — R26.9 ABNORMAL GAIT: ICD-10-CM

## 2023-10-25 DIAGNOSIS — M25.551 HIP PAIN, RIGHT: ICD-10-CM

## 2023-10-25 PROCEDURE — 97110 THERAPEUTIC EXERCISES: CPT | Mod: GP | Performed by: PHYSICAL THERAPIST

## 2023-10-25 PROCEDURE — 97161 PT EVAL LOW COMPLEX 20 MIN: CPT | Mod: GP | Performed by: PHYSICAL THERAPIST

## 2023-10-25 NOTE — PROGRESS NOTES
"PHYSICAL THERAPY EVALUATION  Type of Visit: Evaluation    See electronic medical record for Abuse and Falls Screening details.    Subjective       Presenting condition or subjective complaint: Right hip replacement. She was able to verbalize hip precautions with 1 reminder (hip flexion 90)  Date of onset: 10/13/23 (DOS)    Relevant medical history: Arthritis; Implanted device; Osteoarthritis   Dates & types of surgery: 7/11 and 10/13 2023    Prior diagnostic imaging/testing results: MRI; CT scan; X-ray     Prior therapy history for the same diagnosis, illness or injury: Yes July 11 left hip rplacement    Prior Level of Function  Transfers: Independent  Ambulation: Independent  ADL: Independent  IADL: Driving, Finances, Housekeeping, Laundry, Meal preparation, Medication management, Yard work    Living Environment  Social support: With a significant other or spouse   Type of home: 1 level   Stairs to enter the home: No       Ramp: No   Stairs inside the home: No       Help at home: Home management tasks (cooking, cleaning); Home and Yard maintenance tasks; Assist for driving and community activities  Equipment owned: Straight Cane; Walker; Walker with wheels; Grab bars; Raised toilet seat     Employment: No    Hobbies/Interests:      Patient goals for therapy: Waiting to heal walk normal    Pain assessment: at incision     Objective   HIP EVALUATION  PAIN: at surgical site  INTEGUMENTARY (edema, incisions): moderate edema at surgical site  ecchymosis at calf, thigh  incision healing well with no evidence of dehiscence  GAIT:  Weightbearing Status: WBAT  Assistive Device(s): Walker  Gait Deviations:  decreased step length but equal steps, using 4WW  BALANCE/PROPRIOCEPTION: not formally assessed  ROM: Surgical hip - flexion 0-0-58 with heel slide (AAROM), abduction approx 30 deg, ER approx 10 deg (hip neutral)  Knee and ankle WFL for transfers observed today  STRENGTH: quad set good and SLR 2-3\" with belt assist, no " lag      Assessment & Plan   CLINICAL IMPRESSIONS  Medical Diagnosis: s/p R TACOS    Treatment Diagnosis: s/p R TACOS, hip pain   Impression/Assessment: Patient is a 73 year old female with s/p R TACOS.  The following significant findings have been identified: Pain, Decreased ROM/flexibility, Decreased strength, Impaired balance, Edema, Impaired gait, Impaired muscle performance, and Decreased activity tolerance. These impairments interfere with their ability to perform self care tasks, work tasks, recreational activities, household chores, driving , household mobility, and community mobility as compared to previous level of function.     Clinical Decision Making (Complexity):  Clinical Presentation: Stable/Uncomplicated  Clinical Presentation Rationale: based on medical and personal factors listed in PT evaluation  Clinical Decision Making (Complexity): Low complexity    PLAN OF CARE  Treatment Interventions:  Modalities: Cryotherapy, E-stim, Hot Pack  Interventions: Gait Training, Manual Therapy, Neuromuscular Re-education, Therapeutic Activity, Therapeutic Exercise, Self-Care/Home Management    Long Term Goals     PT Goal 1  Goal Identifier: Ambulation  Goal Description: Patient will report ability to ambulate 30 min with LRAD and normalized gait, no increase in pain, for safe independent community mobility.  Target Date: 01/23/24  PT Goal 2  Goal Identifier: Stairs  Goal Description: Patient will demonstrate ability to ascend/descend 1 flight of stairs in reciprocal pattern, no increase in pain, and no UE assist for safe entry and exit from home.  Target Date: 01/23/24      Frequency of Treatment: 1x/week tapering to 2x/month  Duration of Treatment: 12 weeks    Recommended Referrals to Other Professionals: none  Education Assessment:        Risks and benefits of evaluation/treatment have been explained.   Patient/Family/caregiver agrees with Plan of Care.     Evaluation Time:     PT Eval, Low Complexity Minutes  (17703): 25       Signing Clinician: SHAR MANZO, SEBAS      [facility]                                                                                   OUTPATIENT PHYSICAL THERAPY      PLAN OF TREATMENT FOR OUTPATIENT REHABILITATION   Patient's Last Name, First Name, Virginia Muse YOB: 1950   Provider's Name   [facility]   Medical Record No.  [id number]     Onset Date: 10/13/23 (DOS)  Start of Care Date: 10/25/23     Medical Diagnosis:  s/p R TACOS      PT Treatment Diagnosis:  s/p R TACOS, hip pain Plan of Treatment  Frequency/Duration: 1x/week tapering to 2x/month/ 12 weeks    Certification date from 10/25/23 to 01/23/24         See note for plan of treatment details and functional goals     SHAR MANZO, PT                         I CERTIFY THE NEED FOR THESE SERVICES FURNISHED UNDER        THIS PLAN OF TREATMENT AND WHILE UNDER MY CARE     (Physician attestation of this document indicates review and certification of the therapy plan).                Referring Provider:  Austin Blood      Initial Assessment  See Epic Evaluation- Start of Care Date: 10/25/23

## 2023-11-01 ENCOUNTER — THERAPY VISIT (OUTPATIENT)
Dept: PHYSICAL THERAPY | Facility: CLINIC | Age: 73
End: 2023-11-01
Payer: MEDICARE

## 2023-11-01 DIAGNOSIS — R26.9 ABNORMAL GAIT: ICD-10-CM

## 2023-11-01 DIAGNOSIS — Z47.1 AFTERCARE FOLLOWING RIGHT HIP JOINT REPLACEMENT SURGERY: Primary | ICD-10-CM

## 2023-11-01 DIAGNOSIS — Z96.641 AFTERCARE FOLLOWING RIGHT HIP JOINT REPLACEMENT SURGERY: Primary | ICD-10-CM

## 2023-11-01 DIAGNOSIS — M25.551 HIP PAIN, RIGHT: ICD-10-CM

## 2023-11-01 PROCEDURE — 97110 THERAPEUTIC EXERCISES: CPT | Mod: GP | Performed by: PHYSICAL THERAPIST

## 2023-11-01 PROCEDURE — 97140 MANUAL THERAPY 1/> REGIONS: CPT | Mod: GP | Performed by: PHYSICAL THERAPIST

## 2023-11-01 PROCEDURE — 97116 GAIT TRAINING THERAPY: CPT | Mod: GP | Performed by: PHYSICAL THERAPIST

## 2023-11-08 ENCOUNTER — THERAPY VISIT (OUTPATIENT)
Dept: PHYSICAL THERAPY | Facility: CLINIC | Age: 73
End: 2023-11-08
Payer: MEDICARE

## 2023-11-08 DIAGNOSIS — R26.9 ABNORMAL GAIT: ICD-10-CM

## 2023-11-08 DIAGNOSIS — Z47.1 AFTERCARE FOLLOWING RIGHT HIP JOINT REPLACEMENT SURGERY: Primary | ICD-10-CM

## 2023-11-08 DIAGNOSIS — M25.551 HIP PAIN, RIGHT: ICD-10-CM

## 2023-11-08 DIAGNOSIS — Z96.641 AFTERCARE FOLLOWING RIGHT HIP JOINT REPLACEMENT SURGERY: Primary | ICD-10-CM

## 2023-11-08 PROCEDURE — 97110 THERAPEUTIC EXERCISES: CPT | Mod: GP | Performed by: PHYSICAL THERAPIST

## 2023-11-08 PROCEDURE — 97116 GAIT TRAINING THERAPY: CPT | Mod: GP | Performed by: PHYSICAL THERAPIST

## 2023-11-08 PROCEDURE — 97140 MANUAL THERAPY 1/> REGIONS: CPT | Mod: GP | Performed by: PHYSICAL THERAPIST

## 2023-11-09 DIAGNOSIS — Z96.641 S/P TOTAL RIGHT HIP ARTHROPLASTY: Primary | ICD-10-CM

## 2023-11-15 ENCOUNTER — THERAPY VISIT (OUTPATIENT)
Dept: PHYSICAL THERAPY | Facility: CLINIC | Age: 73
End: 2023-11-15
Payer: MEDICARE

## 2023-11-15 DIAGNOSIS — M25.551 HIP PAIN, RIGHT: ICD-10-CM

## 2023-11-15 DIAGNOSIS — Z96.641 AFTERCARE FOLLOWING RIGHT HIP JOINT REPLACEMENT SURGERY: Primary | ICD-10-CM

## 2023-11-15 DIAGNOSIS — Z47.1 AFTERCARE FOLLOWING RIGHT HIP JOINT REPLACEMENT SURGERY: Primary | ICD-10-CM

## 2023-11-15 DIAGNOSIS — R26.9 ABNORMAL GAIT: ICD-10-CM

## 2023-11-15 PROCEDURE — 97140 MANUAL THERAPY 1/> REGIONS: CPT | Mod: GP | Performed by: PHYSICAL THERAPIST

## 2023-11-15 PROCEDURE — 97110 THERAPEUTIC EXERCISES: CPT | Mod: GP | Performed by: PHYSICAL THERAPIST

## 2023-11-22 ENCOUNTER — THERAPY VISIT (OUTPATIENT)
Dept: PHYSICAL THERAPY | Facility: CLINIC | Age: 73
End: 2023-11-22
Payer: MEDICARE

## 2023-11-22 DIAGNOSIS — R26.9 ABNORMAL GAIT: ICD-10-CM

## 2023-11-22 DIAGNOSIS — Z47.1 AFTERCARE FOLLOWING RIGHT HIP JOINT REPLACEMENT SURGERY: Primary | ICD-10-CM

## 2023-11-22 DIAGNOSIS — Z96.641 AFTERCARE FOLLOWING RIGHT HIP JOINT REPLACEMENT SURGERY: Primary | ICD-10-CM

## 2023-11-22 DIAGNOSIS — M25.551 HIP PAIN, RIGHT: ICD-10-CM

## 2023-11-22 PROCEDURE — 97110 THERAPEUTIC EXERCISES: CPT | Mod: GP | Performed by: PHYSICAL THERAPIST

## 2023-11-22 PROCEDURE — 97140 MANUAL THERAPY 1/> REGIONS: CPT | Mod: GP | Performed by: PHYSICAL THERAPIST

## 2023-11-22 PROCEDURE — 97116 GAIT TRAINING THERAPY: CPT | Mod: GP | Performed by: PHYSICAL THERAPIST

## 2023-11-22 NOTE — PROGRESS NOTES
11/22/23 0500   Appointment Info   Signing clinician's name / credentials Kaitlyn Estevez, PT, DPT, OCS   Total/Authorized Visits 8   Visits Used 5   Medical Diagnosis s/p R TACOS   PT Tx Diagnosis s/p R TACOS, hip pain   Precautions/Limitations WBAT, standard hip precautions (posterior)   Quick Adds Certification   Progress Note/Certification   Start of Care Date 10/25/23   Onset of illness/injury or Date of Surgery 10/13/23  (DOS)   Therapy Frequency 1x/week tapering to 2x/month   Predicted Duration 12 weeks   Certification date from 10/25/23   Certification date to 01/23/24   Progress Note Due Date 11/28/23   Progress Note Completed Date 10/25/23   GOALS   PT Goals 2   PT Goal 1   Goal Identifier Ambulation   Goal Description Patient will report ability to ambulate 30 min with LRAD and normalized gait, no increase in pain, for safe independent community mobility.   Target Date 01/23/24   PT Goal 2   Goal Identifier Stairs   Goal Description Patient will demonstrate ability to ascend/descend 1 flight of stairs in reciprocal pattern, no increase in pain, and no UE assist for safe entry and exit from home.   Target Date 01/23/24   Subjective Report   Subjective Report Using cane for most mobility, Accompanied by friend Aurelio today.   Objective Measures   Objective Measures Objective Measure 1;Objective Measure 2;Objective Measure 3;Objective Measure 4   Objective Measure 1   Objective Measure Hip PROM   Details abduction - 20 deg / flexion - 90 / ER - in neutral hip (prone): R 8 deg, L 10 deg   Objective Measure 2   Objective Measure Quad   Details good - able to do 5 SLR no lag   Objective Measure 3   Objective Measure Edema   Details mild   Objective Measure 4   Objective Measure Gait   Details improving step length but cautious so decreased foot clearance bilaterally with no gait aid   Treatment Interventions (PT)   Interventions Therapeutic Procedure/Exercise;Therapeutic Activity;Gait Training;Manual Therapy    Therapeutic Procedure/Exercise   Therapeutic Procedures: strength, endurance, ROM, flexibillity minutes (89973) 15   Therapeutic Procedures Ther Proc 3;Ther Proc 4;Ther Proc 5   Ther Proc 1 nustep   Ther Proc 1 - Details AAROM to 90 deg hip flexion, x 6 min, warmup   Ther Proc 2 Standing ER   Ther Proc 2 - Details x 20 at countertop   Ther Proc 3 butterfly stretch hooklying   Ther Proc 3 - Details for ER, x 10 today - added to HEP   Ther Proc 4 prone lying   Ther Proc 4 - Details with quad stretch and ER PROM by PT. x 20 min today - added to HEP   PTRx Ther Proc 1 Supine Heel Slides   PTRx Ther Proc 2 Side-lying Hip - External Rotation   PTRx Ther Proc 3 Bridging #1   Skilled Intervention targeting AROM, hip muscle strength   Patient Response/Progress min cues needed   Ther Proc 5 Hip abduction standing   Ther Proc 5 - Details with reactions via verbal cues to either side (weight shifts) x 30 total   Therapeutic Activity   PTRx Ther Act 1 Icing and elevation   PTRx Ther Act 1 - Details No Notes   Neuromuscular Re-education   PTRx Neuro Re-ed 1 TE: Hip Flexion Straight Leg Raise   PTRx Neuro Re-ed 1 - Details HEP   Gait Training   Gait Training Minutes, includes stair climbing (97198) 5   Gait 1 Gait cues   Gait 2 stair negotiation   Gait 2 - Details x 3 steps with step to gait and BUE support   Gait Training Gait 2   Manual Therapy   Manual Therapy: Mobilization, MFR, MLD, friction massage minutes (03400) 20   Manual Therapy 1 PROM hip   Manual Therapy 1 - Details abduction, flexion to 90 deg, ER, in prone   Skilled Intervention Targeting PROM flexion and ER especially   Patient Response/Progress pelvis posteriorly rotates with flexion limited true flexion   Education   Education Comments PTRx online   Plan   Home program initiated, daily   Plan for next session ER, flexion supine, extension prone, gait   Total Session Time   Timed Code Treatment Minutes 40   Total Treatment Time (sum of timed and untimed  services) 40       PLAN  Continue therapy per current plan of care.    Beginning/End Dates of Progress Note Reporting Period:  10/25/23 to 11/22/2023    Referring Provider:  Austin Blood

## 2023-11-28 ENCOUNTER — ANCILLARY PROCEDURE (OUTPATIENT)
Dept: GENERAL RADIOLOGY | Facility: CLINIC | Age: 73
End: 2023-11-28
Attending: ORTHOPAEDIC SURGERY
Payer: MEDICARE

## 2023-11-28 ENCOUNTER — OFFICE VISIT (OUTPATIENT)
Dept: ORTHOPEDICS | Facility: CLINIC | Age: 73
End: 2023-11-28
Payer: MEDICARE

## 2023-11-28 DIAGNOSIS — Z96.641 S/P TOTAL RIGHT HIP ARTHROPLASTY: Primary | ICD-10-CM

## 2023-11-28 DIAGNOSIS — Z96.641 S/P TOTAL RIGHT HIP ARTHROPLASTY: ICD-10-CM

## 2023-11-28 PROCEDURE — 99024 POSTOP FOLLOW-UP VISIT: CPT | Performed by: ORTHOPAEDIC SURGERY

## 2023-11-28 PROCEDURE — 73502 X-RAY EXAM HIP UNI 2-3 VIEWS: CPT | Mod: RT | Performed by: RADIOLOGY

## 2023-11-28 ASSESSMENT — PAIN SCALES - GENERAL: PAINLEVEL: NO PAIN (0)

## 2023-11-28 NOTE — PROGRESS NOTES
"Chief Complaint: Surgical Followup (6 weeks post op RTHA-10/13/23. )         HPI: Virginia Byers returns today in follow-up for right hip.  Reports doing very well.  She has no pain medication.  She is using a cane for distances.  She reports \"I have no pain.\"  She is very happy with how she is doing so far.        Current Outpatient Medications:     acetaminophen (TYLENOL) 325 MG tablet, Take 2 tablets (650 mg) by mouth every 4 hours as needed for other (For optimal non-opioid multimodal pain management to improve pain control.), Disp: 100 tablet, Rfl: 0    aspirin 81 MG EC tablet, Take 1 tablet (81 mg) by mouth 2 times daily, Disp: 28 tablet, Rfl: 0    dorzolamide-timolol (COSOPT) 2-0.5 % ophthalmic solution, Place 1 drop into both eyes 2 times daily, Disp: , Rfl:     latanoprost (XALATAN) 0.005 % ophthalmic solution, Place 1 drop into both eyes At Bedtime, Disp: , Rfl:     losartan (COZAAR) 100 MG tablet, Take 1 tablet (100 mg) by mouth daily, Disp: 90 tablet, Rfl: 3    Multiple Vitamins-Minerals (PRESERVISION AREDS) TABS, Take 1 tablet by mouth 2 times daily, Disp: , Rfl:     oxyCODONE (ROXICODONE) 5 MG tablet, Take 1 tablet (5 mg) by mouth every 4 hours as needed for moderate pain, Disp: 26 tablet, Rfl: 0    rosuvastatin (CRESTOR) 10 MG tablet, Take 1 tablet (10 mg) by mouth daily, Disp: 90 tablet, Rfl: 3    senna-docusate (SENOKOT-S/PERICOLACE) 8.6-50 MG tablet, Take 1 tablet by mouth 2 times daily, Disp: 20 tablet, Rfl: 0    Allergies: Compazine, Hydrochlorothiazide, Prochlorperazine, and Simvastatin        Physical Exam:  On physical examination the patient appears the stated age, is in no acute distress, affect is appropriate, and breathing is non-labored.    There were no vitals taken for this visit.  There is no height or weight on file to calculate BMI.    Rises from chair: Easily  Gait: Slow but without antalgia.  ROM: Full and painless  Incision is healed and benign  Flexion: Greater than 90 " degrees  Sciatic neuromotor function is normal and symmetric.  We reviewed the radiographs together.  This shows that the normal progression of her right total hip arthroplasty without evidence of loosening or subsidence  Assessment: 73-year-old female 6-week status post total hip arthroplasty right side doing very well.  Both hips now functioning very well.    Plan: She is going to continue with rehab.  I will follow-up with her in 6 weeks.  She would like to do this by video.  No ref. provider found

## 2023-11-28 NOTE — NURSING NOTE
Virginia Byers's chief complaint for this visit includes:  Chief Complaint   Patient presents with    Surgical Followup     6 weeks post op RTHA-10/13/23.        Referring Provider:  No referring provider defined for this encounter.    There were no vitals taken for this visit.  No Pain (0)   Global Mental Health Score: (P) 18  Global Physical Health Score: (P) 17  PROMIS TOTAL - SUBSCORES: (P) 35  [facility]: 3-4  HOOS Jr. 85.26    Pain increases with: No pain  Previous surgeries: 10/13/23 RTHA  Previous injections within last 6 months: NO  Treatments done: PT going well  Imaging completed: 11/28/23  Pain: 0/10  Concerns: Having PET scan on 12/1/23 is that okay? There is a lump top of hip, are these sutures?    Letty Martinez, ATC

## 2023-11-28 NOTE — LETTER
"    11/28/2023         RE: Virginia Byers  [id number] [address] [id number]        Dear Colleague,    Thank you for referring your patient, Virginia Byers, to the [facility]. Please see a copy of my visit note below.    Chief Complaint: Surgical Followup (6 weeks post op RTHA-10/13/23. )         HPI: Virginia Byers returns today in follow-up for right hip.  Reports doing very well.  She has no pain medication.  She is using a cane for distances.  She reports \"I have no pain.\"  She is very happy with how she is doing so far.        Current Outpatient Medications:      acetaminophen (TYLENOL) 325 MG tablet, Take 2 tablets (650 mg) by mouth every 4 hours as needed for other (For optimal non-opioid multimodal pain management to improve pain control.), Disp: 100 tablet, Rfl: 0     aspirin 81 MG EC tablet, Take 1 tablet (81 mg) by mouth 2 times daily, Disp: 28 tablet, Rfl: 0     dorzolamide-timolol (COSOPT) 2-0.5 % ophthalmic solution, Place 1 drop into both eyes 2 times daily, Disp: , Rfl:      latanoprost (XALATAN) 0.005 % ophthalmic solution, Place 1 drop into both eyes At Bedtime, Disp: , Rfl:      losartan (COZAAR) 100 MG tablet, Take 1 tablet (100 mg) by mouth daily, Disp: 90 tablet, Rfl: 3     Multiple Vitamins-Minerals (PRESERVISION AREDS) TABS, Take 1 tablet by mouth 2 times daily, Disp: , Rfl:      oxyCODONE (ROXICODONE) 5 MG tablet, Take 1 tablet (5 mg) by mouth every 4 hours as needed for moderate pain, Disp: 26 tablet, Rfl: 0     rosuvastatin (CRESTOR) 10 MG tablet, Take 1 tablet (10 mg) by mouth daily, Disp: 90 tablet, Rfl: 3     senna-docusate (SENOKOT-S/PERICOLACE) 8.6-50 MG tablet, Take 1 tablet by mouth 2 times daily, Disp: 20 tablet, Rfl: 0    Allergies: Compazine, Hydrochlorothiazide, Prochlorperazine, and Simvastatin        Physical Exam:  On physical examination the patient appears the stated age, is in no acute distress, affect is appropriate, and breathing is " non-labored.    There were no vitals taken for this visit.  There is no height or weight on file to calculate BMI.    Rises from chair: Easily  Gait: Slow but without antalgia.  ROM: Full and painless  Incision is healed and benign  Flexion: Greater than 90 degrees  Sciatic neuromotor function is normal and symmetric.  We reviewed the radiographs together.  This shows that the normal progression of her right total hip arthroplasty without evidence of loosening or subsidence  Assessment: 73-year-old female 6-week status post total hip arthroplasty right side doing very well.  Both hips now functioning very well.    Plan: She is going to continue with rehab.  I will follow-up with her in 6 weeks.  She would like to do this by video.  No ref. provider found      Again, thank you for allowing me to participate in the care of your patient.        Sincerely,        Austin Blood MD

## 2023-12-01 ENCOUNTER — LAB (OUTPATIENT)
Dept: LAB | Facility: CLINIC | Age: 73
End: 2023-12-01
Payer: MEDICARE

## 2023-12-01 ENCOUNTER — ANCILLARY ORDERS (OUTPATIENT)
Dept: ONCOLOGY | Facility: CLINIC | Age: 73
End: 2023-12-01

## 2023-12-01 ENCOUNTER — ANCILLARY PROCEDURE (OUTPATIENT)
Dept: PET IMAGING | Facility: CLINIC | Age: 73
End: 2023-12-01
Attending: INTERNAL MEDICINE
Payer: MEDICARE

## 2023-12-01 ENCOUNTER — DOCUMENTATION ONLY (OUTPATIENT)
Dept: CARDIOLOGY | Facility: CLINIC | Age: 73
End: 2023-12-01

## 2023-12-01 DIAGNOSIS — C82.00 FOLLICULAR LYMPHOMA GRADE I, UNSPECIFIED BODY REGION (H): Primary | ICD-10-CM

## 2023-12-01 DIAGNOSIS — C82.08 FOLLICULAR LYMPHOMA GRADE I, LYMPH NODES OF MULTIPLE SITES (H): ICD-10-CM

## 2023-12-01 DIAGNOSIS — C91.10 MONOCLONAL B-CELL LYMPHOCYTOSIS WITH IMMUNOPHENOTYPE LIKE CHRONIC LYMPHOCYTIC LEUKEMIA (CLL) (H): ICD-10-CM

## 2023-12-01 DIAGNOSIS — M79.89 LEFT AXILLARY SWELLING: ICD-10-CM

## 2023-12-01 DIAGNOSIS — C82.00 FOLLICULAR LYMPHOMA GRADE I, UNSPECIFIED BODY REGION (H): ICD-10-CM

## 2023-12-01 DIAGNOSIS — D72.820 MONOCLONAL B-CELL LYMPHOCYTOSIS WITH IMMUNOPHENOTYPE LIKE CHRONIC LYMPHOCYTIC LEUKEMIA (CLL) (H): ICD-10-CM

## 2023-12-01 LAB
ALBUMIN SERPL BCG-MCNC: 4.3 G/DL (ref 3.5–5.2)
ALP SERPL-CCNC: 158 U/L (ref 40–150)
ALT SERPL W P-5'-P-CCNC: 10 U/L (ref 0–50)
ANION GAP SERPL CALCULATED.3IONS-SCNC: 13 MMOL/L (ref 7–15)
AST SERPL W P-5'-P-CCNC: 19 U/L (ref 0–45)
BASOPHILS # BLD AUTO: 0.1 10E3/UL (ref 0–0.2)
BASOPHILS NFR BLD AUTO: 1 %
BILIRUB SERPL-MCNC: 0.4 MG/DL
BUN SERPL-MCNC: 18.7 MG/DL (ref 8–23)
CALCIUM SERPL-MCNC: 9.8 MG/DL (ref 8.8–10.2)
CHLORIDE SERPL-SCNC: 103 MMOL/L (ref 98–107)
CREAT BLD-MCNC: 0.6 MG/DL (ref 0.5–1)
CREAT SERPL-MCNC: 0.58 MG/DL (ref 0.51–0.95)
DEPRECATED HCO3 PLAS-SCNC: 23 MMOL/L (ref 22–29)
EGFRCR SERPLBLD CKD-EPI 2021: >60 ML/MIN/1.73M2
EGFRCR SERPLBLD CKD-EPI 2021: >90 ML/MIN/1.73M2
EOSINOPHIL # BLD AUTO: 0.3 10E3/UL (ref 0–0.7)
EOSINOPHIL NFR BLD AUTO: 3 %
ERYTHROCYTE [DISTWIDTH] IN BLOOD BY AUTOMATED COUNT: 14.6 % (ref 10–15)
GLUCOSE SERPL-MCNC: 81 MG/DL (ref 70–99)
HCT VFR BLD AUTO: 41.1 % (ref 35–47)
HGB BLD-MCNC: 12.9 G/DL (ref 11.7–15.7)
IMM GRANULOCYTES # BLD: 0 10E3/UL
IMM GRANULOCYTES NFR BLD: 0 %
LDH SERPL L TO P-CCNC: 223 U/L (ref 0–250)
LYMPHOCYTES # BLD AUTO: 1.7 10E3/UL (ref 0.8–5.3)
LYMPHOCYTES NFR BLD AUTO: 19 %
MCH RBC QN AUTO: 27.6 PG (ref 26.5–33)
MCHC RBC AUTO-ENTMCNC: 31.4 G/DL (ref 31.5–36.5)
MCV RBC AUTO: 88 FL (ref 78–100)
MONOCYTES # BLD AUTO: 0.7 10E3/UL (ref 0–1.3)
MONOCYTES NFR BLD AUTO: 7 %
NEUTROPHILS # BLD AUTO: 6.6 10E3/UL (ref 1.6–8.3)
NEUTROPHILS NFR BLD AUTO: 70 %
NRBC # BLD AUTO: 0 10E3/UL
NRBC BLD AUTO-RTO: 0 /100
PLATELET # BLD AUTO: 360 10E3/UL (ref 150–450)
POTASSIUM SERPL-SCNC: 4.4 MMOL/L (ref 3.4–5.3)
PROT SERPL-MCNC: 8.1 G/DL (ref 6.4–8.3)
RBC # BLD AUTO: 4.68 10E6/UL (ref 3.8–5.2)
SODIUM SERPL-SCNC: 139 MMOL/L (ref 135–145)
WBC # BLD AUTO: 9.4 10E3/UL (ref 4–11)

## 2023-12-01 PROCEDURE — 70490 CT SOFT TISSUE NECK W/O DYE: CPT | Mod: XU | Performed by: RADIOLOGY

## 2023-12-01 PROCEDURE — G1010 CDSM STANSON: HCPCS | Performed by: RADIOLOGY

## 2023-12-01 PROCEDURE — 74176 CT ABD & PELVIS W/O CONTRAST: CPT | Mod: XU | Performed by: RADIOLOGY

## 2023-12-01 PROCEDURE — 83615 LACTATE (LD) (LDH) ENZYME: CPT

## 2023-12-01 PROCEDURE — A9552 F18 FDG: HCPCS | Performed by: RADIOLOGY

## 2023-12-01 PROCEDURE — 71250 CT THORAX DX C-: CPT | Mod: XU | Performed by: RADIOLOGY

## 2023-12-01 PROCEDURE — 82565 ASSAY OF CREATININE: CPT

## 2023-12-01 PROCEDURE — 36415 COLL VENOUS BLD VENIPUNCTURE: CPT

## 2023-12-01 PROCEDURE — 80053 COMPREHEN METABOLIC PANEL: CPT

## 2023-12-01 PROCEDURE — 85025 COMPLETE CBC W/AUTO DIFF WBC: CPT

## 2023-12-01 PROCEDURE — 78816 PET IMAGE W/CT FULL BODY: CPT | Mod: PS | Performed by: RADIOLOGY

## 2023-12-01 RX ORDER — IOPAMIDOL 755 MG/ML
10-135 INJECTION, SOLUTION INTRAVASCULAR ONCE
Status: COMPLETED | OUTPATIENT
Start: 2023-12-01 | End: 2023-12-01

## 2023-12-01 NOTE — CONFIDENTIAL NOTE
Pt experienced an IV infiltration with a small amount of contrast (ISOVUE 370) being injected under the skin. I assessed the patient and gave patient how to care for the site at home. Cold compacts and keep the area above the heart if possible. Told patient if it becomes painful to visit urgent care. Injection of contrast was on the PET truck at the [facility] site.

## 2023-12-07 ENCOUNTER — ONCOLOGY VISIT (OUTPATIENT)
Dept: ONCOLOGY | Facility: CLINIC | Age: 73
End: 2023-12-07
Payer: MEDICARE

## 2023-12-07 VITALS
SYSTOLIC BLOOD PRESSURE: 134 MMHG | HEIGHT: 63 IN | BODY MASS INDEX: 26.05 KG/M2 | HEART RATE: 66 BPM | OXYGEN SATURATION: 97 % | WEIGHT: 147 LBS | DIASTOLIC BLOOD PRESSURE: 66 MMHG

## 2023-12-07 DIAGNOSIS — D72.820 MONOCLONAL B-CELL LYMPHOCYTOSIS WITH IMMUNOPHENOTYPE LIKE CHRONIC LYMPHOCYTIC LEUKEMIA (CLL) (H): ICD-10-CM

## 2023-12-07 DIAGNOSIS — C82.00 FOLLICULAR LYMPHOMA GRADE I, UNSPECIFIED BODY REGION (H): Primary | ICD-10-CM

## 2023-12-07 DIAGNOSIS — R74.8 ALKALINE PHOSPHATASE ELEVATION: ICD-10-CM

## 2023-12-07 DIAGNOSIS — C82.00 FOLLICULAR LYMPHOMA GRADE I, UNSPECIFIED BODY REGION (H): ICD-10-CM

## 2023-12-07 DIAGNOSIS — C91.10 MONOCLONAL B-CELL LYMPHOCYTOSIS WITH IMMUNOPHENOTYPE LIKE CHRONIC LYMPHOCYTIC LEUKEMIA (CLL) (H): ICD-10-CM

## 2023-12-07 DIAGNOSIS — M89.9 BONE LESION: ICD-10-CM

## 2023-12-07 PROCEDURE — 99214 OFFICE O/P EST MOD 30 MIN: CPT | Performed by: INTERNAL MEDICINE

## 2023-12-07 ASSESSMENT — PAIN SCALES - GENERAL: PAINLEVEL: NO PAIN (0)

## 2023-12-07 NOTE — PROGRESS NOTES
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    Patient has medical history including LEFT breast cancer in 2000, hypertension, hyperlipidemia, osteoporosis, osteoarthritis, degenerative disc disease, spinal stenosis of lumbar region,  s/p bilateral L5-S1 transforaminal ROS by Dr. Garcia on 7/9/2021, s/p bilateral L4-5 and L5-S1 facet joint injections on 6/14/2021, glaucoma and macular degeneration, cataracts s/p surgery, history of tobacco use (quit 1999)     Regarding previous history of breast cancer:  She was previously treated by Dr. Sakina Brandt at [facility]      In summary, diagnosed at age 49 with LEFT breast IDC ER positive, NJ positive, HER2 positive on FISH. 3/2000 left breast lumpectomy and left axillary node dissection (IDC, grade 3, 1.45 cm incompletely excised tumor, DCIS, positive margins with infiltrating carcinoma, 1 of 17 lymph nodes positive (0.5 cm metastatic focus), ER positive, NJ positive, HER2 positive).  4/2020 left breast simple mastectomy with no residual carcinoma. S/p ACx4, taxol x4, no anti-her2 treatment, no RT. S/p 5 years adjuvant endocrine therapy (tamoxifen and anastrozole). Delayed left breast reconstruction and right breast augmentation.      3/2/2000  Excisional biopsy of Left breast mass -   Infiltrating ductal carcinoma with associated DCIS   ER positive (60%), NJ positive (22%), HER2 by FISH POSITIVE     3/10/2000 - Left lumpectomy and left axillary node  Invasive ductal carcinoma, Grade 3, 1.45 cm (incompletely excised), negative for LVI  DCIS, greatest histologic dimension of tumor (DCIS PLUS invasive tumor) = 2.9 cm (estimate 50% DCIS)  Positive margins- infiltrating carcinoma  extends to margin  1 of 17 axillary lymph nodes sampled was positive for metastatic focus of 0.5 cm.  ER/TX+     Sections of lumpectomy left breast showing residual 1.1 cm focus of ductal carcinoma in situ (DCIS), present within microns of the linked margin of resection.       4/11/2000 - Left breast simple mastectomy, negative for residual carcinoma  Left mastectomy on 4/11/2000 performed by Dr. Cristian Mcdonald at [facility].      Adjuvant treatment:  - 5/15/2000-7/24/2000 4 cycles of Adriamycin and Cytoxan   - 8/16/2000-10/18/2000 4 cycles of paclitaxel   - Appears she may have been on some kind of trial/protocol, was not given any anti-HER2 targeted treatment  - No adjuvant radiation  - 12/2000- 10/2002 tamoxifen  - 10/2002-5/2006 switched to anastrozole (due to in vitro studies showing anastrozole may be slightly better for patients were HER2 positive) with Fosamax for osteopenia     8/31/2015 Left delayed reconstruction with tissue expander    2/3/2016 Left 2nd stage 450 cc high profile silicone implant; right augmentation 150 cc Moderate classic profile silicone implant; cresent mastopexy    10/18/2021: Screening mammogram right breast ZACKARY    OTHER:  Of note, patient has documentation of anemia (iron deficiency and anemia of chronic disease), elevated LFTs, nausea and vomiting of all solids and liquids, fatigue with 26 pound unintentional weight loss from 7/20/2021 - 10/20/2021 with mildly elevated LFTs which led to evaluation with mammogram, endoscopy with EUS and colonoscopy as detailed below. Pt did associate these with some spinal injections she had gotten, due to overlapping times. Pt did regain all of the weight and hand improvement of anemia within 2 months, without significant intervention.     -10/2021: Endoscopy with EUS normal EGD and no stigmata or source of upper GI bleeding, visualized bile duct, gallbladder, liver, pancreas, left adrenal gland normal.  No lymphadenopathy. LIVER, RANDOM  NEEDLE BIOPSY: Benign parenchyma with congestion; no significant fibrosis or other abnormalities     -10/2021 colonoscopy: Hemorrhoids, diverticulosis in sigmoid colon and splenic flexure    Current history:  -Ongoing right knee pain not improved with brace and physical therapy, s/p bilateral L5-S1 transforaminal ROS by Dr. Garcia on 7/9/2021, s/p bilateral L4-5 and L5-S1 facet joint injections on 6/14/2021. ongoing b/l hip and leg pain, worse with position changes (ie sitting to standing), constant, 8-9/10, tylenol brings pain down to 6/10 (using tylenol bid). ECOG 3.   -Patient has seen neurosurgery Dr. Jose Alexander, occupational medicine specialist Dr.Orrin Murphy, with plan to see PM&R Dr. Margie Agudelo and movement disorder clinic  - 11/22 MRI lumbar spine:    prominent left iliac lymph nodes among other findings related to degenerative changes and neural foraminal stenosis throughout lumbar spine    - 12/22 MRI pelvis:  1. Enhancing marrow signal abnormality of the right iliac bone extending from the superior acetabulum subchondral location proximally almost to the level of the sacroiliac joint caudal aspect. No associated fracture line. Underlying marrow infiltrative process such as from metastasis cannot be excluded especially given the degree of T1 hypointensity and history of primary tumor. Other consideration include stress reaction.  2. Mildly increased left external iliac chain, and the right medial thigh lymphadenopathy. Metastasis cannot be excluded.  3. Left posterior iliac enhancing lesion, similar in size to prior CT 9/2021 and previously not visible on CT, focal enhancing lesion in the right greater trochanter laterally. Findings are concerning for Metastasis.    -12/22 CT chest abdomen pelvis with contrast:  COMPARISON: MRI pelvis 12/20/2022 and CT abdomen pelvis 9/21/2021.  1. Mastectomies with implant reconstructions. No lymphadenopathy  2.  There is an new or increased left common iliac, external  iliac and pelvic sidewall lymphadenopathy since prior CT. Distal left common iliac lymph nodes measure up to 1.2 cm on this exam compared to 0.7 cm on prior CT. Left pelvic sidewall lymph node measures 2.1 cm short axis on this exam compared to 1.2 cm on prior CT . consistent with metastatic disease.  3. Sclerotic lesions in the left sacrum and left posterior ilium are unchanged from prior CT.  may be metastatic.  4. No evidence of metastatic disease in the chest.     -1/2023 MRI brain w/wo contrast:  IMPRESSION:  1. No findings of intracranial metastatic disease.  2. Indeterminate ovoid T2 hyperintense enhancing mass in the region of the  left lacrimal gland measuring approximately 2.4 cm AP by 1.4 cm  transverse by 2.3 cm craniocaudal associated with/replacing the left  lacrimal gland. Consider correlation with tissue sampling.  3. A few indeterminate partially visualized heterogeneous enhancing  Nodules measuring up to approximately 1.5 cm in the bilateral parotid glands.   Consider soft tissue neck CT with contrast for further evaluation.    -1/20/2023: IR US Guided biopsy of a right external iliac LN  Final Diagnosis   A.  LYMPH NODE, RIGHT EXTERNAL ILIAC, CORE NEEDLE BIOPSY AND TOUCH IMPRINT:  -Involved by follicular lymphoma, low-grade (WHO grade 1-2)  -Negative for metastatic carcinoma     The morphologic and immunophenotypic patterns are consistent with follicular lymphoma.  There is no high-grade lymphoma present in this limited specimen.  Concurrent flow study ([id number]) demonstrated CD10-positive lambda monotypic B cells (22%), rare kappa monotypic B cells (1.3%), and no aberrant immunophenotype on T cells.  A FISH study for Bcl-2 is pending and will be reported separately.     The case was initially reviewed by Dr. Cifuentes (breast pathology).     This case was reviewed in part at our Hematopathology Faculty Consensus conference at which Girish Nolasco, Narciso, and Kayode were present in addition  to myself.  This is a small needle core.  In some of the areas, the CD10+ B cells are confined to follicles.  Though the possibility of in situ follicular neoplasia was discussed, we still favor a diagnosis of follicular lymphoma - there are a number of CD10+ B cells outside of follicles at the base of the biopsy, and the clinical history supports a greater extent of involvement.     There was a tiny kappa monotypic B cell population identified by flow (separate from the CD10+ clone).  We looked for the morphologic correlate in this case by IHC, but it is not readily apparent.  This may represent a monoclonal B lymphocytosis - consider sending a peirpheral blood sample for flow cytometry.    FISH:  RESULTS:     NORMAL  - No rearrangement of BCL2     INTERPRETATION:  No evidence was found by FISH of rearrangement of the BCL2 (18q21) locus. These findings are not helpful for confirming or further characterizing the reported pathologic diagnosis of follicular lymphoma.    Flow Interpretation   A. Lymph Node(s), :  -CD10-positive lambda monotypic B cells (22%)  -Rare kappa monotypic B cells (1.3%)  -No aberrant immunophenotype on T cells  See comment   Electronically signed by Krista Headley MD on 1/24/2023 at  3:10 PM   Comment     The CD10-positive population is consistent with a clonal B cell population and a CD10 positive B-cell lymphoma is favored.      In addition, a second clonal B-cell population is identified which has a CLL-like immunophenotype except that CD5 expression is equivocal, this may represent a monoclonal B-cell lymphocytosis (MBL).     Addendum   INTERPRETATION  The diagnosis remains the same (see comment)     REASON FOR ADDENDUM  This addendum is issued to reflect additional testing performed on this case. See Comment.      COMMENT  Additional multi-color flow analysis was performed for the following markers:   CD23, CD79b     The CD5 (dimly) positive B cells mostly lack CD23 and CD79b         1/23/23 CT neck:  A) Heterogeneous nodule in the right thyroid gland measuring up  to 1.6 cm.   B) Soft tissue nodule in the superficial left parotid  gland measuring 1.2 cm. Additional 0.8 cm nodule in the deep portion  of the left parotid gland. Soft tissue nodule in the superficial right  parotid gland measuring 0.8 cm. These may represent benign or malignant parotid lesions. Multiplicity of lesions raises concern for Warthin tumors. Otolaryngology consultation is  recommended.  C) Prominent soft tissue around the major arteries in the neck  particularly the common and internal carotid arteries. This is  atypical for atherosclerotic disease and vasculopathy/vasculitis such  as giant cell arteritis should be considered. Probable superimposed  atherosclerotic disease at the carotid bifurcations right greater than  left without definite high-grade stenosis.    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  And B-cell cohort #2 which is described as negative for CD5 and showing dim CD20 and dim kappa staining.  This population is listed a CD5 negative and CD10 negative also listed as negative for  and .  Of note the population of monoclonal B-cell lymphocytosis found in peripheral blood in our department and in the lymph node biopsy in January showed partial/equivocal dim CD5 and may correspond to the cohort #2.     We essentially agree with the diagnosis of  follicular lymphoma, which is consistent with prior diagnosis of follicular lymphoma low grade  established on core biopsy of right external iliac lymph node January 20, 2023 (case [id number]) in our Department . The concurrent flow cytometry case [id number] in January showed in addition to CD10 positive B-cell population  also a partial/equivocal CD5 positive separate kappa monotypic B-cell population, which most likely represents monoclonal B-cell lymphocytosis of CLL/SLL type, since it was also documented in the peripheral blood flow cytometry on February 16, 2023 (case number [facility] ) at a low frequency.  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 Areas with infiltrates of small B cells with coexpression of CD5 and CD23 are seen in the current biopsy, but the size of the biopsy is too small to comprehensively evaluate for the presence of proliferation centers and the extent of involvement by the CD5 positive B cell lymphoproliferative process.   This case has been discussed at intradepartmental hematopathology conference with participation by THELMA Petersen, THELMA Davis and myself.      -5/23 PET/CT NCAP:   1. An ovoid mass in the left lateral orbit measures 2.5 x 1.2 cm, slightly larger than 01/09/2023, and demonstrates marked uptake (SUVmax 9.3), consistent with biopsy-proven lymphoma  2. A few small FDG avid bilateral intraparotid nodules are present, one on the right and two on the left, including representative left parotid nodule measuring 0.9 x 1.4 cm with moderate uptake (SUVmax 6.7).  3.  Asymmetric prominent right posterior lower cervical node measures 0.8 cm associated with moderate focal   uptake (SUVmax 5.1).  4. A single mildly enlarged left axillary  lymph node measures 1.0 cm associated with mild uptake (SUVmax 3.0)  5. Some scattered FDG avid lymph nodes below the diaphragm involve the retroperitoneal retrocaval, bilateral common iliac, left pelvic sidewall and left inguinal regions. Some of these have increased slightly in size since 12/28/2022, such as a conglomerate left pelvic sidewall bj mass measuring approximately 2.2 x 4.1 cm (previously 2.2 x 3.4 cm) associated with marked uptake (SUVmax 7.6).  6. FDG avid sclerotic skeletal lesion involves the right superior acetabulum (SUVmax 12.9) as well as a couple of separate smaller sclerotic lesions in the right iliac bone uptake (SUVmax 12.2).     - 6/14/2023- 6/15/2023 palliative radiation to left orbit follicular lymphoma 4Gy/2fx  - 6/15/2023 bone marrow biopsy  MORPHOLOGY:  - No morphologic or immunophenotypic evidence of follicular lymphoma  - Flow cytometry showing a small population of CD5 positive monotypic B cells; most compatible with monoclonal B lymphocytosis (There is no definitive morphologic correlate for this finding. There is no morphologic or immunophenotypic evidence of involvement by follicular lymphoma.   The CD5-positive B cell clone may best represent monoclonal B lymphocytosis, provided that bj SLL is excluded.)  - Normocellular marrow (cellularity estimated at 30%) with trilineage hematopoietic maturation and no increase in blasts  FLOW CYTOMETRY:  CD5 positive kappa-monotypic B cells (0.8%)  0.8% B cells which express CD5, CD19, CD20 (dim to negative) and monotypic kappa immunoglobulin light chains (dim) and lack CD10 and CD38. The B cells have similar forward scatter relative to background T cells suggestive of similar size; however, precise size determination is deferred to morphology  CYTOGENETICS:  46,XX    -6/19/2023 CT-guided bone biopsy, right acetabulum  PATHOLOGY:  - Atypical lymphoid infiltrate in a suboptimal biopsy  - No histologic evidence of metastatic carcinoma  -  Two small monoclonal B-cell populations detected by flow cytometry  This biopsy is suboptimal for evaluation due to fragmentation, crush artifact, and decalcification. In this limited biopsy, there are fragments of bone and marrow showing a mixed infiltrate that includes both B and T cells. Notably, 2 small clonal B-cell populations were detected by flow cytometry which resemble B-cell populations detected in this patient's past lymph node (1/20/23) and blood and bone marrow (2/16/23, 6/15/23) flow cytometry studies. A definitive morphologic correlate to these cell populations is not appreciated, and the overall features are insufficient for a diagnosis of lymphoma.    FLOW CYTOMETRY:  A. Pelvis, Right:  -CD5 positive kappa-monotypic B cells ( 2.8 %) - see comment A  -CD10 positive lambda-monotypic B cells ( 0.4 %) - see comment B      Electronically signed by Arnaldo Alvarado MD on 6/20/2023 at  1:31 PM   Comment     A) Clonal B cells with the immunophenotype of CLL/SLL are present in this sample.  The differential includes peripheral blood contamination (with an MBL), the tissue based correlate to MBL, versus involvement by SLL.  2.8% B cells which express CD5 (partial, dim), CD19, CD20 (dim to negative) and monotypic kappa immunoglobulin light chains (dim) and lack CD10. The B cells have similar forward scatter relative to background T cells suggestive of similar size; however, precise size determination is deferred to morphology.     B) A CD10-positive B cell clone is also present - involvement by the patient's previously diagnosed CD10+ B cell lymphoma is in the differential (follicular lymphoma versus other (transformation to a higher grade lymphoma). 0.4% B cells which express CD10, CD19 (slightly dim), CD20, and monotypic lambda immunoglobulin light chains and lack CD5.  The B cells have increased forward scatter relative to background T cells suggestive of increased size; however, precise size  "determination is deferred to morphology.        - 7/10/23 Rheumatology consult for ESR and CRP elevated, DEVON positive 1:160 speckled; PCP suspects pt may have PMR; \"I suspect that the bilateral hip OA is altering gait that then results in worsening of low back pain. No further rheumatology workup needed at this time. \"    - 7/11/23 left hip total arthroplasty with severe b/l hip osteoarthritis with Dr. Austin Blood,  cc, discharged with DVT prophylaxis Lovenox 40 mg daily x2 weeks and aspirin 81 mg twice daily x2 weeks    INTERIM HISTORY:  Regarding lymphoma:  Denies weight loss, night sweats, early satiety, RUQ or LUQ pain  Increased activity overall, especially s/p 10/13/23 right hip total arthroplasty   Reports left eye blurriness is stable since surgery, not worsened since RT, using lubricating eye drops prn and working with eye doctor for management of glaucoma, reports she had laser treatment to left eye that helped with blurred vision    - 12/23 PET CT NCAP Liver SUV max = 3.78, Aorta Blood SUV max = 3.43.  -New/progressed:  - 0.8 x 0.5 cm right FDG avid cervical lymph node; SUV max of 6.5  - 1.3 x 1.5 cm right FDG avid cervical lymph node; SUV max of 5.8  - 1.3 x 1.4 cm right FDG avid cervical lymph node; SUV max of 7.8   -Lesion in left lateral chest wall immediately inferior to the margin of breast implant, 0.5 x 0.4 mm, SUV 5.02, previously 1.4  - Lymph node adjacent to left psoas, 1.5 cm, SUV 19.9, previously 6.02  - Right acetabulum, 2.0 x 1.9 cm, SUV 14.03, previously 12.05  - No new bone lesions but increased metabolic activity previously identified multiple FDG avid osseous foci:  1.  Sternal lesion, SUV 5.7, previously 3.6  2.  Right scapular spine lesion, SUV 4.6, previously 3.6  3.  Right acetabular bone lesion SUV 13.4, previously 11    -Unchanged:  - 1 of 2 left parotid gland lesions, 1.6 x 1.1 cm, SUV 8.4, previously 6.6  - Left axillary lymph node, 1.2 x 1.0 cm, SUV 2.3  - Spleen " normal    -Resolved/improved:  - Left orbital hypermetabolic lesion-resolved  - 1 of 2 previous left parotid hypermetabolic lesions-resolved  - Multiple mildly hypermetabolic bilateral inguinal nodes, index right inguinal lymph node SUV 2.62, previously 5.19-improved    IMPRESSION: In this patient with follicular lymphoma there is evidence  for progression of disease.  1. There are multiple new hypermetabolic lymph nodes.   2. Increased metabolic activity of multiple previously seen lymph  nodes  3. No new bony lesions identified but there is increased metabolic  activity of the previously identified lesions.  4. Multiple stable and some resolved hypermetabolic lesions; resolved  lesions include the left orbit and left parotid gland lesion.    REVIEW OF SYSTEMS:   A 14 point ROS was reviewed with pertinent positives and negatives in the HPI.       HOME MEDICATIONS:  Current Outpatient Medications   Medication Sig Dispense Refill    dorzolamide-timolol (COSOPT) 2-0.5 % ophthalmic solution Place 1 drop into both eyes 2 times daily      latanoprost (XALATAN) 0.005 % ophthalmic solution Place 1 drop into both eyes At Bedtime      losartan (COZAAR) 100 MG tablet Take 1 tablet (100 mg) by mouth daily 90 tablet 3    Multiple Vitamins-Minerals (PRESERVISION AREDS) TABS Take 1 tablet by mouth 2 times daily      rosuvastatin (CRESTOR) 10 MG tablet Take 1 tablet (10 mg) by mouth daily 90 tablet 3         ALLERGIES:  Allergies   Allergen Reactions    Compazine      Mental confusion    Hydrochlorothiazide      Dryness mouth    Prochlorperazine Visual Disturbance    Simvastatin Other (See Comments)     Muscle aches         PAST MEDICAL HISTORY:  Past Medical History:   Diagnosis Date    Breast cancer (H) 2000    Left breast, s/p mastectomy, chemotherapy and endocrine therapy    Follicular lymphoma (H) 01/20/2023    Glaucoma     bilateral - Dr. Moore - [facility]    History of spinal stenosis      Hyperlipidemia     Hypertension     Macular degeneration     Dr. Toscano - [facility]    Osteoarthritis     Osteoporosis     Personal history of chemotherapy     A/C + Taxol    S/P radiation therapy     400 cGy to left orbit completed on 6/15/2023 [facility]         PAST SURGICAL HISTORY:  Past Surgical History:   Procedure Laterality Date    ARTHROPLASTY HIP Left 2023    Procedure: ARTHROPLASTY, HIP, TOTAL LEFT;  Surgeon: Austin Bolod MD;  Location: [facility] OR    ARTHROPLASTY HIP Right 10/13/2023    Procedure: RIGHT ARTHROPLASTY, HIP, TOTAL;  Surgeon: Austin Blood MD;  [facility]    BIOPSY Left 2023    Left Orbital Biopsy    C MASTECTOMY,SIMPLE  2000    left    COLONOSCOPY  2006    Q 10 years    COLONOSCOPY N/A 10/04/2021    Procedure: COLONOSCOPY;  Surgeon: Guru Lyla Ruby MD;  Location: [facility] OR    ESOPHAGOSCOPY, GASTROSCOPY, DUODENOSCOPY (EGD), COMBINED N/A 10/04/2021    Procedure: ENDOSCOPIC ULTRASOUND, ESOPHAGOSCOPY / UPPER GASTROINTESTINAL TRACT (GI), Esophagogastroduodenoscopy,  Fine Needle Biopsy of liver;  Surgeon: Guru Lyla Ruby MD;  Location: [facility] OR    EYE SURGERY      IR LYMPH NODE BIOPSY  2023    SURGICAL HISTORY OF -  Left 2015    tissue expander placed in left breast area    SURGICAL HISTORY OF -  Left 2016    left breast implant and right mammoplasty         SOCIAL HISTORY:  Social History     Socioeconomic History    Marital status: Single     Spouse name: Not on file    Number of children: 0    Years of education: Not on file    Highest education level: Not on file   Occupational History     Employer: [employer]    Occupation: REtired   Tobacco Use    Smoking status: Former     Packs/day: 1.00     Years: 17.00     Additional pack years: 0.00     Total pack years: 17.00     Types: Cigarettes     Quit date: 3/15/1999     Years since quittin.7     Passive exposure:  Past    Smokeless tobacco: Never   Vaping Use    Vaping Use: Never used   Substance and Sexual Activity    Alcohol use: Not Currently     Alcohol/week: 4.0 standard drinks of alcohol     Types: 4 Glasses of wine per week    Drug use: No    Sexual activity: Not Currently     Birth control/protection: None   Other Topics Concern    Parent/sibling w/ CABG, MI or angioplasty before 65F 55M? Yes     Comment: Father heart attack   Social History Narrative    Not on file     Social Determinants of Health     Financial Resource Strain: Unknown (9/21/2023)    Financial Resource Strain     Within the past 12 months, have you or your family members you live with been unable to get utilities (heat, electricity) when it was really needed?: Patient refused   Food Insecurity: Unknown (9/21/2023)    Food Insecurity     Within the past 12 months, did you worry that your food would run out before you got money to buy more?: Patient refused     Within the past 12 months, did the food you bought just not last and you didn t have money to get more?: Patient refused   Transportation Needs: Unknown (9/21/2023)    Transportation Needs     Within the past 12 months, has lack of transportation kept you from medical appointments, getting your medicines, non-medical meetings or appointments, work, or from getting things that you need?: Patient refused   Physical Activity: Not on file   Stress: Not on file   Social Connections: Not on file   Interpersonal Safety: Low Risk  (10/3/2023)    Interpersonal Safety     Do you feel physically and emotionally safe where you currently live?: Yes     Within the past 12 months, have you been hit, slapped, kicked or otherwise physically hurt by someone?: No     Within the past 12 months, have you been humiliated or emotionally abused in other ways by your partner or ex-partner?: No   Housing Stability: Unknown (9/21/2023)    Housing Stability     Do you have housing? : Patient refused     Are you worried  "about losing your housing?: Patient refused       FAMILY HISTORY:  Family History   Problem Relation Age of Onset    Cancer Mother         bone cancer    Heart Disease Father     Coronary Artery Disease Father     Diabetes Father     Arthritis Sister     Breast Cancer Sister         age 75    Diabetes Maternal Grandmother     Diabetes Maternal Grandfather     Diabetes Paternal Grandmother      Family history of cancer- sister breast cancer- dx at age 75 y/o, localized breast cancer, surgical resection, RT, no adjuvant chemo or endocrine therapy that pt is aware of     PHYSICAL EXAM:  Vital signs:  /66 (BP Location: Right arm, Patient Position: Chair, Cuff Size: Adult Regular)   Pulse 66   Ht 1.598 m (5' 2.91\")   Wt 66.7 kg (147 lb)   SpO2 97%   BMI 26.11 kg/m       GENERAL/CONSTITUTIONAL: No acute distress.  EYES: Pupils are equal and round. Extraocular movements intact without nystagmus.  No scleral icterus. Minimal ptosis left eye at site of surgery  RESPIRATORY: Equal chest rise.   MUSCULOSKELETAL: Warm and well-perfused, no cyanosis, clubbing, or edema.   NEUROLOGIC: Cranial nerves are grossly intact. Alert, oriented to person, place and time, answers questions appropriately.  INTEGUMENTARY: No rashes or jaundice.  GAIT: ambulates independently without cane        LABS:   Latest Reference Range & Units 12/01/23 07:56   Sodium 135 - 145 mmol/L 139   Potassium 3.4 - 5.3 mmol/L 4.4   Chloride 98 - 107 mmol/L 103   Carbon Dioxide (CO2) 22 - 29 mmol/L 23   Urea Nitrogen 8.0 - 23.0 mg/dL 18.7   Creatinine 0.51 - 0.95 mg/dL 0.58   GFR Estimate >60 mL/min/1.73m2 >90   Calcium 8.8 - 10.2 mg/dL 9.8   Anion Gap 7 - 15 mmol/L 13   Albumin 3.5 - 5.2 g/dL 4.3   Protein Total 6.4 - 8.3 g/dL 8.1   Alkaline Phosphatase 40 - 150 U/L 158 (H)   ALT 0 - 50 U/L 10   AST 0 - 45 U/L 19   Bilirubin Total <=1.2 mg/dL 0.4   Glucose 70 - 99 mg/dL 81   Lactate Dehydrogenase 0 - 250 U/L 223   WBC 4.0 - 11.0 10e3/uL 9.4 "   Hemoglobin 11.7 - 15.7 g/dL 12.9   Hematocrit 35.0 - 47.0 % 41.1   Platelet Count 150 - 450 10e3/uL 360   RBC Count 3.80 - 5.20 10e6/uL 4.68   MCV 78 - 100 fL 88   MCH 26.5 - 33.0 pg 27.6   MCHC 31.5 - 36.5 g/dL 31.4 (L)   RDW 10.0 - 15.0 % 14.6   % Neutrophils % 70   % Lymphocytes % 19   % Monocytes % 7   % Eosinophils % 3   % Basophils % 1   Absolute Basophils 0.0 - 0.2 10e3/uL 0.1   Absolute Eosinophils 0.0 - 0.7 10e3/uL 0.3   Absolute Immature Granulocytes <=0.4 10e3/uL 0.0   Absolute Lymphocytes 0.8 - 5.3 10e3/uL 1.7   Absolute Monocytes 0.0 - 1.3 10e3/uL 0.7   % Immature Granulocytes % 0   Absolute Neutrophils 1.6 - 8.3 10e3/uL 6.6   Absolute NRBCs 10e3/uL 0.0   NRBCs per 100 WBC <1 /100 0   (H): Data is abnormally high  (L): Data is abnormally low    PATHOLOGY:      IMAGING:  Narrative & Impression   Combined Report of: PET and CT on 12/1/2023 10:30 AM:     1. PET of the neck, chest, abdomen, and pelvis.  2. PET CT Fusion for Attenuation Correction and Anatomical  Localization.  3. Diagnostic CT of the chest, abdomen and pelvis without intravenous  contrast obtained for diagnostic interpretation.  4. 3D MIP and PET-CT fused images were processed on an independent  workstation and archived to PACS and reviewed by a radiologist.     Technique:     1. PET: The patient received 12.34 mCi of F-18-FDG. The serum glucose  was 83 mg/dL prior to administration. Body weight was 66.2 kg. Images  were evaluated in the axial, sagittal, and coronal planes as well as  the rotational whole body MIP. Images were acquired from the cranial  vertex to the feet. During exam there was contrast extravasation of  the patient vein during administration; exam converted to without  contrast.     UPTAKE WAS MEASURED AT 60 MINUTES.      BACKGROUND: Liver SUV max = 3.78, Aorta Blood SUV max = 3.43.      2. CT: Volumetric acquisition for clinical interpretation of the  chest, abdomen, and pelvis acquired at 3 mm sections. The  chest,  abdomen, and pelvis were evaluated at 5 mm sections in bone, soft  tissue, and lung windows .     Contrast and Medications:  IV contrast: None  PO contrast: None.  Additional Medications: 500 mL of water     3. 3D MIP and PET-CT fused images were processed on an independent  workstation and archived to PACS and reviewed by a radiologist.     INDICATION: Follicular lymphoma grade I, unspecified body region (H);  Follicular lymphoma grade i, lymph nodes of multiple sites (H).     ADDITIONAL INFORMATION OBTAINED FROM EMR: Right external iliac lymph  node core needle biopsy on 1/20/2023 positive for follicular lymphoma.  Left orbital mass/lacrimal gland excisional biopsy on 4/6/2023  positive for follicular lymphoma and concurrent monoclonal B-cell  lymphocytosis. Right acetabular CT-guided bone biopsy demonstrating  atypical lymphoid infiltrate without histologic evidence of metastatic  carcinoma. Status post radiation to the left orbit follicular lymphoma  on 6/14/2023-6/15/2023. Remote history of left breast cancer status  post mastectomy and left axillary lymph node dissection in 2000 with 6  years subsequent chemotherapy.     COMPARISON: MRI lumbar spine 11/25/2022, MRI pelvis 12/20/2022, PET  CT: 5/18/2023. Brain MR 1/9/2023     FOLLOW-UP APPOINTMENT: 12/7/2023     FINDINGS:      HEAD/NECK:  There are multiple new and stable previously identified hypermetabolic  lesions. For example, resolution of the previous left orbital  hypermetabolic lesion and one of two of the previous left parotid  gland hypermetabolic lesions. Stable 1.6 x 1.1 cm left parotid gland  lesion, SUV max 8.44, previously 6.63. Right carotid pancreatic lesion  within the CT maxillofacial 0.54 compared to 5.12.     Multiple new hypermetabolic foci for example:  - 0.8 x 0.5 cm right FDG avid cervical lymph node (series 4, image 79)  SUV max of 6.5  - 1.3 x 1.5 cm right FDG avid cervical lymph node (series 4, image 87)  SUV max of 5.8  -  1.3 x 1.4 cm right FDG avid cervical lymph node (series 4, image 85)  SUV max of 7.8      Minimal paranasal sinus mucosal debris. Mastoid air cells are clear.  Mucosal and deep spaces of the neck are unremarkable. Major salivary  glands are unremarkable. Non-FDG avid 1.6 cm right thyroid lobe nodule  is unchanged.        CHEST:  Unchanged 1.2 x 1.0 cm mildly FDG avid left axillary lymph node  (series 2, image 110) SUV max of 2.3. Additionally, there is a  increasing FDG avid 0.5 x 0.4 mm left lateral chest wall FDG avid  lesion immediately inferior margin of the breast implant, SUV max 5.02  (series 2, image 127). Previous maximum SUV was 1.4     Heart is normal in size. Coronary artery calcifications. No  pericardial effusion. Aortic and main pulmonary artery caliber within  normal limits. Esophagus is unremarkable. Thyroid is normal in  appearance. Partially visualized neck soft tissues are within normal  limits. Pleural spaces are clear. Mild basilar atelectasis versus  scarring. Tracheobronchial tree is patent.      ABDOMEN AND PELVIS:  There are increased hypermetabolic foci in the pelvis, for example a  1.5 cm hypermetabolic lymph node immediately adjacent to the left  psoas with SUV max 19.97, previously 6.02 (series 4, image 238).  Previously biopsied right acetabulum with increased FDG uptake, SUV  max 14.03, previously 12.05. This is unchanged in size and today's  study this measures 2.0 cm x 4.9 cm.     Unchanged scattered simple hepatic cysts. No cholelithiasis or  intra-/extrahepatic biliary dilatation. No pancreatic masses or main  duct dilatation. Spleen and adrenal glands are within normal limits.      No renal masses, obstructing calculi or hydronephrosis. Bladder and  reproductive organs are normal.      Stomach within normal limits. Normal caliber small bowel. Large bowel  within normal limits. Appendix is not seen, however no right lower  quadrant fat stranding suggestive of inflammatory process.  No  peritoneal masses, pneumoperitoneum, or ascites.     Asymmetric bilateral breast implants, left greater than right.  Bilateral total hip arthroplasties with associated benign increased  FDG uptake.     LOWER EXTREMITIES:   Multiple mildly hypermetabolic bilateral inguinal lymph nodes with  index right inguinal lymph node, SUV max 2.62, previously 5.19.      BONES:   Multiple previously identified FDG avid osseous foci show increased  metabolic activity.   Sternal lesion on series 4 image 133 SUV max of 5.7 increased compared  to 3.6 .   Right scapular spine lesion SUV max of 4.6 compared to 3.6  Right acetabular bony lesion SUV max of 14.3 compared to 11.0     Bilateral total hip joint replacements with increased surrounding  hypermetabolism. Multilevel degenerative changes of the spine.                                                                          IMPRESSION: In this patient with follicular lymphoma there is evidence  for progression of disease.  1. There are multiple new hypermetabolic lymph nodes.   2. Increased metabolic activity of multiple previously seen lymph  nodes  3. No new bony lesions identified but there is increased metabolic  activity of the previously identified lesions.  4. Multiple stable and some resolved hypermetabolic lesions; resolved  lesions include the left orbit and left parotid gland lesion.        I have personally reviewed the examination and initial interpretation  and I agree with the findings.     BARRINGTON FOURNIER MD        ASSESSMENT/PLAN:  Virginia Byers is a 73 year old female with:    # 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 INCLUDING biopsy proven LN and left orbital mass/lacrimal gland involvement and suspected parotid gland involvement   -11/22 MRI lumbar spine in 12/22 MRI pelvis show prominent left iliac lymph nodes (measuring 1.8 x 3.2, previously 1.5 x 2.7 cm), right medial thigh lymphadenopathy (1.7cm), right iliac bone enhancing marrow signal, left posterior  "iliac enhancing lesion  -12/22 CT chest abdomen pelvis shows new or increased left common iliac (1.2cm), external iliac, pelvic sidewall lymphadenopathy (2.1cm) and sclerotic lesions in left sacrum and left posterior ilium. No splenomegaly  - 1/23 brain MRI: Indeterminate ovoid T2 hyperintense enhancing mass in the region of the left lacrimal gland measuring approximately 2.4 cm AP by 1.4 cm transverse by 2.3 cm craniocaudal associated with/replacing the left lacrimal gland. Consider correlation with tissue sampling  - 1/23 CT soft tissue neck: Soft tissue nodule in the superficial left parotid gland measuring 1.2 cm. Additional 0.8 cm nodule in the deep portion of the left parotid gland. Soft tissue nodule in the superficial right parotid gland measuring 0.8 cm. These may represent benign or malignant parotid lesions. Multiplicity of lesions raises concern for Warthin tumors  - 1/27/23 note from Dr. Wilmar Morales- \"Based on this diagnosis, I think the 2 nodules in her parotid gland are likely atypical lymph nodes containing lymphoma.  She will probably require some staging imaging, such as PET scan and we can take a look at that as well.  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 CD5 stains T cells  - Ki-67 proliferative index is approximately 10%, also low in the follicles.  - flow cytometry \"CD10-positive population is consistent with a clonal B cell population and a CD10 positive B-cell lymphoma is favored.\"    - 4/6/23 left orbital mass/lacrimal gland excisional biopsy: " PATHOLOGY: - Follicular Lymphoma, low grade ([facility] negative for BCL2 rearrangement). 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  The flow cytometry from 1/20/23 right external iliac LN biopsy showed in addition to CD10 positive B-cell population  also a partial/equivocal CD5 positive separate kappa monotypic B-cell population, which most likely represents monoclonal B-cell lymphocytosis of CLL/SLL type, since it was also documented in the peripheral blood flow cytometry on February 16, 2023 (case number UF ) at a low frequency.  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  - Cohort 2 which is described as negative for CD5, CD10, ,  and showing dim CD20 and dim kappa staining   Of note the population of monoclonal B-cell lymphocytosis found in peripheral blood in our department and in the lymph node biopsy in January showed partial/equivocal dim CD5 and may correspond to the cohort #2.     -5/23 PET/CT NCAP:   1. An ovoid mass in the left lateral orbit measures 2.5 x 1.2 cm, slightly larger than 01/09/2023, and demonstrates marked uptake (SUVmax 9.3), consistent with biopsy-proven lymphoma  2. A few small FDG avid bilateral intraparotid nodules are present, one on the right and two on the left, including representative left parotid nodule measuring 0.9 x 1.4 cm with moderate uptake (SUVmax 6.7).  3.  Asymmetric prominent right posterior lower cervical node measures 0.8 cm associated with moderate focal uptake (SUVmax  5.1).  4. A single mildly enlarged left axillary lymph node measures 1.0 cm associated with mild uptake (SUVmax 3.0)  5. Some scattered FDG avid lymph nodes below the diaphragm involve the retroperitoneal retrocaval, bilateral common iliac, left pelvic sidewall and left inguinal regions. Some of these have increased slightly in size since 12/28/2022, such as a conglomerate left pelvic sidewall bj mass measuring approximately 2.2 x 4.1 cm (previously 2.2 x 3.4 cm) associated with marked uptake (SUVmax 7.6).  6. FDG avid sclerotic skeletal lesion involves the right superior acetabulum (SUVmax 12.9) as well as a couple of separate smaller sclerotic lesions in the right iliac bone uptake (SUVmax 12.2).     - 6/23 bone marrow biopsy:  MORPHOLOGY:  - No morphologic or immunophenotypic evidence of follicular lymphoma  - Flow cytometry showing a small population of CD5 positive monotypic B cells; most compatible with monoclonal B lymphocytosis (There is no definitive morphologic correlate for this finding. There is no morphologic or immunophenotypic evidence of involvement by follicular lymphoma.  The CD5-positive B cell clone may best represent monoclonal B lymphocytosis, provided that bj SLL is excluded.)  - Normocellular marrow (cellularity estimated at 30%) with trilineage hematopoietic maturation and no increase in blasts  FLOW CYTOMETRY:  CD5 positive kappa-monotypic B cells (0.8%)  0.8% B cells which express CD5, CD19, CD20 (dim to negative) and monotypic kappa immunoglobulin light chains (dim) and lack CD10 and CD38. The B cells have similar forward scatter relative to background T cells suggestive of similar size; however, precise size determination is deferred to morphology  CYTOGENETICS:  46,XX    -6/19/2023 CT-guided bone biopsy, right acetabulum  PATHOLOGY:  - Atypical lymphoid infiltrate in a suboptimal biopsy  - No histologic evidence of metastatic carcinoma  - Two small monoclonal B-cell populations  detected by flow cytometry: CD5 positive kappa-monotypic B cells ( 2.8 %, CD5 partial, dim, CD19 positive, CD30 dim to negative, CD10 negative); CD10 positive lambda-monotypic B cells ( 0.4 %, CD10 positive, CD19 slightly dim, CD20 positive, CD5 negative)     - option of systemic treatment including rituximab discussed previously on multiple occassions particularly w/left orbital involvement, see my previous notes for details; pt deferred  - 6/14/2023- 6/15/2023 palliative radiation to left orbit follicular lymphoma 4Gy/2fx w/Dr. Julian Horvath    - 12/23 PET CT NCAP Liver SUV max = 3.78, Aorta Blood SUV max = 3.43.  -New/progressed:  - 0.8 x 0.5 cm right FDG avid cervical lymph node; SUV max of 6.5  - 1.3 x 1.5 cm right FDG avid cervical lymph node; SUV max of 5.8  - 1.3 x 1.4 cm right FDG avid cervical lymph node; SUV max of 7.8   -Lesion in left lateral chest wall immediately inferior to the margin of breast implant, 0.5 x 0.4 mm, SUV 5.02, previously 1.4  - Lymph node adjacent to left psoas, 1.5 cm, SUV 19.9, previously 6.02  - Right acetabulum, 2.0 x 1.9 cm, SUV 14.03, previously 12.05  - No new bone lesions but increased metabolic activity previously identified multiple FDG avid osseous foci:  1.  Sternal lesion, SUV 5.7, previously 3.6  2.  Right scapular spine lesion, SUV 4.6, previously 3.6  3.  Right acetabular bone lesion SUV 13.4, previously 11    -Unchanged:  -1 of 2 left parotid gland lesions, 1.6 x 1.1 cm, SUV 8.4, previously 6.6  - Left axillary lymph node, 1.2 x 1.0 cm, SUV 2.3  -Spleen normal    -Resolved/improved:  - Left orbital hypermetabolic lesion-resolved  - 1 of 2 previous left parotid hypermetabolic lesions-resolved  - Multiple mildly hypermetabolic bilateral inguinal nodes, index right inguinal lymph node SUV 2.62, previously 5.19-improved    Staging and prognostication:  - Stage: 4 given right medial thigh SURINDER and left common iliac, external iliac, pelvic sidewall; biopsy proven left orbital  mass involvement; there was some concern from ENT that patients parotid lesions were related to pts follicular lymphoma   - FLIPI: score 2 (age>60),  stage 3-4; intermediate risk  - pertinent labs: 12/22  and 5/23 ; 2/23 and 5/23 beta 2 microglobulin 1.7, hepatitis B and C negative    Treatment:  - GELF criteria to indicate treatment (involvement of 3 or more bj sites each with a diameter greater than or equal to 3 cm, any bj or extranodal tumor mass with a diameter of greater than or equal to 7 cm, B symptoms, splenomegaly, pleural effusions, peritoneal ascites, clinically progressive or significant cytopenias, leukemia, symptoms, threatened end organ function, clinically significant bulky disease, steady or rapid progression of disease)  - option of systemic treatment including rituximab discussed previously on multiple occassions particularly w/left orbital involvement, see my previous notes for details; pt deferred  - 6/14/2023- 6/15/2023 palliative radiation to left orbit follicular lymphoma 4Gy/2fx    PLAN:  - pt has 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 INCLUDING biopsy proven LN and left orbital mass/lacrimal gland involvement   - bone marrow biopsy negative for lymphoma and right acetabulum bone biopsy atypical lymphoid infiltrate seen but biopsy suboptimal and flow cytometry shows 2.8% CD5 positive kappa monotypic B cells consistent w/monoclonal B cell lymphocytosis and 0.4% CD10 positive lambda-monotypic B cells consistent w/lymphoma- ie follicular vs other  - overall stage 4 disease and intermediate risk  - systemic tx including rituximab discussed on multiple prior visits, pt deferred as detailed in my previous notes   - pt completed palliative RT to left orbit; use lubricating eye drops prn (if also using eye drops for glaucoma)  - 12/23 CBC and CMP WNL,  (similar to prior); pt asx   - 12/23 PET CT NCAP Shows progression of disease with several new lymph nodes noted as well as  progression of previous lymph nodes, most significant of which is a lymph node adjacent to the left psoas, 1.5 cm, SUV 19.9, previously 6.02  - I am concerned about possible transformation into a more aggressive lymphoma given SUV 19.9  - Needs STAT biopsy of lymph node adjacent to the left psoas and will d/w IR if they can do a bone biopsy w/deep bone biopsy w/enough specimen to send for bone marrow biopsy studies as well  - of note, pt has also had recent surgeries with b/l hip replacements (left hip replacement 7/23 and right hip replacement 10/23), I do not think this is contributing to the lymphadenopathy we are seeing above  - Given above findings, discussed that we will likely need to start systemic therapy though the exact regimen and schedule will depend on biopsy findings  - check G6PD, uric acid for baseline  - will refer to [facility] for formal second opinion from our lymphoma experts    #Sclerotic lesions in left sacrum and left posterior ilium  -10/21 mammogram, endoscopy with EUS and colonoscopy WNL  -12/22 CT chest abdomen pelvis shows new or increased left common iliac, external iliac, pelvic sidewall lymphadenopathy and sclerotic lesions in left sacrum and left posterior ilium.  No metastatic disease in chest.  No abnormal findings in breast.  - 12/22 tumor markers: CA 15-3 24,  29, CEA 3.1, CA 19-9 4, AFP 5.9,   - 1/2023 MRI brain w/wo contrast: no metastases   - 1/2023 right external iliac LN core need biopsy- no metastatic carcinoma   - 5/23 PET avid bone lesions  -6/19/2023 CT-guided bone biopsy, right acetabulum  PATHOLOGY:  - Atypical lymphoid infiltrate in a suboptimal biopsy  - No histologic evidence of metastatic carcinoma  - Two small monoclonal B-cell populations detected by flow cytometry: CD5 positive kappa-monotypic B cells ( 2.8 %, CD5 partial, dim, CD19 positive, CD30 dim to negative, CD10 negative); CD10 positive lambda-monotypic B cells ( 0.4 %, CD10 positive, CD19  slightly dim, CD20 positive, CD5 negative)   - 12/23 PET   - Right acetabulum, 2.0 x 1.9 cm, SUV 14.03, previously 12.05  - No new bone lesions but increased metabolic activity previously identified multiple FDG avid osseous foci:  1.  Sternal lesion, SUV 5.7, previously 3.6  2.  Right scapular spine lesion, SUV 4.6, previously 3.6  3.  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   - 1/23 right external iliac LN core needle biopsy- FLOW CYTOMETRY: 1.3% rare monoclonal B cell population CD5 equivocal, CD23 negative, CD 79b negative, possible MBL; no morphological correlate  -2/23 peripheral blood- FLOW CYTOMETRY: 2.3% kappa monoclonal B cell population, CD5 positive, CD38 negative, CD49d negative, possible MBL  - 4/6/23 left orbital mass/lacrimal gland excisional biopsy: FLOW CYTOMETRY: 15.1% kappa monotypic B cell population, bright positive: CD45, CD19, CD22, moderately positive kappa, CD20, CD79b, dim positive CD23, negative CD 2,3,103, lambda, 5, 10, 200, 38, 43  - 6/23 bone marrow biopsy: FLOW CYTOMETRY: 0.8% kappa monotypic B cell population, positive for CD5, CD19, dim to negative CD20, negative for CD38 and CD10  - 6/23 right acetabulum bone biopsy: FLOW CYTOMETRY: 2.8% kappa monotypic B cell population, CD5 partial dim, CD 19 positive, CD20 dim negative, CD10 negative, possible MBL    - of note pt does not have leukocytosis or lymphocytosis, ALC consistently <5000, no splenomegaly, LN biopsy did not demonstrate IHC consistent with SLL  - 5/23 PET/CT as above    PLAN:  - 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    -4/2020 left breast simple mastectomy with no residual carcinoma.   -S/p ACx4, taxol x4, no anti-her2 treatment, no RT.   -S/p 5 years adjuvant endocrine therapy (tamoxifen and anastrozole).  -Delayed left breast reconstruction and right breast augmentation.   - 5/23 PET/CT notes single enlarged left axillary lymph node with SUV 3, \"could also represent lymphoma although given patient's history of left breast cancer, a breast cancer metastasis is also a possibility\"  - The above in the setting of known lymphoma and breast tumor markers normal   -9/23 right breast diagnostic mammogram: [facility]  - 9/23 ultrasound left axilla: 1.1 x 0.9 x 1.0 cm lymph node with mildly thickened cortex that correlates with abnormal lymph node on previous PET, recommend tissue diagnosis  - 9/23 ultrasound-guided left axillary lymph node core biopsy: No morphologic evidence of lymphoma.  Lymph node tissue was received in formalin, this could not be sent for flow cytometry.  No separate fresh tissue was sent for flow cytometry.  - 12/23 PET Left axillary lymph node, 1.2 x 1.0 cm, SUV 2.3, unchanged    PLAN:   -With the limitations of tissue processed in formalin and no fresh tissue available for flow cytometry, appears left axilla lymph node is benign without lymphoma  - Monitor clinically      RTC 1 month for follow up with me, labs prior to appt (after biopsy resulted and second opinion completed)    ADDENDUM:  D/w IR- they will " be able to biopsy the mass at the left psoas (LN is caged and difficult to access) and at the same time will do a bone marrow biopsy. Liza will order surg path and flow cytometry for mass and bone marrow biopsy and I will put in the studies we need for the bone marrow biopsy. I have called the patient and updated her on this and she is agreeable to the above    Naomy DO Dejuan  Hematology/Oncology  [facility]

## 2023-12-07 NOTE — NURSING NOTE
"Oncology Rooming Note    December 7, 2023 10:13 AM   Virginia Byers is a 73 year old female who presents for:    Chief Complaint   Patient presents with    Oncology Clinic Visit     PET Results     Initial Vitals: /66 (BP Location: Right arm, Patient Position: Chair, Cuff Size: Adult Regular)   Pulse 66   Ht 1.598 m (5' 2.91\")   Wt 66.7 kg (147 lb)   SpO2 97%   BMI 26.11 kg/m   Estimated body mass index is 26.11 kg/m  as calculated from the following:    Height as of this encounter: 1.598 m (5' 2.91\").    Weight as of this encounter: 66.7 kg (147 lb). Body surface area is 1.72 meters squared.  No Pain (0) Comment: Data Unavailable   No LMP recorded. Patient is postmenopausal.  Allergies reviewed: Yes  Medications reviewed: Yes    Medications: Medication refills not needed today.  Pharmacy name entered into [employer]: [id number] - [place], [place] - [address] [facility]    Clinical concerns: NO  Dr. Zaidi was notified.      Linda Landry CMA              "

## 2023-12-07 NOTE — CONSULTS
Outpatient IR Biopsy Referral    Patient is a 74 y/o female with a PMH tobacco use, of breast cancer, HTN, HDL, osteoporosis, osteoarthritis, degenerative disc disease, spinal stenosis of lumbar region,  s/p bilateral L5-S1 transforaminal ROS by Dr. Garcia on 7/9/2021, s/p bilateral L4-5 and L5-S1 facet joint injections on 6/14/2021, glaucoma and macular degeneration, cataracts s/p surgery, follicular lymphoma w/PET showing left psoas LN increased uptake from 6 to 20; increasing uptake in sclerotic bone lesions (previous bone biopsy suboptimal). IR has been asked to biopsy Left psoas LN and deep bone biopsy with enough to send for bone marrow biopsy at the same time.    6/15/23 BMBX Left:  inadequate sampling per referring team.             PET CT 12/1/23 IMPRESSION: In this patient with follicular lymphoma there is evidence  for progression of disease.  1. There are multiple new hypermetabolic lymph nodes.   2. Increased metabolic activity of multiple previously seen lymph  nodes  3. No new bony lesions identified but there is increased metabolic  activity of the previously identified lesions.  4. Multiple stable and some resolved hypermetabolic lesions; resolved  lesions include the left orbit and left parotid gland lesion.    ABDOMEN AND PELVIS:  There are increased hypermetabolic foci in the pelvis, for example a  1.5 cm hypermetabolic lymph node immediately adjacent to the left  psoas with SUV max 19.97, previously 6.02 (series 4, image 238).    Case and imaging PET CT 12/1/23 was reviewed with Dr. Sanchez from IR and CT guided biopsy of the large left pelvic mass/lymph node and bone marrow biopsy is approved. Series 4 Image 261, 258. IR provider to review day of procedure anterior vs posterior approach to avoid vessels.  Dicussed with Dr. Zaidi who is in agreement with approved biopsies.       Procedure order, surgical pathology and leukemia lymphoma orders placed. Referring team to enter BMBX orders.      Patient is not on AC at time of referral.     If requesting team would like samples sent for anything else please enter them prior to scheduled procedure.    Primary team Dr. Zaidi ONC  made aware of IR recommendations via epic messaging.    Liza Walters DNP, APRN  Interventional Radiology   IR on-call pager: [phone]

## 2023-12-07 NOTE — LETTER
12/7/2023         RE: Virginia Byers  [id number] [facility] [address]        Dear Colleague,    Thank you for referring your patient, Virginia Byers, to the [facility]. Please see a copy of my visit note below.    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    Patient has medical history including LEFT breast cancer in 2000, hypertension, hyperlipidemia, osteoporosis, osteoarthritis, degenerative disc disease, spinal stenosis of lumbar region,  s/p bilateral L5-S1 transforaminal ROS by Dr. Garcia on 7/9/2021, s/p bilateral L4-5 and L5-S1 facet joint injections on 6/14/2021, glaucoma and macular degeneration, cataracts s/p surgery, history of tobacco use (quit 1999)     Regarding previous history of breast cancer:  She was previously treated by Dr. Sakina Brandt at [facility]      In summary, diagnosed at age 49 with LEFT breast IDC ER positive, ND positive, HER2 positive on FISH. 3/2000 left breast lumpectomy and left axillary node dissection (IDC, grade 3, 1.45 cm incompletely excised tumor, DCIS, positive margins with infiltrating carcinoma, 1 of 17 lymph nodes positive (0.5 cm metastatic focus), ER positive, ND positive, HER2 positive).  4/2020 left breast simple mastectomy with no residual carcinoma. S/p ACx4, taxol x4, no anti-her2 treatment, no RT. S/p 5 years adjuvant endocrine therapy (tamoxifen and anastrozole). Delayed left breast reconstruction and right breast augmentation.      3/2/2000  Excisional biopsy of Left breast mass -   Infiltrating ductal carcinoma with associated DCIS   ER positive (60%), ND positive (22%), HER2 by FISH POSITIVE     3/10/2000 - Left  lumpectomy and left axillary node  Invasive ductal carcinoma, Grade 3, 1.45 cm (incompletely excised), negative for LVI  DCIS, greatest histologic dimension of tumor (DCIS PLUS invasive tumor) = 2.9 cm (estimate 50% DCIS)  Positive margins- infiltrating carcinoma extends to margin  1 of 17 axillary lymph nodes sampled was positive for metastatic focus of 0.5 cm.  ER/AK+     Sections of lumpectomy left breast showing residual 1.1 cm focus of ductal carcinoma in situ (DCIS), present within microns of the linked margin of resection.       4/11/2000 - Left breast simple mastectomy, negative for residual carcinoma  Left mastectomy on 4/11/2000 performed by Dr. Cristian Mcdonald at [facility].      Adjuvant treatment:  - 5/15/2000-7/24/2000 4 cycles of Adriamycin and Cytoxan   - 8/16/2000-10/18/2000 4 cycles of paclitaxel   - Appears she may have been on some kind of trial/protocol, was not given any anti-HER2 targeted treatment  - No adjuvant radiation  - 12/2000- 10/2002 tamoxifen  - 10/2002-5/2006 switched to anastrozole (due to in vitro studies showing anastrozole may be slightly better for patients were HER2 positive) with Fosamax for osteopenia     8/31/2015 Left delayed reconstruction with tissue expander    2/3/2016 Left 2nd stage 450 cc high profile silicone implant; right augmentation 150 cc Moderate classic profile silicone implant; cresent mastopexy    10/18/2021: Screening mammogram right breast ZACKARY    OTHER:  Of note, patient has documentation of anemia (iron deficiency and anemia of chronic disease), elevated LFTs, nausea and vomiting of all solids and liquids, fatigue with 26 pound unintentional weight loss from 7/20/2021 - 10/20/2021 with mildly elevated LFTs which led to evaluation with mammogram, endoscopy with EUS and colonoscopy as detailed below. Pt did associate these with some spinal injections she had gotten, due to overlapping times. Pt did regain all of the weight and hand improvement of  anemia within 2 months, without significant intervention.     -10/2021: Endoscopy with EUS normal EGD and no stigmata or source of upper GI bleeding, visualized bile duct, gallbladder, liver, pancreas, left adrenal gland normal.  No lymphadenopathy. LIVER, RANDOM NEEDLE BIOPSY: Benign parenchyma with congestion; no significant fibrosis or other abnormalities     -10/2021 colonoscopy: Hemorrhoids, diverticulosis in sigmoid colon and splenic flexure    Current history:  -Ongoing right knee pain not improved with brace and physical therapy, s/p bilateral L5-S1 transforaminal ROS by Dr. Garcia on 7/9/2021, s/p bilateral L4-5 and L5-S1 facet joint injections on 6/14/2021. ongoing b/l hip and leg pain, worse with position changes (ie sitting to standing), constant, 8-9/10, tylenol brings pain down to 6/10 (using tylenol bid). ECOG 3.   -Patient has seen neurosurgery Dr. Jose Alexander, occupational medicine specialist Dr.Orrin Murphy, with plan to see PM&R Dr. Margie Agudelo and movement disorder clinic  - 11/22 MRI lumbar spine:    prominent left iliac lymph nodes among other findings related to degenerative changes and neural foraminal stenosis throughout lumbar spine    - 12/22 MRI pelvis:  1. Enhancing marrow signal abnormality of the right iliac bone extending from the superior acetabulum subchondral location proximally almost to the level of the sacroiliac joint caudal aspect. No associated fracture line. Underlying marrow infiltrative process such as from metastasis cannot be excluded especially given the degree of T1 hypointensity and history of primary tumor. Other consideration include stress reaction.  2. Mildly increased left external iliac chain, and the right medial thigh lymphadenopathy. Metastasis cannot be excluded.  3. Left posterior iliac enhancing lesion, similar in size to prior CT 9/2021 and previously not visible on CT, focal enhancing lesion in the right greater trochanter laterally. Findings are  concerning for Metastasis.    -12/22 CT chest abdomen pelvis with contrast:  COMPARISON: MRI pelvis 12/20/2022 and CT abdomen pelvis 9/21/2021.  1. Mastectomies with implant reconstructions. No lymphadenopathy  2.  There is an new or increased left common iliac, external iliac and pelvic sidewall lymphadenopathy since prior CT. Distal left common iliac lymph nodes measure up to 1.2 cm on this exam compared to 0.7 cm on prior CT. Left pelvic sidewall lymph node measures 2.1 cm short axis on this exam compared to 1.2 cm on prior CT . consistent with metastatic disease.  3. Sclerotic lesions in the left sacrum and left posterior ilium are unchanged from prior CT.  may be metastatic.  4. No evidence of metastatic disease in the chest.     -1/2023 MRI brain w/wo contrast:  IMPRESSION:  1. No findings of intracranial metastatic disease.  2. Indeterminate ovoid T2 hyperintense enhancing mass in the region of the  left lacrimal gland measuring approximately 2.4 cm AP by 1.4 cm  transverse by 2.3 cm craniocaudal associated with/replacing the left  lacrimal gland. Consider correlation with tissue sampling.  3. A few indeterminate partially visualized heterogeneous enhancing  Nodules measuring up to approximately 1.5 cm in the bilateral parotid glands.   Consider soft tissue neck CT with contrast for further evaluation.    -1/20/2023: IR US Guided biopsy of a right external iliac LN  Final Diagnosis   A.  LYMPH NODE, RIGHT EXTERNAL ILIAC, CORE NEEDLE BIOPSY AND TOUCH IMPRINT:  -Involved by follicular lymphoma, low-grade (WHO grade 1-2)  -Negative for metastatic carcinoma     The morphologic and immunophenotypic patterns are consistent with follicular lymphoma.  There is no high-grade lymphoma present in this limited specimen.  Concurrent flow study ([id number]) demonstrated CD10-positive lambda monotypic B cells (22%), rare kappa monotypic B cells (1.3%), and no aberrant immunophenotype on T cells.  A FISH study for Bcl-2 is  pending and will be reported separately.     The case was initially reviewed by Dr. Cifuentes (breast pathology).     This case was reviewed in part at our Hematopathology Faculty Consensus conference at which Girish Nolasco, Narciso, and Kayode were present in addition to myself.  This is a small needle core.  In some of the areas, the CD10+ B cells are confined to follicles.  Though the possibility of in situ follicular neoplasia was discussed, we still favor a diagnosis of follicular lymphoma - there are a number of CD10+ B cells outside of follicles at the base of the biopsy, and the clinical history supports a greater extent of involvement.     There was a tiny kappa monotypic B cell population identified by flow (separate from the CD10+ clone).  We looked for the morphologic correlate in this case by IHC, but it is not readily apparent.  This may represent a monoclonal B lymphocytosis - consider sending a peirpheral blood sample for flow cytometry.    FISH:  RESULTS:     NORMAL  - No rearrangement of BCL2     INTERPRETATION:  No evidence was found by FISH of rearrangement of the BCL2 (18q21) locus. These findings are not helpful for confirming or further characterizing the reported pathologic diagnosis of follicular lymphoma.    Flow Interpretation   A. Lymph Node(s), :  -CD10-positive lambda monotypic B cells (22%)  -Rare kappa monotypic B cells (1.3%)  -No aberrant immunophenotype on T cells  See comment   Electronically signed by Krista Headley MD on 1/24/2023 at  3:10 PM   Comment     The CD10-positive population is consistent with a clonal B cell population and a CD10 positive B-cell lymphoma is favored.      In addition, a second clonal B-cell population is identified which has a CLL-like immunophenotype except that CD5 expression is equivocal, this may represent a monoclonal B-cell lymphocytosis (MBL).     Addendum   INTERPRETATION  The diagnosis remains the same (see comment)     REASON FOR  ADDENDUM  This addendum is issued to reflect additional testing performed on this case. See Comment.      COMMENT  Additional multi-color flow analysis was performed for the following markers:   CD23, CD79b     The CD5 (dimly) positive B cells mostly lack CD23 and CD79b        1/23/23 CT neck:  A) Heterogeneous nodule in the right thyroid gland measuring up  to 1.6 cm.   B) Soft tissue nodule in the superficial left parotid  gland measuring 1.2 cm. Additional 0.8 cm nodule in the deep portion  of the left parotid gland. Soft tissue nodule in the superficial right  parotid gland measuring 0.8 cm. These may represent benign or malignant parotid lesions. Multiplicity of lesions raises concern for Warthin tumors. Otolaryngology consultation is  recommended.  C) Prominent soft tissue around the major arteries in the neck  particularly the common and internal carotid arteries. This is  atypical for atherosclerotic disease and vasculopathy/vasculitis such  as giant cell arteritis should be considered. Probable superimposed  atherosclerotic disease at the carotid bifurcations right greater than  left without definite high-grade stenosis.    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  And B-cell cohort #2 which is described as negative for CD5 and showing dim CD20 and dim kappa staining.  This population is listed a CD5 negative and CD10 negative also listed as negative for   and .  Of note the population of monoclonal B-cell lymphocytosis found in peripheral blood in our department and in the lymph node biopsy in January showed partial/equivocal dim CD5 and may correspond to the cohort #2.     We essentially agree with the diagnosis of follicular lymphoma, which is consistent with prior diagnosis of follicular lymphoma low grade  established on core biopsy of right external iliac lymph node January 20, 2023 (case [id number]) in our Department . The concurrent flow cytometry case [id number] in January showed in addition to CD10 positive B-cell population  also a partial/equivocal CD5 positive separate kappa monotypic B-cell population, which most likely represents monoclonal B-cell lymphocytosis of CLL/SLL type, since it was also documented in the peripheral blood flow cytometry on February 16, 2023 (case number [facility] ) at a low frequency.  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 Areas with infiltrates of small B cells with coexpression of CD5 and CD23 are seen in the current biopsy, but the size of the biopsy is too small to comprehensively evaluate for the presence of proliferation centers and the extent of involvement by the CD5 positive B cell lymphoproliferative process.   This case has been discussed at intradepartmental hematopathology conference with participation by THELMA Petersen, THELMA Davis and myself.      -5/23 PET/CT NCAP:   1. An ovoid mass in the left lateral orbit measures 2.5 x 1.2 cm, slightly larger than 01/09/2023, and demonstrates marked uptake (SUVmax 9.3), consistent with biopsy-proven lymphoma  2. A few small FDG avid bilateral intraparotid nodules are present, one on the right and two on the left,  including representative left parotid nodule measuring 0.9 x 1.4 cm with moderate uptake (SUVmax 6.7).  3.  Asymmetric prominent right posterior lower cervical node measures 0.8 cm associated with moderate focal   uptake (SUVmax 5.1).  4. A single mildly enlarged left axillary lymph node measures 1.0 cm associated with mild uptake (SUVmax 3.0)  5. Some scattered FDG avid lymph nodes below the diaphragm involve the retroperitoneal retrocaval, bilateral common iliac, left pelvic sidewall and left inguinal regions. Some of these have increased slightly in size since 12/28/2022, such as a conglomerate left pelvic sidewall bj mass measuring approximately 2.2 x 4.1 cm (previously 2.2 x 3.4 cm) associated with marked uptake (SUVmax 7.6).  6. FDG avid sclerotic skeletal lesion involves the right superior acetabulum (SUVmax 12.9) as well as a couple of separate smaller sclerotic lesions in the right iliac bone uptake (SUVmax 12.2).     - 6/14/2023- 6/15/2023 palliative radiation to left orbit follicular lymphoma 4Gy/2fx  - 6/15/2023 bone marrow biopsy  MORPHOLOGY:  - No morphologic or immunophenotypic evidence of follicular lymphoma  - Flow cytometry showing a small population of CD5 positive monotypic B cells; most compatible with monoclonal B lymphocytosis (There is no definitive morphologic correlate for this finding. There is no morphologic or immunophenotypic evidence of involvement by follicular lymphoma.   The CD5-positive B cell clone may best represent monoclonal B lymphocytosis, provided that bj SLL is excluded.)  - Normocellular marrow (cellularity estimated at 30%) with trilineage hematopoietic maturation and no increase in blasts  FLOW CYTOMETRY:  CD5 positive kappa-monotypic B cells (0.8%)  0.8% B cells which express CD5, CD19, CD20 (dim to negative) and monotypic kappa immunoglobulin light chains (dim) and lack CD10 and CD38. The B cells have similar forward scatter relative to background T cells  suggestive of similar size; however, precise size determination is deferred to morphology  CYTOGENETICS:  46,XX    -6/19/2023 CT-guided bone biopsy, right acetabulum  PATHOLOGY:  - Atypical lymphoid infiltrate in a suboptimal biopsy  - No histologic evidence of metastatic carcinoma  - Two small monoclonal B-cell populations detected by flow cytometry  This biopsy is suboptimal for evaluation due to fragmentation, crush artifact, and decalcification. In this limited biopsy, there are fragments of bone and marrow showing a mixed infiltrate that includes both B and T cells. Notably, 2 small clonal B-cell populations were detected by flow cytometry which resemble B-cell populations detected in this patient's past lymph node (1/20/23) and blood and bone marrow (2/16/23, 6/15/23) flow cytometry studies. A definitive morphologic correlate to these cell populations is not appreciated, and the overall features are insufficient for a diagnosis of lymphoma.    FLOW CYTOMETRY:  A. Pelvis, Right:  -CD5 positive kappa-monotypic B cells ( 2.8 %) - see comment A  -CD10 positive lambda-monotypic B cells ( 0.4 %) - see comment B      Electronically signed by Arnaldo Alvarado MD on 6/20/2023 at  1:31 PM   Comment     A) Clonal B cells with the immunophenotype of CLL/SLL are present in this sample.  The differential includes peripheral blood contamination (with an MBL), the tissue based correlate to MBL, versus involvement by SLL.  2.8% B cells which express CD5 (partial, dim), CD19, CD20 (dim to negative) and monotypic kappa immunoglobulin light chains (dim) and lack CD10. The B cells have similar forward scatter relative to background T cells suggestive of similar size; however, precise size determination is deferred to morphology.     B) A CD10-positive B cell clone is also present - involvement by the patient's previously diagnosed CD10+ B cell lymphoma is in the differential (follicular lymphoma versus other (transformation  "to a higher grade lymphoma). 0.4% B cells which express CD10, CD19 (slightly dim), CD20, and monotypic lambda immunoglobulin light chains and lack CD5.  The B cells have increased forward scatter relative to background T cells suggestive of increased size; however, precise size determination is deferred to morphology.        - 7/10/23 Rheumatology consult for ESR and CRP elevated, DEVON positive 1:160 speckled; PCP suspects pt may have PMR; \"I suspect that the bilateral hip OA is altering gait that then results in worsening of low back pain. No further rheumatology workup needed at this time. \"    - 7/11/23 left hip total arthroplasty with severe b/l hip osteoarthritis with Dr. Austin Blood,  cc, discharged with DVT prophylaxis Lovenox 40 mg daily x2 weeks and aspirin 81 mg twice daily x2 weeks    INTERIM HISTORY:  Regarding lymphoma:  Denies weight loss, night sweats, early satiety, RUQ or LUQ pain  Increased activity overall, especially s/p 10/13/23 right hip total arthroplasty   Reports left eye blurriness is stable since surgery, not worsened since RT, using lubricating eye drops prn and working with eye doctor for management of glaucoma, reports she had laser treatment to left eye that helped with blurred vision    - 12/23 PET CT NCAP Liver SUV max = 3.78, Aorta Blood SUV max = 3.43.  -New/progressed:  - 0.8 x 0.5 cm right FDG avid cervical lymph node; SUV max of 6.5  - 1.3 x 1.5 cm right FDG avid cervical lymph node; SUV max of 5.8  - 1.3 x 1.4 cm right FDG avid cervical lymph node; SUV max of 7.8   -Lesion in left lateral chest wall immediately inferior to the margin of breast implant, 0.5 x 0.4 mm, SUV 5.02, previously 1.4  - Lymph node adjacent to left psoas, 1.5 cm, SUV 19.9, previously 6.02  - Right acetabulum, 2.0 x 1.9 cm, SUV 14.03, previously 12.05  - No new bone lesions but increased metabolic activity previously identified multiple FDG avid osseous foci:  1.  Sternal lesion, SUV 5.7, " previously 3.6  2.  Right scapular spine lesion, SUV 4.6, previously 3.6  3.  Right acetabular bone lesion SUV 13.4, previously 11    -Unchanged:  - 1 of 2 left parotid gland lesions, 1.6 x 1.1 cm, SUV 8.4, previously 6.6  - Left axillary lymph node, 1.2 x 1.0 cm, SUV 2.3  - Spleen normal    -Resolved/improved:  - Left orbital hypermetabolic lesion-resolved  - 1 of 2 previous left parotid hypermetabolic lesions-resolved  - Multiple mildly hypermetabolic bilateral inguinal nodes, index right inguinal lymph node SUV 2.62, previously 5.19-improved    IMPRESSION: In this patient with follicular lymphoma there is evidence  for progression of disease.  1. There are multiple new hypermetabolic lymph nodes.   2. Increased metabolic activity of multiple previously seen lymph  nodes  3. No new bony lesions identified but there is increased metabolic  activity of the previously identified lesions.  4. Multiple stable and some resolved hypermetabolic lesions; resolved  lesions include the left orbit and left parotid gland lesion.    REVIEW OF SYSTEMS:   A 14 point ROS was reviewed with pertinent positives and negatives in the HPI.       HOME MEDICATIONS:  Current Outpatient Medications   Medication Sig Dispense Refill     dorzolamide-timolol (COSOPT) 2-0.5 % ophthalmic solution Place 1 drop into both eyes 2 times daily       latanoprost (XALATAN) 0.005 % ophthalmic solution Place 1 drop into both eyes At Bedtime       losartan (COZAAR) 100 MG tablet Take 1 tablet (100 mg) by mouth daily 90 tablet 3     Multiple Vitamins-Minerals (PRESERVISION AREDS) TABS Take 1 tablet by mouth 2 times daily       rosuvastatin (CRESTOR) 10 MG tablet Take 1 tablet (10 mg) by mouth daily 90 tablet 3         ALLERGIES:  Allergies   Allergen Reactions     Compazine      Mental confusion     Hydrochlorothiazide      Dryness mouth     Prochlorperazine Visual Disturbance     Simvastatin Other (See Comments)     Muscle aches         PAST MEDICAL  HISTORY:  Past Medical History:   Diagnosis Date     Breast cancer (H) 2000    Left breast, s/p mastectomy, chemotherapy and endocrine therapy     Follicular lymphoma (H) 01/20/2023     Glaucoma     bilateral - Dr. Moore - [facility]     History of spinal stenosis      Hyperlipidemia      Hypertension      Macular degeneration     Dr. Toscano - [facility]     Osteoarthritis      Osteoporosis      Personal history of chemotherapy 2000    A/C + Taxol     S/P radiation therapy     400 cGy to left orbit completed on 6/15/2023 [facility]         PAST SURGICAL HISTORY:  Past Surgical History:   Procedure Laterality Date     ARTHROPLASTY HIP Left 07/11/2023    Procedure: ARTHROPLASTY, HIP, TOTAL LEFT;  Surgeon: Austin Blood MD;  Location: [facility] OR     ARTHROPLASTY HIP Right 10/13/2023    Procedure: RIGHT ARTHROPLASTY, HIP, TOTAL;  Surgeon: Austin Blood MD;  Location: [facility] Main OR     BIOPSY Left 04/06/2023    Left Orbital Biopsy     C MASTECTOMY,SIMPLE  03/2000    left     COLONOSCOPY  2006    Q 10 years     COLONOSCOPY N/A 10/04/2021    Procedure: COLONOSCOPY;  Surgeon: Guru Lyla Ruby MD;  Location: [facility] OR     ESOPHAGOSCOPY, GASTROSCOPY, DUODENOSCOPY (EGD), COMBINED N/A 10/04/2021    Procedure: ENDOSCOPIC ULTRASOUND, ESOPHAGOSCOPY / UPPER GASTROINTESTINAL TRACT (GI), Esophagogastroduodenoscopy,  Fine Needle Biopsy of liver;  Surgeon: Guru Lyla Ruby MD;  Location: [facility] OR     EYE SURGERY       IR LYMPH NODE BIOPSY  01/20/2023     SURGICAL HISTORY OF -  Left 08/2015    tissue expander placed in left breast area     SURGICAL HISTORY OF -  Left 02/2016    left breast implant and right mammoplasty         SOCIAL HISTORY:  Social History     Socioeconomic History     Marital status: Single     Spouse name: Not on file     Number of children: 0     Years of education: Not on file     Highest education level: Not  on file   Occupational History     Employer: [employer]     Occupation: REtired   Tobacco Use     Smoking status: Former     Packs/day: 1.00     Years: 17.00     Additional pack years: 0.00     Total pack years: 17.00     Types: Cigarettes     Quit date: 3/15/1999     Years since quittin.7     Passive exposure: Past     Smokeless tobacco: Never   Vaping Use     Vaping Use: Never used   Substance and Sexual Activity     Alcohol use: Not Currently     Alcohol/week: 4.0 standard drinks of alcohol     Types: 4 Glasses of wine per week     Drug use: No     Sexual activity: Not Currently     Birth control/protection: None   Other Topics Concern     Parent/sibling w/ CABG, MI or angioplasty before 64F 55M? Yes     Comment: Father heart attack   Social History Narrative     Not on file     Social Determinants of Health     Financial Resource Strain: Unknown (2023)    Financial Resource Strain      Within the past 12 months, have you or your family members you live with been unable to get utilities (heat, electricity) when it was really needed?: Patient refused   Food Insecurity: Unknown (2023)    Food Insecurity      Within the past 12 months, did you worry that your food would run out before you got money to buy more?: Patient refused      Within the past 12 months, did the food you bought just not last and you didn t have money to get more?: Patient refused   Transportation Needs: Unknown (2023)    Transportation Needs      Within the past 12 months, has lack of transportation kept you from medical appointments, getting your medicines, non-medical meetings or appointments, work, or from getting things that you need?: Patient refused   Physical Activity: Not on file   Stress: Not on file   Social Connections: Not on file   Interpersonal Safety: Low Risk  (10/3/2023)    Interpersonal Safety      Do you feel physically and emotionally safe where you currently live?: Yes      Within the past 12 months, have you  "been hit, slapped, kicked or otherwise physically hurt by someone?: No      Within the past 12 months, have you been humiliated or emotionally abused in other ways by your partner or ex-partner?: No   Housing Stability: Unknown (9/21/2023)    Housing Stability      Do you have housing? : Patient refused      Are you worried about losing your housing?: Patient refused       FAMILY HISTORY:  Family History   Problem Relation Age of Onset     Cancer Mother         bone cancer     Heart Disease Father      Coronary Artery Disease Father      Diabetes Father      Arthritis Sister      Breast Cancer Sister         age 75     Diabetes Maternal Grandmother      Diabetes Maternal Grandfather      Diabetes Paternal Grandmother      Family history of cancer- sister breast cancer- dx at age 75 y/o, localized breast cancer, surgical resection, RT, no adjuvant chemo or endocrine therapy that pt is aware of     PHYSICAL EXAM:  Vital signs:  /66 (BP Location: Right arm, Patient Position: Chair, Cuff Size: Adult Regular)   Pulse 66   Ht 1.598 m (5' 2.91\")   Wt 66.7 kg (147 lb)   SpO2 97%   BMI 26.11 kg/m       GENERAL/CONSTITUTIONAL: No acute distress.  EYES: Pupils are equal and round. Extraocular movements intact without nystagmus.  No scleral icterus. Minimal ptosis left eye at site of surgery  RESPIRATORY: Equal chest rise.   MUSCULOSKELETAL: Warm and well-perfused, no cyanosis, clubbing, or edema.   NEUROLOGIC: Cranial nerves are grossly intact. Alert, oriented to person, place and time, answers questions appropriately.  INTEGUMENTARY: No rashes or jaundice.  GAIT: ambulates independently without cane        LABS:   Latest Reference Range & Units 12/01/23 07:56   Sodium 135 - 145 mmol/L 139   Potassium 3.4 - 5.3 mmol/L 4.4   Chloride 98 - 107 mmol/L 103   Carbon Dioxide (CO2) 22 - 29 mmol/L 23   Urea Nitrogen 8.0 - 23.0 mg/dL 18.7   Creatinine 0.51 - 0.95 mg/dL 0.58   GFR Estimate >60 mL/min/1.73m2 >90   Calcium 8.8 " - 10.2 mg/dL 9.8   Anion Gap 7 - 15 mmol/L 13   Albumin 3.5 - 5.2 g/dL 4.3   Protein Total 6.4 - 8.3 g/dL 8.1   Alkaline Phosphatase 40 - 150 U/L 158 (H)   ALT 0 - 50 U/L 10   AST 0 - 45 U/L 19   Bilirubin Total <=1.2 mg/dL 0.4   Glucose 70 - 99 mg/dL 81   Lactate Dehydrogenase 0 - 250 U/L 223   WBC 4.0 - 11.0 10e3/uL 9.4   Hemoglobin 11.7 - 15.7 g/dL 12.9   Hematocrit 35.0 - 47.0 % 41.1   Platelet Count 150 - 450 10e3/uL 360   RBC Count 3.80 - 5.20 10e6/uL 4.68   MCV 78 - 100 fL 88   MCH 26.5 - 33.0 pg 27.6   MCHC 31.5 - 36.5 g/dL 31.4 (L)   RDW 10.0 - 15.0 % 14.6   % Neutrophils % 70   % Lymphocytes % 19   % Monocytes % 7   % Eosinophils % 3   % Basophils % 1   Absolute Basophils 0.0 - 0.2 10e3/uL 0.1   Absolute Eosinophils 0.0 - 0.7 10e3/uL 0.3   Absolute Immature Granulocytes <=0.4 10e3/uL 0.0   Absolute Lymphocytes 0.8 - 5.3 10e3/uL 1.7   Absolute Monocytes 0.0 - 1.3 10e3/uL 0.7   % Immature Granulocytes % 0   Absolute Neutrophils 1.6 - 8.3 10e3/uL 6.6   Absolute NRBCs 10e3/uL 0.0   NRBCs per 100 WBC <1 /100 0   (H): Data is abnormally high  (L): Data is abnormally low    PATHOLOGY:      IMAGING:  Narrative & Impression   Combined Report of: PET and CT on 12/1/2023 10:30 AM:     1. PET of the neck, chest, abdomen, and pelvis.  2. PET CT Fusion for Attenuation Correction and Anatomical  Localization.  3. Diagnostic CT of the chest, abdomen and pelvis without intravenous  contrast obtained for diagnostic interpretation.  4. 3D MIP and PET-CT fused images were processed on an independent  workstation and archived to PACS and reviewed by a radiologist.     Technique:     1. PET: The patient received 12.34 mCi of F-18-FDG. The serum glucose  was 83 mg/dL prior to administration. Body weight was 66.2 kg. Images  were evaluated in the axial, sagittal, and coronal planes as well as  the rotational whole body MIP. Images were acquired from the cranial  vertex to the feet. During exam there was contrast extravasation  of  the patient vein during administration; exam converted to without  contrast.     UPTAKE WAS MEASURED AT 60 MINUTES.      BACKGROUND: Liver SUV max = 3.78, Aorta Blood SUV max = 3.43.      2. CT: Volumetric acquisition for clinical interpretation of the  chest, abdomen, and pelvis acquired at 3 mm sections. The chest,  abdomen, and pelvis were evaluated at 5 mm sections in bone, soft  tissue, and lung windows .     Contrast and Medications:  IV contrast: None  PO contrast: None.  Additional Medications: 500 mL of water     3. 3D MIP and PET-CT fused images were processed on an independent  workstation and archived to PACS and reviewed by a radiologist.     INDICATION: Follicular lymphoma grade I, unspecified body region (H);  Follicular lymphoma grade i, lymph nodes of multiple sites (H).     ADDITIONAL INFORMATION OBTAINED FROM EMR: Right external iliac lymph  node core needle biopsy on 1/20/2023 positive for follicular lymphoma.  Left orbital mass/lacrimal gland excisional biopsy on 4/6/2023  positive for follicular lymphoma and concurrent monoclonal B-cell  lymphocytosis. Right acetabular CT-guided bone biopsy demonstrating  atypical lymphoid infiltrate without histologic evidence of metastatic  carcinoma. Status post radiation to the left orbit follicular lymphoma  on 6/14/2023-6/15/2023. Remote history of left breast cancer status  post mastectomy and left axillary lymph node dissection in 2000 with 6  years subsequent chemotherapy.     COMPARISON: MRI lumbar spine 11/25/2022, MRI pelvis 12/20/2022, PET  CT: 5/18/2023. Brain MR 1/9/2023     FOLLOW-UP APPOINTMENT: 12/7/2023     FINDINGS:      HEAD/NECK:  There are multiple new and stable previously identified hypermetabolic  lesions. For example, resolution of the previous left orbital  hypermetabolic lesion and one of two of the previous left parotid  gland hypermetabolic lesions. Stable 1.6 x 1.1 cm left parotid gland  lesion, SUV max 8.44, previously 6.63.  Right carotid pancreatic lesion  within the CT maxillofacial 0.54 compared to 5.12.     Multiple new hypermetabolic foci for example:  - 0.8 x 0.5 cm right FDG avid cervical lymph node (series 4, image 79)  SUV max of 6.5  - 1.3 x 1.5 cm right FDG avid cervical lymph node (series 4, image 87)  SUV max of 5.8  - 1.3 x 1.4 cm right FDG avid cervical lymph node (series 4, image 85)  SUV max of 7.8      Minimal paranasal sinus mucosal debris. Mastoid air cells are clear.  Mucosal and deep spaces of the neck are unremarkable. Major salivary  glands are unremarkable. Non-FDG avid 1.6 cm right thyroid lobe nodule  is unchanged.        CHEST:  Unchanged 1.2 x 1.0 cm mildly FDG avid left axillary lymph node  (series 2, image 110) SUV max of 2.3. Additionally, there is a  increasing FDG avid 0.5 x 0.4 mm left lateral chest wall FDG avid  lesion immediately inferior margin of the breast implant, SUV max 5.02  (series 2, image 127). Previous maximum SUV was 1.4     Heart is normal in size. Coronary artery calcifications. No  pericardial effusion. Aortic and main pulmonary artery caliber within  normal limits. Esophagus is unremarkable. Thyroid is normal in  appearance. Partially visualized neck soft tissues are within normal  limits. Pleural spaces are clear. Mild basilar atelectasis versus  scarring. Tracheobronchial tree is patent.      ABDOMEN AND PELVIS:  There are increased hypermetabolic foci in the pelvis, for example a  1.5 cm hypermetabolic lymph node immediately adjacent to the left  psoas with SUV max 19.97, previously 6.02 (series 4, image 238).  Previously biopsied right acetabulum with increased FDG uptake, SUV  max 14.03, previously 12.05. This is unchanged in size and today's  study this measures 2.0 cm x 4.9 cm.     Unchanged scattered simple hepatic cysts. No cholelithiasis or  intra-/extrahepatic biliary dilatation. No pancreatic masses or main  duct dilatation. Spleen and adrenal glands are within normal  limits.      No renal masses, obstructing calculi or hydronephrosis. Bladder and  reproductive organs are normal.      Stomach within normal limits. Normal caliber small bowel. Large bowel  within normal limits. Appendix is not seen, however no right lower  quadrant fat stranding suggestive of inflammatory process. No  peritoneal masses, pneumoperitoneum, or ascites.     Asymmetric bilateral breast implants, left greater than right.  Bilateral total hip arthroplasties with associated benign increased  FDG uptake.     LOWER EXTREMITIES:   Multiple mildly hypermetabolic bilateral inguinal lymph nodes with  index right inguinal lymph node, SUV max 2.62, previously 5.19.      BONES:   Multiple previously identified FDG avid osseous foci show increased  metabolic activity.   Sternal lesion on series 4 image 133 SUV max of 5.7 increased compared  to 3.6 .   Right scapular spine lesion SUV max of 4.6 compared to 3.6  Right acetabular bony lesion SUV max of 14.3 compared to 11.0     Bilateral total hip joint replacements with increased surrounding  hypermetabolism. Multilevel degenerative changes of the spine.                                                                          IMPRESSION: In this patient with follicular lymphoma there is evidence  for progression of disease.  1. There are multiple new hypermetabolic lymph nodes.   2. Increased metabolic activity of multiple previously seen lymph  nodes  3. No new bony lesions identified but there is increased metabolic  activity of the previously identified lesions.  4. Multiple stable and some resolved hypermetabolic lesions; resolved  lesions include the left orbit and left parotid gland lesion.        I have personally reviewed the examination and initial interpretation  and I agree with the findings.     BARRINGTON FOURNIER MD        ASSESSMENT/PLAN:  Virginia Byers is a 73 year old female with:    # 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 INCLUDING biopsy proven LN and  "left orbital mass/lacrimal gland involvement and suspected parotid gland involvement   -11/22 MRI lumbar spine in 12/22 MRI pelvis show prominent left iliac lymph nodes (measuring 1.8 x 3.2, previously 1.5 x 2.7 cm), right medial thigh lymphadenopathy (1.7cm), right iliac bone enhancing marrow signal, left posterior iliac enhancing lesion  -12/22 CT chest abdomen pelvis shows new or increased left common iliac (1.2cm), external iliac, pelvic sidewall lymphadenopathy (2.1cm) and sclerotic lesions in left sacrum and left posterior ilium. No splenomegaly  - 1/23 brain MRI: Indeterminate ovoid T2 hyperintense enhancing mass in the region of the left lacrimal gland measuring approximately 2.4 cm AP by 1.4 cm transverse by 2.3 cm craniocaudal associated with/replacing the left lacrimal gland. Consider correlation with tissue sampling  - 1/23 CT soft tissue neck: Soft tissue nodule in the superficial left parotid gland measuring 1.2 cm. Additional 0.8 cm nodule in the deep portion of the left parotid gland. Soft tissue nodule in the superficial right parotid gland measuring 0.8 cm. These may represent benign or malignant parotid lesions. Multiplicity of lesions raises concern for Warthin tumors  - 1/27/23 note from Dr. Wilmar Morales- \"Based on this diagnosis, I think the 2 nodules in her parotid gland are likely atypical lymph nodes containing lymphoma.  She will probably require some staging imaging, such as PET scan and we can take a look at that as well.  I would not recommend biopsy at this time of the parotid lesions given their appearance and the recent diagnosis of low-grade follicular lymphoma.\"      -1/20/2023: IR US Guided core needle biopsy of a right external iliac LN:   -follicular lymphoma, low grade WHO grade 1-2 (negative for metastatic carcinoma),  - no high grade lymphoma present,   - FISH negative for BCL2 rearrangement;   - IHC: neoplastic cells demonstrate expression of CD20, BCL-6 (in follicles), and " "CD10 (bright). CD5 stains T cells  - Ki-67 proliferative index is approximately 10%, also low in the follicles.  - flow cytometry \"CD10-positive population is consistent with a clonal B cell population and a CD10 positive B-cell lymphoma is favored.\"    - 4/6/23 left orbital mass/lacrimal gland excisional biopsy: PATHOLOGY: - Follicular Lymphoma, low grade ([facility] negative for BCL2 rearrangement). 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  The flow cytometry from 1/20/23 right external iliac LN biopsy showed in addition to CD10 positive B-cell population  also a partial/equivocal CD5 positive separate kappa monotypic B-cell population, which most likely represents monoclonal B-cell lymphocytosis of CLL/SLL type, since it was also documented in the peripheral blood flow cytometry on February 16, 2023 (case number UF ) at a low frequency.  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  - Cohort 2 which is described as negative for CD5, CD10, ,  and showing dim CD20 and dim kappa staining   Of note the population of monoclonal B-cell lymphocytosis found in peripheral blood in our department and in the lymph node biopsy in January showed partial/equivocal dim CD5 and may correspond to the cohort #2.     -5/23 PET/CT NCAP:   1. An ovoid mass in the left lateral orbit measures 2.5 x 1.2 cm, slightly larger than 01/09/2023, and demonstrates marked uptake (SUVmax 9.3), consistent with biopsy-proven lymphoma  2. A few small FDG " avid bilateral intraparotid nodules are present, one on the right and two on the left, including representative left parotid nodule measuring 0.9 x 1.4 cm with moderate uptake (SUVmax 6.7).  3.  Asymmetric prominent right posterior lower cervical node measures 0.8 cm associated with moderate focal uptake (SUVmax 5.1).  4. A single mildly enlarged left axillary lymph node measures 1.0 cm associated with mild uptake (SUVmax 3.0)  5. Some scattered FDG avid lymph nodes below the diaphragm involve the retroperitoneal retrocaval, bilateral common iliac, left pelvic sidewall and left inguinal regions. Some of these have increased slightly in size since 12/28/2022, such as a conglomerate left pelvic sidewall bj mass measuring approximately 2.2 x 4.1 cm (previously 2.2 x 3.4 cm) associated with marked uptake (SUVmax 7.6).  6. FDG avid sclerotic skeletal lesion involves the right superior acetabulum (SUVmax 12.9) as well as a couple of separate smaller sclerotic lesions in the right iliac bone uptake (SUVmax 12.2).     - 6/23 bone marrow biopsy:  MORPHOLOGY:  - No morphologic or immunophenotypic evidence of follicular lymphoma  - Flow cytometry showing a small population of CD5 positive monotypic B cells; most compatible with monoclonal B lymphocytosis (There is no definitive morphologic correlate for this finding. There is no morphologic or immunophenotypic evidence of involvement by follicular lymphoma.  The CD5-positive B cell clone may best represent monoclonal B lymphocytosis, provided that bj SLL is excluded.)  - Normocellular marrow (cellularity estimated at 30%) with trilineage hematopoietic maturation and no increase in blasts  FLOW CYTOMETRY:  CD5 positive kappa-monotypic B cells (0.8%)  0.8% B cells which express CD5, CD19, CD20 (dim to negative) and monotypic kappa immunoglobulin light chains (dim) and lack CD10 and CD38. The B cells have similar forward scatter relative to background T cells suggestive of  similar size; however, precise size determination is deferred to morphology  CYTOGENETICS:  46,XX    -6/19/2023 CT-guided bone biopsy, right acetabulum  PATHOLOGY:  - Atypical lymphoid infiltrate in a suboptimal biopsy  - No histologic evidence of metastatic carcinoma  - Two small monoclonal B-cell populations detected by flow cytometry: CD5 positive kappa-monotypic B cells ( 2.8 %, CD5 partial, dim, CD19 positive, CD30 dim to negative, CD10 negative); CD10 positive lambda-monotypic B cells ( 0.4 %, CD10 positive, CD19 slightly dim, CD20 positive, CD5 negative)     - option of systemic treatment including rituximab discussed previously on multiple occassions particularly w/left orbital involvement, see my previous notes for details; pt deferred  - 6/14/2023- 6/15/2023 palliative radiation to left orbit follicular lymphoma 4Gy/2fx w/Dr. Julian Horvath    - 12/23 PET CT NCAP Liver SUV max = 3.78, Aorta Blood SUV max = 3.43.  -New/progressed:  - 0.8 x 0.5 cm right FDG avid cervical lymph node; SUV max of 6.5  - 1.3 x 1.5 cm right FDG avid cervical lymph node; SUV max of 5.8  - 1.3 x 1.4 cm right FDG avid cervical lymph node; SUV max of 7.8   -Lesion in left lateral chest wall immediately inferior to the margin of breast implant, 0.5 x 0.4 mm, SUV 5.02, previously 1.4  - Lymph node adjacent to left psoas, 1.5 cm, SUV 19.9, previously 6.02  - Right acetabulum, 2.0 x 1.9 cm, SUV 14.03, previously 12.05  - No new bone lesions but increased metabolic activity previously identified multiple FDG avid osseous foci:  1.  Sternal lesion, SUV 5.7, previously 3.6  2.  Right scapular spine lesion, SUV 4.6, previously 3.6  3.  Right acetabular bone lesion SUV 13.4, previously 11    -Unchanged:  -1 of 2 left parotid gland lesions, 1.6 x 1.1 cm, SUV 8.4, previously 6.6  - Left axillary lymph node, 1.2 x 1.0 cm, SUV 2.3  -Spleen normal    -Resolved/improved:  - Left orbital hypermetabolic lesion-resolved  - 1 of 2 previous left parotid  hypermetabolic lesions-resolved  - Multiple mildly hypermetabolic bilateral inguinal nodes, index right inguinal lymph node SUV 2.62, previously 5.19-improved    Staging and prognostication:  - Stage: 4 given right medial thigh SURINDER and left common iliac, external iliac, pelvic sidewall; biopsy proven left orbital mass involvement; there was some concern from ENT that patients parotid lesions were related to pts follicular lymphoma   - FLIPI: score 2 (age>60),  stage 3-4; intermediate risk  - pertinent labs: 12/22  and 5/23 ; 2/23 and 5/23 beta 2 microglobulin 1.7, hepatitis B and C negative    Treatment:  - GELF criteria to indicate treatment (involvement of 3 or more bj sites each with a diameter greater than or equal to 3 cm, any bj or extranodal tumor mass with a diameter of greater than or equal to 7 cm, B symptoms, splenomegaly, pleural effusions, peritoneal ascites, clinically progressive or significant cytopenias, leukemia, symptoms, threatened end organ function, clinically significant bulky disease, steady or rapid progression of disease)  - option of systemic treatment including rituximab discussed previously on multiple occassions particularly w/left orbital involvement, see my previous notes for details; pt deferred  - 6/14/2023- 6/15/2023 palliative radiation to left orbit follicular lymphoma 4Gy/2fx    PLAN:  - pt has 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 INCLUDING biopsy proven LN and left orbital mass/lacrimal gland involvement   - bone marrow biopsy negative for lymphoma and right acetabulum bone biopsy atypical lymphoid infiltrate seen but biopsy suboptimal and flow cytometry shows 2.8% CD5 positive kappa monotypic B cells consistent w/monoclonal B cell lymphocytosis and 0.4% CD10 positive lambda-monotypic B cells consistent w/lymphoma- ie follicular vs other  - overall stage 4 disease and intermediate risk  - systemic tx including rituximab discussed on multiple prior visits, pt  deferred as detailed in my previous notes   - pt completed palliative RT to left orbit; use lubricating eye drops prn (if also using eye drops for glaucoma)  - 12/23 CBC and CMP WNL,  (similar to prior); pt asx   - 12/23 PET CT NCAP Shows progression of disease with several new lymph nodes noted as well as progression of previous lymph nodes, most significant of which is a lymph node adjacent to the left psoas, 1.5 cm, SUV 19.9, previously 6.02  - I am concerned about possible transformation into a more aggressive lymphoma given SUV 19.9  - Needs STAT biopsy of lymph node adjacent to the left psoas and will d/w IR if they can do a bone biopsy w/deep bone biopsy w/enough specimen to send for bone marrow biopsy studies as well  - of note, pt has also had recent surgeries with b/l hip replacements (left hip replacement 7/23 and right hip replacement 10/23), I do not think this is contributing to the lymphadenopathy we are seeing above  - Given above findings, discussed that we will likely need to start systemic therapy though the exact regimen and schedule will depend on biopsy findings  - check G6PD, uric acid for baseline  - will refer to [facility] for formal second opinion from our lymphoma experts    #Sclerotic lesions in left sacrum and left posterior ilium  -10/21 mammogram, endoscopy with EUS and colonoscopy WNL  -12/22 CT chest abdomen pelvis shows new or increased left common iliac, external iliac, pelvic sidewall lymphadenopathy and sclerotic lesions in left sacrum and left posterior ilium.  No metastatic disease in chest.  No abnormal findings in breast.  - 12/22 tumor markers: CA 15-3 24,  29, CEA 3.1, CA 19-9 4, AFP 5.9,   - 1/2023 MRI brain w/wo contrast: no metastases   - 1/2023 right external iliac LN core need biopsy- no metastatic carcinoma   - 5/23 PET avid bone lesions  -6/19/2023 CT-guided bone biopsy, right acetabulum  PATHOLOGY:  - Atypical lymphoid infiltrate in a suboptimal  biopsy  - No histologic evidence of metastatic carcinoma  - Two small monoclonal B-cell populations detected by flow cytometry: CD5 positive kappa-monotypic B cells ( 2.8 %, CD5 partial, dim, CD19 positive, CD30 dim to negative, CD10 negative); CD10 positive lambda-monotypic B cells ( 0.4 %, CD10 positive, CD19 slightly dim, CD20 positive, CD5 negative)   - 12/23 PET   - Right acetabulum, 2.0 x 1.9 cm, SUV 14.03, previously 12.05  - No new bone lesions but increased metabolic activity previously identified multiple FDG avid osseous foci:  1.  Sternal lesion, SUV 5.7, previously 3.6  2.  Right scapular spine lesion, SUV 4.6, previously 3.6  3.  Right acetabular bone lesion SUV 13.4, previously 11    PLAN:   - no proven malignancy on bone biopsy  - work up as above    #  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   - 1/23 right external iliac LN core needle biopsy- FLOW CYTOMETRY: 1.3% rare monoclonal B cell population CD5 equivocal, CD23 negative, CD 79b negative, possible MBL; no morphological correlate  -2/23 peripheral blood- FLOW CYTOMETRY: 2.3% kappa monoclonal B cell population, CD5 positive, CD38 negative, CD49d negative, possible MBL  - 4/6/23 left orbital mass/lacrimal gland excisional biopsy: FLOW CYTOMETRY: 15.1% kappa monotypic B cell population, bright positive: CD45, CD19, CD22, moderately positive kappa, CD20, CD79b, dim positive CD23, negative CD 2,3,103, lambda, 5, 10, 200, 38, 43  - 6/23 bone marrow biopsy: FLOW CYTOMETRY: 0.8% kappa monotypic B cell population, positive for CD5, CD19, dim to negative CD20, negative for CD38 and CD10  - 6/23 right acetabulum bone biopsy: FLOW CYTOMETRY: 2.8% kappa monotypic B cell population, CD5 partial dim, CD 19 positive, CD20 dim negative, CD10 negative, possible MBL    - of note pt does not have leukocytosis or lymphocytosis, ALC consistently <5000, no splenomegaly, LN biopsy did not demonstrate IHC consistent with SLL  - 5/23 PET/CT as above    PLAN:  -  "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    -4/2020 left breast simple mastectomy with no residual carcinoma.   -S/p ACx4, taxol x4, no anti-her2 treatment, no RT.   -S/p 5 years adjuvant endocrine therapy (tamoxifen and anastrozole).  -Delayed left breast reconstruction and right breast augmentation.   - 5/23 PET/CT notes single enlarged left axillary lymph node with SUV 3, \"could also represent lymphoma although given patient's history of left breast cancer, a breast cancer metastasis is also a possibility\"  - The above in the setting of known lymphoma and breast tumor markers normal   -9/23 right breast diagnostic mammogram: [facility]  - 9/23 ultrasound left axilla: 1.1 x 0.9 x 1.0 cm lymph node with mildly thickened cortex that correlates with abnormal lymph node on previous PET, recommend tissue diagnosis  - 9/23 ultrasound-guided left axillary lymph node core biopsy: No morphologic evidence of lymphoma.  Lymph node tissue was received in formalin, this could not be sent for flow cytometry.  No separate fresh tissue was sent for flow cytometry.  - 12/23 PET Left axillary lymph node, 1.2 x 1.0 cm, SUV 2.3, unchanged    PLAN:   -With the limitations of tissue processed in formalin and no fresh " tissue available for flow cytometry, appears left axilla lymph node is benign without lymphoma  - Monitor clinically        RTC 1 month for follow up with me, labs prior to appt (after biopsy resulted and second opinion completed)      Arlene Ahumada DO  Hematology/Oncology  [facility]      Again, thank you for allowing me to participate in the care of your patient.        Sincerely,        ARLENE AHUMADA, DO

## 2023-12-07 NOTE — TELEPHONE ENCOUNTER
Spoke to pharmacy and confirmed duplicate request.  Karol CALDERON CMA ([facility])       Well Child Visit at 5 to 8 Years   AMBULATORY CARE:   A well child visit  is when your child sees a healthcare provider to prevent health problems. Well child visits are used to track your child's growth and development. It is also a time for you to ask questions and to get information on how to keep your child safe. Write down your questions so you remember to ask them. Your child should have regular well child visits from birth to 16 years. Development milestones your child may reach by 9 to 10 years:  Each child develops at his or her own pace. Your child might have already reached the following milestones, or he or she may reach them later:  Menstruation (monthly periods) in girls and testicle enlargement in boys    Wanting to be more independent, and to be with friends more than with family    Developing more friendships    Able to handle more difficult homework    Be given chores or other responsibilities to do at home    Keep your child safe in the car:   Have your child ride in a booster seat,  and make sure everyone in your car wears a seatbelt. Children aged 5 to 10 years should ride in a booster car seat. Your child must stay in the booster car seat until he or she is between 6and 15years old and 4 foot 9 inches (57 inches) tall. This is when a regular seatbelt should fit your child properly without the booster seat. Booster seats come with and without a seat back. Your child will be secured in the booster seat with the regular seatbelt in your car. Your child should remain in a forward-facing car seat if you only have a lap belt seatbelt in your car. Some forward-facing car seats hold children who weigh more than 40 pounds. The harness on the forward-facing car seat will keep your child safer and more secure than a lap belt and booster seat. Always put your child's car seat in the back seat. Never put your child's car seat in the front.  This will help prevent him or her from being injured in an accident. Keep your child safe in the sun and near water:   Teach your child how to swim. Even if your child knows how to swim, do not let him or her play around water alone. An adult needs to be present and watching at all times. Make sure your child wears a safety vest when he or she is on a boat. Make sure your child puts sunscreen on before he or she goes outside to play or swim. Use sunscreen with a SPF 15 or higher. Use as directed. Apply sunscreen at least 15 minutes before your child goes outside. Reapply sunscreen every 2 hours. Other ways to keep your child safe:   Encourage your child to use safety equipment. Encourage your child to wear a helmet when he or she rides a bicycle and protective gear when he or she plays sports. Protective gear includes a helmet, mouth guard, and pads that are appropriate for the sport. Remind your child how to cross the street safely. Remind your child to stop at the curb, look left, then look right, and left again. Tell your child never to cross the street without an adult. Teach your child where the school bus will pick him or her up and drop him or her off. Always have adult supervision at your child's bus stop. Store and lock all guns and weapons. Make sure all guns are unloaded before you store them. Make sure your child cannot reach or find where weapons or bullets are kept. Never  leave a loaded gun unattended. Remind your child about emergency safety. Be sure your child knows what to do in case of a fire or other emergency. Teach your child how to call your local emergency number (911 in the [place]). Talk to your child about personal safety without making him or her anxious. Teach him or her that no one has the right to touch his or her private parts. Also explain that others should not ask your child to touch their private parts.  Let your child know that he or she should tell you even if he or she is told not to.    Help your child get the right nutrition:   Teach your child about a healthy meal plan by setting a good example. Buy healthy foods for your family. Eat healthy meals together as a family as often as possible. Talk with your child about why it is important to choose healthy foods. Provide a variety of fruits and vegetables. Half of your child's plate should contain fruits and vegetables. He or she should eat about 5 servings of fruits and vegetables each day. Buy fresh, canned, or dried fruit instead of fruit juice as often as possible. Offer more dark green, red, and orange vegetables. Dark green vegetables include broccoli, spinach, adelita lettuce, and ion greens. Examples of orange and red vegetables are carrots, sweet potatoes, winter squash, and red peppers. Make sure your child has a healthy breakfast every day. Breakfast can help your child learn and focus better in school. Limit foods that contain sugar and are low in healthy nutrients. Limit candy, soda, fast food, and salty snacks. Do not give your child fruit drinks. Limit 100% juice to 4 to 6 ounces each day. Teach your child how to make healthy food choices. A healthy lunch may include a sandwich with lean meat, cheese, or peanut butter. It could also include a fruit, vegetable, and milk. Pack healthy foods if your child takes his or her own lunch to school. Pack baby carrots or pretzels instead of potato chips in your child's lunch box. You can also add fruit or low-fat yogurt instead of cookies. Keep his or her lunch cold with an ice pack so that it does not spoil. Make sure your child gets enough calcium. Calcium is needed to build strong bones and teeth. Children need about 2 to 3 servings of dairy each day to get enough calcium. Good sources of calcium are low-fat dairy foods (milk, cheese, and yogurt). A serving of dairy is 8 ounces of milk or yogurt, or 1½ ounces of cheese.  Other foods that contain calcium include tofu, kale, spinach, broccoli, almonds, and calcium-fortified orange juice. Ask your child's healthcare provider for more information about the serving sizes of these foods. Provide whole-grain foods. Half of the grains your child eats each day should be whole grains. Whole grains include brown rice, whole-wheat pasta, and whole-grain cereals and breads. Provide lean meats, poultry, fish, and other healthy protein foods. Other healthy protein foods include legumes (such as beans), soy foods (such as tofu), and peanut butter. Bake, broil, and grill meat instead of frying it to reduce the amount of fat. Use healthy fats to prepare your child's food. A healthy fat is unsaturated fat. It is found in foods such as soybean, canola, olive, and sunflower oils. It is also found in soft tub margarine that is made with liquid vegetable oil. Limit unhealthy fats such as saturated fat, trans fat, and cholesterol. These are found in shortening, butter, stick margarine, and animal fat. Let your child decide how much to eat. Give your child small portions. Let your child have another serving if he or she asks for one. Your child will be very hungry on some days and want to eat more. For example, your child may want to eat more on days when he or she is more active. Your child may also eat more if he or she is going through a growth spurt. There may be days when your child eats less than usual.       Help your  for his or her teeth:   Remind your child to brush his or her teeth 2 times each day. He or she also needs to floss 1 time each day. Mouth care prevents infection, plaque, bleeding gums, mouth sores, and cavities. Take your child to the dentist at least 2 times each year. A dentist can check for problems with his or her teeth or gums, and provide treatments to protect his or her teeth. Encourage your child to wear a mouth guard during sports.   This will protect his or her teeth from injury. Make sure the mouth guard fits correctly. Ask your child's healthcare provider for more information on mouth guards. Support your child:   Encourage your child to get 1 hour of physical activity each day. Examples of physical activity include sports, running, walking, swimming, and riding bikes. The hour of physical activity does not need to be done all at once. It can be done in shorter blocks of time. Your child may become involved in a sport or other activity, such as music lessons. It is important not to schedule too many activities in a week. Make sure your child has time for homework, rest, and play. Limit your child's screen time. Screen time is the amount of television, computer, smart phone, and video game time your child has each day. It is important to limit screen time. This helps your child get enough sleep, physical activity, and social interaction each day. Your child's pediatrician can help you create a screen time plan. The daily limit is usually 1 hour for children 2 to 5 years. The daily limit is usually 2 hours for children 6 years or older. You can also set limits on the kinds of devices your child can use, and where he or she can use them. Keep the plan where your child and anyone who takes care of him or her can see it. Create a plan for each child in your family. You can also go to [employer]/English/media/Pages/default. aspx#planview for more help creating a plan. Help your child learn outside of the classroom. Take your child to places that will help him or her learn and discover. For example, a children's museum will allow him or her to touch and play with objects as he or she learns. Take your child to [employer] and let him or her pick out books. Make sure he or she returns the books. Encourage your child to talk about school every day. Talk to your child about the good and bad things that happened during the school day.  Encourage him or her to tell you or a teacher if someone is being mean to him or her. Talk to your child about bullying. Make sure he or she knows it is not acceptable for him or her to be bullied, or to bully another child. Talk to your child's teacher about help or tutoring if your child is not doing well in school. Create a place for your child to do his or her homework. Your child should have a table or desk where he or she has everything he or she needs to do his or her homework. Do not let him or her watch TV or play computer games while he or she is doing his or her homework. Your child should only use a computer during homework time if he or she needs it for an assignment. Encourage your child to do his or her homework early instead of waiting until the last minute. Set rules for homework time, such as no TV or computer games until his or her homework is done. Praise your child for finishing homework. Let him or her know you are available if he or she needs help. Help your child feel confident and secure. Give your child hugs and encouragement. Do activities together. Praise your child when he or she does tasks and activities well. Do not hit, shake, or spank your child. Set boundaries and make sure he or she knows what the punishment will be if rules are broken. Teach your child about acceptable behaviors. Help your child learn responsibility. Give your child a chore to do regularly, such as taking out the trash. Expect your child to do the chore. You might want to offer an allowance or other reward for chores your child does regularly. Decide on a punishment for not doing the chore, such as no TV for a period of time. Be consistent with rewards and punishments. This will help your child learn that his or her actions will have good or bad results. Vaccines and screenings your child may get during this well child visit:   Vaccines  include influenza (flu) each year.  Your child may also need Tdap (tetanus, diphtheria, and pertussis), HPV (human papillomavirus), meningococcal, MMR (measles, mumps, and rubella), or varicella (chickenpox) vaccines. Screenings  may be used to check the lipid (cholesterol and fatty acids) levels in your child's blood. Screening for sexually transmitted infections (STIs) may also be needed. Anxiety screening may also be recommended. Your child's healthcare provider will tell you more about any screenings, follow-up tests, and treatments for your child, if needed. What you need to know about your child's next well child visit:  Your child's healthcare provider will tell you when to bring him or her in again. The next well child visit is usually at 6 to 14 years. Tdap, HPV, meningococcal, MMR, or varicella vaccines may be given. This depends on the vaccines your child received during this well child visit. Your child may also need lipid or STI screenings if any was not done during this visit. Contact your child's healthcare provider if you have questions or concerns about your child's health or care before the next visit. © Copyright Keila Amaro 2023 Information is for End User's use only and may not be sold, redistributed or otherwise used for commercial purposes. The above information is an  only. It is not intended as medical advice for individual conditions or treatments. Talk to your doctor, nurse or pharmacist before following any medical regimen to see if it is safe and effective for you.

## 2023-12-08 ENCOUNTER — THERAPY VISIT (OUTPATIENT)
Dept: PHYSICAL THERAPY | Facility: CLINIC | Age: 73
End: 2023-12-08
Payer: MEDICARE

## 2023-12-08 DIAGNOSIS — M25.551 HIP PAIN, RIGHT: ICD-10-CM

## 2023-12-08 DIAGNOSIS — Z47.1 AFTERCARE FOLLOWING RIGHT HIP JOINT REPLACEMENT SURGERY: Primary | ICD-10-CM

## 2023-12-08 DIAGNOSIS — Z96.641 AFTERCARE FOLLOWING RIGHT HIP JOINT REPLACEMENT SURGERY: Primary | ICD-10-CM

## 2023-12-08 DIAGNOSIS — R26.9 ABNORMAL GAIT: ICD-10-CM

## 2023-12-08 PROCEDURE — 97116 GAIT TRAINING THERAPY: CPT | Mod: GP | Performed by: PHYSICAL THERAPIST

## 2023-12-08 PROCEDURE — 97110 THERAPEUTIC EXERCISES: CPT | Mod: GP | Performed by: PHYSICAL THERAPIST

## 2023-12-11 ENCOUNTER — DOCUMENTATION ONLY (OUTPATIENT)
Dept: ONCOLOGY | Facility: CLINIC | Age: 73
End: 2023-12-11
Payer: MEDICARE

## 2023-12-11 NOTE — PROGRESS NOTES
Referral for 2nd opinion at [facility]   [facility] [place]   Reviewed.      -NPS to call pt for appt 2 weeks after IR Bx.    Future Appointments   Date Time Provider Department Center   12/20/2023 12:30 PM [facility]   12/20/2023  2:00 PM UU BONE MARROW BIOPSY [facility]   12/20/2023  2:00 PM UUCT2 [facility]   1/4/2024 10:10 AM Kaitlyn Estevez, PT I[facility]PT [facility]   1/9/2024 10:00 AM Austin Blood MD [facility]       Lilly Robins, RN, BSN, OCN  Hematology/Oncology Nurse Navigator  [facility]  [phone] / [phone]

## 2023-12-14 ENCOUNTER — MYC MEDICAL ADVICE (OUTPATIENT)
Dept: INTERVENTIONAL RADIOLOGY/VASCULAR | Facility: CLINIC | Age: 73
End: 2023-12-14
Payer: MEDICARE

## 2023-12-22 ENCOUNTER — APPOINTMENT (OUTPATIENT)
Dept: INTERVENTIONAL RADIOLOGY/VASCULAR | Facility: CLINIC | Age: 73
End: 2023-12-22
Attending: INTERNAL MEDICINE
Payer: MEDICARE

## 2023-12-22 ENCOUNTER — TRANSFERRED RECORDS (OUTPATIENT)
Dept: HEALTH INFORMATION MANAGEMENT | Facility: CLINIC | Age: 73
End: 2023-12-22

## 2023-12-22 ENCOUNTER — HOSPITAL ENCOUNTER (OUTPATIENT)
Facility: CLINIC | Age: 73
Discharge: HOME | End: 2023-12-22
Attending: INTERNAL MEDICINE | Admitting: RADIOLOGY
Payer: MEDICARE

## 2023-12-22 ENCOUNTER — APPOINTMENT (OUTPATIENT)
Dept: MEDSURG UNIT | Facility: CLINIC | Age: 73
End: 2023-12-22
Attending: INTERNAL MEDICINE
Payer: MEDICARE

## 2023-12-22 VITALS
RESPIRATION RATE: 16 BRPM | TEMPERATURE: 98.1 F | HEART RATE: 81 BPM | WEIGHT: 144.6 LBS | SYSTOLIC BLOOD PRESSURE: 129 MMHG | OXYGEN SATURATION: 97 % | BODY MASS INDEX: 25.62 KG/M2 | DIASTOLIC BLOOD PRESSURE: 63 MMHG | HEIGHT: 63 IN

## 2023-12-22 DIAGNOSIS — C82.00 FOLLICULAR LYMPHOMA GRADE I, UNSPECIFIED BODY REGION (H): ICD-10-CM

## 2023-12-22 LAB — INR BLD: 1.2 (ref 2–3)

## 2023-12-22 PROCEDURE — 84999 UNLISTED CHEMISTRY PROCEDURE: CPT | Performed by: INTERNAL MEDICINE

## 2023-12-22 PROCEDURE — 88342 IMHCHEM/IMCYTCHM 1ST ANTB: CPT | Mod: 26 | Performed by: STUDENT IN AN ORGANIZED HEALTH CARE EDUCATION/TRAINING PROGRAM

## 2023-12-22 PROCEDURE — 88185 FLOWCYTOMETRY/TC ADD-ON: CPT | Performed by: INTERNAL MEDICINE

## 2023-12-22 PROCEDURE — 88342 IMHCHEM/IMCYTCHM 1ST ANTB: CPT | Mod: TC,XU | Performed by: INTERNAL MEDICINE

## 2023-12-22 PROCEDURE — 88264 CHROMOSOME ANALYSIS 20-25: CPT | Performed by: INTERNAL MEDICINE

## 2023-12-22 PROCEDURE — 88341 IMHCHEM/IMCYTCHM EA ADD ANTB: CPT | Mod: 26 | Performed by: STUDENT IN AN ORGANIZED HEALTH CARE EDUCATION/TRAINING PROGRAM

## 2023-12-22 PROCEDURE — 250N000011 HC RX IP 250 OP 636

## 2023-12-22 PROCEDURE — 85610 PROTHROMBIN TIME: CPT

## 2023-12-22 PROCEDURE — 99152 MOD SED SAME PHYS/QHP 5/>YRS: CPT

## 2023-12-22 PROCEDURE — 999N000132 HC STATISTIC PP CARE STAGE 1

## 2023-12-22 PROCEDURE — 272N000505 HC NEEDLE CR5

## 2023-12-22 PROCEDURE — 88333 PATH CONSLTJ SURG CYTO XM 1: CPT | Mod: TC | Performed by: INTERNAL MEDICINE

## 2023-12-22 PROCEDURE — 88360 TUMOR IMMUNOHISTOCHEM/MANUAL: CPT | Mod: 26 | Performed by: STUDENT IN AN ORGANIZED HEALTH CARE EDUCATION/TRAINING PROGRAM

## 2023-12-22 PROCEDURE — 88188 FLOWCYTOMETRY/READ 9-15: CPT | Mod: GC | Performed by: STUDENT IN AN ORGANIZED HEALTH CARE EDUCATION/TRAINING PROGRAM

## 2023-12-22 PROCEDURE — 88333 PATH CONSLTJ SURG CYTO XM 1: CPT | Mod: 26 | Performed by: PATHOLOGY

## 2023-12-22 PROCEDURE — 88305 TISSUE EXAM BY PATHOLOGIST: CPT | Mod: 26 | Performed by: STUDENT IN AN ORGANIZED HEALTH CARE EDUCATION/TRAINING PROGRAM

## 2023-12-22 PROCEDURE — 88237 TISSUE CULTURE BONE MARROW: CPT | Performed by: INTERNAL MEDICINE

## 2023-12-22 PROCEDURE — 250N000009 HC RX 250

## 2023-12-22 PROCEDURE — 88311 DECALCIFY TISSUE: CPT | Mod: 26 | Performed by: STUDENT IN AN ORGANIZED HEALTH CARE EDUCATION/TRAINING PROGRAM

## 2023-12-22 PROCEDURE — 99207 CHROMOSOME ANALYSIS, BONE MARROW, DIAGNOSIS/RELAPSE: CPT

## 2023-12-22 PROCEDURE — 88275 CYTOGENETICS 100-300: CPT | Mod: 59 | Performed by: INTERNAL MEDICINE

## 2023-12-22 PROCEDURE — 38222 DX BONE MARROW BX & ASPIR: CPT | Mod: GC | Performed by: RADIOLOGY

## 2023-12-22 PROCEDURE — 999N000248 HC STATISTIC IV INSERT WITH US BY RN

## 2023-12-22 PROCEDURE — 88184 FLOWCYTOMETRY/ TC 1 MARKER: CPT | Performed by: STUDENT IN AN ORGANIZED HEALTH CARE EDUCATION/TRAINING PROGRAM

## 2023-12-22 PROCEDURE — 88311 DECALCIFY TISSUE: CPT | Mod: TC | Performed by: INTERNAL MEDICINE

## 2023-12-22 PROCEDURE — 77012 CT SCAN FOR NEEDLE BIOPSY: CPT

## 2023-12-22 PROCEDURE — 999N000054 HC STATISTIC EKG NON-CHARGEABLE

## 2023-12-22 PROCEDURE — 77012 CT SCAN FOR NEEDLE BIOPSY: CPT | Mod: 26 | Performed by: RADIOLOGY

## 2023-12-22 PROCEDURE — 99152 MOD SED SAME PHYS/QHP 5/>YRS: CPT | Mod: GC | Performed by: RADIOLOGY

## 2023-12-22 PROCEDURE — 999N000142 HC STATISTIC PROCEDURE PREP ONLY

## 2023-12-22 PROCEDURE — 88369 M/PHMTRC ALYSISHQUANT/SEMIQ: CPT | Mod: 26 | Performed by: MEDICAL GENETICS

## 2023-12-22 PROCEDURE — 20225 BONE BIOPSY TROCAR/NDL DEEP: CPT

## 2023-12-22 PROCEDURE — 88368 INSITU HYBRIDIZATION MANUAL: CPT | Mod: 26 | Performed by: MEDICAL GENETICS

## 2023-12-22 PROCEDURE — 88271 CYTOGENETICS DNA PROBE: CPT | Performed by: INTERNAL MEDICINE

## 2023-12-22 PROCEDURE — 85097 BONE MARROW INTERPRETATION: CPT | Mod: GC | Performed by: STUDENT IN AN ORGANIZED HEALTH CARE EDUCATION/TRAINING PROGRAM

## 2023-12-22 PROCEDURE — 38505 NEEDLE BIOPSY LYMPH NODES: CPT | Mod: LT | Performed by: RADIOLOGY

## 2023-12-22 PROCEDURE — 999N000285 HC STATISTIC VASC ACCESS LAB DRAW WITH PIV START

## 2023-12-22 RX ORDER — NALOXONE HYDROCHLORIDE 0.4 MG/ML
0.2 INJECTION, SOLUTION INTRAMUSCULAR; INTRAVENOUS; SUBCUTANEOUS
Status: DISCONTINUED | OUTPATIENT
Start: 2023-12-22 | End: 2023-12-22 | Stop reason: HOSPADM

## 2023-12-22 RX ORDER — LIDOCAINE 40 MG/G
CREAM TOPICAL
Status: DISCONTINUED | OUTPATIENT
Start: 2023-12-22 | End: 2023-12-22 | Stop reason: HOSPADM

## 2023-12-22 RX ORDER — FENTANYL CITRATE 50 UG/ML
25-50 INJECTION, SOLUTION INTRAMUSCULAR; INTRAVENOUS EVERY 5 MIN PRN
Status: DISCONTINUED | OUTPATIENT
Start: 2023-12-22 | End: 2023-12-22 | Stop reason: HOSPADM

## 2023-12-22 RX ORDER — NALOXONE HYDROCHLORIDE 0.4 MG/ML
0.4 INJECTION, SOLUTION INTRAMUSCULAR; INTRAVENOUS; SUBCUTANEOUS
Status: DISCONTINUED | OUTPATIENT
Start: 2023-12-22 | End: 2023-12-22 | Stop reason: HOSPADM

## 2023-12-22 RX ORDER — ACETAMINOPHEN 325 MG/1
650 TABLET ORAL EVERY 4 HOURS PRN
Status: DISCONTINUED | OUTPATIENT
Start: 2023-12-22 | End: 2023-12-22 | Stop reason: HOSPADM

## 2023-12-22 RX ORDER — FLUMAZENIL 0.1 MG/ML
0.2 INJECTION, SOLUTION INTRAVENOUS
Status: DISCONTINUED | OUTPATIENT
Start: 2023-12-22 | End: 2023-12-22 | Stop reason: HOSPADM

## 2023-12-22 RX ADMIN — FENTANYL CITRATE 50 MCG: 50 INJECTION, SOLUTION INTRAMUSCULAR; INTRAVENOUS at 12:06

## 2023-12-22 RX ADMIN — MIDAZOLAM 0.5 MG: 1 INJECTION INTRAMUSCULAR; INTRAVENOUS at 11:15

## 2023-12-22 RX ADMIN — LIDOCAINE HYDROCHLORIDE 15 ML: 10 INJECTION, SOLUTION EPIDURAL; INFILTRATION; INTRACAUDAL; PERINEURAL at 12:12

## 2023-12-22 RX ADMIN — MIDAZOLAM 1 MG: 1 INJECTION INTRAMUSCULAR; INTRAVENOUS at 12:06

## 2023-12-22 RX ADMIN — FENTANYL CITRATE 50 MCG: 50 INJECTION, SOLUTION INTRAMUSCULAR; INTRAVENOUS at 11:25

## 2023-12-22 RX ADMIN — MIDAZOLAM 1 MG: 1 INJECTION INTRAMUSCULAR; INTRAVENOUS at 11:25

## 2023-12-22 RX ADMIN — FENTANYL CITRATE 25 MCG: 50 INJECTION, SOLUTION INTRAMUSCULAR; INTRAVENOUS at 11:15

## 2023-12-22 ASSESSMENT — ACTIVITIES OF DAILY LIVING (ADL)
ADLS_ACUITY_SCORE: 33
ADLS_ACUITY_SCORE: 35
ADLS_ACUITY_SCORE: 35

## 2023-12-22 NOTE — PROGRESS NOTES
Returned from IR s/p left abdominal lymph node biopsy and bone marrow biopsy.  LLQ and low back biopsy sites CDI.  Denies pain.  Resting in bed.   VSS.

## 2023-12-22 NOTE — PRE-PROCEDURE
GENERAL PRE-PROCEDURE:   Procedure:  Image guided lymph node biopsy, image guided bone marrow biopsy.   Date/Time:  12/22/2023 10:46 AM    Verbal consent obtained?: Yes    Written consent obtained?: Yes    Risks and benefits: Risks, benefits and alternatives were discussed    Consent given by:  Patient  Patient states understanding of procedure being performed: Yes    Patient's understanding of procedure matches consent: Yes    Procedure consent matches procedure scheduled: Yes    Expected level of sedation:  Minimal  Appropriately NPO:  Yes  ASA Class:  2  Mallampati  :  Grade 2- soft palate, base of uvula, tonsillar pillars, and portion of posterior pharyngeal wall visible  Lungs:  Lungs clear with good breath sounds bilaterally and crackles right base  Heart:  Normal heart sounds and rate  History & Physical reviewed:  History and physical reviewed and no updates needed  Statement of review:  I have reviewed the lab findings, diagnostic data, medications, and the plan for sedation

## 2023-12-22 NOTE — PROGRESS NOTES
Arrived from home for a lymph node and bone marrow biopsy.  BP slightly elevated secondary to anxiety about procedure.  H&P current.  Consent obtained.  Will be ready for procedure when IV is placed and labs are resulted.

## 2023-12-22 NOTE — IR NOTE
Patient Name: Virginia Daileyken  Medical Record Number: [id number]  Today's Date: 12/22/2023    Procedure: Bone marrow biopsy and left pelvis lymph node biopsy   Proceduralist: Dr. Solano, Dr. Behzadi  Pathology present: Yes and hematology present    Procedure Start: 1122 bone marrow          1209  lymph node   Procedure end: 1230  Sedation medications administered:   Fentanyl 125 mcg  Versed 2.5 mg  Sedation time: 45 minutes (1115 -1230)     Report given to:  RN  : na    Other Notes: Pt arrived to IR room CT2 from . Consent reviewed. Pt denies any questions or concerns regarding procedure. Pt positioned prone for bone marrow biopsy and supine for lymph node biopsy and monitored per protocol.     Pt tolerated procedure without any noted complications.     Dressing to left lower back CDI.  Dressing to left anterior pelvis CDI.    Pt transferred back to .

## 2023-12-22 NOTE — DISCHARGE INSTRUCTIONS
[facility]    Interventional Radiology  Patient Instructions Following Biopsy     AFTER YOU GO HOME  If you were given sedation, for the first 24 hours: we recommend that an adult stay with you, DO NOT drive or operate machinery at home or at work, DO NOT smoke, DO NOT make any important or legal decisions.  DO NOT drink alcoholic beverages the day of your procedure  Drink plenty of fluids   Resume your regular diet, unless otherwise instructed by your Primary Physician  Relax and take it easy for 48 hours  DO NOT do any strenuous exercise or lifting (> 10 lbs) for at least 7 days following your procedure  Keep the dressing dry and in place for 24 hours. Replace with Band aid for 2 days.  Never leave a wet dressing in place.  Do not take a shower for at least 12 hours following your procedure. No tub bath, hot tub, or swimming for 5 days.  There should be minimum drainage from the biopsy site    CALL THE PHYSICIAN IF:  You start bleeding from the procedure site.  If you do start to bleed from that site, lie down flat and hold pressure on the site for a minimum of 10 minutes.  Your physician will tell you if you need to return to the hospital  You develop nausea or vomiting  You have excessive swelling, redness, or tenderness at the site  You have drainage that looks like it is infected.  You experience severe pain  You develop hives or a rash or unexplained itching  You develop shortness of breath  You develop a temperature of 101 degrees F or greater      [facility] INTERVENTIONAL RADIOLOGY DEPARTMENT  Procedure Physician: Dr. Melo                                   Date of procedure: December 22, 2023  Telephone Numbers: [phone]      Monday-Friday 7:30 am to 4:00 pm  [phone] After 4:00 pm Monday-Friday, Weekends & Holidays.   Ask for the Interventional Radiologist on call.  Someone is on call 24 hrs/day  [facility] toll free number: [phone] Monday-Friday 8:00 am to 4:30 pm  [facility]  Emergency Dept: [phone]

## 2023-12-22 NOTE — PROGRESS NOTES
Tolerated bedrest without issues.  Tolerated food, fluids, ambulation and urination without issues.  LLQ biopsy site CDI.  Sacral biopsy site CDI.  Denies pain.  Reviewed discharge instructions with Virginia.  Discharged to home with friend.

## 2023-12-22 NOTE — PROCEDURES
[facility]    Procedure: IR Procedure Note    Date/Time: 12/22/2023 12:39 PM    Performed by: Behzadi, Heshmatzadeh, MD  Authorized by: Edilberto Solano MD  IR Fellow Physician: Ashkan Behzadi       UNIVERSAL PROTOCOL   Site Marked: NA  Prior Images Obtained and Reviewed:  Yes  Required items: Required blood products, implants, devices and special equipment available    Patient identity confirmed:  Verbally with patient, arm band, provided demographic data and hospital-assigned identification number  Patient was reevaluated immediately before administering moderate or deep sedation or anesthesia  Confirmation Checklist:  Patient's identity using two indicators, relevant allergies, procedure was appropriate and matched the consent or emergent situation and correct equipment/implants were available  Time out: Immediately prior to the procedure a time out was called    Universal Protocol: the Joint Commission Universal Protocol was followed    Preparation: Patient was prepped and draped in usual sterile fashion       ANESTHESIA    Anesthesia: Local infiltration  Local Anesthetic:  Lidocaine 1% without epinephrine      SEDATION  Patient Sedated: Yes    Sedation:  Midazolam and fentanyl  Vital signs: Vital signs monitored during sedation    See dictated procedure note for full details.  Findings: -    Specimens: none    Complications: None    Condition: Stable    Plan: Bed rest for 2 hours.       PROCEDURE  Describe Procedure: Successful CT guided left iliac bone marrow aspiration/ biopsy.     Successful CT guided left pelvic node  biopsy.   Patient Tolerance:  Patient tolerated the procedure well with no immediate complications  Length of time physician/provider present for 1:1 monitoring during sedation: 30

## 2023-12-26 LAB
ATRIAL RATE - MUSE: 81 BPM
DIASTOLIC BLOOD PRESSURE - MUSE: NORMAL MMHG
INTERPRETATION ECG - MUSE: NORMAL
P AXIS - MUSE: 69 DEGREES
PATH REPORT.COMMENTS IMP SPEC: ABNORMAL
PATH REPORT.COMMENTS IMP SPEC: YES
PATH REPORT.FINAL DX SPEC: ABNORMAL
PATH REPORT.MICROSCOPIC SPEC OTHER STN: ABNORMAL
PATH REPORT.RELEVANT HX SPEC: ABNORMAL
PR INTERVAL - MUSE: 208 MS
QRS DURATION - MUSE: 88 MS
QT - MUSE: 352 MS
QTC - MUSE: 408 MS
R AXIS - MUSE: -51 DEGREES
SYSTOLIC BLOOD PRESSURE - MUSE: NORMAL MMHG
T AXIS - MUSE: 48 DEGREES
VENTRICULAR RATE- MUSE: 81 BPM

## 2023-12-28 LAB
PATH REPORT.COMMENTS IMP SPEC: ABNORMAL
PATH REPORT.COMMENTS IMP SPEC: YES
PATH REPORT.FINAL DX SPEC: ABNORMAL
PATH REPORT.GROSS SPEC: ABNORMAL
PATH REPORT.MICROSCOPIC SPEC OTHER STN: ABNORMAL
PATH REPORT.RELEVANT HX SPEC: ABNORMAL
PHOTO IMAGE: ABNORMAL

## 2023-12-31 NOTE — PROGRESS NOTES
[facility]  New patient history and physical  Waldemar 3, 2024  HEMATOLOGIC DIAGNOSES:    Hem/onc History:   --Virginia Byers is a 73 year old woman with a PMHx of left breast cancer in 2000, hypertension, hyperlipidemia, osteoporosis, osteoarthritis, degenerative disc disease, spinal stenosis of lumbar region,  s/p bilateral L5-S1 transforaminal ROS 2021, s/p bilateral L4-5 and L5-S1 facet joint injections on 6/14/2021, glaucoma and macular degeneration, cataracts s/p surgery, history of tobacco use (quit 1999).  --Breast Cancer: Diagnosed 3/2000 at age 49 with Left breast IDC ER positive, [place] positive, HER2 positive. S/P lumpectomy and left axillary nod dissection 3/2000, sp ACx4, Taxol x 4, 5 years adjuvant endocrine therapy.     - 11/22 MRI lumbar spine: prominent left iliac lymph nodes among other findings related to degenerative changes and neural foraminal stenosis throughout lumbar spine  - 12/22 MRI pelvis: Enhancing marrow signal abnormality of the right iliac bone extending from the superior acetabulum subchondral location proximally almost to the level of the sacroiliac joint caudal aspect. No associated fracture line. Underlying marrow infiltrative process such as from metastasis cannot be excluded especially given the degree of T1 hypointensity and history of primary tumor. Other consideration include stress reaction. Mildly increased left external iliac chain, and the right medial thigh lymphadenopathy. Metastasis cannot be excluded. Left posterior iliac enhancing lesion, similar in size to prior CT 9/2021 and previously not visible on CT, focal enhancing lesion in the right greater trochanter laterally. Findings are concerning for Metastasis.   -12/22 CT chest abdomen pelvis with contrast: There is an new or increased left common iliac, external iliac and pelvic sidewall lymphadenopathy since prior CT.  --1/2023 MRI brain w/wo contrast: No findings of intracranial metastatic disease.  Indeterminate ovoid T2 hyperintense enhancing mass in the region of the left lacrimal gland measuring approximately 2.4 cm AP by 1.4 cm transverse by 2.3 cm craniocaudal associated with/replacing the left lacrimal gland. Consider correlation with tissue sampling. A few indeterminate partially visualized heterogeneous enhancing nodules measuring up to approximately 1.5 cm in the bilateral parotid glands.   -1/20/2023: IR US Guided biopsy of a right external iliac LN: Involved by follicular lymphoma, low-grade (WHO grade 1-2). FISH shows no rearrangement of BCL2. Possible concurrent CD10+ lambda monotypic B-cells, possibly representing MBL.  --1/23/23 CT neck: Heterogeneous nodule in the right thyroid gland (1.6 cm), soft tissue nodule superficial left parotid gland (1.2 cm). Differential may include benign or malignant parotid lesions; Otolaryngology consultation recommended.   --4/6/2023: Left orbital mass excisional biopsy: Follicular lymphoma, low grade. Concurrent MBL of CLL/SLL type.   -5/23/2023 PET/CT NCAP: Ovoid mass left lateral orbit measures 2.5 x 1.2 cm, SUVmax 9.3. Bilateral intraparotid nodules present, SUVmax 6.7. Asymetric prominent right posterior lower cervical node 0.8 cm, SUVmax 5.1. Single mildly enlarged left axillary lymph node 1.0 cm SUVmax 3.0. Scattered FDG avid LNs below the diaphragm, up to 2.2 x 4.1 cm SUVmax 7.6. FDV avid sclerotic skeletal lesions of right superior acetabulum (SUVmax 12.9).   6/5/2023: 400 cGy to left orbit completed 6/15/2023  --7/10/23 Rheumatology consult.   --7/23: Neurology movement clinic.   --12/22023 BMBx: Hypercellular (40%) with trilineage hematopoiesis, no overt dysplasia, no increase in blasts. Rare CD5+ Kappa--monotypic B-cells (1.7%), small lymphoid aggregate suspicious for marrow involvement by CD5+ B-cell lymphoproliferative disorder. No definitive histologic or immunophenotypic evidence of follicular lymphoma.   --12/22/2023: Left pelvic lymph node  biopsy: Recurrent/persistent low grade B-cell lymphoma consistent with follicular lymphoma.     History of Present Illness:   Virginia Byers is a 73-year-old woman with a past medical history outlined above and hematology history.  She is currently feeling reasonably well, denies any B symptoms, denies any worsening fatigue.  Weight stable.  No recent infections.  Denies pain that is different from her long-term baseline.     Past Medical History:     Past Medical History:   Diagnosis Date    Aftercare following right hip joint replacement surgery 10/25/2023    Breast cancer (H) 2000    Left breast, s/p mastectomy, chemotherapy and endocrine therapy    Follicular lymphoma (H) 01/20/2023    Glaucoma     bilateral - Dr. Moore - [facility]    History of spinal stenosis     Hyperlipidemia     Hypertension     Macular degeneration     Dr. Toscano - [facility]    Osteoarthritis     Osteoporosis     Personal history of chemotherapy 2000    A/C + Taxol    S/P radiation therapy     400 cGy to left orbit completed on 6/15/2023 [facility]          Past Surgical History:      Past Surgical History:   Procedure Laterality Date    ARTHROPLASTY HIP Left 07/11/2023    Procedure: ARTHROPLASTY, HIP, TOTAL LEFT;  Surgeon: Austin Blood MD;  [facility]    ARTHROPLASTY HIP Right 10/13/2023    Procedure: RIGHT ARTHROPLASTY, HIP, TOTAL;  Surgeon: Austin Blood MD;  Location: [facility] Main OR    BIOPSY Left 04/06/2023    Left Orbital Biopsy    C MASTECTOMY,SIMPLE  03/2000    left    COLONOSCOPY  2006    Q 10 years    COLONOSCOPY N/A 10/04/2021    Procedure: COLONOSCOPY;  Surgeon: Guru Lyla Ruby MD;  Location:  OR    ESOPHAGOSCOPY, GASTROSCOPY, DUODENOSCOPY (EGD), COMBINED N/A 10/04/2021    Procedure: ENDOSCOPIC ULTRASOUND, ESOPHAGOSCOPY / UPPER GASTROINTESTINAL TRACT (GI), Esophagogastroduodenoscopy,  Fine Needle Biopsy of liver;   "Surgeon: Guru Lyla Ruby MD;  Location: [facility] OR    EYE SURGERY      IR LYMPH NODE BIOPSY  2023    SURGICAL HISTORY OF -  Left 2015    tissue expander placed in left breast area    SURGICAL HISTORY OF -  Left 2016    left breast implant and right mammoplasty          Social History:     Social History     Tobacco Use    Smoking status: Former     Packs/day: 1.00     Years: 17.00     Additional pack years: 0.00     Total pack years: 17.00     Types: Cigarettes     Quit date: 3/15/1999     Years since quittin.8     Passive exposure: Past    Smokeless tobacco: Never   Substance Use Topics    Alcohol use: Not Currently     Alcohol/week: 4.0 standard drinks of alcohol     Types: 4 Glasses of wine per week          Family History:     Family History   Problem Relation Age of Onset    Cancer Mother         bone cancer    Heart Disease Father     Coronary Artery Disease Father     Diabetes Father     Arthritis Sister     Breast Cancer Sister         age 75    Diabetes Maternal Grandmother     Diabetes Maternal Grandfather     Diabetes Paternal Grandmother      Family history reviewed and updated in EPIC     Medications:       Current Outpatient Medications   Medication Sig    dorzolamide-timolol (COSOPT) 2-0.5 % ophthalmic solution Place 1 drop into both eyes 2 times daily    latanoprost (XALATAN) 0.005 % ophthalmic solution Place 1 drop into both eyes At Bedtime    losartan (COZAAR) 100 MG tablet Take 1 tablet (100 mg) by mouth daily    Multiple Vitamins-Minerals (PRESERVISION AREDS) TABS Take 1 tablet by mouth 2 times daily    rosuvastatin (CRESTOR) 10 MG tablet Take 1 tablet (10 mg) by mouth daily     No current facility-administered medications for this visit.        Physical Exam:   /74 (BP Location: Right arm, Patient Position: Sitting, Cuff Size: Adult Regular)   Pulse 80   Temp 97.6  F (36.4  C) (Oral)   Resp 20   Ht 1.613 m (5' 3.5\")   Wt 66.9 kg (147 lb 6.4 oz)   SpO2 " 98%   BMI 25.70 kg/m      ECOG PS: 1  General: NAD; H&N: no mucosal lesions; Lungs clear; Heart RRR; Abdomen; Soft, No organomegaly; Extremities: No edema; Skin: No rash; Neuro: Nonfocal; Mood/Affect: appropriate; Lymph: no LAD  Assessment and Plan:   # Follicular lymphoma, Grade 1-2, FLIPI 2 (intermediate risk)  # MBL  # Breast Cancer: Diagnosed 3/2000 at age 49 with Left breast IDC ER positive, [place] positive, HER2 positive. S/P lumpectomy and left axillary nod dissection 3/2000, sp ACx4, Taxol x 4, 5 years adjuvant endocrine therapy.     #Glaucoma  # History of spinal stenosis  # Hyperlipidmiea  # Hypertension  # Osteoarthritis  # Osteoporosis    Virginia Byers is a 73 year old woman with a past medical history of breast cancer status post anthracycline-containing chemotherapy in 2000, spinal stenosis, and now with a more recent diagnosis of advanced stage follicular lymphoma status post orbital radiation.  Her multiple biopsies revealed both a CD10+ lymphoma, consistent with follicular lymphoma, as well as a small aggregate of CD5 positive/CD23 positive cells that are consistent with SLL.  Notably cyclin D1 and SOX11 staining are negative among CD5+ cells, making mantle cell lymphoma less likely; FISH/Cg is still pending from her most recent biopsy. She has never had any circulating absolute lymphocytosis, and CBCs have been typically stable.  She has no B symptoms, she has a relatively low tumor burden currently, and appears to be asymptomatic with regards to her hematologic malignancies.    Today we discussed the natural history of follicular lymphoma.  This is an indolent lymphoma that occasionally can transform into a more high-grade aggressive subtype.  There is an approximate 1% chance of transformation every year.  Follicular lymphoma can cause organ damage, immunosuppression, tumor lysis, hypercalcemia or other complications during progression or transformation.  Advanced follicular lymphoma is  incurable, but highly treatable with existing chemotherapy regimens.  These are typically able to induce remission, which sometimes can be sustained for multiple years.  All chemotherapy regimens have potential adverse effects, which typically include nausea, vomiting, diarrhea, hair loss, bone marrow suppression and cytopenias, and increased risk of infections.  Typical chemotherapy regimens for frontline approaches to Follicular Lymphoma include R-CHOP, R-CVP, Bendamustine/rituximab, or rituximab plus Revlimid.  Rituximab monotherapy is also another option that is sometimes used for low-volume disease.  R-CHOP uniquely contains anthracycline class chemotherapy, which I think would be particularly high risk for Virginia, given her prior history of AC Taxol chemotherapy.  There are currently no clinical trials at [facility] for frontline therapy for follicular lymphoma.  However multiple regimens are commonly available for relapsed/refractory disease.    We also discussed her CD5+ lymphoproliferative disorder.  This is consistent with monoclonal B-cell lymphocytosis.  Treatment is not warranted at this time.     Virginia expressed a desire to avoid treatment for as long as possible.  I think this is reasonable, so long as she undergoes active surveillance on a regular basis.  She has a good relationship with Dr. Zaidi and would like to continue surveillance under her care.  We agreed to check in on an annual basis to monitor her progress, and are happy to see her sooner, as indicated.    Morteza Nguyen MD, PhD  Division of Hematology, Oncology, and Transplantation  [facility]

## 2024-01-03 ENCOUNTER — PATIENT OUTREACH (OUTPATIENT)
Dept: ONCOLOGY | Facility: CLINIC | Age: 74
End: 2024-01-03

## 2024-01-03 ENCOUNTER — ONCOLOGY VISIT (OUTPATIENT)
Dept: ONCOLOGY | Facility: CLINIC | Age: 74
End: 2024-01-03
Attending: INTERNAL MEDICINE
Payer: MEDICARE

## 2024-01-03 VITALS
BODY MASS INDEX: 25.16 KG/M2 | HEIGHT: 64 IN | RESPIRATION RATE: 20 BRPM | SYSTOLIC BLOOD PRESSURE: 138 MMHG | DIASTOLIC BLOOD PRESSURE: 74 MMHG | TEMPERATURE: 97.6 F | HEART RATE: 80 BPM | OXYGEN SATURATION: 98 % | WEIGHT: 147.4 LBS

## 2024-01-03 DIAGNOSIS — C82.00 FOLLICULAR LYMPHOMA GRADE I, UNSPECIFIED BODY REGION (H): ICD-10-CM

## 2024-01-03 PROCEDURE — 99204 OFFICE O/P NEW MOD 45 MIN: CPT | Mod: 24 | Performed by: INTERNAL MEDICINE

## 2024-01-03 PROCEDURE — G0463 HOSPITAL OUTPT CLINIC VISIT: HCPCS | Performed by: INTERNAL MEDICINE

## 2024-01-03 ASSESSMENT — PAIN SCALES - GENERAL: PAINLEVEL: NO PAIN (0)

## 2024-01-03 NOTE — PROGRESS NOTES
[facility]: Cancer Care                                                                                          I met with Virginia after her visit with Dr. Morteza Nguyen.  Introduced myself and provided her with my contact number as RN Care Coordinator working with Dr Nguyen.  He advised she can continue following her care with Dr. Naomy Zaidi and he will see her in follow up in 1 year    Encouraged Virginia to call or MyChart if she has any questions.      Signature:  Pepper Bartholomew RN

## 2024-01-03 NOTE — LETTER
1/3/2024         RE: Virginia Byers  [id number] [address] [address]        Dear Colleague,    Thank you for referring your patient, Virginia Byers, to the [facility]. Please see a copy of my visit note below.    [facility] [place]  New patient history and physical  Waldemar 3, 2024  HEMATOLOGIC DIAGNOSES:    Hem/onc History:   --Virginia Byers is a 73 year old woman with a PMHx of left breast cancer in 2000, hypertension, hyperlipidemia, osteoporosis, osteoarthritis, degenerative disc disease, spinal stenosis of lumbar region,  s/p bilateral L5-S1 transforaminal ROS 2021, s/p bilateral L4-5 and L5-S1 facet joint injections on 6/14/2021, glaucoma and macular degeneration, cataracts s/p surgery, history of tobacco use (quit 1999).  --Breast Cancer: Diagnosed 3/2000 at age 49 with Left breast IDC ER positive, [place] positive, HER2 positive. S/P lumpectomy and left axillary nod dissection 3/2000, sp ACx4, Taxol x 4, 5 years adjuvant endocrine therapy.     - 11/22 MRI lumbar spine: prominent left iliac lymph nodes among other findings related to degenerative changes and neural foraminal stenosis throughout lumbar spine  - 12/22 MRI pelvis: Enhancing marrow signal abnormality of the right iliac bone extending from the superior acetabulum subchondral location proximally almost to the level of the sacroiliac joint caudal aspect. No associated fracture line. Underlying marrow infiltrative process such as from metastasis cannot be excluded especially given the degree of T1 hypointensity and history of primary tumor. Other consideration include stress reaction. Mildly increased left external iliac chain, and the right medial thigh lymphadenopathy. Metastasis cannot be excluded. Left posterior iliac enhancing lesion, similar in size to prior CT 9/2021 and previously not visible on CT, focal enhancing lesion in the right greater trochanter laterally. Findings are concerning for  Metastasis.   -12/22 CT chest abdomen pelvis with contrast: There is an new or increased left common iliac, external iliac and pelvic sidewall lymphadenopathy since prior CT.  --1/2023 MRI brain w/wo contrast: No findings of intracranial metastatic disease. Indeterminate ovoid T2 hyperintense enhancing mass in the region of the left lacrimal gland measuring approximately 2.4 cm AP by 1.4 cm transverse by 2.3 cm craniocaudal associated with/replacing the left lacrimal gland. Consider correlation with tissue sampling. A few indeterminate partially visualized heterogeneous enhancing nodules measuring up to approximately 1.5 cm in the bilateral parotid glands.   -1/20/2023: IR US Guided biopsy of a right external iliac LN: Involved by follicular lymphoma, low-grade (WHO grade 1-2). FISH shows no rearrangement of BCL2. Possible concurrent CD10+ lambda monotypic B-cells, possibly representing MBL.  --1/23/23 CT neck: Heterogeneous nodule in the right thyroid gland (1.6 cm), soft tissue nodule superficial left parotid gland (1.2 cm). Differential may include benign or malignant parotid lesions; Otolaryngology consultation recommended.   --4/6/2023: Left orbital mass excisional biopsy: Follicular lymphoma, low grade. Concurrent MBL of CLL/SLL type.   -5/23/2023 PET/CT NCAP: Ovoid mass left lateral orbit measures 2.5 x 1.2 cm, SUVmax 9.3. Bilateral intraparotid nodules present, SUVmax 6.7. Asymetric prominent right posterior lower cervical node 0.8 cm, SUVmax 5.1. Single mildly enlarged left axillary lymph node 1.0 cm SUVmax 3.0. Scattered FDG avid LNs below the diaphragm, up to 2.2 x 4.1 cm SUVmax 7.6. FDV avid sclerotic skeletal lesions of right superior acetabulum (SUVmax 12.9).   6/5/2023: 400 cGy to left orbit completed 6/15/2023  --7/10/23 Rheumatology consult.   --7/23: Neurology movement clinic.   --12/22023 BMBx: Hypercellular (40%) with trilineage hematopoiesis, no overt dysplasia, no increase in blasts. Rare CD5+  Kappa--monotypic B-cells (1.7%), small lymphoid aggregate suspicious for marrow involvement by CD5+ B-cell lymphoproliferative disorder. No definitive histologic or immunophenotypic evidence of follicular lymphoma.   --12/22/2023: Left pelvic lymph node biopsy: Recurrent/persistent low grade B-cell lymphoma consistent with follicular lymphoma.     History of Present Illness:   Virginia Byers is a 73-year-old woman with a past medical history outlined above and hematology history.  She is currently feeling reasonably well, denies any B symptoms, denies any worsening fatigue.  Weight stable.  No recent infections.  Denies pain that is different from her long-term baseline.     Past Medical History:     Past Medical History:   Diagnosis Date    Aftercare following right hip joint replacement surgery 10/25/2023    Breast cancer (H) 2000    Left breast, s/p mastectomy, chemotherapy and endocrine therapy    Follicular lymphoma (H) 01/20/2023    Glaucoma     bilateral - Dr. Moore - [facility]    History of spinal stenosis     Hyperlipidemia     Hypertension     Macular degeneration     Dr. Toscano - [facility]    Osteoarthritis     Osteoporosis     Personal history of chemotherapy 2000    A/C + Taxol    S/P radiation therapy     400 cGy to left orbit completed on 6/15/2023 [facility]          Past Surgical History:      Past Surgical History:   Procedure Laterality Date    ARTHROPLASTY HIP Left 07/11/2023    Procedure: ARTHROPLASTY, HIP, TOTAL LEFT;  Surgeon: Austin Blood MD;  [facility]    ARTHROPLASTY HIP Right 10/13/2023    Procedure: RIGHT ARTHROPLASTY, HIP, TOTAL;  Surgeon: Austin Blood MD;  Location: [facility] Main OR    BIOPSY Left 04/06/2023    Left Orbital Biopsy    C MASTECTOMY,SIMPLE  03/2000    left    COLONOSCOPY  2006    Q 10 years    COLONOSCOPY N/A 10/04/2021    Procedure: COLONOSCOPY;  Surgeon: Guru Lyla Ruby  MD Freddie;  Location: [facility] OR    ESOPHAGOSCOPY, GASTROSCOPY, DUODENOSCOPY (EGD), COMBINED N/A 10/04/2021    Procedure: ENDOSCOPIC ULTRASOUND, ESOPHAGOSCOPY / UPPER GASTROINTESTINAL TRACT (GI), Esophagogastroduodenoscopy,  Fine Needle Biopsy of liver;  Surgeon: Guru Lyla Ruby MD;  Location: [facility] OR    EYE SURGERY      IR LYMPH NODE BIOPSY  2023    SURGICAL HISTORY OF -  Left 2015    tissue expander placed in left breast area    SURGICAL HISTORY OF -  Left 2016    left breast implant and right mammoplasty          Social History:     Social History     Tobacco Use    Smoking status: Former     Packs/day: 1.00     Years: 17.00     Additional pack years: 0.00     Total pack years: 17.00     Types: Cigarettes     Quit date: 3/15/1999     Years since quittin.8     Passive exposure: Past    Smokeless tobacco: Never   Substance Use Topics    Alcohol use: Not Currently     Alcohol/week: 4.0 standard drinks of alcohol     Types: 4 Glasses of wine per week          Family History:     Family History   Problem Relation Age of Onset    Cancer Mother         bone cancer    Heart Disease Father     Coronary Artery Disease Father     Diabetes Father     Arthritis Sister     Breast Cancer Sister         age 75    Diabetes Maternal Grandmother     Diabetes Maternal Grandfather     Diabetes Paternal Grandmother      Family history reviewed and updated in EPIC     Medications:       Current Outpatient Medications   Medication Sig    dorzolamide-timolol (COSOPT) 2-0.5 % ophthalmic solution Place 1 drop into both eyes 2 times daily    latanoprost (XALATAN) 0.005 % ophthalmic solution Place 1 drop into both eyes At Bedtime    losartan (COZAAR) 100 MG tablet Take 1 tablet (100 mg) by mouth daily    Multiple Vitamins-Minerals (PRESERVISION AREDS) TABS Take 1 tablet by mouth 2 times daily    rosuvastatin (CRESTOR) 10 MG tablet Take 1 tablet (10 mg) by mouth daily     No current facility-administered  "medications for this visit.        Physical Exam:   /74 (BP Location: Right arm, Patient Position: Sitting, Cuff Size: Adult Regular)   Pulse 80   Temp 97.6  F (36.4  C) (Oral)   Resp 20   Ht 1.613 m (5' 3.5\")   Wt 66.9 kg (147 lb 6.4 oz)   SpO2 98%   BMI 25.70 kg/m      ECOG PS: 1  General: NAD; H&N: no mucosal lesions; Lungs clear; Heart RRR; Abdomen; Soft, No organomegaly; Extremities: No edema; Skin: No rash; Neuro: Nonfocal; Mood/Affect: appropriate; Lymph: no LAD  Assessment and Plan:   # Follicular lymphoma, Grade 1-2, FLIPI 2 (intermediate risk)  # MBL  # Breast Cancer: Diagnosed 3/2000 at age 49 with Left breast IDC ER positive, [place] positive, HER2 positive. S/P lumpectomy and left axillary nod dissection 3/2000, sp ACx4, Taxol x 4, 5 years adjuvant endocrine therapy.     #Glaucoma  # History of spinal stenosis  # Hyperlipidmiea  # Hypertension  # Osteoarthritis  # Osteoporosis    Virginia Byers is a 73 year old woman with a past medical history of breast cancer status post anthracycline-containing chemotherapy in 2000, spinal stenosis, and now with a more recent diagnosis of advanced stage follicular lymphoma status post orbital radiation.  Her multiple biopsies revealed both a CD10+ lymphoma, consistent with follicular lymphoma, as well as a small aggregate of CD5 positive/CD23 positive cells that are consistent with SLL.  Notably cyclin D1 and SOX11 staining are negative among CD5+ cells, making mantle cell lymphoma less likely; FISH/Cg is still pending from her most recent biopsy. She has never had any circulating absolute lymphocytosis, and CBCs have been typically stable.  She has no B symptoms, she has a relatively low tumor burden currently, and appears to be asymptomatic with regards to her hematologic malignancies.    Today we discussed the natural history of follicular lymphoma.  This is an indolent lymphoma that occasionally can transform into a more high-grade aggressive subtype.  " There is an approximate 1% chance of transformation every year.  Follicular lymphoma can cause organ damage, immunosuppression, tumor lysis, hypercalcemia or other complications during progression or transformation.  Advanced follicular lymphoma is incurable, but highly treatable with existing chemotherapy regimens.  These are typically able to induce remission, which sometimes can be sustained for multiple years.  All chemotherapy regimens have potential adverse effects, which typically include nausea, vomiting, diarrhea, hair loss, bone marrow suppression and cytopenias, and increased risk of infections.  Typical chemotherapy regimens for frontline approaches to Follicular Lymphoma include R-CHOP, R-CVP, Bendamustine/rituximab, or rituximab plus Revlimid.  Rituximab monotherapy is also another option that is sometimes used for low-volume disease.  R-CHOP uniquely contains anthracycline class chemotherapy, which I think would be particularly high risk for Virginia, given her prior history of AC Taxol chemotherapy.  There are currently no clinical trials at [facility] for frontline therapy for follicular lymphoma.  However multiple regimens are commonly available for relapsed/refractory disease.    We also discussed her CD5+ lymphoproliferative disorder.  This is consistent with monoclonal B-cell lymphocytosis.  Treatment is not warranted at this time.     Virginia expressed a desire to avoid treatment for as long as possible.  I think this is reasonable, so long as she undergoes active surveillance on a regular basis.  She has a good relationship with Dr. Zaidi and would like to continue surveillance under her care.  We agreed to check in on an annual basis to monitor her progress, and are happy to see her sooner, as indicated.    Morteza Nguyen MD, PhD  Division of Hematology, Oncology, and Transplantation  [facility]

## 2024-01-03 NOTE — NURSING NOTE
"Oncology Rooming Note    January 3, 2024 9:08 AM   Virginia Byers is a 73 year old female who presents for:    Chief Complaint   Patient presents with    Oncology Clinic Visit     Follicular lymphoma grade I      Initial Vitals: /74 (BP Location: Right arm, Patient Position: Sitting, Cuff Size: Adult Regular)   Pulse 80   Temp 97.6  F (36.4  C) (Oral)   Resp 20   Ht 1.613 m (5' 3.5\")   Wt 66.9 kg (147 lb 6.4 oz)   SpO2 98%   BMI 25.70 kg/m   Estimated body mass index is 25.7 kg/m  as calculated from the following:    Height as of this encounter: 1.613 m (5' 3.5\").    Weight as of this encounter: 66.9 kg (147 lb 6.4 oz). Body surface area is 1.73 meters squared.  No Pain (0) Comment: Data Unavailable   No LMP recorded. Patient is postmenopausal.  Allergies reviewed: Yes  Medications reviewed: Yes    Medications: Medication refills not needed today.  Pharmacy name entered into [employer]: [id number] - [place], [place] - [address] [facility]    Frailty Screening:   Is the patient here for a new oncology consult visit in cancer care? 1. Yes. Over the past month, have you experienced difficulty or required a caregiver to assist with:   1. Balance, walking or general mobility (including any falls)? NO  2. Completion of self-care tasks such as bathing, dressing, toileting, grooming/hygiene?  NO  3. Concentration or memory that affects your daily life?  NO       Clinical concerns: none      Gunjan Larsen"

## 2024-01-04 ENCOUNTER — THERAPY VISIT (OUTPATIENT)
Dept: PHYSICAL THERAPY | Facility: CLINIC | Age: 74
End: 2024-01-04
Payer: MEDICARE

## 2024-01-04 DIAGNOSIS — M25.551 HIP PAIN, RIGHT: ICD-10-CM

## 2024-01-04 DIAGNOSIS — R26.9 ABNORMAL GAIT: ICD-10-CM

## 2024-01-04 DIAGNOSIS — Z96.641 AFTERCARE FOLLOWING RIGHT HIP JOINT REPLACEMENT SURGERY: Primary | ICD-10-CM

## 2024-01-04 DIAGNOSIS — Z47.1 AFTERCARE FOLLOWING RIGHT HIP JOINT REPLACEMENT SURGERY: Primary | ICD-10-CM

## 2024-01-04 PROCEDURE — 97110 THERAPEUTIC EXERCISES: CPT | Mod: GP | Performed by: PHYSICAL THERAPIST

## 2024-01-04 NOTE — PROGRESS NOTES
01/04/24 0500   Appointment Info   Signing clinician's name / credentials Kaitlyn Estevez, PT, DPT, OCS   Total/Authorized Visits 8   Visits Used 7   Medical Diagnosis s/p R TACOS   PT Tx Diagnosis s/p R TACOS, hip pain   Precautions/Limitations WBAT, standard hip precautions (posterior)   Quick Adds Certification   Progress Note/Certification   Start of Care Date 10/25/23   Onset of illness/injury or Date of Surgery 10/13/23  (DOS)   Therapy Frequency 1x/week tapering to 2x/month   Predicted Duration 12 weeks   Certification date from 10/25/23   Certification date to 01/23/24   Progress Note Due Date 01/12/24   Progress Note Completed Date 11/22/23   GOALS   PT Goals 2   PT Goal 1   Goal Identifier Ambulation   Goal Description Patient will report ability to ambulate 30 min with LRAD and normalized gait, no increase in pain, for safe independent community mobility.   Target Date 01/23/24   PT Goal 2   Goal Identifier Stairs   Goal Description Patient will demonstrate ability to ascend/descend 1 flight of stairs in reciprocal pattern, no increase in pain, and no UE assist for safe entry and exit from home.   Goal Progress reciprocal gait x 3 flights   Target Date 01/23/24   Date Met 01/04/24   Subjective Report   Subjective Report Feeling good overall. Using SPC for community ambulation when she is going on a longer trip. Overall, feeling good to self manage.   Objective Measures   Objective Measures Objective Measure 1;Objective Measure 2;Objective Measure 3;Objective Measure 4   Objective Measure 1   Objective Measure Hip PROM   Details abduction - 20 deg / flexion - 90 / ER - in neutral hip (prone): R 8 deg, L 10 deg   Objective Measure 2   Objective Measure Quad   Details good - able to do 10 SLR no lag   Objective Measure 3   Objective Measure Edema   Details mild   Objective Measure 4   Objective Measure Gait / Stairs   Details reciprocal with bilateral railings. Occasionally will self select to step to gait.    Treatment Interventions (PT)   Interventions Therapeutic Procedure/Exercise;Therapeutic Activity;Gait Training;Manual Therapy   Therapeutic Procedure/Exercise   Therapeutic Procedures: strength, endurance, ROM, flexibillity minutes (01769) 30   Therapeutic Procedures Ther Proc 3;Ther Proc 4;Ther Proc 5   Ther Proc 1 HEP review   Ther Proc 1 - Details continue with HEP daily and work up to 30 reps. Continue this for a month, then start titrating down to 3x/week average. Discussed returning to PT if she feels she is losing momentum and/or has difficulty with mobiity and energy management with her cancer treatments. She is in an appropriate place to self manage for now   Ther Proc 2 SLR   Ther Proc 2 - Details x 10 bilateral   Ther Proc 3 butterfly stretch hooklying   Ther Proc 3 - Details HEP   Ther Proc 4 prone lying   Ther Proc 4 - Details HEP   Ther Proc 5 Sidesteps   Ther Proc 5 - Details 6 x 20' no cane   PTRx Ther Proc 1 Step ups   PTRx Ther Proc 1 - Details x 4 flights of stairs   PTRx Ther Proc 2 Side-lying Hip - External Rotation   PTRx Ther Proc 3 Bridging #1   PTRx Ther Proc 3 - Details HEP   Skilled Intervention targeting AROM, hip muscle strength   Patient Response/Progress min cues needed   Therapeutic Activity   PTRx Ther Act 1 Icing and elevation   PTRx Ther Act 1 - Details No Notes   Gait Training   Gait Training Gait 2   Gait 1 Gait cues   Gait 2 stair negotiation   Skilled Intervention Ed on stairs for exercise, options of reciprocal and step to gait   Manual Therapy   Manual Therapy 1 PROM hip   Skilled Intervention Targeting PROM flexion and ER especially   Patient Response/Progress pelvis posteriorly rotates with flexion limited true flexion   Education   Education Comments PTRx online   Plan   Home program initiated, daily   Plan for next session d/c formal PT   Total Session Time   Timed Code Treatment Minutes 30   Total Treatment Time (sum of timed and untimed services) 30        DISCHARGE  Reason for Discharge: Patient has met all goals.    Equipment Issued: none    Discharge Plan: Patient to continue home program.    Referring Provider:  Austin Blood

## 2024-01-08 ASSESSMENT — HOOS JR: HOOS JR TOTAL INTERVAL SCORE: 100

## 2024-01-09 ENCOUNTER — VIRTUAL VISIT (OUTPATIENT)
Dept: ORTHOPEDICS | Facility: CLINIC | Age: 74
End: 2024-01-09
Payer: MEDICARE

## 2024-01-09 DIAGNOSIS — Z96.649 STATUS POST TOTAL REPLACEMENT OF HIP, UNSPECIFIED LATERALITY: Primary | ICD-10-CM

## 2024-01-09 PROCEDURE — 99024 POSTOP FOLLOW-UP VISIT: CPT | Mod: 95 | Performed by: ORTHOPAEDIC SURGERY

## 2024-01-09 NOTE — NURSING NOTE
Virginia Byers's chief complaint for this visit includes:  Chief Complaint   Patient presents with    Surgical Followup     3mo sp RTHA 10/13/23       Referring Provider:  No referring provider defined for this encounter.    There were no vitals taken for this visit.  Data Unavailable   Global Mental Health Score: (P) 17  Global Physical Health Score: (P) 16  PROMIS TOTAL - SUBSCORES: (P) 33  [facility]: 4-5,   HOOS Jr. 100      Pain increases with: none  Previous surgeries: RTHA 10/13/23, LTHA 7/11/23  Previous injections within last 6 months: NO  Pain: 0/10  Concerns: None      Valentin Díaz, ATC

## 2024-01-09 NOTE — LETTER
1/9/2024         RE: Virginia Byers  [id number] [facility] [id number]        Dear Colleague,    Thank you for referring your patient, Virginia Byers, to the [facility]. Please see a copy of my visit note below.    I saw Virginia on a video visit today for her 12 week visit. She reports that she is doing bery well. No pain, no assist device, back to her deisred activities. Very happy with how she is doing.    HOOS Jr: 100     On physical examination she walks with a normal gait.     Assessment: doing very well now s/p bilateral total hips for really severe OA. Discussed activities on total hip and we discussed typical follow-up. All the patient's questions were answered to the best of my ability.   Plan: RTC in one year, sooner if issues.            Again, thank you for allowing me to participate in the care of your patient.        Sincerely,        Austin Blood MD

## 2024-01-09 NOTE — PROGRESS NOTES
I saw Virginia on a video visit today for her 12 week visit. She reports that she is doing bery well. No pain, no assist device, back to her deisred activities. Very happy with how she is doing.    SOPHIE Jr: 100     On physical examination she walks with a normal gait.     Assessment: doing very well now s/p bilateral total hips for really severe OA. Discussed activities on total hip and we discussed typical follow-up. All the patient's questions were answered to the best of my ability.   Plan: RTC in one year, sooner if issues.

## 2024-01-12 ENCOUNTER — ONCOLOGY VISIT (OUTPATIENT)
Dept: ONCOLOGY | Facility: CLINIC | Age: 74
End: 2024-01-12
Attending: INTERNAL MEDICINE
Payer: MEDICARE

## 2024-01-12 ENCOUNTER — LAB (OUTPATIENT)
Dept: INFUSION THERAPY | Facility: CLINIC | Age: 74
End: 2024-01-12
Attending: INTERNAL MEDICINE
Payer: MEDICARE

## 2024-01-12 VITALS
DIASTOLIC BLOOD PRESSURE: 72 MMHG | HEIGHT: 63 IN | RESPIRATION RATE: 16 BRPM | BODY MASS INDEX: 26.05 KG/M2 | SYSTOLIC BLOOD PRESSURE: 125 MMHG | OXYGEN SATURATION: 98 % | HEART RATE: 66 BPM | WEIGHT: 147 LBS

## 2024-01-12 DIAGNOSIS — R74.8 ALKALINE PHOSPHATASE ELEVATION: ICD-10-CM

## 2024-01-12 DIAGNOSIS — C82.00 FOLLICULAR LYMPHOMA GRADE I, UNSPECIFIED BODY REGION (H): ICD-10-CM

## 2024-01-12 DIAGNOSIS — Z85.3 HISTORY OF BREAST CANCER IN ADULTHOOD: ICD-10-CM

## 2024-01-12 DIAGNOSIS — C91.10 MONOCLONAL B-CELL LYMPHOCYTOSIS WITH IMMUNOPHENOTYPE LIKE CHRONIC LYMPHOCYTIC LEUKEMIA (CLL) (H): ICD-10-CM

## 2024-01-12 DIAGNOSIS — C82.00 FOLLICULAR LYMPHOMA GRADE I, UNSPECIFIED BODY REGION (H): Primary | ICD-10-CM

## 2024-01-12 DIAGNOSIS — D72.820 MONOCLONAL B-CELL LYMPHOCYTOSIS WITH IMMUNOPHENOTYPE LIKE CHRONIC LYMPHOCYTIC LEUKEMIA (CLL) (H): ICD-10-CM

## 2024-01-12 LAB
BASOPHILS # BLD AUTO: 0 10E3/UL (ref 0–0.2)
BASOPHILS NFR BLD AUTO: 1 %
EOSINOPHIL # BLD AUTO: 0.3 10E3/UL (ref 0–0.7)
EOSINOPHIL NFR BLD AUTO: 3 %
ERYTHROCYTE [DISTWIDTH] IN BLOOD BY AUTOMATED COUNT: 14 % (ref 10–15)
HCT VFR BLD AUTO: 41.3 % (ref 35–47)
HGB BLD-MCNC: 13.2 G/DL (ref 11.7–15.7)
HOLD SPECIMEN: NORMAL
IMM GRANULOCYTES # BLD: 0 10E3/UL
IMM GRANULOCYTES NFR BLD: 0 %
LDH SERPL L TO P-CCNC: 196 U/L (ref 0–250)
LYMPHOCYTES # BLD AUTO: 1.7 10E3/UL (ref 0.8–5.3)
LYMPHOCYTES NFR BLD AUTO: 19 %
MCH RBC QN AUTO: 26.5 PG (ref 26.5–33)
MCHC RBC AUTO-ENTMCNC: 32 G/DL (ref 31.5–36.5)
MCV RBC AUTO: 83 FL (ref 78–100)
MONOCYTES # BLD AUTO: 0.8 10E3/UL (ref 0–1.3)
MONOCYTES NFR BLD AUTO: 9 %
NEUTROPHILS # BLD AUTO: 5.9 10E3/UL (ref 1.6–8.3)
NEUTROPHILS NFR BLD AUTO: 68 %
NRBC # BLD AUTO: 0 10E3/UL
NRBC BLD AUTO-RTO: 0 /100
PLATELET # BLD AUTO: 288 10E3/UL (ref 150–450)
RBC # BLD AUTO: 4.98 10E6/UL (ref 3.8–5.2)
URATE SERPL-MCNC: 4.5 MG/DL (ref 2.4–5.7)
WBC # BLD AUTO: 8.7 10E3/UL (ref 4–11)

## 2024-01-12 PROCEDURE — G0463 HOSPITAL OUTPT CLINIC VISIT: HCPCS | Performed by: INTERNAL MEDICINE

## 2024-01-12 PROCEDURE — 83615 LACTATE (LD) (LDH) ENZYME: CPT

## 2024-01-12 PROCEDURE — 84080 ASSAY ALKALINE PHOSPHATASES: CPT

## 2024-01-12 PROCEDURE — 36415 COLL VENOUS BLD VENIPUNCTURE: CPT

## 2024-01-12 PROCEDURE — 82955 ASSAY OF G6PD ENZYME: CPT

## 2024-01-12 PROCEDURE — 99214 OFFICE O/P EST MOD 30 MIN: CPT | Performed by: INTERNAL MEDICINE

## 2024-01-12 PROCEDURE — 85025 COMPLETE CBC W/AUTO DIFF WBC: CPT

## 2024-01-12 PROCEDURE — 84550 ASSAY OF BLOOD/URIC ACID: CPT

## 2024-01-12 ASSESSMENT — PAIN SCALES - GENERAL: PAINLEVEL: NO PAIN (0)

## 2024-01-12 NOTE — NURSING NOTE
"Oncology Rooming Note    January 12, 2024 8:39 AM   Virginia Byers is a 73 year old female who presents for:    Chief Complaint   Patient presents with    Oncology Clinic Visit     Follow up- results     Initial Vitals: /72 (BP Location: Right arm)   Pulse 66   Resp 16   Ht 1.598 m (5' 2.91\")   Wt 66.7 kg (147 lb)   SpO2 98%   BMI 26.11 kg/m   Estimated body mass index is 26.11 kg/m  as calculated from the following:    Height as of this encounter: 1.598 m (5' 2.91\").    Weight as of this encounter: 66.7 kg (147 lb). Body surface area is 1.72 meters squared.  No Pain (0) Comment: Data Unavailable   No LMP recorded. Patient is postmenopausal.  Allergies reviewed: Yes  Medications reviewed: Yes    Medications: Medication refills not needed today.  Pharmacy name entered into [employer]: [id number] - [place], [place] - [address] [facility],     Frailty Screening:   Is the patient here for a new oncology consult visit in cancer care? 2. No      Clinical concerns: results       Eri Garber LPN              "

## 2024-01-12 NOTE — LETTER
1/12/2024         RE: Virginia Byers  [address] [facility] [address]        Dear Colleague,    Thank you for referring your patient, Virginia Byers, to the [facility]. Please see a copy of my visit note below.    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    Patient has medical history including LEFT breast cancer in 2000, hypertension, hyperlipidemia, osteoporosis, osteoarthritis, degenerative disc disease, spinal stenosis of lumbar region,  s/p bilateral L5-S1 transforaminal ROS by Dr. Garcia on 7/9/2021, s/p bilateral L4-5 and L5-S1 facet joint injections on 6/14/2021, glaucoma and macular degeneration, cataracts s/p surgery, history of tobacco use (quit 1999)     Regarding previous history of breast cancer:  She was previously treated by Dr. Sakina Brandt at [facility]      In summary, diagnosed at age 49 with LEFT breast IDC ER positive, NM positive, HER2 positive on FISH. 3/2000 left breast lumpectomy and left axillary node dissection (IDC, grade 3, 1.45 cm incompletely excised tumor, DCIS, positive margins with infiltrating carcinoma, 1 of 17 lymph nodes positive (0.5 cm metastatic focus), ER positive, NM positive, HER2 positive).  4/2020 left breast simple mastectomy with no residual carcinoma. S/p ACx4, taxol x4, no anti-her2 treatment, no RT. S/p 5 years adjuvant endocrine therapy (tamoxifen and anastrozole). Delayed left breast reconstruction and right breast augmentation.      3/2/2000  Excisional biopsy of Left breast mass -   Infiltrating ductal carcinoma with associated DCIS   ER positive (60%), NM positive (22%), HER2 by FISH POSITIVE     3/10/2000 - Left  lumpectomy and left axillary node  Invasive ductal carcinoma, Grade 3, 1.45 cm (incompletely excised), negative for LVI  DCIS, greatest histologic dimension of tumor (DCIS PLUS invasive tumor) = 2.9 cm (estimate 50% DCIS)  Positive margins- infiltrating carcinoma extends to margin  1 of 17 axillary lymph nodes sampled was positive for metastatic focus of 0.5 cm.  ER/VA+     Sections of lumpectomy left breast showing residual 1.1 cm focus of ductal carcinoma in situ (DCIS), present within microns of the linked margin of resection.       4/11/2000 - Left breast simple mastectomy, negative for residual carcinoma  Left mastectomy on 4/11/2000 performed by Dr. Cristian Mcdonald at [facility].      Adjuvant treatment:  - 5/15/2000-7/24/2000 4 cycles of Adriamycin and Cytoxan   - 8/16/2000-10/18/2000 4 cycles of paclitaxel   - Appears she may have been on some kind of trial/protocol, was not given any anti-HER2 targeted treatment  - No adjuvant radiation  - 12/2000- 10/2002 tamoxifen  - 10/2002-5/2006 switched to anastrozole (due to in vitro studies showing anastrozole may be slightly better for patients were HER2 positive) with Fosamax for osteopenia     8/31/2015 Left delayed reconstruction with tissue expander    2/3/2016 Left 2nd stage 450 cc high profile silicone implant; right augmentation 150 cc Moderate classic profile silicone implant; cresent mastopexy    10/18/2021: Screening mammogram right breast ZACKARY    OTHER:  Of note, patient has documentation of anemia (iron deficiency and anemia of chronic disease), elevated LFTs, nausea and vomiting of all solids and liquids, fatigue with 26 pound unintentional weight loss from 7/20/2021 - 10/20/2021 with mildly elevated LFTs which led to evaluation with mammogram, endoscopy with EUS and colonoscopy as detailed below. Pt did associate these with some spinal injections she had gotten, due to overlapping times. Pt did regain all of the weight and hand improvement of  anemia within 2 months, without significant intervention.     -10/2021: Endoscopy with EUS normal EGD and no stigmata or source of upper GI bleeding, visualized bile duct, gallbladder, liver, pancreas, left adrenal gland normal.  No lymphadenopathy. LIVER, RANDOM NEEDLE BIOPSY: Benign parenchyma with congestion; no significant fibrosis or other abnormalities     -10/2021 colonoscopy: Hemorrhoids, diverticulosis in sigmoid colon and splenic flexure    Current history:  -Ongoing right knee pain not improved with brace and physical therapy, s/p bilateral L5-S1 transforaminal ROS by Dr. Garcia on 7/9/2021, s/p bilateral L4-5 and L5-S1 facet joint injections on 6/14/2021. ongoing b/l hip and leg pain, worse with position changes (ie sitting to standing), constant, 8-9/10, tylenol brings pain down to 6/10 (using tylenol bid). ECOG 3.   -Patient has seen neurosurgery Dr. Jose Alexander, occupational medicine specialist Dr.Orrin Murphy, with plan to see PM&R Dr. Margie Agudelo and movement disorder clinic  - 11/22 MRI lumbar spine:    prominent left iliac lymph nodes among other findings related to degenerative changes and neural foraminal stenosis throughout lumbar spine    - 12/22 MRI pelvis:  1. Enhancing marrow signal abnormality of the right iliac bone extending from the superior acetabulum subchondral location proximally almost to the level of the sacroiliac joint caudal aspect. No associated fracture line. Underlying marrow infiltrative process such as from metastasis cannot be excluded especially given the degree of T1 hypointensity and history of primary tumor. Other consideration include stress reaction.  2. Mildly increased left external iliac chain, and the right medial thigh lymphadenopathy. Metastasis cannot be excluded.  3. Left posterior iliac enhancing lesion, similar in size to prior CT 9/2021 and previously not visible on CT, focal enhancing lesion in the right greater trochanter laterally. Findings are  concerning for Metastasis.    -12/22 CT chest abdomen pelvis with contrast:  COMPARISON: MRI pelvis 12/20/2022 and CT abdomen pelvis 9/21/2021.  1. Mastectomies with implant reconstructions. No lymphadenopathy  2.  There is an new or increased left common iliac, external iliac and pelvic sidewall lymphadenopathy since prior CT. Distal left common iliac lymph nodes measure up to 1.2 cm on this exam compared to 0.7 cm on prior CT. Left pelvic sidewall lymph node measures 2.1 cm short axis on this exam compared to 1.2 cm on prior CT . consistent with metastatic disease.  3. Sclerotic lesions in the left sacrum and left posterior ilium are unchanged from prior CT.  may be metastatic.  4. No evidence of metastatic disease in the chest.     -1/2023 MRI brain w/wo contrast:  IMPRESSION:  1. No findings of intracranial metastatic disease.  2. Indeterminate ovoid T2 hyperintense enhancing mass in the region of the  left lacrimal gland measuring approximately 2.4 cm AP by 1.4 cm  transverse by 2.3 cm craniocaudal associated with/replacing the left  lacrimal gland. Consider correlation with tissue sampling.  3. A few indeterminate partially visualized heterogeneous enhancing  Nodules measuring up to approximately 1.5 cm in the bilateral parotid glands.   Consider soft tissue neck CT with contrast for further evaluation.    -1/20/2023: IR US Guided biopsy of a right external iliac LN  Final Diagnosis   A.  LYMPH NODE, RIGHT EXTERNAL ILIAC, CORE NEEDLE BIOPSY AND TOUCH IMPRINT:  -Involved by follicular lymphoma, low-grade (WHO grade 1-2)  -Negative for metastatic carcinoma     The morphologic and immunophenotypic patterns are consistent with follicular lymphoma.  There is no high-grade lymphoma present in this limited specimen.  Concurrent flow study ([id number]) demonstrated CD10-positive lambda monotypic B cells (22%), rare kappa monotypic B cells (1.3%), and no aberrant immunophenotype on T cells.  A FISH study for Bcl-2 is  pending and will be reported separately.     The case was initially reviewed by Dr. Cifuentes (breast pathology).     This case was reviewed in part at our Hematopathology Faculty Consensus conference at which Girish Nolasco, Narciso, and Kayode were present in addition to myself.  This is a small needle core.  In some of the areas, the CD10+ B cells are confined to follicles.  Though the possibility of in situ follicular neoplasia was discussed, we still favor a diagnosis of follicular lymphoma - there are a number of CD10+ B cells outside of follicles at the base of the biopsy, and the clinical history supports a greater extent of involvement.     There was a tiny kappa monotypic B cell population identified by flow (separate from the CD10+ clone).  We looked for the morphologic correlate in this case by IHC, but it is not readily apparent.  This may represent a monoclonal B lymphocytosis - consider sending a peirpheral blood sample for flow cytometry.    FISH:  RESULTS:     NORMAL  - No rearrangement of BCL2     INTERPRETATION:  No evidence was found by FISH of rearrangement of the BCL2 (18q21) locus. These findings are not helpful for confirming or further characterizing the reported pathologic diagnosis of follicular lymphoma.    Flow Interpretation   A. Lymph Node(s), :  -CD10-positive lambda monotypic B cells (22%)  -Rare kappa monotypic B cells (1.3%)  -No aberrant immunophenotype on T cells  See comment   Electronically signed by Krista Headley MD on 1/24/2023 at  3:10 PM   Comment     The CD10-positive population is consistent with a clonal B cell population and a CD10 positive B-cell lymphoma is favored.      In addition, a second clonal B-cell population is identified which has a CLL-like immunophenotype except that CD5 expression is equivocal, this may represent a monoclonal B-cell lymphocytosis (MBL).     Addendum   INTERPRETATION  The diagnosis remains the same (see comment)     REASON FOR  ADDENDUM  This addendum is issued to reflect additional testing performed on this case. See Comment.      COMMENT  Additional multi-color flow analysis was performed for the following markers:   CD23, CD79b     The CD5 (dimly) positive B cells mostly lack CD23 and CD79b        1/23/23 CT neck:  A) Heterogeneous nodule in the right thyroid gland measuring up  to 1.6 cm.   B) Soft tissue nodule in the superficial left parotid  gland measuring 1.2 cm. Additional 0.8 cm nodule in the deep portion  of the left parotid gland. Soft tissue nodule in the superficial right  parotid gland measuring 0.8 cm. These may represent benign or malignant parotid lesions. Multiplicity of lesions raises concern for Warthin tumors. Otolaryngology consultation is  recommended.  C) Prominent soft tissue around the major arteries in the neck  particularly the common and internal carotid arteries. This is  atypical for atherosclerotic disease and vasculopathy/vasculitis such  as giant cell arteritis should be considered. Probable superimposed  atherosclerotic disease at the carotid bifurcations right greater than  left without definite high-grade stenosis.    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  And B-cell cohort #2 which is described as negative for CD5 and showing dim CD20 and dim kappa staining.  This population is listed a CD5 negative and CD10 negative also listed as negative for   and .  Of note the population of monoclonal B-cell lymphocytosis found in peripheral blood in our department and in the lymph node biopsy in January showed partial/equivocal dim CD5 and may correspond to the cohort #2.     We essentially agree with the diagnosis of follicular lymphoma, which is consistent with prior diagnosis of follicular lymphoma low grade  established on core biopsy of right external iliac lymph node January 20, 2023 (case US [id number]) in our Department . The concurrent flow cytometry case [id number] in January showed in addition to CD10 positive B-cell population  also a partial/equivocal CD5 positive separate kappa monotypic B-cell population, which most likely represents monoclonal B-cell lymphocytosis of CLL/SLL type, since it was also documented in the peripheral blood flow cytometry on February 16, 2023 (case number UF ) at a low frequency.  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 Areas with infiltrates of small B cells with coexpression of CD5 and CD23 are seen in the current biopsy, but the size of the biopsy is too small to comprehensively evaluate for the presence of proliferation centers and the extent of involvement by the CD5 positive B cell lymphoproliferative process.   This case has been discussed at intradepartmental hematopathology conference with participation by THELMA Petersen, THELMA Davis and myself.      -5/23 PET/CT NCAP:   1. An ovoid mass in the left lateral orbit measures 2.5 x 1.2 cm, slightly larger than 01/09/2023, and demonstrates marked uptake (SUVmax 9.3), consistent with biopsy-proven lymphoma  2. A few small FDG avid bilateral intraparotid nodules are present, one on the right and two on the left,  including representative left parotid nodule measuring 0.9 x 1.4 cm with moderate uptake (SUVmax 6.7).  3.  Asymmetric prominent right posterior lower cervical node measures 0.8 cm associated with moderate focal   uptake (SUVmax 5.1).  4. A single mildly enlarged left axillary lymph node measures 1.0 cm associated with mild uptake (SUVmax 3.0)  5. Some scattered FDG avid lymph nodes below the diaphragm involve the retroperitoneal retrocaval, bilateral common iliac, left pelvic sidewall and left inguinal regions. Some of these have increased slightly in size since 12/28/2022, such as a conglomerate left pelvic sidewall bj mass measuring approximately 2.2 x 4.1 cm (previously 2.2 x 3.4 cm) associated with marked uptake (SUVmax 7.6).  6. FDG avid sclerotic skeletal lesion involves the right superior acetabulum (SUVmax 12.9) as well as a couple of separate smaller sclerotic lesions in the right iliac bone uptake (SUVmax 12.2).     - 6/14/2023- 6/15/2023 palliative radiation to left orbit follicular lymphoma 4Gy/2fx  - 6/15/2023 bone marrow biopsy  MORPHOLOGY:  - No morphologic or immunophenotypic evidence of follicular lymphoma  - Flow cytometry showing a small population of CD5 positive monotypic B cells; most compatible with monoclonal B lymphocytosis (There is no definitive morphologic correlate for this finding. There is no morphologic or immunophenotypic evidence of involvement by follicular lymphoma.   The CD5-positive B cell clone may best represent monoclonal B lymphocytosis, provided that bj SLL is excluded.)  - Normocellular marrow (cellularity estimated at 30%) with trilineage hematopoietic maturation and no increase in blasts  FLOW CYTOMETRY:  CD5 positive kappa-monotypic B cells (0.8%)  0.8% B cells which express CD5, CD19, CD20 (dim to negative) and monotypic kappa immunoglobulin light chains (dim) and lack CD10 and CD38. The B cells have similar forward scatter relative to background T cells  suggestive of similar size; however, precise size determination is deferred to morphology  CYTOGENETICS:  46,XX    -6/19/2023 CT-guided bone biopsy, right acetabulum  PATHOLOGY:  - Atypical lymphoid infiltrate in a suboptimal biopsy  - No histologic evidence of metastatic carcinoma  - Two small monoclonal B-cell populations detected by flow cytometry  This biopsy is suboptimal for evaluation due to fragmentation, crush artifact, and decalcification. In this limited biopsy, there are fragments of bone and marrow showing a mixed infiltrate that includes both B and T cells. Notably, 2 small clonal B-cell populations were detected by flow cytometry which resemble B-cell populations detected in this patient's past lymph node (1/20/23) and blood and bone marrow (2/16/23, 6/15/23) flow cytometry studies. A definitive morphologic correlate to these cell populations is not appreciated, and the overall features are insufficient for a diagnosis of lymphoma.    FLOW CYTOMETRY:  A. Pelvis, Right:  -CD5 positive kappa-monotypic B cells ( 2.8 %) - see comment A  -CD10 positive lambda-monotypic B cells ( 0.4 %) - see comment B      Electronically signed by Arnaldo Alvarado MD on 6/20/2023 at  1:31 PM   Comment     A) Clonal B cells with the immunophenotype of CLL/SLL are present in this sample.  The differential includes peripheral blood contamination (with an MBL), the tissue based correlate to MBL, versus involvement by SLL.  2.8% B cells which express CD5 (partial, dim), CD19, CD20 (dim to negative) and monotypic kappa immunoglobulin light chains (dim) and lack CD10. The B cells have similar forward scatter relative to background T cells suggestive of similar size; however, precise size determination is deferred to morphology.     B) A CD10-positive B cell clone is also present - involvement by the patient's previously diagnosed CD10+ B cell lymphoma is in the differential (follicular lymphoma versus other (transformation  "to a higher grade lymphoma). 0.4% B cells which express CD10, CD19 (slightly dim), CD20, and monotypic lambda immunoglobulin light chains and lack CD5.  The B cells have increased forward scatter relative to background T cells suggestive of increased size; however, precise size determination is deferred to morphology.        - 7/10/23 Rheumatology consult for ESR and CRP elevated, DEVON positive 1:160 speckled; PCP suspects pt may have PMR; \"I suspect that the bilateral hip OA is altering gait that then results in worsening of low back pain. No further rheumatology workup needed at this time. \"    - 7/11/23 left hip total arthroplasty with severe b/l hip osteoarthritis with Dr. Austin Blood,  cc, discharged with DVT prophylaxis Lovenox 40 mg daily x2 weeks and aspirin 81 mg twice daily x2 weeks    - 10/13/23 right hip total arthroplasty    - 12/23 PET CT NCAP Liver SUV max = 3.78, Aorta Blood SUV max = 3.43.  -New/progressed:  - 0.8 x 0.5 cm right FDG avid cervical lymph node; SUV max of 6.5  - 1.3 x 1.5 cm right FDG avid cervical lymph node; SUV max of 5.8  - 1.3 x 1.4 cm right FDG avid cervical lymph node; SUV max of 7.8   -Lesion in left lateral chest wall immediately inferior to the margin of breast implant, 0.5 x 0.4 mm, SUV 5.02, previously 1.4  - Lymph node adjacent to left psoas, 1.5 cm, SUV 19.9, previously 6.02  - Right acetabulum, 2.0 x 1.9 cm, SUV 14.03, previously 12.05  - No new bone lesions but increased metabolic activity previously identified multiple FDG avid osseous foci:  1.  Sternal lesion, SUV 5.7, previously 3.6  2.  Right scapular spine lesion, SUV 4.6, previously 3.6  3.  Right acetabular bone lesion SUV 13.4, previously 11    -Unchanged:  - 1 of 2 left parotid gland lesions, 1.6 x 1.1 cm, SUV 8.4, previously 6.6  - Left axillary lymph node, 1.2 x 1.0 cm, SUV 2.3  - Spleen normal    -Resolved/improved:  - Left orbital hypermetabolic lesion-resolved  - 1 of 2 previous left parotid " hypermetabolic lesions-resolved  - Multiple mildly hypermetabolic bilateral inguinal nodes, index right inguinal lymph node SUV 2.62, previously 5.19-improved    IMPRESSION: In this patient with follicular lymphoma there is evidence  for progression of disease.  1. There are multiple new hypermetabolic lymph nodes.   2. Increased metabolic activity of multiple previously seen lymph  nodes  3. No new bony lesions identified but there is increased metabolic  activity of the previously identified lesions.  4. Multiple stable and some resolved hypermetabolic lesions; resolved  lesions include the left orbit and left parotid gland lesion.    INTERIM HISTORY:  Regarding lymphoma:  Denies weight loss, night sweats, early satiety, RUQ or LUQ pain  Reports left eye blurriness is stable since surgery, not worsened since RT, using lubricating eye drops prn and working with eye doctor for management of glaucoma, reports she had laser treatment to left eye that helped with blurred vision. Appt w/eye doctor 1/22/24     -12/23 Left pelvic lymph node, biopsy:  - Recurrent/persistent low grade B-cell lymphoma, most consistent with follicular lymphoma  - Negative for metastatic carcinoma    This biopsy is involved by a low grade lymphoma with a staining pattern consistent with follicular lymphoma. The growth pattern of the lymphoma cells appears diffuse, although the biopsy is quite small, limiting assessment of architecture. Concurrent flow cytometry ([id number]) detected a population of CD10-positive B cells negative for surface light chains, similar to the patient's past flow cytometry studies and consistent with recurrent/persistent disease.     In addition, flow cytometry identified a small population of CD5-positive kappa-monotypic B cells (2.6%), similar to the patient's known monoclonal B-cell lymphocytosis (MBL). No definitive morphologic correlate to this finding is appreciated in this biopsy - it may represent peripheral  blood contamination versus a tissue-based MBL equivalent.     There is no evidence of large cell transformation in this biopsy, and no evidence of negro small lymphocytic lymphoma (SLL). However, the biopsy is very small - the possibility of unsampled higher grade disease or SLL cannot be excluded. Correlation with imaging studies is recommended.    Left pelvic LN flow cytometry:  A. Pelvis, Left, :  - CD5 positive kappa-monotypic B cells (2.6%)  Clonal B cells with the immunophenotype of CLL/SLL are present in this sample.  The differential includes peripheral blood contamination (with an MBL) versus involvement by SLL.    - CD10 positive negative for surface light chain B cells (29%) - see comment B  A CD10-positive B cell clone is also present - involvement by the patient's previously diagnosed CD10+ B cell lymphoma is in the differential (follicular lymphoma versus transformation to a higher grade lymphoma).         - 12/23 bone marrow biopsy:  MORPHOLOGY:  - Hypercellular marrow (cellularity estimated at 40%) with trilineage hematopoiesis, no overt dysplasia, and no increase in blasts  - Rare CD5+ kappa-monotypic B cells detected by flow cytometry (1.7%)  - Small lymphoid aggregate suspicious for marrow involvement by CD5+ B-cell lymphoproliferative disorder (<5% of marrow cellularity)  - No definitive histologic or immunophenotypic evidence of follicular lymphoma  Flow cytometry ([id number]) showed rare CD5-positive kappa-monotypic B cells (1.7%). By morphology, a tiny lymphoid aggregate is identified on the trephine core biopsy. This aggregate contains small B cells that appear to have aberrant expression of CD5, and may represent the morphologic correlate to the flow cytometry results. Overall, the findings are suspicious for low level marrow involvement by a CD5+ lymphoproliferative disorder most consistent with monoclonal B-cell lymphocytosis.     There is no definitive evidence of marrow involvement by  follicular lymphoma, and no morphologic evidence of large cell lymphoma.  FLOW CYTOMETRY:  -CD5-positive kappa-monotypic B cells (1.7%) express CD5 (dim, partial), CD19, CD20 (dim), and monotypic kappa immunoglobulin light chains and lack CD10 and CD38.   The monotypic B-cell population present in this specimen has a similar immunophenotype as reported previously in this patient's peripheral blood ([id number], 2/16/23). The immunophenotypic findings are suggestive of marrow involvement by monoclonal B-cell lymphocytosis (versus chronic lymphocytic leukemia/small lymphocytic lymphoma; CLL/SLL). There may be peripheral blood contamination. Large cells may not survive specimen processing. Morphologic correlation is required.   CYTOGENETICS:  46,XX[20]  FISH:  Pending    -1/3/24 second opinion w/Dr. Nguyen     REVIEW OF SYSTEMS:   A 14 point ROS was reviewed with pertinent positives and negatives in the HPI.       HOME MEDICATIONS:  Current Outpatient Medications   Medication Sig Dispense Refill     dorzolamide-timolol (COSOPT) 2-0.5 % ophthalmic solution Place 1 drop into both eyes 2 times daily       latanoprost (XALATAN) 0.005 % ophthalmic solution Place 1 drop into both eyes At Bedtime       losartan (COZAAR) 100 MG tablet Take 1 tablet (100 mg) by mouth daily 90 tablet 3     Multiple Vitamins-Minerals (PRESERVISION AREDS) TABS Take 1 tablet by mouth 2 times daily       rosuvastatin (CRESTOR) 10 MG tablet Take 1 tablet (10 mg) by mouth daily 90 tablet 3         ALLERGIES:  Allergies   Allergen Reactions     Compazine      Mental confusion     Hydrochlorothiazide      Dryness mouth     Prochlorperazine Visual Disturbance     Simvastatin Other (See Comments)     Muscle aches         PAST MEDICAL HISTORY:  Past Medical History:   Diagnosis Date     Aftercare following right hip joint replacement surgery 10/25/2023     Breast cancer (H) 2000    Left breast, s/p mastectomy, chemotherapy and endocrine therapy      Follicular lymphoma (H) 01/20/2023     Glaucoma     bilateral - Dr. Moore - [facility]     History of spinal stenosis      Hyperlipidemia      Hypertension      Macular degeneration     Dr. Toscano - [facility]     Osteoarthritis      Osteoporosis      Personal history of chemotherapy 2000    A/C + Taxol     S/P radiation therapy     400 cGy to left orbit completed on 6/15/2023 [facility]         PAST SURGICAL HISTORY:  Past Surgical History:   Procedure Laterality Date     ARTHROPLASTY HIP Left 07/11/2023    Procedure: ARTHROPLASTY, HIP, TOTAL LEFT;  Surgeon: Austin Blood MD;  Location: [facility] OR     ARTHROPLASTY HIP Right 10/13/2023    Procedure: RIGHT ARTHROPLASTY, HIP, TOTAL;  Surgeon: Austin Blood MD;  Location: [facility] Main OR     BIOPSY Left 04/06/2023    Left Orbital Biopsy     C MASTECTOMY,SIMPLE  03/2000    left     COLONOSCOPY  2006    Q 10 years     COLONOSCOPY N/A 10/04/2021    Procedure: COLONOSCOPY;  Surgeon: Guru Lyla Ruby MD;  Location: [facility] OR     ESOPHAGOSCOPY, GASTROSCOPY, DUODENOSCOPY (EGD), COMBINED N/A 10/04/2021    Procedure: ENDOSCOPIC ULTRASOUND, ESOPHAGOSCOPY / UPPER GASTROINTESTINAL TRACT (GI), Esophagogastroduodenoscopy,  Fine Needle Biopsy of liver;  Surgeon: Guru Lyla Ruby MD;  Location: [facility] OR     EYE SURGERY       IR LYMPH NODE BIOPSY  01/20/2023     SURGICAL HISTORY OF -  Left 08/2015    tissue expander placed in left breast area     SURGICAL HISTORY OF -  Left 02/2016    left breast implant and right mammoplasty         SOCIAL HISTORY:  Social History     Socioeconomic History     Marital status: Single     Spouse name: Not on file     Number of children: 0     Years of education: Not on file     Highest education level: Not on file   Occupational History     Employer: [employer]     Occupation: REtired   Tobacco Use     Smoking status: Former     Packs/day: 1.00     Years:  17.00     Additional pack years: 0.00     Total pack years: 17.00     Types: Cigarettes     Quit date: 3/15/1999     Years since quittin.8     Passive exposure: Past     Smokeless tobacco: Never   Vaping Use     Vaping Use: Never used   Substance and Sexual Activity     Alcohol use: Not Currently     Alcohol/week: 4.0 standard drinks of alcohol     Types: 4 Glasses of wine per week     Drug use: No     Sexual activity: Not Currently     Birth control/protection: None   Other Topics Concern     Parent/sibling w/ CABG, MI or angioplasty before 65F 55M? Yes     Comment: Father heart attack   Social History Narrative     Not on file     Social Determinants of Health     Financial Resource Strain: Unknown (2023)    Financial Resource Strain      Within the past 12 months, have you or your family members you live with been unable to get utilities (heat, electricity) when it was really needed?: Patient refused   Food Insecurity: Unknown (2023)    Food Insecurity      Within the past 12 months, did you worry that your food would run out before you got money to buy more?: Patient refused      Within the past 12 months, did the food you bought just not last and you didn t have money to get more?: Patient refused   Transportation Needs: Unknown (2023)    Transportation Needs      Within the past 12 months, has lack of transportation kept you from medical appointments, getting your medicines, non-medical meetings or appointments, work, or from getting things that you need?: Patient refused   Physical Activity: Not on file   Stress: Not on file   Social Connections: Not on file   Interpersonal Safety: Low Risk  (10/3/2023)    Interpersonal Safety      Do you feel physically and emotionally safe where you currently live?: Yes      Within the past 12 months, have you been hit, slapped, kicked or otherwise physically hurt by someone?: No      Within the past 12 months, have you been humiliated or emotionally  "abused in other ways by your partner or ex-partner?: No   Housing Stability: Unknown (9/21/2023)    Housing Stability      Do you have housing? : Patient refused      Are you worried about losing your housing?: Patient refused       FAMILY HISTORY:  Family History   Problem Relation Age of Onset     Cancer Mother         bone cancer     Heart Disease Father      Coronary Artery Disease Father      Diabetes Father      Arthritis Sister      Breast Cancer Sister         age 75     Diabetes Maternal Grandmother      Diabetes Maternal Grandfather      Diabetes Paternal Grandmother      Family history of cancer- sister breast cancer- dx at age 77 y/o, localized breast cancer, surgical resection, RT, no adjuvant chemo or endocrine therapy that pt is aware of     PHYSICAL EXAM:  Vital signs:  /72 (BP Location: Right arm)   Pulse 66   Resp 16   Ht 1.598 m (5' 2.91\")   Wt 66.7 kg (147 lb)   SpO2 98%   BMI 26.11 kg/m       GENERAL/CONSTITUTIONAL: No acute distress.  EYES: Pupils are equal and round. Extraocular movements intact without nystagmus.  No scleral icterus. Minimal ptosis left eye at site of surgery  RESPIRATORY: Equal chest rise.   MUSCULOSKELETAL: Warm and well-perfused, no cyanosis, clubbing, or edema.   NEUROLOGIC: Cranial nerves are grossly intact. Alert, oriented to person, place and time, answers questions appropriately.  INTEGUMENTARY: No rashes or jaundice.  GAIT: ambulates independently without cane      LABS:  Pending     PATHOLOGY:  As above    IMAGING:      ASSESSMENT/PLAN:  Virginia Byers is a 73 year old female with:    # 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 INCLUDING biopsy proven LN and left orbital mass/lacrimal gland involvement and suspected parotid gland involvement   -11/22 MRI lumbar spine in 12/22 MRI pelvis show prominent left iliac lymph nodes (measuring 1.8 x 3.2, previously 1.5 x 2.7 cm), right medial thigh lymphadenopathy (1.7cm), right iliac bone enhancing marrow signal, " "left posterior iliac enhancing lesion  -12/22 CT chest abdomen pelvis shows new or increased left common iliac (1.2cm), external iliac, pelvic sidewall lymphadenopathy (2.1cm) and sclerotic lesions in left sacrum and left posterior ilium. No splenomegaly  - 1/23 brain MRI: Indeterminate ovoid T2 hyperintense enhancing mass in the region of the left lacrimal gland measuring approximately 2.4 cm AP by 1.4 cm transverse by 2.3 cm craniocaudal associated with/replacing the left lacrimal gland. Consider correlation with tissue sampling  - 1/23 CT soft tissue neck: Soft tissue nodule in the superficial left parotid gland measuring 1.2 cm. Additional 0.8 cm nodule in the deep portion of the left parotid gland. Soft tissue nodule in the superficial right parotid gland measuring 0.8 cm. These may represent benign or malignant parotid lesions. Multiplicity of lesions raises concern for Warthin tumors  - 1/27/23 note from Dr. Wilmar Morales- \"Based on this diagnosis, I think the 2 nodules in her parotid gland are likely atypical lymph nodes containing lymphoma.  She will probably require some staging imaging, such as PET scan and we can take a look at that as well.  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 CD5 stains T cells  - Ki-67 proliferative index is approximately 10%, also low in the follicles.  - flow cytometry \"CD10-positive population is consistent with a clonal B cell population and a CD10 positive B-cell lymphoma is favored.\"    - 4/6/23 left orbital mass/lacrimal gland excisional " biopsy: PATHOLOGY: - Follicular Lymphoma, low grade ([facility] negative for BCL2 rearrangement). 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  The flow cytometry from 1/20/23 right external iliac LN biopsy showed in addition to CD10 positive B-cell population  also a partial/equivocal CD5 positive separate kappa monotypic B-cell population, which most likely represents monoclonal B-cell lymphocytosis of CLL/SLL type, since it was also documented in the peripheral blood flow cytometry on February 16, 2023 (case number [facility] ) at a low frequency.  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  - Cohort 2 which is described as negative for CD5, CD10, ,  and showing dim CD20 and dim kappa staining   Of note the population of monoclonal B-cell lymphocytosis found in peripheral blood in our department and in the lymph node biopsy in January showed partial/equivocal dim CD5 and may correspond to the cohort #2.     -5/23 PET/CT NCAP:   1. An ovoid mass in the left lateral orbit measures 2.5 x 1.2 cm, slightly larger than 01/09/2023, and demonstrates marked uptake (SUVmax 9.3), consistent with biopsy-proven lymphoma  2. A few small FDG avid bilateral intraparotid nodules are present, one on the right and two on the left, including representative left parotid nodule measuring 0.9 x 1.4 cm with moderate uptake (SUVmax 6.7).  3.  Asymmetric prominent right posterior lower cervical node measures 0.8 cm associated with moderate focal uptake  (SUVmax 5.1).  4. A single mildly enlarged left axillary lymph node measures 1.0 cm associated with mild uptake (SUVmax 3.0)  5. Some scattered FDG avid lymph nodes below the diaphragm involve the retroperitoneal retrocaval, bilateral common iliac, left pelvic sidewall and left inguinal regions. Some of these have increased slightly in size since 12/28/2022, such as a conglomerate left pelvic sidewall bj mass measuring approximately 2.2 x 4.1 cm (previously 2.2 x 3.4 cm) associated with marked uptake (SUVmax 7.6).  6. FDG avid sclerotic skeletal lesion involves the right superior acetabulum (SUVmax 12.9) as well as a couple of separate smaller sclerotic lesions in the right iliac bone uptake (SUVmax 12.2).     - 6/23 bone marrow biopsy:  MORPHOLOGY:  - No morphologic or immunophenotypic evidence of follicular lymphoma  - Flow cytometry showing a small population of CD5 positive monotypic B cells; most compatible with monoclonal B lymphocytosis (There is no definitive morphologic correlate for this finding. There is no morphologic or immunophenotypic evidence of involvement by follicular lymphoma.  The CD5-positive B cell clone may best represent monoclonal B lymphocytosis, provided that bj SLL is excluded.)  - Normocellular marrow (cellularity estimated at 30%) with trilineage hematopoietic maturation and no increase in blasts  FLOW CYTOMETRY:  CD5 positive kappa-monotypic B cells (0.8%)  0.8% B cells which express CD5, CD19, CD20 (dim to negative) and monotypic kappa immunoglobulin light chains (dim) and lack CD10 and CD38. The B cells have similar forward scatter relative to background T cells suggestive of similar size; however, precise size determination is deferred to morphology  CYTOGENETICS:  46,XX    -6/19/2023 CT-guided bone biopsy, right acetabulum  PATHOLOGY:  - Atypical lymphoid infiltrate in a suboptimal biopsy  - No histologic evidence of metastatic carcinoma  - Two small monoclonal B-cell  populations detected by flow cytometry: CD5 positive kappa-monotypic B cells ( 2.8 %, CD5 partial, dim, CD19 positive, CD30 dim to negative, CD10 negative); CD10 positive lambda-monotypic B cells ( 0.4 %, CD10 positive, CD19 slightly dim, CD20 positive, CD5 negative)     - option of systemic treatment including rituximab discussed previously on multiple occassions particularly w/left orbital involvement, see my previous notes for details; pt deferred  - 6/14/2023- 6/15/2023 palliative radiation to left orbit follicular lymphoma 4Gy/2fx w/Dr. Julian Horvath    - 12/23 PET CT NCAP w/o contrast Liver SUV max = 3.78, Aorta Blood SUV max = 3.43.  -New/progressed:  - 0.8 x 0.5 cm right FDG avid cervical lymph node; SUV max of 6.5  - 1.3 x 1.5 cm right FDG avid cervical lymph node; SUV max of 5.8  - 1.3 x 1.4 cm right FDG avid cervical lymph node; SUV max of 7.8   -Lesion in left lateral chest wall immediately inferior to the margin of breast implant, 0.5 x 0.4 mm, SUV 5.02, previously 1.4  - Lymph node adjacent to left psoas, 1.5 cm, SUV 19.9, previously 6.02  - Right acetabulum, 2.0 x 1.9 cm, SUV 14.03, previously 12.05  - No new bone lesions but increased metabolic activity previously identified multiple FDG avid osseous foci:  1.  Sternal lesion, SUV 5.7, previously 3.6  2.  Right scapular spine lesion, SUV 4.6, previously 3.6  3.  Right acetabular bone lesion SUV 13.4, previously 11    -Unchanged:  -1 of 2 left parotid gland lesions, 1.6 x 1.1 cm, SUV 8.4, previously 6.6  - Left axillary lymph node, 1.2 x 1.0 cm, SUV 2.3  -Spleen normal    -Resolved/improved:  - Left orbital hypermetabolic lesion-resolved  - 1 of 2 previous left parotid hypermetabolic lesions-resolved  - Multiple mildly hypermetabolic bilateral inguinal nodes, index right inguinal lymph node SUV 2.62, previously 5.19-improved    -12/23 Left pelvic lymph node, biopsy:  - Recurrent/persistent low grade B-cell lymphoma, most consistent with follicular  lymphoma (limited evaluation of architecture),  no evidence of large cell transformation in this biopsy, and no evidence of negro small lymphocytic lymphoma (SLL).  - Negative for metastatic carcinoma    Flow cytometry:   C5 positive kappa-monotypic B cells 2.6% consistent with CLL/SLL vs monoclonal B cell lymphocytosis  CD10 positive B cells negative for surface light chain 29%, consistent w/previously dx CD10+ B cell lymphoma    - 12/23 bone marrow biopsy:  MORPHOLOGY:  - Hypercellular marrow (cellularity estimated at 40%) with trilineage hematopoiesis, no overt dysplasia, and no increase in blasts  - Rare CD5+ kappa-monotypic B cells detected by flow cytometry (1.7%)  - Small lymphoid aggregate suspicious for marrow involvement by CD5+ B-cell lymphoproliferative disorder (<5% of marrow cellularity), most consistent with monoclonal B-cell lymphocytosis.  - No definitive histologic or immunophenotypic evidence of follicular lymphoma and  no morphologic evidence of large cell lymphoma.  FLOW CYTOMETRY:  -CD5-positive kappa-monotypic B cells (1.7%) express CD5 (dim, partial), CD19, CD20 (dim), and monotypic kappa immunoglobulin light chains and lack CD10 and CD38.  suggestive of marrow involvement by monoclonal B-cell lymphocytosis (versus chronic lymphocytic leukemia/small lymphocytic lymphoma; CLL/SLL).   CYTOGENETICS:  46,XX[20]  FISH:  Pending    Staging and prognostication:  - Stage: 4 given right medial thigh LN and left common iliac, external iliac, pelvic sidewall; biopsy proven left orbital mass involvement; there was some concern from ENT that patients parotid lesions were related to pts follicular lymphoma   - FLIPI: score 2 (age>60),  stage 3-4; intermediate risk  - pertinent labs: 12/22  and 5/23 ; 2/23 and 5/23 beta 2 microglobulin 1.7, hepatitis B and C negative    Treatment:  - GELF criteria to indicate treatment (involvement of 3 or more bj sites each with a diameter greater than or  equal to 3 cm, any bj or extranodal tumor mass with a diameter of greater than or equal to 7 cm, B symptoms, splenomegaly, pleural effusions, peritoneal ascites, clinically progressive or significant cytopenias, leukemia, symptoms, threatened end organ function, clinically significant bulky disease, steady or rapid progression of disease)  - option of systemic treatment including rituximab discussed previously on multiple occassions particularly w/left orbital involvement, see my previous notes for details; pt deferred  - 6/14/2023- 6/15/2023 palliative radiation to left orbit follicular lymphoma 4Gy/2fx    PLAN:  - pt has 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 INCLUDING biopsy proven LN and left orbital mass/lacrimal gland involvement   - bone marrow biopsy negative for lymphoma and right acetabulum bone biopsy atypical lymphoid infiltrate seen but biopsy suboptimal and flow cytometry shows 2.8% CD5 positive kappa monotypic B cells consistent w/monoclonal B cell lymphocytosis and 0.4% CD10 positive lambda-monotypic B cells consistent w/lymphoma- ie follicular vs other  - 12/23 PET showed left pelvic LN w/SUV increased from 6->20, biopsied to rule out transformation to aggressive lymphoma. Biopsy showed follicular lymphoma w/aggressive lymphoma or SLL. LN flow cytometry consistent w/follicular lymphoma and MBL. Concurrent bone marrow biopsy, <5% CD5+ lymphoproliferative d/o consistent w/MBL  - 2nd opinion w/Dr. 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   - 1/23 right external iliac LN core needle biopsy- FLOW CYTOMETRY: 1.3% rare monoclonal B cell population CD5 equivocal, CD23 negative, CD 79b negative, possible MBL; no morphological correlate  -2/23 peripheral blood- FLOW CYTOMETRY: 2.3% kappa monoclonal B cell population, CD5 positive, CD38 negative, CD49d negative, possible MBL  - 4/6/23 left orbital mass/lacrimal gland excisional biopsy: FLOW CYTOMETRY: 15.1% kappa monotypic B cell population, bright positive: CD45, CD19, CD22, moderately positive kappa, CD20, CD79b, dim positive CD23, negative CD 2,3,103, lambda, 5, 10, 200, 38, 43  - 6/23 bone marrow biopsy: FLOW CYTOMETRY: 0.8% kappa monotypic B cell population, positive for CD5, CD19, dim to negative CD20, negative for CD38 and CD10  - 6/23 right acetabulum bone biopsy: FLOW CYTOMETRY: 2.8% kappa monotypic B cell population, CD5 partial dim, CD 19 positive, CD20 dim negative, CD10 negative, possible MBL    - of note pt does not have leukocytosis or lymphocytosis, ALC consistently <5000, no splenomegaly, LN biopsy did not demonstrate IHC consistent with SLL  - 5/23 PET/CT as above  - 12/23 bone marrow biopsy:  MORPHOLOGY:  - Hypercellular marrow (cellularity estimated at 40%) with trilineage hematopoiesis, no overt dysplasia, and no increase in blasts  - Rare CD5+ kappa-monotypic B cells detected by flow cytometry (1.7%)  - Small lymphoid aggregate suspicious for marrow involvement by CD5+ B-cell lymphoproliferative disorder (<5% of marrow cellularity), most consistent with monoclonal B-cell lymphocytosis.  - No definitive histologic or immunophenotypic evidence of follicular lymphoma and  no morphologic evidence of large cell lymphoma.  FLOW CYTOMETRY:  -CD5-positive kappa-monotypic B cells (1.7%) express CD5 (dim, partial), CD19, CD20 (dim), and monotypic kappa immunoglobulin light chains and lack CD10 and CD38.  suggestive  of marrow involvement by monoclonal B-cell lymphocytosis (versus chronic lymphocytic leukemia/small lymphocytic lymphoma; CLL/SLL).     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 Bone marrow biopsy negative for involvement of lymphoma 6/23 and shows <5% cellularity involvement of MBL 12/23  - 9/23 ALC 1.6, 12/23 ALC 1.7, 1/24 labs pending  - Monitor CBC with differential and clinically every 3-6 months    #Sclerotic lesions in left sacrum and left posterior ilium  -10/21 mammogram, endoscopy with EUS and colonoscopy WNL  -12/22 CT chest abdomen pelvis shows new or increased left common iliac, external iliac, pelvic sidewall lymphadenopathy and sclerotic lesions in left sacrum and left posterior ilium.  No metastatic disease in chest.  No abnormal findings in breast.  - 12/22 tumor markers: CA 15-3 24,  29, CEA 3.1, CA 19-9 4, AFP 5.9,   - 1/2023 MRI brain w/wo contrast: no metastases   - 1/2023 right external iliac LN core need biopsy- no metastatic carcinoma   - 5/23 PET avid bone lesions  -6/19/2023 CT-guided bone biopsy, right acetabulum  PATHOLOGY:  - Atypical lymphoid infiltrate in a suboptimal biopsy  - No histologic evidence of metastatic carcinoma  - Two small monoclonal B-cell populations detected by flow cytometry: CD5 positive kappa-monotypic B cells ( 2.8 %, CD5 partial, dim, CD19 positive, CD30 dim to negative, CD10 negative); CD10 positive lambda-monotypic B cells ( 0.4 %, CD10 positive, CD19 slightly dim, CD20 positive, CD5 negative)   - 12/23 PET   - Right acetabulum, 2.0 x 1.9 cm, SUV 14.03, previously 12.05  - No new bone lesions but increased metabolic activity previously identified multiple FDG avid osseous foci:  1.  Sternal lesion, SUV 5.7,  "previously 3.6  2.  Right scapular spine lesion, SUV 4.6, previously 3.6  3.  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    -4/2020 left breast simple mastectomy with no residual carcinoma.   -S/p ACx4, taxol x4, no anti-her2 treatment, no RT.   -S/p 5 years adjuvant endocrine therapy (tamoxifen and anastrozole).  -Delayed left breast reconstruction and right breast augmentation.   - 5/23 PET/CT notes single enlarged left axillary lymph node with SUV 3, \"could also represent lymphoma although given patient's history of left breast cancer, a breast cancer metastasis is also a possibility\"  - The above in the setting of known lymphoma and breast tumor markers normal   -9/23 right breast diagnostic mammogram: [facility]  - 9/23 ultrasound left axilla: 1.1 x 0.9 x 1.0 cm lymph node with mildly thickened cortex that correlates with abnormal lymph node on previous PET, recommend tissue diagnosis  - 9/23 ultrasound-guided left axillary lymph node core biopsy: No morphologic evidence of lymphoma.  Lymph node tissue was received in formalin, this could not be sent for flow cytometry.  No separate fresh tissue was sent for flow cytometry.  - 12/23 PET Left axillary lymph node, 1.2 x 1.0 cm, SUV 2.3, unchanged    PLAN:   -With the limitations of tissue processed in formalin and no fresh tissue available for flow cytometry, appears left axilla lymph node is benign without lymphoma      RTC 3 months for follow up with me, labs prior " to appt     Arlene Ahumada DO  Hematology/Oncology  [facility]      Again, thank you for allowing me to participate in the care of your patient.        Sincerely,        ARLENE AHUMADA, DO

## 2024-01-12 NOTE — PROGRESS NOTES
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    Patient has medical history including LEFT breast cancer in 2000, hypertension, hyperlipidemia, osteoporosis, osteoarthritis, degenerative disc disease, spinal stenosis of lumbar region,  s/p bilateral L5-S1 transforaminal ROS by Dr. Garcia on 7/9/2021, s/p bilateral L4-5 and L5-S1 facet joint injections on 6/14/2021, glaucoma and macular degeneration, cataracts s/p surgery, history of tobacco use (quit 1999)     Regarding previous history of breast cancer:  She was previously treated by Dr. Sakina Brandt at [facility]      In summary, diagnosed at age 49 with LEFT breast IDC ER positive, CA positive, HER2 positive on FISH. 3/2000 left breast lumpectomy and left axillary node dissection (IDC, grade 3, 1.45 cm incompletely excised tumor, DCIS, positive margins with infiltrating carcinoma, 1 of 17 lymph nodes positive (0.5 cm metastatic focus), ER positive, CA positive, HER2 positive).  4/2020 left breast simple mastectomy with no residual carcinoma. S/p ACx4, taxol x4, no anti-her2 treatment, no RT. S/p 5 years adjuvant endocrine therapy (tamoxifen and anastrozole). Delayed left breast reconstruction and right breast augmentation.      3/2/2000  Excisional biopsy of Left breast mass -   Infiltrating ductal carcinoma with associated DCIS   ER positive (60%), CA positive (22%), HER2 by FISH POSITIVE     3/10/2000 - Left lumpectomy and left axillary node  Invasive ductal carcinoma, Grade 3, 1.45 cm (incompletely excised), negative for LVI  DCIS, greatest histologic dimension of tumor (DCIS PLUS invasive tumor) = 2.9 cm (estimate 50% DCIS)  Positive margins- infiltrating carcinoma  extends to margin  1 of 17 axillary lymph nodes sampled was positive for metastatic focus of 0.5 cm.  ER/RI+     Sections of lumpectomy left breast showing residual 1.1 cm focus of ductal carcinoma in situ (DCIS), present within microns of the linked margin of resection.       4/11/2000 - Left breast simple mastectomy, negative for residual carcinoma  Left mastectomy on 4/11/2000 performed by Dr. Cristian Mcdonald at [facility].      Adjuvant treatment:  - 5/15/2000-7/24/2000 4 cycles of Adriamycin and Cytoxan   - 8/16/2000-10/18/2000 4 cycles of paclitaxel   - Appears she may have been on some kind of trial/protocol, was not given any anti-HER2 targeted treatment  - No adjuvant radiation  - 12/2000- 10/2002 tamoxifen  - 10/2002-5/2006 switched to anastrozole (due to in vitro studies showing anastrozole may be slightly better for patients were HER2 positive) with Fosamax for osteopenia     8/31/2015 Left delayed reconstruction with tissue expander    2/3/2016 Left 2nd stage 450 cc high profile silicone implant; right augmentation 150 cc Moderate classic profile silicone implant; cresent mastopexy    10/18/2021: Screening mammogram right breast ZACKARY    OTHER:  Of note, patient has documentation of anemia (iron deficiency and anemia of chronic disease), elevated LFTs, nausea and vomiting of all solids and liquids, fatigue with 26 pound unintentional weight loss from 7/20/2021 - 10/20/2021 with mildly elevated LFTs which led to evaluation with mammogram, endoscopy with EUS and colonoscopy as detailed below. Pt did associate these with some spinal injections she had gotten, due to overlapping times. Pt did regain all of the weight and hand improvement of anemia within 2 months, without significant intervention.     -10/2021: Endoscopy with EUS normal EGD and no stigmata or source of upper GI bleeding, visualized bile duct, gallbladder, liver, pancreas, left adrenal gland normal.  No lymphadenopathy. LIVER, RANDOM  NEEDLE BIOPSY: Benign parenchyma with congestion; no significant fibrosis or other abnormalities     -10/2021 colonoscopy: Hemorrhoids, diverticulosis in sigmoid colon and splenic flexure    Current history:  -Ongoing right knee pain not improved with brace and physical therapy, s/p bilateral L5-S1 transforaminal ROS by Dr. Garcia on 7/9/2021, s/p bilateral L4-5 and L5-S1 facet joint injections on 6/14/2021. ongoing b/l hip and leg pain, worse with position changes (ie sitting to standing), constant, 8-9/10, tylenol brings pain down to 6/10 (using tylenol bid). ECOG 3.   -Patient has seen neurosurgery Dr. Jose Alexander, occupational medicine specialist Dr.Orrin Murphy, with plan to see PM&R Dr. Margie Agudelo and movement disorder clinic  - 11/22 MRI lumbar spine:    prominent left iliac lymph nodes among other findings related to degenerative changes and neural foraminal stenosis throughout lumbar spine    - 12/22 MRI pelvis:  1. Enhancing marrow signal abnormality of the right iliac bone extending from the superior acetabulum subchondral location proximally almost to the level of the sacroiliac joint caudal aspect. No associated fracture line. Underlying marrow infiltrative process such as from metastasis cannot be excluded especially given the degree of T1 hypointensity and history of primary tumor. Other consideration include stress reaction.  2. Mildly increased left external iliac chain, and the right medial thigh lymphadenopathy. Metastasis cannot be excluded.  3. Left posterior iliac enhancing lesion, similar in size to prior CT 9/2021 and previously not visible on CT, focal enhancing lesion in the right greater trochanter laterally. Findings are concerning for Metastasis.    -12/22 CT chest abdomen pelvis with contrast:  COMPARISON: MRI pelvis 12/20/2022 and CT abdomen pelvis 9/21/2021.  1. Mastectomies with implant reconstructions. No lymphadenopathy  2.  There is an new or increased left common iliac, external  iliac and pelvic sidewall lymphadenopathy since prior CT. Distal left common iliac lymph nodes measure up to 1.2 cm on this exam compared to 0.7 cm on prior CT. Left pelvic sidewall lymph node measures 2.1 cm short axis on this exam compared to 1.2 cm on prior CT . consistent with metastatic disease.  3. Sclerotic lesions in the left sacrum and left posterior ilium are unchanged from prior CT.  may be metastatic.  4. No evidence of metastatic disease in the chest.     -1/2023 MRI brain w/wo contrast:  IMPRESSION:  1. No findings of intracranial metastatic disease.  2. Indeterminate ovoid T2 hyperintense enhancing mass in the region of the  left lacrimal gland measuring approximately 2.4 cm AP by 1.4 cm  transverse by 2.3 cm craniocaudal associated with/replacing the left  lacrimal gland. Consider correlation with tissue sampling.  3. A few indeterminate partially visualized heterogeneous enhancing  Nodules measuring up to approximately 1.5 cm in the bilateral parotid glands.   Consider soft tissue neck CT with contrast for further evaluation.    -1/20/2023: IR US Guided biopsy of a right external iliac LN  Final Diagnosis   A.  LYMPH NODE, RIGHT EXTERNAL ILIAC, CORE NEEDLE BIOPSY AND TOUCH IMPRINT:  -Involved by follicular lymphoma, low-grade (WHO grade 1-2)  -Negative for metastatic carcinoma     The morphologic and immunophenotypic patterns are consistent with follicular lymphoma.  There is no high-grade lymphoma present in this limited specimen.  Concurrent flow study ([id number]) demonstrated CD10-positive lambda monotypic B cells (22%), rare kappa monotypic B cells (1.3%), and no aberrant immunophenotype on T cells.  A FISH study for Bcl-2 is pending and will be reported separately.     The case was initially reviewed by Dr. Cifuentes (breast pathology).     This case was reviewed in part at our Hematopathology Faculty Consensus conference at which Girish Nolasco, Narciso, and Kayode were present in addition  to myself.  This is a small needle core.  In some of the areas, the CD10+ B cells are confined to follicles.  Though the possibility of in situ follicular neoplasia was discussed, we still favor a diagnosis of follicular lymphoma - there are a number of CD10+ B cells outside of follicles at the base of the biopsy, and the clinical history supports a greater extent of involvement.     There was a tiny kappa monotypic B cell population identified by flow (separate from the CD10+ clone).  We looked for the morphologic correlate in this case by IHC, but it is not readily apparent.  This may represent a monoclonal B lymphocytosis - consider sending a peirpheral blood sample for flow cytometry.    FISH:  RESULTS:     NORMAL  - No rearrangement of BCL2     INTERPRETATION:  No evidence was found by FISH of rearrangement of the BCL2 (18q21) locus. These findings are not helpful for confirming or further characterizing the reported pathologic diagnosis of follicular lymphoma.    Flow Interpretation   A. Lymph Node(s), :  -CD10-positive lambda monotypic B cells (22%)  -Rare kappa monotypic B cells (1.3%)  -No aberrant immunophenotype on T cells  See comment   Electronically signed by Krista Headley MD on 1/24/2023 at  3:10 PM   Comment     The CD10-positive population is consistent with a clonal B cell population and a CD10 positive B-cell lymphoma is favored.      In addition, a second clonal B-cell population is identified which has a CLL-like immunophenotype except that CD5 expression is equivocal, this may represent a monoclonal B-cell lymphocytosis (MBL).     Addendum   INTERPRETATION  The diagnosis remains the same (see comment)     REASON FOR ADDENDUM  This addendum is issued to reflect additional testing performed on this case. See Comment.      COMMENT  Additional multi-color flow analysis was performed for the following markers:   CD23, CD79b     The CD5 (dimly) positive B cells mostly lack CD23 and CD79b         1/23/23 CT neck:  A) Heterogeneous nodule in the right thyroid gland measuring up  to 1.6 cm.   B) Soft tissue nodule in the superficial left parotid  gland measuring 1.2 cm. Additional 0.8 cm nodule in the deep portion  of the left parotid gland. Soft tissue nodule in the superficial right  parotid gland measuring 0.8 cm. These may represent benign or malignant parotid lesions. Multiplicity of lesions raises concern for Warthin tumors. Otolaryngology consultation is  recommended.  C) Prominent soft tissue around the major arteries in the neck  particularly the common and internal carotid arteries. This is  atypical for atherosclerotic disease and vasculopathy/vasculitis such  as giant cell arteritis should be considered. Probable superimposed  atherosclerotic disease at the carotid bifurcations right greater than  left without definite high-grade stenosis.    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  And B-cell cohort #2 which is described as negative for CD5 and showing dim CD20 and dim kappa staining.  This population is listed a CD5 negative and CD10 negative also listed as negative for  and .  Of note the population of monoclonal B-cell lymphocytosis found in peripheral blood in our department and in the lymph node biopsy in January showed partial/equivocal dim CD5 and may correspond to the cohort #2.     We essentially agree with the diagnosis of  follicular lymphoma, which is consistent with prior diagnosis of follicular lymphoma low grade  established on core biopsy of right external iliac lymph node January 20, 2023 (case [id number]) in our Department . The concurrent flow cytometry case [id number] in January showed in addition to CD10 positive B-cell population  also a partial/equivocal CD5 positive separate kappa monotypic B-cell population, which most likely represents monoclonal B-cell lymphocytosis of CLL/SLL type, since it was also documented in the peripheral blood flow cytometry on February 16, 2023 (case number UF ) at a low frequency.  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 Areas with infiltrates of small B cells with coexpression of CD5 and CD23 are seen in the current biopsy, but the size of the biopsy is too small to comprehensively evaluate for the presence of proliferation centers and the extent of involvement by the CD5 positive B cell lymphoproliferative process.   This case has been discussed at intradepartmental hematopathology conference with participation by THELMA Petersen, THELMA Davis and myself.      -5/23 PET/CT NCAP:   1. An ovoid mass in the left lateral orbit measures 2.5 x 1.2 cm, slightly larger than 01/09/2023, and demonstrates marked uptake (SUVmax 9.3), consistent with biopsy-proven lymphoma  2. A few small FDG avid bilateral intraparotid nodules are present, one on the right and two on the left, including representative left parotid nodule measuring 0.9 x 1.4 cm with moderate uptake (SUVmax 6.7).  3.  Asymmetric prominent right posterior lower cervical node measures 0.8 cm associated with moderate focal   uptake (SUVmax 5.1).  4. A single mildly enlarged left axillary  lymph node measures 1.0 cm associated with mild uptake (SUVmax 3.0)  5. Some scattered FDG avid lymph nodes below the diaphragm involve the retroperitoneal retrocaval, bilateral common iliac, left pelvic sidewall and left inguinal regions. Some of these have increased slightly in size since 12/28/2022, such as a conglomerate left pelvic sidewall bj mass measuring approximately 2.2 x 4.1 cm (previously 2.2 x 3.4 cm) associated with marked uptake (SUVmax 7.6).  6. FDG avid sclerotic skeletal lesion involves the right superior acetabulum (SUVmax 12.9) as well as a couple of separate smaller sclerotic lesions in the right iliac bone uptake (SUVmax 12.2).     - 6/14/2023- 6/15/2023 palliative radiation to left orbit follicular lymphoma 4Gy/2fx  - 6/15/2023 bone marrow biopsy  MORPHOLOGY:  - No morphologic or immunophenotypic evidence of follicular lymphoma  - Flow cytometry showing a small population of CD5 positive monotypic B cells; most compatible with monoclonal B lymphocytosis (There is no definitive morphologic correlate for this finding. There is no morphologic or immunophenotypic evidence of involvement by follicular lymphoma.   The CD5-positive B cell clone may best represent monoclonal B lymphocytosis, provided that bj SLL is excluded.)  - Normocellular marrow (cellularity estimated at 30%) with trilineage hematopoietic maturation and no increase in blasts  FLOW CYTOMETRY:  CD5 positive kappa-monotypic B cells (0.8%)  0.8% B cells which express CD5, CD19, CD20 (dim to negative) and monotypic kappa immunoglobulin light chains (dim) and lack CD10 and CD38. The B cells have similar forward scatter relative to background T cells suggestive of similar size; however, precise size determination is deferred to morphology  CYTOGENETICS:  46,XX    -6/19/2023 CT-guided bone biopsy, right acetabulum  PATHOLOGY:  - Atypical lymphoid infiltrate in a suboptimal biopsy  - No histologic evidence of metastatic carcinoma  -  Two small monoclonal B-cell populations detected by flow cytometry  This biopsy is suboptimal for evaluation due to fragmentation, crush artifact, and decalcification. In this limited biopsy, there are fragments of bone and marrow showing a mixed infiltrate that includes both B and T cells. Notably, 2 small clonal B-cell populations were detected by flow cytometry which resemble B-cell populations detected in this patient's past lymph node (1/20/23) and blood and bone marrow (2/16/23, 6/15/23) flow cytometry studies. A definitive morphologic correlate to these cell populations is not appreciated, and the overall features are insufficient for a diagnosis of lymphoma.    FLOW CYTOMETRY:  A. Pelvis, Right:  -CD5 positive kappa-monotypic B cells ( 2.8 %) - see comment A  -CD10 positive lambda-monotypic B cells ( 0.4 %) - see comment B      Electronically signed by Arnaldo Alvarado MD on 6/20/2023 at  1:31 PM   Comment     A) Clonal B cells with the immunophenotype of CLL/SLL are present in this sample.  The differential includes peripheral blood contamination (with an MBL), the tissue based correlate to MBL, versus involvement by SLL.  2.8% B cells which express CD5 (partial, dim), CD19, CD20 (dim to negative) and monotypic kappa immunoglobulin light chains (dim) and lack CD10. The B cells have similar forward scatter relative to background T cells suggestive of similar size; however, precise size determination is deferred to morphology.     B) A CD10-positive B cell clone is also present - involvement by the patient's previously diagnosed CD10+ B cell lymphoma is in the differential (follicular lymphoma versus other (transformation to a higher grade lymphoma). 0.4% B cells which express CD10, CD19 (slightly dim), CD20, and monotypic lambda immunoglobulin light chains and lack CD5.  The B cells have increased forward scatter relative to background T cells suggestive of increased size; however, precise size  "determination is deferred to morphology.        - 7/10/23 Rheumatology consult for ESR and CRP elevated, DEVON positive 1:160 speckled; PCP suspects pt may have PMR; \"I suspect that the bilateral hip OA is altering gait that then results in worsening of low back pain. No further rheumatology workup needed at this time. \"    - 7/11/23 left hip total arthroplasty with severe b/l hip osteoarthritis with Dr. Austin Blood,  cc, discharged with DVT prophylaxis Lovenox 40 mg daily x2 weeks and aspirin 81 mg twice daily x2 weeks    - 10/13/23 right hip total arthroplasty    - 12/23 PET CT NCAP Liver SUV max = 3.78, Aorta Blood SUV max = 3.43.  -New/progressed:  - 0.8 x 0.5 cm right FDG avid cervical lymph node; SUV max of 6.5  - 1.3 x 1.5 cm right FDG avid cervical lymph node; SUV max of 5.8  - 1.3 x 1.4 cm right FDG avid cervical lymph node; SUV max of 7.8   -Lesion in left lateral chest wall immediately inferior to the margin of breast implant, 0.5 x 0.4 mm, SUV 5.02, previously 1.4  - Lymph node adjacent to left psoas, 1.5 cm, SUV 19.9, previously 6.02  - Right acetabulum, 2.0 x 1.9 cm, SUV 14.03, previously 12.05  - No new bone lesions but increased metabolic activity previously identified multiple FDG avid osseous foci:  1.  Sternal lesion, SUV 5.7, previously 3.6  2.  Right scapular spine lesion, SUV 4.6, previously 3.6  3.  Right acetabular bone lesion SUV 13.4, previously 11    -Unchanged:  - 1 of 2 left parotid gland lesions, 1.6 x 1.1 cm, SUV 8.4, previously 6.6  - Left axillary lymph node, 1.2 x 1.0 cm, SUV 2.3  - Spleen normal    -Resolved/improved:  - Left orbital hypermetabolic lesion-resolved  - 1 of 2 previous left parotid hypermetabolic lesions-resolved  - Multiple mildly hypermetabolic bilateral inguinal nodes, index right inguinal lymph node SUV 2.62, previously 5.19-improved    IMPRESSION: In this patient with follicular lymphoma there is evidence  for progression of disease.  1. There are " multiple new hypermetabolic lymph nodes.   2. Increased metabolic activity of multiple previously seen lymph  nodes  3. No new bony lesions identified but there is increased metabolic  activity of the previously identified lesions.  4. Multiple stable and some resolved hypermetabolic lesions; resolved  lesions include the left orbit and left parotid gland lesion.    INTERIM HISTORY:  Regarding lymphoma:  Denies weight loss, night sweats, early satiety, RUQ or LUQ pain  Reports left eye blurriness is stable since surgery, not worsened since RT, using lubricating eye drops prn and working with eye doctor for management of glaucoma, reports she had laser treatment to left eye that helped with blurred vision. Appt w/eye doctor 1/22/24     -12/23 Left pelvic lymph node, biopsy:  - Recurrent/persistent low grade B-cell lymphoma, most consistent with follicular lymphoma  - Negative for metastatic carcinoma    This biopsy is involved by a low grade lymphoma with a staining pattern consistent with follicular lymphoma. The growth pattern of the lymphoma cells appears diffuse, although the biopsy is quite small, limiting assessment of architecture. Concurrent flow cytometry ([id number]) detected a population of CD10-positive B cells negative for surface light chains, similar to the patient's past flow cytometry studies and consistent with recurrent/persistent disease.     In addition, flow cytometry identified a small population of CD5-positive kappa-monotypic B cells (2.6%), similar to the patient's known monoclonal B-cell lymphocytosis (MBL). No definitive morphologic correlate to this finding is appreciated in this biopsy - it may represent peripheral blood contamination versus a tissue-based MBL equivalent.     There is no evidence of large cell transformation in this biopsy, and no evidence of negro small lymphocytic lymphoma (SLL). However, the biopsy is very small - the possibility of unsampled higher grade disease or  SLL cannot be excluded. Correlation with imaging studies is recommended.    Left pelvic LN flow cytometry:  A. Pelvis, Left, :  - CD5 positive kappa-monotypic B cells (2.6%)  Clonal B cells with the immunophenotype of CLL/SLL are present in this sample.  The differential includes peripheral blood contamination (with an MBL) versus involvement by SLL.    - CD10 positive negative for surface light chain B cells (29%) - see comment B  A CD10-positive B cell clone is also present - involvement by the patient's previously diagnosed CD10+ B cell lymphoma is in the differential (follicular lymphoma versus transformation to a higher grade lymphoma).         - 12/23 bone marrow biopsy:  MORPHOLOGY:  - Hypercellular marrow (cellularity estimated at 40%) with trilineage hematopoiesis, no overt dysplasia, and no increase in blasts  - Rare CD5+ kappa-monotypic B cells detected by flow cytometry (1.7%)  - Small lymphoid aggregate suspicious for marrow involvement by CD5+ B-cell lymphoproliferative disorder (<5% of marrow cellularity)  - No definitive histologic or immunophenotypic evidence of follicular lymphoma  Flow cytometry ([id number]) showed rare CD5-positive kappa-monotypic B cells (1.7%). By morphology, a tiny lymphoid aggregate is identified on the trephine core biopsy. This aggregate contains small B cells that appear to have aberrant expression of CD5, and may represent the morphologic correlate to the flow cytometry results. Overall, the findings are suspicious for low level marrow involvement by a CD5+ lymphoproliferative disorder most consistent with monoclonal B-cell lymphocytosis.     There is no definitive evidence of marrow involvement by follicular lymphoma, and no morphologic evidence of large cell lymphoma.  FLOW CYTOMETRY:  -CD5-positive kappa-monotypic B cells (1.7%) express CD5 (dim, partial), CD19, CD20 (dim), and monotypic kappa immunoglobulin light chains and lack CD10 and CD38.   The monotypic B-cell  population present in this specimen has a similar immunophenotype as reported previously in this patient's peripheral blood ([id number], 2/16/23). The immunophenotypic findings are suggestive of marrow involvement by monoclonal B-cell lymphocytosis (versus chronic lymphocytic leukemia/small lymphocytic lymphoma; CLL/SLL). There may be peripheral blood contamination. Large cells may not survive specimen processing. Morphologic correlation is required.   CYTOGENETICS:  46,XX[20]  FISH:  Pending    -1/3/24 second opinion w/Dr. Nguyen     REVIEW OF SYSTEMS:   A 14 point ROS was reviewed with pertinent positives and negatives in the HPI.       HOME MEDICATIONS:  Current Outpatient Medications   Medication Sig Dispense Refill    dorzolamide-timolol (COSOPT) 2-0.5 % ophthalmic solution Place 1 drop into both eyes 2 times daily      latanoprost (XALATAN) 0.005 % ophthalmic solution Place 1 drop into both eyes At Bedtime      losartan (COZAAR) 100 MG tablet Take 1 tablet (100 mg) by mouth daily 90 tablet 3    Multiple Vitamins-Minerals (PRESERVISION AREDS) TABS Take 1 tablet by mouth 2 times daily      rosuvastatin (CRESTOR) 10 MG tablet Take 1 tablet (10 mg) by mouth daily 90 tablet 3         ALLERGIES:  Allergies   Allergen Reactions    Compazine      Mental confusion    Hydrochlorothiazide      Dryness mouth    Prochlorperazine Visual Disturbance    Simvastatin Other (See Comments)     Muscle aches         PAST MEDICAL HISTORY:  Past Medical History:   Diagnosis Date    Aftercare following right hip joint replacement surgery 10/25/2023    Breast cancer (H) 2000    Left breast, s/p mastectomy, chemotherapy and endocrine therapy    Follicular lymphoma (H) 01/20/2023    Glaucoma     bilateral - Dr. Moore - [facility]    History of spinal stenosis     Hyperlipidemia     Hypertension     Macular degeneration     Dr. Toscano - [facility]    Osteoarthritis     Osteoporosis     Personal  history of chemotherapy     A/C + Taxol    S/P radiation therapy     400 cGy to left orbit completed on 6/15/2023 [facility]         PAST SURGICAL HISTORY:  Past Surgical History:   Procedure Laterality Date    ARTHROPLASTY HIP Left 2023    Procedure: ARTHROPLASTY, HIP, TOTAL LEFT;  Surgeon: Austin Blood MD;  Location: [facility] OR    ARTHROPLASTY HIP Right 10/13/2023    Procedure: RIGHT ARTHROPLASTY, HIP, TOTAL;  Surgeon: Austin Blood MD;  Location: [facility] Main OR    BIOPSY Left 2023    Left Orbital Biopsy    C MASTECTOMY,SIMPLE  2000    left    COLONOSCOPY  2006    Q 10 years    COLONOSCOPY N/A 10/04/2021    Procedure: COLONOSCOPY;  Surgeon: Guru Lyla Ruby MD;  Location: [facility] OR    ESOPHAGOSCOPY, GASTROSCOPY, DUODENOSCOPY (EGD), COMBINED N/A 10/04/2021    Procedure: ENDOSCOPIC ULTRASOUND, ESOPHAGOSCOPY / UPPER GASTROINTESTINAL TRACT (GI), Esophagogastroduodenoscopy,  Fine Needle Biopsy of liver;  Surgeon: Guru Lyla Ruby MD;  Location: [facility] OR    EYE SURGERY      IR LYMPH NODE BIOPSY  2023    SURGICAL HISTORY OF -  Left 2015    tissue expander placed in left breast area    SURGICAL HISTORY OF -  Left 2016    left breast implant and right mammoplasty         SOCIAL HISTORY:  Social History     Socioeconomic History    Marital status: Single     Spouse name: Not on file    Number of children: 0    Years of education: Not on file    Highest education level: Not on file   Occupational History     Employer: [employer]    Occupation: REtired   Tobacco Use    Smoking status: Former     Packs/day: 1.00     Years: 17.00     Additional pack years: 0.00     Total pack years: 17.00     Types: Cigarettes     Quit date: 3/15/1999     Years since quittin.8     Passive exposure: Past    Smokeless tobacco: Never   Vaping Use    Vaping Use: Never used   Substance and Sexual Activity    Alcohol use: Not Currently     Alcohol/week: 4.0  standard drinks of alcohol     Types: 4 Glasses of wine per week    Drug use: No    Sexual activity: Not Currently     Birth control/protection: None   Other Topics Concern    Parent/sibling w/ CABG, MI or angioplasty before 65F 55M? Yes     Comment: Father heart attack   Social History Narrative    Not on file     Social Determinants of Health     Financial Resource Strain: Unknown (9/21/2023)    Financial Resource Strain     Within the past 12 months, have you or your family members you live with been unable to get utilities (heat, electricity) when it was really needed?: Patient refused   Food Insecurity: Unknown (9/21/2023)    Food Insecurity     Within the past 12 months, did you worry that your food would run out before you got money to buy more?: Patient refused     Within the past 12 months, did the food you bought just not last and you didn t have money to get more?: Patient refused   Transportation Needs: Unknown (9/21/2023)    Transportation Needs     Within the past 12 months, has lack of transportation kept you from medical appointments, getting your medicines, non-medical meetings or appointments, work, or from getting things that you need?: Patient refused   Physical Activity: Not on file   Stress: Not on file   Social Connections: Not on file   Interpersonal Safety: Low Risk  (10/3/2023)    Interpersonal Safety     Do you feel physically and emotionally safe where you currently live?: Yes     Within the past 12 months, have you been hit, slapped, kicked or otherwise physically hurt by someone?: No     Within the past 12 months, have you been humiliated or emotionally abused in other ways by your partner or ex-partner?: No   Housing Stability: Unknown (9/21/2023)    Housing Stability     Do you have housing? : Patient refused     Are you worried about losing your housing?: Patient refused       FAMILY HISTORY:  Family History   Problem Relation Age of Onset    Cancer Mother         bone cancer     "Heart Disease Father     Coronary Artery Disease Father     Diabetes Father     Arthritis Sister     Breast Cancer Sister         age 75    Diabetes Maternal Grandmother     Diabetes Maternal Grandfather     Diabetes Paternal Grandmother      Family history of cancer- sister breast cancer- dx at age 75 y/o, localized breast cancer, surgical resection, RT, no adjuvant chemo or endocrine therapy that pt is aware of     PHYSICAL EXAM:  Vital signs:  /72 (BP Location: Right arm)   Pulse 66   Resp 16   Ht 1.598 m (5' 2.91\")   Wt 66.7 kg (147 lb)   SpO2 98%   BMI 26.11 kg/m       GENERAL/CONSTITUTIONAL: No acute distress.  EYES: Pupils are equal and round. Extraocular movements intact without nystagmus.  No scleral icterus. Minimal ptosis left eye at site of surgery  RESPIRATORY: Equal chest rise.   MUSCULOSKELETAL: Warm and well-perfused, no cyanosis, clubbing, or edema.   NEUROLOGIC: Cranial nerves are grossly intact. Alert, oriented to person, place and time, answers questions appropriately.  INTEGUMENTARY: No rashes or jaundice.  GAIT: ambulates independently without cane      LABS:  Pending     PATHOLOGY:  As above    IMAGING:      ASSESSMENT/PLAN:  Virginia Byers is a 73 year old female with:    # 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 INCLUDING biopsy proven LN and left orbital mass/lacrimal gland involvement and suspected parotid gland involvement   -11/22 MRI lumbar spine in 12/22 MRI pelvis show prominent left iliac lymph nodes (measuring 1.8 x 3.2, previously 1.5 x 2.7 cm), right medial thigh lymphadenopathy (1.7cm), right iliac bone enhancing marrow signal, left posterior iliac enhancing lesion  -12/22 CT chest abdomen pelvis shows new or increased left common iliac (1.2cm), external iliac, pelvic sidewall lymphadenopathy (2.1cm) and sclerotic lesions in left sacrum and left posterior ilium. No splenomegaly  - 1/23 brain MRI: Indeterminate ovoid T2 hyperintense enhancing mass in the region of the " "left lacrimal gland measuring approximately 2.4 cm AP by 1.4 cm transverse by 2.3 cm craniocaudal associated with/replacing the left lacrimal gland. Consider correlation with tissue sampling  - 1/23 CT soft tissue neck: Soft tissue nodule in the superficial left parotid gland measuring 1.2 cm. Additional 0.8 cm nodule in the deep portion of the left parotid gland. Soft tissue nodule in the superficial right parotid gland measuring 0.8 cm. These may represent benign or malignant parotid lesions. Multiplicity of lesions raises concern for Warthin tumors  - 1/27/23 note from Dr. Wilmar Morales- \"Based on this diagnosis, I think the 2 nodules in her parotid gland are likely atypical lymph nodes containing lymphoma.  She will probably require some staging imaging, such as PET scan and we can take a look at that as well.  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 CD5 stains T cells  - Ki-67 proliferative index is approximately 10%, also low in the follicles.  - flow cytometry \"CD10-positive population is consistent with a clonal B cell population and a CD10 positive B-cell lymphoma is favored.\"    - 4/6/23 left orbital mass/lacrimal gland excisional biopsy: PATHOLOGY: - Follicular Lymphoma, low grade ([facility] negative for BCL2 rearrangement). 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  The flow cytometry from 1/20/23 right external iliac LN biopsy showed in addition to CD10 positive B-cell population  also a partial/equivocal CD5 positive separate kappa monotypic B-cell population, which most likely represents monoclonal B-cell lymphocytosis of CLL/SLL type, since it was also documented in the peripheral blood flow cytometry on February 16, 2023 (case number [facility] ) at a low frequency.  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  - Cohort 2 which is described as negative for CD5, CD10, ,  and showing dim CD20 and dim kappa staining   Of note the population of monoclonal B-cell lymphocytosis found in peripheral blood in our department and in the lymph node biopsy in January showed partial/equivocal dim CD5 and may correspond to the cohort #2.     -5/23 PET/CT NCAP:   1. An ovoid mass in the left lateral orbit measures 2.5 x 1.2 cm, slightly larger than 01/09/2023, and demonstrates marked uptake (SUVmax 9.3), consistent with biopsy-proven lymphoma  2. A few small FDG avid bilateral intraparotid nodules are present, one on the right and two on the left, including representative left parotid nodule measuring 0.9 x 1.4 cm with moderate uptake (SUVmax 6.7).  3.  Asymmetric prominent right posterior lower cervical node measures 0.8 cm associated with moderate focal uptake (SUVmax 5.1).  4. A single mildly enlarged left axillary lymph node measures 1.0 cm associated with mild uptake (SUVmax 3.0)  5. Some scattered FDG avid lymph nodes below the diaphragm involve the retroperitoneal retrocaval, bilateral common iliac, left pelvic sidewall and left inguinal regions. Some of these have increased slightly in size  since 12/28/2022, such as a conglomerate left pelvic sidewall bj mass measuring approximately 2.2 x 4.1 cm (previously 2.2 x 3.4 cm) associated with marked uptake (SUVmax 7.6).  6. FDG avid sclerotic skeletal lesion involves the right superior acetabulum (SUVmax 12.9) as well as a couple of separate smaller sclerotic lesions in the right iliac bone uptake (SUVmax 12.2).     - 6/23 bone marrow biopsy:  MORPHOLOGY:  - No morphologic or immunophenotypic evidence of follicular lymphoma  - Flow cytometry showing a small population of CD5 positive monotypic B cells; most compatible with monoclonal B lymphocytosis (There is no definitive morphologic correlate for this finding. There is no morphologic or immunophenotypic evidence of involvement by follicular lymphoma.  The CD5-positive B cell clone may best represent monoclonal B lymphocytosis, provided that bj SLL is excluded.)  - Normocellular marrow (cellularity estimated at 30%) with trilineage hematopoietic maturation and no increase in blasts  FLOW CYTOMETRY:  CD5 positive kappa-monotypic B cells (0.8%)  0.8% B cells which express CD5, CD19, CD20 (dim to negative) and monotypic kappa immunoglobulin light chains (dim) and lack CD10 and CD38. The B cells have similar forward scatter relative to background T cells suggestive of similar size; however, precise size determination is deferred to morphology  CYTOGENETICS:  46,XX    -6/19/2023 CT-guided bone biopsy, right acetabulum  PATHOLOGY:  - Atypical lymphoid infiltrate in a suboptimal biopsy  - No histologic evidence of metastatic carcinoma  - Two small monoclonal B-cell populations detected by flow cytometry: CD5 positive kappa-monotypic B cells ( 2.8 %, CD5 partial, dim, CD19 positive, CD30 dim to negative, CD10 negative); CD10 positive lambda-monotypic B cells ( 0.4 %, CD10 positive, CD19 slightly dim, CD20 positive, CD5 negative)     - option of systemic treatment including rituximab discussed previously on  multiple occassions particularly w/left orbital involvement, see my previous notes for details; pt deferred  - 6/14/2023- 6/15/2023 palliative radiation to left orbit follicular lymphoma 4Gy/2fx w/Dr. Julian Horvath    - 12/23 PET CT NCAP w/o contrast Liver SUV max = 3.78, Aorta Blood SUV max = 3.43.  -New/progressed:  - 0.8 x 0.5 cm right FDG avid cervical lymph node; SUV max of 6.5  - 1.3 x 1.5 cm right FDG avid cervical lymph node; SUV max of 5.8  - 1.3 x 1.4 cm right FDG avid cervical lymph node; SUV max of 7.8   -Lesion in left lateral chest wall immediately inferior to the margin of breast implant, 0.5 x 0.4 mm, SUV 5.02, previously 1.4  - Lymph node adjacent to left psoas, 1.5 cm, SUV 19.9, previously 6.02  - Right acetabulum, 2.0 x 1.9 cm, SUV 14.03, previously 12.05  - No new bone lesions but increased metabolic activity previously identified multiple FDG avid osseous foci:  1.  Sternal lesion, SUV 5.7, previously 3.6  2.  Right scapular spine lesion, SUV 4.6, previously 3.6  3.  Right acetabular bone lesion SUV 13.4, previously 11    -Unchanged:  -1 of 2 left parotid gland lesions, 1.6 x 1.1 cm, SUV 8.4, previously 6.6  - Left axillary lymph node, 1.2 x 1.0 cm, SUV 2.3  -Spleen normal    -Resolved/improved:  - Left orbital hypermetabolic lesion-resolved  - 1 of 2 previous left parotid hypermetabolic lesions-resolved  - Multiple mildly hypermetabolic bilateral inguinal nodes, index right inguinal lymph node SUV 2.62, previously 5.19-improved    -12/23 Left pelvic lymph node, biopsy:  - Recurrent/persistent low grade B-cell lymphoma, most consistent with follicular lymphoma (limited evaluation of architecture),  no evidence of large cell transformation in this biopsy, and no evidence of negro small lymphocytic lymphoma (SLL).  - Negative for metastatic carcinoma    Flow cytometry:   C5 positive kappa-monotypic B cells 2.6% consistent with CLL/SLL vs monoclonal B cell lymphocytosis  CD10 positive B cells negative  for surface light chain 29%, consistent w/previously dx CD10+ B cell lymphoma    - 12/23 bone marrow biopsy:  MORPHOLOGY:  - Hypercellular marrow (cellularity estimated at 40%) with trilineage hematopoiesis, no overt dysplasia, and no increase in blasts  - Rare CD5+ kappa-monotypic B cells detected by flow cytometry (1.7%)  - Small lymphoid aggregate suspicious for marrow involvement by CD5+ B-cell lymphoproliferative disorder (<5% of marrow cellularity), most consistent with monoclonal B-cell lymphocytosis.  - No definitive histologic or immunophenotypic evidence of follicular lymphoma and  no morphologic evidence of large cell lymphoma.  FLOW CYTOMETRY:  -CD5-positive kappa-monotypic B cells (1.7%) express CD5 (dim, partial), CD19, CD20 (dim), and monotypic kappa immunoglobulin light chains and lack CD10 and CD38.  suggestive of marrow involvement by monoclonal B-cell lymphocytosis (versus chronic lymphocytic leukemia/small lymphocytic lymphoma; CLL/SLL).   CYTOGENETICS:  46,XX[20]  FISH:  Pending    Staging and prognostication:  - Stage: 4 given right medial thigh LN and left common iliac, external iliac, pelvic sidewall; biopsy proven left orbital mass involvement; there was some concern from ENT that patients parotid lesions were related to pts follicular lymphoma   - FLIPI: score 2 (age>60),  stage 3-4; intermediate risk  - pertinent labs: 12/22  and 5/23 ; 2/23 and 5/23 beta 2 microglobulin 1.7, hepatitis B and C negative    Treatment:  - GELF criteria to indicate treatment (involvement of 3 or more bj sites each with a diameter greater than or equal to 3 cm, any bj or extranodal tumor mass with a diameter of greater than or equal to 7 cm, B symptoms, splenomegaly, pleural effusions, peritoneal ascites, clinically progressive or significant cytopenias, leukemia, symptoms, threatened end organ function, clinically significant bulky disease, steady or rapid progression of disease)  - option  of systemic treatment including rituximab discussed previously on multiple occassions particularly w/left orbital involvement, see my previous notes for details; pt deferred  - 6/14/2023- 6/15/2023 palliative radiation to left orbit follicular lymphoma 4Gy/2fx    PLAN:  - pt has 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 INCLUDING biopsy proven LN and left orbital mass/lacrimal gland involvement   - bone marrow biopsy negative for lymphoma and right acetabulum bone biopsy atypical lymphoid infiltrate seen but biopsy suboptimal and flow cytometry shows 2.8% CD5 positive kappa monotypic B cells consistent w/monoclonal B cell lymphocytosis and 0.4% CD10 positive lambda-monotypic B cells consistent w/lymphoma- ie follicular vs other  - 12/23 PET showed left pelvic LN w/SUV increased from 6->20, biopsied to rule out transformation to aggressive lymphoma. Biopsy showed follicular lymphoma w/aggressive lymphoma or SLL. LN flow cytometry consistent w/follicular lymphoma and MBL. Concurrent bone marrow biopsy, <5% CD5+ lymphoproliferative d/o consistent w/MBL  - 2nd opinion w/Dr. 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   - 1/23 right external iliac LN core needle biopsy- FLOW CYTOMETRY: 1.3% rare monoclonal B cell population CD5 equivocal, CD23 negative, CD 79b negative, possible MBL; no morphological correlate  -2/23 peripheral blood- FLOW CYTOMETRY: 2.3% kappa monoclonal B cell  population, CD5 positive, CD38 negative, CD49d negative, possible MBL  - 4/6/23 left orbital mass/lacrimal gland excisional biopsy: FLOW CYTOMETRY: 15.1% kappa monotypic B cell population, bright positive: CD45, CD19, CD22, moderately positive kappa, CD20, CD79b, dim positive CD23, negative CD 2,3,103, lambda, 5, 10, 200, 38, 43  - 6/23 bone marrow biopsy: FLOW CYTOMETRY: 0.8% kappa monotypic B cell population, positive for CD5, CD19, dim to negative CD20, negative for CD38 and CD10  - 6/23 right acetabulum bone biopsy: FLOW CYTOMETRY: 2.8% kappa monotypic B cell population, CD5 partial dim, CD 19 positive, CD20 dim negative, CD10 negative, possible MBL    - of note pt does not have leukocytosis or lymphocytosis, ALC consistently <5000, no splenomegaly, LN biopsy did not demonstrate IHC consistent with SLL  - 5/23 PET/CT as above  - 12/23 bone marrow biopsy:  MORPHOLOGY:  - Hypercellular marrow (cellularity estimated at 40%) with trilineage hematopoiesis, no overt dysplasia, and no increase in blasts  - Rare CD5+ kappa-monotypic B cells detected by flow cytometry (1.7%)  - Small lymphoid aggregate suspicious for marrow involvement by CD5+ B-cell lymphoproliferative disorder (<5% of marrow cellularity), most consistent with monoclonal B-cell lymphocytosis.  - No definitive histologic or immunophenotypic evidence of follicular lymphoma and  no morphologic evidence of large cell lymphoma.  FLOW CYTOMETRY:  -CD5-positive kappa-monotypic B cells (1.7%) express CD5 (dim, partial), CD19, CD20 (dim), and monotypic kappa immunoglobulin light chains and lack CD10 and CD38.  suggestive of marrow involvement by monoclonal B-cell lymphocytosis (versus chronic lymphocytic leukemia/small lymphocytic lymphoma; CLL/SLL).     PLAN:  - pt likely has monoclonal B cell lymphocytosis, though cannot definitively rule out presence of SLL as pt  has multiple lymph nodes, though at least the external iliac and lacrimal gland regions and left  pelvic LN were biopsied and consistent with low grade follicular lymphoma. Bone marrow biopsy negative for involvement of lymphoma 6/23 and shows <5% cellularity involvement of MBL 12/23  - 9/23 ALC 1.6, 12/23 ALC 1.7, 1/24 labs pending  - Monitor CBC with differential and clinically every 3-6 months    #Sclerotic lesions in left sacrum and left posterior ilium  -10/21 mammogram, endoscopy with EUS and colonoscopy WNL  -12/22 CT chest abdomen pelvis shows new or increased left common iliac, external iliac, pelvic sidewall lymphadenopathy and sclerotic lesions in left sacrum and left posterior ilium.  No metastatic disease in chest.  No abnormal findings in breast.  - 12/22 tumor markers: CA 15-3 24,  29, CEA 3.1, CA 19-9 4, AFP 5.9,   - 1/2023 MRI brain w/wo contrast: no metastases   - 1/2023 right external iliac LN core need biopsy- no metastatic carcinoma   - 5/23 PET avid bone lesions  -6/19/2023 CT-guided bone biopsy, right acetabulum  PATHOLOGY:  - Atypical lymphoid infiltrate in a suboptimal biopsy  - No histologic evidence of metastatic carcinoma  - Two small monoclonal B-cell populations detected by flow cytometry: CD5 positive kappa-monotypic B cells ( 2.8 %, CD5 partial, dim, CD19 positive, CD30 dim to negative, CD10 negative); CD10 positive lambda-monotypic B cells ( 0.4 %, CD10 positive, CD19 slightly dim, CD20 positive, CD5 negative)   - 12/23 PET   - Right acetabulum, 2.0 x 1.9 cm, SUV 14.03, previously 12.05  - No new bone lesions but increased metabolic activity previously identified multiple FDG avid osseous foci:  1.  Sternal lesion, SUV 5.7, previously 3.6  2.  Right scapular spine lesion, SUV 4.6, previously 3.6  3.  Right acetabular bone lesion SUV 13.4, previously 11  - 12/23 alk phos 158    PLAN:   - no proven malignancy on bone biopsy x2  - work up as above  - repeat alk phos w/isoenzymes pending    #History of LEFT breast IDC ER positive, FL positive, HER2 positive on  "FISH  -Diagnosed at age 49  - 3/2000 left breast lumpectomy and left axillary node dissection (IDC, grade 3, 1.45 cm incompletely excised tumor, DCIS, positive margins with infiltrating carcinoma, 1 of 17 lymph nodes positive (0.5 cm metastatic focus), ER positive, NJ positive, HER2 positive).    -4/2020 left breast simple mastectomy with no residual carcinoma.   -S/p ACx4, taxol x4, no anti-her2 treatment, no RT.   -S/p 5 years adjuvant endocrine therapy (tamoxifen and anastrozole).  -Delayed left breast reconstruction and right breast augmentation.   - 5/23 PET/CT notes single enlarged left axillary lymph node with SUV 3, \"could also represent lymphoma although given patient's history of left breast cancer, a breast cancer metastasis is also a possibility\"  - The above in the setting of known lymphoma and breast tumor markers normal   -9/23 right breast diagnostic mammogram: [facility]  - 9/23 ultrasound left axilla: 1.1 x 0.9 x 1.0 cm lymph node with mildly thickened cortex that correlates with abnormal lymph node on previous PET, recommend tissue diagnosis  - 9/23 ultrasound-guided left axillary lymph node core biopsy: No morphologic evidence of lymphoma.  Lymph node tissue was received in formalin, this could not be sent for flow cytometry.  No separate fresh tissue was sent for flow cytometry.  - 12/23 PET Left axillary lymph node, 1.2 x 1.0 cm, SUV 2.3, unchanged    PLAN:   -With the limitations of tissue processed in formalin and no fresh tissue available for flow cytometry, appears left axilla lymph node is benign without lymphoma      RTC 3 months for follow up with me, labs prior to appt     Naomy Zaidi DO  Hematology/Oncology  [facility]  "

## 2024-01-14 LAB — G6PD RBC-CCNT: 14.8 U/G HB

## 2024-01-15 LAB
ALP BONE SERPL-CCNC: 81 U/L
ALP LIVER SERPL-CCNC: 104 U/L
ALP OTHER SERPL-CCNC: 0 U/L
ALP SERPL-CCNC: 185 U/L

## 2024-02-09 ENCOUNTER — ANCILLARY PROCEDURE (OUTPATIENT)
Dept: CT IMAGING | Facility: CLINIC | Age: 74
End: 2024-02-09
Attending: INTERNAL MEDICINE
Payer: MEDICARE

## 2024-02-09 ENCOUNTER — ONCOLOGY VISIT (OUTPATIENT)
Dept: ONCOLOGY | Facility: CLINIC | Age: 74
End: 2024-02-09
Attending: INTERNAL MEDICINE
Payer: MEDICARE

## 2024-02-09 ENCOUNTER — ANCILLARY ORDERS (OUTPATIENT)
Dept: ONCOLOGY | Facility: CLINIC | Age: 74
End: 2024-02-09

## 2024-02-09 ENCOUNTER — TELEPHONE (OUTPATIENT)
Dept: ONCOLOGY | Facility: CLINIC | Age: 74
End: 2024-02-09
Payer: MEDICARE

## 2024-02-09 VITALS
BODY MASS INDEX: 25.13 KG/M2 | HEART RATE: 79 BPM | RESPIRATION RATE: 18 BRPM | SYSTOLIC BLOOD PRESSURE: 139 MMHG | OXYGEN SATURATION: 97 % | DIASTOLIC BLOOD PRESSURE: 77 MMHG | HEIGHT: 64 IN | WEIGHT: 147.2 LBS

## 2024-02-09 DIAGNOSIS — R22.1 NECK MASS: ICD-10-CM

## 2024-02-09 DIAGNOSIS — C82.00 FOLLICULAR LYMPHOMA GRADE I, UNSPECIFIED BODY REGION (H): ICD-10-CM

## 2024-02-09 DIAGNOSIS — C91.10 MONOCLONAL B-CELL LYMPHOCYTOSIS WITH IMMUNOPHENOTYPE LIKE CHRONIC LYMPHOCYTIC LEUKEMIA (CLL) (H): ICD-10-CM

## 2024-02-09 DIAGNOSIS — R22.1 NECK MASS: Primary | ICD-10-CM

## 2024-02-09 DIAGNOSIS — D72.820 MONOCLONAL B-CELL LYMPHOCYTOSIS WITH IMMUNOPHENOTYPE LIKE CHRONIC LYMPHOCYTIC LEUKEMIA (CLL) (H): ICD-10-CM

## 2024-02-09 DIAGNOSIS — I88.9 LYMPHADENITIS: Primary | ICD-10-CM

## 2024-02-09 LAB
BASOPHILS # BLD AUTO: 0.1 10E3/UL (ref 0–0.2)
BASOPHILS NFR BLD AUTO: 1 %
CREAT BLD-MCNC: 0.5 MG/DL (ref 0.5–1)
CRP SERPL-MCNC: 12.8 MG/L
EGFRCR SERPLBLD CKD-EPI 2021: >60 ML/MIN/1.73M2
EOSINOPHIL # BLD AUTO: 0.4 10E3/UL (ref 0–0.7)
EOSINOPHIL NFR BLD AUTO: 4 %
ERYTHROCYTE [DISTWIDTH] IN BLOOD BY AUTOMATED COUNT: 14.6 % (ref 10–15)
ERYTHROCYTE [SEDIMENTATION RATE] IN BLOOD BY WESTERGREN METHOD: 29 MM/HR (ref 0–30)
HCT VFR BLD AUTO: 34.9 % (ref 35–47)
HGB BLD-MCNC: 10.9 G/DL (ref 11.7–15.7)
IMM GRANULOCYTES # BLD: 0 10E3/UL
IMM GRANULOCYTES NFR BLD: 0 %
LYMPHOCYTES # BLD AUTO: 2 10E3/UL (ref 0.8–5.3)
LYMPHOCYTES NFR BLD AUTO: 21 %
MCH RBC QN AUTO: 26.7 PG (ref 26.5–33)
MCHC RBC AUTO-ENTMCNC: 31.2 G/DL (ref 31.5–36.5)
MCV RBC AUTO: 86 FL (ref 78–100)
MONOCYTES # BLD AUTO: 1 10E3/UL (ref 0–1.3)
MONOCYTES NFR BLD AUTO: 10 %
NEUTROPHILS # BLD AUTO: 6.2 10E3/UL (ref 1.6–8.3)
NEUTROPHILS NFR BLD AUTO: 64 %
NRBC # BLD AUTO: 0 10E3/UL
NRBC BLD AUTO-RTO: 0 /100
PLATELET # BLD AUTO: 251 10E3/UL (ref 150–450)
RBC # BLD AUTO: 4.08 10E6/UL (ref 3.8–5.2)
WBC # BLD AUTO: 9.6 10E3/UL (ref 4–11)

## 2024-02-09 PROCEDURE — 82565 ASSAY OF CREATININE: CPT

## 2024-02-09 PROCEDURE — 85652 RBC SED RATE AUTOMATED: CPT | Performed by: INTERNAL MEDICINE

## 2024-02-09 PROCEDURE — 70491 CT SOFT TISSUE NECK W/DYE: CPT | Mod: MA | Performed by: RADIOLOGY

## 2024-02-09 PROCEDURE — 86140 C-REACTIVE PROTEIN: CPT | Performed by: INTERNAL MEDICINE

## 2024-02-09 PROCEDURE — 85025 COMPLETE CBC W/AUTO DIFF WBC: CPT

## 2024-02-09 PROCEDURE — G0463 HOSPITAL OUTPT CLINIC VISIT: HCPCS | Performed by: INTERNAL MEDICINE

## 2024-02-09 PROCEDURE — 99214 OFFICE O/P EST MOD 30 MIN: CPT | Performed by: INTERNAL MEDICINE

## 2024-02-09 PROCEDURE — 36415 COLL VENOUS BLD VENIPUNCTURE: CPT | Performed by: INTERNAL MEDICINE

## 2024-02-09 RX ORDER — IOPAMIDOL 755 MG/ML
90 INJECTION, SOLUTION INTRAVASCULAR ONCE
Status: COMPLETED | OUTPATIENT
Start: 2024-02-09 | End: 2024-02-09

## 2024-02-09 RX ADMIN — IOPAMIDOL 90 ML: 755 INJECTION, SOLUTION INTRAVASCULAR at 15:58

## 2024-02-09 ASSESSMENT — PAIN SCALES - GENERAL: PAINLEVEL: NO PAIN (0)

## 2024-02-09 NOTE — TELEPHONE ENCOUNTER
Oncology Nurse Triage    Situation:   [place] is reporting the following symptoms: new lump, neck    Background:   Treating Provider:   Dr. Zaidi    Date of last office visit: 1/12/24    Recent Treatments: Surveillance, 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, r/o monoclonal B cell lymphocytosis     Assessment:     Onset: She has noticed semi solid lump, right side of her neck, for the last 4 days. Lump is tender with deep palpation and feels semi solid. It's about the size of a golf ball. She denies any difficulty with breathing or swallowing. No fever, chills, redness, or swelling around the area.     She would like to follow up with Dr. Zaidi.    Recommendations:     Writer will connect with care team for recommendations and get back to patient today. In the meantime, she can apply a warm compress to the area and see if that helps. She is to monitor sx and call back with any new or worsening sx. Discussed emergent sx that would require immediate care, understanding verbalized.     Doris Russell, RN, BSN, PHN, OCN  [facility]

## 2024-02-09 NOTE — LETTER
2/9/2024         RE: Virginia Byers  [address] [facility] [address]        Dear Colleague,    Thank you for referring your patient, Virginia Byers, to the [facility]. Please see a copy of my visit note below.    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    Patient has medical history including LEFT breast cancer in 2000, hypertension, hyperlipidemia, osteoporosis, osteoarthritis, degenerative disc disease, spinal stenosis of lumbar region,  s/p bilateral L5-S1 transforaminal ROS by Dr. Garcia on 7/9/2021, s/p bilateral L4-5 and L5-S1 facet joint injections on 6/14/2021, glaucoma and macular degeneration, cataracts s/p surgery, history of tobacco use (quit 1999)     Regarding previous history of breast cancer:  She was previously treated by Dr. Sakina Brandt at [facility]      In summary, diagnosed at age 49 with LEFT breast IDC ER positive, ME positive, HER2 positive on FISH. 3/2000 left breast lumpectomy and left axillary node dissection (IDC, grade 3, 1.45 cm incompletely excised tumor, DCIS, positive margins with infiltrating carcinoma, 1 of 17 lymph nodes positive (0.5 cm metastatic focus), ER positive, ME positive, HER2 positive).  4/2020 left breast simple mastectomy with no residual carcinoma. S/p ACx4, taxol x4, no anti-her2 treatment, no RT. S/p 5 years adjuvant endocrine therapy (tamoxifen and anastrozole). Delayed left breast reconstruction and right breast augmentation.      3/2/2000  Excisional biopsy of Left breast mass -   Infiltrating ductal carcinoma with associated DCIS   ER positive (60%), ME positive (22%), HER2 by FISH POSITIVE     3/10/2000 - Left lumpectomy  and left axillary node  Invasive ductal carcinoma, Grade 3, 1.45 cm (incompletely excised), negative for LVI  DCIS, greatest histologic dimension of tumor (DCIS PLUS invasive tumor) = 2.9 cm (estimate 50% DCIS)  Positive margins- infiltrating carcinoma extends to margin  1 of 17 axillary lymph nodes sampled was positive for metastatic focus of 0.5 cm.  ER/CA+     Sections of lumpectomy left breast showing residual 1.1 cm focus of ductal carcinoma in situ (DCIS), present within microns of the linked margin of resection.       4/11/2000 - Left breast simple mastectomy, negative for residual carcinoma  Left mastectomy on 4/11/2000 performed by Dr. Cristian Mcdonald at [facility].      Adjuvant treatment:  - 5/15/2000-7/24/2000 4 cycles of Adriamycin and Cytoxan   - 8/16/2000-10/18/2000 4 cycles of paclitaxel   - Appears she may have been on some kind of trial/protocol, was not given any anti-HER2 targeted treatment  - No adjuvant radiation  - 12/2000- 10/2002 tamoxifen  - 10/2002-5/2006 switched to anastrozole (due to in vitro studies showing anastrozole may be slightly better for patients were HER2 positive) with Fosamax for osteopenia     8/31/2015 Left delayed reconstruction with tissue expander    2/3/2016 Left 2nd stage 450 cc high profile silicone implant; right augmentation 150 cc Moderate classic profile silicone implant; cresent mastopexy    10/18/2021: Screening mammogram right breast ZACKARY    OTHER:  Of note, patient has documentation of anemia (iron deficiency and anemia of chronic disease), elevated LFTs, nausea and vomiting of all solids and liquids, fatigue with 26 pound unintentional weight loss from 7/20/2021 - 10/20/2021 with mildly elevated LFTs which led to evaluation with mammogram, endoscopy with EUS and colonoscopy as detailed below. Pt did associate these with some spinal injections she had gotten, due to overlapping times. Pt did regain all of the weight and hand improvement of anemia within  2 months, without significant intervention.     -10/2021: Endoscopy with EUS normal EGD and no stigmata or source of upper GI bleeding, visualized bile duct, gallbladder, liver, pancreas, left adrenal gland normal.  No lymphadenopathy. LIVER, RANDOM NEEDLE BIOPSY: Benign parenchyma with congestion; no significant fibrosis or other abnormalities     -10/2021 colonoscopy: Hemorrhoids, diverticulosis in sigmoid colon and splenic flexure    Current history:  -Ongoing right knee pain not improved with brace and physical therapy, s/p bilateral L5-S1 transforaminal ROS by Dr. Garcia on 7/9/2021, s/p bilateral L4-5 and L5-S1 facet joint injections on 6/14/2021. ongoing b/l hip and leg pain, worse with position changes (ie sitting to standing), constant, 8-9/10, tylenol brings pain down to 6/10 (using tylenol bid). ECOG 3.   -Patient has seen neurosurgery Dr. Jose Alexander, occupational medicine specialist Dr.Orrin Murphy, with plan to see PM&R Dr. Margie Agudelo and movement disorder clinic  - 11/22 MRI lumbar spine:    prominent left iliac lymph nodes among other findings related to degenerative changes and neural foraminal stenosis throughout lumbar spine    - 12/22 MRI pelvis:  1. Enhancing marrow signal abnormality of the right iliac bone extending from the superior acetabulum subchondral location proximally almost to the level of the sacroiliac joint caudal aspect. No associated fracture line. Underlying marrow infiltrative process such as from metastasis cannot be excluded especially given the degree of T1 hypointensity and history of primary tumor. Other consideration include stress reaction.  2. Mildly increased left external iliac chain, and the right medial thigh lymphadenopathy. Metastasis cannot be excluded.  3. Left posterior iliac enhancing lesion, similar in size to prior CT 9/2021 and previously not visible on CT, focal enhancing lesion in the right greater trochanter laterally. Findings are concerning for  Metastasis.    -12/22 CT chest abdomen pelvis with contrast:  COMPARISON: MRI pelvis 12/20/2022 and CT abdomen pelvis 9/21/2021.  1. Mastectomies with implant reconstructions. No lymphadenopathy  2.  There is an new or increased left common iliac, external iliac and pelvic sidewall lymphadenopathy since prior CT. Distal left common iliac lymph nodes measure up to 1.2 cm on this exam compared to 0.7 cm on prior CT. Left pelvic sidewall lymph node measures 2.1 cm short axis on this exam compared to 1.2 cm on prior CT . consistent with metastatic disease.  3. Sclerotic lesions in the left sacrum and left posterior ilium are unchanged from prior CT.  may be metastatic.  4. No evidence of metastatic disease in the chest.     -1/2023 MRI brain w/wo contrast:  IMPRESSION:  1. No findings of intracranial metastatic disease.  2. Indeterminate ovoid T2 hyperintense enhancing mass in the region of the  left lacrimal gland measuring approximately 2.4 cm AP by 1.4 cm  transverse by 2.3 cm craniocaudal associated with/replacing the left  lacrimal gland. Consider correlation with tissue sampling.  3. A few indeterminate partially visualized heterogeneous enhancing  Nodules measuring up to approximately 1.5 cm in the bilateral parotid glands.   Consider soft tissue neck CT with contrast for further evaluation.    -1/20/2023: IR US Guided biopsy of a right external iliac LN  Final Diagnosis   A.  LYMPH NODE, RIGHT EXTERNAL ILIAC, CORE NEEDLE BIOPSY AND TOUCH IMPRINT:  -Involved by follicular lymphoma, low-grade (WHO grade 1-2)  -Negative for metastatic carcinoma     The morphologic and immunophenotypic patterns are consistent with follicular lymphoma.  There is no high-grade lymphoma present in this limited specimen.  Concurrent flow study ([id number]) demonstrated CD10-positive lambda monotypic B cells (22%), rare kappa monotypic B cells (1.3%), and no aberrant immunophenotype on T cells.  A FISH study for Bcl-2 is pending and will  be reported separately.     The case was initially reviewed by Dr. Cifuentes (breast pathology).     This case was reviewed in part at our Hematopathology Faculty Consensus conference at which Girish Nolasco, Narciso, and Kayode were present in addition to myself.  This is a small needle core.  In some of the areas, the CD10+ B cells are confined to follicles.  Though the possibility of in situ follicular neoplasia was discussed, we still favor a diagnosis of follicular lymphoma - there are a number of CD10+ B cells outside of follicles at the base of the biopsy, and the clinical history supports a greater extent of involvement.     There was a tiny kappa monotypic B cell population identified by flow (separate from the CD10+ clone).  We looked for the morphologic correlate in this case by IHC, but it is not readily apparent.  This may represent a monoclonal B lymphocytosis - consider sending a peirpheral blood sample for flow cytometry.    FISH:  RESULTS:     NORMAL  - No rearrangement of BCL2     INTERPRETATION:  No evidence was found by FISH of rearrangement of the BCL2 (18q21) locus. These findings are not helpful for confirming or further characterizing the reported pathologic diagnosis of follicular lymphoma.    Flow Interpretation   A. Lymph Node(s), :  -CD10-positive lambda monotypic B cells (22%)  -Rare kappa monotypic B cells (1.3%)  -No aberrant immunophenotype on T cells  See comment   Electronically signed by Krista Headley MD on 1/24/2023 at  3:10 PM   Comment     The CD10-positive population is consistent with a clonal B cell population and a CD10 positive B-cell lymphoma is favored.      In addition, a second clonal B-cell population is identified which has a CLL-like immunophenotype except that CD5 expression is equivocal, this may represent a monoclonal B-cell lymphocytosis (MBL).     Addendum   INTERPRETATION  The diagnosis remains the same (see comment)     REASON FOR ADDENDUM  This addendum  is issued to reflect additional testing performed on this case. See Comment.      COMMENT  Additional multi-color flow analysis was performed for the following markers:   CD23, CD79b     The CD5 (dimly) positive B cells mostly lack CD23 and CD79b        1/23/23 CT neck:  A) Heterogeneous nodule in the right thyroid gland measuring up  to 1.6 cm.   B) Soft tissue nodule in the superficial left parotid  gland measuring 1.2 cm. Additional 0.8 cm nodule in the deep portion  of the left parotid gland. Soft tissue nodule in the superficial right  parotid gland measuring 0.8 cm. These may represent benign or malignant parotid lesions. Multiplicity of lesions raises concern for Warthin tumors. Otolaryngology consultation is  recommended.  C) Prominent soft tissue around the major arteries in the neck  particularly the common and internal carotid arteries. This is  atypical for atherosclerotic disease and vasculopathy/vasculitis such  as giant cell arteritis should be considered. Probable superimposed  atherosclerotic disease at the carotid bifurcations right greater than  left without definite high-grade stenosis.    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  And B-cell cohort #2 which is described as negative for CD5 and showing dim CD20 and dim kappa staining.  This population is listed a CD5 negative and CD10 negative also listed as negative for  and .  Of note the  population of monoclonal B-cell lymphocytosis found in peripheral blood in our department and in the lymph node biopsy in January showed partial/equivocal dim CD5 and may correspond to the cohort #2.     We essentially agree with the diagnosis of follicular lymphoma, which is consistent with prior diagnosis of follicular lymphoma low grade  established on core biopsy of right external iliac lymph node January 20, 2023 (case US [id number]) in our Department . The concurrent flow cytometry case [id number] in January showed in addition to CD10 positive B-cell population  also a partial/equivocal CD5 positive separate kappa monotypic B-cell population, which most likely represents monoclonal B-cell lymphocytosis of CLL/SLL type, since it was also documented in the peripheral blood flow cytometry on February 16, 2023 (case number UF ) at a low frequency.  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 Areas with infiltrates of small B cells with coexpression of CD5 and CD23 are seen in the current biopsy, but the size of the biopsy is too small to comprehensively evaluate for the presence of proliferation centers and the extent of involvement by the CD5 positive B cell lymphoproliferative process.   This case has been discussed at intradepartmental hematopathology conference with participation by THELMA Petersen, THELMA Davis and myself.      -5/23 PET/CT NCAP:   1. An ovoid mass in the left lateral orbit measures 2.5 x 1.2 cm, slightly larger than 01/09/2023, and demonstrates marked uptake (SUVmax 9.3), consistent with biopsy-proven lymphoma  2. A few small FDG avid bilateral intraparotid nodules are present, one on the right and two on the left, including representative left  parotid nodule measuring 0.9 x 1.4 cm with moderate uptake (SUVmax 6.7).  3.  Asymmetric prominent right posterior lower cervical node measures 0.8 cm associated with moderate focal   uptake (SUVmax 5.1).  4. A single mildly enlarged left axillary lymph node measures 1.0 cm associated with mild uptake (SUVmax 3.0)  5. Some scattered FDG avid lymph nodes below the diaphragm involve the retroperitoneal retrocaval, bilateral common iliac, left pelvic sidewall and left inguinal regions. Some of these have increased slightly in size since 12/28/2022, such as a conglomerate left pelvic sidewall bj mass measuring approximately 2.2 x 4.1 cm (previously 2.2 x 3.4 cm) associated with marked uptake (SUVmax 7.6).  6. FDG avid sclerotic skeletal lesion involves the right superior acetabulum (SUVmax 12.9) as well as a couple of separate smaller sclerotic lesions in the right iliac bone uptake (SUVmax 12.2).     - 6/14/2023- 6/15/2023 palliative radiation to left orbit follicular lymphoma 4Gy/2fx  - 6/15/2023 bone marrow biopsy  MORPHOLOGY:  - No morphologic or immunophenotypic evidence of follicular lymphoma  - Flow cytometry showing a small population of CD5 positive monotypic B cells; most compatible with monoclonal B lymphocytosis (There is no definitive morphologic correlate for this finding. There is no morphologic or immunophenotypic evidence of involvement by follicular lymphoma.   The CD5-positive B cell clone may best represent monoclonal B lymphocytosis, provided that bj SLL is excluded.)  - Normocellular marrow (cellularity estimated at 30%) with trilineage hematopoietic maturation and no increase in blasts  FLOW CYTOMETRY:  CD5 positive kappa-monotypic B cells (0.8%)  0.8% B cells which express CD5, CD19, CD20 (dim to negative) and monotypic kappa immunoglobulin light chains (dim) and lack CD10 and CD38. The B cells have similar forward scatter relative to background T cells suggestive of similar size; however,  precise size determination is deferred to morphology  CYTOGENETICS:  46,XX    -6/19/2023 CT-guided bone biopsy, right acetabulum  PATHOLOGY:  - Atypical lymphoid infiltrate in a suboptimal biopsy  - No histologic evidence of metastatic carcinoma  - Two small monoclonal B-cell populations detected by flow cytometry  This biopsy is suboptimal for evaluation due to fragmentation, crush artifact, and decalcification. In this limited biopsy, there are fragments of bone and marrow showing a mixed infiltrate that includes both B and T cells. Notably, 2 small clonal B-cell populations were detected by flow cytometry which resemble B-cell populations detected in this patient's past lymph node (1/20/23) and blood and bone marrow (2/16/23, 6/15/23) flow cytometry studies. A definitive morphologic correlate to these cell populations is not appreciated, and the overall features are insufficient for a diagnosis of lymphoma.    FLOW CYTOMETRY:  A. Pelvis, Right:  -CD5 positive kappa-monotypic B cells ( 2.8 %) - see comment A  -CD10 positive lambda-monotypic B cells ( 0.4 %) - see comment B      Electronically signed by Arnaldo Alvarado MD on 6/20/2023 at  1:31 PM   Comment     A) Clonal B cells with the immunophenotype of CLL/SLL are present in this sample.  The differential includes peripheral blood contamination (with an MBL), the tissue based correlate to MBL, versus involvement by SLL.  2.8% B cells which express CD5 (partial, dim), CD19, CD20 (dim to negative) and monotypic kappa immunoglobulin light chains (dim) and lack CD10. The B cells have similar forward scatter relative to background T cells suggestive of similar size; however, precise size determination is deferred to morphology.     B) A CD10-positive B cell clone is also present - involvement by the patient's previously diagnosed CD10+ B cell lymphoma is in the differential (follicular lymphoma versus other (transformation to a higher grade lymphoma). 0.4% B  "cells which express CD10, CD19 (slightly dim), CD20, and monotypic lambda immunoglobulin light chains and lack CD5.  The B cells have increased forward scatter relative to background T cells suggestive of increased size; however, precise size determination is deferred to morphology.        - 7/10/23 Rheumatology consult for ESR and CRP elevated, DEVON positive 1:160 speckled; PCP suspects pt may have PMR; \"I suspect that the bilateral hip OA is altering gait that then results in worsening of low back pain. No further rheumatology workup needed at this time. \"    - 7/11/23 left hip total arthroplasty with severe b/l hip osteoarthritis with Dr. Austin Blood,  cc, discharged with DVT prophylaxis Lovenox 40 mg daily x2 weeks and aspirin 81 mg twice daily x2 weeks    - 10/13/23 right hip total arthroplasty    - 12/23 PET CT NCAP Liver SUV max = 3.78, Aorta Blood SUV max = 3.43.  -New/progressed:  - 0.8 x 0.5 cm right FDG avid cervical lymph node; SUV max of 6.5  - 1.3 x 1.5 cm right FDG avid cervical lymph node; SUV max of 5.8  - 1.3 x 1.4 cm right FDG avid cervical lymph node; SUV max of 7.8   -Lesion in left lateral chest wall immediately inferior to the margin of breast implant, 0.5 x 0.4 mm, SUV 5.02, previously 1.4  - Lymph node adjacent to left psoas, 1.5 cm, SUV 19.9, previously 6.02  - Right acetabulum, 2.0 x 1.9 cm, SUV 14.03, previously 12.05  - No new bone lesions but increased metabolic activity previously identified multiple FDG avid osseous foci:  1.  Sternal lesion, SUV 5.7, previously 3.6  2.  Right scapular spine lesion, SUV 4.6, previously 3.6  3.  Right acetabular bone lesion SUV 13.4, previously 11    -Unchanged:  - 1 of 2 left parotid gland lesions, 1.6 x 1.1 cm, SUV 8.4, previously 6.6  - Left axillary lymph node, 1.2 x 1.0 cm, SUV 2.3  - Spleen normal    -Resolved/improved:  - Left orbital hypermetabolic lesion-resolved  - 1 of 2 previous left parotid hypermetabolic lesions-resolved  - " Multiple mildly hypermetabolic bilateral inguinal nodes, index right inguinal lymph node SUV 2.62, previously 5.19-improved    IMPRESSION: In this patient with follicular lymphoma there is evidence  for progression of disease.  1. There are multiple new hypermetabolic lymph nodes.   2. Increased metabolic activity of multiple previously seen lymph  nodes  3. No new bony lesions identified but there is increased metabolic  activity of the previously identified lesions.  4. Multiple stable and some resolved hypermetabolic lesions; resolved  lesions include the left orbit and left parotid gland lesion.      -12/23 Left pelvic lymph node, biopsy:  - Recurrent/persistent low grade B-cell lymphoma, most consistent with follicular lymphoma  - Negative for metastatic carcinoma    This biopsy is involved by a low grade lymphoma with a staining pattern consistent with follicular lymphoma. The growth pattern of the lymphoma cells appears diffuse, although the biopsy is quite small, limiting assessment of architecture. Concurrent flow cytometry ([id number]) detected a population of CD10-positive B cells negative for surface light chains, similar to the patient's past flow cytometry studies and consistent with recurrent/persistent disease.     In addition, flow cytometry identified a small population of CD5-positive kappa-monotypic B cells (2.6%), similar to the patient's known monoclonal B-cell lymphocytosis (MBL). No definitive morphologic correlate to this finding is appreciated in this biopsy - it may represent peripheral blood contamination versus a tissue-based MBL equivalent.     There is no evidence of large cell transformation in this biopsy, and no evidence of negro small lymphocytic lymphoma (SLL). However, the biopsy is very small - the possibility of unsampled higher grade disease or SLL cannot be excluded. Correlation with imaging studies is recommended.    Left pelvic LN flow cytometry:  A. Pelvis, Left, :  - CD5  positive kappa-monotypic B cells (2.6%)  Clonal B cells with the immunophenotype of CLL/SLL are present in this sample.  The differential includes peripheral blood contamination (with an MBL) versus involvement by SLL.    - CD10 positive negative for surface light chain B cells (29%) - see comment B  A CD10-positive B cell clone is also present - involvement by the patient's previously diagnosed CD10+ B cell lymphoma is in the differential (follicular lymphoma versus transformation to a higher grade lymphoma).         - 12/23 bone marrow biopsy:  MORPHOLOGY:  - Hypercellular marrow (cellularity estimated at 40%) with trilineage hematopoiesis, no overt dysplasia, and no increase in blasts  - Rare CD5+ kappa-monotypic B cells detected by flow cytometry (1.7%)  - Small lymphoid aggregate suspicious for marrow involvement by CD5+ B-cell lymphoproliferative disorder (<5% of marrow cellularity)  - No definitive histologic or immunophenotypic evidence of follicular lymphoma  Flow cytometry ([id number]) showed rare CD5-positive kappa-monotypic B cells (1.7%). By morphology, a tiny lymphoid aggregate is identified on the trephine core biopsy. This aggregate contains small B cells that appear to have aberrant expression of CD5, and may represent the morphologic correlate to the flow cytometry results. Overall, the findings are suspicious for low level marrow involvement by a CD5+ lymphoproliferative disorder most consistent with monoclonal B-cell lymphocytosis.     There is no definitive evidence of marrow involvement by follicular lymphoma, and no morphologic evidence of large cell lymphoma.  FLOW CYTOMETRY:  -CD5-positive kappa-monotypic B cells (1.7%) express CD5 (dim, partial), CD19, CD20 (dim), and monotypic kappa immunoglobulin light chains and lack CD10 and CD38.   The monotypic B-cell population present in this specimen has a similar immunophenotype as reported previously in this patient's peripheral blood  "([id number], 2/16/23). The immunophenotypic findings are suggestive of marrow involvement by monoclonal B-cell lymphocytosis (versus chronic lymphocytic leukemia/small lymphocytic lymphoma; CLL/SLL). There may be peripheral blood contamination. Large cells may not survive specimen processing. Morphologic correlation is required.   CYTOGENETICS:  46,XX[20]  FISH:  Pending    -1/3/24 second opinion w/Dr. Nguyen     INTERIM HISTORY:  Regarding lymphoma:  Denies weight loss, night sweats, early satiety, RUQ or LUQ pain  Reports left eye blurriness is stable since surgery, not worsened since RT, using lubricating eye drops prn and working with eye doctor for management of glaucoma, reports she had laser treatment to left eye that helped with blurred vision. Appt w/eye doctor 1/22/24, changed eye drops      Pt called in today, asking to be seen   Right neck lump x4 days, \"semi solid\", nontender except with deep palpation, golf ball size- not growing in size. Small pimple or bite in the area  No associated injury, lifting  No difficulty with breathing or swallowing, fever, chills, redness, or swelling around the area, no changes in hearing, no dental pain      REVIEW OF SYSTEMS:   A 14 point ROS was reviewed with pertinent positives and negatives in the HPI.       HOME MEDICATIONS:  Current Outpatient Medications   Medication Sig Dispense Refill     dorzolamide-timolol (COSOPT) 2-0.5 % ophthalmic solution Place 1 drop into both eyes 2 times daily       losartan (COZAAR) 100 MG tablet Take 1 tablet (100 mg) by mouth daily 90 tablet 3     Multiple Vitamins-Minerals (PRESERVISION AREDS) TABS Take 1 tablet by mouth 2 times daily       rosuvastatin (CRESTOR) 10 MG tablet Take 1 tablet (10 mg) by mouth daily 90 tablet 3     latanoprost (XALATAN) 0.005 % ophthalmic solution Place 1 drop into both eyes At Bedtime           ALLERGIES:  Allergies   Allergen Reactions     Compazine      Mental confusion     Hydrochlorothiazide      " Dryness mouth     Prochlorperazine Visual Disturbance     Simvastatin Other (See Comments)     Muscle aches         PAST MEDICAL HISTORY:  Past Medical History:   Diagnosis Date     Aftercare following right hip joint replacement surgery 10/25/2023     Breast cancer (H) 2000    Left breast, s/p mastectomy, chemotherapy and endocrine therapy     Follicular lymphoma (H) 01/20/2023     Glaucoma     bilateral - Dr. Moore - [facility]     History of spinal stenosis      Hyperlipidemia      Hypertension      Macular degeneration     Dr. Toscano - [facility]     Osteoarthritis      Osteoporosis      Personal history of chemotherapy 2000    A/C + Taxol     S/P radiation therapy     400 cGy to left orbit completed on 6/15/2023 [facility]         PAST SURGICAL HISTORY:  Past Surgical History:   Procedure Laterality Date     ARTHROPLASTY HIP Left 07/11/2023    Procedure: ARTHROPLASTY, HIP, TOTAL LEFT;  Surgeon: Austin Blood MD;  Location: [facility] OR     ARTHROPLASTY HIP Right 10/13/2023    Procedure: RIGHT ARTHROPLASTY, HIP, TOTAL;  Surgeon: Austin Blood MD;  Location: [facility] OR     BIOPSY Left 04/06/2023    Left Orbital Biopsy     C MASTECTOMY,SIMPLE  03/2000    left     COLONOSCOPY  2006    Q 10 years     COLONOSCOPY N/A 10/04/2021    Procedure: COLONOSCOPY;  Surgeon: Guru Lyla Ruby MD;  Location: [facility] OR     ESOPHAGOSCOPY, GASTROSCOPY, DUODENOSCOPY (EGD), COMBINED N/A 10/04/2021    Procedure: ENDOSCOPIC ULTRASOUND, ESOPHAGOSCOPY / UPPER GASTROINTESTINAL TRACT (GI), Esophagogastroduodenoscopy,  Fine Needle Biopsy of liver;  Surgeon: Guru Lyla Ruby MD;  Location: [facility] OR     EYE SURGERY       IR LYMPH NODE BIOPSY  01/20/2023     SURGICAL HISTORY OF -  Left 08/2015    tissue expander placed in left breast area     SURGICAL HISTORY OF -  Left 02/2016    left breast implant and right mammoplasty         SOCIAL  HISTORY:  Social History     Socioeconomic History     Marital status: Single     Spouse name: Not on file     Number of children: 0     Years of education: Not on file     Highest education level: Not on file   Occupational History     Employer: [employer]     Occupation: REtired   Tobacco Use     Smoking status: Former     Packs/day: 1.00     Years: 17.00     Additional pack years: 0.00     Total pack years: 17.00     Types: Cigarettes     Quit date: 3/15/1999     Years since quittin.9     Passive exposure: Past     Smokeless tobacco: Never   Vaping Use     Vaping Use: Never used   Substance and Sexual Activity     Alcohol use: Not Currently     Alcohol/week: 4.0 standard drinks of alcohol     Types: 4 Glasses of wine per week     Drug use: No     Sexual activity: Not Currently     Birth control/protection: None   Other Topics Concern     Parent/sibling w/ CABG, MI or angioplasty before 65F 55M? Yes     Comment: Father heart attack   Social History Narrative     Not on file     Social Determinants of Health     Financial Resource Strain: Unknown (2023)    Financial Resource Strain      Within the past 12 months, have you or your family members you live with been unable to get utilities (heat, electricity) when it was really needed?: Patient refused   Food Insecurity: Unknown (2023)    Food Insecurity      Within the past 12 months, did you worry that your food would run out before you got money to buy more?: Patient refused      Within the past 12 months, did the food you bought just not last and you didn t have money to get more?: Patient refused   Transportation Needs: Unknown (2023)    Transportation Needs      Within the past 12 months, has lack of transportation kept you from medical appointments, getting your medicines, non-medical meetings or appointments, work, or from getting things that you need?: Patient refused   Physical Activity: Not on file   Stress: Not on file   Social Connections:  "Not on file   Interpersonal Safety: Low Risk  (10/3/2023)    Interpersonal Safety      Do you feel physically and emotionally safe where you currently live?: Yes      Within the past 12 months, have you been hit, slapped, kicked or otherwise physically hurt by someone?: No      Within the past 12 months, have you been humiliated or emotionally abused in other ways by your partner or ex-partner?: No   Housing Stability: Unknown (9/21/2023)    Housing Stability      Do you have housing? : Patient refused      Are you worried about losing your housing?: Patient refused       FAMILY HISTORY:  Family History   Problem Relation Age of Onset     Cancer Mother         bone cancer     Heart Disease Father      Coronary Artery Disease Father      Diabetes Father      Arthritis Sister      Breast Cancer Sister         age 75     Diabetes Maternal Grandmother      Diabetes Maternal Grandfather      Diabetes Paternal Grandmother      Family history of cancer- sister breast cancer- dx at age 77 y/o, localized breast cancer, surgical resection, RT, no adjuvant chemo or endocrine therapy that pt is aware of     PHYSICAL EXAM:  Vital signs:  /77   Pulse 79   Resp 18   Ht 1.613 m (5' 3.5\")   Wt 66.8 kg (147 lb 3.2 oz)   SpO2 97%   BMI 25.67 kg/m       GENERAL/CONSTITUTIONAL: No acute distress.  EYES: Pupils are equal and round. Extraocular movements intact without nystagmus.  No scleral icterus. Minimal ptosis left eye at site of surgery  RESPIRATORY: Equal chest rise.   MUSCULOSKELETAL: Warm and well-perfused, no cyanosis, clubbing, or edema. 4cm x4cm circular partially soft and superior hard nodule at lower anterior cervical/supraclavicular area   NEUROLOGIC: Cranial nerves are grossly intact. Alert, oriented to person, place and time, answers questions appropriately.  INTEGUMENTARY: No rashes or jaundice.  GAIT: ambulates independently without cane      LABS:   Latest Reference Range & Units 01/12/24 07:57 01/12/24 " 07:58   Alkptase % Bone 0 - 55 U/L 81 (H)    Alkptase % Liver 0 - 94 U/L 104 (H)    Alkptase % Other U/L 0    Alkptase Isoenzymes 40 - 120 U/L 185 (H)    Glucose-6-PO4 Dehydrogenase 9.9 - 16.6 U/g Hb 14.8    Lactate Dehydrogenase 0 - 250 U/L 196    Uric Acid 2.4 - 5.7 mg/dL 4.5    WBC 4.0 - 11.0 10e3/uL  8.7   Hemoglobin 11.7 - 15.7 g/dL  13.2   Hematocrit 35.0 - 47.0 %  41.3   Platelet Count 150 - 450 10e3/uL  288   RBC Count 3.80 - 5.20 10e6/uL  4.98   MCV 78 - 100 fL  83   MCH 26.5 - 33.0 pg  26.5   MCHC 31.5 - 36.5 g/dL  32.0   RDW 10.0 - 15.0 %  14.0   % Neutrophils %  68   % Lymphocytes %  19   % Monocytes %  9   % Eosinophils %  3   % Basophils %  1   Absolute Basophils 0.0 - 0.2 10e3/uL  0.0   Absolute Eosinophils 0.0 - 0.7 10e3/uL  0.3   Absolute Immature Granulocytes <=0.4 10e3/uL  0.0   Absolute Lymphocytes 0.8 - 5.3 10e3/uL  1.7   Absolute Monocytes 0.0 - 1.3 10e3/uL  0.8   % Immature Granulocytes %  0   Absolute Neutrophils 1.6 - 8.3 10e3/uL  5.9   Absolute NRBCs 10e3/uL  0.0   NRBCs per 100 WBC <1 /100  0   (H): Data is abnormally high    PATHOLOGY:  As above    IMAGING:      ASSESSMENT/PLAN:  Virginia Byers is a 73 year old female with:    #right neck mass  - sudden onset 4cm x4cm base of neck/supraclavicular mass    PLAN:  - check CBC, ESR, CRP, LDH  - check STAT CT neck- r/o abscess     # 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 INCLUDING biopsy proven LN and left orbital mass/lacrimal gland involvement and suspected parotid gland involvement   -11/22 MRI lumbar spine in 12/22 MRI pelvis show prominent left iliac lymph nodes (measuring 1.8 x 3.2, previously 1.5 x 2.7 cm), right medial thigh lymphadenopathy (1.7cm), right iliac bone enhancing marrow signal, left posterior iliac enhancing lesion  -12/22 CT chest abdomen pelvis shows new or increased left common iliac (1.2cm), external iliac, pelvic sidewall lymphadenopathy (2.1cm) and sclerotic lesions in left sacrum and left posterior ilium. No  "splenomegaly  - 1/23 brain MRI: Indeterminate ovoid T2 hyperintense enhancing mass in the region of the left lacrimal gland measuring approximately 2.4 cm AP by 1.4 cm transverse by 2.3 cm craniocaudal associated with/replacing the left lacrimal gland. Consider correlation with tissue sampling  - 1/23 CT soft tissue neck: Soft tissue nodule in the superficial left parotid gland measuring 1.2 cm. Additional 0.8 cm nodule in the deep portion of the left parotid gland. Soft tissue nodule in the superficial right parotid gland measuring 0.8 cm. These may represent benign or malignant parotid lesions. Multiplicity of lesions raises concern for Warthin tumors  - 1/27/23 note from Dr. Wilmar Morales- \"Based on this diagnosis, I think the 2 nodules in her parotid gland are likely atypical lymph nodes containing lymphoma.  She will probably require some staging imaging, such as PET scan and we can take a look at that as well.  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 CD5 stains T cells  - Ki-67 proliferative index is approximately 10%, also low in the follicles.  - flow cytometry \"CD10-positive population is consistent with a clonal B cell population and a CD10 positive B-cell lymphoma is favored.\"    - 4/6/23 left orbital mass/lacrimal gland excisional biopsy: PATHOLOGY: - Follicular Lymphoma, low grade ([facility] negative for BCL2 rearrangement). 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  The flow cytometry from 1/20/23 right external iliac LN biopsy showed in addition to CD10 positive B-cell population  also a partial/equivocal CD5 positive separate kappa monotypic B-cell population, which most likely represents monoclonal B-cell lymphocytosis of CLL/SLL type, since it was also documented in the peripheral blood flow cytometry on February 16, 2023 (case number [facility] ) at a low frequency.  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  - Cohort 2 which is described as negative for CD5, CD10, ,  and showing dim CD20 and dim kappa staining   Of note the population of monoclonal B-cell lymphocytosis found in peripheral blood in our department and in the lymph node biopsy in January showed partial/equivocal dim CD5 and may correspond to the cohort #2.     -5/23 PET/CT NCAP:   1. An ovoid mass in the left lateral orbit measures 2.5 x 1.2 cm, slightly larger than 01/09/2023, and demonstrates marked uptake (SUVmax 9.3), consistent with biopsy-proven lymphoma  2. A few small FDG avid bilateral intraparotid nodules are present, one on the right and two on the left, including representative left parotid nodule measuring 0.9 x 1.4 cm with moderate uptake (SUVmax 6.7).  3.  Asymmetric prominent right posterior lower cervical node measures 0.8 cm associated with moderate focal uptake (SUVmax 5.1).  4. A single mildly enlarged left axillary lymph node measures 1.0 cm associated with mild uptake (SUVmax 3.0)  5. Some scattered FDG avid lymph nodes below the diaphragm involve the retroperitoneal retrocaval, bilateral  common iliac, left pelvic sidewall and left inguinal regions. Some of these have increased slightly in size since 12/28/2022, such as a conglomerate left pelvic sidewall bj mass measuring approximately 2.2 x 4.1 cm (previously 2.2 x 3.4 cm) associated with marked uptake (SUVmax 7.6).  6. FDG avid sclerotic skeletal lesion involves the right superior acetabulum (SUVmax 12.9) as well as a couple of separate smaller sclerotic lesions in the right iliac bone uptake (SUVmax 12.2).     - 6/23 bone marrow biopsy:  MORPHOLOGY:  - No morphologic or immunophenotypic evidence of follicular lymphoma  - Flow cytometry showing a small population of CD5 positive monotypic B cells; most compatible with monoclonal B lymphocytosis (There is no definitive morphologic correlate for this finding. There is no morphologic or immunophenotypic evidence of involvement by follicular lymphoma.  The CD5-positive B cell clone may best represent monoclonal B lymphocytosis, provided that bj SLL is excluded.)  - Normocellular marrow (cellularity estimated at 30%) with trilineage hematopoietic maturation and no increase in blasts  FLOW CYTOMETRY:  CD5 positive kappa-monotypic B cells (0.8%)  0.8% B cells which express CD5, CD19, CD20 (dim to negative) and monotypic kappa immunoglobulin light chains (dim) and lack CD10 and CD38. The B cells have similar forward scatter relative to background T cells suggestive of similar size; however, precise size determination is deferred to morphology  CYTOGENETICS:  46,XX    -6/19/2023 CT-guided bone biopsy, right acetabulum  PATHOLOGY:  - Atypical lymphoid infiltrate in a suboptimal biopsy  - No histologic evidence of metastatic carcinoma  - Two small monoclonal B-cell populations detected by flow cytometry: CD5 positive kappa-monotypic B cells ( 2.8 %, CD5 partial, dim, CD19 positive, CD30 dim to negative, CD10 negative); CD10 positive lambda-monotypic B cells ( 0.4 %, CD10 positive, CD19 slightly dim,  CD20 positive, CD5 negative)     - option of systemic treatment including rituximab discussed previously on multiple occassions particularly w/left orbital involvement, see my previous notes for details; pt deferred  - 6/14/2023- 6/15/2023 palliative radiation to left orbit follicular lymphoma 4Gy/2fx w/Dr. Julian Horvath    - 12/23 PET CT NCAP w/o contrast Liver SUV max = 3.78, Aorta Blood SUV max = 3.43.  -New/progressed:  - 0.8 x 0.5 cm right FDG avid cervical lymph node; SUV max of 6.5  - 1.3 x 1.5 cm right FDG avid cervical lymph node; SUV max of 5.8  - 1.3 x 1.4 cm right FDG avid cervical lymph node; SUV max of 7.8   -Lesion in left lateral chest wall immediately inferior to the margin of breast implant, 0.5 x 0.4 mm, SUV 5.02, previously 1.4  - Lymph node adjacent to left psoas, 1.5 cm, SUV 19.9, previously 6.02  - Right acetabulum, 2.0 x 1.9 cm, SUV 14.03, previously 12.05  - No new bone lesions but increased metabolic activity previously identified multiple FDG avid osseous foci:  1.  Sternal lesion, SUV 5.7, previously 3.6  2.  Right scapular spine lesion, SUV 4.6, previously 3.6  3.  Right acetabular bone lesion SUV 13.4, previously 11    -Unchanged:  -1 of 2 left parotid gland lesions, 1.6 x 1.1 cm, SUV 8.4, previously 6.6  - Left axillary lymph node, 1.2 x 1.0 cm, SUV 2.3  -Spleen normal    -Resolved/improved:  - Left orbital hypermetabolic lesion-resolved  - 1 of 2 previous left parotid hypermetabolic lesions-resolved  - Multiple mildly hypermetabolic bilateral inguinal nodes, index right inguinal lymph node SUV 2.62, previously 5.19-improved    -12/23 Left pelvic lymph node, biopsy:  - Recurrent/persistent low grade B-cell lymphoma, most consistent with follicular lymphoma (limited evaluation of architecture),  no evidence of large cell transformation in this biopsy, and no evidence of negro small lymphocytic lymphoma (SLL).  - Negative for metastatic carcinoma    Flow cytometry:   C5 positive  kappa-monotypic B cells 2.6% consistent with CLL/SLL vs monoclonal B cell lymphocytosis  CD10 positive B cells negative for surface light chain 29%, consistent w/previously dx CD10+ B cell lymphoma    - 12/23 bone marrow biopsy:  MORPHOLOGY:  - Hypercellular marrow (cellularity estimated at 40%) with trilineage hematopoiesis, no overt dysplasia, and no increase in blasts  - Rare CD5+ kappa-monotypic B cells detected by flow cytometry (1.7%)  - Small lymphoid aggregate suspicious for marrow involvement by CD5+ B-cell lymphoproliferative disorder (<5% of marrow cellularity), most consistent with monoclonal B-cell lymphocytosis.  - No definitive histologic or immunophenotypic evidence of follicular lymphoma and  no morphologic evidence of large cell lymphoma.  FLOW CYTOMETRY:  -CD5-positive kappa-monotypic B cells (1.7%) express CD5 (dim, partial), CD19, CD20 (dim), and monotypic kappa immunoglobulin light chains and lack CD10 and CD38.  suggestive of marrow involvement by monoclonal B-cell lymphocytosis (versus chronic lymphocytic leukemia/small lymphocytic lymphoma; CLL/SLL).   CYTOGENETICS:  46,XX[20]  FISH:  Pending    Staging and prognostication:  - Stage: 4 given right medial thigh LN and left common iliac, external iliac, pelvic sidewall; biopsy proven left orbital mass involvement; there was some concern from ENT that patients parotid lesions were related to pts follicular lymphoma   - FLIPI: score 2 (age>60),  stage 3-4; intermediate risk  - pertinent labs: 12/22  and 5/23 ; 2/23 and 5/23 beta 2 microglobulin 1.7, hepatitis B and C negative    Treatment:  - GELF criteria to indicate treatment (involvement of 3 or more bj sites each with a diameter greater than or equal to 3 cm, any bj or extranodal tumor mass with a diameter of greater than or equal to 7 cm, B symptoms, splenomegaly, pleural effusions, peritoneal ascites, clinically progressive or significant cytopenias, leukemia, symptoms,  threatened end organ function, clinically significant bulky disease, steady or rapid progression of disease)  - option of systemic treatment including rituximab discussed previously on multiple occassions particularly w/left orbital involvement, see my previous notes for details; pt deferred  - 6/14/2023- 6/15/2023 palliative radiation to left orbit follicular lymphoma 4Gy/2fx    PLAN:  - pt has 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 INCLUDING biopsy proven LN and left orbital mass/lacrimal gland involvement   - bone marrow biopsy negative for lymphoma and right acetabulum bone biopsy atypical lymphoid infiltrate seen but biopsy suboptimal and flow cytometry shows 2.8% CD5 positive kappa monotypic B cells consistent w/monoclonal B cell lymphocytosis and 0.4% CD10 positive lambda-monotypic B cells consistent w/lymphoma- ie follicular vs other  - 12/23 PET showed left pelvic LN w/SUV increased from 6->20, biopsied to rule out transformation to aggressive lymphoma. Biopsy showed follicular lymphoma w/aggressive lymphoma or SLL. LN flow cytometry consistent w/follicular lymphoma and MBL. Concurrent bone marrow biopsy, <5% CD5+ lymphoproliferative d/o consistent w/MBL  - 2nd opinion w/Dr. 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   - 1/23 right external iliac LN core needle biopsy- FLOW CYTOMETRY: 1.3% rare monoclonal B cell population CD5 equivocal, CD23 negative, CD 79b  negative, possible MBL; no morphological correlate  -2/23 peripheral blood- FLOW CYTOMETRY: 2.3% kappa monoclonal B cell population, CD5 positive, CD38 negative, CD49d negative, possible MBL  - 4/6/23 left orbital mass/lacrimal gland excisional biopsy: FLOW CYTOMETRY: 15.1% kappa monotypic B cell population, bright positive: CD45, CD19, CD22, moderately positive kappa, CD20, CD79b, dim positive CD23, negative CD 2,3,103, lambda, 5, 10, 200, 38, 43  - 6/23 bone marrow biopsy: FLOW CYTOMETRY: 0.8% kappa monotypic B cell population, positive for CD5, CD19, dim to negative CD20, negative for CD38 and CD10  - 6/23 right acetabulum bone biopsy: FLOW CYTOMETRY: 2.8% kappa monotypic B cell population, CD5 partial dim, CD 19 positive, CD20 dim negative, CD10 negative, possible MBL    - of note pt does not have leukocytosis or lymphocytosis, ALC consistently <5000, no splenomegaly, LN biopsy did not demonstrate IHC consistent with SLL  - 5/23 PET/CT as above  - 12/23 bone marrow biopsy:  MORPHOLOGY:  - Hypercellular marrow (cellularity estimated at 40%) with trilineage hematopoiesis, no overt dysplasia, and no increase in blasts  - Rare CD5+ kappa-monotypic B cells detected by flow cytometry (1.7%)  - Small lymphoid aggregate suspicious for marrow involvement by CD5+ B-cell lymphoproliferative disorder (<5% of marrow cellularity), most consistent with monoclonal B-cell lymphocytosis.  - No definitive histologic or immunophenotypic evidence of follicular lymphoma and  no morphologic evidence of large cell lymphoma.  FLOW CYTOMETRY:  -CD5-positive kappa-monotypic B cells (1.7%) express CD5 (dim, partial), CD19, CD20 (dim), and monotypic kappa immunoglobulin light chains and lack CD10 and CD38.  suggestive of marrow involvement by monoclonal B-cell lymphocytosis (versus chronic lymphocytic leukemia/small lymphocytic lymphoma; CLL/SLL).     PLAN:  - pt likely has monoclonal B cell lymphocytosis, though cannot definitively rule  out presence of SLL as pt  has multiple lymph nodes, though at least the external iliac and lacrimal gland regions and left pelvic LN were biopsied and consistent with low grade follicular lymphoma. Bone marrow biopsy negative for involvement of lymphoma 6/23 and shows <5% cellularity involvement of MBL 12/23  - 9/23 ALC 1.6, 12/23 ALC 1.7, 1/24 labs pending  - Monitor CBC with differential and clinically every 3-6 months    #Sclerotic lesions in left sacrum and left posterior ilium  -10/21 mammogram, endoscopy with EUS and colonoscopy WNL  -12/22 CT chest abdomen pelvis shows new or increased left common iliac, external iliac, pelvic sidewall lymphadenopathy and sclerotic lesions in left sacrum and left posterior ilium.  No metastatic disease in chest.  No abnormal findings in breast.  - 12/22 tumor markers: CA 15-3 24,  29, CEA 3.1, CA 19-9 4, AFP 5.9,   - 1/2023 MRI brain w/wo contrast: no metastases   - 1/2023 right external iliac LN core need biopsy- no metastatic carcinoma   - 5/23 PET avid bone lesions  -6/19/2023 CT-guided bone biopsy, right acetabulum  PATHOLOGY:  - Atypical lymphoid infiltrate in a suboptimal biopsy  - No histologic evidence of metastatic carcinoma  - Two small monoclonal B-cell populations detected by flow cytometry: CD5 positive kappa-monotypic B cells ( 2.8 %, CD5 partial, dim, CD19 positive, CD30 dim to negative, CD10 negative); CD10 positive lambda-monotypic B cells ( 0.4 %, CD10 positive, CD19 slightly dim, CD20 positive, CD5 negative)   - 12/23 PET   - Right acetabulum, 2.0 x 1.9 cm, SUV 14.03, previously 12.05  - No new bone lesions but increased metabolic activity previously identified multiple FDG avid osseous foci:  1.  Sternal lesion, SUV 5.7, previously 3.6  2.  Right scapular spine lesion, SUV 4.6, previously 3.6  3.  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    -4/2020 left breast simple mastectomy with no residual carcinoma.   -S/p ACx4, taxol x4, no anti-her2 treatment, no RT.   -S/p 5 years adjuvant endocrine therapy (tamoxifen and anastrozole).  -Delayed left breast reconstruction and right breast augmentation.   - 5/23 PET/CT notes single enlarged left axillary lymph node with SUV 3, \"could also represent lymphoma although given patient's history of left breast cancer, a breast cancer metastasis is also a possibility\"  - The above in the setting of known lymphoma and breast tumor markers normal   -9/23 right breast diagnostic mammogram: [facility]  - 9/23 ultrasound left axilla: 1.1 x 0.9 x 1.0 cm lymph node with mildly thickened cortex that correlates with abnormal lymph node on previous PET, recommend tissue diagnosis  - 9/23 ultrasound-guided left axillary lymph node core biopsy: No morphologic evidence of lymphoma.  Lymph node tissue was received in formalin, this could not be sent for flow cytometry.  No separate fresh tissue was sent for flow cytometry.  - 12/23 PET Left axillary lymph node, 1.2 x 1.0 cm, SUV 2.3, unchanged    PLAN:   -With the limitations of tissue processed in formalin and no fresh tissue available for flow cytometry, appears left axilla lymph node is benign without lymphoma      RTC 3 months for follow up with me, labs prior to appt     Arlene Ahumada DO  Hematology/Oncology  [facility]      Again, thank you for allowing me to participate in the care of your patient.        Sincerely,        ARLENE AHUMADA DO  "

## 2024-02-09 NOTE — NURSING NOTE
"Oncology Rooming Note    February 9, 2024 2:04 PM   Virginia Byers is a 73 year old female who presents for:    Chief Complaint   Patient presents with    Oncology Clinic Visit     Follow up     Initial Vitals: /77   Pulse 79   Resp 18   Ht 1.613 m (5' 3.5\")   Wt 66.8 kg (147 lb 3.2 oz)   SpO2 97%   BMI 25.67 kg/m   Estimated body mass index is 25.67 kg/m  as calculated from the following:    Height as of this encounter: 1.613 m (5' 3.5\").    Weight as of this encounter: 66.8 kg (147 lb 3.2 oz). Body surface area is 1.73 meters squared.  No Pain (0) Comment: Data Unavailable   No LMP recorded. Patient is postmenopausal.  Allergies reviewed: Yes  Medications reviewed: Yes    Medications: Medication refills not needed today.  Pharmacy name entered into [employer]: [id number] - [facility] [place], [place] - [address] [facility],     Frailty Screening:   Is the patient here for a new oncology consult visit in cancer care? 2. No      Clinical concerns: Patient will discuss concerns with provider       Clayton Payton MA            "

## 2024-02-09 NOTE — TELEPHONE ENCOUNTER
Called patient and discussed message below, she agrees with plan. Writer will schedule appointment.    Doris Russell RN, BSN, PHN, OCN  [facility]     Naomy Zaidi, DO  You; Pura Babin RN3 minutes ago (9:55 AM)     SK  Ok lets add to schedule    SK      22.1

## 2024-02-09 NOTE — PROGRESS NOTES
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    Patient has medical history including LEFT breast cancer in 2000, hypertension, hyperlipidemia, osteoporosis, osteoarthritis, degenerative disc disease, spinal stenosis of lumbar region,  s/p bilateral L5-S1 transforaminal ROS by Dr. Garcia on 7/9/2021, s/p bilateral L4-5 and L5-S1 facet joint injections on 6/14/2021, glaucoma and macular degeneration, cataracts s/p surgery, history of tobacco use (quit 1999)     Regarding previous history of breast cancer:  She was previously treated by Dr. Sakina Brandt at [facility]      In summary, diagnosed at age 49 with LEFT breast IDC ER positive, IA positive, HER2 positive on FISH. 3/2000 left breast lumpectomy and left axillary node dissection (IDC, grade 3, 1.45 cm incompletely excised tumor, DCIS, positive margins with infiltrating carcinoma, 1 of 17 lymph nodes positive (0.5 cm metastatic focus), ER positive, IA positive, HER2 positive).  4/2020 left breast simple mastectomy with no residual carcinoma. S/p ACx4, taxol x4, no anti-her2 treatment, no RT. S/p 5 years adjuvant endocrine therapy (tamoxifen and anastrozole). Delayed left breast reconstruction and right breast augmentation.      3/2/2000  Excisional biopsy of Left breast mass -   Infiltrating ductal carcinoma with associated DCIS   ER positive (60%), IA positive (22%), HER2 by FISH POSITIVE     3/10/2000 - Left lumpectomy and left axillary node  Invasive ductal carcinoma, Grade 3, 1.45 cm (incompletely excised), negative for LVI  DCIS, greatest histologic dimension of tumor (DCIS PLUS invasive tumor) = 2.9 cm (estimate 50% DCIS)  Positive margins- infiltrating carcinoma  extends to margin  1 of 17 axillary lymph nodes sampled was positive for metastatic focus of 0.5 cm.  ER/DC+     Sections of lumpectomy left breast showing residual 1.1 cm focus of ductal carcinoma in situ (DCIS), present within microns of the linked margin of resection.       4/11/2000 - Left breast simple mastectomy, negative for residual carcinoma  Left mastectomy on 4/11/2000 performed by Dr. Cristian Mcdonald at [facility].      Adjuvant treatment:  - 5/15/2000-7/24/2000 4 cycles of Adriamycin and Cytoxan   - 8/16/2000-10/18/2000 4 cycles of paclitaxel   - Appears she may have been on some kind of trial/protocol, was not given any anti-HER2 targeted treatment  - No adjuvant radiation  - 12/2000- 10/2002 tamoxifen  - 10/2002-5/2006 switched to anastrozole (due to in vitro studies showing anastrozole may be slightly better for patients were HER2 positive) with Fosamax for osteopenia     8/31/2015 Left delayed reconstruction with tissue expander    2/3/2016 Left 2nd stage 450 cc high profile silicone implant; right augmentation 150 cc Moderate classic profile silicone implant; cresent mastopexy    10/18/2021: Screening mammogram right breast ZACKARY    OTHER:  Of note, patient has documentation of anemia (iron deficiency and anemia of chronic disease), elevated LFTs, nausea and vomiting of all solids and liquids, fatigue with 26 pound unintentional weight loss from 7/20/2021 - 10/20/2021 with mildly elevated LFTs which led to evaluation with mammogram, endoscopy with EUS and colonoscopy as detailed below. Pt did associate these with some spinal injections she had gotten, due to overlapping times. Pt did regain all of the weight and hand improvement of anemia within 2 months, without significant intervention.     -10/2021: Endoscopy with EUS normal EGD and no stigmata or source of upper GI bleeding, visualized bile duct, gallbladder, liver, pancreas, left adrenal gland normal.  No lymphadenopathy. LIVER, RANDOM  NEEDLE BIOPSY: Benign parenchyma with congestion; no significant fibrosis or other abnormalities     -10/2021 colonoscopy: Hemorrhoids, diverticulosis in sigmoid colon and splenic flexure    Current history:  -Ongoing right knee pain not improved with brace and physical therapy, s/p bilateral L5-S1 transforaminal ROS by Dr. Garcia on 7/9/2021, s/p bilateral L4-5 and L5-S1 facet joint injections on 6/14/2021. ongoing b/l hip and leg pain, worse with position changes (ie sitting to standing), constant, 8-9/10, tylenol brings pain down to 6/10 (using tylenol bid). ECOG 3.   -Patient has seen neurosurgery Dr. Jose Alexander, occupational medicine specialist Dr.Orrin Murphy, with plan to see PM&R Dr. Margie Agudelo and movement disorder clinic  - 11/22 MRI lumbar spine:    prominent left iliac lymph nodes among other findings related to degenerative changes and neural foraminal stenosis throughout lumbar spine    - 12/22 MRI pelvis:  1. Enhancing marrow signal abnormality of the right iliac bone extending from the superior acetabulum subchondral location proximally almost to the level of the sacroiliac joint caudal aspect. No associated fracture line. Underlying marrow infiltrative process such as from metastasis cannot be excluded especially given the degree of T1 hypointensity and history of primary tumor. Other consideration include stress reaction.  2. Mildly increased left external iliac chain, and the right medial thigh lymphadenopathy. Metastasis cannot be excluded.  3. Left posterior iliac enhancing lesion, similar in size to prior CT 9/2021 and previously not visible on CT, focal enhancing lesion in the right greater trochanter laterally. Findings are concerning for Metastasis.    -12/22 CT chest abdomen pelvis with contrast:  COMPARISON: MRI pelvis 12/20/2022 and CT abdomen pelvis 9/21/2021.  1. Mastectomies with implant reconstructions. No lymphadenopathy  2.  There is an new or increased left common iliac, external  iliac and pelvic sidewall lymphadenopathy since prior CT. Distal left common iliac lymph nodes measure up to 1.2 cm on this exam compared to 0.7 cm on prior CT. Left pelvic sidewall lymph node measures 2.1 cm short axis on this exam compared to 1.2 cm on prior CT . consistent with metastatic disease.  3. Sclerotic lesions in the left sacrum and left posterior ilium are unchanged from prior CT.  may be metastatic.  4. No evidence of metastatic disease in the chest.     -1/2023 MRI brain w/wo contrast:  IMPRESSION:  1. No findings of intracranial metastatic disease.  2. Indeterminate ovoid T2 hyperintense enhancing mass in the region of the  left lacrimal gland measuring approximately 2.4 cm AP by 1.4 cm  transverse by 2.3 cm craniocaudal associated with/replacing the left  lacrimal gland. Consider correlation with tissue sampling.  3. A few indeterminate partially visualized heterogeneous enhancing  Nodules measuring up to approximately 1.5 cm in the bilateral parotid glands.   Consider soft tissue neck CT with contrast for further evaluation.    -1/20/2023: IR US Guided biopsy of a right external iliac LN  Final Diagnosis   A.  LYMPH NODE, RIGHT EXTERNAL ILIAC, CORE NEEDLE BIOPSY AND TOUCH IMPRINT:  -Involved by follicular lymphoma, low-grade (WHO grade 1-2)  -Negative for metastatic carcinoma     The morphologic and immunophenotypic patterns are consistent with follicular lymphoma.  There is no high-grade lymphoma present in this limited specimen.  Concurrent flow study ([id number]) demonstrated CD10-positive lambda monotypic B cells (22%), rare kappa monotypic B cells (1.3%), and no aberrant immunophenotype on T cells.  A FISH study for Bcl-2 is pending and will be reported separately.     The case was initially reviewed by Dr. Cifuentes (breast pathology).     This case was reviewed in part at our Hematopathology Faculty Consensus conference at which Girish Nolasco, Narciso, and Kayode were present in addition  to myself.  This is a small needle core.  In some of the areas, the CD10+ B cells are confined to follicles.  Though the possibility of in situ follicular neoplasia was discussed, we still favor a diagnosis of follicular lymphoma - there are a number of CD10+ B cells outside of follicles at the base of the biopsy, and the clinical history supports a greater extent of involvement.     There was a tiny kappa monotypic B cell population identified by flow (separate from the CD10+ clone).  We looked for the morphologic correlate in this case by IHC, but it is not readily apparent.  This may represent a monoclonal B lymphocytosis - consider sending a peirpheral blood sample for flow cytometry.    FISH:  RESULTS:     NORMAL  - No rearrangement of BCL2     INTERPRETATION:  No evidence was found by FISH of rearrangement of the BCL2 (18q21) locus. These findings are not helpful for confirming or further characterizing the reported pathologic diagnosis of follicular lymphoma.    Flow Interpretation   A. Lymph Node(s), :  -CD10-positive lambda monotypic B cells (22%)  -Rare kappa monotypic B cells (1.3%)  -No aberrant immunophenotype on T cells  See comment   Electronically signed by Krista Headley MD on 1/24/2023 at  3:10 PM   Comment     The CD10-positive population is consistent with a clonal B cell population and a CD10 positive B-cell lymphoma is favored.      In addition, a second clonal B-cell population is identified which has a CLL-like immunophenotype except that CD5 expression is equivocal, this may represent a monoclonal B-cell lymphocytosis (MBL).     Addendum   INTERPRETATION  The diagnosis remains the same (see comment)     REASON FOR ADDENDUM  This addendum is issued to reflect additional testing performed on this case. See Comment.      COMMENT  Additional multi-color flow analysis was performed for the following markers:   CD23, CD79b     The CD5 (dimly) positive B cells mostly lack CD23 and CD79b         1/23/23 CT neck:  A) Heterogeneous nodule in the right thyroid gland measuring up  to 1.6 cm.   B) Soft tissue nodule in the superficial left parotid  gland measuring 1.2 cm. Additional 0.8 cm nodule in the deep portion  of the left parotid gland. Soft tissue nodule in the superficial right  parotid gland measuring 0.8 cm. These may represent benign or malignant parotid lesions. Multiplicity of lesions raises concern for Warthin tumors. Otolaryngology consultation is  recommended.  C) Prominent soft tissue around the major arteries in the neck  particularly the common and internal carotid arteries. This is  atypical for atherosclerotic disease and vasculopathy/vasculitis such  as giant cell arteritis should be considered. Probable superimposed  atherosclerotic disease at the carotid bifurcations right greater than  left without definite high-grade stenosis.    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  And B-cell cohort #2 which is described as negative for CD5 and showing dim CD20 and dim kappa staining.  This population is listed a CD5 negative and CD10 negative also listed as negative for  and .  Of note the population of monoclonal B-cell lymphocytosis found in peripheral blood in our department and in the lymph node biopsy in January showed partial/equivocal dim CD5 and may correspond to the cohort #2.     We essentially agree with the diagnosis of  follicular lymphoma, which is consistent with prior diagnosis of follicular lymphoma low grade  established on core biopsy of right external iliac lymph node January 20, 2023 (case [id number]) in our Department . The concurrent flow cytometry case [id number] in January showed in addition to CD10 positive B-cell population  also a partial/equivocal CD5 positive separate kappa monotypic B-cell population, which most likely represents monoclonal B-cell lymphocytosis of CLL/SLL type, since it was also documented in the peripheral blood flow cytometry on February 16, 2023 (case number UF ) at a low frequency.  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 Areas with infiltrates of small B cells with coexpression of CD5 and CD23 are seen in the current biopsy, but the size of the biopsy is too small to comprehensively evaluate for the presence of proliferation centers and the extent of involvement by the CD5 positive B cell lymphoproliferative process.   This case has been discussed at intradepartmental hematopathology conference with participation by THELMA Petersen, THELMA Davis and myself.      -5/23 PET/CT NCAP:   1. An ovoid mass in the left lateral orbit measures 2.5 x 1.2 cm, slightly larger than 01/09/2023, and demonstrates marked uptake (SUVmax 9.3), consistent with biopsy-proven lymphoma  2. A few small FDG avid bilateral intraparotid nodules are present, one on the right and two on the left, including representative left parotid nodule measuring 0.9 x 1.4 cm with moderate uptake (SUVmax 6.7).  3.  Asymmetric prominent right posterior lower cervical node measures 0.8 cm associated with moderate focal   uptake (SUVmax 5.1).  4. A single mildly enlarged left axillary  lymph node measures 1.0 cm associated with mild uptake (SUVmax 3.0)  5. Some scattered FDG avid lymph nodes below the diaphragm involve the retroperitoneal retrocaval, bilateral common iliac, left pelvic sidewall and left inguinal regions. Some of these have increased slightly in size since 12/28/2022, such as a conglomerate left pelvic sidewall bj mass measuring approximately 2.2 x 4.1 cm (previously 2.2 x 3.4 cm) associated with marked uptake (SUVmax 7.6).  6. FDG avid sclerotic skeletal lesion involves the right superior acetabulum (SUVmax 12.9) as well as a couple of separate smaller sclerotic lesions in the right iliac bone uptake (SUVmax 12.2).     - 6/14/2023- 6/15/2023 palliative radiation to left orbit follicular lymphoma 4Gy/2fx  - 6/15/2023 bone marrow biopsy  MORPHOLOGY:  - No morphologic or immunophenotypic evidence of follicular lymphoma  - Flow cytometry showing a small population of CD5 positive monotypic B cells; most compatible with monoclonal B lymphocytosis (There is no definitive morphologic correlate for this finding. There is no morphologic or immunophenotypic evidence of involvement by follicular lymphoma.   The CD5-positive B cell clone may best represent monoclonal B lymphocytosis, provided that bj SLL is excluded.)  - Normocellular marrow (cellularity estimated at 30%) with trilineage hematopoietic maturation and no increase in blasts  FLOW CYTOMETRY:  CD5 positive kappa-monotypic B cells (0.8%)  0.8% B cells which express CD5, CD19, CD20 (dim to negative) and monotypic kappa immunoglobulin light chains (dim) and lack CD10 and CD38. The B cells have similar forward scatter relative to background T cells suggestive of similar size; however, precise size determination is deferred to morphology  CYTOGENETICS:  46,XX    -6/19/2023 CT-guided bone biopsy, right acetabulum  PATHOLOGY:  - Atypical lymphoid infiltrate in a suboptimal biopsy  - No histologic evidence of metastatic carcinoma  -  Two small monoclonal B-cell populations detected by flow cytometry  This biopsy is suboptimal for evaluation due to fragmentation, crush artifact, and decalcification. In this limited biopsy, there are fragments of bone and marrow showing a mixed infiltrate that includes both B and T cells. Notably, 2 small clonal B-cell populations were detected by flow cytometry which resemble B-cell populations detected in this patient's past lymph node (1/20/23) and blood and bone marrow (2/16/23, 6/15/23) flow cytometry studies. A definitive morphologic correlate to these cell populations is not appreciated, and the overall features are insufficient for a diagnosis of lymphoma.    FLOW CYTOMETRY:  A. Pelvis, Right:  -CD5 positive kappa-monotypic B cells ( 2.8 %) - see comment A  -CD10 positive lambda-monotypic B cells ( 0.4 %) - see comment B      Electronically signed by Arnaldo Alvarado MD on 6/20/2023 at  1:31 PM   Comment     A) Clonal B cells with the immunophenotype of CLL/SLL are present in this sample.  The differential includes peripheral blood contamination (with an MBL), the tissue based correlate to MBL, versus involvement by SLL.  2.8% B cells which express CD5 (partial, dim), CD19, CD20 (dim to negative) and monotypic kappa immunoglobulin light chains (dim) and lack CD10. The B cells have similar forward scatter relative to background T cells suggestive of similar size; however, precise size determination is deferred to morphology.     B) A CD10-positive B cell clone is also present - involvement by the patient's previously diagnosed CD10+ B cell lymphoma is in the differential (follicular lymphoma versus other (transformation to a higher grade lymphoma). 0.4% B cells which express CD10, CD19 (slightly dim), CD20, and monotypic lambda immunoglobulin light chains and lack CD5.  The B cells have increased forward scatter relative to background T cells suggestive of increased size; however, precise size  "determination is deferred to morphology.        - 7/10/23 Rheumatology consult for ESR and CRP elevated, DEVON positive 1:160 speckled; PCP suspects pt may have PMR; \"I suspect that the bilateral hip OA is altering gait that then results in worsening of low back pain. No further rheumatology workup needed at this time. \"    - 7/11/23 left hip total arthroplasty with severe b/l hip osteoarthritis with Dr. Austin Blood,  cc, discharged with DVT prophylaxis Lovenox 40 mg daily x2 weeks and aspirin 81 mg twice daily x2 weeks    - 10/13/23 right hip total arthroplasty    - 12/23 PET CT NCAP Liver SUV max = 3.78, Aorta Blood SUV max = 3.43.  -New/progressed:  - 0.8 x 0.5 cm right FDG avid cervical lymph node; SUV max of 6.5  - 1.3 x 1.5 cm right FDG avid cervical lymph node; SUV max of 5.8  - 1.3 x 1.4 cm right FDG avid cervical lymph node; SUV max of 7.8   -Lesion in left lateral chest wall immediately inferior to the margin of breast implant, 0.5 x 0.4 mm, SUV 5.02, previously 1.4  - Lymph node adjacent to left psoas, 1.5 cm, SUV 19.9, previously 6.02  - Right acetabulum, 2.0 x 1.9 cm, SUV 14.03, previously 12.05  - No new bone lesions but increased metabolic activity previously identified multiple FDG avid osseous foci:  1.  Sternal lesion, SUV 5.7, previously 3.6  2.  Right scapular spine lesion, SUV 4.6, previously 3.6  3.  Right acetabular bone lesion SUV 13.4, previously 11    -Unchanged:  - 1 of 2 left parotid gland lesions, 1.6 x 1.1 cm, SUV 8.4, previously 6.6  - Left axillary lymph node, 1.2 x 1.0 cm, SUV 2.3  - Spleen normal    -Resolved/improved:  - Left orbital hypermetabolic lesion-resolved  - 1 of 2 previous left parotid hypermetabolic lesions-resolved  - Multiple mildly hypermetabolic bilateral inguinal nodes, index right inguinal lymph node SUV 2.62, previously 5.19-improved    IMPRESSION: In this patient with follicular lymphoma there is evidence  for progression of disease.  1. There are " multiple new hypermetabolic lymph nodes.   2. Increased metabolic activity of multiple previously seen lymph  nodes  3. No new bony lesions identified but there is increased metabolic  activity of the previously identified lesions.  4. Multiple stable and some resolved hypermetabolic lesions; resolved  lesions include the left orbit and left parotid gland lesion.      -12/23 Left pelvic lymph node, biopsy:  - Recurrent/persistent low grade B-cell lymphoma, most consistent with follicular lymphoma  - Negative for metastatic carcinoma    This biopsy is involved by a low grade lymphoma with a staining pattern consistent with follicular lymphoma. The growth pattern of the lymphoma cells appears diffuse, although the biopsy is quite small, limiting assessment of architecture. Concurrent flow cytometry ([id number]) detected a population of CD10-positive B cells negative for surface light chains, similar to the patient's past flow cytometry studies and consistent with recurrent/persistent disease.     In addition, flow cytometry identified a small population of CD5-positive kappa-monotypic B cells (2.6%), similar to the patient's known monoclonal B-cell lymphocytosis (MBL). No definitive morphologic correlate to this finding is appreciated in this biopsy - it may represent peripheral blood contamination versus a tissue-based MBL equivalent.     There is no evidence of large cell transformation in this biopsy, and no evidence of negro small lymphocytic lymphoma (SLL). However, the biopsy is very small - the possibility of unsampled higher grade disease or SLL cannot be excluded. Correlation with imaging studies is recommended.    Left pelvic LN flow cytometry:  A. Pelvis, Left, :  - CD5 positive kappa-monotypic B cells (2.6%)  Clonal B cells with the immunophenotype of CLL/SLL are present in this sample.  The differential includes peripheral blood contamination (with an MBL) versus involvement by SLL.    - CD10 positive  negative for surface light chain B cells (29%) - see comment B  A CD10-positive B cell clone is also present - involvement by the patient's previously diagnosed CD10+ B cell lymphoma is in the differential (follicular lymphoma versus transformation to a higher grade lymphoma).         - 12/23 bone marrow biopsy:  MORPHOLOGY:  - Hypercellular marrow (cellularity estimated at 40%) with trilineage hematopoiesis, no overt dysplasia, and no increase in blasts  - Rare CD5+ kappa-monotypic B cells detected by flow cytometry (1.7%)  - Small lymphoid aggregate suspicious for marrow involvement by CD5+ B-cell lymphoproliferative disorder (<5% of marrow cellularity)  - No definitive histologic or immunophenotypic evidence of follicular lymphoma  Flow cytometry ([id number]) showed rare CD5-positive kappa-monotypic B cells (1.7%). By morphology, a tiny lymphoid aggregate is identified on the trephine core biopsy. This aggregate contains small B cells that appear to have aberrant expression of CD5, and may represent the morphologic correlate to the flow cytometry results. Overall, the findings are suspicious for low level marrow involvement by a CD5+ lymphoproliferative disorder most consistent with monoclonal B-cell lymphocytosis.     There is no definitive evidence of marrow involvement by follicular lymphoma, and no morphologic evidence of large cell lymphoma.  FLOW CYTOMETRY:  -CD5-positive kappa-monotypic B cells (1.7%) express CD5 (dim, partial), CD19, CD20 (dim), and monotypic kappa immunoglobulin light chains and lack CD10 and CD38.   The monotypic B-cell population present in this specimen has a similar immunophenotype as reported previously in this patient's peripheral blood ([id number], 2/16/23). The immunophenotypic findings are suggestive of marrow involvement by monoclonal B-cell lymphocytosis (versus chronic lymphocytic leukemia/small lymphocytic lymphoma; CLL/SLL). There may be peripheral blood contamination.  "Large cells may not survive specimen processing. Morphologic correlation is required.   CYTOGENETICS:  46,XX[20]  FISH:  Pending    -1/3/24 second opinion w/Dr. Nguyen     INTERIM HISTORY:  Regarding lymphoma:  Denies weight loss, night sweats, early satiety, RUQ or LUQ pain  Reports left eye blurriness is stable since surgery, not worsened since RT, using lubricating eye drops prn and working with eye doctor for management of glaucoma, reports she had laser treatment to left eye that helped with blurred vision. Appt w/eye doctor 1/22/24, changed eye drops      Pt called in today, asking to be seen   Right neck lump x4 days, \"semi solid\", nontender except with deep palpation, golf ball size- not growing in size. Small pimple or bite in the area  No associated injury, lifting  No difficulty with breathing or swallowing, fever, chills, redness, or swelling around the area, no changes in hearing, no dental pain      REVIEW OF SYSTEMS:   A 14 point ROS was reviewed with pertinent positives and negatives in the HPI.       HOME MEDICATIONS:  Current Outpatient Medications   Medication Sig Dispense Refill    dorzolamide-timolol (COSOPT) 2-0.5 % ophthalmic solution Place 1 drop into both eyes 2 times daily      losartan (COZAAR) 100 MG tablet Take 1 tablet (100 mg) by mouth daily 90 tablet 3    Multiple Vitamins-Minerals (PRESERVISION AREDS) TABS Take 1 tablet by mouth 2 times daily      rosuvastatin (CRESTOR) 10 MG tablet Take 1 tablet (10 mg) by mouth daily 90 tablet 3    latanoprost (XALATAN) 0.005 % ophthalmic solution Place 1 drop into both eyes At Bedtime           ALLERGIES:  Allergies   Allergen Reactions    Compazine      Mental confusion    Hydrochlorothiazide      Dryness mouth    Prochlorperazine Visual Disturbance    Simvastatin Other (See Comments)     Muscle aches         PAST MEDICAL HISTORY:  Past Medical History:   Diagnosis Date    Aftercare following right hip joint replacement surgery 10/25/2023    " Breast cancer (H) 2000    Left breast, s/p mastectomy, chemotherapy and endocrine therapy    Follicular lymphoma (H) 01/20/2023    Glaucoma     bilateral - Dr. Moore - [facility]    History of spinal stenosis     Hyperlipidemia     Hypertension     Macular degeneration     Dr. Toscano - [facility]    Osteoarthritis     Osteoporosis     Personal history of chemotherapy 2000    A/C + Taxol    S/P radiation therapy     400 cGy to left orbit completed on 6/15/2023 [facility]         PAST SURGICAL HISTORY:  Past Surgical History:   Procedure Laterality Date    ARTHROPLASTY HIP Left 07/11/2023    Procedure: ARTHROPLASTY, HIP, TOTAL LEFT;  Surgeon: Austin Blood MD;  Location: [facility] OR    ARTHROPLASTY HIP Right 10/13/2023    Procedure: RIGHT ARTHROPLASTY, HIP, TOTAL;  Surgeon: Austin Blood MD;  Location: [facility] OR    BIOPSY Left 04/06/2023    Left Orbital Biopsy    C MASTECTOMY,SIMPLE  03/2000    left    COLONOSCOPY  2006    Q 10 years    COLONOSCOPY N/A 10/04/2021    Procedure: COLONOSCOPY;  Surgeon: Guru Lyla Ruby MD;  Location: [facility] OR    ESOPHAGOSCOPY, GASTROSCOPY, DUODENOSCOPY (EGD), COMBINED N/A 10/04/2021    Procedure: ENDOSCOPIC ULTRASOUND, ESOPHAGOSCOPY / UPPER GASTROINTESTINAL TRACT (GI), Esophagogastroduodenoscopy,  Fine Needle Biopsy of liver;  Surgeon: Guru Lyla Ruby MD;  Location: [facility] OR    EYE SURGERY      IR LYMPH NODE BIOPSY  01/20/2023    SURGICAL HISTORY OF -  Left 08/2015    tissue expander placed in left breast area    SURGICAL HISTORY OF -  Left 02/2016    left breast implant and right mammoplasty         SOCIAL HISTORY:  Social History     Socioeconomic History    Marital status: Single     Spouse name: Not on file    Number of children: 0    Years of education: Not on file    Highest education level: Not on file   Occupational History     Employer: [employer]    Occupation: REtired    Tobacco Use    Smoking status: Former     Packs/day: 1.00     Years: 17.00     Additional pack years: 0.00     Total pack years: 17.00     Types: Cigarettes     Quit date: 3/15/1999     Years since quittin.9     Passive exposure: Past    Smokeless tobacco: Never   Vaping Use    Vaping Use: Never used   Substance and Sexual Activity    Alcohol use: Not Currently     Alcohol/week: 4.0 standard drinks of alcohol     Types: 4 Glasses of wine per week    Drug use: No    Sexual activity: Not Currently     Birth control/protection: None   Other Topics Concern    Parent/sibling w/ CABG, MI or angioplasty before 65F 55M? Yes     Comment: Father heart attack   Social History Narrative    Not on file     Social Determinants of Health     Financial Resource Strain: Unknown (2023)    Financial Resource Strain     Within the past 12 months, have you or your family members you live with been unable to get utilities (heat, electricity) when it was really needed?: Patient refused   Food Insecurity: Unknown (2023)    Food Insecurity     Within the past 12 months, did you worry that your food would run out before you got money to buy more?: Patient refused     Within the past 12 months, did the food you bought just not last and you didn t have money to get more?: Patient refused   Transportation Needs: Unknown (2023)    Transportation Needs     Within the past 12 months, has lack of transportation kept you from medical appointments, getting your medicines, non-medical meetings or appointments, work, or from getting things that you need?: Patient refused   Physical Activity: Not on file   Stress: Not on file   Social Connections: Not on file   Interpersonal Safety: Low Risk  (10/3/2023)    Interpersonal Safety     Do you feel physically and emotionally safe where you currently live?: Yes     Within the past 12 months, have you been hit, slapped, kicked or otherwise physically hurt by someone?: No     Within the  "past 12 months, have you been humiliated or emotionally abused in other ways by your partner or ex-partner?: No   Housing Stability: Unknown (9/21/2023)    Housing Stability     Do you have housing? : Patient refused     Are you worried about losing your housing?: Patient refused       FAMILY HISTORY:  Family History   Problem Relation Age of Onset    Cancer Mother         bone cancer    Heart Disease Father     Coronary Artery Disease Father     Diabetes Father     Arthritis Sister     Breast Cancer Sister         age 75    Diabetes Maternal Grandmother     Diabetes Maternal Grandfather     Diabetes Paternal Grandmother      Family history of cancer- sister breast cancer- dx at age 77 y/o, localized breast cancer, surgical resection, RT, no adjuvant chemo or endocrine therapy that pt is aware of     PHYSICAL EXAM:  Vital signs:  /77   Pulse 79   Resp 18   Ht 1.613 m (5' 3.5\")   Wt 66.8 kg (147 lb 3.2 oz)   SpO2 97%   BMI 25.67 kg/m       GENERAL/CONSTITUTIONAL: No acute distress.  EYES: Pupils are equal and round. Extraocular movements intact without nystagmus.  No scleral icterus. Minimal ptosis left eye at site of surgery  RESPIRATORY: Equal chest rise.   MUSCULOSKELETAL: Warm and well-perfused, no cyanosis, clubbing, or edema. 4cm x4cm circular partially soft and superior hard nodule at lower anterior cervical/supraclavicular area   NEUROLOGIC: Cranial nerves are grossly intact. Alert, oriented to person, place and time, answers questions appropriately.  INTEGUMENTARY: No rashes or jaundice.  GAIT: ambulates independently without cane      LABS:   Latest Reference Range & Units 01/12/24 07:57 01/12/24 07:58   Alkptase % Bone 0 - 55 U/L 81 (H)    Alkptase % Liver 0 - 94 U/L 104 (H)    Alkptase % Other U/L 0    Alkptase Isoenzymes 40 - 120 U/L 185 (H)    Glucose-6-PO4 Dehydrogenase 9.9 - 16.6 U/g Hb 14.8    Lactate Dehydrogenase 0 - 250 U/L 196    Uric Acid 2.4 - 5.7 mg/dL 4.5    WBC 4.0 - 11.0 10e3/uL "  8.7   Hemoglobin 11.7 - 15.7 g/dL  13.2   Hematocrit 35.0 - 47.0 %  41.3   Platelet Count 150 - 450 10e3/uL  288   RBC Count 3.80 - 5.20 10e6/uL  4.98   MCV 78 - 100 fL  83   MCH 26.5 - 33.0 pg  26.5   MCHC 31.5 - 36.5 g/dL  32.0   RDW 10.0 - 15.0 %  14.0   % Neutrophils %  68   % Lymphocytes %  19   % Monocytes %  9   % Eosinophils %  3   % Basophils %  1   Absolute Basophils 0.0 - 0.2 10e3/uL  0.0   Absolute Eosinophils 0.0 - 0.7 10e3/uL  0.3   Absolute Immature Granulocytes <=0.4 10e3/uL  0.0   Absolute Lymphocytes 0.8 - 5.3 10e3/uL  1.7   Absolute Monocytes 0.0 - 1.3 10e3/uL  0.8   % Immature Granulocytes %  0   Absolute Neutrophils 1.6 - 8.3 10e3/uL  5.9   Absolute NRBCs 10e3/uL  0.0   NRBCs per 100 WBC <1 /100  0   (H): Data is abnormally high    PATHOLOGY:  As above    IMAGING:      ASSESSMENT/PLAN:  Virginia Byers is a 73 year old female with:    #right neck mass  - sudden onset 4cm x4cm base of neck/supraclavicular mass    PLAN:  - check CBC, ESR, CRP, LDH  - check STAT CT neck- r/o abscess     # 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 INCLUDING biopsy proven LN and left orbital mass/lacrimal gland involvement and suspected parotid gland involvement   -11/22 MRI lumbar spine in 12/22 MRI pelvis show prominent left iliac lymph nodes (measuring 1.8 x 3.2, previously 1.5 x 2.7 cm), right medial thigh lymphadenopathy (1.7cm), right iliac bone enhancing marrow signal, left posterior iliac enhancing lesion  -12/22 CT chest abdomen pelvis shows new or increased left common iliac (1.2cm), external iliac, pelvic sidewall lymphadenopathy (2.1cm) and sclerotic lesions in left sacrum and left posterior ilium. No splenomegaly  - 1/23 brain MRI: Indeterminate ovoid T2 hyperintense enhancing mass in the region of the left lacrimal gland measuring approximately 2.4 cm AP by 1.4 cm transverse by 2.3 cm craniocaudal associated with/replacing the left lacrimal gland. Consider correlation with tissue sampling  - 1/23 CT soft  "tissue neck: Soft tissue nodule in the superficial left parotid gland measuring 1.2 cm. Additional 0.8 cm nodule in the deep portion of the left parotid gland. Soft tissue nodule in the superficial right parotid gland measuring 0.8 cm. These may represent benign or malignant parotid lesions. Multiplicity of lesions raises concern for Warthin tumors  - 1/27/23 note from Dr. Wilmar Morales- \"Based on this diagnosis, I think the 2 nodules in her parotid gland are likely atypical lymph nodes containing lymphoma.  She will probably require some staging imaging, such as PET scan and we can take a look at that as well.  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 CD5 stains T cells  - Ki-67 proliferative index is approximately 10%, also low in the follicles.  - flow cytometry \"CD10-positive population is consistent with a clonal B cell population and a CD10 positive B-cell lymphoma is favored.\"    - 4/6/23 left orbital mass/lacrimal gland excisional biopsy: PATHOLOGY: - Follicular Lymphoma, low grade ([facility] negative for BCL2 rearrangement). 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  The flow cytometry from 1/20/23 right external iliac LN biopsy showed in addition to CD10 positive B-cell population  also a partial/equivocal CD5 " positive separate kappa monotypic B-cell population, which most likely represents monoclonal B-cell lymphocytosis of CLL/SLL type, since it was also documented in the peripheral blood flow cytometry on February 16, 2023 (case number [facility] ) at a low frequency.  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  - Cohort 2 which is described as negative for CD5, CD10, ,  and showing dim CD20 and dim kappa staining   Of note the population of monoclonal B-cell lymphocytosis found in peripheral blood in our department and in the lymph node biopsy in January showed partial/equivocal dim CD5 and may correspond to the cohort #2.     -5/23 PET/CT NCAP:   1. An ovoid mass in the left lateral orbit measures 2.5 x 1.2 cm, slightly larger than 01/09/2023, and demonstrates marked uptake (SUVmax 9.3), consistent with biopsy-proven lymphoma  2. A few small FDG avid bilateral intraparotid nodules are present, one on the right and two on the left, including representative left parotid nodule measuring 0.9 x 1.4 cm with moderate uptake (SUVmax 6.7).  3.  Asymmetric prominent right posterior lower cervical node measures 0.8 cm associated with moderate focal uptake (SUVmax 5.1).  4. A single mildly enlarged left axillary lymph node measures 1.0 cm associated with mild uptake (SUVmax 3.0)  5. Some scattered FDG avid lymph nodes below the diaphragm involve the retroperitoneal retrocaval, bilateral common iliac, left pelvic sidewall and left inguinal regions. Some of these have increased slightly in size since 12/28/2022, such as a conglomerate left pelvic sidewall bj mass measuring approximately 2.2 x 4.1 cm (previously 2.2 x 3.4 cm) associated with marked uptake (SUVmax 7.6).  6. FDG avid sclerotic  skeletal lesion involves the right superior acetabulum (SUVmax 12.9) as well as a couple of separate smaller sclerotic lesions in the right iliac bone uptake (SUVmax 12.2).     - 6/23 bone marrow biopsy:  MORPHOLOGY:  - No morphologic or immunophenotypic evidence of follicular lymphoma  - Flow cytometry showing a small population of CD5 positive monotypic B cells; most compatible with monoclonal B lymphocytosis (There is no definitive morphologic correlate for this finding. There is no morphologic or immunophenotypic evidence of involvement by follicular lymphoma.  The CD5-positive B cell clone may best represent monoclonal B lymphocytosis, provided that bj SLL is excluded.)  - Normocellular marrow (cellularity estimated at 30%) with trilineage hematopoietic maturation and no increase in blasts  FLOW CYTOMETRY:  CD5 positive kappa-monotypic B cells (0.8%)  0.8% B cells which express CD5, CD19, CD20 (dim to negative) and monotypic kappa immunoglobulin light chains (dim) and lack CD10 and CD38. The B cells have similar forward scatter relative to background T cells suggestive of similar size; however, precise size determination is deferred to morphology  CYTOGENETICS:  46,XX    -6/19/2023 CT-guided bone biopsy, right acetabulum  PATHOLOGY:  - Atypical lymphoid infiltrate in a suboptimal biopsy  - No histologic evidence of metastatic carcinoma  - Two small monoclonal B-cell populations detected by flow cytometry: CD5 positive kappa-monotypic B cells ( 2.8 %, CD5 partial, dim, CD19 positive, CD30 dim to negative, CD10 negative); CD10 positive lambda-monotypic B cells ( 0.4 %, CD10 positive, CD19 slightly dim, CD20 positive, CD5 negative)     - option of systemic treatment including rituximab discussed previously on multiple occassions particularly w/left orbital involvement, see my previous notes for details; pt deferred  - 6/14/2023- 6/15/2023 palliative radiation to left orbit follicular lymphoma 4Gy/2fx w/Dr. Jordan  Alexandru    - 12/23 PET CT NCAP w/o contrast Liver SUV max = 3.78, Aorta Blood SUV max = 3.43.  -New/progressed:  - 0.8 x 0.5 cm right FDG avid cervical lymph node; SUV max of 6.5  - 1.3 x 1.5 cm right FDG avid cervical lymph node; SUV max of 5.8  - 1.3 x 1.4 cm right FDG avid cervical lymph node; SUV max of 7.8   -Lesion in left lateral chest wall immediately inferior to the margin of breast implant, 0.5 x 0.4 mm, SUV 5.02, previously 1.4  - Lymph node adjacent to left psoas, 1.5 cm, SUV 19.9, previously 6.02  - Right acetabulum, 2.0 x 1.9 cm, SUV 14.03, previously 12.05  - No new bone lesions but increased metabolic activity previously identified multiple FDG avid osseous foci:  1.  Sternal lesion, SUV 5.7, previously 3.6  2.  Right scapular spine lesion, SUV 4.6, previously 3.6  3.  Right acetabular bone lesion SUV 13.4, previously 11    -Unchanged:  -1 of 2 left parotid gland lesions, 1.6 x 1.1 cm, SUV 8.4, previously 6.6  - Left axillary lymph node, 1.2 x 1.0 cm, SUV 2.3  -Spleen normal    -Resolved/improved:  - Left orbital hypermetabolic lesion-resolved  - 1 of 2 previous left parotid hypermetabolic lesions-resolved  - Multiple mildly hypermetabolic bilateral inguinal nodes, index right inguinal lymph node SUV 2.62, previously 5.19-improved    -12/23 Left pelvic lymph node, biopsy:  - Recurrent/persistent low grade B-cell lymphoma, most consistent with follicular lymphoma (limited evaluation of architecture),  no evidence of large cell transformation in this biopsy, and no evidence of negro small lymphocytic lymphoma (SLL).  - Negative for metastatic carcinoma    Flow cytometry:   C5 positive kappa-monotypic B cells 2.6% consistent with CLL/SLL vs monoclonal B cell lymphocytosis  CD10 positive B cells negative for surface light chain 29%, consistent w/previously dx CD10+ B cell lymphoma    - 12/23 bone marrow biopsy:  MORPHOLOGY:  - Hypercellular marrow (cellularity estimated at 40%) with trilineage  hematopoiesis, no overt dysplasia, and no increase in blasts  - Rare CD5+ kappa-monotypic B cells detected by flow cytometry (1.7%)  - Small lymphoid aggregate suspicious for marrow involvement by CD5+ B-cell lymphoproliferative disorder (<5% of marrow cellularity), most consistent with monoclonal B-cell lymphocytosis.  - No definitive histologic or immunophenotypic evidence of follicular lymphoma and  no morphologic evidence of large cell lymphoma.  FLOW CYTOMETRY:  -CD5-positive kappa-monotypic B cells (1.7%) express CD5 (dim, partial), CD19, CD20 (dim), and monotypic kappa immunoglobulin light chains and lack CD10 and CD38.  suggestive of marrow involvement by monoclonal B-cell lymphocytosis (versus chronic lymphocytic leukemia/small lymphocytic lymphoma; CLL/SLL).   CYTOGENETICS:  46,XX[20]  FISH:  Pending    Staging and prognostication:  - Stage: 4 given right medial thigh LN and left common iliac, external iliac, pelvic sidewall; biopsy proven left orbital mass involvement; there was some concern from ENT that patients parotid lesions were related to pts follicular lymphoma   - FLIPI: score 2 (age>60),  stage 3-4; intermediate risk  - pertinent labs: 12/22  and 5/23 ; 2/23 and 5/23 beta 2 microglobulin 1.7, hepatitis B and C negative    Treatment:  - GELF criteria to indicate treatment (involvement of 3 or more bj sites each with a diameter greater than or equal to 3 cm, any bj or extranodal tumor mass with a diameter of greater than or equal to 7 cm, B symptoms, splenomegaly, pleural effusions, peritoneal ascites, clinically progressive or significant cytopenias, leukemia, symptoms, threatened end organ function, clinically significant bulky disease, steady or rapid progression of disease)  - option of systemic treatment including rituximab discussed previously on multiple occassions particularly w/left orbital involvement, see my previous notes for details; pt deferred  - 6/14/2023-  6/15/2023 palliative radiation to left orbit follicular lymphoma 4Gy/2fx    PLAN:  - pt has 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 INCLUDING biopsy proven LN and left orbital mass/lacrimal gland involvement   - bone marrow biopsy negative for lymphoma and right acetabulum bone biopsy atypical lymphoid infiltrate seen but biopsy suboptimal and flow cytometry shows 2.8% CD5 positive kappa monotypic B cells consistent w/monoclonal B cell lymphocytosis and 0.4% CD10 positive lambda-monotypic B cells consistent w/lymphoma- ie follicular vs other  - 12/23 PET showed left pelvic LN w/SUV increased from 6->20, biopsied to rule out transformation to aggressive lymphoma. Biopsy showed follicular lymphoma w/aggressive lymphoma or SLL. LN flow cytometry consistent w/follicular lymphoma and MBL. Concurrent bone marrow biopsy, <5% CD5+ lymphoproliferative d/o consistent w/MBL  - 2nd opinion w/Dr. 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   - 1/23 right external iliac LN core needle biopsy- FLOW CYTOMETRY: 1.3% rare monoclonal B cell population CD5 equivocal, CD23 negative, CD 79b negative, possible MBL; no morphological correlate  -2/23 peripheral blood- FLOW CYTOMETRY: 2.3% kappa monoclonal B cell population, CD5 positive, CD38 negative, CD49d negative, possible MBL  - 4/6/23 left orbital mass/lacrimal gland excisional biopsy: FLOW CYTOMETRY: 15.1% kappa monotypic B cell population,  bright positive: CD45, CD19, CD22, moderately positive kappa, CD20, CD79b, dim positive CD23, negative CD 2,3,103, lambda, 5, 10, 200, 38, 43  - 6/23 bone marrow biopsy: FLOW CYTOMETRY: 0.8% kappa monotypic B cell population, positive for CD5, CD19, dim to negative CD20, negative for CD38 and CD10  - 6/23 right acetabulum bone biopsy: FLOW CYTOMETRY: 2.8% kappa monotypic B cell population, CD5 partial dim, CD 19 positive, CD20 dim negative, CD10 negative, possible MBL    - of note pt does not have leukocytosis or lymphocytosis, ALC consistently <5000, no splenomegaly, LN biopsy did not demonstrate IHC consistent with SLL  - 5/23 PET/CT as above  - 12/23 bone marrow biopsy:  MORPHOLOGY:  - Hypercellular marrow (cellularity estimated at 40%) with trilineage hematopoiesis, no overt dysplasia, and no increase in blasts  - Rare CD5+ kappa-monotypic B cells detected by flow cytometry (1.7%)  - Small lymphoid aggregate suspicious for marrow involvement by CD5+ B-cell lymphoproliferative disorder (<5% of marrow cellularity), most consistent with monoclonal B-cell lymphocytosis.  - No definitive histologic or immunophenotypic evidence of follicular lymphoma and  no morphologic evidence of large cell lymphoma.  FLOW CYTOMETRY:  -CD5-positive kappa-monotypic B cells (1.7%) express CD5 (dim, partial), CD19, CD20 (dim), and monotypic kappa immunoglobulin light chains and lack CD10 and CD38.  suggestive of marrow involvement by monoclonal B-cell lymphocytosis (versus chronic lymphocytic leukemia/small lymphocytic lymphoma; CLL/SLL).     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 Bone marrow biopsy negative for involvement of lymphoma 6/23 and shows <5% cellularity involvement of MBL  12/23  - 9/23 ALC 1.6, 12/23 ALC 1.7, 1/24 labs pending  - Monitor CBC with differential and clinically every 3-6 months    #Sclerotic lesions in left sacrum and left posterior ilium  -10/21 mammogram, endoscopy with EUS and colonoscopy WNL  -12/22 CT chest abdomen pelvis shows new or increased left common iliac, external iliac, pelvic sidewall lymphadenopathy and sclerotic lesions in left sacrum and left posterior ilium.  No metastatic disease in chest.  No abnormal findings in breast.  - 12/22 tumor markers: CA 15-3 24,  29, CEA 3.1, CA 19-9 4, AFP 5.9,   - 1/2023 MRI brain w/wo contrast: no metastases   - 1/2023 right external iliac LN core need biopsy- no metastatic carcinoma   - 5/23 PET avid bone lesions  -6/19/2023 CT-guided bone biopsy, right acetabulum  PATHOLOGY:  - Atypical lymphoid infiltrate in a suboptimal biopsy  - No histologic evidence of metastatic carcinoma  - Two small monoclonal B-cell populations detected by flow cytometry: CD5 positive kappa-monotypic B cells ( 2.8 %, CD5 partial, dim, CD19 positive, CD30 dim to negative, CD10 negative); CD10 positive lambda-monotypic B cells ( 0.4 %, CD10 positive, CD19 slightly dim, CD20 positive, CD5 negative)   - 12/23 PET   - Right acetabulum, 2.0 x 1.9 cm, SUV 14.03, previously 12.05  - No new bone lesions but increased metabolic activity previously identified multiple FDG avid osseous foci:  1.  Sternal lesion, SUV 5.7, previously 3.6  2.  Right scapular spine lesion, SUV 4.6, previously 3.6  3.  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    -4/2020 left breast simple mastectomy with no residual carcinoma.   -S/p ACx4, taxol x4, no anti-her2 treatment, no RT.   -S/p 5 years adjuvant endocrine therapy (tamoxifen and anastrozole).  -Delayed left breast reconstruction and right breast augmentation.   - 5/23 PET/CT notes single enlarged left axillary lymph node with SUV 3, \"could also represent lymphoma although given patient's history of left breast cancer, a breast cancer metastasis is also a possibility\"  - The above in the setting of known lymphoma and breast tumor markers normal   -9/23 right breast diagnostic mammogram: [facility]  - 9/23 ultrasound left axilla: 1.1 x 0.9 x 1.0 cm lymph node with mildly thickened cortex that correlates with abnormal lymph node on previous PET, recommend tissue diagnosis  - 9/23 ultrasound-guided left axillary lymph node core biopsy: No morphologic evidence of lymphoma.  Lymph node tissue was received in formalin, this could not be sent for flow cytometry.  No separate fresh tissue was sent for flow cytometry.  - 12/23 PET Left axillary lymph node, 1.2 x 1.0 cm, SUV 2.3, unchanged    PLAN:   -With the limitations of tissue processed in formalin and no fresh tissue available for flow cytometry, appears left axilla lymph node is benign without lymphoma      RTC 3 months for follow up with me, labs prior to appt     ADDENDUM:  2/9/24 CT neck:  Multiple new/enlarged right level 4 and 5 lymph nodes with adjacent  fat stranding, suggesting infection/inflammation, but with history of  follicular lymphoma this could also represent progressive disease. No  abscess.    Start keflex 500mg 4x/day x7 days  I will see pt 2/20 for follow up    Naomy DO Dejuan  Hematology/Oncology  [facility]  "

## 2024-02-12 RX ORDER — CEPHALEXIN 500 MG/1
500 CAPSULE ORAL 4 TIMES DAILY
Qty: 28 CAPSULE | Refills: 0 | Status: SHIPPED | OUTPATIENT
Start: 2024-02-12 | End: 2024-02-19

## 2024-02-19 NOTE — PROGRESS NOTES
Video-Visit Details     Video Start Time: 9:49AM     Type of service:  Video Visit     Video End Time: 10:02AM     Originating Location (pt. 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    Patient has medical history including LEFT breast cancer in 2000, hypertension, hyperlipidemia, osteoporosis, osteoarthritis, degenerative disc disease, spinal stenosis of lumbar region,  s/p bilateral L5-S1 transforaminal ROS by Dr. Garcia on 7/9/2021, s/p bilateral L4-5 and L5-S1 facet joint injections on 6/14/2021, glaucoma and macular degeneration, cataracts s/p surgery, history of tobacco use (quit 1999)     Regarding previous history of breast cancer:  She was previously treated by Dr. Sakina Brandt at [facility]      In summary, diagnosed at age 49 with LEFT breast IDC ER positive, RI positive, HER2 positive on FISH. 3/2000 left breast lumpectomy and left axillary node dissection (IDC, grade 3, 1.45 cm incompletely excised tumor, DCIS, positive margins with infiltrating carcinoma, 1 of 17 lymph nodes positive (0.5 cm metastatic focus), ER positive, RI positive, HER2 positive).  4/2020 left breast simple mastectomy with no residual carcinoma. S/p ACx4, taxol x4, no anti-her2 treatment, no RT. S/p 5 years adjuvant endocrine therapy (tamoxifen and anastrozole). Delayed left breast reconstruction and right breast augmentation.      3/2/2000  Excisional biopsy of Left breast mass -   Infiltrating ductal carcinoma with associated DCIS   ER positive (60%), RI positive (22%), HER2 by FISH POSITIVE     3/10/2000 -  Left lumpectomy and left axillary node  Invasive ductal carcinoma, Grade 3, 1.45 cm (incompletely excised), negative for LVI  DCIS, greatest histologic dimension of tumor (DCIS PLUS invasive tumor) = 2.9 cm (estimate 50% DCIS)  Positive margins- infiltrating carcinoma extends to margin  1 of 17 axillary lymph nodes sampled was positive for metastatic focus of 0.5 cm.  ER/AZ+     Sections of lumpectomy left breast showing residual 1.1 cm focus of ductal carcinoma in situ (DCIS), present within microns of the linked margin of resection.       4/11/2000 - Left breast simple mastectomy, negative for residual carcinoma  Left mastectomy on 4/11/2000 performed by Dr. Cristian Mcdonald at [facility].      Adjuvant treatment:  - 5/15/2000-7/24/2000 4 cycles of Adriamycin and Cytoxan   - 8/16/2000-10/18/2000 4 cycles of paclitaxel   - Appears she may have been on some kind of trial/protocol, was not given any anti-HER2 targeted treatment  - No adjuvant radiation  - 12/2000- 10/2002 tamoxifen  - 10/2002-5/2006 switched to anastrozole (due to in vitro studies showing anastrozole may be slightly better for patients were HER2 positive) with Fosamax for osteopenia     8/31/2015 Left delayed reconstruction with tissue expander    2/3/2016 Left 2nd stage 450 cc high profile silicone implant; right augmentation 150 cc Moderate classic profile silicone implant; cresent mastopexy    10/18/2021: Screening mammogram right breast ZACKARY    OTHER:  Of note, patient has documentation of anemia (iron deficiency and anemia of chronic disease), elevated LFTs, nausea and vomiting of all solids and liquids, fatigue with 26 pound unintentional weight loss from 7/20/2021 - 10/20/2021 with mildly elevated LFTs which led to evaluation with mammogram, endoscopy with EUS and colonoscopy as detailed below. Pt did associate these with some spinal injections she had gotten, due to overlapping times. Pt did regain all of the weight and hand improvement  of anemia within 2 months, without significant intervention.     -10/2021: Endoscopy with EUS normal EGD and no stigmata or source of upper GI bleeding, visualized bile duct, gallbladder, liver, pancreas, left adrenal gland normal.  No lymphadenopathy. LIVER, RANDOM NEEDLE BIOPSY: Benign parenchyma with congestion; no significant fibrosis or other abnormalities     -10/2021 colonoscopy: Hemorrhoids, diverticulosis in sigmoid colon and splenic flexure    Current history:  -Ongoing right knee pain not improved with brace and physical therapy, s/p bilateral L5-S1 transforaminal ROS by Dr. Garcia on 7/9/2021, s/p bilateral L4-5 and L5-S1 facet joint injections on 6/14/2021. ongoing b/l hip and leg pain, worse with position changes (ie sitting to standing), constant, 8-9/10, tylenol brings pain down to 6/10 (using tylenol bid). ECOG 3.   -Patient has seen neurosurgery Dr. Jose Alexander, occupational medicine specialist Dr.Orrin Murphy, with plan to see PM&R Dr. Magrie Agudelo and movement disorder clinic  - 11/22 MRI lumbar spine:    prominent left iliac lymph nodes among other findings related to degenerative changes and neural foraminal stenosis throughout lumbar spine    - 12/22 MRI pelvis:  1. Enhancing marrow signal abnormality of the right iliac bone extending from the superior acetabulum subchondral location proximally almost to the level of the sacroiliac joint caudal aspect. No associated fracture line. Underlying marrow infiltrative process such as from metastasis cannot be excluded especially given the degree of T1 hypointensity and history of primary tumor. Other consideration include stress reaction.  2. Mildly increased left external iliac chain, and the right medial thigh lymphadenopathy. Metastasis cannot be excluded.  3. Left posterior iliac enhancing lesion, similar in size to prior CT 9/2021 and previously not visible on CT, focal enhancing lesion in the right greater trochanter laterally. Findings are  concerning for Metastasis.    -12/22 CT chest abdomen pelvis with contrast:  COMPARISON: MRI pelvis 12/20/2022 and CT abdomen pelvis 9/21/2021.  1. Mastectomies with implant reconstructions. No lymphadenopathy  2.  There is an new or increased left common iliac, external iliac and pelvic sidewall lymphadenopathy since prior CT. Distal left common iliac lymph nodes measure up to 1.2 cm on this exam compared to 0.7 cm on prior CT. Left pelvic sidewall lymph node measures 2.1 cm short axis on this exam compared to 1.2 cm on prior CT . consistent with metastatic disease.  3. Sclerotic lesions in the left sacrum and left posterior ilium are unchanged from prior CT.  may be metastatic.  4. No evidence of metastatic disease in the chest.     -1/2023 MRI brain w/wo contrast:  IMPRESSION:  1. No findings of intracranial metastatic disease.  2. Indeterminate ovoid T2 hyperintense enhancing mass in the region of the  left lacrimal gland measuring approximately 2.4 cm AP by 1.4 cm  transverse by 2.3 cm craniocaudal associated with/replacing the left  lacrimal gland. Consider correlation with tissue sampling.  3. A few indeterminate partially visualized heterogeneous enhancing  Nodules measuring up to approximately 1.5 cm in the bilateral parotid glands.   Consider soft tissue neck CT with contrast for further evaluation.    -1/20/2023: IR US Guided biopsy of a right external iliac LN  Final Diagnosis   A.  LYMPH NODE, RIGHT EXTERNAL ILIAC, CORE NEEDLE BIOPSY AND TOUCH IMPRINT:  -Involved by follicular lymphoma, low-grade (WHO grade 1-2)  -Negative for metastatic carcinoma     The morphologic and immunophenotypic patterns are consistent with follicular lymphoma.  There is no high-grade lymphoma present in this limited specimen.  Concurrent flow study ([id number]) demonstrated CD10-positive lambda monotypic B cells (22%), rare kappa monotypic B cells (1.3%), and no aberrant immunophenotype on T cells.  A FISH study for Bcl-2 is  pending and will be reported separately.     The case was initially reviewed by Dr. Cifuentes (breast pathology).     This case was reviewed in part at our Hematopathology Faculty Consensus conference at which Girish Nolasco, Narciso, and Kayode were present in addition to myself.  This is a small needle core.  In some of the areas, the CD10+ B cells are confined to follicles.  Though the possibility of in situ follicular neoplasia was discussed, we still favor a diagnosis of follicular lymphoma - there are a number of CD10+ B cells outside of follicles at the base of the biopsy, and the clinical history supports a greater extent of involvement.     There was a tiny kappa monotypic B cell population identified by flow (separate from the CD10+ clone).  We looked for the morphologic correlate in this case by IHC, but it is not readily apparent.  This may represent a monoclonal B lymphocytosis - consider sending a peirpheral blood sample for flow cytometry.    FISH:  RESULTS:     NORMAL  - No rearrangement of BCL2     INTERPRETATION:  No evidence was found by FISH of rearrangement of the BCL2 (18q21) locus. These findings are not helpful for confirming or further characterizing the reported pathologic diagnosis of follicular lymphoma.    Flow Interpretation   A. Lymph Node(s), :  -CD10-positive lambda monotypic B cells (22%)  -Rare kappa monotypic B cells (1.3%)  -No aberrant immunophenotype on T cells  See comment   Electronically signed by Krista Headley MD on 1/24/2023 at  3:10 PM   Comment     The CD10-positive population is consistent with a clonal B cell population and a CD10 positive B-cell lymphoma is favored.      In addition, a second clonal B-cell population is identified which has a CLL-like immunophenotype except that CD5 expression is equivocal, this may represent a monoclonal B-cell lymphocytosis (MBL).     Addendum   INTERPRETATION  The diagnosis remains the same (see comment)     REASON FOR  ADDENDUM  This addendum is issued to reflect additional testing performed on this case. See Comment.      COMMENT  Additional multi-color flow analysis was performed for the following markers:   CD23, CD79b     The CD5 (dimly) positive B cells mostly lack CD23 and CD79b        1/23/23 CT neck:  A) Heterogeneous nodule in the right thyroid gland measuring up  to 1.6 cm.   B) Soft tissue nodule in the superficial left parotid  gland measuring 1.2 cm. Additional 0.8 cm nodule in the deep portion  of the left parotid gland. Soft tissue nodule in the superficial right  parotid gland measuring 0.8 cm. These may represent benign or malignant parotid lesions. Multiplicity of lesions raises concern for Warthin tumors. Otolaryngology consultation is  recommended.  C) Prominent soft tissue around the major arteries in the neck  particularly the common and internal carotid arteries. This is  atypical for atherosclerotic disease and vasculopathy/vasculitis such  as giant cell arteritis should be considered. Probable superimposed  atherosclerotic disease at the carotid bifurcations right greater than  left without definite high-grade stenosis.    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  And B-cell cohort #2 which is described as negative for CD5 and showing dim CD20 and dim kappa staining.  This population is listed a CD5 negative and CD10 negative also listed as negative for   and .  Of note the population of monoclonal B-cell lymphocytosis found in peripheral blood in our department and in the lymph node biopsy in January showed partial/equivocal dim CD5 and may correspond to the cohort #2.     We essentially agree with the diagnosis of follicular lymphoma, which is consistent with prior diagnosis of follicular lymphoma low grade  established on core biopsy of right external iliac lymph node January 20, 2023 (case [id number]) in our Department . The concurrent flow cytometry case [id number] in January showed in addition to CD10 positive B-cell population  also a partial/equivocal CD5 positive separate kappa monotypic B-cell population, which most likely represents monoclonal B-cell lymphocytosis of CLL/SLL type, since it was also documented in the peripheral blood flow cytometry on February 16, 2023 (case number UF ) at a low frequency.  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 Areas with infiltrates of small B cells with coexpression of CD5 and CD23 are seen in the current biopsy, but the size of the biopsy is too small to comprehensively evaluate for the presence of proliferation centers and the extent of involvement by the CD5 positive B cell lymphoproliferative process.   This case has been discussed at intradepartmental hematopathology conference with participation by THELMA Petersen, THELMA Davis and myself.      -5/23 PET/CT NCAP:   1. An ovoid mass in the left lateral orbit measures 2.5 x 1.2 cm, slightly larger than 01/09/2023, and demonstrates marked uptake (SUVmax 9.3), consistent with biopsy-proven lymphoma  2. A few small FDG avid bilateral intraparotid nodules are present, one on the right and two on the left,  including representative left parotid nodule measuring 0.9 x 1.4 cm with moderate uptake (SUVmax 6.7).  3.  Asymmetric prominent right posterior lower cervical node measures 0.8 cm associated with moderate focal   uptake (SUVmax 5.1).  4. A single mildly enlarged left axillary lymph node measures 1.0 cm associated with mild uptake (SUVmax 3.0)  5. Some scattered FDG avid lymph nodes below the diaphragm involve the retroperitoneal retrocaval, bilateral common iliac, left pelvic sidewall and left inguinal regions. Some of these have increased slightly in size since 12/28/2022, such as a conglomerate left pelvic sidewall bj mass measuring approximately 2.2 x 4.1 cm (previously 2.2 x 3.4 cm) associated with marked uptake (SUVmax 7.6).  6. FDG avid sclerotic skeletal lesion involves the right superior acetabulum (SUVmax 12.9) as well as a couple of separate smaller sclerotic lesions in the right iliac bone uptake (SUVmax 12.2).     - 6/14/2023- 6/15/2023 palliative radiation to left orbit follicular lymphoma 4Gy/2fx  - 6/15/2023 bone marrow biopsy  MORPHOLOGY:  - No morphologic or immunophenotypic evidence of follicular lymphoma  - Flow cytometry showing a small population of CD5 positive monotypic B cells; most compatible with monoclonal B lymphocytosis (There is no definitive morphologic correlate for this finding. There is no morphologic or immunophenotypic evidence of involvement by follicular lymphoma.   The CD5-positive B cell clone may best represent monoclonal B lymphocytosis, provided that bj SLL is excluded.)  - Normocellular marrow (cellularity estimated at 30%) with trilineage hematopoietic maturation and no increase in blasts  FLOW CYTOMETRY:  CD5 positive kappa-monotypic B cells (0.8%)  0.8% B cells which express CD5, CD19, CD20 (dim to negative) and monotypic kappa immunoglobulin light chains (dim) and lack CD10 and CD38. The B cells have similar forward scatter relative to background T cells  suggestive of similar size; however, precise size determination is deferred to morphology  CYTOGENETICS:  46,XX    -6/19/2023 CT-guided bone biopsy, right acetabulum  PATHOLOGY:  - Atypical lymphoid infiltrate in a suboptimal biopsy  - No histologic evidence of metastatic carcinoma  - Two small monoclonal B-cell populations detected by flow cytometry  This biopsy is suboptimal for evaluation due to fragmentation, crush artifact, and decalcification. In this limited biopsy, there are fragments of bone and marrow showing a mixed infiltrate that includes both B and T cells. Notably, 2 small clonal B-cell populations were detected by flow cytometry which resemble B-cell populations detected in this patient's past lymph node (1/20/23) and blood and bone marrow (2/16/23, 6/15/23) flow cytometry studies. A definitive morphologic correlate to these cell populations is not appreciated, and the overall features are insufficient for a diagnosis of lymphoma.    FLOW CYTOMETRY:  A. Pelvis, Right:  -CD5 positive kappa-monotypic B cells ( 2.8 %) - see comment A  -CD10 positive lambda-monotypic B cells ( 0.4 %) - see comment B      Electronically signed by Arnaldo Alvarado MD on 6/20/2023 at  1:31 PM   Comment     A) Clonal B cells with the immunophenotype of CLL/SLL are present in this sample.  The differential includes peripheral blood contamination (with an MBL), the tissue based correlate to MBL, versus involvement by SLL.  2.8% B cells which express CD5 (partial, dim), CD19, CD20 (dim to negative) and monotypic kappa immunoglobulin light chains (dim) and lack CD10. The B cells have similar forward scatter relative to background T cells suggestive of similar size; however, precise size determination is deferred to morphology.     B) A CD10-positive B cell clone is also present - involvement by the patient's previously diagnosed CD10+ B cell lymphoma is in the differential (follicular lymphoma versus other (transformation  "to a higher grade lymphoma). 0.4% B cells which express CD10, CD19 (slightly dim), CD20, and monotypic lambda immunoglobulin light chains and lack CD5.  The B cells have increased forward scatter relative to background T cells suggestive of increased size; however, precise size determination is deferred to morphology.        - 7/10/23 Rheumatology consult for ESR and CRP elevated, DEVON positive 1:160 speckled; PCP suspects pt may have PMR; \"I suspect that the bilateral hip OA is altering gait that then results in worsening of low back pain. No further rheumatology workup needed at this time. \"    - 7/11/23 left hip total arthroplasty with severe b/l hip osteoarthritis with Dr. Austin Blood,  cc, discharged with DVT prophylaxis Lovenox 40 mg daily x2 weeks and aspirin 81 mg twice daily x2 weeks    - 10/13/23 right hip total arthroplasty    - 12/23 PET CT NCAP Liver SUV max = 3.78, Aorta Blood SUV max = 3.43.  -New/progressed:  - 0.8 x 0.5 cm right FDG avid cervical lymph node; SUV max of 6.5  - 1.3 x 1.5 cm right FDG avid cervical lymph node; SUV max of 5.8  - 1.3 x 1.4 cm right FDG avid cervical lymph node; SUV max of 7.8   -Lesion in left lateral chest wall immediately inferior to the margin of breast implant, 0.5 x 0.4 mm, SUV 5.02, previously 1.4  - Lymph node adjacent to left psoas, 1.5 cm, SUV 19.9, previously 6.02  - Right acetabulum, 2.0 x 1.9 cm, SUV 14.03, previously 12.05  - No new bone lesions but increased metabolic activity previously identified multiple FDG avid osseous foci:  1.  Sternal lesion, SUV 5.7, previously 3.6  2.  Right scapular spine lesion, SUV 4.6, previously 3.6  3.  Right acetabular bone lesion SUV 13.4, previously 11    -Unchanged:  - 1 of 2 left parotid gland lesions, 1.6 x 1.1 cm, SUV 8.4, previously 6.6  - Left axillary lymph node, 1.2 x 1.0 cm, SUV 2.3  - Spleen normal    -Resolved/improved:  - Left orbital hypermetabolic lesion-resolved  - 1 of 2 previous left parotid " hypermetabolic lesions-resolved  - Multiple mildly hypermetabolic bilateral inguinal nodes, index right inguinal lymph node SUV 2.62, previously 5.19-improved    IMPRESSION: In this patient with follicular lymphoma there is evidence  for progression of disease.  1. There are multiple new hypermetabolic lymph nodes.   2. Increased metabolic activity of multiple previously seen lymph  nodes  3. No new bony lesions identified but there is increased metabolic  activity of the previously identified lesions.  4. Multiple stable and some resolved hypermetabolic lesions; resolved  lesions include the left orbit and left parotid gland lesion.      -12/23 Left pelvic lymph node, biopsy:  - Recurrent/persistent low grade B-cell lymphoma, most consistent with follicular lymphoma  - Negative for metastatic carcinoma    This biopsy is involved by a low grade lymphoma with a staining pattern consistent with follicular lymphoma. The growth pattern of the lymphoma cells appears diffuse, although the biopsy is quite small, limiting assessment of architecture. Concurrent flow cytometry ([id number]) detected a population of CD10-positive B cells negative for surface light chains, similar to the patient's past flow cytometry studies and consistent with recurrent/persistent disease.     In addition, flow cytometry identified a small population of CD5-positive kappa-monotypic B cells (2.6%), similar to the patient's known monoclonal B-cell lymphocytosis (MBL). No definitive morphologic correlate to this finding is appreciated in this biopsy - it may represent peripheral blood contamination versus a tissue-based MBL equivalent.     There is no evidence of large cell transformation in this biopsy, and no evidence of negro small lymphocytic lymphoma (SLL). However, the biopsy is very small - the possibility of unsampled higher grade disease or SLL cannot be excluded. Correlation with imaging studies is recommended.    Left pelvic LN flow  cytometry:  A. Pelvis, Left, :  - CD5 positive kappa-monotypic B cells (2.6%)  Clonal B cells with the immunophenotype of CLL/SLL are present in this sample.  The differential includes peripheral blood contamination (with an MBL) versus involvement by SLL.    - CD10 positive negative for surface light chain B cells (29%) - see comment B  A CD10-positive B cell clone is also present - involvement by the patient's previously diagnosed CD10+ B cell lymphoma is in the differential (follicular lymphoma versus transformation to a higher grade lymphoma).         - 12/23 bone marrow biopsy:  MORPHOLOGY:  - Hypercellular marrow (cellularity estimated at 40%) with trilineage hematopoiesis, no overt dysplasia, and no increase in blasts  - Rare CD5+ kappa-monotypic B cells detected by flow cytometry (1.7%)  - Small lymphoid aggregate suspicious for marrow involvement by CD5+ B-cell lymphoproliferative disorder (<5% of marrow cellularity)  - No definitive histologic or immunophenotypic evidence of follicular lymphoma  Flow cytometry ([id number]) showed rare CD5-positive kappa-monotypic B cells (1.7%). By morphology, a tiny lymphoid aggregate is identified on the trephine core biopsy. This aggregate contains small B cells that appear to have aberrant expression of CD5, and may represent the morphologic correlate to the flow cytometry results. Overall, the findings are suspicious for low level marrow involvement by a CD5+ lymphoproliferative disorder most consistent with monoclonal B-cell lymphocytosis.     There is no definitive evidence of marrow involvement by follicular lymphoma, and no morphologic evidence of large cell lymphoma.  FLOW CYTOMETRY:  -CD5-positive kappa-monotypic B cells (1.7%) express CD5 (dim, partial), CD19, CD20 (dim), and monotypic kappa immunoglobulin light chains and lack CD10 and CD38.   The monotypic B-cell population present in this specimen has a similar immunophenotype as reported previously in this  patient's peripheral blood ([id number], 2/16/23). The immunophenotypic findings are suggestive of marrow involvement by monoclonal B-cell lymphocytosis (versus chronic lymphocytic leukemia/small lymphocytic lymphoma; CLL/SLL). There may be peripheral blood contamination. Large cells may not survive specimen processing. Morphologic correlation is required.   CYTOGENETICS:  46,XX[20]  FISH:  Pending    -1/3/24 second opinion w/Dr. Nguyen     -2/9/24 pt w/right neck lump, x4 days, semi-solid feeling, TTP with deep palpation only    INTERIM HISTORY:  Regarding lymphoma:  Denies weight loss, night sweats, early satiety, RUQ or LUQ pain  Reports left eye blurriness is stable since surgery, not worsened since RT, using lubricating eye drops prn and working with eye doctor for management of glaucoma, reports she had laser treatment to left eye that helped with blurred vision. Appt w/eye doctor 1/22/24, changed eye drops      - 2/9/24 CT soft tissue neck-   There are multiple enlarged and heterogeneous right-sided level 5 and 4 lymph nodes with adjacent fat stranding, increased since 12/1/2023. The largest measures up to 1.7 cm. No drainable fluid collection.    -2/12/24 - 2/19/24 keflex x 7 days  Pt feels size is smaller, thinks the size fluctuates, no pain, no fever  Pt is out of town 3/1-3/10 taking a road trip to [facility]       REVIEW OF SYSTEMS:   A 14 point ROS was reviewed with pertinent positives and negatives in the HPI.       HOME MEDICATIONS:  Current Outpatient Medications   Medication Sig Dispense Refill    cephALEXin (KEFLEX) 500 MG capsule Take 1 capsule (500 mg) by mouth 4 times daily for 7 days 28 capsule 0    dorzolamide-timolol (COSOPT) 2-0.5 % ophthalmic solution Place 1 drop into both eyes 2 times daily      latanoprost (XALATAN) 0.005 % ophthalmic solution Place 1 drop into both eyes At Bedtime      losartan (COZAAR) 100 MG tablet Take 1 tablet (100 mg) by mouth daily 90 tablet 3     Multiple Vitamins-Minerals (PRESERVISION AREDS) TABS Take 1 tablet by mouth 2 times daily      rosuvastatin (CRESTOR) 10 MG tablet Take 1 tablet (10 mg) by mouth daily 90 tablet 3         ALLERGIES:  Allergies   Allergen Reactions    Compazine      Mental confusion    Hydrochlorothiazide      Dryness mouth    Prochlorperazine Visual Disturbance    Simvastatin Other (See Comments)     Muscle aches         PAST MEDICAL HISTORY:  Past Medical History:   Diagnosis Date    Aftercare following right hip joint replacement surgery 10/25/2023    Breast cancer (H) 2000    Left breast, s/p mastectomy, chemotherapy and endocrine therapy    Follicular lymphoma (H) 01/20/2023    Glaucoma     bilateral - Dr. Moore [facility]    History of spinal stenosis     Hyperlipidemia     Hypertension     Macular degeneration     Dr. Toscano [facility]    Osteoarthritis     Osteoporosis     Personal history of chemotherapy 2000    A/C + Taxol    S/P radiation therapy     400 cGy to left orbit completed on 6/15/2023 [facility]         PAST SURGICAL HISTORY:  Past Surgical History:   Procedure Laterality Date    ARTHROPLASTY HIP Left 07/11/2023    Procedure: ARTHROPLASTY, HIP, TOTAL LEFT;  Surgeon: Austin Blood MD;  Location: [facility] OR    ARTHROPLASTY HIP Right 10/13/2023    Procedure: RIGHT ARTHROPLASTY, HIP, TOTAL;  Surgeon: Austin Blood MD;  Location: [facility] Main OR    BIOPSY Left 04/06/2023    Left Orbital Biopsy    C MASTECTOMY,SIMPLE  03/2000    left    COLONOSCOPY  2006    Q 10 years    COLONOSCOPY N/A 10/04/2021    Procedure: COLONOSCOPY;  Surgeon: Guru Lyla Ruby MD;  Location: [facility]    ESOPHAGOSCOPY, GASTROSCOPY, DUODENOSCOPY (EGD), COMBINED N/A 10/04/2021    Procedure: ENDOSCOPIC ULTRASOUND, ESOPHAGOSCOPY / UPPER GASTROINTESTINAL TRACT (GI), Esophagogastroduodenoscopy,  Fine Needle Biopsy of liver;  Surgeon: Guru Lyla Ruby  MD Freddie;  Location: [facility] OR    EYE SURGERY      IR LYMPH NODE BIOPSY  2023    SURGICAL HISTORY OF -  Left 2015    tissue expander placed in left breast area    SURGICAL HISTORY OF -  Left 2016    left breast implant and right mammoplasty         SOCIAL HISTORY:  Social History     Socioeconomic History    Marital status: Single     Spouse name: Not on file    Number of children: 0    Years of education: Not on file    Highest education level: Not on file   Occupational History     Employer: [employer]    Occupation: REtired   Tobacco Use    Smoking status: Former     Packs/day: 1.00     Years: 17.00     Additional pack years: 0.00     Total pack years: 17.00     Types: Cigarettes     Quit date: 3/15/1999     Years since quittin.9     Passive exposure: Past    Smokeless tobacco: Never   Vaping Use    Vaping Use: Never used   Substance and Sexual Activity    Alcohol use: Not Currently     Alcohol/week: 4.0 standard drinks of alcohol     Types: 4 Glasses of wine per week    Drug use: No    Sexual activity: Not Currently     Birth control/protection: None   Other Topics Concern    Parent/sibling w/ CABG, MI or angioplasty before 65F 55M? Yes     Comment: Father heart attack   Social History Narrative    Not on file     Social Determinants of Health     Financial Resource Strain: Unknown (2023)    Financial Resource Strain     Within the past 12 months, have you or your family members you live with been unable to get utilities (heat, electricity) when it was really needed?: Patient refused   Food Insecurity: Unknown (2023)    Food Insecurity     Within the past 12 months, did you worry that your food would run out before you got money to buy more?: Patient refused     Within the past 12 months, did the food you bought just not last and you didn t have money to get more?: Patient refused   Transportation Needs: Unknown (2023)    Transportation Needs     Within the past 12 months, has lack of  transportation kept you from medical appointments, getting your medicines, non-medical meetings or appointments, work, or from getting things that you need?: Patient refused   Physical Activity: Not on file   Stress: Not on file   Social Connections: Not on file   Interpersonal Safety: Low Risk  (10/3/2023)    Interpersonal Safety     Do you feel physically and emotionally safe where you currently live?: Yes     Within the past 12 months, have you been hit, slapped, kicked or otherwise physically hurt by someone?: No     Within the past 12 months, have you been humiliated or emotionally abused in other ways by your partner or ex-partner?: No   Housing Stability: Unknown (9/21/2023)    Housing Stability     Do you have housing? : Patient refused     Are you worried about losing your housing?: Patient refused       FAMILY HISTORY:  Family History   Problem Relation Age of Onset    Cancer Mother         bone cancer    Heart Disease Father     Coronary Artery Disease Father     Diabetes Father     Arthritis Sister     Breast Cancer Sister         age 75    Diabetes Maternal Grandmother     Diabetes Maternal Grandfather     Diabetes Paternal Grandmother      Family history of cancer- sister breast cancer- dx at age 77 y/o, localized breast cancer, surgical resection, RT, no adjuvant chemo or endocrine therapy that pt is aware of     PHYSICAL EXAM:  Vital signs:  There were no vitals taken for this visit.       LABS:   Latest Reference Range & Units 02/09/24 15:57 02/09/24 16:00   CRP Inflammation <5.00 mg/L  12.80 (H)   WBC 4.0 - 11.0 10e3/uL 9.6    Hemoglobin 11.7 - 15.7 g/dL 10.9 (L)    Hematocrit 35.0 - 47.0 % 34.9 (L)    Platelet Count 150 - 450 10e3/uL 251    RBC Count 3.80 - 5.20 10e6/uL 4.08    MCV 78 - 100 fL 86    MCH 26.5 - 33.0 pg 26.7    MCHC 31.5 - 36.5 g/dL 31.2 (L)    RDW 10.0 - 15.0 % 14.6    % Neutrophils % 64    % Lymphocytes % 21    % Monocytes % 10    % Eosinophils % 4    % Basophils % 1    Absolute  Basophils 0.0 - 0.2 10e3/uL 0.1    Absolute Eosinophils 0.0 - 0.7 10e3/uL 0.4    Absolute Immature Granulocytes <=0.4 10e3/uL 0.0    Absolute Lymphocytes 0.8 - 5.3 10e3/uL 2.0    Absolute Monocytes 0.0 - 1.3 10e3/uL 1.0    % Immature Granulocytes % 0    Absolute Neutrophils 1.6 - 8.3 10e3/uL 6.2    Absolute NRBCs 10e3/uL 0.0    NRBCs per 100 WBC <1 /100 0    Sed Rate 0 - 30 mm/hr  29   (H): Data is abnormally high  (L): Data is abnormally low    PATHOLOGY:      IMAGING:  Narrative & Impression   CT SOFT TISSUE NECK W CONTRAST 2/9/2024 4:17 PM     History:  sudden onset right supclavicular/base of neck LN  enlargement- r/o abscess; Neck mass  ICD-10: Neck mass      Comparison:  Neck PET CT12/1/2023, 5/18/2023, 1/23/2023     Technique: Following intravenous administration of nonionic iodinated  contrast medium, thin section helical CT images were obtained from the  skull base down to the level of the aortic arch.  Axial, coronal and  sagittal reformations were performed with 2-3 mm slice thickness  reconstruction. Images were reviewed in soft tissue, lung and bone  windows.     Contrast: 90ml isovue 370     Findings:   Evaluation of the mucosal space demonstrates no evident abnormality in  the nasopharynx, oropharynx, hypopharynx or the glottis. The tongue  base appears normal. There is a 1.6 cm enhancing left parotid mass and  an 8 mm right parotid mass which are stable.. Stable 1.5 cm right  thyroid nodule.     There are multiple enlarged and heterogeneous right-sided level 5 and  4 lymph nodes with adjacent fat stranding, increased since 12/1/2023.  The largest measures up to 1.7 cm. No drainable fluid collection.  Surgical changes of left axillary lymph node dissection.      There is hypoattenuation surrounding both common carotid arteries and  heterogeneous plaque of the bilateral carotid bifurcations, stable.     Evaluation of the osseous structures demonstrate no worrisome lytic or  sclerotic lesion. No overt  spinal canal or neuroforaminal stenosis.  Mild mucosal thickening and fluid in the sphenoid sinus, increased.  The mastoid air cells are clear.      The visualized lung apices are clear.                                                                      Impression:  1. Multiple new/enlarged right level 4 and 5 lymph nodes with adjacent  fat stranding, suggesting infection/inflammation, but with history of  follicular lymphoma this could also represent progressive disease. No  abscess.  2. Stable left greater than right enhancing parotid masses.     I have personally reviewed the examination and initial interpretation  and I agree with the findings.     DESTINY CODY MD        ASSESSMENT/PLAN:  Virginia Byers is a 73 year old female with:    #right neck mass  - sudden onset 4cm x4cm base of neck/supraclavicular mass  - 2/9/24 CT neck: multiple enlarged and heterogeneous right-sided level 5 and 4 lymph nodes with adjacent fat stranding, increased since 12/1/2023. The largest measures up to 1.7 cm. No drainable fluid collection.  - 2/9/24 WBC 9.6, ESR 29, CRP 12.8 (LDH hemolyzed)  - 2/12/24-2/19/24 keflex    PLAN:  - suspected persistent lymphadenitis w/some improvement on keflex. Will give bactrim x7 days   - alarm sx discussed    #new anemia  - 1/24 hgb 13.2, 2/24 hgb 10.9    PLAN:  - pt has interval fluctuations in hgb since at least 6/23 that have historically self corrected  - no bleeding, will do close follow up with labs as soon as pt returns from her vacation. If persistent anemia, will do further work up    # 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 INCLUDING biopsy proven LN and left orbital mass/lacrimal gland involvement and suspected parotid gland involvement   -11/22 MRI lumbar spine in 12/22 MRI pelvis show prominent left iliac lymph nodes (measuring 1.8 x 3.2, previously 1.5 x 2.7 cm), right medial thigh lymphadenopathy (1.7cm), right iliac bone enhancing marrow signal, left posterior iliac enhancing  "lesion  -12/22 CT chest abdomen pelvis shows new or increased left common iliac (1.2cm), external iliac, pelvic sidewall lymphadenopathy (2.1cm) and sclerotic lesions in left sacrum and left posterior ilium. No splenomegaly  - 1/23 brain MRI: Indeterminate ovoid T2 hyperintense enhancing mass in the region of the left lacrimal gland measuring approximately 2.4 cm AP by 1.4 cm transverse by 2.3 cm craniocaudal associated with/replacing the left lacrimal gland. Consider correlation with tissue sampling  - 1/23 CT soft tissue neck: Soft tissue nodule in the superficial left parotid gland measuring 1.2 cm. Additional 0.8 cm nodule in the deep portion of the left parotid gland. Soft tissue nodule in the superficial right parotid gland measuring 0.8 cm. These may represent benign or malignant parotid lesions. Multiplicity of lesions raises concern for Warthin tumors  - 1/27/23 note from Dr. Wilmar Morales- \"Based on this diagnosis, I think the 2 nodules in her parotid gland are likely atypical lymph nodes containing lymphoma.  She will probably require some staging imaging, such as PET scan and we can take a look at that as well.  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 CD5 stains T cells  - Ki-67 proliferative index is approximately 10%, also low in the follicles.  - flow cytometry \"CD10-positive population is consistent with a clonal B cell population and a CD10 positive B-cell lymphoma is favored.\"    - 4/6/23 left orbital mass/lacrimal gland excisional biopsy: PATHOLOGY: - " Follicular Lymphoma, low grade ([facility] negative for BCL2 rearrangement). 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  The flow cytometry from 1/20/23 right external iliac LN biopsy showed in addition to CD10 positive B-cell population  also a partial/equivocal CD5 positive separate kappa monotypic B-cell population, which most likely represents monoclonal B-cell lymphocytosis of CLL/SLL type, since it was also documented in the peripheral blood flow cytometry on February 16, 2023 (case number [facility] ) at a low frequency.  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  - Cohort 2 which is described as negative for CD5, CD10, ,  and showing dim CD20 and dim kappa staining   Of note the population of monoclonal B-cell lymphocytosis found in peripheral blood in our department and in the lymph node biopsy in January showed partial/equivocal dim CD5 and may correspond to the cohort #2.     -5/23 PET/CT NCAP:   1. An ovoid mass in the left lateral orbit measures 2.5 x 1.2 cm, slightly larger than 01/09/2023, and demonstrates marked uptake (SUVmax 9.3), consistent with biopsy-proven lymphoma  2. A few small FDG avid bilateral intraparotid nodules are present, one on the right and two on the left, including representative left parotid nodule measuring 0.9 x 1.4 cm with moderate uptake (SUVmax 6.7).  3.  Asymmetric prominent right posterior lower cervical node measures 0.8 cm associated with moderate focal uptake (SUVmax 5.1).  4. A  single mildly enlarged left axillary lymph node measures 1.0 cm associated with mild uptake (SUVmax 3.0)  5. Some scattered FDG avid lymph nodes below the diaphragm involve the retroperitoneal retrocaval, bilateral common iliac, left pelvic sidewall and left inguinal regions. Some of these have increased slightly in size since 12/28/2022, such as a conglomerate left pelvic sidewall bj mass measuring approximately 2.2 x 4.1 cm (previously 2.2 x 3.4 cm) associated with marked uptake (SUVmax 7.6).  6. FDG avid sclerotic skeletal lesion involves the right superior acetabulum (SUVmax 12.9) as well as a couple of separate smaller sclerotic lesions in the right iliac bone uptake (SUVmax 12.2).     - 6/23 bone marrow biopsy:  MORPHOLOGY:  - No morphologic or immunophenotypic evidence of follicular lymphoma  - Flow cytometry showing a small population of CD5 positive monotypic B cells; most compatible with monoclonal B lymphocytosis (There is no definitive morphologic correlate for this finding. There is no morphologic or immunophenotypic evidence of involvement by follicular lymphoma.  The CD5-positive B cell clone may best represent monoclonal B lymphocytosis, provided that bj SLL is excluded.)  - Normocellular marrow (cellularity estimated at 30%) with trilineage hematopoietic maturation and no increase in blasts  FLOW CYTOMETRY:  CD5 positive kappa-monotypic B cells (0.8%)  0.8% B cells which express CD5, CD19, CD20 (dim to negative) and monotypic kappa immunoglobulin light chains (dim) and lack CD10 and CD38. The B cells have similar forward scatter relative to background T cells suggestive of similar size; however, precise size determination is deferred to morphology  CYTOGENETICS:  46,XX    -6/19/2023 CT-guided bone biopsy, right acetabulum  PATHOLOGY:  - Atypical lymphoid infiltrate in a suboptimal biopsy  - No histologic evidence of metastatic carcinoma  - Two small monoclonal B-cell populations detected by  flow cytometry: CD5 positive kappa-monotypic B cells ( 2.8 %, CD5 partial, dim, CD19 positive, CD30 dim to negative, CD10 negative); CD10 positive lambda-monotypic B cells ( 0.4 %, CD10 positive, CD19 slightly dim, CD20 positive, CD5 negative)     - option of systemic treatment including rituximab discussed previously on multiple occassions particularly w/left orbital involvement, see my previous notes for details; pt deferred  - 6/14/2023- 6/15/2023 palliative radiation to left orbit follicular lymphoma 4Gy/2fx w/Dr. Julian Horvath    - 12/23 PET CT NCAP w/o contrast Liver SUV max = 3.78, Aorta Blood SUV max = 3.43.  -New/progressed:  - 0.8 x 0.5 cm right FDG avid cervical lymph node; SUV max of 6.5  - 1.3 x 1.5 cm right FDG avid cervical lymph node; SUV max of 5.8  - 1.3 x 1.4 cm right FDG avid cervical lymph node; SUV max of 7.8   -Lesion in left lateral chest wall immediately inferior to the margin of breast implant, 0.5 x 0.4 mm, SUV 5.02, previously 1.4  - Lymph node adjacent to left psoas, 1.5 cm, SUV 19.9, previously 6.02  - Right acetabulum, 2.0 x 1.9 cm, SUV 14.03, previously 12.05  - No new bone lesions but increased metabolic activity previously identified multiple FDG avid osseous foci:  1.  Sternal lesion, SUV 5.7, previously 3.6  2.  Right scapular spine lesion, SUV 4.6, previously 3.6  3.  Right acetabular bone lesion SUV 13.4, previously 11    -Unchanged:  -1 of 2 left parotid gland lesions, 1.6 x 1.1 cm, SUV 8.4, previously 6.6  - Left axillary lymph node, 1.2 x 1.0 cm, SUV 2.3  -Spleen normal    -Resolved/improved:  - Left orbital hypermetabolic lesion-resolved  - 1 of 2 previous left parotid hypermetabolic lesions-resolved  - Multiple mildly hypermetabolic bilateral inguinal nodes, index right inguinal lymph node SUV 2.62, previously 5.19-improved    -12/23 Left pelvic lymph node, biopsy:  - Recurrent/persistent low grade B-cell lymphoma, most consistent with follicular lymphoma (limited evaluation of  architecture),  no evidence of large cell transformation in this biopsy, and no evidence of negro small lymphocytic lymphoma (SLL).  - Negative for metastatic carcinoma    Flow cytometry:   C5 positive kappa-monotypic B cells 2.6% consistent with CLL/SLL vs monoclonal B cell lymphocytosis  CD10 positive B cells negative for surface light chain 29%, consistent w/previously dx CD10+ B cell lymphoma    - 12/23 bone marrow biopsy:  MORPHOLOGY:  - Hypercellular marrow (cellularity estimated at 40%) with trilineage hematopoiesis, no overt dysplasia, and no increase in blasts  - Rare CD5+ kappa-monotypic B cells detected by flow cytometry (1.7%)  - Small lymphoid aggregate suspicious for marrow involvement by CD5+ B-cell lymphoproliferative disorder (<5% of marrow cellularity), most consistent with monoclonal B-cell lymphocytosis.  - No definitive histologic or immunophenotypic evidence of follicular lymphoma and  no morphologic evidence of large cell lymphoma.  FLOW CYTOMETRY:  -CD5-positive kappa-monotypic B cells (1.7%) express CD5 (dim, partial), CD19, CD20 (dim), and monotypic kappa immunoglobulin light chains and lack CD10 and CD38.  suggestive of marrow involvement by monoclonal B-cell lymphocytosis (versus chronic lymphocytic leukemia/small lymphocytic lymphoma; CLL/SLL).   CYTOGENETICS:  46,XX[20]  FISH:  Pending    Staging and prognostication:  - Stage: 4 given right medial thigh LN and left common iliac, external iliac, pelvic sidewall; biopsy proven left orbital mass involvement; there was some concern from ENT that patients parotid lesions were related to pts follicular lymphoma   - FLIPI: score 2 (age>60),  stage 3-4; intermediate risk  - pertinent labs: 12/22  and 5/23 ; 2/23 and 5/23 beta 2 microglobulin 1.7, hepatitis B and C negative    Treatment:  - GELF criteria to indicate treatment (involvement of 3 or more bj sites each with a diameter greater than or equal to 3 cm, any bj or  extranodal tumor mass with a diameter of greater than or equal to 7 cm, B symptoms, splenomegaly, pleural effusions, peritoneal ascites, clinically progressive or significant cytopenias, leukemia, symptoms, threatened end organ function, clinically significant bulky disease, steady or rapid progression of disease)  - option of systemic treatment including rituximab discussed previously on multiple occassions particularly w/left orbital involvement, see my previous notes for details; pt deferred  - 6/14/2023- 6/15/2023 palliative radiation to left orbit follicular lymphoma 4Gy/2fx    PLAN:  - pt has 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 INCLUDING biopsy proven LN and left orbital mass/lacrimal gland involvement   - bone marrow biopsy negative for lymphoma and right acetabulum bone biopsy atypical lymphoid infiltrate seen but biopsy suboptimal and flow cytometry shows 2.8% CD5 positive kappa monotypic B cells consistent w/monoclonal B cell lymphocytosis and 0.4% CD10 positive lambda-monotypic B cells consistent w/lymphoma- ie follicular vs other  - 12/23 PET showed left pelvic LN w/SUV increased from 6->20, biopsied to rule out transformation to aggressive lymphoma. Biopsy showed follicular lymphoma w/aggressive lymphoma or SLL. LN flow cytometry consistent w/follicular lymphoma and MBL. Concurrent bone marrow biopsy, <5% CD5+ lymphoproliferative d/o consistent w/MBL  - overall stage 4 disease and intermediate risk  - systemic tx including rituximab discussed on multiple prior visits, pt deferred as detailed in my previous notes   - pt completed palliative RT to left orbit; use lubricating eye drops prn (also using eye drops for glaucoma)  - 2/24 hgb 10.9  - pt overall asx, with exception of self palpating right neck LN (largest 1.7cm)  - she continues to wish to avoid systemic tx unless absolutely necessary thus we will continue active surveillance   - H&P and labs q3 months  - PET q6 months due end of 6/24- to be  ordered at next visit (may be sooner if neck LN continue to grow)      #  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   - 1/23 right external iliac LN core needle biopsy- FLOW CYTOMETRY: 1.3% rare monoclonal B cell population CD5 equivocal, CD23 negative, CD 79b negative, possible MBL; no morphological correlate  -2/23 peripheral blood- FLOW CYTOMETRY: 2.3% kappa monoclonal B cell population, CD5 positive, CD38 negative, CD49d negative, possible MBL  - 4/6/23 left orbital mass/lacrimal gland excisional biopsy: FLOW CYTOMETRY: 15.1% kappa monotypic B cell population, bright positive: CD45, CD19, CD22, moderately positive kappa, CD20, CD79b, dim positive CD23, negative CD 2,3,103, lambda, 5, 10, 200, 38, 43  - 6/23 bone marrow biopsy: FLOW CYTOMETRY: 0.8% kappa monotypic B cell population, positive for CD5, CD19, dim to negative CD20, negative for CD38 and CD10  - 6/23 right acetabulum bone biopsy: FLOW CYTOMETRY: 2.8% kappa monotypic B cell population, CD5 partial dim, CD 19 positive, CD20 dim negative, CD10 negative, possible MBL    - of note pt does not have leukocytosis or lymphocytosis, ALC consistently <5000, no splenomegaly, LN biopsy did not demonstrate IHC consistent with SLL  - 5/23 PET/CT as above  - 12/23 bone marrow biopsy:  MORPHOLOGY:  - Hypercellular marrow (cellularity estimated at 40%) with trilineage hematopoiesis, no overt dysplasia, and no increase in blasts  - Rare CD5+ kappa-monotypic B cells detected by flow cytometry (1.7%)  - Small lymphoid aggregate suspicious for marrow involvement by CD5+ B-cell lymphoproliferative disorder (<5% of marrow cellularity), most consistent with monoclonal B-cell lymphocytosis.  - No definitive histologic or immunophenotypic evidence of follicular lymphoma and  no morphologic evidence of large cell lymphoma.  FLOW CYTOMETRY:  -CD5-positive kappa-monotypic B cells (1.7%) express CD5 (dim, partial), CD19, CD20 (dim), and monotypic kappa immunoglobulin light  chains and lack CD10 and CD38.  suggestive of marrow involvement by monoclonal B-cell lymphocytosis (versus chronic lymphocytic leukemia/small lymphocytic lymphoma; CLL/SLL).     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 Bone marrow biopsy negative for involvement of lymphoma 6/23 and shows <5% cellularity involvement of MBL 12/23  - 9/23 ALC 1.6, 12/23 ALC 1.7, 1/24 ALC 1.7  - Monitor CBC with differential and clinically every 3-6 months    #Sclerotic lesions in left sacrum and left posterior ilium  -10/21 mammogram, endoscopy with EUS and colonoscopy WNL  -12/22 CT chest abdomen pelvis shows new or increased left common iliac, external iliac, pelvic sidewall lymphadenopathy and sclerotic lesions in left sacrum and left posterior ilium.  No metastatic disease in chest.  No abnormal findings in breast.  - 12/22 tumor markers: CA 15-3 24,  29, CEA 3.1, CA 19-9 4, AFP 5.9,   - 1/2023 MRI brain w/wo contrast: no metastases   - 1/2023 right external iliac LN core need biopsy- no metastatic carcinoma   - 5/23 PET avid bone lesions  -6/19/2023 CT-guided bone biopsy, right acetabulum  PATHOLOGY:  - Atypical lymphoid infiltrate in a suboptimal biopsy  - No histologic evidence of metastatic carcinoma  - Two small monoclonal B-cell populations detected by flow cytometry: CD5 positive kappa-monotypic B cells ( 2.8 %, CD5 partial, dim, CD19 positive, CD30 dim to negative, CD10 negative); CD10 positive lambda-monotypic B cells ( 0.4 %, CD10 positive, CD19 slightly dim, CD20 positive, CD5 negative)   - 12/23 PET   - Right acetabulum, 2.0 x 1.9 cm, SUV 14.03, previously 12.05  - No new bone lesions but increased metabolic activity previously identified multiple FDG avid osseous  "foci:  1.  Sternal lesion, SUV 5.7, previously 3.6  2.  Right scapular spine lesion, SUV 4.6, previously 3.6  3.  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    -4/2020 left breast simple mastectomy with no residual carcinoma.   -S/p ACx4, taxol x4, no anti-her2 treatment, no RT.   -S/p 5 years adjuvant endocrine therapy (tamoxifen and anastrozole).  -Delayed left breast reconstruction and right breast augmentation.   - 5/23 PET/CT notes single enlarged left axillary lymph node with SUV 3, \"could also represent lymphoma although given patient's history of left breast cancer, a breast cancer metastasis is also a possibility\"  - The above in the setting of known lymphoma and breast tumor markers normal   -9/23 right breast diagnostic mammogram: [facility]  - 9/23 ultrasound left axilla: 1.1 x 0.9 x 1.0 cm lymph node with mildly thickened cortex that correlates with abnormal lymph node on previous PET, recommend tissue diagnosis  - 9/23 ultrasound-guided left axillary lymph node core biopsy: No morphologic evidence of lymphoma.  Lymph node tissue was received in formalin, this could not be sent for flow cytometry.  No separate fresh tissue was sent for flow cytometry.  - 12/23 PET Left axillary lymph node, 1.2 x 1.0 cm, SUV 2.3, unchanged    PLAN:   -With the limitations of tissue processed in formalin and no fresh tissue available for flow cytometry, appears left axilla lymph node is benign without lymphoma      RTC 3-4 weeks for follow up " with me, labs prior to appt   Pt is out of town 3/1-3/10 taking a road trip to [place], [place], [place]     Naomy Zaidi DO  Hematology/Oncology  [facility]

## 2024-02-20 ENCOUNTER — VIRTUAL VISIT (OUTPATIENT)
Dept: ONCOLOGY | Facility: CLINIC | Age: 74
End: 2024-02-20
Payer: MEDICARE

## 2024-02-20 DIAGNOSIS — D72.820 MONOCLONAL B-CELL LYMPHOCYTOSIS WITH IMMUNOPHENOTYPE LIKE CHRONIC LYMPHOCYTIC LEUKEMIA (CLL) (H): ICD-10-CM

## 2024-02-20 DIAGNOSIS — I88.9 LYMPHADENITIS: ICD-10-CM

## 2024-02-20 DIAGNOSIS — C82.00 FOLLICULAR LYMPHOMA GRADE I, UNSPECIFIED BODY REGION (H): Primary | ICD-10-CM

## 2024-02-20 DIAGNOSIS — C91.10 MONOCLONAL B-CELL LYMPHOCYTOSIS WITH IMMUNOPHENOTYPE LIKE CHRONIC LYMPHOCYTIC LEUKEMIA (CLL) (H): ICD-10-CM

## 2024-02-20 DIAGNOSIS — R22.1 NECK MASS: ICD-10-CM

## 2024-02-20 PROCEDURE — 99214 OFFICE O/P EST MOD 30 MIN: CPT | Mod: 95 | Performed by: INTERNAL MEDICINE

## 2024-02-20 RX ORDER — SULFAMETHOXAZOLE/TRIMETHOPRIM 800-160 MG
1 TABLET ORAL 2 TIMES DAILY
Qty: 14 TABLET | Refills: 0 | Status: SHIPPED | OUTPATIENT
Start: 2024-02-20 | End: 2024-03-08

## 2024-02-20 NOTE — LETTER
2/20/2024         RE: Virginia Byers  [address] [facility] [id number]        Dear Colleague,    Thank you for referring your patient, Virginia Byers, to the [facility]. Please see a copy of my visit note below.    Video-Visit Details     Video Start Time: 9:49AM     Type of service:  Video Visit     Video End Time: 10:02AM     Originating Location (pt. 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    Patient has medical history including LEFT breast cancer in 2000, hypertension, hyperlipidemia, osteoporosis, osteoarthritis, degenerative disc disease, spinal stenosis of lumbar region,  s/p bilateral L5-S1 transforaminal ROS by Dr. Garcia on 7/9/2021, s/p bilateral L4-5 and L5-S1 facet joint injections on 6/14/2021, glaucoma and macular degeneration, cataracts s/p surgery, history of tobacco use (quit 1999)     Regarding previous history of breast cancer:  She was previously treated by Dr. Sakina Brandt at [facility]      In summary, diagnosed at age 49 with LEFT breast IDC ER positive, AZ positive, HER2 positive on FISH. 3/2000 left breast lumpectomy and left axillary node dissection (IDC, grade 3, 1.45 cm incompletely excised tumor, DCIS, positive margins with infiltrating carcinoma, 1 of 17 lymph nodes positive (0.5 cm metastatic focus), ER positive, AZ positive, HER2 positive).  4/2020 left breast simple mastectomy with no residual carcinoma. S/p ACx4, taxol x4, no anti-her2 treatment, no RT. S/p 5 years adjuvant endocrine therapy (tamoxifen and  anastrozole). Delayed left breast reconstruction and right breast augmentation.      3/2/2000  Excisional biopsy of Left breast mass -   Infiltrating ductal carcinoma with associated DCIS   ER positive (60%), NM positive (22%), HER2 by FISH POSITIVE     3/10/2000 - Left lumpectomy and left axillary node  Invasive ductal carcinoma, Grade 3, 1.45 cm (incompletely excised), negative for LVI  DCIS, greatest histologic dimension of tumor (DCIS PLUS invasive tumor) = 2.9 cm (estimate 50% DCIS)  Positive margins- infiltrating carcinoma extends to margin  1 of 17 axillary lymph nodes sampled was positive for metastatic focus of 0.5 cm.  ER/NM+     Sections of lumpectomy left breast showing residual 1.1 cm focus of ductal carcinoma in situ (DCIS), present within microns of the linked margin of resection.       4/11/2000 - Left breast simple mastectomy, negative for residual carcinoma  Left mastectomy on 4/11/2000 performed by Dr. Cristian Mcdonald at [facility].      Adjuvant treatment:  - 5/15/2000-7/24/2000 4 cycles of Adriamycin and Cytoxan   - 8/16/2000-10/18/2000 4 cycles of paclitaxel   - Appears she may have been on some kind of trial/protocol, was not given any anti-HER2 targeted treatment  - No adjuvant radiation  - 12/2000- 10/2002 tamoxifen  - 10/2002-5/2006 switched to anastrozole (due to in vitro studies showing anastrozole may be slightly better for patients were HER2 positive) with Fosamax for osteopenia     8/31/2015 Left delayed reconstruction with tissue expander    2/3/2016 Left 2nd stage 450 cc high profile silicone implant; right augmentation 150 cc Moderate classic profile silicone implant; cresent mastopexy    10/18/2021: Screening mammogram right breast ZACKARY    OTHER:  Of note, patient has documentation of anemia (iron deficiency and anemia of chronic disease), elevated LFTs, nausea and vomiting of all solids and liquids, fatigue with 26 pound unintentional weight loss from 7/20/2021 - 10/20/2021  with mildly elevated LFTs which led to evaluation with mammogram, endoscopy with EUS and colonoscopy as detailed below. Pt did associate these with some spinal injections she had gotten, due to overlapping times. Pt did regain all of the weight and hand improvement of anemia within 2 months, without significant intervention.     -10/2021: Endoscopy with EUS normal EGD and no stigmata or source of upper GI bleeding, visualized bile duct, gallbladder, liver, pancreas, left adrenal gland normal.  No lymphadenopathy. LIVER, RANDOM NEEDLE BIOPSY: Benign parenchyma with congestion; no significant fibrosis or other abnormalities     -10/2021 colonoscopy: Hemorrhoids, diverticulosis in sigmoid colon and splenic flexure    Current history:  -Ongoing right knee pain not improved with brace and physical therapy, s/p bilateral L5-S1 transforaminal ROS by Dr. Garcia on 7/9/2021, s/p bilateral L4-5 and L5-S1 facet joint injections on 6/14/2021. ongoing b/l hip and leg pain, worse with position changes (ie sitting to standing), constant, 8-9/10, tylenol brings pain down to 6/10 (using tylenol bid). ECOG 3.   -Patient has seen neurosurgery Dr. Jose Alexander, occupational medicine specialist Dr.Orrin Murphy, with plan to see PM&R Dr. Margie Agudelo and movement disorder clinic  - 11/22 MRI lumbar spine:    prominent left iliac lymph nodes among other findings related to degenerative changes and neural foraminal stenosis throughout lumbar spine    - 12/22 MRI pelvis:  1. Enhancing marrow signal abnormality of the right iliac bone extending from the superior acetabulum subchondral location proximally almost to the level of the sacroiliac joint caudal aspect. No associated fracture line. Underlying marrow infiltrative process such as from metastasis cannot be excluded especially given the degree of T1 hypointensity and history of primary tumor. Other consideration include stress reaction.  2. Mildly increased left external iliac chain, and  the right medial thigh lymphadenopathy. Metastasis cannot be excluded.  3. Left posterior iliac enhancing lesion, similar in size to prior CT 9/2021 and previously not visible on CT, focal enhancing lesion in the right greater trochanter laterally. Findings are concerning for Metastasis.    -12/22 CT chest abdomen pelvis with contrast:  COMPARISON: MRI pelvis 12/20/2022 and CT abdomen pelvis 9/21/2021.  1. Mastectomies with implant reconstructions. No lymphadenopathy  2.  There is an new or increased left common iliac, external iliac and pelvic sidewall lymphadenopathy since prior CT. Distal left common iliac lymph nodes measure up to 1.2 cm on this exam compared to 0.7 cm on prior CT. Left pelvic sidewall lymph node measures 2.1 cm short axis on this exam compared to 1.2 cm on prior CT . consistent with metastatic disease.  3. Sclerotic lesions in the left sacrum and left posterior ilium are unchanged from prior CT.  may be metastatic.  4. No evidence of metastatic disease in the chest.     -1/2023 MRI brain w/wo contrast:  IMPRESSION:  1. No findings of intracranial metastatic disease.  2. Indeterminate ovoid T2 hyperintense enhancing mass in the region of the  left lacrimal gland measuring approximately 2.4 cm AP by 1.4 cm  transverse by 2.3 cm craniocaudal associated with/replacing the left  lacrimal gland. Consider correlation with tissue sampling.  3. A few indeterminate partially visualized heterogeneous enhancing  Nodules measuring up to approximately 1.5 cm in the bilateral parotid glands.   Consider soft tissue neck CT with contrast for further evaluation.    -1/20/2023: IR US Guided biopsy of a right external iliac LN  Final Diagnosis   A.  LYMPH NODE, RIGHT EXTERNAL ILIAC, CORE NEEDLE BIOPSY AND TOUCH IMPRINT:  -Involved by follicular lymphoma, low-grade (WHO grade 1-2)  -Negative for metastatic carcinoma     The morphologic and immunophenotypic patterns are consistent with follicular lymphoma.  There is  no high-grade lymphoma present in this limited specimen.  Concurrent flow study ([id number]) demonstrated CD10-positive lambda monotypic B cells (22%), rare kappa monotypic B cells (1.3%), and no aberrant immunophenotype on T cells.  A FISH study for Bcl-2 is pending and will be reported separately.     The case was initially reviewed by Dr. Cifuentes (breast pathology).     This case was reviewed in part at our Hematopathology Faculty Consensus conference at which Girish Nolasco, Narciso, and Kayode were present in addition to myself.  This is a small needle core.  In some of the areas, the CD10+ B cells are confined to follicles.  Though the possibility of in situ follicular neoplasia was discussed, we still favor a diagnosis of follicular lymphoma - there are a number of CD10+ B cells outside of follicles at the base of the biopsy, and the clinical history supports a greater extent of involvement.     There was a tiny kappa monotypic B cell population identified by flow (separate from the CD10+ clone).  We looked for the morphologic correlate in this case by IHC, but it is not readily apparent.  This may represent a monoclonal B lymphocytosis - consider sending a peirpheral blood sample for flow cytometry.    FISH:  RESULTS:     NORMAL  - No rearrangement of BCL2     INTERPRETATION:  No evidence was found by FISH of rearrangement of the BCL2 (18q21) locus. These findings are not helpful for confirming or further characterizing the reported pathologic diagnosis of follicular lymphoma.    Flow Interpretation   A. Lymph Node(s), :  -CD10-positive lambda monotypic B cells (22%)  -Rare kappa monotypic B cells (1.3%)  -No aberrant immunophenotype on T cells  See comment   Electronically signed by Krista Headley MD on 1/24/2023 at  3:10 PM   Comment     The CD10-positive population is consistent with a clonal B cell population and a CD10 positive B-cell lymphoma is favored.      In addition, a second clonal B-cell  population is identified which has a CLL-like immunophenotype except that CD5 expression is equivocal, this may represent a monoclonal B-cell lymphocytosis (MBL).     Addendum   INTERPRETATION  The diagnosis remains the same (see comment)     REASON FOR ADDENDUM  This addendum is issued to reflect additional testing performed on this case. See Comment.      COMMENT  Additional multi-color flow analysis was performed for the following markers:   CD23, CD79b     The CD5 (dimly) positive B cells mostly lack CD23 and CD79b        1/23/23 CT neck:  A) Heterogeneous nodule in the right thyroid gland measuring up  to 1.6 cm.   B) Soft tissue nodule in the superficial left parotid  gland measuring 1.2 cm. Additional 0.8 cm nodule in the deep portion  of the left parotid gland. Soft tissue nodule in the superficial right  parotid gland measuring 0.8 cm. These may represent benign or malignant parotid lesions. Multiplicity of lesions raises concern for Warthin tumors. Otolaryngology consultation is  recommended.  C) Prominent soft tissue around the major arteries in the neck  particularly the common and internal carotid arteries. This is  atypical for atherosclerotic disease and vasculopathy/vasculitis such  as giant cell arteritis should be considered. Probable superimposed  atherosclerotic disease at the carotid bifurcations right greater than  left without definite high-grade stenosis.    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  And B-cell cohort #2 which is described as negative for CD5 and showing dim CD20 and dim kappa staining.  This population is listed a CD5 negative and CD10 negative also listed as negative for  and .  Of note the population of monoclonal B-cell lymphocytosis found in peripheral blood in our department and in the lymph node biopsy in January showed partial/equivocal dim CD5 and may correspond to the cohort #2.     We essentially agree with the diagnosis of follicular lymphoma, which is consistent with prior diagnosis of follicular lymphoma low grade  established on core biopsy of right external iliac lymph node January 20, 2023 (case [id number]) in our Department . The concurrent flow cytometry case [id number] in January showed in addition to CD10 positive B-cell population  also a partial/equivocal CD5 positive separate kappa monotypic B-cell population, which most likely represents monoclonal B-cell lymphocytosis of CLL/SLL type, since it was also documented in the peripheral blood flow cytometry on February 16, 2023 (case number UF ) at a low frequency.  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 Areas with infiltrates of small B cells with coexpression of CD5 and CD23 are seen in the current biopsy, but the size of the biopsy is too small to comprehensively evaluate for the presence of proliferation centers and the extent of involvement by the CD5 positive B cell lymphoproliferative process.   This case has been discussed at intradepartmental hematopathology conference with participation by THELMA Petersen, THELMA Davis and myself.      -5/23 PET/CT NCAP:   1. An ovoid mass in the left lateral orbit  measures 2.5 x 1.2 cm, slightly larger than 01/09/2023, and demonstrates marked uptake (SUVmax 9.3), consistent with biopsy-proven lymphoma  2. A few small FDG avid bilateral intraparotid nodules are present, one on the right and two on the left, including representative left parotid nodule measuring 0.9 x 1.4 cm with moderate uptake (SUVmax 6.7).  3.  Asymmetric prominent right posterior lower cervical node measures 0.8 cm associated with moderate focal   uptake (SUVmax 5.1).  4. A single mildly enlarged left axillary lymph node measures 1.0 cm associated with mild uptake (SUVmax 3.0)  5. Some scattered FDG avid lymph nodes below the diaphragm involve the retroperitoneal retrocaval, bilateral common iliac, left pelvic sidewall and left inguinal regions. Some of these have increased slightly in size since 12/28/2022, such as a conglomerate left pelvic sidewall bj mass measuring approximately 2.2 x 4.1 cm (previously 2.2 x 3.4 cm) associated with marked uptake (SUVmax 7.6).  6. FDG avid sclerotic skeletal lesion involves the right superior acetabulum (SUVmax 12.9) as well as a couple of separate smaller sclerotic lesions in the right iliac bone uptake (SUVmax 12.2).     - 6/14/2023- 6/15/2023 palliative radiation to left orbit follicular lymphoma 4Gy/2fx  - 6/15/2023 bone marrow biopsy  MORPHOLOGY:  - No morphologic or immunophenotypic evidence of follicular lymphoma  - Flow cytometry showing a small population of CD5 positive monotypic B cells; most compatible with monoclonal B lymphocytosis (There is no definitive morphologic correlate for this finding. There is no morphologic or immunophenotypic evidence of involvement by follicular lymphoma.   The CD5-positive B cell clone may best represent monoclonal B lymphocytosis, provided that bj SLL is excluded.)  - Normocellular marrow (cellularity estimated at 30%) with trilineage hematopoietic maturation and no increase in blasts  FLOW CYTOMETRY:  CD5 positive  kappa-monotypic B cells (0.8%)  0.8% B cells which express CD5, CD19, CD20 (dim to negative) and monotypic kappa immunoglobulin light chains (dim) and lack CD10 and CD38. The B cells have similar forward scatter relative to background T cells suggestive of similar size; however, precise size determination is deferred to morphology  CYTOGENETICS:  46,XX    -6/19/2023 CT-guided bone biopsy, right acetabulum  PATHOLOGY:  - Atypical lymphoid infiltrate in a suboptimal biopsy  - No histologic evidence of metastatic carcinoma  - Two small monoclonal B-cell populations detected by flow cytometry  This biopsy is suboptimal for evaluation due to fragmentation, crush artifact, and decalcification. In this limited biopsy, there are fragments of bone and marrow showing a mixed infiltrate that includes both B and T cells. Notably, 2 small clonal B-cell populations were detected by flow cytometry which resemble B-cell populations detected in this patient's past lymph node (1/20/23) and blood and bone marrow (2/16/23, 6/15/23) flow cytometry studies. A definitive morphologic correlate to these cell populations is not appreciated, and the overall features are insufficient for a diagnosis of lymphoma.    FLOW CYTOMETRY:  A. Pelvis, Right:  -CD5 positive kappa-monotypic B cells ( 2.8 %) - see comment A  -CD10 positive lambda-monotypic B cells ( 0.4 %) - see comment B      Electronically signed by Arnaldo Alvarado MD on 6/20/2023 at  1:31 PM   Comment     A) Clonal B cells with the immunophenotype of CLL/SLL are present in this sample.  The differential includes peripheral blood contamination (with an MBL), the tissue based correlate to MBL, versus involvement by SLL.  2.8% B cells which express CD5 (partial, dim), CD19, CD20 (dim to negative) and monotypic kappa immunoglobulin light chains (dim) and lack CD10. The B cells have similar forward scatter relative to background T cells suggestive of similar size; however, precise  "size determination is deferred to morphology.     B) A CD10-positive B cell clone is also present - involvement by the patient's previously diagnosed CD10+ B cell lymphoma is in the differential (follicular lymphoma versus other (transformation to a higher grade lymphoma). 0.4% B cells which express CD10, CD19 (slightly dim), CD20, and monotypic lambda immunoglobulin light chains and lack CD5.  The B cells have increased forward scatter relative to background T cells suggestive of increased size; however, precise size determination is deferred to morphology.        - 7/10/23 Rheumatology consult for ESR and CRP elevated, DEVON positive 1:160 speckled; PCP suspects pt may have PMR; \"I suspect that the bilateral hip OA is altering gait that then results in worsening of low back pain. No further rheumatology workup needed at this time. \"    - 7/11/23 left hip total arthroplasty with severe b/l hip osteoarthritis with Dr. Austin Blood,  cc, discharged with DVT prophylaxis Lovenox 40 mg daily x2 weeks and aspirin 81 mg twice daily x2 weeks    - 10/13/23 right hip total arthroplasty    - 12/23 PET CT NCAP Liver SUV max = 3.78, Aorta Blood SUV max = 3.43.  -New/progressed:  - 0.8 x 0.5 cm right FDG avid cervical lymph node; SUV max of 6.5  - 1.3 x 1.5 cm right FDG avid cervical lymph node; SUV max of 5.8  - 1.3 x 1.4 cm right FDG avid cervical lymph node; SUV max of 7.8   -Lesion in left lateral chest wall immediately inferior to the margin of breast implant, 0.5 x 0.4 mm, SUV 5.02, previously 1.4  - Lymph node adjacent to left psoas, 1.5 cm, SUV 19.9, previously 6.02  - Right acetabulum, 2.0 x 1.9 cm, SUV 14.03, previously 12.05  - No new bone lesions but increased metabolic activity previously identified multiple FDG avid osseous foci:  1.  Sternal lesion, SUV 5.7, previously 3.6  2.  Right scapular spine lesion, SUV 4.6, previously 3.6  3.  Right acetabular bone lesion SUV 13.4, previously 11    -Unchanged:  - 1 " of 2 left parotid gland lesions, 1.6 x 1.1 cm, SUV 8.4, previously 6.6  - Left axillary lymph node, 1.2 x 1.0 cm, SUV 2.3  - Spleen normal    -Resolved/improved:  - Left orbital hypermetabolic lesion-resolved  - 1 of 2 previous left parotid hypermetabolic lesions-resolved  - Multiple mildly hypermetabolic bilateral inguinal nodes, index right inguinal lymph node SUV 2.62, previously 5.19-improved    IMPRESSION: In this patient with follicular lymphoma there is evidence  for progression of disease.  1. There are multiple new hypermetabolic lymph nodes.   2. Increased metabolic activity of multiple previously seen lymph  nodes  3. No new bony lesions identified but there is increased metabolic  activity of the previously identified lesions.  4. Multiple stable and some resolved hypermetabolic lesions; resolved  lesions include the left orbit and left parotid gland lesion.      -12/23 Left pelvic lymph node, biopsy:  - Recurrent/persistent low grade B-cell lymphoma, most consistent with follicular lymphoma  - Negative for metastatic carcinoma    This biopsy is involved by a low grade lymphoma with a staining pattern consistent with follicular lymphoma. The growth pattern of the lymphoma cells appears diffuse, although the biopsy is quite small, limiting assessment of architecture. Concurrent flow cytometry ([id number]) detected a population of CD10-positive B cells negative for surface light chains, similar to the patient's past flow cytometry studies and consistent with recurrent/persistent disease.     In addition, flow cytometry identified a small population of CD5-positive kappa-monotypic B cells (2.6%), similar to the patient's known monoclonal B-cell lymphocytosis (MBL). No definitive morphologic correlate to this finding is appreciated in this biopsy - it may represent peripheral blood contamination versus a tissue-based MBL equivalent.     There is no evidence of large cell transformation in this biopsy, and no  evidence of negro small lymphocytic lymphoma (SLL). However, the biopsy is very small - the possibility of unsampled higher grade disease or SLL cannot be excluded. Correlation with imaging studies is recommended.    Left pelvic LN flow cytometry:  A. Pelvis, Left, :  - CD5 positive kappa-monotypic B cells (2.6%)  Clonal B cells with the immunophenotype of CLL/SLL are present in this sample.  The differential includes peripheral blood contamination (with an MBL) versus involvement by SLL.    - CD10 positive negative for surface light chain B cells (29%) - see comment B  A CD10-positive B cell clone is also present - involvement by the patient's previously diagnosed CD10+ B cell lymphoma is in the differential (follicular lymphoma versus transformation to a higher grade lymphoma).         - 12/23 bone marrow biopsy:  MORPHOLOGY:  - Hypercellular marrow (cellularity estimated at 40%) with trilineage hematopoiesis, no overt dysplasia, and no increase in blasts  - Rare CD5+ kappa-monotypic B cells detected by flow cytometry (1.7%)  - Small lymphoid aggregate suspicious for marrow involvement by CD5+ B-cell lymphoproliferative disorder (<5% of marrow cellularity)  - No definitive histologic or immunophenotypic evidence of follicular lymphoma  Flow cytometry ([id number]) showed rare CD5-positive kappa-monotypic B cells (1.7%). By morphology, a tiny lymphoid aggregate is identified on the trephine core biopsy. This aggregate contains small B cells that appear to have aberrant expression of CD5, and may represent the morphologic correlate to the flow cytometry results. Overall, the findings are suspicious for low level marrow involvement by a CD5+ lymphoproliferative disorder most consistent with monoclonal B-cell lymphocytosis.     There is no definitive evidence of marrow involvement by follicular lymphoma, and no morphologic evidence of large cell lymphoma.  FLOW CYTOMETRY:  -CD5-positive kappa-monotypic B cells (1.7%)  express CD5 (dim, partial), CD19, CD20 (dim), and monotypic kappa immunoglobulin light chains and lack CD10 and CD38.   The monotypic B-cell population present in this specimen has a similar immunophenotype as reported previously in this patient's peripheral blood ([id number], 2/16/23). The immunophenotypic findings are suggestive of marrow involvement by monoclonal B-cell lymphocytosis (versus chronic lymphocytic leukemia/small lymphocytic lymphoma; CLL/SLL). There may be peripheral blood contamination. Large cells may not survive specimen processing. Morphologic correlation is required.   CYTOGENETICS:  46,XX[20]  FISH:  Pending    -1/3/24 second opinion w/Dr. Nguyen     -2/9/24 pt w/right neck lump, x4 days, semi-solid feeling, TTP with deep palpation only    INTERIM HISTORY:  Regarding lymphoma:  Denies weight loss, night sweats, early satiety, RUQ or LUQ pain  Reports left eye blurriness is stable since surgery, not worsened since RT, using lubricating eye drops prn and working with eye doctor for management of glaucoma, reports she had laser treatment to left eye that helped with blurred vision. Appt w/eye doctor 1/22/24, changed eye drops      - 2/9/24 CT soft tissue neck-   There are multiple enlarged and heterogeneous right-sided level 5 and 4 lymph nodes with adjacent fat stranding, increased since 12/1/2023. The largest measures up to 1.7 cm. No drainable fluid collection.    -2/12/24 - 2/19/24 keflex x 7 days  Pt feels size is smaller, thinks the size fluctuates, no pain, no fever  Pt is out of town 3/1-3/10 taking a road trip to [facility]       REVIEW OF SYSTEMS:   A 14 point ROS was reviewed with pertinent positives and negatives in the HPI.       HOME MEDICATIONS:  Current Outpatient Medications   Medication Sig Dispense Refill     cephALEXin (KEFLEX) 500 MG capsule Take 1 capsule (500 mg) by mouth 4 times daily for 7 days 28 capsule 0     dorzolamide-timolol (COSOPT) 2-0.5 %  ophthalmic solution Place 1 drop into both eyes 2 times daily       latanoprost (XALATAN) 0.005 % ophthalmic solution Place 1 drop into both eyes At Bedtime       losartan (COZAAR) 100 MG tablet Take 1 tablet (100 mg) by mouth daily 90 tablet 3     Multiple Vitamins-Minerals (PRESERVISION AREDS) TABS Take 1 tablet by mouth 2 times daily       rosuvastatin (CRESTOR) 10 MG tablet Take 1 tablet (10 mg) by mouth daily 90 tablet 3         ALLERGIES:  Allergies   Allergen Reactions     Compazine      Mental confusion     Hydrochlorothiazide      Dryness mouth     Prochlorperazine Visual Disturbance     Simvastatin Other (See Comments)     Muscle aches         PAST MEDICAL HISTORY:  Past Medical History:   Diagnosis Date     Aftercare following right hip joint replacement surgery 10/25/2023     Breast cancer (H) 2000    Left breast, s/p mastectomy, chemotherapy and endocrine therapy     Follicular lymphoma (H) 01/20/2023     Glaucoma     bilateral - Dr. Moore [facility]     History of spinal stenosis      Hyperlipidemia      Hypertension      Macular degeneration     Dr. Toscano [facility]     Osteoarthritis      Osteoporosis      Personal history of chemotherapy 2000    A/C + Taxol     S/P radiation therapy     400 cGy to left orbit completed on 6/15/2023 [facility]         PAST SURGICAL HISTORY:  Past Surgical History:   Procedure Laterality Date     ARTHROPLASTY HIP Left 07/11/2023    Procedure: ARTHROPLASTY, HIP, TOTAL LEFT;  Surgeon: Austin Blood MD;  Location: [facility] OR     ARTHROPLASTY HIP Right 10/13/2023    Procedure: RIGHT ARTHROPLASTY, HIP, TOTAL;  Surgeon: Austin Blood MD;  Location: [facility] OR     BIOPSY Left 04/06/2023    Left Orbital Biopsy     C MASTECTOMY,SIMPLE  03/2000    left     COLONOSCOPY  2006    Q 10 years     COLONOSCOPY N/A 10/04/2021    Procedure: COLONOSCOPY;  Surgeon: Guru Lyla Ruby MD;   Location: [facility] OR     ESOPHAGOSCOPY, GASTROSCOPY, DUODENOSCOPY (EGD), COMBINED N/A 10/04/2021    Procedure: ENDOSCOPIC ULTRASOUND, ESOPHAGOSCOPY / UPPER GASTROINTESTINAL TRACT (GI), Esophagogastroduodenoscopy,  Fine Needle Biopsy of liver;  Surgeon: Guru Lyla Ruby MD;  Location: [facility] OR     EYE SURGERY       IR LYMPH NODE BIOPSY  2023     SURGICAL HISTORY OF -  Left 2015    tissue expander placed in left breast area     SURGICAL HISTORY OF -  Left 2016    left breast implant and right mammoplasty         SOCIAL HISTORY:  Social History     Socioeconomic History     Marital status: Single     Spouse name: Not on file     Number of children: 0     Years of education: Not on file     Highest education level: Not on file   Occupational History     Employer: [employer]     Occupation: REtired   Tobacco Use     Smoking status: Former     Packs/day: 1.00     Years: 17.00     Additional pack years: 0.00     Total pack years: 17.00     Types: Cigarettes     Quit date: 3/15/1999     Years since quittin.9     Passive exposure: Past     Smokeless tobacco: Never   Vaping Use     Vaping Use: Never used   Substance and Sexual Activity     Alcohol use: Not Currently     Alcohol/week: 4.0 standard drinks of alcohol     Types: 4 Glasses of wine per week     Drug use: No     Sexual activity: Not Currently     Birth control/protection: None   Other Topics Concern     Parent/sibling w/ CABG, MI or angioplasty before 65F 55M? Yes     Comment: Father heart attack   Social History Narrative     Not on file     Social Determinants of Health     Financial Resource Strain: Unknown (2023)    Financial Resource Strain      Within the past 12 months, have you or your family members you live with been unable to get utilities (heat, electricity) when it was really needed?: Patient refused   Food Insecurity: Unknown (2023)    Food Insecurity      Within the past 12 months, did you worry that your food would  run out before you got money to buy more?: Patient refused      Within the past 12 months, did the food you bought just not last and you didn t have money to get more?: Patient refused   Transportation Needs: Unknown (9/21/2023)    Transportation Needs      Within the past 12 months, has lack of transportation kept you from medical appointments, getting your medicines, non-medical meetings or appointments, work, or from getting things that you need?: Patient refused   Physical Activity: Not on file   Stress: Not on file   Social Connections: Not on file   Interpersonal Safety: Low Risk  (10/3/2023)    Interpersonal Safety      Do you feel physically and emotionally safe where you currently live?: Yes      Within the past 12 months, have you been hit, slapped, kicked or otherwise physically hurt by someone?: No      Within the past 12 months, have you been humiliated or emotionally abused in other ways by your partner or ex-partner?: No   Housing Stability: Unknown (9/21/2023)    Housing Stability      Do you have housing? : Patient refused      Are you worried about losing your housing?: Patient refused       FAMILY HISTORY:  Family History   Problem Relation Age of Onset     Cancer Mother         bone cancer     Heart Disease Father      Coronary Artery Disease Father      Diabetes Father      Arthritis Sister      Breast Cancer Sister         age 75     Diabetes Maternal Grandmother      Diabetes Maternal Grandfather      Diabetes Paternal Grandmother      Family history of cancer- sister breast cancer- dx at age 77 y/o, localized breast cancer, surgical resection, RT, no adjuvant chemo or endocrine therapy that pt is aware of     PHYSICAL EXAM:  Vital signs:  There were no vitals taken for this visit.       LABS:   Latest Reference Range & Units 02/09/24 15:57 02/09/24 16:00   CRP Inflammation <5.00 mg/L  12.80 (H)   WBC 4.0 - 11.0 10e3/uL 9.6    Hemoglobin 11.7 - 15.7 g/dL 10.9 (L)    Hematocrit 35.0 - 47.0 %  34.9 (L)    Platelet Count 150 - 450 10e3/uL 251    RBC Count 3.80 - 5.20 10e6/uL 4.08    MCV 78 - 100 fL 86    MCH 26.5 - 33.0 pg 26.7    MCHC 31.5 - 36.5 g/dL 31.2 (L)    RDW 10.0 - 15.0 % 14.6    % Neutrophils % 64    % Lymphocytes % 21    % Monocytes % 10    % Eosinophils % 4    % Basophils % 1    Absolute Basophils 0.0 - 0.2 10e3/uL 0.1    Absolute Eosinophils 0.0 - 0.7 10e3/uL 0.4    Absolute Immature Granulocytes <=0.4 10e3/uL 0.0    Absolute Lymphocytes 0.8 - 5.3 10e3/uL 2.0    Absolute Monocytes 0.0 - 1.3 10e3/uL 1.0    % Immature Granulocytes % 0    Absolute Neutrophils 1.6 - 8.3 10e3/uL 6.2    Absolute NRBCs 10e3/uL 0.0    NRBCs per 100 WBC <1 /100 0    Sed Rate 0 - 30 mm/hr  29   (H): Data is abnormally high  (L): Data is abnormally low    PATHOLOGY:      IMAGING:  Narrative & Impression   CT SOFT TISSUE NECK W CONTRAST 2/9/2024 4:17 PM     History:  sudden onset right supclavicular/base of neck LN  enlargement- r/o abscess; Neck mass  ICD-10: Neck mass      Comparison:  Neck PET CT12/1/2023, 5/18/2023, 1/23/2023     Technique: Following intravenous administration of nonionic iodinated  contrast medium, thin section helical CT images were obtained from the  skull base down to the level of the aortic arch.  Axial, coronal and  sagittal reformations were performed with 2-3 mm slice thickness  reconstruction. Images were reviewed in soft tissue, lung and bone  windows.     Contrast: 90ml isovue 370     Findings:   Evaluation of the mucosal space demonstrates no evident abnormality in  the nasopharynx, oropharynx, hypopharynx or the glottis. The tongue  base appears normal. There is a 1.6 cm enhancing left parotid mass and  an 8 mm right parotid mass which are stable.. Stable 1.5 cm right  thyroid nodule.     There are multiple enlarged and heterogeneous right-sided level 5 and  4 lymph nodes with adjacent fat stranding, increased since 12/1/2023.  The largest measures up to 1.7 cm. No drainable fluid  collection.  Surgical changes of left axillary lymph node dissection.      There is hypoattenuation surrounding both common carotid arteries and  heterogeneous plaque of the bilateral carotid bifurcations, stable.     Evaluation of the osseous structures demonstrate no worrisome lytic or  sclerotic lesion. No overt spinal canal or neuroforaminal stenosis.  Mild mucosal thickening and fluid in the sphenoid sinus, increased.  The mastoid air cells are clear.      The visualized lung apices are clear.                                                                      Impression:  1. Multiple new/enlarged right level 4 and 5 lymph nodes with adjacent  fat stranding, suggesting infection/inflammation, but with history of  follicular lymphoma this could also represent progressive disease. No  abscess.  2. Stable left greater than right enhancing parotid masses.     I have personally reviewed the examination and initial interpretation  and I agree with the findings.     DESTINY CODY MD        ASSESSMENT/PLAN:  Virginia Byers is a 73 year old female with:    #right neck mass  - sudden onset 4cm x4cm base of neck/supraclavicular mass  - 2/9/24 CT neck: multiple enlarged and heterogeneous right-sided level 5 and 4 lymph nodes with adjacent fat stranding, increased since 12/1/2023. The largest measures up to 1.7 cm. No drainable fluid collection.  - 2/9/24 WBC 9.6, ESR 29, CRP 12.8 (LDH hemolyzed)  - 2/12/24-2/19/24 keflex    PLAN:  - suspected persistent lymphadenitis w/some improvement on keflex. Will give bactrim x7 days   - alarm sx discussed    #new anemia  - 1/24 hgb 13.2, 2/24 hgb 10.9    PLAN:  - pt has interval fluctuations in hgb since at least 6/23 that have historically self corrected  - no bleeding, will do close follow up with labs as soon as pt returns from her vacation. If persistent anemia, will do further work up    # 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 INCLUDING biopsy proven LN and left orbital  "mass/lacrimal gland involvement and suspected parotid gland involvement   -11/22 MRI lumbar spine in 12/22 MRI pelvis show prominent left iliac lymph nodes (measuring 1.8 x 3.2, previously 1.5 x 2.7 cm), right medial thigh lymphadenopathy (1.7cm), right iliac bone enhancing marrow signal, left posterior iliac enhancing lesion  -12/22 CT chest abdomen pelvis shows new or increased left common iliac (1.2cm), external iliac, pelvic sidewall lymphadenopathy (2.1cm) and sclerotic lesions in left sacrum and left posterior ilium. No splenomegaly  - 1/23 brain MRI: Indeterminate ovoid T2 hyperintense enhancing mass in the region of the left lacrimal gland measuring approximately 2.4 cm AP by 1.4 cm transverse by 2.3 cm craniocaudal associated with/replacing the left lacrimal gland. Consider correlation with tissue sampling  - 1/23 CT soft tissue neck: Soft tissue nodule in the superficial left parotid gland measuring 1.2 cm. Additional 0.8 cm nodule in the deep portion of the left parotid gland. Soft tissue nodule in the superficial right parotid gland measuring 0.8 cm. These may represent benign or malignant parotid lesions. Multiplicity of lesions raises concern for Warthin tumors  - 1/27/23 note from Dr. Wilmar Morales- \"Based on this diagnosis, I think the 2 nodules in her parotid gland are likely atypical lymph nodes containing lymphoma.  She will probably require some staging imaging, such as PET scan and we can take a look at that as well.  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 " "CD5 stains T cells  - Ki-67 proliferative index is approximately 10%, also low in the follicles.  - flow cytometry \"CD10-positive population is consistent with a clonal B cell population and a CD10 positive B-cell lymphoma is favored.\"    - 4/6/23 left orbital mass/lacrimal gland excisional biopsy: PATHOLOGY: - Follicular Lymphoma, low grade ([facility] negative for BCL2 rearrangement). 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  The flow cytometry from 1/20/23 right external iliac LN biopsy showed in addition to CD10 positive B-cell population  also a partial/equivocal CD5 positive separate kappa monotypic B-cell population, which most likely represents monoclonal B-cell lymphocytosis of CLL/SLL type, since it was also documented in the peripheral blood flow cytometry on February 16, 2023 (case number [facility] ) at a low frequency.  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  - Cohort 2 which is described as negative for CD5, CD10, ,  and showing dim CD20 and dim kappa staining   Of note the population of monoclonal B-cell lymphocytosis found in peripheral blood in our department and in the lymph node biopsy in January showed partial/equivocal dim CD5 and may correspond to the cohort #2.     -5/23 PET/CT NCAP:   1. An ovoid mass in the left lateral orbit measures 2.5 x 1.2 cm, slightly larger than 01/09/2023, and demonstrates marked uptake (SUVmax 9.3), consistent with biopsy-proven lymphoma  2. A few small FDG avid bilateral " intraparotid nodules are present, one on the right and two on the left, including representative left parotid nodule measuring 0.9 x 1.4 cm with moderate uptake (SUVmax 6.7).  3.  Asymmetric prominent right posterior lower cervical node measures 0.8 cm associated with moderate focal uptake (SUVmax 5.1).  4. A single mildly enlarged left axillary lymph node measures 1.0 cm associated with mild uptake (SUVmax 3.0)  5. Some scattered FDG avid lymph nodes below the diaphragm involve the retroperitoneal retrocaval, bilateral common iliac, left pelvic sidewall and left inguinal regions. Some of these have increased slightly in size since 12/28/2022, such as a conglomerate left pelvic sidewall bj mass measuring approximately 2.2 x 4.1 cm (previously 2.2 x 3.4 cm) associated with marked uptake (SUVmax 7.6).  6. FDG avid sclerotic skeletal lesion involves the right superior acetabulum (SUVmax 12.9) as well as a couple of separate smaller sclerotic lesions in the right iliac bone uptake (SUVmax 12.2).     - 6/23 bone marrow biopsy:  MORPHOLOGY:  - No morphologic or immunophenotypic evidence of follicular lymphoma  - Flow cytometry showing a small population of CD5 positive monotypic B cells; most compatible with monoclonal B lymphocytosis (There is no definitive morphologic correlate for this finding. There is no morphologic or immunophenotypic evidence of involvement by follicular lymphoma.  The CD5-positive B cell clone may best represent monoclonal B lymphocytosis, provided that bj SLL is excluded.)  - Normocellular marrow (cellularity estimated at 30%) with trilineage hematopoietic maturation and no increase in blasts  FLOW CYTOMETRY:  CD5 positive kappa-monotypic B cells (0.8%)  0.8% B cells which express CD5, CD19, CD20 (dim to negative) and monotypic kappa immunoglobulin light chains (dim) and lack CD10 and CD38. The B cells have similar forward scatter relative to background T cells suggestive of similar size;  however, precise size determination is deferred to morphology  CYTOGENETICS:  46,XX    -6/19/2023 CT-guided bone biopsy, right acetabulum  PATHOLOGY:  - Atypical lymphoid infiltrate in a suboptimal biopsy  - No histologic evidence of metastatic carcinoma  - Two small monoclonal B-cell populations detected by flow cytometry: CD5 positive kappa-monotypic B cells ( 2.8 %, CD5 partial, dim, CD19 positive, CD30 dim to negative, CD10 negative); CD10 positive lambda-monotypic B cells ( 0.4 %, CD10 positive, CD19 slightly dim, CD20 positive, CD5 negative)     - option of systemic treatment including rituximab discussed previously on multiple occassions particularly w/left orbital involvement, see my previous notes for details; pt deferred  - 6/14/2023- 6/15/2023 palliative radiation to left orbit follicular lymphoma 4Gy/2fx w/Dr. Julian Horvath    - 12/23 PET CT NCAP w/o contrast Liver SUV max = 3.78, Aorta Blood SUV max = 3.43.  -New/progressed:  - 0.8 x 0.5 cm right FDG avid cervical lymph node; SUV max of 6.5  - 1.3 x 1.5 cm right FDG avid cervical lymph node; SUV max of 5.8  - 1.3 x 1.4 cm right FDG avid cervical lymph node; SUV max of 7.8   -Lesion in left lateral chest wall immediately inferior to the margin of breast implant, 0.5 x 0.4 mm, SUV 5.02, previously 1.4  - Lymph node adjacent to left psoas, 1.5 cm, SUV 19.9, previously 6.02  - Right acetabulum, 2.0 x 1.9 cm, SUV 14.03, previously 12.05  - No new bone lesions but increased metabolic activity previously identified multiple FDG avid osseous foci:  1.  Sternal lesion, SUV 5.7, previously 3.6  2.  Right scapular spine lesion, SUV 4.6, previously 3.6  3.  Right acetabular bone lesion SUV 13.4, previously 11    -Unchanged:  -1 of 2 left parotid gland lesions, 1.6 x 1.1 cm, SUV 8.4, previously 6.6  - Left axillary lymph node, 1.2 x 1.0 cm, SUV 2.3  -Spleen normal    -Resolved/improved:  - Left orbital hypermetabolic lesion-resolved  - 1 of 2 previous left parotid  hypermetabolic lesions-resolved  - Multiple mildly hypermetabolic bilateral inguinal nodes, index right inguinal lymph node SUV 2.62, previously 5.19-improved    -12/23 Left pelvic lymph node, biopsy:  - Recurrent/persistent low grade B-cell lymphoma, most consistent with follicular lymphoma (limited evaluation of architecture),  no evidence of large cell transformation in this biopsy, and no evidence of negro small lymphocytic lymphoma (SLL).  - Negative for metastatic carcinoma    Flow cytometry:   C5 positive kappa-monotypic B cells 2.6% consistent with CLL/SLL vs monoclonal B cell lymphocytosis  CD10 positive B cells negative for surface light chain 29%, consistent w/previously dx CD10+ B cell lymphoma    - 12/23 bone marrow biopsy:  MORPHOLOGY:  - Hypercellular marrow (cellularity estimated at 40%) with trilineage hematopoiesis, no overt dysplasia, and no increase in blasts  - Rare CD5+ kappa-monotypic B cells detected by flow cytometry (1.7%)  - Small lymphoid aggregate suspicious for marrow involvement by CD5+ B-cell lymphoproliferative disorder (<5% of marrow cellularity), most consistent with monoclonal B-cell lymphocytosis.  - No definitive histologic or immunophenotypic evidence of follicular lymphoma and  no morphologic evidence of large cell lymphoma.  FLOW CYTOMETRY:  -CD5-positive kappa-monotypic B cells (1.7%) express CD5 (dim, partial), CD19, CD20 (dim), and monotypic kappa immunoglobulin light chains and lack CD10 and CD38.  suggestive of marrow involvement by monoclonal B-cell lymphocytosis (versus chronic lymphocytic leukemia/small lymphocytic lymphoma; CLL/SLL).   CYTOGENETICS:  46,XX[20]  FISH:  Pending    Staging and prognostication:  - Stage: 4 given right medial thigh LN and left common iliac, external iliac, pelvic sidewall; biopsy proven left orbital mass involvement; there was some concern from ENT that patients parotid lesions were related to pts follicular lymphoma   - FLIPI: score 2  (age>60),  stage 3-4; intermediate risk  - pertinent labs: 12/22  and 5/23 ; 2/23 and 5/23 beta 2 microglobulin 1.7, hepatitis B and C negative    Treatment:  - GELF criteria to indicate treatment (involvement of 3 or more bj sites each with a diameter greater than or equal to 3 cm, any bj or extranodal tumor mass with a diameter of greater than or equal to 7 cm, B symptoms, splenomegaly, pleural effusions, peritoneal ascites, clinically progressive or significant cytopenias, leukemia, symptoms, threatened end organ function, clinically significant bulky disease, steady or rapid progression of disease)  - option of systemic treatment including rituximab discussed previously on multiple occassions particularly w/left orbital involvement, see my previous notes for details; pt deferred  - 6/14/2023- 6/15/2023 palliative radiation to left orbit follicular lymphoma 4Gy/2fx    PLAN:  - pt has 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 INCLUDING biopsy proven LN and left orbital mass/lacrimal gland involvement   - bone marrow biopsy negative for lymphoma and right acetabulum bone biopsy atypical lymphoid infiltrate seen but biopsy suboptimal and flow cytometry shows 2.8% CD5 positive kappa monotypic B cells consistent w/monoclonal B cell lymphocytosis and 0.4% CD10 positive lambda-monotypic B cells consistent w/lymphoma- ie follicular vs other  - 12/23 PET showed left pelvic LN w/SUV increased from 6->20, biopsied to rule out transformation to aggressive lymphoma. Biopsy showed follicular lymphoma w/aggressive lymphoma or SLL. LN flow cytometry consistent w/follicular lymphoma and MBL. Concurrent bone marrow biopsy, <5% CD5+ lymphoproliferative d/o consistent w/MBL  - overall stage 4 disease and intermediate risk  - systemic tx including rituximab discussed on multiple prior visits, pt deferred as detailed in my previous notes   - pt completed palliative RT to left orbit; use lubricating eye drops prn (also  using eye drops for glaucoma)  - 2/24 hgb 10.9  - pt overall asx, with exception of self palpating right neck LN (largest 1.7cm)  - she continues to wish to avoid systemic tx unless absolutely necessary thus we will continue active surveillance   - H&P and labs q3 months  - PET q6 months due end of 6/24- to be ordered at next visit (may be sooner if neck LN continue to grow)      #  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   - 1/23 right external iliac LN core needle biopsy- FLOW CYTOMETRY: 1.3% rare monoclonal B cell population CD5 equivocal, CD23 negative, CD 79b negative, possible MBL; no morphological correlate  -2/23 peripheral blood- FLOW CYTOMETRY: 2.3% kappa monoclonal B cell population, CD5 positive, CD38 negative, CD49d negative, possible MBL  - 4/6/23 left orbital mass/lacrimal gland excisional biopsy: FLOW CYTOMETRY: 15.1% kappa monotypic B cell population, bright positive: CD45, CD19, CD22, moderately positive kappa, CD20, CD79b, dim positive CD23, negative CD 2,3,103, lambda, 5, 10, 200, 38, 43  - 6/23 bone marrow biopsy: FLOW CYTOMETRY: 0.8% kappa monotypic B cell population, positive for CD5, CD19, dim to negative CD20, negative for CD38 and CD10  - 6/23 right acetabulum bone biopsy: FLOW CYTOMETRY: 2.8% kappa monotypic B cell population, CD5 partial dim, CD 19 positive, CD20 dim negative, CD10 negative, possible MBL    - of note pt does not have leukocytosis or lymphocytosis, ALC consistently <5000, no splenomegaly, LN biopsy did not demonstrate IHC consistent with SLL  - 5/23 PET/CT as above  - 12/23 bone marrow biopsy:  MORPHOLOGY:  - Hypercellular marrow (cellularity estimated at 40%) with trilineage hematopoiesis, no overt dysplasia, and no increase in blasts  - Rare CD5+ kappa-monotypic B cells detected by flow cytometry (1.7%)  - Small lymphoid aggregate suspicious for marrow involvement by CD5+ B-cell lymphoproliferative disorder (<5% of marrow cellularity), most consistent with  monoclonal B-cell lymphocytosis.  - No definitive histologic or immunophenotypic evidence of follicular lymphoma and  no morphologic evidence of large cell lymphoma.  FLOW CYTOMETRY:  -CD5-positive kappa-monotypic B cells (1.7%) express CD5 (dim, partial), CD19, CD20 (dim), and monotypic kappa immunoglobulin light chains and lack CD10 and CD38.  suggestive of marrow involvement by monoclonal B-cell lymphocytosis (versus chronic lymphocytic leukemia/small lymphocytic lymphoma; CLL/SLL).     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 Bone marrow biopsy negative for involvement of lymphoma 6/23 and shows <5% cellularity involvement of MBL 12/23  - 9/23 ALC 1.6, 12/23 ALC 1.7, 1/24 ALC 1.7  - Monitor CBC with differential and clinically every 3-6 months    #Sclerotic lesions in left sacrum and left posterior ilium  -10/21 mammogram, endoscopy with EUS and colonoscopy WNL  -12/22 CT chest abdomen pelvis shows new or increased left common iliac, external iliac, pelvic sidewall lymphadenopathy and sclerotic lesions in left sacrum and left posterior ilium.  No metastatic disease in chest.  No abnormal findings in breast.  - 12/22 tumor markers: CA 15-3 24,  29, CEA 3.1, CA 19-9 4, AFP 5.9,   - 1/2023 MRI brain w/wo contrast: no metastases   - 1/2023 right external iliac LN core need biopsy- no metastatic carcinoma   - 5/23 PET avid bone lesions  -6/19/2023 CT-guided bone biopsy, right acetabulum  PATHOLOGY:  - Atypical lymphoid infiltrate in a suboptimal biopsy  - No histologic evidence of metastatic carcinoma  - Two small monoclonal B-cell populations detected by flow cytometry: CD5 positive kappa-monotypic B cells ( 2.8 %, CD5 partial, dim, CD19 positive, CD30 dim to  "negative, CD10 negative); CD10 positive lambda-monotypic B cells ( 0.4 %, CD10 positive, CD19 slightly dim, CD20 positive, CD5 negative)   - 12/23 PET   - Right acetabulum, 2.0 x 1.9 cm, SUV 14.03, previously 12.05  - No new bone lesions but increased metabolic activity previously identified multiple FDG avid osseous foci:  1.  Sternal lesion, SUV 5.7, previously 3.6  2.  Right scapular spine lesion, SUV 4.6, previously 3.6  3.  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    -4/2020 left breast simple mastectomy with no residual carcinoma.   -S/p ACx4, taxol x4, no anti-her2 treatment, no RT.   -S/p 5 years adjuvant endocrine therapy (tamoxifen and anastrozole).  -Delayed left breast reconstruction and right breast augmentation.   - 5/23 PET/CT notes single enlarged left axillary lymph node with SUV 3, \"could also represent lymphoma although given patient's history of left breast cancer, a breast cancer metastasis is also a possibility\"  - The above in the setting of known lymphoma and breast tumor markers normal   -9/23 right breast diagnostic mammogram: [facility]  - 9/23 ultrasound left axilla: 1.1 x 0.9 x 1.0 cm lymph node with mildly thickened cortex that correlates with abnormal lymph node on previous PET, recommend tissue diagnosis  - 9/23 ultrasound-guided left axillary lymph node core biopsy: No morphologic evidence of lymphoma.  Lymph node tissue was received in formalin, this could not be sent for flow cytometry.  No separate " fresh tissue was sent for flow cytometry.  - 12/23 PET Left axillary lymph node, 1.2 x 1.0 cm, SUV 2.3, unchanged    PLAN:   -With the limitations of tissue processed in formalin and no fresh tissue available for flow cytometry, appears left axilla lymph node is benign without lymphoma      RTC 3-4 weeks for follow up with me, labs prior to appt   Pt is out of town 3/1-3/10 taking a road trip to [facility]     Arlene Ahumada DO  Hematology/Oncology  [facility]      Again, thank you for allowing me to participate in the care of your patient.        Sincerely,        ARLENE AHUMADA, DO

## 2024-02-20 NOTE — NURSING NOTE
DISCHARGE PLAN:  Next appointments: See patient instruction section  Departure Mode: video  Accompanied by:    minutes for medical assistant discharge (face to face time)     [place] MARGA Byers is here today for onc follow up.  Patient was not seen by medical assistant at time of the appointment.   Appointments scheduled for labs and follow up with Dejuan in . I called pt and coordinated appts with her. She does look at [facility] for her appts.  See patient instructions and Oncologist's Progress note for further details. Questions and concerns addressed to patient's satisfaction. Patient verbalized and demonstrated understanding of plan.  Contact information provided and patient is encouraged to call with any that arise in the interim of care.    Lauren Lynch  [facility]  [phone]  2/20/2024, 2:27 PM

## 2024-02-20 NOTE — LETTER
2/20/2024         RE: Virginia Byers  [id number] [facility] [address]        Dear Colleague,    Thank you for referring your patient, Virginia Byers, to the [facility]. Please see a copy of my visit note below.    Video-Visit Details     Video Start Time: 9:49AM     Type of service:  Video Visit     Video End Time: 10:02AM     Originating Location (pt. 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    Patient has medical history including LEFT breast cancer in 2000, hypertension, hyperlipidemia, osteoporosis, osteoarthritis, degenerative disc disease, spinal stenosis of lumbar region,  s/p bilateral L5-S1 transforaminal ROS by Dr. Garcia on 7/9/2021, s/p bilateral L4-5 and L5-S1 facet joint injections on 6/14/2021, glaucoma and macular degeneration, cataracts s/p surgery, history of tobacco use (quit 1999)     Regarding previous history of breast cancer:  She was previously treated by Dr. Sakina Brandt at [facility]      In summary, diagnosed at age 49 with LEFT breast IDC ER positive, OK positive, HER2 positive on FISH. 3/2000 left breast lumpectomy and left axillary node dissection (IDC, grade 3, 1.45 cm incompletely excised tumor, DCIS, positive margins with infiltrating carcinoma, 1 of 17 lymph nodes positive (0.5 cm metastatic focus), ER positive, OK positive, HER2 positive).  4/2020 left breast simple mastectomy with no residual carcinoma. S/p ACx4, taxol x4, no anti-her2 treatment, no RT. S/p 5 years adjuvant endocrine therapy (tamoxifen and  anastrozole). Delayed left breast reconstruction and right breast augmentation.      3/2/2000  Excisional biopsy of Left breast mass -   Infiltrating ductal carcinoma with associated DCIS   ER positive (60%), HI positive (22%), HER2 by FISH POSITIVE     3/10/2000 - Left lumpectomy and left axillary node  Invasive ductal carcinoma, Grade 3, 1.45 cm (incompletely excised), negative for LVI  DCIS, greatest histologic dimension of tumor (DCIS PLUS invasive tumor) = 2.9 cm (estimate 50% DCIS)  Positive margins- infiltrating carcinoma extends to margin  1 of 17 axillary lymph nodes sampled was positive for metastatic focus of 0.5 cm.  ER/HI+     Sections of lumpectomy left breast showing residual 1.1 cm focus of ductal carcinoma in situ (DCIS), present within microns of the linked margin of resection.       4/11/2000 - Left breast simple mastectomy, negative for residual carcinoma  Left mastectomy on 4/11/2000 performed by Dr. Cristian Mcdonald at [facility].      Adjuvant treatment:  - 5/15/2000-7/24/2000 4 cycles of Adriamycin and Cytoxan   - 8/16/2000-10/18/2000 4 cycles of paclitaxel   - Appears she may have been on some kind of trial/protocol, was not given any anti-HER2 targeted treatment  - No adjuvant radiation  - 12/2000- 10/2002 tamoxifen  - 10/2002-5/2006 switched to anastrozole (due to in vitro studies showing anastrozole may be slightly better for patients were HER2 positive) with Fosamax for osteopenia     8/31/2015 Left delayed reconstruction with tissue expander    2/3/2016 Left 2nd stage 450 cc high profile silicone implant; right augmentation 150 cc Moderate classic profile silicone implant; cresent mastopexy    10/18/2021: Screening mammogram right breast ZACKARY    OTHER:  Of note, patient has documentation of anemia (iron deficiency and anemia of chronic disease), elevated LFTs, nausea and vomiting of all solids and liquids, fatigue with 26 pound unintentional weight loss from 7/20/2021 - 10/20/2021  with mildly elevated LFTs which led to evaluation with mammogram, endoscopy with EUS and colonoscopy as detailed below. Pt did associate these with some spinal injections she had gotten, due to overlapping times. Pt did regain all of the weight and hand improvement of anemia within 2 months, without significant intervention.     -10/2021: Endoscopy with EUS normal EGD and no stigmata or source of upper GI bleeding, visualized bile duct, gallbladder, liver, pancreas, left adrenal gland normal.  No lymphadenopathy. LIVER, RANDOM NEEDLE BIOPSY: Benign parenchyma with congestion; no significant fibrosis or other abnormalities     -10/2021 colonoscopy: Hemorrhoids, diverticulosis in sigmoid colon and splenic flexure    Current history:  -Ongoing right knee pain not improved with brace and physical therapy, s/p bilateral L5-S1 transforaminal ROS by Dr. Garcia on 7/9/2021, s/p bilateral L4-5 and L5-S1 facet joint injections on 6/14/2021. ongoing b/l hip and leg pain, worse with position changes (ie sitting to standing), constant, 8-9/10, tylenol brings pain down to 6/10 (using tylenol bid). ECOG 3.   -Patient has seen neurosurgery Dr. Jose Alexander, occupational medicine specialist Dr.Orrin Murphy, with plan to see PM&R Dr. Margie Agudelo and movement disorder clinic  - 11/22 MRI lumbar spine:    prominent left iliac lymph nodes among other findings related to degenerative changes and neural foraminal stenosis throughout lumbar spine    - 12/22 MRI pelvis:  1. Enhancing marrow signal abnormality of the right iliac bone extending from the superior acetabulum subchondral location proximally almost to the level of the sacroiliac joint caudal aspect. No associated fracture line. Underlying marrow infiltrative process such as from metastasis cannot be excluded especially given the degree of T1 hypointensity and history of primary tumor. Other consideration include stress reaction.  2. Mildly increased left external iliac chain, and  the right medial thigh lymphadenopathy. Metastasis cannot be excluded.  3. Left posterior iliac enhancing lesion, similar in size to prior CT 9/2021 and previously not visible on CT, focal enhancing lesion in the right greater trochanter laterally. Findings are concerning for Metastasis.    -12/22 CT chest abdomen pelvis with contrast:  COMPARISON: MRI pelvis 12/20/2022 and CT abdomen pelvis 9/21/2021.  1. Mastectomies with implant reconstructions. No lymphadenopathy  2.  There is an new or increased left common iliac, external iliac and pelvic sidewall lymphadenopathy since prior CT. Distal left common iliac lymph nodes measure up to 1.2 cm on this exam compared to 0.7 cm on prior CT. Left pelvic sidewall lymph node measures 2.1 cm short axis on this exam compared to 1.2 cm on prior CT . consistent with metastatic disease.  3. Sclerotic lesions in the left sacrum and left posterior ilium are unchanged from prior CT.  may be metastatic.  4. No evidence of metastatic disease in the chest.     -1/2023 MRI brain w/wo contrast:  IMPRESSION:  1. No findings of intracranial metastatic disease.  2. Indeterminate ovoid T2 hyperintense enhancing mass in the region of the  left lacrimal gland measuring approximately 2.4 cm AP by 1.4 cm  transverse by 2.3 cm craniocaudal associated with/replacing the left  lacrimal gland. Consider correlation with tissue sampling.  3. A few indeterminate partially visualized heterogeneous enhancing  Nodules measuring up to approximately 1.5 cm in the bilateral parotid glands.   Consider soft tissue neck CT with contrast for further evaluation.    -1/20/2023: IR US Guided biopsy of a right external iliac LN  Final Diagnosis   A.  LYMPH NODE, RIGHT EXTERNAL ILIAC, CORE NEEDLE BIOPSY AND TOUCH IMPRINT:  -Involved by follicular lymphoma, low-grade (WHO grade 1-2)  -Negative for metastatic carcinoma     The morphologic and immunophenotypic patterns are consistent with follicular lymphoma.  There is  no high-grade lymphoma present in this limited specimen.  Concurrent flow study ([id number]) demonstrated CD10-positive lambda monotypic B cells (22%), rare kappa monotypic B cells (1.3%), and no aberrant immunophenotype on T cells.  A FISH study for Bcl-2 is pending and will be reported separately.     The case was initially reviewed by Dr. Cifuentes (breast pathology).     This case was reviewed in part at our Hematopathology Faculty Consensus conference at which Girish Nolasco, Narciso, and Kayode were present in addition to myself.  This is a small needle core.  In some of the areas, the CD10+ B cells are confined to follicles.  Though the possibility of in situ follicular neoplasia was discussed, we still favor a diagnosis of follicular lymphoma - there are a number of CD10+ B cells outside of follicles at the base of the biopsy, and the clinical history supports a greater extent of involvement.     There was a tiny kappa monotypic B cell population identified by flow (separate from the CD10+ clone).  We looked for the morphologic correlate in this case by IHC, but it is not readily apparent.  This may represent a monoclonal B lymphocytosis - consider sending a peirpheral blood sample for flow cytometry.    FISH:  RESULTS:     NORMAL  - No rearrangement of BCL2     INTERPRETATION:  No evidence was found by FISH of rearrangement of the BCL2 (18q21) locus. These findings are not helpful for confirming or further characterizing the reported pathologic diagnosis of follicular lymphoma.    Flow Interpretation   A. Lymph Node(s), :  -CD10-positive lambda monotypic B cells (22%)  -Rare kappa monotypic B cells (1.3%)  -No aberrant immunophenotype on T cells  See comment   Electronically signed by Krista Headley MD on 1/24/2023 at  3:10 PM   Comment     The CD10-positive population is consistent with a clonal B cell population and a CD10 positive B-cell lymphoma is favored.      In addition, a second clonal B-cell  population is identified which has a CLL-like immunophenotype except that CD5 expression is equivocal, this may represent a monoclonal B-cell lymphocytosis (MBL).     Addendum   INTERPRETATION  The diagnosis remains the same (see comment)     REASON FOR ADDENDUM  This addendum is issued to reflect additional testing performed on this case. See Comment.      COMMENT  Additional multi-color flow analysis was performed for the following markers:   CD23, CD79b     The CD5 (dimly) positive B cells mostly lack CD23 and CD79b        1/23/23 CT neck:  A) Heterogeneous nodule in the right thyroid gland measuring up  to 1.6 cm.   B) Soft tissue nodule in the superficial left parotid  gland measuring 1.2 cm. Additional 0.8 cm nodule in the deep portion  of the left parotid gland. Soft tissue nodule in the superficial right  parotid gland measuring 0.8 cm. These may represent benign or malignant parotid lesions. Multiplicity of lesions raises concern for Warthin tumors. Otolaryngology consultation is  recommended.  C) Prominent soft tissue around the major arteries in the neck  particularly the common and internal carotid arteries. This is  atypical for atherosclerotic disease and vasculopathy/vasculitis such  as giant cell arteritis should be considered. Probable superimposed  atherosclerotic disease at the carotid bifurcations right greater than  left without definite high-grade stenosis.    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  And B-cell cohort #2 which is described as negative for CD5 and showing dim CD20 and dim kappa staining.  This population is listed a CD5 negative and CD10 negative also listed as negative for  and .  Of note the population of monoclonal B-cell lymphocytosis found in peripheral blood in our department and in the lymph node biopsy in January showed partial/equivocal dim CD5 and may correspond to the cohort #2.     We essentially agree with the diagnosis of follicular lymphoma, which is consistent with prior diagnosis of follicular lymphoma low grade  established on core biopsy of right external iliac lymph node January 20, 2023 (case [id number]) in our Department . The concurrent flow cytometry case [id number] in January showed in addition to CD10 positive B-cell population  also a partial/equivocal CD5 positive separate kappa monotypic B-cell population, which most likely represents monoclonal B-cell lymphocytosis of CLL/SLL type, since it was also documented in the peripheral blood flow cytometry on February 16, 2023 (case number UF ) at a low frequency.  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 Areas with infiltrates of small B cells with coexpression of CD5 and CD23 are seen in the current biopsy, but the size of the biopsy is too small to comprehensively evaluate for the presence of proliferation centers and the extent of involvement by the CD5 positive B cell lymphoproliferative process.   This case has been discussed at intradepartmental hematopathology conference with participation by THELMA Petersen, THELMA Davis and myself.      -5/23 PET/CT NCAP:   1. An ovoid mass in the left lateral orbit  measures 2.5 x 1.2 cm, slightly larger than 01/09/2023, and demonstrates marked uptake (SUVmax 9.3), consistent with biopsy-proven lymphoma  2. A few small FDG avid bilateral intraparotid nodules are present, one on the right and two on the left, including representative left parotid nodule measuring 0.9 x 1.4 cm with moderate uptake (SUVmax 6.7).  3.  Asymmetric prominent right posterior lower cervical node measures 0.8 cm associated with moderate focal   uptake (SUVmax 5.1).  4. A single mildly enlarged left axillary lymph node measures 1.0 cm associated with mild uptake (SUVmax 3.0)  5. Some scattered FDG avid lymph nodes below the diaphragm involve the retroperitoneal retrocaval, bilateral common iliac, left pelvic sidewall and left inguinal regions. Some of these have increased slightly in size since 12/28/2022, such as a conglomerate left pelvic sidewall bj mass measuring approximately 2.2 x 4.1 cm (previously 2.2 x 3.4 cm) associated with marked uptake (SUVmax 7.6).  6. FDG avid sclerotic skeletal lesion involves the right superior acetabulum (SUVmax 12.9) as well as a couple of separate smaller sclerotic lesions in the right iliac bone uptake (SUVmax 12.2).     - 6/14/2023- 6/15/2023 palliative radiation to left orbit follicular lymphoma 4Gy/2fx  - 6/15/2023 bone marrow biopsy  MORPHOLOGY:  - No morphologic or immunophenotypic evidence of follicular lymphoma  - Flow cytometry showing a small population of CD5 positive monotypic B cells; most compatible with monoclonal B lymphocytosis (There is no definitive morphologic correlate for this finding. There is no morphologic or immunophenotypic evidence of involvement by follicular lymphoma.   The CD5-positive B cell clone may best represent monoclonal B lymphocytosis, provided that bj SLL is excluded.)  - Normocellular marrow (cellularity estimated at 30%) with trilineage hematopoietic maturation and no increase in blasts  FLOW CYTOMETRY:  CD5 positive  kappa-monotypic B cells (0.8%)  0.8% B cells which express CD5, CD19, CD20 (dim to negative) and monotypic kappa immunoglobulin light chains (dim) and lack CD10 and CD38. The B cells have similar forward scatter relative to background T cells suggestive of similar size; however, precise size determination is deferred to morphology  CYTOGENETICS:  46,XX    -6/19/2023 CT-guided bone biopsy, right acetabulum  PATHOLOGY:  - Atypical lymphoid infiltrate in a suboptimal biopsy  - No histologic evidence of metastatic carcinoma  - Two small monoclonal B-cell populations detected by flow cytometry  This biopsy is suboptimal for evaluation due to fragmentation, crush artifact, and decalcification. In this limited biopsy, there are fragments of bone and marrow showing a mixed infiltrate that includes both B and T cells. Notably, 2 small clonal B-cell populations were detected by flow cytometry which resemble B-cell populations detected in this patient's past lymph node (1/20/23) and blood and bone marrow (2/16/23, 6/15/23) flow cytometry studies. A definitive morphologic correlate to these cell populations is not appreciated, and the overall features are insufficient for a diagnosis of lymphoma.    FLOW CYTOMETRY:  A. Pelvis, Right:  -CD5 positive kappa-monotypic B cells ( 2.8 %) - see comment A  -CD10 positive lambda-monotypic B cells ( 0.4 %) - see comment B      Electronically signed by Arnaldo Alvarado MD on 6/20/2023 at  1:31 PM   Comment     A) Clonal B cells with the immunophenotype of CLL/SLL are present in this sample.  The differential includes peripheral blood contamination (with an MBL), the tissue based correlate to MBL, versus involvement by SLL.  2.8% B cells which express CD5 (partial, dim), CD19, CD20 (dim to negative) and monotypic kappa immunoglobulin light chains (dim) and lack CD10. The B cells have similar forward scatter relative to background T cells suggestive of similar size; however, precise  "size determination is deferred to morphology.     B) A CD10-positive B cell clone is also present - involvement by the patient's previously diagnosed CD10+ B cell lymphoma is in the differential (follicular lymphoma versus other (transformation to a higher grade lymphoma). 0.4% B cells which express CD10, CD19 (slightly dim), CD20, and monotypic lambda immunoglobulin light chains and lack CD5.  The B cells have increased forward scatter relative to background T cells suggestive of increased size; however, precise size determination is deferred to morphology.        - 7/10/23 Rheumatology consult for ESR and CRP elevated, DEVON positive 1:160 speckled; PCP suspects pt may have PMR; \"I suspect that the bilateral hip OA is altering gait that then results in worsening of low back pain. No further rheumatology workup needed at this time. \"    - 7/11/23 left hip total arthroplasty with severe b/l hip osteoarthritis with Dr. [place] Blood,  cc, discharged with DVT prophylaxis Lovenox 40 mg daily x2 weeks and aspirin 81 mg twice daily x2 weeks    - 10/13/23 right hip total arthroplasty    - 12/23 PET CT NCAP Liver SUV max = 3.78, Aorta Blood SUV max = 3.43.  -New/progressed:  - 0.8 x 0.5 cm right FDG avid cervical lymph node; SUV max of 6.5  - 1.3 x 1.5 cm right FDG avid cervical lymph node; SUV max of 5.8  - 1.3 x 1.4 cm right FDG avid cervical lymph node; SUV max of 7.8   -Lesion in left lateral chest wall immediately inferior to the margin of breast implant, 0.5 x 0.4 mm, SUV 5.02, previously 1.4  - Lymph node adjacent to left psoas, 1.5 cm, SUV 19.9, previously 6.02  - Right acetabulum, 2.0 x 1.9 cm, SUV 14.03, previously 12.05  - No new bone lesions but increased metabolic activity previously identified multiple FDG avid osseous foci:  1.  Sternal lesion, SUV 5.7, previously 3.6  2.  Right scapular spine lesion, SUV 4.6, previously 3.6  3.  Right acetabular bone lesion SUV 13.4, previously 11    -Unchanged:  - 1 " of 2 left parotid gland lesions, 1.6 x 1.1 cm, SUV 8.4, previously 6.6  - Left axillary lymph node, 1.2 x 1.0 cm, SUV 2.3  - Spleen normal    -Resolved/improved:  - Left orbital hypermetabolic lesion-resolved  - 1 of 2 previous left parotid hypermetabolic lesions-resolved  - Multiple mildly hypermetabolic bilateral inguinal nodes, index right inguinal lymph node SUV 2.62, previously 5.19-improved    IMPRESSION: In this patient with follicular lymphoma there is evidence  for progression of disease.  1. There are multiple new hypermetabolic lymph nodes.   2. Increased metabolic activity of multiple previously seen lymph  nodes  3. No new bony lesions identified but there is increased metabolic  activity of the previously identified lesions.  4. Multiple stable and some resolved hypermetabolic lesions; resolved  lesions include the left orbit and left parotid gland lesion.      -12/23 Left pelvic lymph node, biopsy:  - Recurrent/persistent low grade B-cell lymphoma, most consistent with follicular lymphoma  - Negative for metastatic carcinoma    This biopsy is involved by a low grade lymphoma with a staining pattern consistent with follicular lymphoma. The growth pattern of the lymphoma cells appears diffuse, although the biopsy is quite small, limiting assessment of architecture. Concurrent flow cytometry ([id number]) detected a population of CD10-positive B cells negative for surface light chains, similar to the patient's past flow cytometry studies and consistent with recurrent/persistent disease.     In addition, flow cytometry identified a small population of CD5-positive kappa-monotypic B cells (2.6%), similar to the patient's known monoclonal B-cell lymphocytosis (MBL). No definitive morphologic correlate to this finding is appreciated in this biopsy - it may represent peripheral blood contamination versus a tissue-based MBL equivalent.     There is no evidence of large cell transformation in this biopsy, and no  evidence of negro small lymphocytic lymphoma (SLL). However, the biopsy is very small - the possibility of unsampled higher grade disease or SLL cannot be excluded. Correlation with imaging studies is recommended.    Left pelvic LN flow cytometry:  A. Pelvis, Left, :  - CD5 positive kappa-monotypic B cells (2.6%)  Clonal B cells with the immunophenotype of CLL/SLL are present in this sample.  The differential includes peripheral blood contamination (with an MBL) versus involvement by SLL.    - CD10 positive negative for surface light chain B cells (29%) - see comment B  A CD10-positive B cell clone is also present - involvement by the patient's previously diagnosed CD10+ B cell lymphoma is in the differential (follicular lymphoma versus transformation to a higher grade lymphoma).         - 12/23 bone marrow biopsy:  MORPHOLOGY:  - Hypercellular marrow (cellularity estimated at 40%) with trilineage hematopoiesis, no overt dysplasia, and no increase in blasts  - Rare CD5+ kappa-monotypic B cells detected by flow cytometry (1.7%)  - Small lymphoid aggregate suspicious for marrow involvement by CD5+ B-cell lymphoproliferative disorder (<5% of marrow cellularity)  - No definitive histologic or immunophenotypic evidence of follicular lymphoma  Flow cytometry ([id number]) showed rare CD5-positive kappa-monotypic B cells (1.7%). By morphology, a tiny lymphoid aggregate is identified on the trephine core biopsy. This aggregate contains small B cells that appear to have aberrant expression of CD5, and may represent the morphologic correlate to the flow cytometry results. Overall, the findings are suspicious for low level marrow involvement by a CD5+ lymphoproliferative disorder most consistent with monoclonal B-cell lymphocytosis.     There is no definitive evidence of marrow involvement by follicular lymphoma, and no morphologic evidence of large cell lymphoma.  FLOW CYTOMETRY:  -CD5-positive kappa-monotypic B cells (1.7%)  express CD5 (dim, partial), CD19, CD20 (dim), and monotypic kappa immunoglobulin light chains and lack CD10 and CD38.   The monotypic B-cell population present in this specimen has a similar immunophenotype as reported previously in this patient's peripheral blood ([id number], 2/16/23). The immunophenotypic findings are suggestive of marrow involvement by monoclonal B-cell lymphocytosis (versus chronic lymphocytic leukemia/small lymphocytic lymphoma; CLL/SLL). There may be peripheral blood contamination. Large cells may not survive specimen processing. Morphologic correlation is required.   CYTOGENETICS:  46,XX[20]  FISH:  Pending    -1/3/24 second opinion w/Dr. Nguyen     -2/9/24 pt w/right neck lump, x4 days, semi-solid feeling, TTP with deep palpation only    INTERIM HISTORY:  Regarding lymphoma:  Denies weight loss, night sweats, early satiety, RUQ or LUQ pain  Reports left eye blurriness is stable since surgery, not worsened since RT, using lubricating eye drops prn and working with eye doctor for management of glaucoma, reports she had laser treatment to left eye that helped with blurred vision. Appt w/eye doctor 1/22/24, changed eye drops      - 2/9/24 CT soft tissue neck-   There are multiple enlarged and heterogeneous right-sided level 5 and 4 lymph nodes with adjacent fat stranding, increased since 12/1/2023. The largest measures up to 1.7 cm. No drainable fluid collection.    -2/12/24 - 2/19/24 keflex x 7 days  Pt feels size is smaller, thinks the size fluctuates, no pain, no fever  Pt is out of town 3/1-3/10 taking a road trip to [facility]       REVIEW OF SYSTEMS:   A 14 point ROS was reviewed with pertinent positives and negatives in the HPI.       HOME MEDICATIONS:  Current Outpatient Medications   Medication Sig Dispense Refill     cephALEXin (KEFLEX) 500 MG capsule Take 1 capsule (500 mg) by mouth 4 times daily for 7 days 28 capsule 0     dorzolamide-timolol (COSOPT) 2-0.5 %  ophthalmic solution Place 1 drop into both eyes 2 times daily       latanoprost (XALATAN) 0.005 % ophthalmic solution Place 1 drop into both eyes At Bedtime       losartan (COZAAR) 100 MG tablet Take 1 tablet (100 mg) by mouth daily 90 tablet 3     Multiple Vitamins-Minerals (PRESERVISION AREDS) TABS Take 1 tablet by mouth 2 times daily       rosuvastatin (CRESTOR) 10 MG tablet Take 1 tablet (10 mg) by mouth daily 90 tablet 3         ALLERGIES:  Allergies   Allergen Reactions     Compazine      Mental confusion     Hydrochlorothiazide      Dryness mouth     Prochlorperazine Visual Disturbance     Simvastatin Other (See Comments)     Muscle aches         PAST MEDICAL HISTORY:  Past Medical History:   Diagnosis Date     Aftercare following right hip joint replacement surgery 10/25/2023     Breast cancer (H) 2000    Left breast, s/p mastectomy, chemotherapy and endocrine therapy     Follicular lymphoma (H) 01/20/2023     Glaucoma     bilateral - Dr. Moore [facility]     History of spinal stenosis      Hyperlipidemia      Hypertension      Macular degeneration     Dr. Toscano [facility]     Osteoarthritis      Osteoporosis      Personal history of chemotherapy 2000    A/C + Taxol     S/P radiation therapy     400 cGy to left orbit completed on 6/15/2023 [facility]         PAST SURGICAL HISTORY:  Past Surgical History:   Procedure Laterality Date     ARTHROPLASTY HIP Left 07/11/2023    Procedure: ARTHROPLASTY, HIP, TOTAL LEFT;  Surgeon: Austin Blood MD;  Location: [facility] OR     ARTHROPLASTY HIP Right 10/13/2023    Procedure: RIGHT ARTHROPLASTY, HIP, TOTAL;  Surgeon: Austin Blood MD;  Location: [facility] Main OR     BIOPSY Left 04/06/2023    Left Orbital Biopsy     C MASTECTOMY,SIMPLE  03/2000    left     COLONOSCOPY  2006    Q 10 years     COLONOSCOPY N/A 10/04/2021    Procedure: COLONOSCOPY;  Surgeon: Guru Lyla Ruby MD;   Location: [facility] OR     ESOPHAGOSCOPY, GASTROSCOPY, DUODENOSCOPY (EGD), COMBINED N/A 10/04/2021    Procedure: ENDOSCOPIC ULTRASOUND, ESOPHAGOSCOPY / UPPER GASTROINTESTINAL TRACT (GI), Esophagogastroduodenoscopy,  Fine Needle Biopsy of liver;  Surgeon: Guru Lyla Ruby MD;  Location: [facility] OR     EYE SURGERY       IR LYMPH NODE BIOPSY  2023     SURGICAL HISTORY OF -  Left 2015    tissue expander placed in left breast area     SURGICAL HISTORY OF -  Left 2016    left breast implant and right mammoplasty         SOCIAL HISTORY:  Social History     Socioeconomic History     Marital status: Single     Spouse name: Not on file     Number of children: 0     Years of education: Not on file     Highest education level: Not on file   Occupational History     Employer: [employer]     Occupation: REtired   Tobacco Use     Smoking status: Former     Packs/day: 1.00     Years: 17.00     Additional pack years: 0.00     Total pack years: 17.00     Types: Cigarettes     Quit date: 3/15/1999     Years since quittin.9     Passive exposure: Past     Smokeless tobacco: Never   Vaping Use     Vaping Use: Never used   Substance and Sexual Activity     Alcohol use: Not Currently     Alcohol/week: 4.0 standard drinks of alcohol     Types: 4 Glasses of wine per week     Drug use: No     Sexual activity: Not Currently     Birth control/protection: None   Other Topics Concern     Parent/sibling w/ CABG, MI or angioplasty before 65F 55M? Yes     Comment: Father heart attack   Social History Narrative     Not on file     Social Determinants of Health     Financial Resource Strain: Unknown (2023)    Financial Resource Strain      Within the past 12 months, have you or your family members you live with been unable to get utilities (heat, electricity) when it was really needed?: Patient refused   Food Insecurity: Unknown (2023)    Food Insecurity      Within the past 12 months, did you worry that your food would  run out before you got money to buy more?: Patient refused      Within the past 12 months, did the food you bought just not last and you didn t have money to get more?: Patient refused   Transportation Needs: Unknown (9/21/2023)    Transportation Needs      Within the past 12 months, has lack of transportation kept you from medical appointments, getting your medicines, non-medical meetings or appointments, work, or from getting things that you need?: Patient refused   Physical Activity: Not on file   Stress: Not on file   Social Connections: Not on file   Interpersonal Safety: Low Risk  (10/3/2023)    Interpersonal Safety      Do you feel physically and emotionally safe where you currently live?: Yes      Within the past 12 months, have you been hit, slapped, kicked or otherwise physically hurt by someone?: No      Within the past 12 months, have you been humiliated or emotionally abused in other ways by your partner or ex-partner?: No   Housing Stability: Unknown (9/21/2023)    Housing Stability      Do you have housing? : Patient refused      Are you worried about losing your housing?: Patient refused       FAMILY HISTORY:  Family History   Problem Relation Age of Onset     Cancer Mother         bone cancer     Heart Disease Father      Coronary Artery Disease Father      Diabetes Father      Arthritis Sister      Breast Cancer Sister         age 75     Diabetes Maternal Grandmother      Diabetes Maternal Grandfather      Diabetes Paternal Grandmother      Family history of cancer- sister breast cancer- dx at age 77 y/o, localized breast cancer, surgical resection, RT, no adjuvant chemo or endocrine therapy that pt is aware of     PHYSICAL EXAM:  Vital signs:  There were no vitals taken for this visit.       LABS:   Latest Reference Range & Units 02/09/24 15:57 02/09/24 16:00   CRP Inflammation <5.00 mg/L  12.80 (H)   WBC 4.0 - 11.0 10e3/uL 9.6    Hemoglobin 11.7 - 15.7 g/dL 10.9 (L)    Hematocrit 35.0 - 47.0 %  34.9 (L)    Platelet Count 150 - 450 10e3/uL 251    RBC Count 3.80 - 5.20 10e6/uL 4.08    MCV 78 - 100 fL 86    MCH 26.5 - 33.0 pg 26.7    MCHC 31.5 - 36.5 g/dL 31.2 (L)    RDW 10.0 - 15.0 % 14.6    % Neutrophils % 64    % Lymphocytes % 21    % Monocytes % 10    % Eosinophils % 4    % Basophils % 1    Absolute Basophils 0.0 - 0.2 10e3/uL 0.1    Absolute Eosinophils 0.0 - 0.7 10e3/uL 0.4    Absolute Immature Granulocytes <=0.4 10e3/uL 0.0    Absolute Lymphocytes 0.8 - 5.3 10e3/uL 2.0    Absolute Monocytes 0.0 - 1.3 10e3/uL 1.0    % Immature Granulocytes % 0    Absolute Neutrophils 1.6 - 8.3 10e3/uL 6.2    Absolute NRBCs 10e3/uL 0.0    NRBCs per 100 WBC <1 /100 0    Sed Rate 0 - 30 mm/hr  29   (H): Data is abnormally high  (L): Data is abnormally low    PATHOLOGY:      IMAGING:  Narrative & Impression   CT SOFT TISSUE NECK W CONTRAST 2/9/2024 4:17 PM     History:  sudden onset right supclavicular/base of neck LN  enlargement- r/o abscess; Neck mass  ICD-10: Neck mass      Comparison:  Neck PET CT12/1/2023, 5/18/2023, 1/23/2023     Technique: Following intravenous administration of nonionic iodinated  contrast medium, thin section helical CT images were obtained from the  skull base down to the level of the aortic arch.  Axial, coronal and  sagittal reformations were performed with 2-3 mm slice thickness  reconstruction. Images were reviewed in soft tissue, lung and bone  windows.     Contrast: 90ml isovue 370     Findings:   Evaluation of the mucosal space demonstrates no evident abnormality in  the nasopharynx, oropharynx, hypopharynx or the glottis. The tongue  base appears normal. There is a 1.6 cm enhancing left parotid mass and  an 8 mm right parotid mass which are stable.. Stable 1.5 cm right  thyroid nodule.     There are multiple enlarged and heterogeneous right-sided level 5 and  4 lymph nodes with adjacent fat stranding, increased since 12/1/2023.  The largest measures up to 1.7 cm. No drainable fluid  collection.  Surgical changes of left axillary lymph node dissection.      There is hypoattenuation surrounding both common carotid arteries and  heterogeneous plaque of the bilateral carotid bifurcations, stable.     Evaluation of the osseous structures demonstrate no worrisome lytic or  sclerotic lesion. No overt spinal canal or neuroforaminal stenosis.  Mild mucosal thickening and fluid in the sphenoid sinus, increased.  The mastoid air cells are clear.      The visualized lung apices are clear.                                                                      Impression:  1. Multiple new/enlarged right level 4 and 5 lymph nodes with adjacent  fat stranding, suggesting infection/inflammation, but with history of  follicular lymphoma this could also represent progressive disease. No  abscess.  2. Stable left greater than right enhancing parotid masses.     I have personally reviewed the examination and initial interpretation  and I agree with the findings.     DESTINY CODY MD        ASSESSMENT/PLAN:  Virginia Byers is a 73 year old female with:    #right neck mass  - sudden onset 4cm x4cm base of neck/supraclavicular mass  - 2/9/24 CT neck: multiple enlarged and heterogeneous right-sided level 5 and 4 lymph nodes with adjacent fat stranding, increased since 12/1/2023. The largest measures up to 1.7 cm. No drainable fluid collection.  - 2/9/24 WBC 9.6, ESR 29, CRP 12.8 (LDH hemolyzed)  - 2/12/24-2/19/24 keflex    PLAN:  - suspected persistent lymphadenitis w/some improvement on keflex. Will give bactrim x7 days   - alarm sx discussed    #new anemia  - 1/24 hgb 13.2, 2/24 hgb 10.9    PLAN:  - pt has interval fluctuations in hgb since at least 6/23 that have historically self corrected  - no bleeding, will do close follow up with labs as soon as pt returns from her vacation. If persistent anemia, will do further work up    # 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 INCLUDING biopsy proven LN and left orbital  "mass/lacrimal gland involvement and suspected parotid gland involvement   -11/22 MRI lumbar spine in 12/22 MRI pelvis show prominent left iliac lymph nodes (measuring 1.8 x 3.2, previously 1.5 x 2.7 cm), right medial thigh lymphadenopathy (1.7cm), right iliac bone enhancing marrow signal, left posterior iliac enhancing lesion  -12/22 CT chest abdomen pelvis shows new or increased left common iliac (1.2cm), external iliac, pelvic sidewall lymphadenopathy (2.1cm) and sclerotic lesions in left sacrum and left posterior ilium. No splenomegaly  - 1/23 brain MRI: Indeterminate ovoid T2 hyperintense enhancing mass in the region of the left lacrimal gland measuring approximately 2.4 cm AP by 1.4 cm transverse by 2.3 cm craniocaudal associated with/replacing the left lacrimal gland. Consider correlation with tissue sampling  - 1/23 CT soft tissue neck: Soft tissue nodule in the superficial left parotid gland measuring 1.2 cm. Additional 0.8 cm nodule in the deep portion of the left parotid gland. Soft tissue nodule in the superficial right parotid gland measuring 0.8 cm. These may represent benign or malignant parotid lesions. Multiplicity of lesions raises concern for Warthin tumors  - 1/27/23 note from Dr. Wilmar Morales- \"Based on this diagnosis, I think the 2 nodules in her parotid gland are likely atypical lymph nodes containing lymphoma.  She will probably require some staging imaging, such as PET scan and we can take a look at that as well.  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 " "CD5 stains T cells  - Ki-67 proliferative index is approximately 10%, also low in the follicles.  - flow cytometry \"CD10-positive population is consistent with a clonal B cell population and a CD10 positive B-cell lymphoma is favored.\"    - 4/6/23 left orbital mass/lacrimal gland excisional biopsy: PATHOLOGY: - Follicular Lymphoma, low grade ([facility] negative for BCL2 rearrangement). 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  The flow cytometry from 1/20/23 right external iliac LN biopsy showed in addition to CD10 positive B-cell population  also a partial/equivocal CD5 positive separate kappa monotypic B-cell population, which most likely represents monoclonal B-cell lymphocytosis of CLL/SLL type, since it was also documented in the peripheral blood flow cytometry on February 16, 2023 (case number [facility] ) at a low frequency.  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  - Cohort 2 which is described as negative for CD5, CD10, ,  and showing dim CD20 and dim kappa staining   Of note the population of monoclonal B-cell lymphocytosis found in peripheral blood in our department and in the lymph node biopsy in January showed partial/equivocal dim CD5 and may correspond to the cohort #2.     -5/23 PET/CT NCAP:   1. An ovoid mass in the left lateral orbit measures 2.5 x 1.2 cm, slightly larger than 01/09/2023, and demonstrates marked uptake (SUVmax 9.3), consistent with biopsy-proven lymphoma  2. A few small FDG avid bilateral " intraparotid nodules are present, one on the right and two on the left, including representative left parotid nodule measuring 0.9 x 1.4 cm with moderate uptake (SUVmax 6.7).  3.  Asymmetric prominent right posterior lower cervical node measures 0.8 cm associated with moderate focal uptake (SUVmax 5.1).  4. A single mildly enlarged left axillary lymph node measures 1.0 cm associated with mild uptake (SUVmax 3.0)  5. Some scattered FDG avid lymph nodes below the diaphragm involve the retroperitoneal retrocaval, bilateral common iliac, left pelvic sidewall and left inguinal regions. Some of these have increased slightly in size since 12/28/2022, such as a conglomerate left pelvic sidewall bj mass measuring approximately 2.2 x 4.1 cm (previously 2.2 x 3.4 cm) associated with marked uptake (SUVmax 7.6).  6. FDG avid sclerotic skeletal lesion involves the right superior acetabulum (SUVmax 12.9) as well as a couple of separate smaller sclerotic lesions in the right iliac bone uptake (SUVmax 12.2).     - 6/23 bone marrow biopsy:  MORPHOLOGY:  - No morphologic or immunophenotypic evidence of follicular lymphoma  - Flow cytometry showing a small population of CD5 positive monotypic B cells; most compatible with monoclonal B lymphocytosis (There is no definitive morphologic correlate for this finding. There is no morphologic or immunophenotypic evidence of involvement by follicular lymphoma.  The CD5-positive B cell clone may best represent monoclonal B lymphocytosis, provided that bj SLL is excluded.)  - Normocellular marrow (cellularity estimated at 30%) with trilineage hematopoietic maturation and no increase in blasts  FLOW CYTOMETRY:  CD5 positive kappa-monotypic B cells (0.8%)  0.8% B cells which express CD5, CD19, CD20 (dim to negative) and monotypic kappa immunoglobulin light chains (dim) and lack CD10 and CD38. The B cells have similar forward scatter relative to background T cells suggestive of similar size;  however, precise size determination is deferred to morphology  CYTOGENETICS:  46,XX    -6/19/2023 CT-guided bone biopsy, right acetabulum  PATHOLOGY:  - Atypical lymphoid infiltrate in a suboptimal biopsy  - No histologic evidence of metastatic carcinoma  - Two small monoclonal B-cell populations detected by flow cytometry: CD5 positive kappa-monotypic B cells ( 2.8 %, CD5 partial, dim, CD19 positive, CD30 dim to negative, CD10 negative); CD10 positive lambda-monotypic B cells ( 0.4 %, CD10 positive, CD19 slightly dim, CD20 positive, CD5 negative)     - option of systemic treatment including rituximab discussed previously on multiple occassions particularly w/left orbital involvement, see my previous notes for details; pt deferred  - 6/14/2023- 6/15/2023 palliative radiation to left orbit follicular lymphoma 4Gy/2fx w/Dr. Julian Horvath    - 12/23 PET CT NCAP w/o contrast Liver SUV max = 3.78, Aorta Blood SUV max = 3.43.  -New/progressed:  - 0.8 x 0.5 cm right FDG avid cervical lymph node; SUV max of 6.5  - 1.3 x 1.5 cm right FDG avid cervical lymph node; SUV max of 5.8  - 1.3 x 1.4 cm right FDG avid cervical lymph node; SUV max of 7.8   -Lesion in left lateral chest wall immediately inferior to the margin of breast implant, 0.5 x 0.4 mm, SUV 5.02, previously 1.4  - Lymph node adjacent to left psoas, 1.5 cm, SUV 19.9, previously 6.02  - Right acetabulum, 2.0 x 1.9 cm, SUV 14.03, previously 12.05  - No new bone lesions but increased metabolic activity previously identified multiple FDG avid osseous foci:  1.  Sternal lesion, SUV 5.7, previously 3.6  2.  Right scapular spine lesion, SUV 4.6, previously 3.6  3.  Right acetabular bone lesion SUV 13.4, previously 11    -Unchanged:  -1 of 2 left parotid gland lesions, 1.6 x 1.1 cm, SUV 8.4, previously 6.6  - Left axillary lymph node, 1.2 x 1.0 cm, SUV 2.3  -Spleen normal    -Resolved/improved:  - Left orbital hypermetabolic lesion-resolved  - 1 of 2 previous left parotid  hypermetabolic lesions-resolved  - Multiple mildly hypermetabolic bilateral inguinal nodes, index right inguinal lymph node SUV 2.62, previously 5.19-improved    -12/23 Left pelvic lymph node, biopsy:  - Recurrent/persistent low grade B-cell lymphoma, most consistent with follicular lymphoma (limited evaluation of architecture),  no evidence of large cell transformation in this biopsy, and no evidence of negro small lymphocytic lymphoma (SLL).  - Negative for metastatic carcinoma    Flow cytometry:   C5 positive kappa-monotypic B cells 2.6% consistent with CLL/SLL vs monoclonal B cell lymphocytosis  CD10 positive B cells negative for surface light chain 29%, consistent w/previously dx CD10+ B cell lymphoma    - 12/23 bone marrow biopsy:  MORPHOLOGY:  - Hypercellular marrow (cellularity estimated at 40%) with trilineage hematopoiesis, no overt dysplasia, and no increase in blasts  - Rare CD5+ kappa-monotypic B cells detected by flow cytometry (1.7%)  - Small lymphoid aggregate suspicious for marrow involvement by CD5+ B-cell lymphoproliferative disorder (<5% of marrow cellularity), most consistent with monoclonal B-cell lymphocytosis.  - No definitive histologic or immunophenotypic evidence of follicular lymphoma and  no morphologic evidence of large cell lymphoma.  FLOW CYTOMETRY:  -CD5-positive kappa-monotypic B cells (1.7%) express CD5 (dim, partial), CD19, CD20 (dim), and monotypic kappa immunoglobulin light chains and lack CD10 and CD38.  suggestive of marrow involvement by monoclonal B-cell lymphocytosis (versus chronic lymphocytic leukemia/small lymphocytic lymphoma; CLL/SLL).   CYTOGENETICS:  46,XX[20]  FISH:  Pending    Staging and prognostication:  - Stage: 4 given right medial thigh LN and left common iliac, external iliac, pelvic sidewall; biopsy proven left orbital mass involvement; there was some concern from ENT that patients parotid lesions were related to pts follicular lymphoma   - FLIPI: score 2  (age>60),  stage 3-4; intermediate risk  - pertinent labs: 12/22  and 5/23 ; 2/23 and 5/23 beta 2 microglobulin 1.7, hepatitis B and C negative    Treatment:  - GELF criteria to indicate treatment (involvement of 3 or more bj sites each with a diameter greater than or equal to 3 cm, any bj or extranodal tumor mass with a diameter of greater than or equal to 7 cm, B symptoms, splenomegaly, pleural effusions, peritoneal ascites, clinically progressive or significant cytopenias, leukemia, symptoms, threatened end organ function, clinically significant bulky disease, steady or rapid progression of disease)  - option of systemic treatment including rituximab discussed previously on multiple occassions particularly w/left orbital involvement, see my previous notes for details; pt deferred  - 6/14/2023- 6/15/2023 palliative radiation to left orbit follicular lymphoma 4Gy/2fx    PLAN:  - pt has 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 INCLUDING biopsy proven LN and left orbital mass/lacrimal gland involvement   - bone marrow biopsy negative for lymphoma and right acetabulum bone biopsy atypical lymphoid infiltrate seen but biopsy suboptimal and flow cytometry shows 2.8% CD5 positive kappa monotypic B cells consistent w/monoclonal B cell lymphocytosis and 0.4% CD10 positive lambda-monotypic B cells consistent w/lymphoma- ie follicular vs other  - 12/23 PET showed left pelvic LN w/SUV increased from 6->20, biopsied to rule out transformation to aggressive lymphoma. Biopsy showed follicular lymphoma w/aggressive lymphoma or SLL. LN flow cytometry consistent w/follicular lymphoma and MBL. Concurrent bone marrow biopsy, <5% CD5+ lymphoproliferative d/o consistent w/MBL  - overall stage 4 disease and intermediate risk  - systemic tx including rituximab discussed on multiple prior visits, pt deferred as detailed in my previous notes   - pt completed palliative RT to left orbit; use lubricating eye drops prn (also  using eye drops for glaucoma)  - 2/24 hgb 10.9  - pt overall asx, with exception of self palpating right neck LN (largest 1.7cm)  - she continues to wish to avoid systemic tx unless absolutely necessary thus we will continue active surveillance   - H&P and labs q3 months  - PET q6 months due end of 6/24- to be ordered at next visit (may be sooner if neck LN continue to grow)      #  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   - 1/23 right external iliac LN core needle biopsy- FLOW CYTOMETRY: 1.3% rare monoclonal B cell population CD5 equivocal, CD23 negative, CD 79b negative, possible MBL; no morphological correlate  -2/23 peripheral blood- FLOW CYTOMETRY: 2.3% kappa monoclonal B cell population, CD5 positive, CD38 negative, CD49d negative, possible MBL  - 4/6/23 left orbital mass/lacrimal gland excisional biopsy: FLOW CYTOMETRY: 15.1% kappa monotypic B cell population, bright positive: CD45, CD19, CD22, moderately positive kappa, CD20, CD79b, dim positive CD23, negative CD 2,3,103, lambda, 5, 10, 200, 38, 43  - 6/23 bone marrow biopsy: FLOW CYTOMETRY: 0.8% kappa monotypic B cell population, positive for CD5, CD19, dim to negative CD20, negative for CD38 and CD10  - 6/23 right acetabulum bone biopsy: FLOW CYTOMETRY: 2.8% kappa monotypic B cell population, CD5 partial dim, CD 19 positive, CD20 dim negative, CD10 negative, possible MBL    - of note pt does not have leukocytosis or lymphocytosis, ALC consistently <5000, no splenomegaly, LN biopsy did not demonstrate IHC consistent with SLL  - 5/23 PET/CT as above  - 12/23 bone marrow biopsy:  MORPHOLOGY:  - Hypercellular marrow (cellularity estimated at 40%) with trilineage hematopoiesis, no overt dysplasia, and no increase in blasts  - Rare CD5+ kappa-monotypic B cells detected by flow cytometry (1.7%)  - Small lymphoid aggregate suspicious for marrow involvement by CD5+ B-cell lymphoproliferative disorder (<5% of marrow cellularity), most consistent with  monoclonal B-cell lymphocytosis.  - No definitive histologic or immunophenotypic evidence of follicular lymphoma and  no morphologic evidence of large cell lymphoma.  FLOW CYTOMETRY:  -CD5-positive kappa-monotypic B cells (1.7%) express CD5 (dim, partial), CD19, CD20 (dim), and monotypic kappa immunoglobulin light chains and lack CD10 and CD38.  suggestive of marrow involvement by monoclonal B-cell lymphocytosis (versus chronic lymphocytic leukemia/small lymphocytic lymphoma; CLL/SLL).     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 Bone marrow biopsy negative for involvement of lymphoma 6/23 and shows <5% cellularity involvement of MBL 12/23  - 9/23 ALC 1.6, 12/23 ALC 1.7, 1/24 ALC 1.7  - Monitor CBC with differential and clinically every 3-6 months    #Sclerotic lesions in left sacrum and left posterior ilium  -10/21 mammogram, endoscopy with EUS and colonoscopy WNL  -12/22 CT chest abdomen pelvis shows new or increased left common iliac, external iliac, pelvic sidewall lymphadenopathy and sclerotic lesions in left sacrum and left posterior ilium.  No metastatic disease in chest.  No abnormal findings in breast.  - 12/22 tumor markers: CA 15-3 24,  29, CEA 3.1, CA 19-9 4, AFP 5.9,   - 1/2023 MRI brain w/wo contrast: no metastases   - 1/2023 right external iliac LN core need biopsy- no metastatic carcinoma   - 5/23 PET avid bone lesions  -6/19/2023 CT-guided bone biopsy, right acetabulum  PATHOLOGY:  - Atypical lymphoid infiltrate in a suboptimal biopsy  - No histologic evidence of metastatic carcinoma  - Two small monoclonal B-cell populations detected by flow cytometry: CD5 positive kappa-monotypic B cells ( 2.8 %, CD5 partial, dim, CD19 positive, CD30 dim to  "negative, CD10 negative); CD10 positive lambda-monotypic B cells ( 0.4 %, CD10 positive, CD19 slightly dim, CD20 positive, CD5 negative)   - 12/23 PET   - Right acetabulum, 2.0 x 1.9 cm, SUV 14.03, previously 12.05  - No new bone lesions but increased metabolic activity previously identified multiple FDG avid osseous foci:  1.  Sternal lesion, SUV 5.7, previously 3.6  2.  Right scapular spine lesion, SUV 4.6, previously 3.6  3.  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    -4/2020 left breast simple mastectomy with no residual carcinoma.   -S/p ACx4, taxol x4, no anti-her2 treatment, no RT.   -S/p 5 years adjuvant endocrine therapy (tamoxifen and anastrozole).  -Delayed left breast reconstruction and right breast augmentation.   - 5/23 PET/CT notes single enlarged left axillary lymph node with SUV 3, \"could also represent lymphoma although given patient's history of left breast cancer, a breast cancer metastasis is also a possibility\"  - The above in the setting of known lymphoma and breast tumor markers normal   -9/23 right breast diagnostic mammogram: [facility]  - 9/23 ultrasound left axilla: 1.1 x 0.9 x 1.0 cm lymph node with mildly thickened cortex that correlates with abnormal lymph node on previous PET, recommend tissue diagnosis  - 9/23 ultrasound-guided left axillary lymph node core biopsy: No morphologic evidence of lymphoma.  Lymph node tissue was received in formalin, this could not be sent for flow cytometry.  No separate " fresh tissue was sent for flow cytometry.  - 12/23 PET Left axillary lymph node, 1.2 x 1.0 cm, SUV 2.3, unchanged    PLAN:   -With the limitations of tissue processed in formalin and no fresh tissue available for flow cytometry, appears left axilla lymph node is benign without lymphoma      RTC 3-4 weeks for follow up with me, labs prior to appt   Pt is out of town 3/1-3/10 taking a road trip to [facility]     Arlene Ahumada DO  Hematology/Oncology  [facility]      Again, thank you for allowing me to participate in the care of your patient.        Sincerely,        ARLENE AHUMADA, DO

## 2024-03-08 ENCOUNTER — TELEPHONE (OUTPATIENT)
Dept: INFUSION THERAPY | Facility: CLINIC | Age: 74
End: 2024-03-08
Payer: MEDICARE

## 2024-03-08 ENCOUNTER — ONCOLOGY VISIT (OUTPATIENT)
Dept: ONCOLOGY | Facility: CLINIC | Age: 74
End: 2024-03-08
Attending: INTERNAL MEDICINE
Payer: MEDICARE

## 2024-03-08 VITALS
DIASTOLIC BLOOD PRESSURE: 72 MMHG | HEIGHT: 62 IN | BODY MASS INDEX: 27.09 KG/M2 | HEART RATE: 66 BPM | SYSTOLIC BLOOD PRESSURE: 125 MMHG | OXYGEN SATURATION: 98 % | WEIGHT: 147.2 LBS

## 2024-03-08 DIAGNOSIS — R22.1 NECK MASS: ICD-10-CM

## 2024-03-08 DIAGNOSIS — C82.00 FOLLICULAR LYMPHOMA GRADE I, UNSPECIFIED BODY REGION (H): Primary | ICD-10-CM

## 2024-03-08 PROCEDURE — G0463 HOSPITAL OUTPT CLINIC VISIT: HCPCS | Performed by: INTERNAL MEDICINE

## 2024-03-08 PROCEDURE — 99214 OFFICE O/P EST MOD 30 MIN: CPT | Performed by: INTERNAL MEDICINE

## 2024-03-08 ASSESSMENT — PAIN SCALES - GENERAL: PAINLEVEL: MODERATE PAIN (4)

## 2024-03-08 NOTE — CONFIDENTIAL NOTE
Writer called pt to offer apt per Dr. Zaidi.     Naomy Zaidi, DO  You; Pura Babin, RN1 minute ago (11:24 AM)     SK  I have an open slot at 345- I had a patient fall off my schedule. You can offer her that time slot.    SK   Pt stated that she would like that apt time. Pt will be at apt today to discuss symptoms.

## 2024-03-08 NOTE — LETTER
3/8/2024         RE: Virginia Byers  [id number] [facility] [id number]        Dear Colleague,    Thank you for referring your patient, Virginia Byers, to the [facility]. Please see a copy of my visit note below.    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    Patient has medical history including LEFT breast cancer in 2000, hypertension, hyperlipidemia, osteoporosis, osteoarthritis, degenerative disc disease, spinal stenosis of lumbar region,  s/p bilateral L5-S1 transforaminal ROS by Dr. Garcia on 7/9/2021, s/p bilateral L4-5 and L5-S1 facet joint injections on 6/14/2021, glaucoma and macular degeneration, cataracts s/p surgery, history of tobacco use (quit 1999)     Regarding previous history of breast cancer:  She was previously treated by Dr. Sakina Brandt at [facility]      In summary, diagnosed at age 49 with LEFT breast IDC ER positive, NM positive, HER2 positive on FISH. 3/2000 left breast lumpectomy and left axillary node dissection (IDC, grade 3, 1.45 cm incompletely excised tumor, DCIS, positive margins with infiltrating carcinoma, 1 of 17 lymph nodes positive (0.5 cm metastatic focus), ER positive, NM positive, HER2 positive).  4/2020 left breast simple mastectomy with no residual carcinoma. S/p ACx4, taxol x4, no anti-her2 treatment, no RT. S/p 5 years adjuvant endocrine therapy (tamoxifen and anastrozole). Delayed left breast reconstruction and right breast augmentation.      3/2/2000  Excisional biopsy of Left breast mass -   Infiltrating ductal carcinoma with associated DCIS   ER positive (60%), NM positive (22%), HER2 by FISH POSITIVE     3/10/2000 - Left lumpectomy  and left axillary node  Invasive ductal carcinoma, Grade 3, 1.45 cm (incompletely excised), negative for LVI  DCIS, greatest histologic dimension of tumor (DCIS PLUS invasive tumor) = 2.9 cm (estimate 50% DCIS)  Positive margins- infiltrating carcinoma extends to margin  1 of 17 axillary lymph nodes sampled was positive for metastatic focus of 0.5 cm.  ER/PA+     Sections of lumpectomy left breast showing residual 1.1 cm focus of ductal carcinoma in situ (DCIS), present within microns of the linked margin of resection.       4/11/2000 - Left breast simple mastectomy, negative for residual carcinoma  Left mastectomy on 4/11/2000 performed by Dr. Cristian Mcdonald at [facility].      Adjuvant treatment:  - 5/15/2000-7/24/2000 4 cycles of Adriamycin and Cytoxan   - 8/16/2000-10/18/2000 4 cycles of paclitaxel   - Appears she may have been on some kind of trial/protocol, was not given any anti-HER2 targeted treatment  - No adjuvant radiation  - 12/2000- 10/2002 tamoxifen  - 10/2002-5/2006 switched to anastrozole (due to in vitro studies showing anastrozole may be slightly better for patients were HER2 positive) with Fosamax for osteopenia     8/31/2015 Left delayed reconstruction with tissue expander    2/3/2016 Left 2nd stage 450 cc high profile silicone implant; right augmentation 150 cc Moderate classic profile silicone implant; cresent mastopexy    10/18/2021: Screening mammogram right breast ZACKARY    OTHER:  Of note, patient has documentation of anemia (iron deficiency and anemia of chronic disease), elevated LFTs, nausea and vomiting of all solids and liquids, fatigue with 26 pound unintentional weight loss from 7/20/2021 - 10/20/2021 with mildly elevated LFTs which led to evaluation with mammogram, endoscopy with EUS and colonoscopy as detailed below. Pt did associate these with some spinal injections she had gotten, due to overlapping times. Pt did regain all of the weight and hand improvement of anemia within  2 months, without significant intervention.     -10/2021: Endoscopy with EUS normal EGD and no stigmata or source of upper GI bleeding, visualized bile duct, gallbladder, liver, pancreas, left adrenal gland normal.  No lymphadenopathy. LIVER, RANDOM NEEDLE BIOPSY: Benign parenchyma with congestion; no significant fibrosis or other abnormalities     -10/2021 colonoscopy: Hemorrhoids, diverticulosis in sigmoid colon and splenic flexure    Current history:  -Ongoing right knee pain not improved with brace and physical therapy, s/p bilateral L5-S1 transforaminal ROS by Dr. Garcia on 7/9/2021, s/p bilateral L4-5 and L5-S1 facet joint injections on 6/14/2021. ongoing b/l hip and leg pain, worse with position changes (ie sitting to standing), constant, 8-9/10, tylenol brings pain down to 6/10 (using tylenol bid). ECOG 3.   -Patient has seen neurosurgery Dr. Jose Alexander, occupational medicine specialist Dr.Orrin Murphy, with plan to see PM&R Dr. Margie Agudelo and movement disorder clinic  - 11/22 MRI lumbar spine:    prominent left iliac lymph nodes among other findings related to degenerative changes and neural foraminal stenosis throughout lumbar spine    - 12/22 MRI pelvis:  1. Enhancing marrow signal abnormality of the right iliac bone extending from the superior acetabulum subchondral location proximally almost to the level of the sacroiliac joint caudal aspect. No associated fracture line. Underlying marrow infiltrative process such as from metastasis cannot be excluded especially given the degree of T1 hypointensity and history of primary tumor. Other consideration include stress reaction.  2. Mildly increased left external iliac chain, and the right medial thigh lymphadenopathy. Metastasis cannot be excluded.  3. Left posterior iliac enhancing lesion, similar in size to prior CT 9/2021 and previously not visible on CT, focal enhancing lesion in the right greater trochanter laterally. Findings are concerning for  Metastasis.    -12/22 CT chest abdomen pelvis with contrast:  COMPARISON: MRI pelvis 12/20/2022 and CT abdomen pelvis 9/21/2021.  1. Mastectomies with implant reconstructions. No lymphadenopathy  2.  There is an new or increased left common iliac, external iliac and pelvic sidewall lymphadenopathy since prior CT. Distal left common iliac lymph nodes measure up to 1.2 cm on this exam compared to 0.7 cm on prior CT. Left pelvic sidewall lymph node measures 2.1 cm short axis on this exam compared to 1.2 cm on prior CT . consistent with metastatic disease.  3. Sclerotic lesions in the left sacrum and left posterior ilium are unchanged from prior CT.  may be metastatic.  4. No evidence of metastatic disease in the chest.     -1/2023 MRI brain w/wo contrast:  IMPRESSION:  1. No findings of intracranial metastatic disease.  2. Indeterminate ovoid T2 hyperintense enhancing mass in the region of the  left lacrimal gland measuring approximately 2.4 cm AP by 1.4 cm  transverse by 2.3 cm craniocaudal associated with/replacing the left  lacrimal gland. Consider correlation with tissue sampling.  3. A few indeterminate partially visualized heterogeneous enhancing  Nodules measuring up to approximately 1.5 cm in the bilateral parotid glands.   Consider soft tissue neck CT with contrast for further evaluation.    -1/20/2023: IR US Guided biopsy of a right external iliac LN  Final Diagnosis   A.  LYMPH NODE, RIGHT EXTERNAL ILIAC, CORE NEEDLE BIOPSY AND TOUCH IMPRINT:  -Involved by follicular lymphoma, low-grade (WHO grade 1-2)  -Negative for metastatic carcinoma     The morphologic and immunophenotypic patterns are consistent with follicular lymphoma.  There is no high-grade lymphoma present in this limited specimen.  Concurrent flow study ([id number]) demonstrated CD10-positive lambda monotypic B cells (22%), rare kappa monotypic B cells (1.3%), and no aberrant immunophenotype on T cells.  A FISH study for Bcl-2 is pending and will  be reported separately.     The case was initially reviewed by Dr. Cifuentes (breast pathology).     This case was reviewed in part at our Hematopathology Faculty Consensus conference at which Girish Nolasco, Narciso, and Kayode were present in addition to myself.  This is a small needle core.  In some of the areas, the CD10+ B cells are confined to follicles.  Though the possibility of in situ follicular neoplasia was discussed, we still favor a diagnosis of follicular lymphoma - there are a number of CD10+ B cells outside of follicles at the base of the biopsy, and the clinical history supports a greater extent of involvement.     There was a tiny kappa monotypic B cell population identified by flow (separate from the CD10+ clone).  We looked for the morphologic correlate in this case by IHC, but it is not readily apparent.  This may represent a monoclonal B lymphocytosis - consider sending a peirpheral blood sample for flow cytometry.    FISH:  RESULTS:     NORMAL  - No rearrangement of BCL2     INTERPRETATION:  No evidence was found by FISH of rearrangement of the BCL2 (18q21) locus. These findings are not helpful for confirming or further characterizing the reported pathologic diagnosis of follicular lymphoma.    Flow Interpretation   A. Lymph Node(s), :  -CD10-positive lambda monotypic B cells (22%)  -Rare kappa monotypic B cells (1.3%)  -No aberrant immunophenotype on T cells  See comment   Electronically signed by Krista Headley MD on 1/24/2023 at  3:10 PM   Comment     The CD10-positive population is consistent with a clonal B cell population and a CD10 positive B-cell lymphoma is favored.      In addition, a second clonal B-cell population is identified which has a CLL-like immunophenotype except that CD5 expression is equivocal, this may represent a monoclonal B-cell lymphocytosis (MBL).     Addendum   INTERPRETATION  The diagnosis remains the same (see comment)     REASON FOR ADDENDUM  This addendum  is issued to reflect additional testing performed on this case. See Comment.      COMMENT  Additional multi-color flow analysis was performed for the following markers:   CD23, CD79b     The CD5 (dimly) positive B cells mostly lack CD23 and CD79b        1/23/23 CT neck:  A) Heterogeneous nodule in the right thyroid gland measuring up  to 1.6 cm.   B) Soft tissue nodule in the superficial left parotid  gland measuring 1.2 cm. Additional 0.8 cm nodule in the deep portion  of the left parotid gland. Soft tissue nodule in the superficial right  parotid gland measuring 0.8 cm. These may represent benign or malignant parotid lesions. Multiplicity of lesions raises concern for Warthin tumors. Otolaryngology consultation is  recommended.  C) Prominent soft tissue around the major arteries in the neck  particularly the common and internal carotid arteries. This is  atypical for atherosclerotic disease and vasculopathy/vasculitis such  as giant cell arteritis should be considered. Probable superimposed  atherosclerotic disease at the carotid bifurcations right greater than  left without definite high-grade stenosis.    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  And B-cell cohort #2 which is described as negative for CD5 and showing dim CD20 and dim kappa staining.  This population is listed a CD5 negative and CD10 negative also listed as negative for  and .  Of note the  population of monoclonal B-cell lymphocytosis found in peripheral blood in our department and in the lymph node biopsy in January showed partial/equivocal dim CD5 and may correspond to the cohort #2.     We essentially agree with the diagnosis of follicular lymphoma, which is consistent with prior diagnosis of follicular lymphoma low grade  established on core biopsy of right external iliac lymph node January 20, 2023 (case [id number]) in our Department . The concurrent flow cytometry case [id number] in January showed in addition to CD10 positive B-cell population  also a partial/equivocal CD5 positive separate kappa monotypic B-cell population, which most likely represents monoclonal B-cell lymphocytosis of CLL/SLL type, since it was also documented in the peripheral blood flow cytometry on February 16, 2023 (case number UF ) at a low frequency.  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 Areas with infiltrates of small B cells with coexpression of CD5 and CD23 are seen in the current biopsy, but the size of the biopsy is too small to comprehensively evaluate for the presence of proliferation centers and the extent of involvement by the CD5 positive B cell lymphoproliferative process.   This case has been discussed at intradepartmental hematopathology conference with participation by THELMA Petersen, THELMA Davis and myself.      -5/23 PET/CT NCAP:   1. An ovoid mass in the left lateral orbit measures 2.5 x 1.2 cm, slightly larger than 01/09/2023, and demonstrates marked uptake (SUVmax 9.3), consistent with biopsy-proven lymphoma  2. A few small FDG avid bilateral intraparotid nodules are present, one on the right and two on the left, including representative left  parotid nodule measuring 0.9 x 1.4 cm with moderate uptake (SUVmax 6.7).  3.  Asymmetric prominent right posterior lower cervical node measures 0.8 cm associated with moderate focal   uptake (SUVmax 5.1).  4. A single mildly enlarged left axillary lymph node measures 1.0 cm associated with mild uptake (SUVmax 3.0)  5. Some scattered FDG avid lymph nodes below the diaphragm involve the retroperitoneal retrocaval, bilateral common iliac, left pelvic sidewall and left inguinal regions. Some of these have increased slightly in size since 12/28/2022, such as a conglomerate left pelvic sidewall bj mass measuring approximately 2.2 x 4.1 cm (previously 2.2 x 3.4 cm) associated with marked uptake (SUVmax 7.6).  6. FDG avid sclerotic skeletal lesion involves the right superior acetabulum (SUVmax 12.9) as well as a couple of separate smaller sclerotic lesions in the right iliac bone uptake (SUVmax 12.2).     - 6/14/2023- 6/15/2023 palliative radiation to left orbit follicular lymphoma 4Gy/2fx  - 6/15/2023 bone marrow biopsy  MORPHOLOGY:  - No morphologic or immunophenotypic evidence of follicular lymphoma  - Flow cytometry showing a small population of CD5 positive monotypic B cells; most compatible with monoclonal B lymphocytosis (There is no definitive morphologic correlate for this finding. There is no morphologic or immunophenotypic evidence of involvement by follicular lymphoma.   The CD5-positive B cell clone may best represent monoclonal B lymphocytosis, provided that bj SLL is excluded.)  - Normocellular marrow (cellularity estimated at 30%) with trilineage hematopoietic maturation and no increase in blasts  FLOW CYTOMETRY:  CD5 positive kappa-monotypic B cells (0.8%)  0.8% B cells which express CD5, CD19, CD20 (dim to negative) and monotypic kappa immunoglobulin light chains (dim) and lack CD10 and CD38. The B cells have similar forward scatter relative to background T cells suggestive of similar size; however,  precise size determination is deferred to morphology  CYTOGENETICS:  46,XX    -6/19/2023 CT-guided bone biopsy, right acetabulum  PATHOLOGY:  - Atypical lymphoid infiltrate in a suboptimal biopsy  - No histologic evidence of metastatic carcinoma  - Two small monoclonal B-cell populations detected by flow cytometry  This biopsy is suboptimal for evaluation due to fragmentation, crush artifact, and decalcification. In this limited biopsy, there are fragments of bone and marrow showing a mixed infiltrate that includes both B and T cells. Notably, 2 small clonal B-cell populations were detected by flow cytometry which resemble B-cell populations detected in this patient's past lymph node (1/20/23) and blood and bone marrow (2/16/23, 6/15/23) flow cytometry studies. A definitive morphologic correlate to these cell populations is not appreciated, and the overall features are insufficient for a diagnosis of lymphoma.    FLOW CYTOMETRY:  A. Pelvis, Right:  -CD5 positive kappa-monotypic B cells ( 2.8 %) - see comment A  -CD10 positive lambda-monotypic B cells ( 0.4 %) - see comment B      Electronically signed by Arnaldo Alvarado MD on 6/20/2023 at  1:31 PM   Comment     A) Clonal B cells with the immunophenotype of CLL/SLL are present in this sample.  The differential includes peripheral blood contamination (with an MBL), the tissue based correlate to MBL, versus involvement by SLL.  2.8% B cells which express CD5 (partial, dim), CD19, CD20 (dim to negative) and monotypic kappa immunoglobulin light chains (dim) and lack CD10. The B cells have similar forward scatter relative to background T cells suggestive of similar size; however, precise size determination is deferred to morphology.     B) A CD10-positive B cell clone is also present - involvement by the patient's previously diagnosed CD10+ B cell lymphoma is in the differential (follicular lymphoma versus other (transformation to a higher grade lymphoma). 0.4% B  "cells which express CD10, CD19 (slightly dim), CD20, and monotypic lambda immunoglobulin light chains and lack CD5.  The B cells have increased forward scatter relative to background T cells suggestive of increased size; however, precise size determination is deferred to morphology.        - 7/10/23 Rheumatology consult for ESR and CRP elevated, DEVON positive 1:160 speckled; PCP suspects pt may have PMR; \"I suspect that the bilateral hip OA is altering gait that then results in worsening of low back pain. No further rheumatology workup needed at this time. \"    - 7/11/23 left hip total arthroplasty with severe b/l hip osteoarthritis with Dr. Austin Blood,  cc, discharged with DVT prophylaxis Lovenox 40 mg daily x2 weeks and aspirin 81 mg twice daily x2 weeks    - 10/13/23 right hip total arthroplasty    - 12/23 PET CT NCAP Liver SUV max = 3.78, Aorta Blood SUV max = 3.43.  -New/progressed:  - 0.8 x 0.5 cm right FDG avid cervical lymph node; SUV max of 6.5  - 1.3 x 1.5 cm right FDG avid cervical lymph node; SUV max of 5.8  - 1.3 x 1.4 cm right FDG avid cervical lymph node; SUV max of 7.8   -Lesion in left lateral chest wall immediately inferior to the margin of breast implant, 0.5 x 0.4 mm, SUV 5.02, previously 1.4  - Lymph node adjacent to left psoas, 1.5 cm, SUV 19.9, previously 6.02  - Right acetabulum, 2.0 x 1.9 cm, SUV 14.03, previously 12.05  - No new bone lesions but increased metabolic activity previously identified multiple FDG avid osseous foci:  1.  Sternal lesion, SUV 5.7, previously 3.6  2.  Right scapular spine lesion, SUV 4.6, previously 3.6  3.  Right acetabular bone lesion SUV 13.4, previously 11    -Unchanged:  - 1 of 2 left parotid gland lesions, 1.6 x 1.1 cm, SUV 8.4, previously 6.6  - Left axillary lymph node, 1.2 x 1.0 cm, SUV 2.3  - Spleen normal    -Resolved/improved:  - Left orbital hypermetabolic lesion-resolved  - 1 of 2 previous left parotid hypermetabolic lesions-resolved  - " Multiple mildly hypermetabolic bilateral inguinal nodes, index right inguinal lymph node SUV 2.62, previously 5.19-improved    IMPRESSION: In this patient with follicular lymphoma there is evidence  for progression of disease.  1. There are multiple new hypermetabolic lymph nodes.   2. Increased metabolic activity of multiple previously seen lymph  nodes  3. No new bony lesions identified but there is increased metabolic  activity of the previously identified lesions.  4. Multiple stable and some resolved hypermetabolic lesions; resolved  lesions include the left orbit and left parotid gland lesion.      -12/23 Left pelvic lymph node, biopsy:  - Recurrent/persistent low grade B-cell lymphoma, most consistent with follicular lymphoma  - Negative for metastatic carcinoma    This biopsy is involved by a low grade lymphoma with a staining pattern consistent with follicular lymphoma. The growth pattern of the lymphoma cells appears diffuse, although the biopsy is quite small, limiting assessment of architecture. Concurrent flow cytometry ([id number]) detected a population of CD10-positive B cells negative for surface light chains, similar to the patient's past flow cytometry studies and consistent with recurrent/persistent disease.     In addition, flow cytometry identified a small population of CD5-positive kappa-monotypic B cells (2.6%), similar to the patient's known monoclonal B-cell lymphocytosis (MBL). No definitive morphologic correlate to this finding is appreciated in this biopsy - it may represent peripheral blood contamination versus a tissue-based MBL equivalent.     There is no evidence of large cell transformation in this biopsy, and no evidence of negro small lymphocytic lymphoma (SLL). However, the biopsy is very small - the possibility of unsampled higher grade disease or SLL cannot be excluded. Correlation with imaging studies is recommended.    Left pelvic LN flow cytometry:  A. Pelvis, Left, :  - CD5  positive kappa-monotypic B cells (2.6%)  Clonal B cells with the immunophenotype of CLL/SLL are present in this sample.  The differential includes peripheral blood contamination (with an MBL) versus involvement by SLL.    - CD10 positive negative for surface light chain B cells (29%) - see comment B  A CD10-positive B cell clone is also present - involvement by the patient's previously diagnosed CD10+ B cell lymphoma is in the differential (follicular lymphoma versus transformation to a higher grade lymphoma).         - 12/23 bone marrow biopsy:  MORPHOLOGY:  - Hypercellular marrow (cellularity estimated at 40%) with trilineage hematopoiesis, no overt dysplasia, and no increase in blasts  - Rare CD5+ kappa-monotypic B cells detected by flow cytometry (1.7%)  - Small lymphoid aggregate suspicious for marrow involvement by CD5+ B-cell lymphoproliferative disorder (<5% of marrow cellularity)  - No definitive histologic or immunophenotypic evidence of follicular lymphoma  Flow cytometry ([id number]) showed rare CD5-positive kappa-monotypic B cells (1.7%). By morphology, a tiny lymphoid aggregate is identified on the trephine core biopsy. This aggregate contains small B cells that appear to have aberrant expression of CD5, and may represent the morphologic correlate to the flow cytometry results. Overall, the findings are suspicious for low level marrow involvement by a CD5+ lymphoproliferative disorder most consistent with monoclonal B-cell lymphocytosis.     There is no definitive evidence of marrow involvement by follicular lymphoma, and no morphologic evidence of large cell lymphoma.  FLOW CYTOMETRY:  -CD5-positive kappa-monotypic B cells (1.7%) express CD5 (dim, partial), CD19, CD20 (dim), and monotypic kappa immunoglobulin light chains and lack CD10 and CD38.   The monotypic B-cell population present in this specimen has a similar immunophenotype as reported previously in this patient's peripheral blood  ([id number], 2/16/23). The immunophenotypic findings are suggestive of marrow involvement by monoclonal B-cell lymphocytosis (versus chronic lymphocytic leukemia/small lymphocytic lymphoma; CLL/SLL). There may be peripheral blood contamination. Large cells may not survive specimen processing. Morphologic correlation is required.   CYTOGENETICS:  46,XX[20]  FISH:  Pending    -1/3/24 second opinion w/Dr. Nguyen     -2/9/24 pt w/right neck lump, x4 days, semi-solid feeling, TTP with deep palpation only    INTERIM HISTORY:  Regarding lymphoma:  Denies weight loss, night sweats, early satiety, RUQ or LUQ pain  Reports left eye blurriness is stable since surgery, not worsened since RT, using lubricating eye drops prn and working with eye doctor for management of glaucoma, reports she had laser treatment to left eye that helped with blurred vision. Appt w/eye doctor 1/22/24, changed eye drops      - 2/9/24 CT soft tissue neck-   There are multiple enlarged and heterogeneous right-sided level 5 and 4 lymph nodes with adjacent fat stranding, increased since 12/1/2023. The largest measures up to 1.7 cm. No drainable fluid collection.    -2/12/24 - 2/19/24 keflex x 7 days, pt feels size is smaller, thinks the size fluctuates, no pain, no fever    Pt was out of town 3/1-3/8 taking a road trip to [facility] but cut this trip short because she felt the right neck LN was enlarging about 1 week ago, has since rapidly enlarged and is moving up towards her neck, with associated redness and soreness to palpation at neck, no associated dyspnea, HA, vision changes, dysphagia. She was added to my schedule today for this reason.      REVIEW OF SYSTEMS:   A 14 point ROS was reviewed with pertinent positives and negatives in the HPI.       HOME MEDICATIONS:  Current Outpatient Medications   Medication Sig Dispense Refill     dorzolamide-timolol (COSOPT) 2-0.5 % ophthalmic solution Place 1 drop into both eyes 2 times daily        losartan (COZAAR) 100 MG tablet Take 1 tablet (100 mg) by mouth daily 90 tablet 3     Multiple Vitamins-Minerals (PRESERVISION AREDS) TABS Take 1 tablet by mouth 2 times daily       rosuvastatin (CRESTOR) 10 MG tablet Take 1 tablet (10 mg) by mouth daily 90 tablet 3     latanoprost (XALATAN) 0.005 % ophthalmic solution Place 1 drop into both eyes At Bedtime           ALLERGIES:  Allergies   Allergen Reactions     Compazine      Mental confusion     Hydrochlorothiazide      Dryness mouth     Prochlorperazine Visual Disturbance     Simvastatin Other (See Comments)     Muscle aches         PAST MEDICAL HISTORY:  Past Medical History:   Diagnosis Date     Aftercare following right hip joint replacement surgery 10/25/2023     Breast cancer (H) 2000    Left breast, s/p mastectomy, chemotherapy and endocrine therapy     Follicular lymphoma (H) 01/20/2023     Glaucoma     bilateral - Dr. Moore - [facility]     History of spinal stenosis      Hyperlipidemia      Hypertension      Macular degeneration     Dr. Toscano - [facility]     Osteoarthritis      Osteoporosis      Personal history of chemotherapy 2000    A/C + Taxol     S/P radiation therapy     400 cGy to left orbit completed on 6/15/2023 [facility]         PAST SURGICAL HISTORY:  Past Surgical History:   Procedure Laterality Date     ARTHROPLASTY HIP Left 07/11/2023    Procedure: ARTHROPLASTY, HIP, TOTAL LEFT;  Surgeon: Austin Blood MD;  Location: [facility] OR     ARTHROPLASTY HIP Right 10/13/2023    Procedure: RIGHT ARTHROPLASTY, HIP, TOTAL;  Surgeon: Austin Blood MD;  Location: [facility] Main OR     BIOPSY Left 04/06/2023    Left Orbital Biopsy     C MASTECTOMY,SIMPLE  03/2000    left     COLONOSCOPY  2006    Q 10 years     COLONOSCOPY N/A 10/04/2021    Procedure: COLONOSCOPY;  Surgeon: Guru Lyla Ruby MD;  Location:  OR     ESOPHAGOSCOPY, GASTROSCOPY, DUODENOSCOPY  (EGD), COMBINED N/A 10/04/2021    Procedure: ENDOSCOPIC ULTRASOUND, ESOPHAGOSCOPY / UPPER GASTROINTESTINAL TRACT (GI), Esophagogastroduodenoscopy,  Fine Needle Biopsy of liver;  Surgeon: Guru Lyla Ruby MD;  Location: [facility] OR     EYE SURGERY       IR LYMPH NODE BIOPSY  2023     SURGICAL HISTORY OF -  Left 2015    tissue expander placed in left breast area     SURGICAL HISTORY OF -  Left 2016    left breast implant and right mammoplasty         SOCIAL HISTORY:  Social History     Socioeconomic History     Marital status: Single     Spouse name: Not on file     Number of children: 0     Years of education: Not on file     Highest education level: Not on file   Occupational History     Employer: [employer]     Occupation: REtired   Tobacco Use     Smoking status: Former     Packs/day: 1.00     Years: 17.00     Additional pack years: 0.00     Total pack years: 17.00     Types: Cigarettes     Quit date: 3/15/1999     Years since quittin.0     Passive exposure: Past     Smokeless tobacco: Never   Vaping Use     Vaping Use: Never used   Substance and Sexual Activity     Alcohol use: Not Currently     Alcohol/week: 4.0 standard drinks of alcohol     Types: 4 Glasses of wine per week     Drug use: No     Sexual activity: Not Currently     Birth control/protection: None   Other Topics Concern     Parent/sibling w/ CABG, MI or angioplasty before 65F 55M? Yes     Comment: Father heart attack   Social History Narrative     Not on file     Social Determinants of Health     Financial Resource Strain: Unknown (2023)    Financial Resource Strain      Within the past 12 months, have you or your family members you live with been unable to get utilities (heat, electricity) when it was really needed?: Patient refused   Food Insecurity: Unknown (2023)    Food Insecurity      Within the past 12 months, did you worry that your food would run out before you got money to buy more?: Patient refused       "Within the past 12 months, did the food you bought just not last and you didn t have money to get more?: Patient refused   Transportation Needs: Unknown (9/21/2023)    Transportation Needs      Within the past 12 months, has lack of transportation kept you from medical appointments, getting your medicines, non-medical meetings or appointments, work, or from getting things that you need?: Patient refused   Physical Activity: Not on file   Stress: Not on file   Social Connections: Not on file   Interpersonal Safety: Low Risk  (10/3/2023)    Interpersonal Safety      Do you feel physically and emotionally safe where you currently live?: Yes      Within the past 12 months, have you been hit, slapped, kicked or otherwise physically hurt by someone?: No      Within the past 12 months, have you been humiliated or emotionally abused in other ways by your partner or ex-partner?: No   Housing Stability: Unknown (9/21/2023)    Housing Stability      Do you have housing? : Patient refused      Are you worried about losing your housing?: Patient refused       FAMILY HISTORY:  Family History   Problem Relation Age of Onset     Cancer Mother         bone cancer     Heart Disease Father      Coronary Artery Disease Father      Diabetes Father      Arthritis Sister      Breast Cancer Sister         age 75     Diabetes Maternal Grandmother      Diabetes Maternal Grandfather      Diabetes Paternal Grandmother      Family history of cancer- sister breast cancer- dx at age 77 y/o, localized breast cancer, surgical resection, RT, no adjuvant chemo or endocrine therapy that pt is aware of     PHYSICAL EXAM:  Vital signs:  /72   Pulse 66   Ht 1.575 m (5' 2\")   Wt 66.8 kg (147 lb 3.2 oz)   SpO2 98%   BMI 26.92 kg/m         GENERAL/CONSTITUTIONAL: No acute distress.  EYES: Pupils are equal and round. Extraocular movements intact without nystagmus.  No scleral icterus.  RESPIRATORY: Equal chest rise.   MUSCULOSKELETAL: Warm and " well-perfused, no cyanosis, clubbing, or edema.   NEUROLOGIC: Cranial nerves are grossly intact. Alert, oriented to person, place and time, answers questions appropriately.  LYMPH NODES: 2/9/24: 4cm x4cm circular partially soft and superior hard nodule at lower anterior cervical/supraclavicular area, 3/8/24 hard mass with TTP at superior cervical area, now measuring 8cm tall and 6cm across with overlying patchy erythema. No facial plethora.  GAIT: Steady, does not use assistive device      LABS:    PATHOLOGY:      IMAGING:      ASSESSMENT/PLAN:  Virginia Byers is a 73 year old female with:    #right neck lymphadenopathy  - sudden onset 4cm x4cm base of neck/supraclavicular mass  - 2/9/24 CT neck: multiple enlarged and heterogeneous right-sided level 5 and 4 lymph nodes with adjacent fat stranding, increased since 12/1/2023. The largest measures up to 1.7 cm. No drainable fluid collection.  - 2/9/24 WBC 9.6, ESR 29, CRP 12.8 (LDH hemolyzed)  - tx w/keflex x14 days  - 3/8/24 presents again with further enlargement of right sided neck LN, felt the right neck LN was enlarging about 1 week ago, has since rapidly enlarged and is moving up towards her neck, with associated redness and soreness to palpation at neck, no associated dyspnea, HA, vision changes, dysphagia.     PLAN:  - in the setting of follicular lymphoma, r/o histologic transformation  - pt to go to ER for STAT CT neck, r/o abscess, check labs including: CBC, CMP, ESR, CRP, LDH   - will need biopsy (send flow cytometry) and if abscess- drainage with culture  - pt wishes to go to [facility] 2/2 proximity to home, I will call the ER and give sign out    #new anemia  - 1/24 hgb 13.2, 2/24 hgb 10.9    PLAN:  - pt has interval fluctuations in hgb since at least 6/23 that have historically self corrected  - repeat CBC as above    # 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 INCLUDING biopsy proven LN and left orbital mass/lacrimal gland involvement and suspected  "parotid gland involvement   -11/22 MRI lumbar spine in 12/22 MRI pelvis show prominent left iliac lymph nodes (measuring 1.8 x 3.2, previously 1.5 x 2.7 cm), right medial thigh lymphadenopathy (1.7cm), right iliac bone enhancing marrow signal, left posterior iliac enhancing lesion  -12/22 CT chest abdomen pelvis shows new or increased left common iliac (1.2cm), external iliac, pelvic sidewall lymphadenopathy (2.1cm) and sclerotic lesions in left sacrum and left posterior ilium. No splenomegaly  - 1/23 brain MRI: Indeterminate ovoid T2 hyperintense enhancing mass in the region of the left lacrimal gland measuring approximately 2.4 cm AP by 1.4 cm transverse by 2.3 cm craniocaudal associated with/replacing the left lacrimal gland. Consider correlation with tissue sampling  - 1/23 CT soft tissue neck: Soft tissue nodule in the superficial left parotid gland measuring 1.2 cm. Additional 0.8 cm nodule in the deep portion of the left parotid gland. Soft tissue nodule in the superficial right parotid gland measuring 0.8 cm. These may represent benign or malignant parotid lesions. Multiplicity of lesions raises concern for Warthin tumors  - 1/27/23 note from Dr. Wilmar Morales- \"Based on this diagnosis, I think the 2 nodules in her parotid gland are likely atypical lymph nodes containing lymphoma.  She will probably require some staging imaging, such as PET scan and we can take a look at that as well.  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 CD5 stains T cells  - Ki-67 proliferative index " "is approximately 10%, also low in the follicles.  - flow cytometry \"CD10-positive population is consistent with a clonal B cell population and a CD10 positive B-cell lymphoma is favored.\"    - 4/6/23 left orbital mass/lacrimal gland excisional biopsy: PATHOLOGY: - Follicular Lymphoma, low grade ([facility] negative for BCL2 rearrangement). 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  The flow cytometry from 1/20/23 right external iliac LN biopsy showed in addition to CD10 positive B-cell population  also a partial/equivocal CD5 positive separate kappa monotypic B-cell population, which most likely represents monoclonal B-cell lymphocytosis of CLL/SLL type, since it was also documented in the peripheral blood flow cytometry on February 16, 2023 (case number [facility] ) at a low frequency.  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  - Cohort 2 which is described as negative for CD5, CD10, ,  and showing dim CD20 and dim kappa staining   Of note the population of monoclonal B-cell lymphocytosis found in peripheral blood in our department and in the lymph node biopsy in January showed partial/equivocal dim CD5 and may correspond to the cohort #2.     -5/23 PET/CT NCAP:   1. An ovoid mass in the left lateral orbit measures 2.5 x 1.2 cm, slightly larger than 01/09/2023, and demonstrates marked uptake (SUVmax 9.3), consistent with biopsy-proven lymphoma  2. A few small FDG avid bilateral intraparotid nodules are present, one on the " right and two on the left, including representative left parotid nodule measuring 0.9 x 1.4 cm with moderate uptake (SUVmax 6.7).  3.  Asymmetric prominent right posterior lower cervical node measures 0.8 cm associated with moderate focal uptake (SUVmax 5.1).  4. A single mildly enlarged left axillary lymph node measures 1.0 cm associated with mild uptake (SUVmax 3.0)  5. Some scattered FDG avid lymph nodes below the diaphragm involve the retroperitoneal retrocaval, bilateral common iliac, left pelvic sidewall and left inguinal regions. Some of these have increased slightly in size since 12/28/2022, such as a conglomerate left pelvic sidewall bj mass measuring approximately 2.2 x 4.1 cm (previously 2.2 x 3.4 cm) associated with marked uptake (SUVmax 7.6).  6. FDG avid sclerotic skeletal lesion involves the right superior acetabulum (SUVmax 12.9) as well as a couple of separate smaller sclerotic lesions in the right iliac bone uptake (SUVmax 12.2).     - 6/23 bone marrow biopsy:  MORPHOLOGY:  - No morphologic or immunophenotypic evidence of follicular lymphoma  - Flow cytometry showing a small population of CD5 positive monotypic B cells; most compatible with monoclonal B lymphocytosis (There is no definitive morphologic correlate for this finding. There is no morphologic or immunophenotypic evidence of involvement by follicular lymphoma.  The CD5-positive B cell clone may best represent monoclonal B lymphocytosis, provided that bj SLL is excluded.)  - Normocellular marrow (cellularity estimated at 30%) with trilineage hematopoietic maturation and no increase in blasts  FLOW CYTOMETRY:  CD5 positive kappa-monotypic B cells (0.8%)  0.8% B cells which express CD5, CD19, CD20 (dim to negative) and monotypic kappa immunoglobulin light chains (dim) and lack CD10 and CD38. The B cells have similar forward scatter relative to background T cells suggestive of similar size; however, precise size determination is  deferred to morphology  CYTOGENETICS:  46,XX    -6/19/2023 CT-guided bone biopsy, right acetabulum  PATHOLOGY:  - Atypical lymphoid infiltrate in a suboptimal biopsy  - No histologic evidence of metastatic carcinoma  - Two small monoclonal B-cell populations detected by flow cytometry: CD5 positive kappa-monotypic B cells ( 2.8 %, CD5 partial, dim, CD19 positive, CD30 dim to negative, CD10 negative); CD10 positive lambda-monotypic B cells ( 0.4 %, CD10 positive, CD19 slightly dim, CD20 positive, CD5 negative)     - option of systemic treatment including rituximab discussed previously on multiple occassions particularly w/left orbital involvement, see my previous notes for details; pt deferred  - 6/14/2023- 6/15/2023 palliative radiation to left orbit follicular lymphoma 4Gy/2fx w/Dr. Julian Horvath    - 12/23 PET CT NCAP w/o contrast Liver SUV max = 3.78, Aorta Blood SUV max = 3.43.  -New/progressed:  - 0.8 x 0.5 cm right FDG avid cervical lymph node; SUV max of 6.5  - 1.3 x 1.5 cm right FDG avid cervical lymph node; SUV max of 5.8  - 1.3 x 1.4 cm right FDG avid cervical lymph node; SUV max of 7.8   -Lesion in left lateral chest wall immediately inferior to the margin of breast implant, 0.5 x 0.4 mm, SUV 5.02, previously 1.4  - Lymph node adjacent to left psoas, 1.5 cm, SUV 19.9, previously 6.02  - Right acetabulum, 2.0 x 1.9 cm, SUV 14.03, previously 12.05  - No new bone lesions but increased metabolic activity previously identified multiple FDG avid osseous foci:  1.  Sternal lesion, SUV 5.7, previously 3.6  2.  Right scapular spine lesion, SUV 4.6, previously 3.6  3.  Right acetabular bone lesion SUV 13.4, previously 11    -Unchanged:  -1 of 2 left parotid gland lesions, 1.6 x 1.1 cm, SUV 8.4, previously 6.6  - Left axillary lymph node, 1.2 x 1.0 cm, SUV 2.3  -Spleen normal    -Resolved/improved:  - Left orbital hypermetabolic lesion-resolved  - 1 of 2 previous left parotid hypermetabolic lesions-resolved  - Multiple  mildly hypermetabolic bilateral inguinal nodes, index right inguinal lymph node SUV 2.62, previously 5.19-improved    -12/23 Left pelvic lymph node, biopsy:  - Recurrent/persistent low grade B-cell lymphoma, most consistent with follicular lymphoma (limited evaluation of architecture),  no evidence of large cell transformation in this biopsy, and no evidence of negro small lymphocytic lymphoma (SLL).  - Negative for metastatic carcinoma    Flow cytometry:   C5 positive kappa-monotypic B cells 2.6% consistent with CLL/SLL vs monoclonal B cell lymphocytosis  CD10 positive B cells negative for surface light chain 29%, consistent w/previously dx CD10+ B cell lymphoma    - 12/23 bone marrow biopsy:  MORPHOLOGY:  - Hypercellular marrow (cellularity estimated at 40%) with trilineage hematopoiesis, no overt dysplasia, and no increase in blasts  - Rare CD5+ kappa-monotypic B cells detected by flow cytometry (1.7%)  - Small lymphoid aggregate suspicious for marrow involvement by CD5+ B-cell lymphoproliferative disorder (<5% of marrow cellularity), most consistent with monoclonal B-cell lymphocytosis.  - No definitive histologic or immunophenotypic evidence of follicular lymphoma and  no morphologic evidence of large cell lymphoma.  FLOW CYTOMETRY:  -CD5-positive kappa-monotypic B cells (1.7%) express CD5 (dim, partial), CD19, CD20 (dim), and monotypic kappa immunoglobulin light chains and lack CD10 and CD38.  suggestive of marrow involvement by monoclonal B-cell lymphocytosis (versus chronic lymphocytic leukemia/small lymphocytic lymphoma; CLL/SLL).   CYTOGENETICS:  46,XX[20]  FISH:  Pending    Staging and prognostication:  - Stage: 4 given right medial thigh LN and left common iliac, external iliac, pelvic sidewall; biopsy proven left orbital mass involvement; there was some concern from ENT that patients parotid lesions were related to pts follicular lymphoma   - FLIPI: score 2 (age>60),  stage 3-4; intermediate risk  -  pertinent labs: 12/22  and 5/23 ; 2/23 and 5/23 beta 2 microglobulin 1.7, hepatitis B and C negative    Treatment:  - GELF criteria to indicate treatment (involvement of 3 or more bj sites each with a diameter greater than or equal to 3 cm, any bj or extranodal tumor mass with a diameter of greater than or equal to 7 cm, B symptoms, splenomegaly, pleural effusions, peritoneal ascites, clinically progressive or significant cytopenias, leukemia, symptoms, threatened end organ function, clinically significant bulky disease, steady or rapid progression of disease)  - option of systemic treatment including rituximab discussed previously on multiple occassions particularly w/left orbital involvement, see my previous notes for details; pt deferred  - 6/14/2023- 6/15/2023 palliative radiation to left orbit follicular lymphoma 4Gy/2fx    PLAN:  - pt has 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 INCLUDING biopsy proven LN and left orbital mass/lacrimal gland involvement   - bone marrow biopsy negative for lymphoma and right acetabulum bone biopsy atypical lymphoid infiltrate seen but biopsy suboptimal and flow cytometry shows 2.8% CD5 positive kappa monotypic B cells consistent w/monoclonal B cell lymphocytosis and 0.4% CD10 positive lambda-monotypic B cells consistent w/lymphoma- ie follicular vs other  - 12/23 PET showed left pelvic LN w/SUV increased from 6->20, biopsied to rule out transformation to aggressive lymphoma. Biopsy showed follicular lymphoma w/aggressive lymphoma or SLL. LN flow cytometry consistent w/follicular lymphoma and MBL. Concurrent bone marrow biopsy, <5% CD5+ lymphoproliferative d/o consistent w/MBL  - overall stage 4 disease and intermediate risk  - systemic tx including rituximab discussed on multiple prior visits, pt deferred as detailed in my previous notes   - pt completed palliative RT to left orbit; use lubricating eye drops prn (also using eye drops for glaucoma)  - 2/24 hgb  10.9  - pt overall asx, with exception of self palpating right neck LN (largest 1.7cm)  - she continues to wish to avoid systemic tx unless absolutely necessary thus we will continue active surveillance   - H&P and labs q3 months  - PET q6 months due end of 6/24- to be ordered at next visit (may be sooner if neck LN continue to grow)      #  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   - 1/23 right external iliac LN core needle biopsy- FLOW CYTOMETRY: 1.3% rare monoclonal B cell population CD5 equivocal, CD23 negative, CD 79b negative, possible MBL; no morphological correlate  -2/23 peripheral blood- FLOW CYTOMETRY: 2.3% kappa monoclonal B cell population, CD5 positive, CD38 negative, CD49d negative, possible MBL  - 4/6/23 left orbital mass/lacrimal gland excisional biopsy: FLOW CYTOMETRY: 15.1% kappa monotypic B cell population, bright positive: CD45, CD19, CD22, moderately positive kappa, CD20, CD79b, dim positive CD23, negative CD 2,3,103, lambda, 5, 10, 200, 38, 43  - 6/23 bone marrow biopsy: FLOW CYTOMETRY: 0.8% kappa monotypic B cell population, positive for CD5, CD19, dim to negative CD20, negative for CD38 and CD10  - 6/23 right acetabulum bone biopsy: FLOW CYTOMETRY: 2.8% kappa monotypic B cell population, CD5 partial dim, CD 19 positive, CD20 dim negative, CD10 negative, possible MBL    - of note pt does not have leukocytosis or lymphocytosis, ALC consistently <5000, no splenomegaly, LN biopsy did not demonstrate IHC consistent with SLL  - 5/23 PET/CT as above  - 12/23 bone marrow biopsy:  MORPHOLOGY:  - Hypercellular marrow (cellularity estimated at 40%) with trilineage hematopoiesis, no overt dysplasia, and no increase in blasts  - Rare CD5+ kappa-monotypic B cells detected by flow cytometry (1.7%)  - Small lymphoid aggregate suspicious for marrow involvement by CD5+ B-cell lymphoproliferative disorder (<5% of marrow cellularity), most consistent with monoclonal B-cell lymphocytosis.  - No  definitive histologic or immunophenotypic evidence of follicular lymphoma and  no morphologic evidence of large cell lymphoma.  FLOW CYTOMETRY:  -CD5-positive kappa-monotypic B cells (1.7%) express CD5 (dim, partial), CD19, CD20 (dim), and monotypic kappa immunoglobulin light chains and lack CD10 and CD38.  suggestive of marrow involvement by monoclonal B-cell lymphocytosis (versus chronic lymphocytic leukemia/small lymphocytic lymphoma; CLL/SLL).     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 Bone marrow biopsy negative for involvement of lymphoma 6/23 and shows <5% cellularity involvement of MBL 12/23  - 9/23 ALC 1.6, 12/23 ALC 1.7, 1/24 ALC 1.7  - Monitor CBC with differential and clinically every 3-6 months    #Sclerotic lesions in left sacrum and left posterior ilium  -10/21 mammogram, endoscopy with EUS and colonoscopy WNL  -12/22 CT chest abdomen pelvis shows new or increased left common iliac, external iliac, pelvic sidewall lymphadenopathy and sclerotic lesions in left sacrum and left posterior ilium.  No metastatic disease in chest.  No abnormal findings in breast.  - 12/22 tumor markers: CA 15-3 24,  29, CEA 3.1, CA 19-9 4, AFP 5.9,   - 1/2023 MRI brain w/wo contrast: no metastases   - 1/2023 right external iliac LN core need biopsy- no metastatic carcinoma   - 5/23 PET avid bone lesions  -6/19/2023 CT-guided bone biopsy, right acetabulum  PATHOLOGY:  - Atypical lymphoid infiltrate in a suboptimal biopsy  - No histologic evidence of metastatic carcinoma  - Two small monoclonal B-cell populations detected by flow cytometry: CD5 positive kappa-monotypic B cells ( 2.8 %, CD5 partial, dim, CD19 positive, CD30 dim to negative, CD10 negative); CD10 positive  "lambda-monotypic B cells ( 0.4 %, CD10 positive, CD19 slightly dim, CD20 positive, CD5 negative)   - 12/23 PET   - Right acetabulum, 2.0 x 1.9 cm, SUV 14.03, previously 12.05  - No new bone lesions but increased metabolic activity previously identified multiple FDG avid osseous foci:  1.  Sternal lesion, SUV 5.7, previously 3.6  2.  Right scapular spine lesion, SUV 4.6, previously 3.6  3.  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    -4/2020 left breast simple mastectomy with no residual carcinoma.   -S/p ACx4, taxol x4, no anti-her2 treatment, no RT.   -S/p 5 years adjuvant endocrine therapy (tamoxifen and anastrozole).  -Delayed left breast reconstruction and right breast augmentation.   - 5/23 PET/CT notes single enlarged left axillary lymph node with SUV 3, \"could also represent lymphoma although given patient's history of left breast cancer, a breast cancer metastasis is also a possibility\"  - The above in the setting of known lymphoma and breast tumor markers normal   -9/23 right breast diagnostic mammogram: [facility]  - 9/23 ultrasound left axilla: 1.1 x 0.9 x 1.0 cm lymph node with mildly thickened cortex that correlates with abnormal lymph node on previous PET, recommend tissue diagnosis  - 9/23 ultrasound-guided left axillary lymph node core biopsy: No morphologic evidence of lymphoma.  Lymph node tissue was received in formalin, this could not be sent for flow cytometry.  No separate fresh tissue was sent for flow " cytometry.  - 12/23 PET Left axillary lymph node, 1.2 x 1.0 cm, SUV 2.3, unchanged    PLAN:   -With the limitations of tissue processed in formalin and no fresh tissue available for flow cytometry, appears left axilla lymph node is benign without lymphoma      RTC 3/14 for follow up with me      Arlene Ahumada DO  Hematology/Oncology  [facility]      Again, thank you for allowing me to participate in the care of your patient.        Sincerely,        ARLENE AHUMADA DO

## 2024-03-08 NOTE — NURSING NOTE
"Oncology Rooming Note    March 8, 2024 3:36 PM   Virginia Byers is a 73 year old female who presents for:    Chief Complaint   Patient presents with    Oncology Clinic Visit     Follow up     Initial Vitals: /72   Pulse 66   Ht 1.575 m (5' 2\")   Wt 66.8 kg (147 lb 3.2 oz)   SpO2 98%   BMI 26.92 kg/m   Estimated body mass index is 26.92 kg/m  as calculated from the following:    Height as of this encounter: 1.575 m (5' 2\").    Weight as of this encounter: 66.8 kg (147 lb 3.2 oz). Body surface area is 1.71 meters squared.  Moderate Pain (4) Comment: Data Unavailable   No LMP recorded. Patient is postmenopausal.  Allergies reviewed: No  Medications reviewed: No    Medications: Medication refills not needed today.  Pharmacy name entered into Circle: [id number] - [place], [place] - [address] [facility]    Frailty Screening:   Is the patient here for a new oncology consult visit in cancer care? 2. No      Clinical concerns: No Concerns       Clayton Payton MA            "

## 2024-03-08 NOTE — PROGRESS NOTES
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    Patient has medical history including LEFT breast cancer in 2000, hypertension, hyperlipidemia, osteoporosis, osteoarthritis, degenerative disc disease, spinal stenosis of lumbar region,  s/p bilateral L5-S1 transforaminal ROS by Dr. Garcia on 7/9/2021, s/p bilateral L4-5 and L5-S1 facet joint injections on 6/14/2021, glaucoma and macular degeneration, cataracts s/p surgery, history of tobacco use (quit 1999)     Regarding previous history of breast cancer:  She was previously treated by Dr. Sakina Brandt at [facility]      In summary, diagnosed at age 49 with LEFT breast IDC ER positive, WI positive, HER2 positive on FISH. 3/2000 left breast lumpectomy and left axillary node dissection (IDC, grade 3, 1.45 cm incompletely excised tumor, DCIS, positive margins with infiltrating carcinoma, 1 of 17 lymph nodes positive (0.5 cm metastatic focus), ER positive, WI positive, HER2 positive).  4/2020 left breast simple mastectomy with no residual carcinoma. S/p ACx4, taxol x4, no anti-her2 treatment, no RT. S/p 5 years adjuvant endocrine therapy (tamoxifen and anastrozole). Delayed left breast reconstruction and right breast augmentation.      3/2/2000  Excisional biopsy of Left breast mass -   Infiltrating ductal carcinoma with associated DCIS   ER positive (60%), WI positive (22%), HER2 by FISH POSITIVE     3/10/2000 - Left lumpectomy and left axillary node  Invasive ductal carcinoma, Grade 3, 1.45 cm (incompletely excised), negative for LVI  DCIS, greatest histologic dimension of tumor (DCIS PLUS invasive tumor) = 2.9 cm (estimate 50% DCIS)  Positive margins- infiltrating carcinoma  extends to margin  1 of 17 axillary lymph nodes sampled was positive for metastatic focus of 0.5 cm.  ER/CA+     Sections of lumpectomy left breast showing residual 1.1 cm focus of ductal carcinoma in situ (DCIS), present within microns of the linked margin of resection.       4/11/2000 - Left breast simple mastectomy, negative for residual carcinoma  Left mastectomy on 4/11/2000 performed by Dr. Cristian Mcdonald at [facility].      Adjuvant treatment:  - 5/15/2000-7/24/2000 4 cycles of Adriamycin and Cytoxan   - 8/16/2000-10/18/2000 4 cycles of paclitaxel   - Appears she may have been on some kind of trial/protocol, was not given any anti-HER2 targeted treatment  - No adjuvant radiation  - 12/2000- 10/2002 tamoxifen  - 10/2002-5/2006 switched to anastrozole (due to in vitro studies showing anastrozole may be slightly better for patients were HER2 positive) with Fosamax for osteopenia     8/31/2015 Left delayed reconstruction with tissue expander    2/3/2016 Left 2nd stage 450 cc high profile silicone implant; right augmentation 150 cc Moderate classic profile silicone implant; cresent mastopexy    10/18/2021: Screening mammogram right breast ZACKARY    OTHER:  Of note, patient has documentation of anemia (iron deficiency and anemia of chronic disease), elevated LFTs, nausea and vomiting of all solids and liquids, fatigue with 26 pound unintentional weight loss from 7/20/2021 - 10/20/2021 with mildly elevated LFTs which led to evaluation with mammogram, endoscopy with EUS and colonoscopy as detailed below. Pt did associate these with some spinal injections she had gotten, due to overlapping times. Pt did regain all of the weight and hand improvement of anemia within 2 months, without significant intervention.     -10/2021: Endoscopy with EUS normal EGD and no stigmata or source of upper GI bleeding, visualized bile duct, gallbladder, liver, pancreas, left adrenal gland normal.  No lymphadenopathy. LIVER, RANDOM  NEEDLE BIOPSY: Benign parenchyma with congestion; no significant fibrosis or other abnormalities     -10/2021 colonoscopy: Hemorrhoids, diverticulosis in sigmoid colon and splenic flexure    Current history:  -Ongoing right knee pain not improved with brace and physical therapy, s/p bilateral L5-S1 transforaminal ROS by Dr. Garcia on 7/9/2021, s/p bilateral L4-5 and L5-S1 facet joint injections on 6/14/2021. ongoing b/l hip and leg pain, worse with position changes (ie sitting to standing), constant, 8-9/10, tylenol brings pain down to 6/10 (using tylenol bid). ECOG 3.   -Patient has seen neurosurgery Dr. Jose Alexander, occupational medicine specialist Dr.Orrin Murphy, with plan to see PM&R Dr. Margie Agudelo and movement disorder clinic  - 11/22 MRI lumbar spine:    prominent left iliac lymph nodes among other findings related to degenerative changes and neural foraminal stenosis throughout lumbar spine    - 12/22 MRI pelvis:  1. Enhancing marrow signal abnormality of the right iliac bone extending from the superior acetabulum subchondral location proximally almost to the level of the sacroiliac joint caudal aspect. No associated fracture line. Underlying marrow infiltrative process such as from metastasis cannot be excluded especially given the degree of T1 hypointensity and history of primary tumor. Other consideration include stress reaction.  2. Mildly increased left external iliac chain, and the right medial thigh lymphadenopathy. Metastasis cannot be excluded.  3. Left posterior iliac enhancing lesion, similar in size to prior CT 9/2021 and previously not visible on CT, focal enhancing lesion in the right greater trochanter laterally. Findings are concerning for Metastasis.    -12/22 CT chest abdomen pelvis with contrast:  COMPARISON: MRI pelvis 12/20/2022 and CT abdomen pelvis 9/21/2021.  1. Mastectomies with implant reconstructions. No lymphadenopathy  2.  There is an new or increased left common iliac, external  iliac and pelvic sidewall lymphadenopathy since prior CT. Distal left common iliac lymph nodes measure up to 1.2 cm on this exam compared to 0.7 cm on prior CT. Left pelvic sidewall lymph node measures 2.1 cm short axis on this exam compared to 1.2 cm on prior CT . consistent with metastatic disease.  3. Sclerotic lesions in the left sacrum and left posterior ilium are unchanged from prior CT.  may be metastatic.  4. No evidence of metastatic disease in the chest.     -1/2023 MRI brain w/wo contrast:  IMPRESSION:  1. No findings of intracranial metastatic disease.  2. Indeterminate ovoid T2 hyperintense enhancing mass in the region of the  left lacrimal gland measuring approximately 2.4 cm AP by 1.4 cm  transverse by 2.3 cm craniocaudal associated with/replacing the left  lacrimal gland. Consider correlation with tissue sampling.  3. A few indeterminate partially visualized heterogeneous enhancing  Nodules measuring up to approximately 1.5 cm in the bilateral parotid glands.   Consider soft tissue neck CT with contrast for further evaluation.    -1/20/2023: IR US Guided biopsy of a right external iliac LN  Final Diagnosis   A.  LYMPH NODE, RIGHT EXTERNAL ILIAC, CORE NEEDLE BIOPSY AND TOUCH IMPRINT:  -Involved by follicular lymphoma, low-grade (WHO grade 1-2)  -Negative for metastatic carcinoma     The morphologic and immunophenotypic patterns are consistent with follicular lymphoma.  There is no high-grade lymphoma present in this limited specimen.  Concurrent flow study ([id number]) demonstrated CD10-positive lambda monotypic B cells (22%), rare kappa monotypic B cells (1.3%), and no aberrant immunophenotype on T cells.  A FISH study for Bcl-2 is pending and will be reported separately.     The case was initially reviewed by Dr. Cifuentes (breast pathology).     This case was reviewed in part at our Hematopathology Faculty Consensus conference at which Girish Nolasco, Narciso, and Kayode were present in addition  to myself.  This is a small needle core.  In some of the areas, the CD10+ B cells are confined to follicles.  Though the possibility of in situ follicular neoplasia was discussed, we still favor a diagnosis of follicular lymphoma - there are a number of CD10+ B cells outside of follicles at the base of the biopsy, and the clinical history supports a greater extent of involvement.     There was a tiny kappa monotypic B cell population identified by flow (separate from the CD10+ clone).  We looked for the morphologic correlate in this case by IHC, but it is not readily apparent.  This may represent a monoclonal B lymphocytosis - consider sending a peirpheral blood sample for flow cytometry.    FISH:  RESULTS:     NORMAL  - No rearrangement of BCL2     INTERPRETATION:  No evidence was found by FISH of rearrangement of the BCL2 (18q21) locus. These findings are not helpful for confirming or further characterizing the reported pathologic diagnosis of follicular lymphoma.    Flow Interpretation   A. Lymph Node(s), :  -CD10-positive lambda monotypic B cells (22%)  -Rare kappa monotypic B cells (1.3%)  -No aberrant immunophenotype on T cells  See comment   Electronically signed by Krista Headley MD on 1/24/2023 at  3:10 PM   Comment     The CD10-positive population is consistent with a clonal B cell population and a CD10 positive B-cell lymphoma is favored.      In addition, a second clonal B-cell population is identified which has a CLL-like immunophenotype except that CD5 expression is equivocal, this may represent a monoclonal B-cell lymphocytosis (MBL).     Addendum   INTERPRETATION  The diagnosis remains the same (see comment)     REASON FOR ADDENDUM  This addendum is issued to reflect additional testing performed on this case. See Comment.      COMMENT  Additional multi-color flow analysis was performed for the following markers:   CD23, CD79b     The CD5 (dimly) positive B cells mostly lack CD23 and CD79b         1/23/23 CT neck:  A) Heterogeneous nodule in the right thyroid gland measuring up  to 1.6 cm.   B) Soft tissue nodule in the superficial left parotid  gland measuring 1.2 cm. Additional 0.8 cm nodule in the deep portion  of the left parotid gland. Soft tissue nodule in the superficial right  parotid gland measuring 0.8 cm. These may represent benign or malignant parotid lesions. Multiplicity of lesions raises concern for Warthin tumors. Otolaryngology consultation is  recommended.  C) Prominent soft tissue around the major arteries in the neck  particularly the common and internal carotid arteries. This is  atypical for atherosclerotic disease and vasculopathy/vasculitis such  as giant cell arteritis should be considered. Probable superimposed  atherosclerotic disease at the carotid bifurcations right greater than  left without definite high-grade stenosis.    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  And B-cell cohort #2 which is described as negative for CD5 and showing dim CD20 and dim kappa staining.  This population is listed a CD5 negative and CD10 negative also listed as negative for  and .  Of note the population of monoclonal B-cell lymphocytosis found in peripheral blood in our department and in the lymph node biopsy in January showed partial/equivocal dim CD5 and may correspond to the cohort #2.     We essentially agree with the diagnosis of  follicular lymphoma, which is consistent with prior diagnosis of follicular lymphoma low grade  established on core biopsy of right external iliac lymph node January 20, 2023 (case [id number]) in our Department . The concurrent flow cytometry case [id number] in January showed in addition to CD10 positive B-cell population  also a partial/equivocal CD5 positive separate kappa monotypic B-cell population, which most likely represents monoclonal B-cell lymphocytosis of CLL/SLL type, since it was also documented in the peripheral blood flow cytometry on February 16, 2023 (case number UF ) at a low frequency.  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 Areas with infiltrates of small B cells with coexpression of CD5 and CD23 are seen in the current biopsy, but the size of the biopsy is too small to comprehensively evaluate for the presence of proliferation centers and the extent of involvement by the CD5 positive B cell lymphoproliferative process.   This case has been discussed at intradepartmental hematopathology conference with participation by THELMA Petersen, THELMA Davis and myself.      -5/23 PET/CT NCAP:   1. An ovoid mass in the left lateral orbit measures 2.5 x 1.2 cm, slightly larger than 01/09/2023, and demonstrates marked uptake (SUVmax 9.3), consistent with biopsy-proven lymphoma  2. A few small FDG avid bilateral intraparotid nodules are present, one on the right and two on the left, including representative left parotid nodule measuring 0.9 x 1.4 cm with moderate uptake (SUVmax 6.7).  3.  Asymmetric prominent right posterior lower cervical node measures 0.8 cm associated with moderate focal   uptake (SUVmax 5.1).  4. A single mildly enlarged left axillary  lymph node measures 1.0 cm associated with mild uptake (SUVmax 3.0)  5. Some scattered FDG avid lymph nodes below the diaphragm involve the retroperitoneal retrocaval, bilateral common iliac, left pelvic sidewall and left inguinal regions. Some of these have increased slightly in size since 12/28/2022, such as a conglomerate left pelvic sidewall bj mass measuring approximately 2.2 x 4.1 cm (previously 2.2 x 3.4 cm) associated with marked uptake (SUVmax 7.6).  6. FDG avid sclerotic skeletal lesion involves the right superior acetabulum (SUVmax 12.9) as well as a couple of separate smaller sclerotic lesions in the right iliac bone uptake (SUVmax 12.2).     - 6/14/2023- 6/15/2023 palliative radiation to left orbit follicular lymphoma 4Gy/2fx  - 6/15/2023 bone marrow biopsy  MORPHOLOGY:  - No morphologic or immunophenotypic evidence of follicular lymphoma  - Flow cytometry showing a small population of CD5 positive monotypic B cells; most compatible with monoclonal B lymphocytosis (There is no definitive morphologic correlate for this finding. There is no morphologic or immunophenotypic evidence of involvement by follicular lymphoma.   The CD5-positive B cell clone may best represent monoclonal B lymphocytosis, provided that bj SLL is excluded.)  - Normocellular marrow (cellularity estimated at 30%) with trilineage hematopoietic maturation and no increase in blasts  FLOW CYTOMETRY:  CD5 positive kappa-monotypic B cells (0.8%)  0.8% B cells which express CD5, CD19, CD20 (dim to negative) and monotypic kappa immunoglobulin light chains (dim) and lack CD10 and CD38. The B cells have similar forward scatter relative to background T cells suggestive of similar size; however, precise size determination is deferred to morphology  CYTOGENETICS:  46,XX    -6/19/2023 CT-guided bone biopsy, right acetabulum  PATHOLOGY:  - Atypical lymphoid infiltrate in a suboptimal biopsy  - No histologic evidence of metastatic carcinoma  -  Two small monoclonal B-cell populations detected by flow cytometry  This biopsy is suboptimal for evaluation due to fragmentation, crush artifact, and decalcification. In this limited biopsy, there are fragments of bone and marrow showing a mixed infiltrate that includes both B and T cells. Notably, 2 small clonal B-cell populations were detected by flow cytometry which resemble B-cell populations detected in this patient's past lymph node (1/20/23) and blood and bone marrow (2/16/23, 6/15/23) flow cytometry studies. A definitive morphologic correlate to these cell populations is not appreciated, and the overall features are insufficient for a diagnosis of lymphoma.    FLOW CYTOMETRY:  A. Pelvis, Right:  -CD5 positive kappa-monotypic B cells ( 2.8 %) - see comment A  -CD10 positive lambda-monotypic B cells ( 0.4 %) - see comment B      Electronically signed by Arnaldo Alvarado MD on 6/20/2023 at  1:31 PM   Comment     A) Clonal B cells with the immunophenotype of CLL/SLL are present in this sample.  The differential includes peripheral blood contamination (with an MBL), the tissue based correlate to MBL, versus involvement by SLL.  2.8% B cells which express CD5 (partial, dim), CD19, CD20 (dim to negative) and monotypic kappa immunoglobulin light chains (dim) and lack CD10. The B cells have similar forward scatter relative to background T cells suggestive of similar size; however, precise size determination is deferred to morphology.     B) A CD10-positive B cell clone is also present - involvement by the patient's previously diagnosed CD10+ B cell lymphoma is in the differential (follicular lymphoma versus other (transformation to a higher grade lymphoma). 0.4% B cells which express CD10, CD19 (slightly dim), CD20, and monotypic lambda immunoglobulin light chains and lack CD5.  The B cells have increased forward scatter relative to background T cells suggestive of increased size; however, precise size  "determination is deferred to morphology.        - 7/10/23 Rheumatology consult for ESR and CRP elevated, DEVON positive 1:160 speckled; PCP suspects pt may have PMR; \"I suspect that the bilateral hip OA is altering gait that then results in worsening of low back pain. No further rheumatology workup needed at this time. \"    - 7/11/23 left hip total arthroplasty with severe b/l hip osteoarthritis with Dr. Austin Blood,  cc, discharged with DVT prophylaxis Lovenox 40 mg daily x2 weeks and aspirin 81 mg twice daily x2 weeks    - 10/13/23 right hip total arthroplasty    - 12/23 PET CT NCAP Liver SUV max = 3.78, Aorta Blood SUV max = 3.43.  -New/progressed:  - 0.8 x 0.5 cm right FDG avid cervical lymph node; SUV max of 6.5  - 1.3 x 1.5 cm right FDG avid cervical lymph node; SUV max of 5.8  - 1.3 x 1.4 cm right FDG avid cervical lymph node; SUV max of 7.8   -Lesion in left lateral chest wall immediately inferior to the margin of breast implant, 0.5 x 0.4 mm, SUV 5.02, previously 1.4  - Lymph node adjacent to left psoas, 1.5 cm, SUV 19.9, previously 6.02  - Right acetabulum, 2.0 x 1.9 cm, SUV 14.03, previously 12.05  - No new bone lesions but increased metabolic activity previously identified multiple FDG avid osseous foci:  1.  Sternal lesion, SUV 5.7, previously 3.6  2.  Right scapular spine lesion, SUV 4.6, previously 3.6  3.  Right acetabular bone lesion SUV 13.4, previously 11    -Unchanged:  - 1 of 2 left parotid gland lesions, 1.6 x 1.1 cm, SUV 8.4, previously 6.6  - Left axillary lymph node, 1.2 x 1.0 cm, SUV 2.3  - Spleen normal    -Resolved/improved:  - Left orbital hypermetabolic lesion-resolved  - 1 of 2 previous left parotid hypermetabolic lesions-resolved  - Multiple mildly hypermetabolic bilateral inguinal nodes, index right inguinal lymph node SUV 2.62, previously 5.19-improved    IMPRESSION: In this patient with follicular lymphoma there is evidence  for progression of disease.  1. There are " multiple new hypermetabolic lymph nodes.   2. Increased metabolic activity of multiple previously seen lymph  nodes  3. No new bony lesions identified but there is increased metabolic  activity of the previously identified lesions.  4. Multiple stable and some resolved hypermetabolic lesions; resolved  lesions include the left orbit and left parotid gland lesion.      -12/23 Left pelvic lymph node, biopsy:  - Recurrent/persistent low grade B-cell lymphoma, most consistent with follicular lymphoma  - Negative for metastatic carcinoma    This biopsy is involved by a low grade lymphoma with a staining pattern consistent with follicular lymphoma. The growth pattern of the lymphoma cells appears diffuse, although the biopsy is quite small, limiting assessment of architecture. Concurrent flow cytometry ([id number]) detected a population of CD10-positive B cells negative for surface light chains, similar to the patient's past flow cytometry studies and consistent with recurrent/persistent disease.     In addition, flow cytometry identified a small population of CD5-positive kappa-monotypic B cells (2.6%), similar to the patient's known monoclonal B-cell lymphocytosis (MBL). No definitive morphologic correlate to this finding is appreciated in this biopsy - it may represent peripheral blood contamination versus a tissue-based MBL equivalent.     There is no evidence of large cell transformation in this biopsy, and no evidence of negro small lymphocytic lymphoma (SLL). However, the biopsy is very small - the possibility of unsampled higher grade disease or SLL cannot be excluded. Correlation with imaging studies is recommended.    Left pelvic LN flow cytometry:  A. Pelvis, Left, :  - CD5 positive kappa-monotypic B cells (2.6%)  Clonal B cells with the immunophenotype of CLL/SLL are present in this sample.  The differential includes peripheral blood contamination (with an MBL) versus involvement by SLL.    - CD10 positive  negative for surface light chain B cells (29%) - see comment B  A CD10-positive B cell clone is also present - involvement by the patient's previously diagnosed CD10+ B cell lymphoma is in the differential (follicular lymphoma versus transformation to a higher grade lymphoma).         - 12/23 bone marrow biopsy:  MORPHOLOGY:  - Hypercellular marrow (cellularity estimated at 40%) with trilineage hematopoiesis, no overt dysplasia, and no increase in blasts  - Rare CD5+ kappa-monotypic B cells detected by flow cytometry (1.7%)  - Small lymphoid aggregate suspicious for marrow involvement by CD5+ B-cell lymphoproliferative disorder (<5% of marrow cellularity)  - No definitive histologic or immunophenotypic evidence of follicular lymphoma  Flow cytometry ([id number]) showed rare CD5-positive kappa-monotypic B cells (1.7%). By morphology, a tiny lymphoid aggregate is identified on the trephine core biopsy. This aggregate contains small B cells that appear to have aberrant expression of CD5, and may represent the morphologic correlate to the flow cytometry results. Overall, the findings are suspicious for low level marrow involvement by a CD5+ lymphoproliferative disorder most consistent with monoclonal B-cell lymphocytosis.     There is no definitive evidence of marrow involvement by follicular lymphoma, and no morphologic evidence of large cell lymphoma.  FLOW CYTOMETRY:  -CD5-positive kappa-monotypic B cells (1.7%) express CD5 (dim, partial), CD19, CD20 (dim), and monotypic kappa immunoglobulin light chains and lack CD10 and CD38.   The monotypic B-cell population present in this specimen has a similar immunophenotype as reported previously in this patient's peripheral blood ([id number], 2/16/23). The immunophenotypic findings are suggestive of marrow involvement by monoclonal B-cell lymphocytosis (versus chronic lymphocytic leukemia/small lymphocytic lymphoma; CLL/SLL). There may be peripheral blood contamination.  Large cells may not survive specimen processing. Morphologic correlation is required.   CYTOGENETICS:  46,XX[20]  FISH:  Pending    -1/3/24 second opinion w/Dr. Nguyen     -2/9/24 pt w/right neck lump, x4 days, semi-solid feeling, TTP with deep palpation only    INTERIM HISTORY:  Regarding lymphoma:  Denies weight loss, night sweats, early satiety, RUQ or LUQ pain  Reports left eye blurriness is stable since surgery, not worsened since RT, using lubricating eye drops prn and working with eye doctor for management of glaucoma, reports she had laser treatment to left eye that helped with blurred vision. Appt w/eye doctor 1/22/24, changed eye drops      - 2/9/24 CT soft tissue neck-   There are multiple enlarged and heterogeneous right-sided level 5 and 4 lymph nodes with adjacent fat stranding, increased since 12/1/2023. The largest measures up to 1.7 cm. No drainable fluid collection.    -2/12/24 - 2/19/24 keflex x 7 days, pt feels size is smaller, thinks the size fluctuates, no pain, no fever    Pt was out of town 3/1-3/8 taking a road trip to [facility] but cut this trip short because she felt the right neck LN was enlarging about 1 week ago, has since rapidly enlarged and is moving up towards her neck, with associated redness and soreness to palpation at neck, no associated dyspnea, HA, vision changes, dysphagia. She was added to my schedule today for this reason.      REVIEW OF SYSTEMS:   A 14 point ROS was reviewed with pertinent positives and negatives in the HPI.       HOME MEDICATIONS:  Current Outpatient Medications   Medication Sig Dispense Refill    dorzolamide-timolol (COSOPT) 2-0.5 % ophthalmic solution Place 1 drop into both eyes 2 times daily      losartan (COZAAR) 100 MG tablet Take 1 tablet (100 mg) by mouth daily 90 tablet 3    Multiple Vitamins-Minerals (PRESERVISION AREDS) TABS Take 1 tablet by mouth 2 times daily      rosuvastatin (CRESTOR) 10 MG tablet Take 1 tablet (10 mg) by  mouth daily 90 tablet 3    latanoprost (XALATAN) 0.005 % ophthalmic solution Place 1 drop into both eyes At Bedtime           ALLERGIES:  Allergies   Allergen Reactions    Compazine      Mental confusion    Hydrochlorothiazide      Dryness mouth    Prochlorperazine Visual Disturbance    Simvastatin Other (See Comments)     Muscle aches         PAST MEDICAL HISTORY:  Past Medical History:   Diagnosis Date    Aftercare following right hip joint replacement surgery 10/25/2023    Breast cancer (H) 2000    Left breast, s/p mastectomy, chemotherapy and endocrine therapy    Follicular lymphoma (H) 01/20/2023    Glaucoma     bilateral - Dr. Moore - [facility]    History of spinal stenosis     Hyperlipidemia     Hypertension     Macular degeneration     Dr. Toscano - [facility]    Osteoarthritis     Osteoporosis     Personal history of chemotherapy 2000    A/C + Taxol    S/P radiation therapy     400 cGy to left orbit completed on 6/15/2023 [facility]         PAST SURGICAL HISTORY:  Past Surgical History:   Procedure Laterality Date    ARTHROPLASTY HIP Left 07/11/2023    Procedure: ARTHROPLASTY, HIP, TOTAL LEFT;  Surgeon: Austin Blood MD;  Location: [facility] OR    ARTHROPLASTY HIP Right 10/13/2023    Procedure: RIGHT ARTHROPLASTY, HIP, TOTAL;  Surgeon: Austin Blood MD;  Location: [facility] OR    BIOPSY Left 04/06/2023    Left Orbital Biopsy    C MASTECTOMY,SIMPLE  03/2000    left    COLONOSCOPY  2006    Q 10 years    COLONOSCOPY N/A 10/04/2021    Procedure: COLONOSCOPY;  Surgeon: Guru Lyla Ruby MD;  Location:  OR    ESOPHAGOSCOPY, GASTROSCOPY, DUODENOSCOPY (EGD), COMBINED N/A 10/04/2021    Procedure: ENDOSCOPIC ULTRASOUND, ESOPHAGOSCOPY / UPPER GASTROINTESTINAL TRACT (GI), Esophagogastroduodenoscopy,  Fine Needle Biopsy of liver;  Surgeon: Guru Lyla Ruby MD;  Location: [facility] OR    EYE SURGERY      IR LYMPH  NODE BIOPSY  2023    SURGICAL HISTORY OF -  Left 2015    tissue expander placed in left breast area    SURGICAL HISTORY OF -  Left 2016    left breast implant and right mammoplasty         SOCIAL HISTORY:  Social History     Socioeconomic History    Marital status: Single     Spouse name: Not on file    Number of children: 0    Years of education: Not on file    Highest education level: Not on file   Occupational History     Employer: [employer]    Occupation: REtired   Tobacco Use    Smoking status: Former     Packs/day: 1.00     Years: 17.00     Additional pack years: 0.00     Total pack years: 17.00     Types: Cigarettes     Quit date: 3/15/1999     Years since quittin.0     Passive exposure: Past    Smokeless tobacco: Never   Vaping Use    Vaping Use: Never used   Substance and Sexual Activity    Alcohol use: Not Currently     Alcohol/week: 4.0 standard drinks of alcohol     Types: 4 Glasses of wine per week    Drug use: No    Sexual activity: Not Currently     Birth control/protection: None   Other Topics Concern    Parent/sibling w/ CABG, MI or angioplasty before 65F 55M? Yes     Comment: Father heart attack   Social History Narrative    Not on file     Social Determinants of Health     Financial Resource Strain: Unknown (2023)    Financial Resource Strain     Within the past 12 months, have you or your family members you live with been unable to get utilities (heat, electricity) when it was really needed?: Patient refused   Food Insecurity: Unknown (2023)    Food Insecurity     Within the past 12 months, did you worry that your food would run out before you got money to buy more?: Patient refused     Within the past 12 months, did the food you bought just not last and you didn t have money to get more?: Patient refused   Transportation Needs: Unknown (2023)    Transportation Needs     Within the past 12 months, has lack of transportation kept you from medical appointments, getting  "your medicines, non-medical meetings or appointments, work, or from getting things that you need?: Patient refused   Physical Activity: Not on file   Stress: Not on file   Social Connections: Not on file   Interpersonal Safety: Low Risk  (10/3/2023)    Interpersonal Safety     Do you feel physically and emotionally safe where you currently live?: Yes     Within the past 12 months, have you been hit, slapped, kicked or otherwise physically hurt by someone?: No     Within the past 12 months, have you been humiliated or emotionally abused in other ways by your partner or ex-partner?: No   Housing Stability: Unknown (9/21/2023)    Housing Stability     Do you have housing? : Patient refused     Are you worried about losing your housing?: Patient refused       FAMILY HISTORY:  Family History   Problem Relation Age of Onset    Cancer Mother         bone cancer    Heart Disease Father     Coronary Artery Disease Father     Diabetes Father     Arthritis Sister     Breast Cancer Sister         age 75    Diabetes Maternal Grandmother     Diabetes Maternal Grandfather     Diabetes Paternal Grandmother      Family history of cancer- sister breast cancer- dx at age 75 y/o, localized breast cancer, surgical resection, RT, no adjuvant chemo or endocrine therapy that pt is aware of     PHYSICAL EXAM:  Vital signs:  /72   Pulse 66   Ht 1.575 m (5' 2\")   Wt 66.8 kg (147 lb 3.2 oz)   SpO2 98%   BMI 26.92 kg/m         GENERAL/CONSTITUTIONAL: No acute distress.  EYES: Pupils are equal and round. Extraocular movements intact without nystagmus.  No scleral icterus.  RESPIRATORY: Equal chest rise.   MUSCULOSKELETAL: Warm and well-perfused, no cyanosis, clubbing, or edema.   NEUROLOGIC: Cranial nerves are grossly intact. Alert, oriented to person, place and time, answers questions appropriately.  LYMPH NODES: 2/9/24: 4cm x4cm circular partially soft and superior hard nodule at lower anterior cervical/supraclavicular area, 3/8/24 " hard mass with TTP at superior cervical area, now measuring 8cm tall and 6cm across with overlying patchy erythema. No facial plethora.  GAIT: Steady, does not use assistive device      LABS:    PATHOLOGY:      IMAGING:      ASSESSMENT/PLAN:  Virginia Byers is a 73 year old female with:    #right neck lymphadenopathy  - sudden onset 4cm x4cm base of neck/supraclavicular mass  - 2/9/24 CT neck: multiple enlarged and heterogeneous right-sided level 5 and 4 lymph nodes with adjacent fat stranding, increased since 12/1/2023. The largest measures up to 1.7 cm. No drainable fluid collection.  - 2/9/24 WBC 9.6, ESR 29, CRP 12.8 (LDH hemolyzed)  - tx w/keflex x14 days  - 3/8/24 presents again with further enlargement of right sided neck LN, felt the right neck LN was enlarging about 1 week ago, has since rapidly enlarged and is moving up towards her neck, with associated redness and soreness to palpation at neck, no associated dyspnea, HA, vision changes, dysphagia.     PLAN:  - in the setting of follicular lymphoma, r/o histologic transformation  - pt to go to ER for STAT CT neck, r/o abscess, check labs including: CBC, CMP, ESR, CRP, LDH   - if admitted, will need biopsy (send flow cytometry) and if abscess- drainage with culture  - pt wishes to go to [facility] 2/2 proximity to home, I have called the ER team and given sign out to Dr. Mcclendon    #new anemia  - 1/24 hgb 13.2, 2/24 hgb 10.9    PLAN:  - pt has interval fluctuations in hgb since at least 6/23 that have historically self corrected  - repeat CBC as above    # 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 INCLUDING biopsy proven LN and left orbital mass/lacrimal gland involvement and suspected parotid gland involvement   -11/22 MRI lumbar spine in 12/22 MRI pelvis show prominent left iliac lymph nodes (measuring 1.8 x 3.2, previously 1.5 x 2.7 cm), right medial thigh lymphadenopathy (1.7cm), right iliac bone enhancing marrow signal, left posterior iliac enhancing  "lesion  -12/22 CT chest abdomen pelvis shows new or increased left common iliac (1.2cm), external iliac, pelvic sidewall lymphadenopathy (2.1cm) and sclerotic lesions in left sacrum and left posterior ilium. No splenomegaly  - 1/23 brain MRI: Indeterminate ovoid T2 hyperintense enhancing mass in the region of the left lacrimal gland measuring approximately 2.4 cm AP by 1.4 cm transverse by 2.3 cm craniocaudal associated with/replacing the left lacrimal gland. Consider correlation with tissue sampling  - 1/23 CT soft tissue neck: Soft tissue nodule in the superficial left parotid gland measuring 1.2 cm. Additional 0.8 cm nodule in the deep portion of the left parotid gland. Soft tissue nodule in the superficial right parotid gland measuring 0.8 cm. These may represent benign or malignant parotid lesions. Multiplicity of lesions raises concern for Warthin tumors  - 1/27/23 note from Dr. Wilmar Morales- \"Based on this diagnosis, I think the 2 nodules in her parotid gland are likely atypical lymph nodes containing lymphoma.  She will probably require some staging imaging, such as PET scan and we can take a look at that as well.  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 CD5 stains T cells  - Ki-67 proliferative index is approximately 10%, also low in the follicles.  - flow cytometry \"CD10-positive population is consistent with a clonal B cell population and a CD10 positive B-cell lymphoma is favored.\"    - 4/6/23 left orbital mass/lacrimal gland excisional biopsy: PATHOLOGY: - " Follicular Lymphoma, low grade ([facility] negative for BCL2 rearrangement). 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  The flow cytometry from 1/20/23 right external iliac LN biopsy showed in addition to CD10 positive B-cell population  also a partial/equivocal CD5 positive separate kappa monotypic B-cell population, which most likely represents monoclonal B-cell lymphocytosis of CLL/SLL type, since it was also documented in the peripheral blood flow cytometry on February 16, 2023 (case number [facility] ) at a low frequency.  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  - Cohort 2 which is described as negative for CD5, CD10, ,  and showing dim CD20 and dim kappa staining   Of note the population of monoclonal B-cell lymphocytosis found in peripheral blood in our department and in the lymph node biopsy in January showed partial/equivocal dim CD5 and may correspond to the cohort #2.     -5/23 PET/CT NCAP:   1. An ovoid mass in the left lateral orbit measures 2.5 x 1.2 cm, slightly larger than 01/09/2023, and demonstrates marked uptake (SUVmax 9.3), consistent with biopsy-proven lymphoma  2. A few small FDG avid bilateral intraparotid nodules are present, one on the right and two on the left, including representative left parotid nodule measuring 0.9 x 1.4 cm with moderate uptake (SUVmax 6.7).  3.  Asymmetric prominent right posterior lower cervical node measures 0.8 cm associated with moderate focal uptake (SUVmax 5.1).  4. A  single mildly enlarged left axillary lymph node measures 1.0 cm associated with mild uptake (SUVmax 3.0)  5. Some scattered FDG avid lymph nodes below the diaphragm involve the retroperitoneal retrocaval, bilateral common iliac, left pelvic sidewall and left inguinal regions. Some of these have increased slightly in size since 12/28/2022, such as a conglomerate left pelvic sidewall bj mass measuring approximately 2.2 x 4.1 cm (previously 2.2 x 3.4 cm) associated with marked uptake (SUVmax 7.6).  6. FDG avid sclerotic skeletal lesion involves the right superior acetabulum (SUVmax 12.9) as well as a couple of separate smaller sclerotic lesions in the right iliac bone uptake (SUVmax 12.2).     - 6/23 bone marrow biopsy:  MORPHOLOGY:  - No morphologic or immunophenotypic evidence of follicular lymphoma  - Flow cytometry showing a small population of CD5 positive monotypic B cells; most compatible with monoclonal B lymphocytosis (There is no definitive morphologic correlate for this finding. There is no morphologic or immunophenotypic evidence of involvement by follicular lymphoma.  The CD5-positive B cell clone may best represent monoclonal B lymphocytosis, provided that bj SLL is excluded.)  - Normocellular marrow (cellularity estimated at 30%) with trilineage hematopoietic maturation and no increase in blasts  FLOW CYTOMETRY:  CD5 positive kappa-monotypic B cells (0.8%)  0.8% B cells which express CD5, CD19, CD20 (dim to negative) and monotypic kappa immunoglobulin light chains (dim) and lack CD10 and CD38. The B cells have similar forward scatter relative to background T cells suggestive of similar size; however, precise size determination is deferred to morphology  CYTOGENETICS:  46,XX    -6/19/2023 CT-guided bone biopsy, right acetabulum  PATHOLOGY:  - Atypical lymphoid infiltrate in a suboptimal biopsy  - No histologic evidence of metastatic carcinoma  - Two small monoclonal B-cell populations detected by  flow cytometry: CD5 positive kappa-monotypic B cells ( 2.8 %, CD5 partial, dim, CD19 positive, CD30 dim to negative, CD10 negative); CD10 positive lambda-monotypic B cells ( 0.4 %, CD10 positive, CD19 slightly dim, CD20 positive, CD5 negative)     - option of systemic treatment including rituximab discussed previously on multiple occassions particularly w/left orbital involvement, see my previous notes for details; pt deferred  - 6/14/2023- 6/15/2023 palliative radiation to left orbit follicular lymphoma 4Gy/2fx w/Dr. Julian Horvath    - 12/23 PET CT NCAP w/o contrast Liver SUV max = 3.78, Aorta Blood SUV max = 3.43.  -New/progressed:  - 0.8 x 0.5 cm right FDG avid cervical lymph node; SUV max of 6.5  - 1.3 x 1.5 cm right FDG avid cervical lymph node; SUV max of 5.8  - 1.3 x 1.4 cm right FDG avid cervical lymph node; SUV max of 7.8   -Lesion in left lateral chest wall immediately inferior to the margin of breast implant, 0.5 x 0.4 mm, SUV 5.02, previously 1.4  - Lymph node adjacent to left psoas, 1.5 cm, SUV 19.9, previously 6.02  - Right acetabulum, 2.0 x 1.9 cm, SUV 14.03, previously 12.05  - No new bone lesions but increased metabolic activity previously identified multiple FDG avid osseous foci:  1.  Sternal lesion, SUV 5.7, previously 3.6  2.  Right scapular spine lesion, SUV 4.6, previously 3.6  3.  Right acetabular bone lesion SUV 13.4, previously 11    -Unchanged:  -1 of 2 left parotid gland lesions, 1.6 x 1.1 cm, SUV 8.4, previously 6.6  - Left axillary lymph node, 1.2 x 1.0 cm, SUV 2.3  -Spleen normal    -Resolved/improved:  - Left orbital hypermetabolic lesion-resolved  - 1 of 2 previous left parotid hypermetabolic lesions-resolved  - Multiple mildly hypermetabolic bilateral inguinal nodes, index right inguinal lymph node SUV 2.62, previously 5.19-improved    -12/23 Left pelvic lymph node, biopsy:  - Recurrent/persistent low grade B-cell lymphoma, most consistent with follicular lymphoma (limited evaluation of  architecture),  no evidence of large cell transformation in this biopsy, and no evidence of negro small lymphocytic lymphoma (SLL).  - Negative for metastatic carcinoma    Flow cytometry:   C5 positive kappa-monotypic B cells 2.6% consistent with CLL/SLL vs monoclonal B cell lymphocytosis  CD10 positive B cells negative for surface light chain 29%, consistent w/previously dx CD10+ B cell lymphoma    - 12/23 bone marrow biopsy:  MORPHOLOGY:  - Hypercellular marrow (cellularity estimated at 40%) with trilineage hematopoiesis, no overt dysplasia, and no increase in blasts  - Rare CD5+ kappa-monotypic B cells detected by flow cytometry (1.7%)  - Small lymphoid aggregate suspicious for marrow involvement by CD5+ B-cell lymphoproliferative disorder (<5% of marrow cellularity), most consistent with monoclonal B-cell lymphocytosis.  - No definitive histologic or immunophenotypic evidence of follicular lymphoma and  no morphologic evidence of large cell lymphoma.  FLOW CYTOMETRY:  -CD5-positive kappa-monotypic B cells (1.7%) express CD5 (dim, partial), CD19, CD20 (dim), and monotypic kappa immunoglobulin light chains and lack CD10 and CD38.  suggestive of marrow involvement by monoclonal B-cell lymphocytosis (versus chronic lymphocytic leukemia/small lymphocytic lymphoma; CLL/SLL).   CYTOGENETICS:  46,XX[20]  FISH:  Pending    Staging and prognostication:  - Stage: 4 given right medial thigh LN and left common iliac, external iliac, pelvic sidewall; biopsy proven left orbital mass involvement; there was some concern from ENT that patients parotid lesions were related to pts follicular lymphoma   - FLIPI: score 2 (age>60),  stage 3-4; intermediate risk  - pertinent labs: 12/22  and 5/23 ; 2/23 and 5/23 beta 2 microglobulin 1.7, hepatitis B and C negative    Treatment:  - GELF criteria to indicate treatment (involvement of 3 or more bj sites each with a diameter greater than or equal to 3 cm, any bj or  extranodal tumor mass with a diameter of greater than or equal to 7 cm, B symptoms, splenomegaly, pleural effusions, peritoneal ascites, clinically progressive or significant cytopenias, leukemia, symptoms, threatened end organ function, clinically significant bulky disease, steady or rapid progression of disease)  - option of systemic treatment including rituximab discussed previously on multiple occassions particularly w/left orbital involvement, see my previous notes for details; pt deferred  - 6/14/2023- 6/15/2023 palliative radiation to left orbit follicular lymphoma 4Gy/2fx    PLAN:  - pt has 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 INCLUDING biopsy proven LN and left orbital mass/lacrimal gland involvement   - bone marrow biopsy negative for lymphoma and right acetabulum bone biopsy atypical lymphoid infiltrate seen but biopsy suboptimal and flow cytometry shows 2.8% CD5 positive kappa monotypic B cells consistent w/monoclonal B cell lymphocytosis and 0.4% CD10 positive lambda-monotypic B cells consistent w/lymphoma- ie follicular vs other  - 12/23 PET showed left pelvic LN w/SUV increased from 6->20, biopsied to rule out transformation to aggressive lymphoma. Biopsy showed follicular lymphoma w/aggressive lymphoma or SLL. LN flow cytometry consistent w/follicular lymphoma and MBL. Concurrent bone marrow biopsy, <5% CD5+ lymphoproliferative d/o consistent w/MBL  - overall stage 4 disease and intermediate risk  - systemic tx including rituximab discussed on multiple prior visits, pt deferred as detailed in my previous notes   - pt completed palliative RT to left orbit; use lubricating eye drops prn (also using eye drops for glaucoma)  - 2/24 hgb 10.9  - pt overall asx, with exception of self palpating right neck LN (largest 1.7cm)  - she continues to wish to avoid systemic tx unless absolutely necessary thus we will continue active surveillance   - H&P and labs q3 months  - PET q6 months due end of 6/24- to be  ordered at next visit (may be sooner if neck LN continue to grow)      #  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   - 1/23 right external iliac LN core needle biopsy- FLOW CYTOMETRY: 1.3% rare monoclonal B cell population CD5 equivocal, CD23 negative, CD 79b negative, possible MBL; no morphological correlate  -2/23 peripheral blood- FLOW CYTOMETRY: 2.3% kappa monoclonal B cell population, CD5 positive, CD38 negative, CD49d negative, possible MBL  - 4/6/23 left orbital mass/lacrimal gland excisional biopsy: FLOW CYTOMETRY: 15.1% kappa monotypic B cell population, bright positive: CD45, CD19, CD22, moderately positive kappa, CD20, CD79b, dim positive CD23, negative CD 2,3,103, lambda, 5, 10, 200, 38, 43  - 6/23 bone marrow biopsy: FLOW CYTOMETRY: 0.8% kappa monotypic B cell population, positive for CD5, CD19, dim to negative CD20, negative for CD38 and CD10  - 6/23 right acetabulum bone biopsy: FLOW CYTOMETRY: 2.8% kappa monotypic B cell population, CD5 partial dim, CD 19 positive, CD20 dim negative, CD10 negative, possible MBL    - of note pt does not have leukocytosis or lymphocytosis, ALC consistently <5000, no splenomegaly, LN biopsy did not demonstrate IHC consistent with SLL  - 5/23 PET/CT as above  - 12/23 bone marrow biopsy:  MORPHOLOGY:  - Hypercellular marrow (cellularity estimated at 40%) with trilineage hematopoiesis, no overt dysplasia, and no increase in blasts  - Rare CD5+ kappa-monotypic B cells detected by flow cytometry (1.7%)  - Small lymphoid aggregate suspicious for marrow involvement by CD5+ B-cell lymphoproliferative disorder (<5% of marrow cellularity), most consistent with monoclonal B-cell lymphocytosis.  - No definitive histologic or immunophenotypic evidence of follicular lymphoma and  no morphologic evidence of large cell lymphoma.  FLOW CYTOMETRY:  -CD5-positive kappa-monotypic B cells (1.7%) express CD5 (dim, partial), CD19, CD20 (dim), and monotypic kappa immunoglobulin light  chains and lack CD10 and CD38.  suggestive of marrow involvement by monoclonal B-cell lymphocytosis (versus chronic lymphocytic leukemia/small lymphocytic lymphoma; CLL/SLL).     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 Bone marrow biopsy negative for involvement of lymphoma 6/23 and shows <5% cellularity involvement of MBL 12/23  - 9/23 ALC 1.6, 12/23 ALC 1.7, 1/24 ALC 1.7  - Monitor CBC with differential and clinically every 3-6 months    #Sclerotic lesions in left sacrum and left posterior ilium  -10/21 mammogram, endoscopy with EUS and colonoscopy WNL  -12/22 CT chest abdomen pelvis shows new or increased left common iliac, external iliac, pelvic sidewall lymphadenopathy and sclerotic lesions in left sacrum and left posterior ilium.  No metastatic disease in chest.  No abnormal findings in breast.  - 12/22 tumor markers: CA 15-3 24,  29, CEA 3.1, CA 19-9 4, AFP 5.9,   - 1/2023 MRI brain w/wo contrast: no metastases   - 1/2023 right external iliac LN core need biopsy- no metastatic carcinoma   - 5/23 PET avid bone lesions  -6/19/2023 CT-guided bone biopsy, right acetabulum  PATHOLOGY:  - Atypical lymphoid infiltrate in a suboptimal biopsy  - No histologic evidence of metastatic carcinoma  - Two small monoclonal B-cell populations detected by flow cytometry: CD5 positive kappa-monotypic B cells ( 2.8 %, CD5 partial, dim, CD19 positive, CD30 dim to negative, CD10 negative); CD10 positive lambda-monotypic B cells ( 0.4 %, CD10 positive, CD19 slightly dim, CD20 positive, CD5 negative)   - 12/23 PET   - Right acetabulum, 2.0 x 1.9 cm, SUV 14.03, previously 12.05  - No new bone lesions but increased metabolic activity previously identified multiple FDG avid osseous  "foci:  1.  Sternal lesion, SUV 5.7, previously 3.6  2.  Right scapular spine lesion, SUV 4.6, previously 3.6  3.  Right acetabular bone lesion SUV 13.4, previously 11  - 12/23 alk phos 158, 1/24 alk phos 185, liver 104, bone 81     PLAN:   - no proven malignancy on bone biopsy x2    #History of LEFT breast IDC ER positive, DE positive, HER2 positive on FISH  -Diagnosed at age 49  - 3/2000 left breast lumpectomy and left axillary node dissection (IDC, grade 3, 1.45 cm incompletely excised tumor, DCIS, positive margins with infiltrating carcinoma, 1 of 17 lymph nodes positive (0.5 cm metastatic focus), ER positive, DE positive, HER2 positive).    -4/2020 left breast simple mastectomy with no residual carcinoma.   -S/p ACx4, taxol x4, no anti-her2 treatment, no RT.   -S/p 5 years adjuvant endocrine therapy (tamoxifen and anastrozole).  -Delayed left breast reconstruction and right breast augmentation.   - 5/23 PET/CT notes single enlarged left axillary lymph node with SUV 3, \"could also represent lymphoma although given patient's history of left breast cancer, a breast cancer metastasis is also a possibility\"  - The above in the setting of known lymphoma and breast tumor markers normal   -9/23 right breast diagnostic mammogram: [facility]  - 9/23 ultrasound left axilla: 1.1 x 0.9 x 1.0 cm lymph node with mildly thickened cortex that correlates with abnormal lymph node on previous PET, recommend tissue diagnosis  - 9/23 ultrasound-guided left axillary lymph node core biopsy: No morphologic evidence of lymphoma.  Lymph node tissue was received in formalin, this could not be sent for flow cytometry.  No separate fresh tissue was sent for flow cytometry.  - 12/23 PET Left axillary lymph node, 1.2 x 1.0 cm, SUV 2.3, unchanged    PLAN:   -With the limitations of tissue processed in formalin and no fresh tissue available for flow cytometry, appears left axilla lymph node is benign without lymphoma      RTC 3/14 for follow up with " elizabet Zaidi, DO  Hematology/Oncology  [facility]

## 2024-03-08 NOTE — CONFIDENTIAL NOTE
S: Pt reporting an increase in the size of right neck lump, now the size of a golf ball. She is driving home early for vacation and will be back home in about 2 hours. She would like to be seen today if possible because of the increase in size.    B: This is a known lump but it has grown in size. Pt is currently scheduled to see Dr. Zaidi on 3/14. 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    A: No new symptoms aside from the increase in size of the lump.    R: Will check with provider about possibility for same day add on apt.

## 2024-03-11 ENCOUNTER — APPOINTMENT (OUTPATIENT)
Dept: CT IMAGING | Facility: CLINIC | Age: 74
End: 2024-03-11
Attending: EMERGENCY MEDICINE
Payer: MEDICARE

## 2024-03-11 ENCOUNTER — HOSPITAL ENCOUNTER (EMERGENCY)
Facility: CLINIC | Age: 74
Discharge: HOME | End: 2024-03-11
Attending: EMERGENCY MEDICINE | Admitting: EMERGENCY MEDICINE
Payer: MEDICARE

## 2024-03-11 VITALS
RESPIRATION RATE: 12 BRPM | OXYGEN SATURATION: 94 % | HEART RATE: 82 BPM | TEMPERATURE: 97.6 F | SYSTOLIC BLOOD PRESSURE: 130 MMHG | DIASTOLIC BLOOD PRESSURE: 79 MMHG

## 2024-03-11 DIAGNOSIS — R22.1 NECK MASS: Primary | ICD-10-CM

## 2024-03-11 DIAGNOSIS — R22.1 NECK MASS: ICD-10-CM

## 2024-03-11 DIAGNOSIS — C82.11 GRADE 2 FOLLICULAR LYMPHOMA OF LYMPH NODES OF NECK (H): ICD-10-CM

## 2024-03-11 DIAGNOSIS — C77.0 METASTASIS TO SUPRACLAVICULAR LYMPH NODE (H): ICD-10-CM

## 2024-03-11 DIAGNOSIS — C82.90 FOLLICULAR LYMPHOMA, UNSPECIFIED FOLLICULAR LYMPHOMA TYPE, UNSPECIFIED BODY REGION (H): ICD-10-CM

## 2024-03-11 LAB
ALBUMIN SERPL BCG-MCNC: 3.8 G/DL (ref 3.5–5.2)
ALP SERPL-CCNC: 160 U/L (ref 40–150)
ALT SERPL W P-5'-P-CCNC: 24 U/L (ref 0–50)
ANION GAP SERPL CALCULATED.3IONS-SCNC: 11 MMOL/L (ref 7–15)
AST SERPL W P-5'-P-CCNC: 21 U/L (ref 0–45)
BASOPHILS # BLD AUTO: 0.1 10E3/UL (ref 0–0.2)
BASOPHILS NFR BLD AUTO: 0 %
BILIRUB SERPL-MCNC: 0.3 MG/DL
BUN SERPL-MCNC: 14.4 MG/DL (ref 8–23)
CALCIUM SERPL-MCNC: 9.5 MG/DL (ref 8.8–10.2)
CHLORIDE SERPL-SCNC: 103 MMOL/L (ref 98–107)
CREAT SERPL-MCNC: 0.5 MG/DL (ref 0.51–0.95)
DEPRECATED HCO3 PLAS-SCNC: 25 MMOL/L (ref 22–29)
EGFRCR SERPLBLD CKD-EPI 2021: >90 ML/MIN/1.73M2
EOSINOPHIL # BLD AUTO: 0.2 10E3/UL (ref 0–0.7)
EOSINOPHIL NFR BLD AUTO: 1 %
ERYTHROCYTE [DISTWIDTH] IN BLOOD BY AUTOMATED COUNT: 15.3 % (ref 10–15)
GLUCOSE SERPL-MCNC: 116 MG/DL (ref 70–99)
HCT VFR BLD AUTO: 38.4 % (ref 35–47)
HGB BLD-MCNC: 12.5 G/DL (ref 11.7–15.7)
IMM GRANULOCYTES # BLD: 0.1 10E3/UL
IMM GRANULOCYTES NFR BLD: 1 %
LDH SERPL L TO P-CCNC: 220 U/L (ref 0–250)
LYMPHOCYTES # BLD AUTO: 1.6 10E3/UL (ref 0.8–5.3)
LYMPHOCYTES NFR BLD AUTO: 11 %
MCH RBC QN AUTO: 27.4 PG (ref 26.5–33)
MCHC RBC AUTO-ENTMCNC: 32.6 G/DL (ref 31.5–36.5)
MCV RBC AUTO: 84 FL (ref 78–100)
MONOCYTES # BLD AUTO: 1.1 10E3/UL (ref 0–1.3)
MONOCYTES NFR BLD AUTO: 8 %
NEUTROPHILS # BLD AUTO: 11.6 10E3/UL (ref 1.6–8.3)
NEUTROPHILS NFR BLD AUTO: 79 %
NRBC # BLD AUTO: 0 10E3/UL
NRBC BLD AUTO-RTO: 0 /100
PLATELET # BLD AUTO: 408 10E3/UL (ref 150–450)
POTASSIUM SERPL-SCNC: 4.3 MMOL/L (ref 3.4–5.3)
PROT SERPL-MCNC: 7.7 G/DL (ref 6.4–8.3)
RBC # BLD AUTO: 4.56 10E6/UL (ref 3.8–5.2)
SODIUM SERPL-SCNC: 139 MMOL/L (ref 135–145)
URATE SERPL-MCNC: 3.4 MG/DL (ref 2.4–5.7)
WBC # BLD AUTO: 14.7 10E3/UL (ref 4–11)

## 2024-03-11 PROCEDURE — 80053 COMPREHEN METABOLIC PANEL: CPT | Performed by: EMERGENCY MEDICINE

## 2024-03-11 PROCEDURE — 84550 ASSAY OF BLOOD/URIC ACID: CPT | Performed by: INTERNAL MEDICINE

## 2024-03-11 PROCEDURE — 99285 EMERGENCY DEPT VISIT HI MDM: CPT | Performed by: EMERGENCY MEDICINE

## 2024-03-11 PROCEDURE — 99222 1ST HOSP IP/OBS MODERATE 55: CPT | Mod: GC

## 2024-03-11 PROCEDURE — 74177 CT ABD & PELVIS W/CONTRAST: CPT | Mod: 26 | Performed by: STUDENT IN AN ORGANIZED HEALTH CARE EDUCATION/TRAINING PROGRAM

## 2024-03-11 PROCEDURE — 36415 COLL VENOUS BLD VENIPUNCTURE: CPT | Performed by: EMERGENCY MEDICINE

## 2024-03-11 PROCEDURE — 120N000002 HC R&B MED SURG/OB UMMC

## 2024-03-11 PROCEDURE — 83615 LACTATE (LD) (LDH) ENZYME: CPT | Performed by: INTERNAL MEDICINE

## 2024-03-11 PROCEDURE — 85004 AUTOMATED DIFF WBC COUNT: CPT | Performed by: EMERGENCY MEDICINE

## 2024-03-11 PROCEDURE — 250N000011 HC RX IP 250 OP 636: Performed by: EMERGENCY MEDICINE

## 2024-03-11 PROCEDURE — G1010 CDSM STANSON: HCPCS | Performed by: STUDENT IN AN ORGANIZED HEALTH CARE EDUCATION/TRAINING PROGRAM

## 2024-03-11 PROCEDURE — 71260 CT THORAX DX C+: CPT | Mod: MG

## 2024-03-11 PROCEDURE — 71260 CT THORAX DX C+: CPT | Mod: 26 | Performed by: STUDENT IN AN ORGANIZED HEALTH CARE EDUCATION/TRAINING PROGRAM

## 2024-03-11 PROCEDURE — 99285 EMERGENCY DEPT VISIT HI MDM: CPT | Mod: 25 | Performed by: EMERGENCY MEDICINE

## 2024-03-11 RX ORDER — METHYLPREDNISOLONE SODIUM SUCCINATE 125 MG/2ML
125 INJECTION, POWDER, LYOPHILIZED, FOR SOLUTION INTRAMUSCULAR; INTRAVENOUS ONCE
Status: COMPLETED | OUTPATIENT
Start: 2024-03-11 | End: 2024-03-11

## 2024-03-11 RX ORDER — IOPAMIDOL 755 MG/ML
91 INJECTION, SOLUTION INTRAVASCULAR ONCE
Status: COMPLETED | OUTPATIENT
Start: 2024-03-11 | End: 2024-03-11

## 2024-03-11 RX ORDER — PREDNISONE 20 MG/1
60 TABLET ORAL DAILY
Qty: 15 TABLET | Refills: 0 | Status: SHIPPED | OUTPATIENT
Start: 2024-03-11 | End: 2024-03-16

## 2024-03-11 RX ORDER — IOPAMIDOL 755 MG/ML
91 INJECTION, SOLUTION INTRAVASCULAR ONCE
Status: DISCONTINUED | OUTPATIENT
Start: 2024-03-11 | End: 2024-03-11

## 2024-03-11 RX ADMIN — METHYLPREDNISOLONE SODIUM SUCCINATE 125 MG: 125 INJECTION, POWDER, FOR SOLUTION INTRAMUSCULAR; INTRAVENOUS at 13:35

## 2024-03-11 RX ADMIN — IOPAMIDOL 91 ML: 755 INJECTION, SOLUTION INTRAVENOUS at 13:12

## 2024-03-11 ASSESSMENT — ACTIVITIES OF DAILY LIVING (ADL)
ADLS_ACUITY_SCORE: 38
ADLS_ACUITY_SCORE: 36

## 2024-03-11 ASSESSMENT — COLUMBIA-SUICIDE SEVERITY RATING SCALE - C-SSRS
2. HAVE YOU ACTUALLY HAD ANY THOUGHTS OF KILLING YOURSELF IN THE PAST MONTH?: NO
1. IN THE PAST MONTH, HAVE YOU WISHED YOU WERE DEAD OR WISHED YOU COULD GO TO SLEEP AND NOT WAKE UP?: NO
6. HAVE YOU EVER DONE ANYTHING, STARTED TO DO ANYTHING, OR PREPARED TO DO ANYTHING TO END YOUR LIFE?: NO

## 2024-03-11 NOTE — CONSULTS
Hematology Consult Note   Date of Service: 03/11/2024    Patient: Virginia Byers  MRN: [id number]  Admission Date: 3/11/2024  Hospital Day # Hospital Day: 1  Primary Outpatient Hematologist:  /     Reason for Consult: new enlarging rt supraclavicular LN    Assessment & Plan:   Virginia Byers is a 73 year old female with past medical history of breast cancer status post anthracycline containing chemotherapy in 2000, recent diagnosis of follicular lymphoma status post orbital radiation presented to the ER with rapidly enlarging right supraclavicular lymph node concern for transformation to DLBCL.    # Follicular lymphoma with concern for transformation  # Rapidly enlarging Rt lower cervical and supraclavicular lymphadenopathy  Had previous follicular lymphoma with biopsies both revealed CD10 positive lymphoma consistent with follicular lymphoma has a small aggregate of CD5 positive/CD23 positive consistent with SLL.  She had previously met with Dr. Nguyen to discuss about treatment options, at that time treatment was deferred due to patient preference as well as there was concern for side effects from anthracycline as she is previously received AC Taxol for breast cancer.    CT soft tissue neck on 3/8/24 showed interval progression of the disease (from 2/9/24) with prolonged, with increase in size and number of metastatic lymphadenopathy.  There is surrounding soft tissue induration with possible lymphatic congestion.  Some of the lymph nodes show central necrosis.     We discussed with  interventional radiology and the earliest possible date to get IR guided lymph node biopsy is on Friday 3/15/2024. Since she is asymptomatic and her CT scan looks stable she does not need to stay inpatient.  We will give her methylprednisolone here one time followed by  Hence we will discharge her on oral prednisone to help with her symptoms and enlarged lymph nodes. This should not affect the results of LN  biopsy.     Recommendations:   - IV methylprednisolone 120 mg one time   - Oral prednisone 60 mg daily  - Continue amoxicillin   - CT CAP today prior to discharge   - Follow up with EDUIN in the clinic on Thursday with labs.  - Outpatient PET CT scan  - IR guided biopsy of the right supraclavicular LN on 3/15/24 Friday.     Patient was seen and plan of care was discussed with attending physician Dr. German.    Patient and her friend was in agreement with the plan      Frankie Kaye MD  Hem-Onc Fellow  [phone]      History of Present Illness:    Virginia Byers is a 73 year old female with h/o breast cancer, follicular lymphoma, HLD and HTN who presents to the Emergency Department for evaluation of a lump on her right neck/shoulder.     Patient has been sent to the ED by her primary oncologist for rapid workup for enlarging right supraclavicular lymph node.  She noticed this first 5 weeks ago initially it was small mobile lymph node a CT scan was done there was concern for some infection following which she was placed on antibiotics.  She is on amoxicillin.  A week ago she was on vacation and noticed that her lymph node was increased and is rapidly enlarging. She had a CT of her neck which revealed interval progression of her disease.     She denies any low-grade fevers, no loss of appetite, no unintentional weight loss.  She does not have any other symptoms except for the enlarging right supraclavicular lymph node.  No other lumps or bumps were noted.  She denies any nausea vomiting.  No abdominal pain or diarrhea.    Hematologic History:  --Breast Cancer: Diagnosed 3/2000 at age 49 with Left breast IDC ER positive, IL positive, HER2 positive. S/P lumpectomy and left axillary nod dissection 3/2000, sp ACx4, Taxol x 4, 5 years adjuvant endocrine therapy.     - 11/22 MRI lumbar spine: prominent left iliac lymph nodes among other findings related to degenerative changes and neural foraminal stenosis throughout  lumbar spine  - 12/22 MRI pelvis: Enhancing marrow signal abnormality of the right iliac bone extending from the superior acetabulum subchondral location proximally almost to the level of the sacroiliac joint caudal aspect. No associated fracture line. Underlying marrow infiltrative process such as from metastasis cannot be excluded especially given the degree of T1 hypointensity and history of primary tumor. Other consideration include stress reaction. Mildly increased left external iliac chain, and the right medial thigh lymphadenopathy. Metastasis cannot be excluded. Left posterior iliac enhancing lesion, similar in size to prior CT 9/2021 and previously not visible on CT, focal enhancing lesion in the right greater trochanter laterally. Findings are concerning for Metastasis.   -12/22 CT chest abdomen pelvis with contrast: There is an new or increased left common iliac, external iliac and pelvic sidewall lymphadenopathy since prior CT.  --1/2023 MRI brain w/wo contrast: No findings of intracranial metastatic disease. Indeterminate ovoid T2 hyperintense enhancing mass in the region of the left lacrimal gland measuring approximately 2.4 cm AP by 1.4 cm transverse by 2.3 cm craniocaudal associated with/replacing the left lacrimal gland. Consider correlation with tissue sampling. A few indeterminate partially visualized heterogeneous enhancing nodules measuring up to approximately 1.5 cm in the bilateral parotid glands.   -1/20/2023: IR US Guided biopsy of a right external iliac LN: Involved by follicular lymphoma, low-grade (WHO grade 1-2). FISH shows no rearrangement of BCL2. Possible concurrent CD10+ lambda monotypic B-cells, possibly representing MBL.  --1/23/23 CT neck: Heterogeneous nodule in the right thyroid gland (1.6 cm), soft tissue nodule superficial left parotid gland (1.2 cm). Differential may include benign or malignant parotid lesions; Otolaryngology consultation recommended.   --4/6/2023: Left  orbital mass excisional biopsy: Follicular lymphoma, low grade. Concurrent MBL of CLL/SLL type.   -5/23/2023 PET/CT NCAP: Ovoid mass left lateral orbit measures 2.5 x 1.2 cm, SUVmax 9.3. Bilateral intraparotid nodules present, SUVmax 6.7. Asymetric prominent right posterior lower cervical node 0.8 cm, SUVmax 5.1. Single mildly enlarged left axillary lymph node 1.0 cm SUVmax 3.0. Scattered FDG avid LNs below the diaphragm, up to 2.2 x 4.1 cm SUVmax 7.6. FDV avid sclerotic skeletal lesions of right superior acetabulum (SUVmax 12.9).   6/5/2023: 400 cGy to left orbit completed 6/15/2023  --7/10/23 Rheumatology consult.   --7/23: Neurology movement clinic.   --12/22023 BMBx: Hypercellular (40%) with trilineage hematopoiesis, no overt dysplasia, no increase in blasts. Rare CD5+ Kappa--monotypic B-cells (1.7%), small lymphoid aggregate suspicious for marrow involvement by CD5+ B-cell lymphoproliferative disorder. No definitive histologic or immunophenotypic evidence of follicular lymphoma.   --12/22/2023: Left pelvic lymph node biopsy: Recurrent/persistent low grade B-cell lymphoma consistent with follicular lymphoma.        Review of Systems: Pertinent positive and negative systems described in HPI; the remainder of the 14 systems are negative    Past Medical History:  Past Medical History:   Diagnosis Date    Aftercare following right hip joint replacement surgery 10/25/2023    Breast cancer (H) 2000    Left breast, s/p mastectomy, chemotherapy and endocrine therapy    Follicular lymphoma (H) 01/20/2023    Glaucoma     bilateral - Dr. Moore - [facility]    History of spinal stenosis     Hyperlipidemia     Hypertension     Macular degeneration     Dr. Toscano - [facility]    Osteoarthritis     Osteoporosis     Personal history of chemotherapy 2000    A/C + Taxol    S/P radiation therapy     400 cGy to left orbit completed on 6/15/2023 - [facility]       Past  Surgical History:  Past Surgical History:   Procedure Laterality Date    ARTHROPLASTY HIP Left 2023    Procedure: ARTHROPLASTY, HIP, TOTAL LEFT;  Surgeon: Austin Blood MD;  Location: [facility] OR    ARTHROPLASTY HIP Right 10/13/2023    Procedure: RIGHT ARTHROPLASTY, HIP, TOTAL;  Surgeon: Austin Blood MD;  Location: [facility] OR    BIOPSY Left 2023    Left Orbital Biopsy    C MASTECTOMY,SIMPLE  2000    left    COLONOSCOPY  2006    Q 10 years    COLONOSCOPY N/A 10/04/2021    Procedure: COLONOSCOPY;  Surgeon: Guru Lyla Ruby MD;  Location: [facility] OR    ESOPHAGOSCOPY, GASTROSCOPY, DUODENOSCOPY (EGD), COMBINED N/A 10/04/2021    Procedure: ENDOSCOPIC ULTRASOUND, ESOPHAGOSCOPY / UPPER GASTROINTESTINAL TRACT (GI), Esophagogastroduodenoscopy,  Fine Needle Biopsy of liver;  Surgeon: Guru Lyla Ruby MD;  Location: [facility] OR    EYE SURGERY      IR LYMPH NODE BIOPSY  2023    SURGICAL HISTORY OF -  Left 2015    tissue expander placed in left breast area    SURGICAL HISTORY OF -  Left 2016    left breast implant and right mammoplasty       Social History:  Social History     Socioeconomic History    Marital status: Single    Number of children: 0   Occupational History     Employer: [employer]    Occupation: REtired   Tobacco Use    Smoking status: Former     Packs/day: 1.00     Years: 17.00     Additional pack years: 0.00     Total pack years: 17.00     Types: Cigarettes     Quit date: 3/15/1999     Years since quittin.0     Passive exposure: Past    Smokeless tobacco: Never   Vaping Use    Vaping Use: Never used   Substance and Sexual Activity    Alcohol use: Not Currently     Alcohol/week: 4.0 standard drinks of alcohol     Types: 4 Glasses of wine per week    Drug use: No    Sexual activity: Not Currently     Birth control/protection: None   Other Topics Concern    Parent/sibling w/ CABG, MI or angioplasty before 65F 55M? Yes     Comment: Father heart attack      Social Determinants of Health     Financial Resource Strain: Unknown (9/21/2023)    Financial Resource Strain     Within the past 12 months, have you or your family members you live with been unable to get utilities (heat, electricity) when it was really needed?: Patient refused   Food Insecurity: Unknown (9/21/2023)    Food Insecurity     Within the past 12 months, did you worry that your food would run out before you got money to buy more?: Patient refused     Within the past 12 months, did the food you bought just not last and you didn t have money to get more?: Patient refused   Transportation Needs: Unknown (9/21/2023)    Transportation Needs     Within the past 12 months, has lack of transportation kept you from medical appointments, getting your medicines, non-medical meetings or appointments, work, or from getting things that you need?: Patient refused   Interpersonal Safety: Low Risk  (10/3/2023)    Interpersonal Safety     Do you feel physically and emotionally safe where you currently live?: Yes     Within the past 12 months, have you been hit, slapped, kicked or otherwise physically hurt by someone?: No     Within the past 12 months, have you been humiliated or emotionally abused in other ways by your partner or ex-partner?: No   Housing Stability: Unknown (9/21/2023)    Housing Stability     Do you have housing? : Patient refused     Are you worried about losing your housing?: Patient refused        Family History  Family History   Problem Relation Age of Onset    Cancer Mother         bone cancer    Heart Disease Father     Coronary Artery Disease Father     Diabetes Father     Arthritis Sister     Breast Cancer Sister         age 75    Diabetes Maternal Grandmother     Diabetes Maternal Grandfather     Diabetes Paternal Grandmother        Outpatient Medications:  Reviewed     Physical Exam:    /79   Pulse 82   Temp 97.6  F (36.4  C) (Oral)   Resp 12   SpO2 94%   Gen: Well appearing, in  NAD  HEENT: EOMI, PERRL, mmm, oropharynx clear  CV: Normal rate, regular rhythm. No m/r/g  Pulm: CTAB, no wheezing, normal work of breathing  Abd: Soft, nt/nd, no rebound/guarding  Ext: Warm and well perfused. No lower extremity edema  Skin: No rash, cyanosis or petechial lesion  Neuro: Alert and answering questions appropriately. CNII-XII grossly intact. Moving all extremities without issue or focal neurologic deficits. Biceps/tricpes/deltoid strength 5/5 bilaterally. Strength 5/5 bilaterally with hip flexion, knee flexion/extension and dorsi/plantar flexion.    Labs & Studies: I personally reviewed the following studies:  ROUTINE LABS (Last four results):  CMP  Recent Labs   Lab 03/11/24  1131      POTASSIUM 4.3   CHLORIDE 103   CO2 25   ANIONGAP 11   *   BUN 14.4   CR 0.50*   GFRESTIMATED >90   COURTNEY 9.5   PROTTOTAL 7.7   ALBUMIN 3.8   BILITOTAL 0.3   ALKPHOS 160*   AST 21   ALT 24     CBC  Recent Labs   Lab 03/11/24  1131   WBC 14.7*   RBC 4.56   HGB 12.5   HCT 38.4   MCV 84   MCH 27.4   MCHC 32.6   RDW 15.3*

## 2024-03-11 NOTE — ED PROVIDER NOTES
[place] EMERGENCY DEPARTMENT ([facility])    3/11/24       ED PROVIDER NOTE  ED 33      History     Chief Complaint   Patient presents with    Neck Pain     HPI  Virginia Byers is a 73 year old female with a past medical history significant for follicular lymphoma, HLD and HTN who presents to the Emergency Department for evaluation of a lump on her right neck/shoulder. Patient states she first noticed this 5 weeks ago and it has rapidly increased in size and pain. She states at first her oncologist did not think it was growing that fast so she was put on antibiotics which did not do much. She then noticed increased swelling and pain so she presented to [facility] ED on 3/8 per recommendation of her oncologist. She had a CT of her neck which revealed interval progression of her disease however, she was discharged on Augmentin and told to follow-up with her oncologist. She reached out to her oncologist who told her she needs a PET/CT scan as well as a biopsy in the next 48 hours so she presented to the ED today for evaluation.     CT Soft Tissue Neck w Contrast   LOCATION: [facility]   DATE: 3/8/2024   Impression  1.  Interval progression of disease since 02/09/2024 with increased size and number of metastatic adenopathy centered in the right supraclavicular distribution. Surrounding soft tissue induration could be related to lymphatic congestion. Some of the lymph nodes now demonstrate central necrosis, new since 02/09/2024.  2.  Unchanged nodules in the superficial lobe of the left parotid gland and right thyroid gland.    Past Medical History  Past Medical History:   Diagnosis Date    Aftercare following right hip joint replacement surgery 10/25/2023    Breast cancer (H) 2000    Left breast, s/p mastectomy, chemotherapy and endocrine therapy    Follicular lymphoma (H) 01/20/2023    Glaucoma     bilateral - Dr. Moore - [facility]    History of spinal stenosis     Hyperlipidemia      Hypertension     Macular degeneration     Dr. Toscano - [facility]    Osteoarthritis     Osteoporosis     Personal history of chemotherapy 2000    A/C + Taxol    S/P radiation therapy     400 cGy to left orbit completed on 6/15/2023 [facility]     Past Surgical History:   Procedure Laterality Date    ARTHROPLASTY HIP Left 07/11/2023    Procedure: ARTHROPLASTY, HIP, TOTAL LEFT;  Surgeon: Austin Blood MD;  Location: [facility] OR    ARTHROPLASTY HIP Right 10/13/2023    Procedure: RIGHT ARTHROPLASTY, HIP, TOTAL;  Surgeon: Austin Blood MD;  Location: [facility] Main OR    BIOPSY Left 04/06/2023    Left Orbital Biopsy    C MASTECTOMY,SIMPLE  03/2000    left    COLONOSCOPY  2006    Q 10 years    COLONOSCOPY N/A 10/04/2021    Procedure: COLONOSCOPY;  Surgeon: Guru Lyla Ruby MD;  Location: [facility] OR    ESOPHAGOSCOPY, GASTROSCOPY, DUODENOSCOPY (EGD), COMBINED N/A 10/04/2021    Procedure: ENDOSCOPIC ULTRASOUND, ESOPHAGOSCOPY / UPPER GASTROINTESTINAL TRACT (GI), Esophagogastroduodenoscopy,  Fine Needle Biopsy of liver;  Surgeon: Guru Lyla Ruby MD;  Location: [facility] OR    EYE SURGERY      IR LYMPH NODE BIOPSY  01/20/2023    SURGICAL HISTORY OF -  Left 08/2015    tissue expander placed in left breast area    SURGICAL HISTORY OF -  Left 02/2016    left breast implant and right mammoplasty     amoxicillin-clavulanate (AUGMENTIN) 875-125 MG tablet  dorzolamide-timolol (COSOPT) 2-0.5 % ophthalmic solution  latanoprost (XALATAN) 0.005 % ophthalmic solution  losartan (COZAAR) 100 MG tablet  Multiple Vitamins-Minerals (PRESERVISION AREDS) TABS  rosuvastatin (CRESTOR) 10 MG tablet      Allergies   Allergen Reactions    Compazine      Mental confusion    Hydrochlorothiazide      Dryness mouth    Prochlorperazine Visual Disturbance    Simvastatin Other (See Comments)     Muscle aches     Family History  Family History   Problem Relation Age of Onset     Cancer Mother         bone cancer    Heart Disease Father     Coronary Artery Disease Father     Diabetes Father     Arthritis Sister     Breast Cancer Sister         age 75    Diabetes Maternal Grandmother     Diabetes Maternal Grandfather     Diabetes Paternal Grandmother      Social History   Social History     Tobacco Use    Smoking status: Former     Packs/day: 1.00     Years: 17.00     Additional pack years: 0.00     Total pack years: 17.00     Types: Cigarettes     Quit date: 3/15/1999     Years since quittin.0     Passive exposure: Past    Smokeless tobacco: Never   Vaping Use    Vaping Use: Never used   Substance Use Topics    Alcohol use: Not Currently     Alcohol/week: 4.0 standard drinks of alcohol     Types: 4 Glasses of wine per week    Drug use: No      Past medical history, past surgical history, medications, allergies, family history, and social history were reviewed with the patient. No additional pertinent items.      A complete review of systems was performed with pertinent positives and negatives noted in the HPI, and all other systems negative.    Physical Exam   BP: 130/79  Pulse: 82  Temp: 97.6  F (36.4  C)  Resp: 12  SpO2: 94 %  Physical Exam  Vitals and nursing note reviewed.   Constitutional:       General: She is not in acute distress.     Appearance: She is well-developed.   HENT:      Head: Normocephalic and atraumatic.      Mouth/Throat:      Mouth: Mucous membranes are moist.   Eyes:      General: No scleral icterus.     Conjunctiva/sclera: Conjunctivae normal.      Pupils: Pupils are equal, round, and reactive to light.   Neck:        Comments: Large right supraclavicular mass at the base of the neck, it is very firm and mobile not fluctuant overlying skin is normal concerning for metastatic lesion  Cardiovascular:      Rate and Rhythm: Normal rate and regular rhythm.      Heart sounds: Normal heart sounds.   Pulmonary:      Effort: Pulmonary effort is normal. No respiratory  distress.      Breath sounds: Normal breath sounds. No wheezing.   Abdominal:      General: Abdomen is flat.      Palpations: Abdomen is soft.   Musculoskeletal:      Cervical back: Neck supple.   Skin:     General: Skin is warm and dry.   Neurological:      General: No focal deficit present.      Mental Status: She is alert and oriented to person, place, and time.      Cranial Nerves: No cranial nerve deficit.   Psychiatric:         Mood and Affect: Mood normal.         Behavior: Behavior normal.         ED Course, Procedures, & Data      Procedures       A consult was attained from the malignant heme service. The case was discussed with no from that service. The consulting service's recommendations were provided at 11:39 AM         Results for orders placed or performed during the hospital encounter of 03/11/24   CBC with platelets differential     Status: None ()    Narrative    The following orders were created for panel order CBC with platelets differential.  Procedure                               Abnormality         Status                     ---------                               -----------         ------                     CBC with platelets and d...[[id number]]                                                   Please view results for these tests on the individual orders.   Results for orders placed or performed in visit on 03/11/24   IR Referral Outpatient     Status: None ()    Narrative    Liza Walters APRN CNP     3/11/2024  9:26 AM      Outpatient Neuroradiology Biopsy Referral    Patient is a 72 y/o female with a PMH of follicular lymphoma,   rapid R cervical lymph node enlargement. Neuroradiology has been   asked to biopsy right cervical lymph node.    CT 3/8/24 Impression [facility] No images in PACs at time of   referral.     1.  Interval progression of disease since 02/09/2024 with   increased size and number of metastatic adenopathy centered in   the right supraclavicular distribution.  Surrounding soft tissue   induration could be related to lymphatic congestion. Some of the   lymph nodes now demonstrate central necrosis, new since   02/09/2024.  2.  Unchanged nodules in the superficial lobe of the left parotid   gland and right thyroid gland.    CT 2/9/24 Impression:  1. Multiple new/enlarged right level 4 and 5 lymph nodes with   adjacent  fat stranding, suggesting infection/inflammation, but with   history of  follicular lymphoma this could also represent progressive   disease. No  abscess.  2. Stable left greater than right enhancing parotid masses.    Case and imaging was reviewed with XX from Neuroradiology and   biopsy of XX is approved.     INCOMPLETE NOTE     Procedure order, surgical pathology and leukemia lymphoma orders   placed.    Patient not on AC at time of referral.     If requesting team would like samples sent for anything else   please enter them or notify Neuroradiology prior to scheduled   procedure.    Primary team Dr. Zaidi ONC made aware of Neuroradiology   recommendations via epic messaging.    KOSTA Nolan CNP  Interventional Radiology   IR on-call pager: [phone]       Medications - No data to display  Labs Ordered and Resulted from Time of ED Arrival to Time of ED Departure - No data to display  CT Chest/Abdomen/Pelvis w Contrast    (Results Pending)      1:40 PM Case discussed with oncology attending in person.  Plan formulated.  Will discharge home on prednisone and she will be seen in the oncology clinic on Thursday and the plan is for biopsy on Friday and IR.  They will arrange this.    Critical care was not performed.     Medical Decision Making  The patient's presentation was of high complexity (patient with history of follicular lymphoma now with large right supraclavicular neck mass likely malignant).    The patient's evaluation involved:  ordering and/or review of 3+ test(s) in this encounter (CBC, metabolic panel, CT chest abdomen and  pelvis)  discussion of management or test interpretation with another health professional (malignant hematology fellow)    The patient's management necessitated high risk (a decision regarding hospitalization).    Assessment & Plan    Patient referred to the emergency department by her oncologist for inability to obtain outpatient biopsy and CT PET scan.  She has a history of lymphoma now has a large mass base of her right neck that was imaged by CT 3 days ago.  I will not repeat this.  She has no airway issues.  Routine labs and CT chest abdomen pelvis are ordered.  I discussed the case with her community oncologist and with the on-call hematology oncology fellow and a bed was ordered under bock and elbow.  She is stable.  There are remained labs and CT scan results that are pending but will not change disposition.  1:41 PM I discussed the case with the oncology attending and a new plan was formulated, no need to admit, will discharge home on prednisone.  They will arrange clinic follow-up and biopsy of the mass on Friday of this week.    I have reviewed the nursing notes. I have reviewed the findings, diagnosis, plan and need for follow up with the patient.    New Prescriptions    No medications on file       Final diagnoses:   Follicular lymphoma, unspecified follicular lymphoma type, unspecified body region (H)   Metastasis to supraclavicular lymph node (H)   I, MARY WILLINGHAM, am serving as a trained medical scribe to document services personally performed by Kenn Avendaño MD, based on the provider's statements to me.      Knen JENKINS MD, was physically present and have reviewed and verified the accuracy of this note documented by MARY WILLINGHAM.     Kenn Avendaño MD  [facility] EMERGENCY DEPARTMENT  3/11/2024        Kenn Avendaño MD  03/11/24 [id number]       Kenn Avendaño MD  03/11/24 [id number]

## 2024-03-11 NOTE — CONSULTS
Outpatient Neuroradiology Biopsy Referral    Patient is a 72 y/o female with a PMH of follicular lymphoma, rapid R cervical lymph node enlargement. Neuroradiology has been asked to biopsy right cervical lymph node.    CT 3/8/24 Impression [facility] No images in PACs at time of referral. 1155 images now in PACs.     1.  Interval progression of disease since 02/09/2024 with increased size and number of metastatic adenopathy centered in the right supraclavicular distribution. Surrounding soft tissue induration could be related to lymphatic congestion. Some of the lymph nodes now demonstrate central necrosis, new since 02/09/2024.  2.  Unchanged nodules in the superficial lobe of the left parotid gland and right thyroid gland.    CT 2/9/24 Impression:  1. Multiple new/enlarged right level 4 and 5 lymph nodes with adjacent  fat stranding, suggesting infection/inflammation, but with history of  follicular lymphoma this could also represent progressive disease. No  abscess.  2. Stable left greater than right enhancing parotid masses.    Case and imaging was reviewed with Dr. Meadows and Dr. Mcneil from Neuroradiology and US guided biopsy of a right cervical lymph node providers choice is approved. Admission for biopsy is not recommended.  Referring team discussed with Dr. Meadows from Neuroradiology.     Procedure order, surgical pathology and leukemia lymphoma orders placed.    Patient not on AC at time of referral.     If requesting team would like samples sent for anything else please enter them or notify Neuroradiology prior to scheduled procedure.    Primary team Dr. Zaidi ONC made aware of Neuroradiology recommendations via epic messaging.    KOSTA Nolan CNP  Interventional Radiology   IR on-call pager: [phone]

## 2024-03-11 NOTE — ED TRIAGE NOTES
Pt says she was sent here by her oncologist stat because she has a lump on her shoulder. She says she went to urgent care and they could not do anything because they were not in [place]. She was told she needs a CT scan PET scan and biopsy. Pt says she has had it for 5 weeks. She says it is starting to get painful.

## 2024-03-12 ENCOUNTER — PATIENT OUTREACH (OUTPATIENT)
Dept: CARE COORDINATION | Facility: CLINIC | Age: 74
End: 2024-03-12
Payer: MEDICARE

## 2024-03-12 NOTE — PROGRESS NOTES
[facility]    Background: Transitional Care Management program identified per system criteria and reviewed by [facility] team for possible outreach.    Assessment: Upon chart review, [facility] Team member will not proceed with patient outreach related to this episode of Transitional Care Management program due to reason below:    Patient has active communication with a nurse, provider or care team for reason of post-hospital follow up plan.  Outreach call by [facility] team not indicated to minimize duplicative efforts.     Patient is in active communication today with a Registered Nurse in Radiology from [facility] Interventional Radiology. No [facility] outreach call needed at this time.     Plan: Transitional Care Management episode addressed appropriately per reason noted above.      JP Nguyen  [phone]  [facility]     *Connected Care Resource Team does NOT follow patient ongoing. Referrals are identified based on internal discharge reports and the outreach is to ensure patient has an understanding of their discharge instructions.

## 2024-03-15 ENCOUNTER — APPOINTMENT (OUTPATIENT)
Dept: INTERVENTIONAL RADIOLOGY/VASCULAR | Facility: CLINIC | Age: 74
End: 2024-03-15
Attending: INTERNAL MEDICINE
Payer: MEDICARE

## 2024-03-15 ENCOUNTER — TRANSFERRED RECORDS (OUTPATIENT)
Dept: HEALTH INFORMATION MANAGEMENT | Facility: CLINIC | Age: 74
End: 2024-03-15

## 2024-03-15 ENCOUNTER — APPOINTMENT (OUTPATIENT)
Dept: MEDSURG UNIT | Facility: CLINIC | Age: 74
End: 2024-03-15
Attending: INTERNAL MEDICINE
Payer: MEDICARE

## 2024-03-15 ENCOUNTER — HOSPITAL ENCOUNTER (OUTPATIENT)
Facility: CLINIC | Age: 74
Discharge: HOME | End: 2024-03-15
Attending: INTERNAL MEDICINE | Admitting: INTERNAL MEDICINE
Payer: MEDICARE

## 2024-03-15 ENCOUNTER — TELEPHONE (OUTPATIENT)
Dept: ONCOLOGY | Facility: CLINIC | Age: 74
End: 2024-03-15

## 2024-03-15 ENCOUNTER — MYC MEDICAL ADVICE (OUTPATIENT)
Dept: ONCOLOGY | Facility: CLINIC | Age: 74
End: 2024-03-15

## 2024-03-15 VITALS
DIASTOLIC BLOOD PRESSURE: 60 MMHG | OXYGEN SATURATION: 96 % | HEART RATE: 75 BPM | TEMPERATURE: 97.3 F | RESPIRATION RATE: 16 BRPM | SYSTOLIC BLOOD PRESSURE: 106 MMHG

## 2024-03-15 DIAGNOSIS — C82.11 GRADE 2 FOLLICULAR LYMPHOMA OF LYMPH NODES OF NECK (H): Primary | ICD-10-CM

## 2024-03-15 DIAGNOSIS — R22.1 NECK MASS: ICD-10-CM

## 2024-03-15 LAB — INR PPP: 1.18 (ref 0.85–1.15)

## 2024-03-15 PROCEDURE — 88342 IMHCHEM/IMCYTCHM 1ST ANTB: CPT | Mod: TC,XU | Performed by: INTERNAL MEDICINE

## 2024-03-15 PROCEDURE — 999N000142 HC STATISTIC PROCEDURE PREP ONLY

## 2024-03-15 PROCEDURE — 76942 ECHO GUIDE FOR BIOPSY: CPT | Mod: 26 | Performed by: STUDENT IN AN ORGANIZED HEALTH CARE EDUCATION/TRAINING PROGRAM

## 2024-03-15 PROCEDURE — 88342 IMHCHEM/IMCYTCHM 1ST ANTB: CPT | Mod: 26 | Performed by: STUDENT IN AN ORGANIZED HEALTH CARE EDUCATION/TRAINING PROGRAM

## 2024-03-15 PROCEDURE — 88333 PATH CONSLTJ SURG CYTO XM 1: CPT | Mod: 26 | Performed by: PATHOLOGY

## 2024-03-15 PROCEDURE — 999N000132 HC STATISTIC PP CARE STAGE 1

## 2024-03-15 PROCEDURE — 88188 FLOWCYTOMETRY/READ 9-15: CPT | Mod: GC | Performed by: STUDENT IN AN ORGANIZED HEALTH CARE EDUCATION/TRAINING PROGRAM

## 2024-03-15 PROCEDURE — 38505 NEEDLE BIOPSY LYMPH NODES: CPT

## 2024-03-15 PROCEDURE — 88305 TISSUE EXAM BY PATHOLOGIST: CPT | Mod: 26 | Performed by: STUDENT IN AN ORGANIZED HEALTH CARE EDUCATION/TRAINING PROGRAM

## 2024-03-15 PROCEDURE — 88184 FLOWCYTOMETRY/ TC 1 MARKER: CPT | Performed by: INTERNAL MEDICINE

## 2024-03-15 PROCEDURE — 88341 IMHCHEM/IMCYTCHM EA ADD ANTB: CPT | Mod: 26 | Performed by: STUDENT IN AN ORGANIZED HEALTH CARE EDUCATION/TRAINING PROGRAM

## 2024-03-15 PROCEDURE — 36415 COLL VENOUS BLD VENIPUNCTURE: CPT | Performed by: NURSE PRACTITIONER

## 2024-03-15 PROCEDURE — 272N000505 HC NEEDLE CR5

## 2024-03-15 PROCEDURE — 88365 INSITU HYBRIDIZATION (FISH): CPT | Mod: 26 | Performed by: STUDENT IN AN ORGANIZED HEALTH CARE EDUCATION/TRAINING PROGRAM

## 2024-03-15 PROCEDURE — 250N000009 HC RX 250: Performed by: STUDENT IN AN ORGANIZED HEALTH CARE EDUCATION/TRAINING PROGRAM

## 2024-03-15 PROCEDURE — 38505 NEEDLE BIOPSY LYMPH NODES: CPT | Mod: RT | Performed by: STUDENT IN AN ORGANIZED HEALTH CARE EDUCATION/TRAINING PROGRAM

## 2024-03-15 PROCEDURE — 250N000011 HC RX IP 250 OP 636: Performed by: STUDENT IN AN ORGANIZED HEALTH CARE EDUCATION/TRAINING PROGRAM

## 2024-03-15 PROCEDURE — 99152 MOD SED SAME PHYS/QHP 5/>YRS: CPT | Mod: GC | Performed by: STUDENT IN AN ORGANIZED HEALTH CARE EDUCATION/TRAINING PROGRAM

## 2024-03-15 PROCEDURE — 88185 FLOWCYTOMETRY/TC ADD-ON: CPT | Performed by: INTERNAL MEDICINE

## 2024-03-15 PROCEDURE — 88360 TUMOR IMMUNOHISTOCHEM/MANUAL: CPT | Mod: 26 | Performed by: STUDENT IN AN ORGANIZED HEALTH CARE EDUCATION/TRAINING PROGRAM

## 2024-03-15 PROCEDURE — 85610 PROTHROMBIN TIME: CPT | Mod: GZ | Performed by: NURSE PRACTITIONER

## 2024-03-15 RX ORDER — NALOXONE HYDROCHLORIDE 0.4 MG/ML
0.4 INJECTION, SOLUTION INTRAMUSCULAR; INTRAVENOUS; SUBCUTANEOUS
Status: DISCONTINUED | OUTPATIENT
Start: 2024-03-15 | End: 2024-03-15 | Stop reason: HOSPADM

## 2024-03-15 RX ORDER — FENTANYL CITRATE 50 UG/ML
25-50 INJECTION, SOLUTION INTRAMUSCULAR; INTRAVENOUS EVERY 5 MIN PRN
Status: DISCONTINUED | OUTPATIENT
Start: 2024-03-15 | End: 2024-03-15 | Stop reason: HOSPADM

## 2024-03-15 RX ORDER — PREDNISONE 20 MG/1
60 TABLET ORAL DAILY
Qty: 15 TABLET | Refills: 0 | Status: CANCELLED | OUTPATIENT
Start: 2024-03-15

## 2024-03-15 RX ORDER — NALOXONE HYDROCHLORIDE 0.4 MG/ML
0.2 INJECTION, SOLUTION INTRAMUSCULAR; INTRAVENOUS; SUBCUTANEOUS
Status: DISCONTINUED | OUTPATIENT
Start: 2024-03-15 | End: 2024-03-15 | Stop reason: HOSPADM

## 2024-03-15 RX ORDER — LIDOCAINE 40 MG/G
CREAM TOPICAL
Status: DISCONTINUED | OUTPATIENT
Start: 2024-03-15 | End: 2024-03-15 | Stop reason: HOSPADM

## 2024-03-15 RX ORDER — SODIUM CHLORIDE 9 MG/ML
INJECTION, SOLUTION INTRAVENOUS CONTINUOUS
Status: DISCONTINUED | OUTPATIENT
Start: 2024-03-15 | End: 2024-03-15 | Stop reason: HOSPADM

## 2024-03-15 RX ORDER — LIDOCAINE HYDROCHLORIDE 10 MG/ML
1-30 INJECTION, SOLUTION EPIDURAL; INFILTRATION; INTRACAUDAL; PERINEURAL
Status: COMPLETED | OUTPATIENT
Start: 2024-03-15 | End: 2024-03-15

## 2024-03-15 RX ORDER — FLUMAZENIL 0.1 MG/ML
0.2 INJECTION, SOLUTION INTRAVENOUS
Status: DISCONTINUED | OUTPATIENT
Start: 2024-03-15 | End: 2024-03-15 | Stop reason: HOSPADM

## 2024-03-15 RX ADMIN — MIDAZOLAM 1 MG: 1 INJECTION INTRAMUSCULAR; INTRAVENOUS at 13:51

## 2024-03-15 RX ADMIN — FENTANYL CITRATE 50 MCG: 50 INJECTION, SOLUTION INTRAMUSCULAR; INTRAVENOUS at 13:51

## 2024-03-15 RX ADMIN — LIDOCAINE HYDROCHLORIDE 4 ML: 10 INJECTION, SOLUTION EPIDURAL; INFILTRATION; INTRACAUDAL; PERINEURAL at 14:10

## 2024-03-15 ASSESSMENT — ACTIVITIES OF DAILY LIVING (ADL)
ADLS_ACUITY_SCORE: 38

## 2024-03-15 NOTE — PROGRESS NOTES
Patient arrived via cart from IR s/p right cervical lymph node biopsy. R neck dressing CDI, no hematoma. Pt denies pain, tolerating regular diet. Plan discharge at 1530 per MD orders.

## 2024-03-15 NOTE — PROGRESS NOTES
Patient Name: Virginia Byers  Medical Record Number: [id number]  Today's Date: 3/15/2024    Procedure: Image guided lymph node biopsy  Proceduralist: Dr Clinton ALEMAN  Pathology present: Yes present    Procedure Start: 1400  Procedure end: 1420  Sedation medications administered: Midazolam 1 mg, Fentanyl 50 mcg     Report given to:   RN  : N/A    Other Notes: Pt arrived to IR room 4 from . Consent reviewed. Pt denies any questions or concerns regarding procedure. Pt positioned supine and monitored per protocol. 5 cores obtained and sent to lab as ordered.  Patient to be on bedrest for 1 hour.  Pt tolerated procedure without any noted complications. Pt transferred back to .

## 2024-03-15 NOTE — DISCHARGE INSTRUCTIONS
[facility]    Interventional Radiology  Patient Instructions Following Cervical Neck Biopsy    AFTER YOU GO HOME  If you were given sedation, for the first 24 hours: we recommend that an adult stay with you, DO NOT drive or operate machinery at home or at work, DO NOT smoke, DO NOT make any important or legal decisions.  DO NOT drink alcoholic beverages the day of your procedure  Drink plenty of fluids   Resume your regular diet, unless otherwise instructed by your Primary Physician  Relax and take it easy for 48 hours  DO NOT do any strenuous exercise or lifting (> 10 lbs) for at least 7 days following your procedure  There should be minimal drainage from the biopsy site  Keep the dressing dry and in place for 24 hours. After 24 hours, remove the dressing. You may shower at that time. Do not scrub the procedure site vigorously for 5 days. Replace with Band aid for 2 days.  Never leave a wet or dirty dressing in place.  No lotion or powder to the puncture site for 5 days.   No tub bath, hot tub, or swimming for 5 days.    CALL THE PHYSICIAN IF:  You start bleeding from the procedure site.  If you do start to bleed from that site, lie down flat and hold pressure on the site for a minimum of 10 minutes.  Your physician will tell you if you need to return to the hospital  You develop nausea or vomiting  You have excessive swelling, redness, or tenderness at the site  You have drainage that looks like it is infected.  You experience severe pain  You develop hives or a rash or unexplained itching  You develop shortness of breath  You develop a temperature of 101 degrees F or greater  You develop chest pain or cough up blood, lightheadedness or fainting    ADDITIONAL INSTRUCTIONS  If you are taking Coumadin, restart tonight.  Follow up with your Coumadin Clinic or Primary Care MD to have your INR rechecked.    [facility] INTERVENTIONAL RADIOLOGY DEPARTMENT  Procedure Physician: Dr. Solitario                              Date of procedure: March 15, 2024    Telephone Numbers: [phone]      Monday-Friday 7:30 am to 4:00 pm  [phone] After 4:00 pm Monday-Friday, Weekends & Holidays.   Ask the  to contact the Neuro-Radiologist on call.  Someone is on call 24 hrs/day    [facility] toll free number: [phone]  Monday-Friday 8:00 am to 4:30 pm  [facility] Emergency Dept: [phone]

## 2024-03-15 NOTE — PROGRESS NOTES
Recovery time completed without complications. Pt tolerated ambulation to BR. Able to void without complications. Discharge instructions reviewed with pt & pt's friend. Pt stable; met discharge criteria. Pt dressed self and left with friend to discharge home. Pt stable.

## 2024-03-15 NOTE — CONFIDENTIAL NOTE
"Received incoming call from patient's friend Aurelio. At the time of call patient is under sedation for biopsy so Aurelio is speaking on patients behalf. Consent to communicate is on file.    Patient originally sent a [email or web address] message this morning requesting prednisone to minimize symptoms of discomfort caused by her lymph node, however, Aurelio reports that the patient is now requesting something other than prednisone to be prescribed for her discomfort. When I inquired about what discomfort Virginia is experiencing Anamarianevaishali states that Virginia reports discomfort with her shoulder aching, pain 5/10 (however patient did not relay this information to writer) and that the lymph node is \"hot\". Writer clarified about what \"hot\" meant and Aurelio said hot to the touch. They are also wondering if patient should continue antibiotics.    In addition, patient has upcoming PET scan next week and Anamarianevaishali reports that they do not want the prednisone to interfere with blood glucose levels or blood pressure.    Writer spoke with Dr. Zaidi about alternative options and it is recommended that patient should complete antibiotic course, and she can also complete prednisone course without concern that it will impact Virginia getting her PET. The next step would be pain medication, tramadol, to help control the pain/discomfort. However, we need to speak directly to the patient regarding pain and whether or not she would consent to tramadol, and are unable to do that at this time due to her being sedated for procedure.    Writer relayed the above information to Aurelio. With it being late in the day on Friday and Virginia still in sedation, they will plan to call triage once Virginia is home and recovering so triage can assess pain level/provide pain medication if necessary. Aurelio voices understanding with the plan.    Lucy Babin, RN, BSN  RN Care Coordinator - Oncology/Hematology  [facility]        "

## 2024-03-15 NOTE — TELEPHONE ENCOUNTER
"Below information copied from [employer] encounter.    Received incoming call from patient's friend Aurelio. At the time of call patient is under sedation for biopsy so Aurelio is speaking on patients behalf. Consent to communicate is on file.     Patient originally sent a [employer] message this morning requesting prednisone to minimize symptoms of discomfort caused by her lymph node, however, Aurelio reports that the patient is now requesting something other than prednisone to be prescribed for her discomfort. When I inquired about what discomfort Virginia is experiencing Aurelio states that Virginia reports discomfort with her shoulder aching, pain 5/10 (however patient did not relay this information to writer) and that the lymph node is \"hot\". Writer clarified about what \"hot\" meant and Aurelio said hot to the touch. They are also wondering if patient should continue antibiotics.     In addition, patient has upcoming PET scan next week and Aurelio reports that they do not want the prednisone to interfere with blood glucose levels or blood pressure.     Writer spoke with Dr. Zaidi about alternative options and it is recommended that patient should complete antibiotic course, and she can also complete prednisone course without concern that it will impact Virginia getting her PET. The next step would be pain medication, tramadol, to help control the pain/discomfort. However, we need to speak directly to the patient regarding pain and whether or not she would consent to tramadol, and are unable to do that at this time due to her being sedated for procedure.     Writer relayed the above information to Aurelio. With it being late in the day on Friday and Virginia still in sedation, they will plan to call triage once Virginia is home and recovering so triage can assess pain level/provide pain medication if necessary. Aurelio voices understanding with the plan.     Lucy Babin, RN, BSN  RN Care Coordinator - Oncology/Hematology  [facility] " [facility]

## 2024-03-20 ENCOUNTER — ANCILLARY ORDERS (OUTPATIENT)
Dept: ONCOLOGY | Facility: CLINIC | Age: 74
End: 2024-03-20

## 2024-03-20 ENCOUNTER — HOSPITAL ENCOUNTER (OUTPATIENT)
Dept: PET IMAGING | Facility: CLINIC | Age: 74
Discharge: HOME | End: 2024-03-20
Attending: INTERNAL MEDICINE
Payer: MEDICARE

## 2024-03-20 DIAGNOSIS — R22.1 NECK MASS: Primary | ICD-10-CM

## 2024-03-20 DIAGNOSIS — C82.11 GRADE 2 FOLLICULAR LYMPHOMA OF LYMPH NODES OF NECK (H): ICD-10-CM

## 2024-03-20 DIAGNOSIS — R22.1 NECK MASS: ICD-10-CM

## 2024-03-20 PROCEDURE — 343N000001 HC RX 343: Performed by: INTERNAL MEDICINE

## 2024-03-20 PROCEDURE — 71260 CT THORAX DX C+: CPT

## 2024-03-20 PROCEDURE — A9552 F18 FDG: HCPCS | Performed by: INTERNAL MEDICINE

## 2024-03-20 PROCEDURE — 74177 CT ABD & PELVIS W/CONTRAST: CPT | Mod: 26 | Performed by: RADIOLOGY

## 2024-03-20 PROCEDURE — 70491 CT SOFT TISSUE NECK W/DYE: CPT

## 2024-03-20 PROCEDURE — 250N000011 HC RX IP 250 OP 636: Performed by: INTERNAL MEDICINE

## 2024-03-20 PROCEDURE — 78816 PET IMAGE W/CT FULL BODY: CPT | Mod: MG,PS

## 2024-03-20 PROCEDURE — 999N000248 HC STATISTIC IV INSERT WITH US BY RN

## 2024-03-20 PROCEDURE — 70491 CT SOFT TISSUE NECK W/DYE: CPT | Mod: 26 | Performed by: RADIOLOGY

## 2024-03-20 PROCEDURE — 78813 PET IMAGE FULL BODY: CPT | Mod: 26 | Performed by: RADIOLOGY

## 2024-03-20 PROCEDURE — G1010 CDSM STANSON: HCPCS | Performed by: RADIOLOGY

## 2024-03-20 PROCEDURE — 71260 CT THORAX DX C+: CPT | Mod: 26 | Performed by: RADIOLOGY

## 2024-03-20 RX ORDER — IOPAMIDOL 755 MG/ML
10-135 INJECTION, SOLUTION INTRAVASCULAR ONCE
Status: COMPLETED | OUTPATIENT
Start: 2024-03-20 | End: 2024-03-20

## 2024-03-20 RX ADMIN — FLUDEOXYGLUCOSE F-18 12.83 MILLICURIE: 500 INJECTION, SOLUTION INTRAVENOUS at 12:19

## 2024-03-20 RX ADMIN — IOPAMIDOL 81 ML: 755 INJECTION, SOLUTION INTRAVENOUS at 13:16

## 2024-03-22 ENCOUNTER — LAB (OUTPATIENT)
Dept: INFUSION THERAPY | Facility: CLINIC | Age: 74
End: 2024-03-22
Attending: INTERNAL MEDICINE
Payer: MEDICARE

## 2024-03-22 ENCOUNTER — ONCOLOGY VISIT (OUTPATIENT)
Dept: ONCOLOGY | Facility: CLINIC | Age: 74
End: 2024-03-22
Attending: INTERNAL MEDICINE
Payer: MEDICARE

## 2024-03-22 VITALS
BODY MASS INDEX: 25.27 KG/M2 | HEART RATE: 78 BPM | SYSTOLIC BLOOD PRESSURE: 117 MMHG | DIASTOLIC BLOOD PRESSURE: 68 MMHG | HEIGHT: 64 IN | OXYGEN SATURATION: 98 %

## 2024-03-22 DIAGNOSIS — R60.0 LOCALIZED EDEMA: ICD-10-CM

## 2024-03-22 DIAGNOSIS — Z11.4 ENCOUNTER FOR SCREENING FOR HUMAN IMMUNODEFICIENCY VIRUS (HIV): ICD-10-CM

## 2024-03-22 DIAGNOSIS — C81.11 NODULAR SCLEROSIS HODGKIN LYMPHOMA OF LYMPH NODES OF NECK (H): ICD-10-CM

## 2024-03-22 DIAGNOSIS — C82.00 FOLLICULAR LYMPHOMA GRADE I, UNSPECIFIED BODY REGION (H): ICD-10-CM

## 2024-03-22 DIAGNOSIS — C81.11 NODULAR SCLEROSIS HODGKIN LYMPHOMA OF LYMPH NODES OF NECK (H): Primary | ICD-10-CM

## 2024-03-22 LAB
ALBUMIN SERPL BCG-MCNC: 3.5 G/DL (ref 3.5–5.2)
ALP SERPL-CCNC: 127 U/L (ref 40–150)
ALT SERPL W P-5'-P-CCNC: 25 U/L (ref 0–50)
ANION GAP SERPL CALCULATED.3IONS-SCNC: 11 MMOL/L (ref 7–15)
AST SERPL W P-5'-P-CCNC: 18 U/L (ref 0–45)
BASOPHILS # BLD AUTO: 0.1 10E3/UL (ref 0–0.2)
BASOPHILS NFR BLD AUTO: 0 %
BILIRUB SERPL-MCNC: 0.2 MG/DL
BUN SERPL-MCNC: 13.7 MG/DL (ref 8–23)
CALCIUM SERPL-MCNC: 9.5 MG/DL (ref 8.8–10.2)
CHLORIDE SERPL-SCNC: 101 MMOL/L (ref 98–107)
CREAT SERPL-MCNC: 0.54 MG/DL (ref 0.51–0.95)
CRP SERPL-MCNC: 91.3 MG/L
DEPRECATED HCO3 PLAS-SCNC: 25 MMOL/L (ref 22–29)
EGFRCR SERPLBLD CKD-EPI 2021: >90 ML/MIN/1.73M2
EOSINOPHIL # BLD AUTO: 0.1 10E3/UL (ref 0–0.7)
EOSINOPHIL NFR BLD AUTO: 1 %
ERYTHROCYTE [DISTWIDTH] IN BLOOD BY AUTOMATED COUNT: 15.2 % (ref 10–15)
ERYTHROCYTE [SEDIMENTATION RATE] IN BLOOD BY WESTERGREN METHOD: 65 MM/HR (ref 0–30)
GLUCOSE SERPL-MCNC: 134 MG/DL (ref 70–99)
HCT VFR BLD AUTO: 37.3 % (ref 35–47)
HGB BLD-MCNC: 11.8 G/DL (ref 11.7–15.7)
HIV 1+2 AB+HIV1 P24 AG SERPL QL IA: NONREACTIVE
IMM GRANULOCYTES # BLD: 0.1 10E3/UL
IMM GRANULOCYTES NFR BLD: 1 %
LDH SERPL L TO P-CCNC: 130 U/L (ref 0–250)
LYMPHOCYTES # BLD AUTO: 1.9 10E3/UL (ref 0.8–5.3)
LYMPHOCYTES NFR BLD AUTO: 11 %
MCH RBC QN AUTO: 26.4 PG (ref 26.5–33)
MCHC RBC AUTO-ENTMCNC: 31.6 G/DL (ref 31.5–36.5)
MCV RBC AUTO: 83 FL (ref 78–100)
MONOCYTES # BLD AUTO: 1.4 10E3/UL (ref 0–1.3)
MONOCYTES NFR BLD AUTO: 8 %
NEUTROPHILS # BLD AUTO: 14.3 10E3/UL (ref 1.6–8.3)
NEUTROPHILS NFR BLD AUTO: 80 %
NRBC # BLD AUTO: 0 10E3/UL
NRBC BLD AUTO-RTO: 0 /100
PLATELET # BLD AUTO: 474 10E3/UL (ref 150–450)
POTASSIUM SERPL-SCNC: 4.6 MMOL/L (ref 3.4–5.3)
PROT SERPL-MCNC: 7.1 G/DL (ref 6.4–8.3)
RBC # BLD AUTO: 4.47 10E6/UL (ref 3.8–5.2)
SODIUM SERPL-SCNC: 137 MMOL/L (ref 135–145)
WBC # BLD AUTO: 17.9 10E3/UL (ref 4–11)

## 2024-03-22 PROCEDURE — 36415 COLL VENOUS BLD VENIPUNCTURE: CPT | Performed by: INTERNAL MEDICINE

## 2024-03-22 PROCEDURE — G0463 HOSPITAL OUTPT CLINIC VISIT: HCPCS | Performed by: INTERNAL MEDICINE

## 2024-03-22 PROCEDURE — 87389 HIV-1 AG W/HIV-1&-2 AB AG IA: CPT

## 2024-03-22 PROCEDURE — 86140 C-REACTIVE PROTEIN: CPT

## 2024-03-22 PROCEDURE — 99215 OFFICE O/P EST HI 40 MIN: CPT | Performed by: INTERNAL MEDICINE

## 2024-03-22 PROCEDURE — 84155 ASSAY OF PROTEIN SERUM: CPT

## 2024-03-22 PROCEDURE — 85652 RBC SED RATE AUTOMATED: CPT

## 2024-03-22 PROCEDURE — 82040 ASSAY OF SERUM ALBUMIN: CPT

## 2024-03-22 PROCEDURE — 85004 AUTOMATED DIFF WBC COUNT: CPT

## 2024-03-22 PROCEDURE — 83615 LACTATE (LD) (LDH) ENZYME: CPT

## 2024-03-22 RX ORDER — NETARSUDIL AND LATANOPROST OPHTHALMIC SOLUTION, 0.02%/0.005% .2; .05 MG/ML; MG/ML
SOLUTION/ DROPS OPHTHALMIC; TOPICAL
COMMUNITY
Start: 2024-01-23

## 2024-03-22 ASSESSMENT — PAIN SCALES - GENERAL: PAINLEVEL: SEVERE PAIN (7)

## 2024-03-22 NOTE — LETTER
3/22/2024         RE: Virginia Byers  [id number] [facility] [address]        Dear Colleague,    Thank you for referring your patient, Virginia Byers, to the [facility]. Please see a copy of my visit note below.    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    Patient has medical history including LEFT breast cancer in 2000, hypertension, hyperlipidemia, osteoporosis, osteoarthritis, degenerative disc disease, spinal stenosis of lumbar region,  s/p bilateral L5-S1 transforaminal ROS by Dr. Garcia on 7/9/2021, s/p bilateral L4-5 and L5-S1 facet joint injections on 6/14/2021, glaucoma and macular degeneration, cataracts s/p surgery, history of tobacco use (quit 1999)     Regarding previous history of breast cancer:  She was previously treated by Dr. Sakina Brandt at [facility]      In summary, diagnosed at age 49 with LEFT breast IDC ER positive, KS positive, HER2 positive on FISH. 3/2000 left breast lumpectomy and left axillary node dissection (IDC, grade 3, 1.45 cm incompletely excised tumor, DCIS, positive margins with infiltrating carcinoma, 1 of 17 lymph nodes positive (0.5 cm metastatic focus), ER positive, KS positive, HER2 positive).  4/2020 left breast simple mastectomy with no residual carcinoma. S/p ACx4, taxol x4, no anti-her2 treatment, no RT. S/p 5 years adjuvant endocrine therapy (tamoxifen and anastrozole). Delayed left breast reconstruction and right breast augmentation.      3/2/2000  Excisional biopsy of Left breast mass -   Infiltrating ductal carcinoma with associated DCIS   ER positive (60%), KS positive (22%), HER2 by FISH POSITIVE     3/10/2000  - Left lumpectomy and left axillary node  Invasive ductal carcinoma, Grade 3, 1.45 cm (incompletely excised), negative for LVI  DCIS, greatest histologic dimension of tumor (DCIS PLUS invasive tumor) = 2.9 cm (estimate 50% DCIS)  Positive margins- infiltrating carcinoma extends to margin  1 of 17 axillary lymph nodes sampled was positive for metastatic focus of 0.5 cm.  ER/HI+     Sections of lumpectomy left breast showing residual 1.1 cm focus of ductal carcinoma in situ (DCIS), present within microns of the linked margin of resection.       4/11/2000 - Left breast simple mastectomy, negative for residual carcinoma  Left mastectomy on 4/11/2000 performed by Dr. Cristian Mcdonald at [facility].      Adjuvant treatment:  - 5/15/2000-7/24/2000 4 cycles of Adriamycin and Cytoxan   - 8/16/2000-10/18/2000 4 cycles of paclitaxel   - Appears she may have been on some kind of trial/protocol, was not given any anti-HER2 targeted treatment  - No adjuvant radiation  - 12/2000- 10/2002 tamoxifen  - 10/2002-5/2006 switched to anastrozole (due to in vitro studies showing anastrozole may be slightly better for patients were HER2 positive) with Fosamax for osteopenia     8/31/2015 Left delayed reconstruction with tissue expander    2/3/2016 Left 2nd stage 450 cc high profile silicone implant; right augmentation 150 cc Moderate classic profile silicone implant; cresent mastopexy    10/18/2021: Screening mammogram right breast ZACKARY    OTHER:  Of note, patient has documentation of anemia (iron deficiency and anemia of chronic disease), elevated LFTs, nausea and vomiting of all solids and liquids, fatigue with 26 pound unintentional weight loss from 7/20/2021 - 10/20/2021 with mildly elevated LFTs which led to evaluation with mammogram, endoscopy with EUS and colonoscopy as detailed below. Pt did associate these with some spinal injections she had gotten, due to overlapping times. Pt did regain all of the weight and hand improvement  of anemia within 2 months, without significant intervention.     -10/2021: Endoscopy with EUS normal EGD and no stigmata or source of upper GI bleeding, visualized bile duct, gallbladder, liver, pancreas, left adrenal gland normal.  No lymphadenopathy. LIVER, RANDOM NEEDLE BIOPSY: Benign parenchyma with congestion; no significant fibrosis or other abnormalities     -10/2021 colonoscopy: Hemorrhoids, diverticulosis in sigmoid colon and splenic flexure    Current history:  -Ongoing right knee pain not improved with brace and physical therapy, s/p bilateral L5-S1 transforaminal ROS by Dr. Garcia on 7/9/2021, s/p bilateral L4-5 and L5-S1 facet joint injections on 6/14/2021. ongoing b/l hip and leg pain, worse with position changes (ie sitting to standing), constant, 8-9/10, tylenol brings pain down to 6/10 (using tylenol bid). ECOG 3.   -Patient has seen neurosurgery Dr. Jose Alexander, occupational medicine specialist Dr.Orrin Murphy, with plan to see PM&R Dr. Margie Agudelo and movement disorder clinic  - 11/22 MRI lumbar spine:    prominent left iliac lymph nodes among other findings related to degenerative changes and neural foraminal stenosis throughout lumbar spine    - 12/22 MRI pelvis:  1. Enhancing marrow signal abnormality of the right iliac bone extending from the superior acetabulum subchondral location proximally almost to the level of the sacroiliac joint caudal aspect. No associated fracture line. Underlying marrow infiltrative process such as from metastasis cannot be excluded especially given the degree of T1 hypointensity and history of primary tumor. Other consideration include stress reaction.  2. Mildly increased left external iliac chain, and the right medial thigh lymphadenopathy. Metastasis cannot be excluded.  3. Left posterior iliac enhancing lesion, similar in size to prior CT 9/2021 and previously not visible on CT, focal enhancing lesion in the right greater trochanter laterally. Findings are  concerning for Metastasis.    -12/22 CT chest abdomen pelvis with contrast:  COMPARISON: MRI pelvis 12/20/2022 and CT abdomen pelvis 9/21/2021.  1. Mastectomies with implant reconstructions. No lymphadenopathy  2.  There is an new or increased left common iliac, external iliac and pelvic sidewall lymphadenopathy since prior CT. Distal left common iliac lymph nodes measure up to 1.2 cm on this exam compared to 0.7 cm on prior CT. Left pelvic sidewall lymph node measures 2.1 cm short axis on this exam compared to 1.2 cm on prior CT . consistent with metastatic disease.  3. Sclerotic lesions in the left sacrum and left posterior ilium are unchanged from prior CT.  may be metastatic.  4. No evidence of metastatic disease in the chest.     -1/2023 MRI brain w/wo contrast:  IMPRESSION:  1. No findings of intracranial metastatic disease.  2. Indeterminate ovoid T2 hyperintense enhancing mass in the region of the  left lacrimal gland measuring approximately 2.4 cm AP by 1.4 cm  transverse by 2.3 cm craniocaudal associated with/replacing the left  lacrimal gland. Consider correlation with tissue sampling.  3. A few indeterminate partially visualized heterogeneous enhancing  Nodules measuring up to approximately 1.5 cm in the bilateral parotid glands.   Consider soft tissue neck CT with contrast for further evaluation.    -1/20/2023: IR US Guided biopsy of a right external iliac LN  Final Diagnosis   A.  LYMPH NODE, RIGHT EXTERNAL ILIAC, CORE NEEDLE BIOPSY AND TOUCH IMPRINT:  -Involved by follicular lymphoma, low-grade (WHO grade 1-2)  -Negative for metastatic carcinoma     The morphologic and immunophenotypic patterns are consistent with follicular lymphoma.  There is no high-grade lymphoma present in this limited specimen.  Concurrent flow study ([id number]) demonstrated CD10-positive lambda monotypic B cells (22%), rare kappa monotypic B cells (1.3%), and no aberrant immunophenotype on T cells.  A FISH study for Bcl-2 is  pending and will be reported separately.     The case was initially reviewed by Dr. Cifuentes (breast pathology).     This case was reviewed in part at our Hematopathology Faculty Consensus conference at which Girsih Nolasco, Narciso, and Kayode were present in addition to myself.  This is a small needle core.  In some of the areas, the CD10+ B cells are confined to follicles.  Though the possibility of in situ follicular neoplasia was discussed, we still favor a diagnosis of follicular lymphoma - there are a number of CD10+ B cells outside of follicles at the base of the biopsy, and the clinical history supports a greater extent of involvement.     There was a tiny kappa monotypic B cell population identified by flow (separate from the CD10+ clone).  We looked for the morphologic correlate in this case by IHC, but it is not readily apparent.  This may represent a monoclonal B lymphocytosis - consider sending a peirpheral blood sample for flow cytometry.    FISH:  RESULTS:     NORMAL  - No rearrangement of BCL2     INTERPRETATION:  No evidence was found by FISH of rearrangement of the BCL2 (18q21) locus. These findings are not helpful for confirming or further characterizing the reported pathologic diagnosis of follicular lymphoma.    Flow Interpretation   A. Lymph Node(s), :  -CD10-positive lambda monotypic B cells (22%)  -Rare kappa monotypic B cells (1.3%)  -No aberrant immunophenotype on T cells  See comment   Electronically signed by Krista Headley MD on 1/24/2023 at  3:10 PM   Comment     The CD10-positive population is consistent with a clonal B cell population and a CD10 positive B-cell lymphoma is favored.      In addition, a second clonal B-cell population is identified which has a CLL-like immunophenotype except that CD5 expression is equivocal, this may represent a monoclonal B-cell lymphocytosis (MBL).     Addendum   INTERPRETATION  The diagnosis remains the same (see comment)     REASON FOR  ADDENDUM  This addendum is issued to reflect additional testing performed on this case. See Comment.      COMMENT  Additional multi-color flow analysis was performed for the following markers:   CD23, CD79b     The CD5 (dimly) positive B cells mostly lack CD23 and CD79b        1/23/23 CT neck:  A) Heterogeneous nodule in the right thyroid gland measuring up  to 1.6 cm.   B) Soft tissue nodule in the superficial left parotid  gland measuring 1.2 cm. Additional 0.8 cm nodule in the deep portion  of the left parotid gland. Soft tissue nodule in the superficial right  parotid gland measuring 0.8 cm. These may represent benign or malignant parotid lesions. Multiplicity of lesions raises concern for Warthin tumors. Otolaryngology consultation is  recommended.  C) Prominent soft tissue around the major arteries in the neck  particularly the common and internal carotid arteries. This is  atypical for atherosclerotic disease and vasculopathy/vasculitis such  as giant cell arteritis should be considered. Probable superimposed  atherosclerotic disease at the carotid bifurcations right greater than  left without definite high-grade stenosis.    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  And B-cell cohort #2 which is described as negative for CD5 and showing dim CD20 and dim kappa staining.  This population is listed a CD5 negative and CD10 negative also listed as negative for   and .  Of note the population of monoclonal B-cell lymphocytosis found in peripheral blood in our department and in the lymph node biopsy in January showed partial/equivocal dim CD5 and may correspond to the cohort #2.     We essentially agree with the diagnosis of follicular lymphoma, which is consistent with prior diagnosis of follicular lymphoma low grade  established on core biopsy of right external iliac lymph node January 20, 2023 (case [id number]) in our Department . The concurrent flow cytometry case [id number] in January showed in addition to CD10 positive B-cell population  also a partial/equivocal CD5 positive separate kappa monotypic B-cell population, which most likely represents monoclonal B-cell lymphocytosis of CLL/SLL type, since it was also documented in the peripheral blood flow cytometry on February 16, 2023 (case number UF ) at a low frequency.  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 Areas with infiltrates of small B cells with coexpression of CD5 and CD23 are seen in the current biopsy, but the size of the biopsy is too small to comprehensively evaluate for the presence of proliferation centers and the extent of involvement by the CD5 positive B cell lymphoproliferative process.   This case has been discussed at intradepartmental hematopathology conference with participation by THELMA Petersen, THELMA Davis and myself.      -5/23 PET/CT NCAP:   1. An ovoid mass in the left lateral orbit measures 2.5 x 1.2 cm, slightly larger than 01/09/2023, and demonstrates marked uptake (SUVmax 9.3), consistent with biopsy-proven lymphoma  2. A few small FDG avid bilateral intraparotid nodules are present, one on the right and two on the left,  including representative left parotid nodule measuring 0.9 x 1.4 cm with moderate uptake (SUVmax 6.7).  3.  Asymmetric prominent right posterior lower cervical node measures 0.8 cm associated with moderate focal   uptake (SUVmax 5.1).  4. A single mildly enlarged left axillary lymph node measures 1.0 cm associated with mild uptake (SUVmax 3.0)  5. Some scattered FDG avid lymph nodes below the diaphragm involve the retroperitoneal retrocaval, bilateral common iliac, left pelvic sidewall and left inguinal regions. Some of these have increased slightly in size since 12/28/2022, such as a conglomerate left pelvic sidewall bj mass measuring approximately 2.2 x 4.1 cm (previously 2.2 x 3.4 cm) associated with marked uptake (SUVmax 7.6).  6. FDG avid sclerotic skeletal lesion involves the right superior acetabulum (SUVmax 12.9) as well as a couple of separate smaller sclerotic lesions in the right iliac bone uptake (SUVmax 12.2).     - 6/14/2023- 6/15/2023 palliative radiation to left orbit follicular lymphoma 4Gy/2fx  - 6/15/2023 bone marrow biopsy  MORPHOLOGY:  - No morphologic or immunophenotypic evidence of follicular lymphoma  - Flow cytometry showing a small population of CD5 positive monotypic B cells; most compatible with monoclonal B lymphocytosis (There is no definitive morphologic correlate for this finding. There is no morphologic or immunophenotypic evidence of involvement by follicular lymphoma.   The CD5-positive B cell clone may best represent monoclonal B lymphocytosis, provided that bj SLL is excluded.)  - Normocellular marrow (cellularity estimated at 30%) with trilineage hematopoietic maturation and no increase in blasts  FLOW CYTOMETRY:  CD5 positive kappa-monotypic B cells (0.8%)  0.8% B cells which express CD5, CD19, CD20 (dim to negative) and monotypic kappa immunoglobulin light chains (dim) and lack CD10 and CD38. The B cells have similar forward scatter relative to background T cells  suggestive of similar size; however, precise size determination is deferred to morphology  CYTOGENETICS:  46,XX    -6/19/2023 CT-guided bone biopsy, right acetabulum  PATHOLOGY:  - Atypical lymphoid infiltrate in a suboptimal biopsy  - No histologic evidence of metastatic carcinoma  - Two small monoclonal B-cell populations detected by flow cytometry  This biopsy is suboptimal for evaluation due to fragmentation, crush artifact, and decalcification. In this limited biopsy, there are fragments of bone and marrow showing a mixed infiltrate that includes both B and T cells. Notably, 2 small clonal B-cell populations were detected by flow cytometry which resemble B-cell populations detected in this patient's past lymph node (1/20/23) and blood and bone marrow (2/16/23, 6/15/23) flow cytometry studies. A definitive morphologic correlate to these cell populations is not appreciated, and the overall features are insufficient for a diagnosis of lymphoma.    FLOW CYTOMETRY:  A. Pelvis, Right:  -CD5 positive kappa-monotypic B cells ( 2.8 %) - see comment A  -CD10 positive lambda-monotypic B cells ( 0.4 %) - see comment B      Electronically signed by Arnaldo Alvarado MD on 6/20/2023 at  1:31 PM   Comment     A) Clonal B cells with the immunophenotype of CLL/SLL are present in this sample.  The differential includes peripheral blood contamination (with an MBL), the tissue based correlate to MBL, versus involvement by SLL.  2.8% B cells which express CD5 (partial, dim), CD19, CD20 (dim to negative) and monotypic kappa immunoglobulin light chains (dim) and lack CD10. The B cells have similar forward scatter relative to background T cells suggestive of similar size; however, precise size determination is deferred to morphology.     B) A CD10-positive B cell clone is also present - involvement by the patient's previously diagnosed CD10+ B cell lymphoma is in the differential (follicular lymphoma versus other (transformation  "to a higher grade lymphoma). 0.4% B cells which express CD10, CD19 (slightly dim), CD20, and monotypic lambda immunoglobulin light chains and lack CD5.  The B cells have increased forward scatter relative to background T cells suggestive of increased size; however, precise size determination is deferred to morphology.        - 7/10/23 Rheumatology consult for ESR and CRP elevated, DEVON positive 1:160 speckled; PCP suspects pt may have PMR; \"I suspect that the bilateral hip OA is altering gait that then results in worsening of low back pain. No further rheumatology workup needed at this time. \"    - 7/11/23 left hip total arthroplasty with severe b/l hip osteoarthritis with Dr. Austin Blood,  cc, discharged with DVT prophylaxis Lovenox 40 mg daily x2 weeks and aspirin 81 mg twice daily x2 weeks    - 10/13/23 right hip total arthroplasty    - 12/23 PET CT NCAP Liver SUV max = 3.78, Aorta Blood SUV max = 3.43.  -New/progressed:  - 0.8 x 0.5 cm right FDG avid cervical lymph node; SUV max of 6.5  - 1.3 x 1.5 cm right FDG avid cervical lymph node; SUV max of 5.8  - 1.3 x 1.4 cm right FDG avid cervical lymph node; SUV max of 7.8   -Lesion in left lateral chest wall immediately inferior to the margin of breast implant, 0.5 x 0.4 mm, SUV 5.02, previously 1.4  - Lymph node adjacent to left psoas, 1.5 cm, SUV 19.9, previously 6.02  - Right acetabulum, 2.0 x 1.9 cm, SUV 14.03, previously 12.05  - No new bone lesions but increased metabolic activity previously identified multiple FDG avid osseous foci:  1.  Sternal lesion, SUV 5.7, previously 3.6  2.  Right scapular spine lesion, SUV 4.6, previously 3.6  3.  Right acetabular bone lesion SUV 13.4, previously 11    -Unchanged:  - 1 of 2 left parotid gland lesions, 1.6 x 1.1 cm, SUV 8.4, previously 6.6  - Left axillary lymph node, 1.2 x 1.0 cm, SUV 2.3  - Spleen normal    -Resolved/improved:  - Left orbital hypermetabolic lesion-resolved  - 1 of 2 previous left parotid " hypermetabolic lesions-resolved  - Multiple mildly hypermetabolic bilateral inguinal nodes, index right inguinal lymph node SUV 2.62, previously 5.19-improved    IMPRESSION: In this patient with follicular lymphoma there is evidence  for progression of disease.  1. There are multiple new hypermetabolic lymph nodes.   2. Increased metabolic activity of multiple previously seen lymph  nodes  3. No new bony lesions identified but there is increased metabolic  activity of the previously identified lesions.  4. Multiple stable and some resolved hypermetabolic lesions; resolved  lesions include the left orbit and left parotid gland lesion.      -12/23 Left pelvic lymph node, biopsy:  - Recurrent/persistent low grade B-cell lymphoma, most consistent with follicular lymphoma  - Negative for metastatic carcinoma    This biopsy is involved by a low grade lymphoma with a staining pattern consistent with follicular lymphoma. The growth pattern of the lymphoma cells appears diffuse, although the biopsy is quite small, limiting assessment of architecture. Concurrent flow cytometry ([id number]) detected a population of CD10-positive B cells negative for surface light chains, similar to the patient's past flow cytometry studies and consistent with recurrent/persistent disease.     In addition, flow cytometry identified a small population of CD5-positive kappa-monotypic B cells (2.6%), similar to the patient's known monoclonal B-cell lymphocytosis (MBL). No definitive morphologic correlate to this finding is appreciated in this biopsy - it may represent peripheral blood contamination versus a tissue-based MBL equivalent.     There is no evidence of large cell transformation in this biopsy, and no evidence of negro small lymphocytic lymphoma (SLL). However, the biopsy is very small - the possibility of unsampled higher grade disease or SLL cannot be excluded. Correlation with imaging studies is recommended.    Left pelvic LN flow  cytometry:  A. Pelvis, Left, :  - CD5 positive kappa-monotypic B cells (2.6%)  Clonal B cells with the immunophenotype of CLL/SLL are present in this sample.  The differential includes peripheral blood contamination (with an MBL) versus involvement by SLL.    - CD10 positive negative for surface light chain B cells (29%) - see comment B  A CD10-positive B cell clone is also present - involvement by the patient's previously diagnosed CD10+ B cell lymphoma is in the differential (follicular lymphoma versus transformation to a higher grade lymphoma).         - 12/23 bone marrow biopsy:  MORPHOLOGY:  - Hypercellular marrow (cellularity estimated at 40%) with trilineage hematopoiesis, no overt dysplasia, and no increase in blasts  - Rare CD5+ kappa-monotypic B cells detected by flow cytometry (1.7%)  - Small lymphoid aggregate suspicious for marrow involvement by CD5+ B-cell lymphoproliferative disorder (<5% of marrow cellularity)  - No definitive histologic or immunophenotypic evidence of follicular lymphoma  Flow cytometry ([id number]) showed rare CD5-positive kappa-monotypic B cells (1.7%). By morphology, a tiny lymphoid aggregate is identified on the trephine core biopsy. This aggregate contains small B cells that appear to have aberrant expression of CD5, and may represent the morphologic correlate to the flow cytometry results. Overall, the findings are suspicious for low level marrow involvement by a CD5+ lymphoproliferative disorder most consistent with monoclonal B-cell lymphocytosis.     There is no definitive evidence of marrow involvement by follicular lymphoma, and no morphologic evidence of large cell lymphoma.  FLOW CYTOMETRY:  -CD5-positive kappa-monotypic B cells (1.7%) express CD5 (dim, partial), CD19, CD20 (dim), and monotypic kappa immunoglobulin light chains and lack CD10 and CD38.   The monotypic B-cell population present in this specimen has a similar immunophenotype as reported previously in this  patient's peripheral blood ([id number], 2/16/23). The immunophenotypic findings are suggestive of marrow involvement by monoclonal B-cell lymphocytosis (versus chronic lymphocytic leukemia/small lymphocytic lymphoma; CLL/SLL). There may be peripheral blood contamination. Large cells may not survive specimen processing. Morphologic correlation is required.   CYTOGENETICS:  46,XX[20]  FISH:  Pending    -1/3/24 second opinion w/Dr. Nguyen     -2/9/24 pt w/right neck lump, x4 days, semi-solid feeling, TTP with deep palpation only     - 2/9/24 CT soft tissue neck-   There are multiple enlarged and heterogeneous right-sided level 5 and 4 lymph nodes with adjacent fat stranding, increased since 12/1/2023. The largest measures up to 1.7 cm. No drainable fluid collection.    -2/12/24 - 2/19/24 keflex x 7 days, pt feels size is smaller, thinks the size fluctuates, no pain, no fever    Pt was out of town 3/1-3/8 taking a road trip to [facility] but cut this trip short because she felt the right neck LN was enlarging x1 week, has since rapidly enlarged and is moving up towards her neck, with associated redness and soreness to palpation at neck, no associated dyspnea, HA, vision changes, dysphagia.     INTERIM HISTORY:  Regarding lymphoma:  Denies weight loss, night sweats, early satiety, RUQ or LUQ pain  Pt has mild fatigue     3/8/24 [facility] ER for STAT CT and biopsy, pt was discharged from ER  3/8/24 CT neck at [facility]:  1.  Interval progression of disease since 02/09/2024 with increased size and number of metastatic adenopathy centered in the right supraclavicular distribution. Surrounding soft tissue induration could be related to lymphatic congestion. Some of the lymph nodes now demonstrate central necrosis, new since 02/09/2024.     3/11/24 [facility] ER for admission for work up including biopsy, pt discharged on prednisone 60mg PO daily x 5 days per d/w Dr. German     3/15/24 IR CT guided right cervical LN  core biopsy:  PATHOLOGY:  - Classic Hodgkin lymphoma, nodular sclerosis subtype, express CD30, BCL6 (weak), MUM1, and PAX5 (weak) and no significant CD3, CD5, CD10, CD15, CD20, or CD45   - Negative for EBV by in situ hybridization  There is no definitive evidence of the patient's previously diagnosed follicular lymphoma in this biopsy. The current lymphoma may represent progression of follicular lymphoma, although it is not possible to determine any relationship between these two processes based on the current findings   FLOW CYTOMETRY:  CD5 positive kappa-monotypic B cells (6%) express CD5 (dim), CD19, CD20 (dim), and monotypic kappa immunoglobulin light chains (dim) and lack CD10, and CD38.   Clonal B cells with the immunophenotype of CLL/SLL are present in this sample.  The differential includes peripheral blood contamination (with an MBL), the tissue based correlate to MBL, versus involvement by SLL.  The other CD10 positive clonal B-cell population found in previous flow ([id number]) is not detected in this flow study.   There is no morphological correlate for this finding on the biopsy - the discrepancy may be the result of peripheral blood contamination of the flow sample or sampling differences; alternatively, the clonal B cell population may be too small to detect by histology.     - 3/20/24 PET CT NCAP: Liver SUV max = 4.39, Aorta Blood SUV max = 3.29   -  Numerous enlarged hypermetabolic right supraclavicular lymph nodes (SUV max 26.9), increased in size compared to prior CT from 2/9/2024.   - Multiple enlarged FDG avid right axillary lymph nodes (SUV max 22.51), new compared to last PET/CT.  - Similar size of multiple enlarged left iliac chain lymph nodes with slightly decreased tracer uptake (SUV max 15.3, previously 19.97).  - New FDG avid bony lesions:  1. Mixed sclerotic and lytic lesion in the T6 vertebral body, SUV max 6.28  2. Right sternal lesion, SUV max 4.44  - Similar appearance of previously  seen FDG avid bony lesions  1. mildly sclerotic lesion in the right iliac bone and acetabulum demonstrates intense tracer uptake (SUV max 14.7, previously 14.0).   2. Sclerotic lesion in the right proximal humerus, SUV max 6.8   3. Right sacral ala, SUV max 4.26 Decreased tracer uptake in the right scapular spine and left distal clavicular lesions.  - I discussed the above with radiology- 3/20/24 and 3/11/24 similar in size, 3/20/24 and 2/9/24 in the neck: posterior to R jugular: 1.6x1.1cm->2.1x1.7cm; supraclavicular region conglomerate mass: prior 1.6x1.7cm->4.5x2.5cm. 5.5cmx4.3cm in single plane is largest measure for total conglomerate mass. Right axilla 1.1cm, series 4 image 127. No splenomegaly.             REVIEW OF SYSTEMS:   A 14 point ROS was reviewed with pertinent positives and negatives in the HPI.       HOME MEDICATIONS:  Current Outpatient Medications   Medication Sig Dispense Refill     amoxicillin-clavulanate (AUGMENTIN) 875-125 MG tablet Take 1 tablet by mouth 2 times daily       dorzolamide-timolol (COSOPT) 2-0.5 % ophthalmic solution Place 1 drop into both eyes 2 times daily       losartan (COZAAR) 100 MG tablet Take 1 tablet (100 mg) by mouth daily 90 tablet 3     Multiple Vitamins-Minerals (PRESERVISION AREDS) TABS Take 1 tablet by mouth 2 times daily       ROCKLATAN 0.02-0.005 % SOLN        rosuvastatin (CRESTOR) 10 MG tablet Take 1 tablet (10 mg) by mouth daily 90 tablet 3     UNABLE TO FIND Place 1 drop into both eyes at bedtime MEDICATION NAME: rocklatan (netarsudil & latanoprost)       latanoprost (XALATAN) 0.005 % ophthalmic solution Place 1 drop into both eyes At Bedtime           ALLERGIES:  Allergies   Allergen Reactions     Compazine      Mental confusion     Hydrochlorothiazide      Dryness mouth     Prochlorperazine Visual Disturbance     Simvastatin Other (See Comments)     Muscle aches         PAST MEDICAL HISTORY:  Past Medical History:   Diagnosis Date     Aftercare following  right hip joint replacement surgery 10/25/2023     Breast cancer (H) 2000    Left breast, s/p mastectomy, chemotherapy and endocrine therapy     Follicular lymphoma (H) 01/20/2023     Glaucoma     bilateral - Dr. Moore - [facility]     History of spinal stenosis      Hyperlipidemia      Hypertension      Macular degeneration     Dr. Toscano - [facility]     Osteoarthritis      Osteoporosis      Personal history of chemotherapy 2000    A/C + Taxol     S/P radiation therapy     400 cGy to left orbit completed on 6/15/2023 [facility]         PAST SURGICAL HISTORY:  Past Surgical History:   Procedure Laterality Date     ARTHROPLASTY HIP Left 07/11/2023    Procedure: ARTHROPLASTY, HIP, TOTAL LEFT;  Surgeon: Austin Blood MD;  Location: [facility] OR     ARTHROPLASTY HIP Right 10/13/2023    Procedure: RIGHT ARTHROPLASTY, HIP, TOTAL;  Surgeon: Austin Blood MD;  Location: [facility] Main OR     BIOPSY Left 04/06/2023    Left Orbital Biopsy     C MASTECTOMY,SIMPLE  03/2000    left     COLONOSCOPY  2006    Q 10 years     COLONOSCOPY N/A 10/04/2021    Procedure: COLONOSCOPY;  Surgeon: Guru Lyla Ruby MD;  Location: [facility] OR     ESOPHAGOSCOPY, GASTROSCOPY, DUODENOSCOPY (EGD), COMBINED N/A 10/04/2021    Procedure: ENDOSCOPIC ULTRASOUND, ESOPHAGOSCOPY / UPPER GASTROINTESTINAL TRACT (GI), Esophagogastroduodenoscopy,  Fine Needle Biopsy of liver;  Surgeon: Guru Lyla Ruby MD;  Location: [facility] OR     EYE SURGERY       IR LYMPH NODE BIOPSY  01/20/2023     IR LYMPH NODE BIOPSY  3/15/2024     SURGICAL HISTORY OF -  Left 08/2015    tissue expander placed in left breast area     SURGICAL HISTORY OF -  Left 02/2016    left breast implant and right mammoplasty         SOCIAL HISTORY:  Social History     Socioeconomic History     Marital status: Single     Spouse name: Not on file     Number of children: 0     Years of education: Not on  file     Highest education level: Not on file   Occupational History     Employer: [employer]     Occupation: REtired   Tobacco Use     Smoking status: Former     Packs/day: 1.00     Years: 17.00     Additional pack years: 0.00     Total pack years: 17.00     Types: Cigarettes     Quit date: 3/15/1999     Years since quittin.0     Passive exposure: Past     Smokeless tobacco: Never   Vaping Use     Vaping Use: Never used   Substance and Sexual Activity     Alcohol use: Not Currently     Alcohol/week: 4.0 standard drinks of alcohol     Types: 4 Glasses of wine per week     Drug use: No     Sexual activity: Not Currently     Birth control/protection: None   Other Topics Concern     Parent/sibling w/ CABG, MI or angioplasty before 65F 55M? Yes     Comment: Father heart attack   Social History Narrative     Not on file     Social Determinants of Health     Financial Resource Strain: Unknown (2023)    Financial Resource Strain      Within the past 12 months, have you or your family members you live with been unable to get utilities (heat, electricity) when it was really needed?: Patient refused   Food Insecurity: Unknown (2023)    Food Insecurity      Within the past 12 months, did you worry that your food would run out before you got money to buy more?: Patient refused      Within the past 12 months, did the food you bought just not last and you didn t have money to get more?: Patient refused   Transportation Needs: Unknown (2023)    Transportation Needs      Within the past 12 months, has lack of transportation kept you from medical appointments, getting your medicines, non-medical meetings or appointments, work, or from getting things that you need?: Patient refused   Physical Activity: Not on file   Stress: Not on file   Social Connections: Not on file   Interpersonal Safety: Low Risk  (10/3/2023)    Interpersonal Safety      Do you feel physically and emotionally safe where you currently live?: Yes     "  Within the past 12 months, have you been hit, slapped, kicked or otherwise physically hurt by someone?: No      Within the past 12 months, have you been humiliated or emotionally abused in other ways by your partner or ex-partner?: No   Housing Stability: Unknown (9/21/2023)    Housing Stability      Do you have housing? : Patient refused      Are you worried about losing your housing?: Patient refused       FAMILY HISTORY:  Family History   Problem Relation Age of Onset     Cancer Mother         bone cancer     Heart Disease Father      Coronary Artery Disease Father      Diabetes Father      Arthritis Sister      Breast Cancer Sister         age 75     Diabetes Maternal Grandmother      Diabetes Maternal Grandfather      Diabetes Paternal Grandmother      Family history of cancer- sister breast cancer- dx at age 75 y/o, localized breast cancer, surgical resection, RT, no adjuvant chemo or endocrine therapy that pt is aware of     PHYSICAL EXAM:  Vital signs:  /68   Pulse 78   Ht 1.626 m (5' 4\")   SpO2 98%   BMI 25.27 kg/m         GENERAL/CONSTITUTIONAL: anxious, tearful  EYES: Pupils are equal and round. Extraocular movements intact without nystagmus.  No scleral icterus.  RESPIRATORY: Equal chest rise.   MUSCULOSKELETAL: Warm and well-perfused, no cyanosis, clubbing, or edema.   NEUROLOGIC: Cranial nerves are grossly intact. Alert, oriented to person, place and time, answers questions appropriately.  LYMPH NODES: 2/9/24: 4cm x4cm circular partially soft and superior hard nodule at lower anterior cervical/supraclavicular area, 3/8/24 hard mass with TTP at superior cervical area, now measuring 8cm tall and 6cm across with overlying patchy erythema. No facial plethora. 3/22 very large, difficult to measure right cervical LN extending to distal neck with some overlying erythema, warm to touch, minimal TTP, no facial plethora  GAIT: Steady, does not use assistive " device      LABS:    PATHOLOGY:      IMAGING:      ASSESSMENT/PLAN:  Virginia Byers is a 73 year old female with:    #Hodgkin's lymphoma, nodular sclerosis subtype   - pt w/rapidly enlarging right cervical LN from 2/24-3/24, initially responsive to abx, then not  3/15/24 IR CT guided right cervical LN core biopsy: PATHOLOGY:  - Classic Hodgkin lymphoma, nodular sclerosis subtype, express CD30, BCL6 (weak), MUM1, and PAX5 (weak) and no significant CD3, CD5, CD10, CD15, CD20, or CD45. Negative for EBV by in situ hybridization; FLOW CYTOMETRY:  CD5 positive kappa-monotypic B cells (6%) express CD5 (dim), CD19, CD20 (dim), and monotypic kappa immunoglobulin light chains (dim) and lack CD10, and CD38, CLL/SLL  - 3/20/24 PET CT NCAP: Liver SUV max = 4.39, Aorta Blood SUV max = 3.29   -  Numerous enlarged hypermetabolic right supraclavicular lymph nodes (SUV max 26.9), increased in size compared to prior CT from 2/9/2024.   - Multiple enlarged FDG avid right axillary lymph nodes (SUV max 22.51), new compared to last PET/CT.  - Similar size of multiple enlarged left iliac chain lymph nodes with slightly decreased tracer uptake (SUV max 15.3, previously 19.97).  - New FDG avid bony lesions:  1. Mixed sclerotic and lytic lesion in the T6 vertebral body, SUV max 6.28  2. Right sternal lesion, SUV max 4.44  - Similar appearance of previously seen FDG avid bony lesions  1. mildly sclerotic lesion in the right iliac bone and acetabulum demonstrates intense tracer uptake (SUV max 14.7, previously 14.0).   2. Sclerotic lesion in the right proximal humerus, SUV max 6.8   3. Right sacral ala, SUV max 4.26 Decreased tracer uptake in the right scapular spine and left distal clavicular lesions.  - I discussed the above with radiology- 3/20/24 and 3/11/24 similar in size, 3/20/24 and 2/9/24 in the neck: posterior to R jugular: 1.6x1.1cm->2.1x1.7cm; supraclavicular region conglomerate mass: prior 1.6x1.7cm->4.5x2.5cm. 5.5cmx4.3cm in  single plane is largest measure for total conglomerate mass. Right axilla 1.1cm, series 4 image 127. No splenomegaly.   - pertinent labs: 3/24 albumin 3.5, CRP 91, , WBC 17.9 (ANC 14.3, ALC 1.9, AMC 1.4), hgb 11.8, plt 474K    PLAN:  - at least stage 2A, unfavorable disease with LN in right neck and axilla, other previously noted LN related to follicular are stable overall.  - IPS 3, at 5 years freedom from progression 60%, overall survival 78%  - STAT PFTs  - STAT echo  - STAT port  - STAT bone marrow biopsy  - check labs: CBC, CMP, ESR, LDH, HIV  - plan to start chemo STAT, discussed the following regimen, schedule, AE (including severity of toxicities being more severe in the elderly, including but not limited to lung toxicity, cardiac toxicity, risk of secondary malignancy), expected outcomes and pt consents to start tx as detailed below.   - Will start w/2 cycles of ABVD and restage with PET, after which would give up to 4 cycles of AVD (plan to limit bleomycin to 2 cycles only) +/- ISRT    REGIMEN:  ABVD q28 days x2 cycles  Doxorubicin 25mg/m2 IVP day 1 and 15  Bleomycin 10 units/m2 IV day 1 and 15  Viblastine 6mg/m2 IV day 1 and 15  Dacarbazine 375mg/m2 day 1 and 15  PLUS dexamethasone 8mg daily on day 2, 3, 4 and 16, 17, 18   PLUS weekly IVF and toxicity check  EMETIC RISK: high  FEBRILE NEUTROPENIA RISK: avoid GCSF while getting bleomycin 2/2 increased risk of pulmonary toxicity     - I have reached out to Dr. Nguyen regarding the above, whom she had seen in 1/24 for second opinion consult  - I have asked our team to expedite all of the above  - repeat echo q3 months  - monitor for tumor lysis   - immunizations w/PCP      # 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 INCLUDING biopsy proven LN and left orbital mass/lacrimal gland involvement and suspected parotid gland involvement   -11/22 MRI lumbar spine in 12/22 MRI pelvis show prominent left iliac lymph nodes (measuring 1.8 x 3.2, previously 1.5 x 2.7 cm),  "right medial thigh lymphadenopathy (1.7cm), right iliac bone enhancing marrow signal, left posterior iliac enhancing lesion  -12/22 CT chest abdomen pelvis shows new or increased left common iliac (1.2cm), external iliac, pelvic sidewall lymphadenopathy (2.1cm) and sclerotic lesions in left sacrum and left posterior ilium. No splenomegaly  - 1/23 brain MRI: Indeterminate ovoid T2 hyperintense enhancing mass in the region of the left lacrimal gland measuring approximately 2.4 cm AP by 1.4 cm transverse by 2.3 cm craniocaudal associated with/replacing the left lacrimal gland. Consider correlation with tissue sampling  - 1/23 CT soft tissue neck: Soft tissue nodule in the superficial left parotid gland measuring 1.2 cm. Additional 0.8 cm nodule in the deep portion of the left parotid gland. Soft tissue nodule in the superficial right parotid gland measuring 0.8 cm. These may represent benign or malignant parotid lesions. Multiplicity of lesions raises concern for Warthin tumors  - 1/27/23 note from Dr. Wilmar Morales- \"Based on this diagnosis, I think the 2 nodules in her parotid gland are likely atypical lymph nodes containing lymphoma.  She will probably require some staging imaging, such as PET scan and we can take a look at that as well.  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 CD5 stains T cells  - Ki-67 proliferative index is approximately 10%, also low in the follicles.  - flow cytometry \"CD10-positive population is consistent with a clonal B cell population and a CD10 " "positive B-cell lymphoma is favored.\"    - 4/6/23 left orbital mass/lacrimal gland excisional biopsy: PATHOLOGY: - Follicular Lymphoma, low grade ([facility] negative for BCL2 rearrangement). 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  The flow cytometry from 1/20/23 right external iliac LN biopsy showed in addition to CD10 positive B-cell population  also a partial/equivocal CD5 positive separate kappa monotypic B-cell population, which most likely represents monoclonal B-cell lymphocytosis of CLL/SLL type, since it was also documented in the peripheral blood flow cytometry on February 16, 2023 (case number [facility] ) at a low frequency.  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  - Cohort 2 which is described as negative for CD5, CD10, ,  and showing dim CD20 and dim kappa staining   Of note the population of monoclonal B-cell lymphocytosis found in peripheral blood in our department and in the lymph node biopsy in January showed partial/equivocal dim CD5 and may correspond to the cohort #2.     -5/23 PET/CT NCAP:   1. An ovoid mass in the left lateral orbit measures 2.5 x 1.2 cm, slightly larger than 01/09/2023, and demonstrates marked uptake (SUVmax 9.3), consistent with biopsy-proven lymphoma  2. A few small FDG avid bilateral intraparotid nodules are present, one on the right and two on the left, including representative left parotid nodule measuring 0.9 x 1.4 cm with moderate uptake (SUVmax 6.7).  3.  Asymmetric " prominent right posterior lower cervical node measures 0.8 cm associated with moderate focal uptake (SUVmax 5.1).  4. A single mildly enlarged left axillary lymph node measures 1.0 cm associated with mild uptake (SUVmax 3.0)  5. Some scattered FDG avid lymph nodes below the diaphragm involve the retroperitoneal retrocaval, bilateral common iliac, left pelvic sidewall and left inguinal regions. Some of these have increased slightly in size since 12/28/2022, such as a conglomerate left pelvic sidewall bj mass measuring approximately 2.2 x 4.1 cm (previously 2.2 x 3.4 cm) associated with marked uptake (SUVmax 7.6).  6. FDG avid sclerotic skeletal lesion involves the right superior acetabulum (SUVmax 12.9) as well as a couple of separate smaller sclerotic lesions in the right iliac bone uptake (SUVmax 12.2).     - 6/23 bone marrow biopsy:  MORPHOLOGY:  - No morphologic or immunophenotypic evidence of follicular lymphoma  - Flow cytometry showing a small population of CD5 positive monotypic B cells; most compatible with monoclonal B lymphocytosis (There is no definitive morphologic correlate for this finding. There is no morphologic or immunophenotypic evidence of involvement by follicular lymphoma.  The CD5-positive B cell clone may best represent monoclonal B lymphocytosis, provided that bj SLL is excluded.)  - Normocellular marrow (cellularity estimated at 30%) with trilineage hematopoietic maturation and no increase in blasts  FLOW CYTOMETRY:  CD5 positive kappa-monotypic B cells (0.8%)  0.8% B cells which express CD5, CD19, CD20 (dim to negative) and monotypic kappa immunoglobulin light chains (dim) and lack CD10 and CD38. The B cells have similar forward scatter relative to background T cells suggestive of similar size; however, precise size determination is deferred to morphology  CYTOGENETICS:  46,XX    -6/19/2023 CT-guided bone biopsy, right acetabulum  PATHOLOGY:  - Atypical lymphoid infiltrate in a  suboptimal biopsy  - No histologic evidence of metastatic carcinoma  - Two small monoclonal B-cell populations detected by flow cytometry: CD5 positive kappa-monotypic B cells ( 2.8 %, CD5 partial, dim, CD19 positive, CD30 dim to negative, CD10 negative); CD10 positive lambda-monotypic B cells ( 0.4 %, CD10 positive, CD19 slightly dim, CD20 positive, CD5 negative)     - option of systemic treatment including rituximab discussed previously on multiple occassions particularly w/left orbital involvement, see my previous notes for details; pt deferred  - 6/14/2023- 6/15/2023 palliative radiation to left orbit follicular lymphoma 4Gy/2fx w/Dr. Julian Horvath    - 12/23 PET CT NCAP w/o contrast Liver SUV max = 3.78, Aorta Blood SUV max = 3.43.  -New/progressed:  - 0.8 x 0.5 cm right FDG avid cervical lymph node; SUV max of 6.5  - 1.3 x 1.5 cm right FDG avid cervical lymph node; SUV max of 5.8  - 1.3 x 1.4 cm right FDG avid cervical lymph node; SUV max of 7.8   -Lesion in left lateral chest wall immediately inferior to the margin of breast implant, 0.5 x 0.4 mm, SUV 5.02, previously 1.4  - Lymph node adjacent to left psoas, 1.5 cm, SUV 19.9, previously 6.02  - Right acetabulum, 2.0 x 1.9 cm, SUV 14.03, previously 12.05  - No new bone lesions but increased metabolic activity previously identified multiple FDG avid osseous foci:  1.  Sternal lesion, SUV 5.7, previously 3.6  2.  Right scapular spine lesion, SUV 4.6, previously 3.6  3.  Right acetabular bone lesion SUV 13.4, previously 11    -Unchanged:  -1 of 2 left parotid gland lesions, 1.6 x 1.1 cm, SUV 8.4, previously 6.6  - Left axillary lymph node, 1.2 x 1.0 cm, SUV 2.3  -Spleen normal    -Resolved/improved:  - Left orbital hypermetabolic lesion-resolved  - 1 of 2 previous left parotid hypermetabolic lesions-resolved  - Multiple mildly hypermetabolic bilateral inguinal nodes, index right inguinal lymph node SUV 2.62, previously 5.19-improved    -12/23 Left pelvic lymph node,  biopsy:  - Recurrent/persistent low grade B-cell lymphoma, most consistent with follicular lymphoma (limited evaluation of architecture),  no evidence of large cell transformation in this biopsy, and no evidence of negro small lymphocytic lymphoma (SLL).  - Negative for metastatic carcinoma    Flow cytometry:   C5 positive kappa-monotypic B cells 2.6% consistent with CLL/SLL vs monoclonal B cell lymphocytosis  CD10 positive B cells negative for surface light chain 29%, consistent w/previously dx CD10+ B cell lymphoma    - 12/23 bone marrow biopsy:  MORPHOLOGY:  - Hypercellular marrow (cellularity estimated at 40%) with trilineage hematopoiesis, no overt dysplasia, and no increase in blasts  - Rare CD5+ kappa-monotypic B cells detected by flow cytometry (1.7%)  - Small lymphoid aggregate suspicious for marrow involvement by CD5+ B-cell lymphoproliferative disorder (<5% of marrow cellularity), most consistent with monoclonal B-cell lymphocytosis.  - No definitive histologic or immunophenotypic evidence of follicular lymphoma and  no morphologic evidence of large cell lymphoma.  FLOW CYTOMETRY:  -CD5-positive kappa-monotypic B cells (1.7%) express CD5 (dim, partial), CD19, CD20 (dim), and monotypic kappa immunoglobulin light chains and lack CD10 and CD38.  suggestive of marrow involvement by monoclonal B-cell lymphocytosis (versus chronic lymphocytic leukemia/small lymphocytic lymphoma; CLL/SLL).   CYTOGENETICS:  45,XX[20]  FISH:  Pending    Staging and prognostication:  - Stage: 4 given right medial thigh LN and left common iliac, external iliac, pelvic sidewall; biopsy proven left orbital mass involvement; there was some concern from ENT that patients parotid lesions were related to pts follicular lymphoma   - FLIPI: score 2 (age>60),  stage 3-4; intermediate risk  - pertinent labs: 12/22  and 5/23 ; 2/23 and 5/23 beta 2 microglobulin 1.7, hepatitis B and C negative    Treatment:  - GELF criteria to  indicate treatment (involvement of 3 or more bj sites each with a diameter greater than or equal to 3 cm, any bj or extranodal tumor mass with a diameter of greater than or equal to 7 cm, B symptoms, splenomegaly, pleural effusions, peritoneal ascites, clinically progressive or significant cytopenias, leukemia, symptoms, threatened end organ function, clinically significant bulky disease, steady or rapid progression of disease)  - option of systemic treatment including rituximab discussed previously on multiple occassions particularly w/left orbital involvement, see my previous notes for details; pt deferred  - 6/14/2023- 6/15/2023 palliative radiation to left orbit follicular lymphoma 4Gy/2fx    PLAN:  - pt has 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 INCLUDING biopsy proven LN and left orbital mass/lacrimal gland involvement   - bone marrow biopsy negative for lymphoma and right acetabulum bone biopsy atypical lymphoid infiltrate seen but biopsy suboptimal and flow cytometry shows 2.8% CD5 positive kappa monotypic B cells consistent w/monoclonal B cell lymphocytosis and 0.4% CD10 positive lambda-monotypic B cells consistent w/lymphoma- ie follicular vs other  - 12/23 PET showed left pelvic LN w/SUV increased from 6->20, biopsied to rule out transformation to aggressive lymphoma. Biopsy showed follicular lymphoma w/aggressive lymphoma or SLL. LN flow cytometry consistent w/follicular lymphoma and MBL. 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   - 1/23 right external iliac LN core needle biopsy- FLOW  CYTOMETRY: 1.3% rare monoclonal B cell population CD5 equivocal, CD23 negative, CD 79b negative, possible MBL; no morphological correlate  -2/23 peripheral blood- FLOW CYTOMETRY: 2.3% kappa monoclonal B cell population, CD5 positive, CD38 negative, CD49d negative, possible MBL  - 4/6/23 left orbital mass/lacrimal gland excisional biopsy: FLOW CYTOMETRY: 15.1% kappa monotypic B cell population, bright positive: CD45, CD19, CD22, moderately positive kappa, CD20, CD79b, dim positive CD23, negative CD 2,3,103, lambda, 5, 10, 200, 38, 43  - 6/23 bone marrow biopsy: FLOW CYTOMETRY: 0.8% kappa monotypic B cell population, positive for CD5, CD19, dim to negative CD20, negative for CD38 and CD10  - 6/23 right acetabulum bone biopsy: FLOW CYTOMETRY: 2.8% kappa monotypic B cell population, CD5 partial dim, CD 19 positive, CD20 dim negative, CD10 negative, possible MBL    - of note pt does not have leukocytosis or lymphocytosis, ALC consistently <5000, no splenomegaly, LN biopsy did not demonstrate IHC consistent with SLL  - 5/23 PET/CT as above  - 12/23 bone marrow biopsy:  MORPHOLOGY:  - Hypercellular marrow (cellularity estimated at 40%) with trilineage hematopoiesis, no overt dysplasia, and no increase in blasts  - Rare CD5+ kappa-monotypic B cells detected by flow cytometry (1.7%)  - Small lymphoid aggregate suspicious for marrow involvement by CD5+ B-cell lymphoproliferative disorder (<5% of marrow cellularity), most consistent with monoclonal B-cell lymphocytosis.  - No definitive histologic or immunophenotypic evidence of follicular lymphoma and  no morphologic evidence of large cell lymphoma.  FLOW CYTOMETRY:  -CD5-positive kappa-monotypic B cells (1.7%) express CD5 (dim, partial), CD19, CD20 (dim), and monotypic kappa immunoglobulin light chains and lack CD10 and CD38.  suggestive of marrow involvement by monoclonal B-cell lymphocytosis (versus chronic lymphocytic leukemia/small lymphocytic lymphoma; CLL/SLL).      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 Bone marrow biopsy negative for involvement of lymphoma 6/23 and shows <5% cellularity involvement of MBL 12/23  - 9/23 ALC 1.6, 12/23 ALC 1.7, 1/24 ALC 1.7  - prioritizing tx of hodgkin's lymphoma as above    #Sclerotic lesions in left sacrum and left posterior ilium  -10/21 mammogram, endoscopy with EUS and colonoscopy WNL  -12/22 CT chest abdomen pelvis shows new or increased left common iliac, external iliac, pelvic sidewall lymphadenopathy and sclerotic lesions in left sacrum and left posterior ilium.  No metastatic disease in chest.  No abnormal findings in breast.  - 12/22 tumor markers: CA 15-3 24,  29, CEA 3.1, CA 19-9 4, AFP 5.9,   - 1/2023 MRI brain w/wo contrast: no metastases   - 1/2023 right external iliac LN core need biopsy- no metastatic carcinoma   - 5/23 PET avid bone lesions  -6/19/2023 CT-guided bone biopsy, right acetabulum  PATHOLOGY:  - Atypical lymphoid infiltrate in a suboptimal biopsy  - No histologic evidence of metastatic carcinoma  - Two small monoclonal B-cell populations detected by flow cytometry: CD5 positive kappa-monotypic B cells ( 2.8 %, CD5 partial, dim, CD19 positive, CD30 dim to negative, CD10 negative); CD10 positive lambda-monotypic B cells ( 0.4 %, CD10 positive, CD19 slightly dim, CD20 positive, CD5 negative)   - 12/23 PET   - Right acetabulum, 2.0 x 1.9 cm, SUV 14.03, previously 12.05  - No new bone lesions but increased metabolic activity previously identified multiple FDG avid osseous foci:  1.  Sternal lesion, SUV 5.7, previously 3.6  2.  Right scapular spine lesion, SUV 4.6, previously 3.6  3.  Right acetabular bone lesion SUV 13.4, previously 11  - 12/23 alk phos 158,  "1/24 alk phos 185, liver 104, bone 81   - 3/24 PET:  New FDG avid bony lesions:  1. Mixed sclerotic and lytic lesion in the T6 vertebral body, SUV max 6.28  2. Right sternal lesion, SUV max 4.44  - Similar appearance of previously seen FDG avid bony lesions  1. mildly sclerotic lesion in the right iliac bone and acetabulum demonstrates intense tracer uptake (SUV max 14.7, previously 14.0).   2. Sclerotic lesion in the right proximal humerus, SUV max 6.8   3. Right sacral ala, SUV max 4.26 Decreased tracer uptake in the right scapular spine and left distal clavicular lesions.    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    -4/2020 left breast simple mastectomy with no residual carcinoma.   -S/p ACx4, taxol x4, no anti-her2 treatment, no RT.   -S/p 5 years adjuvant endocrine therapy (tamoxifen and anastrozole).  -Delayed left breast reconstruction and right breast augmentation.   - 5/23 PET/CT notes single enlarged left axillary lymph node with SUV 3, \"could also represent lymphoma although given patient's history of left breast cancer, a breast cancer metastasis is also a possibility\"  - The above in the setting of known lymphoma and breast tumor markers normal   -9/23 right breast diagnostic mammogram: [facility]  - 9/23 ultrasound left axilla: 1.1 x 0.9 x 1.0 cm lymph node with mildly thickened cortex that correlates with abnormal lymph node on previous PET, recommend tissue diagnosis  - 9/23 ultrasound-guided left axillary lymph node core biopsy: No morphologic evidence of lymphoma.  Lymph node tissue was " received in formalin, this could not be sent for flow cytometry.  No separate fresh tissue was sent for flow cytometry.  - 12/23 PET Left axillary lymph node, 1.2 x 1.0 cm, SUV 2.3, unchanged    PLAN:   -With the limitations of tissue processed in formalin and no fresh tissue available for flow cytometry, appears left axilla lymph node is benign without lymphoma      RTC 2 weeks for follow up with me, labs, start chemo C1D1  RTC 3 weeks for follow up with EDUIN, IVF  RTC 4 weeks for follow up with me, labs, C1D15  RTC 5 weeks for follow up with EDUIN, IVF  RTC 6 weeks for follow up with me, labs, C2D1  RTC 7 weeks for follow up with EDUIN, IVF  RTC 8 weeks for follow up with me, labs, C2D15  RTC 9 weeks for follow up with EDUIN, IVF  RTC 10 weeks for follow up with me, labs (after PET)    Arlene Ahumada DO  Hematology/Oncology  [facility]      Again, thank you for allowing me to participate in the care of your patient.        Sincerely,        ARLENE AHUMADA DO

## 2024-03-22 NOTE — PROGRESS NOTES
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    Patient has medical history including LEFT breast cancer in 2000, hypertension, hyperlipidemia, osteoporosis, osteoarthritis, degenerative disc disease, spinal stenosis of lumbar region,  s/p bilateral L5-S1 transforaminal ROS by Dr. Garcia on 7/9/2021, s/p bilateral L4-5 and L5-S1 facet joint injections on 6/14/2021, glaucoma and macular degeneration, cataracts s/p surgery, history of tobacco use (quit 1999)     Regarding previous history of breast cancer:  She was previously treated by Dr. Sakina Brandt at [facility]      In summary, diagnosed at age 49 with LEFT breast IDC ER positive, LA positive, HER2 positive on FISH. 3/2000 left breast lumpectomy and left axillary node dissection (IDC, grade 3, 1.45 cm incompletely excised tumor, DCIS, positive margins with infiltrating carcinoma, 1 of 17 lymph nodes positive (0.5 cm metastatic focus), ER positive, LA positive, HER2 positive).  4/2020 left breast simple mastectomy with no residual carcinoma. S/p ACx4, taxol x4, no anti-her2 treatment, no RT. S/p 5 years adjuvant endocrine therapy (tamoxifen and anastrozole). Delayed left breast reconstruction and right breast augmentation.      3/2/2000  Excisional biopsy of Left breast mass -   Infiltrating ductal carcinoma with associated DCIS   ER positive (60%), LA positive (22%), HER2 by FISH POSITIVE     3/10/2000 - Left lumpectomy and left axillary node  Invasive ductal carcinoma, Grade 3, 1.45 cm (incompletely excised), negative for LVI  DCIS, greatest histologic dimension of tumor (DCIS PLUS invasive tumor) = 2.9 cm (estimate 50% DCIS)  Positive margins-  infiltrating carcinoma extends to margin  1 of 17 axillary lymph nodes sampled was positive for metastatic focus of 0.5 cm.  ER/PA+     Sections of lumpectomy left breast showing residual 1.1 cm focus of ductal carcinoma in situ (DCIS), present within microns of the linked margin of resection.       4/11/2000 - Left breast simple mastectomy, negative for residual carcinoma  Left mastectomy on 4/11/2000 performed by Dr. Cristian Mcdonald at [facility].      Adjuvant treatment:  - 5/15/2000-7/24/2000 4 cycles of Adriamycin and Cytoxan   - 8/16/2000-10/18/2000 4 cycles of paclitaxel   - Appears she may have been on some kind of trial/protocol, was not given any anti-HER2 targeted treatment  - No adjuvant radiation  - 12/2000- 10/2002 tamoxifen  - 10/2002-5/2006 switched to anastrozole (due to in vitro studies showing anastrozole may be slightly better for patients were HER2 positive) with Fosamax for osteopenia     8/31/2015 Left delayed reconstruction with tissue expander    2/3/2016 Left 2nd stage 450 cc high profile silicone implant; right augmentation 150 cc Moderate classic profile silicone implant; cresent mastopexy    10/18/2021: Screening mammogram right breast ZACKARY    OTHER:  Of note, patient has documentation of anemia (iron deficiency and anemia of chronic disease), elevated LFTs, nausea and vomiting of all solids and liquids, fatigue with 26 pound unintentional weight loss from 7/20/2021 - 10/20/2021 with mildly elevated LFTs which led to evaluation with mammogram, endoscopy with EUS and colonoscopy as detailed below. Pt did associate these with some spinal injections she had gotten, due to overlapping times. Pt did regain all of the weight and hand improvement of anemia within 2 months, without significant intervention.     -10/2021: Endoscopy with EUS normal EGD and no stigmata or source of upper GI bleeding, visualized bile duct, gallbladder, liver, pancreas, left adrenal gland normal.  No  lymphadenopathy. LIVER, RANDOM NEEDLE BIOPSY: Benign parenchyma with congestion; no significant fibrosis or other abnormalities     -10/2021 colonoscopy: Hemorrhoids, diverticulosis in sigmoid colon and splenic flexure    Current history:  -Ongoing right knee pain not improved with brace and physical therapy, s/p bilateral L5-S1 transforaminal ROS by Dr. Garcia on 7/9/2021, s/p bilateral L4-5 and L5-S1 facet joint injections on 6/14/2021. ongoing b/l hip and leg pain, worse with position changes (ie sitting to standing), constant, 8-9/10, tylenol brings pain down to 6/10 (using tylenol bid). ECOG 3.   -Patient has seen neurosurgery Dr. Jose Alexander, occupational medicine specialist Dr.Orrin Murphy, with plan to see PM&R Dr. Margie Agudelo and movement disorder clinic  - 11/22 MRI lumbar spine:    prominent left iliac lymph nodes among other findings related to degenerative changes and neural foraminal stenosis throughout lumbar spine    - 12/22 MRI pelvis:  1. Enhancing marrow signal abnormality of the right iliac bone extending from the superior acetabulum subchondral location proximally almost to the level of the sacroiliac joint caudal aspect. No associated fracture line. Underlying marrow infiltrative process such as from metastasis cannot be excluded especially given the degree of T1 hypointensity and history of primary tumor. Other consideration include stress reaction.  2. Mildly increased left external iliac chain, and the right medial thigh lymphadenopathy. Metastasis cannot be excluded.  3. Left posterior iliac enhancing lesion, similar in size to prior CT 9/2021 and previously not visible on CT, focal enhancing lesion in the right greater trochanter laterally. Findings are concerning for Metastasis.    -12/22 CT chest abdomen pelvis with contrast:  COMPARISON: MRI pelvis 12/20/2022 and CT abdomen pelvis 9/21/2021.  1. Mastectomies with implant reconstructions. No lymphadenopathy  2.  There is an new or  increased left common iliac, external iliac and pelvic sidewall lymphadenopathy since prior CT. Distal left common iliac lymph nodes measure up to 1.2 cm on this exam compared to 0.7 cm on prior CT. Left pelvic sidewall lymph node measures 2.1 cm short axis on this exam compared to 1.2 cm on prior CT . consistent with metastatic disease.  3. Sclerotic lesions in the left sacrum and left posterior ilium are unchanged from prior CT.  may be metastatic.  4. No evidence of metastatic disease in the chest.     -1/2023 MRI brain w/wo contrast:  IMPRESSION:  1. No findings of intracranial metastatic disease.  2. Indeterminate ovoid T2 hyperintense enhancing mass in the region of the  left lacrimal gland measuring approximately 2.4 cm AP by 1.4 cm  transverse by 2.3 cm craniocaudal associated with/replacing the left  lacrimal gland. Consider correlation with tissue sampling.  3. A few indeterminate partially visualized heterogeneous enhancing  Nodules measuring up to approximately 1.5 cm in the bilateral parotid glands.   Consider soft tissue neck CT with contrast for further evaluation.    -1/20/2023: IR US Guided biopsy of a right external iliac LN  Final Diagnosis   A.  LYMPH NODE, RIGHT EXTERNAL ILIAC, CORE NEEDLE BIOPSY AND TOUCH IMPRINT:  -Involved by follicular lymphoma, low-grade (WHO grade 1-2)  -Negative for metastatic carcinoma     The morphologic and immunophenotypic patterns are consistent with follicular lymphoma.  There is no high-grade lymphoma present in this limited specimen.  Concurrent flow study ([id number]) demonstrated CD10-positive lambda monotypic B cells (22%), rare kappa monotypic B cells (1.3%), and no aberrant immunophenotype on T cells.  A FISH study for Bcl-2 is pending and will be reported separately.     The case was initially reviewed by Dr. Cifuentes (breast pathology).     This case was reviewed in part at our Hematopathology Faculty Consensus conference at which Girish Nolasco,  Narciso, and Kayode were present in addition to myself.  This is a small needle core.  In some of the areas, the CD10+ B cells are confined to follicles.  Though the possibility of in situ follicular neoplasia was discussed, we still favor a diagnosis of follicular lymphoma - there are a number of CD10+ B cells outside of follicles at the base of the biopsy, and the clinical history supports a greater extent of involvement.     There was a tiny kappa monotypic B cell population identified by flow (separate from the CD10+ clone).  We looked for the morphologic correlate in this case by IHC, but it is not readily apparent.  This may represent a monoclonal B lymphocytosis - consider sending a peirpheral blood sample for flow cytometry.    FISH:  RESULTS:     NORMAL  - No rearrangement of BCL2     INTERPRETATION:  No evidence was found by FISH of rearrangement of the BCL2 (18q21) locus. These findings are not helpful for confirming or further characterizing the reported pathologic diagnosis of follicular lymphoma.    Flow Interpretation   A. Lymph Node(s), :  -CD10-positive lambda monotypic B cells (22%)  -Rare kappa monotypic B cells (1.3%)  -No aberrant immunophenotype on T cells  See comment   Electronically signed by Krista Headley MD on 1/24/2023 at  3:10 PM   Comment     The CD10-positive population is consistent with a clonal B cell population and a CD10 positive B-cell lymphoma is favored.      In addition, a second clonal B-cell population is identified which has a CLL-like immunophenotype except that CD5 expression is equivocal, this may represent a monoclonal B-cell lymphocytosis (MBL).     Addendum   INTERPRETATION  The diagnosis remains the same (see comment)     REASON FOR ADDENDUM  This addendum is issued to reflect additional testing performed on this case. See Comment.      COMMENT  Additional multi-color flow analysis was performed for the following markers:   CD23, CD79b     The CD5 (dimly) positive  B cells mostly lack CD23 and CD79b        1/23/23 CT neck:  A) Heterogeneous nodule in the right thyroid gland measuring up  to 1.6 cm.   B) Soft tissue nodule in the superficial left parotid  gland measuring 1.2 cm. Additional 0.8 cm nodule in the deep portion  of the left parotid gland. Soft tissue nodule in the superficial right  parotid gland measuring 0.8 cm. These may represent benign or malignant parotid lesions. Multiplicity of lesions raises concern for Warthin tumors. Otolaryngology consultation is  recommended.  C) Prominent soft tissue around the major arteries in the neck  particularly the common and internal carotid arteries. This is  atypical for atherosclerotic disease and vasculopathy/vasculitis such  as giant cell arteritis should be considered. Probable superimposed  atherosclerotic disease at the carotid bifurcations right greater than  left without definite high-grade stenosis.    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  And B-cell cohort #2 which is described as negative for CD5 and showing dim CD20 and dim kappa staining.  This population is listed a CD5 negative and CD10 negative also listed as negative for  and .  Of note the population of monoclonal B-cell lymphocytosis found in peripheral blood in our department and in the lymph node biopsy in January showed partial/equivocal dim CD5 and may correspond to the cohort #2.     We  essentially agree with the diagnosis of follicular lymphoma, which is consistent with prior diagnosis of follicular lymphoma low grade  established on core biopsy of right external iliac lymph node January 20, 2023 (case [id number]) in our Department . The concurrent flow cytometry case [id number] in January showed in addition to CD10 positive B-cell population  also a partial/equivocal CD5 positive separate kappa monotypic B-cell population, which most likely represents monoclonal B-cell lymphocytosis of CLL/SLL type, since it was also documented in the peripheral blood flow cytometry on February 16, 2023 (case number UF ) at a low frequency.  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 Areas with infiltrates of small B cells with coexpression of CD5 and CD23 are seen in the current biopsy, but the size of the biopsy is too small to comprehensively evaluate for the presence of proliferation centers and the extent of involvement by the CD5 positive B cell lymphoproliferative process.   This case has been discussed at intradepartmental hematopathology conference with participation by THELMA Petersen, THELMA Davis and myself.      -5/23 PET/CT NCAP:   1. An ovoid mass in the left lateral orbit measures 2.5 x 1.2 cm, slightly larger than 01/09/2023, and demonstrates marked uptake (SUVmax 9.3), consistent with biopsy-proven lymphoma  2. A few small FDG avid bilateral intraparotid nodules are present, one on the right and two on the left, including representative left parotid nodule measuring 0.9 x 1.4 cm with moderate uptake (SUVmax 6.7).  3.  Asymmetric prominent right posterior lower cervical node measures 0.8 cm associated with moderate focal   uptake (SUVmax 5.1).  4.  A single mildly enlarged left axillary lymph node measures 1.0 cm associated with mild uptake (SUVmax 3.0)  5. Some scattered FDG avid lymph nodes below the diaphragm involve the retroperitoneal retrocaval, bilateral common iliac, left pelvic sidewall and left inguinal regions. Some of these have increased slightly in size since 12/28/2022, such as a conglomerate left pelvic sidewall bj mass measuring approximately 2.2 x 4.1 cm (previously 2.2 x 3.4 cm) associated with marked uptake (SUVmax 7.6).  6. FDG avid sclerotic skeletal lesion involves the right superior acetabulum (SUVmax 12.9) as well as a couple of separate smaller sclerotic lesions in the right iliac bone uptake (SUVmax 12.2).     - 6/14/2023- 6/15/2023 palliative radiation to left orbit follicular lymphoma 4Gy/2fx  - 6/15/2023 bone marrow biopsy  MORPHOLOGY:  - No morphologic or immunophenotypic evidence of follicular lymphoma  - Flow cytometry showing a small population of CD5 positive monotypic B cells; most compatible with monoclonal B lymphocytosis (There is no definitive morphologic correlate for this finding. There is no morphologic or immunophenotypic evidence of involvement by follicular lymphoma.   The CD5-positive B cell clone may best represent monoclonal B lymphocytosis, provided that bj SLL is excluded.)  - Normocellular marrow (cellularity estimated at 30%) with trilineage hematopoietic maturation and no increase in blasts  FLOW CYTOMETRY:  CD5 positive kappa-monotypic B cells (0.8%)  0.8% B cells which express CD5, CD19, CD20 (dim to negative) and monotypic kappa immunoglobulin light chains (dim) and lack CD10 and CD38. The B cells have similar forward scatter relative to background T cells suggestive of similar size; however, precise size determination is deferred to morphology  CYTOGENETICS:  46,XX    -6/19/2023 CT-guided bone biopsy, right acetabulum  PATHOLOGY:  - Atypical lymphoid infiltrate in a suboptimal biopsy  - No  histologic evidence of metastatic carcinoma  - Two small monoclonal B-cell populations detected by flow cytometry  This biopsy is suboptimal for evaluation due to fragmentation, crush artifact, and decalcification. In this limited biopsy, there are fragments of bone and marrow showing a mixed infiltrate that includes both B and T cells. Notably, 2 small clonal B-cell populations were detected by flow cytometry which resemble B-cell populations detected in this patient's past lymph node (1/20/23) and blood and bone marrow (2/16/23, 6/15/23) flow cytometry studies. A definitive morphologic correlate to these cell populations is not appreciated, and the overall features are insufficient for a diagnosis of lymphoma.    FLOW CYTOMETRY:  A. Pelvis, Right:  -CD5 positive kappa-monotypic B cells ( 2.8 %) - see comment A  -CD10 positive lambda-monotypic B cells ( 0.4 %) - see comment B      Electronically signed by Arnaldo Alvarado MD on 6/20/2023 at  1:31 PM   Comment     A) Clonal B cells with the immunophenotype of CLL/SLL are present in this sample.  The differential includes peripheral blood contamination (with an MBL), the tissue based correlate to MBL, versus involvement by SLL.  2.8% B cells which express CD5 (partial, dim), CD19, CD20 (dim to negative) and monotypic kappa immunoglobulin light chains (dim) and lack CD10. The B cells have similar forward scatter relative to background T cells suggestive of similar size; however, precise size determination is deferred to morphology.     B) A CD10-positive B cell clone is also present - involvement by the patient's previously diagnosed CD10+ B cell lymphoma is in the differential (follicular lymphoma versus other (transformation to a higher grade lymphoma). 0.4% B cells which express CD10, CD19 (slightly dim), CD20, and monotypic lambda immunoglobulin light chains and lack CD5.  The B cells have increased forward scatter relative to background T cells suggestive  "of increased size; however, precise size determination is deferred to morphology.        - 7/10/23 Rheumatology consult for ESR and CRP elevated, DEVON positive 1:160 speckled; PCP suspects pt may have PMR; \"I suspect that the bilateral hip OA is altering gait that then results in worsening of low back pain. No further rheumatology workup needed at this time. \"    - 7/11/23 left hip total arthroplasty with severe b/l hip osteoarthritis with Dr. Austin Blood,  cc, discharged with DVT prophylaxis Lovenox 40 mg daily x2 weeks and aspirin 81 mg twice daily x2 weeks    - 10/13/23 right hip total arthroplasty    - 12/23 PET CT NCAP Liver SUV max = 3.78, Aorta Blood SUV max = 3.43.  -New/progressed:  - 0.8 x 0.5 cm right FDG avid cervical lymph node; SUV max of 6.5  - 1.3 x 1.5 cm right FDG avid cervical lymph node; SUV max of 5.8  - 1.3 x 1.4 cm right FDG avid cervical lymph node; SUV max of 7.8   -Lesion in left lateral chest wall immediately inferior to the margin of breast implant, 0.5 x 0.4 mm, SUV 5.02, previously 1.4  - Lymph node adjacent to left psoas, 1.5 cm, SUV 19.9, previously 6.02  - Right acetabulum, 2.0 x 1.9 cm, SUV 14.03, previously 12.05  - No new bone lesions but increased metabolic activity previously identified multiple FDG avid osseous foci:  1.  Sternal lesion, SUV 5.7, previously 3.6  2.  Right scapular spine lesion, SUV 4.6, previously 3.6  3.  Right acetabular bone lesion SUV 13.4, previously 11    -Unchanged:  - 1 of 2 left parotid gland lesions, 1.6 x 1.1 cm, SUV 8.4, previously 6.6  - Left axillary lymph node, 1.2 x 1.0 cm, SUV 2.3  - Spleen normal    -Resolved/improved:  - Left orbital hypermetabolic lesion-resolved  - 1 of 2 previous left parotid hypermetabolic lesions-resolved  - Multiple mildly hypermetabolic bilateral inguinal nodes, index right inguinal lymph node SUV 2.62, previously 5.19-improved    IMPRESSION: In this patient with follicular lymphoma there is evidence  for " progression of disease.  1. There are multiple new hypermetabolic lymph nodes.   2. Increased metabolic activity of multiple previously seen lymph  nodes  3. No new bony lesions identified but there is increased metabolic  activity of the previously identified lesions.  4. Multiple stable and some resolved hypermetabolic lesions; resolved  lesions include the left orbit and left parotid gland lesion.      -12/23 Left pelvic lymph node, biopsy:  - Recurrent/persistent low grade B-cell lymphoma, most consistent with follicular lymphoma  - Negative for metastatic carcinoma    This biopsy is involved by a low grade lymphoma with a staining pattern consistent with follicular lymphoma. The growth pattern of the lymphoma cells appears diffuse, although the biopsy is quite small, limiting assessment of architecture. Concurrent flow cytometry ([id number]) detected a population of CD10-positive B cells negative for surface light chains, similar to the patient's past flow cytometry studies and consistent with recurrent/persistent disease.     In addition, flow cytometry identified a small population of CD5-positive kappa-monotypic B cells (2.6%), similar to the patient's known monoclonal B-cell lymphocytosis (MBL). No definitive morphologic correlate to this finding is appreciated in this biopsy - it may represent peripheral blood contamination versus a tissue-based MBL equivalent.     There is no evidence of large cell transformation in this biopsy, and no evidence of negro small lymphocytic lymphoma (SLL). However, the biopsy is very small - the possibility of unsampled higher grade disease or SLL cannot be excluded. Correlation with imaging studies is recommended.    Left pelvic LN flow cytometry:  A. Pelvis, Left, :  - CD5 positive kappa-monotypic B cells (2.6%)  Clonal B cells with the immunophenotype of CLL/SLL are present in this sample.  The differential includes peripheral blood contamination (with an MBL) versus  involvement by SLL.    - CD10 positive negative for surface light chain B cells (29%) - see comment B  A CD10-positive B cell clone is also present - involvement by the patient's previously diagnosed CD10+ B cell lymphoma is in the differential (follicular lymphoma versus transformation to a higher grade lymphoma).         - 12/23 bone marrow biopsy:  MORPHOLOGY:  - Hypercellular marrow (cellularity estimated at 40%) with trilineage hematopoiesis, no overt dysplasia, and no increase in blasts  - Rare CD5+ kappa-monotypic B cells detected by flow cytometry (1.7%)  - Small lymphoid aggregate suspicious for marrow involvement by CD5+ B-cell lymphoproliferative disorder (<5% of marrow cellularity)  - No definitive histologic or immunophenotypic evidence of follicular lymphoma  Flow cytometry ([id number]) showed rare CD5-positive kappa-monotypic B cells (1.7%). By morphology, a tiny lymphoid aggregate is identified on the trephine core biopsy. This aggregate contains small B cells that appear to have aberrant expression of CD5, and may represent the morphologic correlate to the flow cytometry results. Overall, the findings are suspicious for low level marrow involvement by a CD5+ lymphoproliferative disorder most consistent with monoclonal B-cell lymphocytosis.     There is no definitive evidence of marrow involvement by follicular lymphoma, and no morphologic evidence of large cell lymphoma.  FLOW CYTOMETRY:  -CD5-positive kappa-monotypic B cells (1.7%) express CD5 (dim, partial), CD19, CD20 (dim), and monotypic kappa immunoglobulin light chains and lack CD10 and CD38.   The monotypic B-cell population present in this specimen has a similar immunophenotype as reported previously in this patient's peripheral blood ([id number], 2/16/23). The immunophenotypic findings are suggestive of marrow involvement by monoclonal B-cell lymphocytosis (versus chronic lymphocytic leukemia/small lymphocytic lymphoma; CLL/SLL). There may  be peripheral blood contamination. Large cells may not survive specimen processing. Morphologic correlation is required.   CYTOGENETICS:  46,XX[20]  FISH:  Pending    -1/3/24 second opinion w/Dr. Nguyen     -2/9/24 pt w/right neck lump, x4 days, semi-solid feeling, TTP with deep palpation only     - 2/9/24 CT soft tissue neck-   There are multiple enlarged and heterogeneous right-sided level 5 and 4 lymph nodes with adjacent fat stranding, increased since 12/1/2023. The largest measures up to 1.7 cm. No drainable fluid collection.    -2/12/24 - 2/19/24 keflex x 7 days, pt feels size is smaller, thinks the size fluctuates, no pain, no fever    Pt was out of town 3/1-3/8 taking a road trip to [facility] but cut this trip short because she felt the right neck LN was enlarging x1 week, has since rapidly enlarged and is moving up towards her neck, with associated redness and soreness to palpation at neck, no associated dyspnea, HA, vision changes, dysphagia.     INTERIM HISTORY:  Regarding lymphoma:  Denies weight loss, night sweats, early satiety, RUQ or LUQ pain  Pt has mild fatigue     3/8/24 [facility] ER for STAT CT and biopsy, pt was discharged from ER  3/8/24 CT neck at [facility]:  1.  Interval progression of disease since 02/09/2024 with increased size and number of metastatic adenopathy centered in the right supraclavicular distribution. Surrounding soft tissue induration could be related to lymphatic congestion. Some of the lymph nodes now demonstrate central necrosis, new since 02/09/2024.     3/11/24 [facility] ER for admission for work up including biopsy, pt discharged on prednisone 60mg PO daily x 5 days per d/w Dr. German     3/15/24 IR CT guided right cervical LN core biopsy:  PATHOLOGY:  - Classic Hodgkin lymphoma, nodular sclerosis subtype, express CD30, BCL6 (weak), MUM1, and PAX5 (weak) and no significant CD3, CD5, CD10, CD15, CD20, or CD45   - Negative for EBV by in situ hybridization  There  is no definitive evidence of the patient's previously diagnosed follicular lymphoma in this biopsy. The current lymphoma may represent progression of follicular lymphoma, although it is not possible to determine any relationship between these two processes based on the current findings   FLOW CYTOMETRY:  CD5 positive kappa-monotypic B cells (6%) express CD5 (dim), CD19, CD20 (dim), and monotypic kappa immunoglobulin light chains (dim) and lack CD10, and CD38.   Clonal B cells with the immunophenotype of CLL/SLL are present in this sample.  The differential includes peripheral blood contamination (with an MBL), the tissue based correlate to MBL, versus involvement by SLL.  The other CD10 positive clonal B-cell population found in previous flow ([id number]) is not detected in this flow study.   There is no morphological correlate for this finding on the biopsy - the discrepancy may be the result of peripheral blood contamination of the flow sample or sampling differences; alternatively, the clonal B cell population may be too small to detect by histology.     - 3/20/24 PET CT NCAP: Liver SUV max = 4.39, Aorta Blood SUV max = 3.29   -  Numerous enlarged hypermetabolic right supraclavicular lymph nodes (SUV max 26.9), increased in size compared to prior CT from 2/9/2024.   - Multiple enlarged FDG avid right axillary lymph nodes (SUV max 22.51), new compared to last PET/CT.  - Similar size of multiple enlarged left iliac chain lymph nodes with slightly decreased tracer uptake (SUV max 15.3, previously 19.97).  - New FDG avid bony lesions:  1. Mixed sclerotic and lytic lesion in the T6 vertebral body, SUV max 6.28  2. Right sternal lesion, SUV max 4.44  - Similar appearance of previously seen FDG avid bony lesions  1. mildly sclerotic lesion in the right iliac bone and acetabulum demonstrates intense tracer uptake (SUV max 14.7, previously 14.0).   2. Sclerotic lesion in the right proximal humerus, SUV max 6.8   3. Right  sacral ala, SUV max 4.26 Decreased tracer uptake in the right scapular spine and left distal clavicular lesions.  - I discussed the above with radiology- 3/20/24 and 3/11/24 similar in size, 3/20/24 and 2/9/24 in the neck: posterior to R jugular: 1.6x1.1cm->2.1x1.7cm; supraclavicular region conglomerate mass: prior 1.6x1.7cm->4.5x2.5cm. 5.5cmx4.3cm in single plane is largest measure for total conglomerate mass. Right axilla 1.1cm, series 4 image 127. No splenomegaly.             REVIEW OF SYSTEMS:   A 14 point ROS was reviewed with pertinent positives and negatives in the HPI.       HOME MEDICATIONS:  Current Outpatient Medications   Medication Sig Dispense Refill    amoxicillin-clavulanate (AUGMENTIN) 875-125 MG tablet Take 1 tablet by mouth 2 times daily      dorzolamide-timolol (COSOPT) 2-0.5 % ophthalmic solution Place 1 drop into both eyes 2 times daily      losartan (COZAAR) 100 MG tablet Take 1 tablet (100 mg) by mouth daily 90 tablet 3    Multiple Vitamins-Minerals (PRESERVISION AREDS) TABS Take 1 tablet by mouth 2 times daily      ROCKLATAN 0.02-0.005 % SOLN       rosuvastatin (CRESTOR) 10 MG tablet Take 1 tablet (10 mg) by mouth daily 90 tablet 3    UNABLE TO FIND Place 1 drop into both eyes at bedtime MEDICATION NAME: rocklatan (netarsudil & latanoprost)      latanoprost (XALATAN) 0.005 % ophthalmic solution Place 1 drop into both eyes At Bedtime           ALLERGIES:  Allergies   Allergen Reactions    Compazine      Mental confusion    Hydrochlorothiazide      Dryness mouth    Prochlorperazine Visual Disturbance    Simvastatin Other (See Comments)     Muscle aches         PAST MEDICAL HISTORY:  Past Medical History:   Diagnosis Date    Aftercare following right hip joint replacement surgery 10/25/2023    Breast cancer (H) 2000    Left breast, s/p mastectomy, chemotherapy and endocrine therapy    Follicular lymphoma (H) 01/20/2023    Glaucoma     bilateral - Dr. Moore - [facility]     History of spinal stenosis     Hyperlipidemia     Hypertension     Macular degeneration     Dr. Toscano - [facility]    Osteoarthritis     Osteoporosis     Personal history of chemotherapy 2000    A/C + Taxol    S/P radiation therapy     400 cGy to left orbit completed on 6/15/2023 [facility]         PAST SURGICAL HISTORY:  Past Surgical History:   Procedure Laterality Date    ARTHROPLASTY HIP Left 07/11/2023    Procedure: ARTHROPLASTY, HIP, TOTAL LEFT;  Surgeon: Austin Blood MD;  Location: [facility] OR    ARTHROPLASTY HIP Right 10/13/2023    Procedure: RIGHT ARTHROPLASTY, HIP, TOTAL;  Surgeon: Austin Blood MD;  Location: [facility] Main OR    BIOPSY Left 04/06/2023    Left Orbital Biopsy    C MASTECTOMY,SIMPLE  03/2000    left    COLONOSCOPY  2006    Q 10 years    COLONOSCOPY N/A 10/04/2021    Procedure: COLONOSCOPY;  Surgeon: Guru Lyla Ruby MD;  Location: [facility] OR    ESOPHAGOSCOPY, GASTROSCOPY, DUODENOSCOPY (EGD), COMBINED N/A 10/04/2021    Procedure: ENDOSCOPIC ULTRASOUND, ESOPHAGOSCOPY / UPPER GASTROINTESTINAL TRACT (GI), Esophagogastroduodenoscopy,  Fine Needle Biopsy of liver;  Surgeon: Guru Lyla Ruby MD;  Location: [facility] OR    EYE SURGERY      IR LYMPH NODE BIOPSY  01/20/2023    IR LYMPH NODE BIOPSY  3/15/2024    SURGICAL HISTORY OF -  Left 08/2015    tissue expander placed in left breast area    SURGICAL HISTORY OF -  Left 02/2016    left breast implant and right mammoplasty         SOCIAL HISTORY:  Social History     Socioeconomic History    Marital status: Single     Spouse name: Not on file    Number of children: 0    Years of education: Not on file    Highest education level: Not on file   Occupational History     Employer: [employer]    Occupation: REtired   Tobacco Use    Smoking status: Former     Packs/day: 1.00     Years: 17.00     Additional pack years: 0.00     Total pack years: 17.00     Types: Cigarettes     Quit date:  3/15/1999     Years since quittin.0     Passive exposure: Past    Smokeless tobacco: Never   Vaping Use    Vaping Use: Never used   Substance and Sexual Activity    Alcohol use: Not Currently     Alcohol/week: 4.0 standard drinks of alcohol     Types: 4 Glasses of wine per week    Drug use: No    Sexual activity: Not Currently     Birth control/protection: None   Other Topics Concern    Parent/sibling w/ CABG, MI or angioplasty before 65F 55M? Yes     Comment: Father heart attack   Social History Narrative    Not on file     Social Determinants of Health     Financial Resource Strain: Unknown (2023)    Financial Resource Strain     Within the past 12 months, have you or your family members you live with been unable to get utilities (heat, electricity) when it was really needed?: Patient refused   Food Insecurity: Unknown (2023)    Food Insecurity     Within the past 12 months, did you worry that your food would run out before you got money to buy more?: Patient refused     Within the past 12 months, did the food you bought just not last and you didn t have money to get more?: Patient refused   Transportation Needs: Unknown (2023)    Transportation Needs     Within the past 12 months, has lack of transportation kept you from medical appointments, getting your medicines, non-medical meetings or appointments, work, or from getting things that you need?: Patient refused   Physical Activity: Not on file   Stress: Not on file   Social Connections: Not on file   Interpersonal Safety: Low Risk  (10/3/2023)    Interpersonal Safety     Do you feel physically and emotionally safe where you currently live?: Yes     Within the past 12 months, have you been hit, slapped, kicked or otherwise physically hurt by someone?: No     Within the past 12 months, have you been humiliated or emotionally abused in other ways by your partner or ex-partner?: No   Housing Stability: Unknown (2023)    Housing Stability     " Do you have housing? : Patient refused     Are you worried about losing your housing?: Patient refused       FAMILY HISTORY:  Family History   Problem Relation Age of Onset    Cancer Mother         bone cancer    Heart Disease Father     Coronary Artery Disease Father     Diabetes Father     Arthritis Sister     Breast Cancer Sister         age 75    Diabetes Maternal Grandmother     Diabetes Maternal Grandfather     Diabetes Paternal Grandmother      Family history of cancer- sister breast cancer- dx at age 75 y/o, localized breast cancer, surgical resection, RT, no adjuvant chemo or endocrine therapy that pt is aware of     PHYSICAL EXAM:  Vital signs:  /68   Pulse 78   Ht 1.626 m (5' 4\")   SpO2 98%   BMI 25.27 kg/m         GENERAL/CONSTITUTIONAL: anxious, tearful  EYES: Pupils are equal and round. Extraocular movements intact without nystagmus.  No scleral icterus.  RESPIRATORY: Equal chest rise.   MUSCULOSKELETAL: Warm and well-perfused, no cyanosis, clubbing, or edema.   NEUROLOGIC: Cranial nerves are grossly intact. Alert, oriented to person, place and time, answers questions appropriately.  LYMPH NODES: 2/9/24: 4cm x4cm circular partially soft and superior hard nodule at lower anterior cervical/supraclavicular area, 3/8/24 hard mass with TTP at superior cervical area, now measuring 8cm tall and 6cm across with overlying patchy erythema. No facial plethora. 3/22 very large, difficult to measure right cervical LN extending to distal neck with some overlying erythema, warm to touch, minimal TTP, no facial plethora  GAIT: Steady, does not use assistive device      LABS:    PATHOLOGY:      IMAGING:      ASSESSMENT/PLAN:  Virginia Byers is a 73 year old female with:    #Hodgkin's lymphoma, nodular sclerosis subtype   - pt w/rapidly enlarging right cervical LN from 2/24-3/24, initially responsive to abx, then not  3/15/24 IR CT guided right cervical LN core biopsy: PATHOLOGY:  - Classic Hodgkin " lymphoma, nodular sclerosis subtype, express CD30, BCL6 (weak), MUM1, and PAX5 (weak) and no significant CD3, CD5, CD10, CD15, CD20, or CD45. Negative for EBV by in situ hybridization; FLOW CYTOMETRY:  CD5 positive kappa-monotypic B cells (6%) express CD5 (dim), CD19, CD20 (dim), and monotypic kappa immunoglobulin light chains (dim) and lack CD10, and CD38, CLL/SLL  - 3/20/24 PET CT NCAP: Liver SUV max = 4.39, Aorta Blood SUV max = 3.29   -  Numerous enlarged hypermetabolic right supraclavicular lymph nodes (SUV max 26.9), increased in size compared to prior CT from 2/9/2024.   - Multiple enlarged FDG avid right axillary lymph nodes (SUV max 22.51), new compared to last PET/CT.  - Similar size of multiple enlarged left iliac chain lymph nodes with slightly decreased tracer uptake (SUV max 15.3, previously 19.97).  - New FDG avid bony lesions:  1. Mixed sclerotic and lytic lesion in the T6 vertebral body, SUV max 6.28  2. Right sternal lesion, SUV max 4.44  - Similar appearance of previously seen FDG avid bony lesions  1. mildly sclerotic lesion in the right iliac bone and acetabulum demonstrates intense tracer uptake (SUV max 14.7, previously 14.0).   2. Sclerotic lesion in the right proximal humerus, SUV max 6.8   3. Right sacral ala, SUV max 4.26 Decreased tracer uptake in the right scapular spine and left distal clavicular lesions.  - I discussed the above with radiology- 3/20/24 and 3/11/24 similar in size, 3/20/24 and 2/9/24 in the neck: posterior to R jugular: 1.6x1.1cm->2.1x1.7cm; supraclavicular region conglomerate mass: prior 1.6x1.7cm->4.5x2.5cm. 5.5cmx4.3cm in single plane is largest measure for total conglomerate mass. Right axilla 1.1cm, series 4 image 127. No splenomegaly.   - pertinent labs: 3/24 albumin 3.5, CRP 91, , WBC 17.9 (ANC 14.3, ALC 1.9, AMC 1.4), hgb 11.8, plt 474K    PLAN:  - at least stage 2A, unfavorable disease with LN in right neck and axilla, other previously noted LN related  to follicular are stable overall.  - IPS 3, at 5 years freedom from progression 60%, overall survival 78%  - STAT PFTs  - STAT echo  - STAT port  - STAT bone marrow biopsy  - check labs: CBC, CMP, ESR, LDH, HIV  - plan to start chemo STAT, discussed the following regimen, schedule, AE (including severity of toxicities being more severe in the elderly, including but not limited to lung toxicity, cardiac toxicity, risk of secondary malignancy), expected outcomes and pt consents to start tx as detailed below.   - as long as the above pretesting allows, will start w/2 cycles of ABVD and restage with PET, after which would give up to 4 cycles of AVD (plan to limit bleomycin to 2 cycles only) +/- ISRT    REGIMEN:  ABVD q28 days x2 cycles  Doxorubicin 25mg/m2 IVP day 1 and 15  Bleomycin 10 units/m2 IV day 1 and 15  Viblastine 6mg/m2 IV day 1 and 15  Dacarbazine 375mg/m2 day 1 and 15  PLUS dexamethasone 8mg daily on day 2, 3, 4 and 16, 17, 18   PLUS weekly IVF and toxicity check  EMETIC RISK: high  FEBRILE NEUTROPENIA RISK: avoid GCSF while getting bleomycin 2/2 increased risk of pulmonary toxicity     - I have reached out to Dr. Nguyen regarding the above, whom she had seen in 1/24 for second opinion consult  - I need to calculate how much doxorubicin pt received for breast cancer treatment and then see how much room we have before reaching lifetime max  - I have asked our team to expedite all of the above  - repeat echo q3 months  - monitor for tumor lysis   - immunizations w/PCP      # 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 INCLUDING biopsy proven LN and left orbital mass/lacrimal gland involvement and suspected parotid gland involvement   -11/22 MRI lumbar spine in 12/22 MRI pelvis show prominent left iliac lymph nodes (measuring 1.8 x 3.2, previously 1.5 x 2.7 cm), right medial thigh lymphadenopathy (1.7cm), right iliac bone enhancing marrow signal, left posterior iliac enhancing lesion  -12/22 CT chest abdomen pelvis shows  "new or increased left common iliac (1.2cm), external iliac, pelvic sidewall lymphadenopathy (2.1cm) and sclerotic lesions in left sacrum and left posterior ilium. No splenomegaly  - 1/23 brain MRI: Indeterminate ovoid T2 hyperintense enhancing mass in the region of the left lacrimal gland measuring approximately 2.4 cm AP by 1.4 cm transverse by 2.3 cm craniocaudal associated with/replacing the left lacrimal gland. Consider correlation with tissue sampling  - 1/23 CT soft tissue neck: Soft tissue nodule in the superficial left parotid gland measuring 1.2 cm. Additional 0.8 cm nodule in the deep portion of the left parotid gland. Soft tissue nodule in the superficial right parotid gland measuring 0.8 cm. These may represent benign or malignant parotid lesions. Multiplicity of lesions raises concern for Warthin tumors  - 1/27/23 note from Dr. Wilmar Morales- \"Based on this diagnosis, I think the 2 nodules in her parotid gland are likely atypical lymph nodes containing lymphoma.  She will probably require some staging imaging, such as PET scan and we can take a look at that as well.  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 CD5 stains T cells  - Ki-67 proliferative index is approximately 10%, also low in the follicles.  - flow cytometry \"CD10-positive population is consistent with a clonal B cell population and a CD10 positive B-cell lymphoma is favored.\"    - 4/6/23 left orbital mass/lacrimal gland excisional biopsy: PATHOLOGY: - Follicular Lymphoma, low grade ([facility] negative for " BCL2 rearrangement). 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  The flow cytometry from 1/20/23 right external iliac LN biopsy showed in addition to CD10 positive B-cell population  also a partial/equivocal CD5 positive separate kappa monotypic B-cell population, which most likely represents monoclonal B-cell lymphocytosis of CLL/SLL type, since it was also documented in the peripheral blood flow cytometry on February 16, 2023 (case number [facility] ) at a low frequency.  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  - Cohort 2 which is described as negative for CD5, CD10, ,  and showing dim CD20 and dim kappa staining   Of note the population of monoclonal B-cell lymphocytosis found in peripheral blood in our department and in the lymph node biopsy in January showed partial/equivocal dim CD5 and may correspond to the cohort #2.     -5/23 PET/CT NCAP:   1. An ovoid mass in the left lateral orbit measures 2.5 x 1.2 cm, slightly larger than 01/09/2023, and demonstrates marked uptake (SUVmax 9.3), consistent with biopsy-proven lymphoma  2. A few small FDG avid bilateral intraparotid nodules are present, one on the right and two on the left, including representative left parotid nodule measuring 0.9 x 1.4 cm with moderate uptake (SUVmax 6.7).  3.  Asymmetric prominent right posterior lower cervical node measures 0.8 cm associated with moderate focal uptake (SUVmax 5.1).  4. A single mildly enlarged left axillary lymph node  measures 1.0 cm associated with mild uptake (SUVmax 3.0)  5. Some scattered FDG avid lymph nodes below the diaphragm involve the retroperitoneal retrocaval, bilateral common iliac, left pelvic sidewall and left inguinal regions. Some of these have increased slightly in size since 12/28/2022, such as a conglomerate left pelvic sidewall bj mass measuring approximately 2.2 x 4.1 cm (previously 2.2 x 3.4 cm) associated with marked uptake (SUVmax 7.6).  6. FDG avid sclerotic skeletal lesion involves the right superior acetabulum (SUVmax 12.9) as well as a couple of separate smaller sclerotic lesions in the right iliac bone uptake (SUVmax 12.2).     - 6/23 bone marrow biopsy:  MORPHOLOGY:  - No morphologic or immunophenotypic evidence of follicular lymphoma  - Flow cytometry showing a small population of CD5 positive monotypic B cells; most compatible with monoclonal B lymphocytosis (There is no definitive morphologic correlate for this finding. There is no morphologic or immunophenotypic evidence of involvement by follicular lymphoma.  The CD5-positive B cell clone may best represent monoclonal B lymphocytosis, provided that jb SLL is excluded.)  - Normocellular marrow (cellularity estimated at 30%) with trilineage hematopoietic maturation and no increase in blasts  FLOW CYTOMETRY:  CD5 positive kappa-monotypic B cells (0.8%)  0.8% B cells which express CD5, CD19, CD20 (dim to negative) and monotypic kappa immunoglobulin light chains (dim) and lack CD10 and CD38. The B cells have similar forward scatter relative to background T cells suggestive of similar size; however, precise size determination is deferred to morphology  CYTOGENETICS:  46,XX    -6/19/2023 CT-guided bone biopsy, right acetabulum  PATHOLOGY:  - Atypical lymphoid infiltrate in a suboptimal biopsy  - No histologic evidence of metastatic carcinoma  - Two small monoclonal B-cell populations detected by flow cytometry: CD5 positive kappa-monotypic B  cells ( 2.8 %, CD5 partial, dim, CD19 positive, CD30 dim to negative, CD10 negative); CD10 positive lambda-monotypic B cells ( 0.4 %, CD10 positive, CD19 slightly dim, CD20 positive, CD5 negative)     - option of systemic treatment including rituximab discussed previously on multiple occassions particularly w/left orbital involvement, see my previous notes for details; pt deferred  - 6/14/2023- 6/15/2023 palliative radiation to left orbit follicular lymphoma 4Gy/2fx w/Dr. Julian Horvath    - 12/23 PET CT NCAP w/o contrast Liver SUV max = 3.78, Aorta Blood SUV max = 3.43.  -New/progressed:  - 0.8 x 0.5 cm right FDG avid cervical lymph node; SUV max of 6.5  - 1.3 x 1.5 cm right FDG avid cervical lymph node; SUV max of 5.8  - 1.3 x 1.4 cm right FDG avid cervical lymph node; SUV max of 7.8   -Lesion in left lateral chest wall immediately inferior to the margin of breast implant, 0.5 x 0.4 mm, SUV 5.02, previously 1.4  - Lymph node adjacent to left psoas, 1.5 cm, SUV 19.9, previously 6.02  - Right acetabulum, 2.0 x 1.9 cm, SUV 14.03, previously 12.05  - No new bone lesions but increased metabolic activity previously identified multiple FDG avid osseous foci:  1.  Sternal lesion, SUV 5.7, previously 3.6  2.  Right scapular spine lesion, SUV 4.6, previously 3.6  3.  Right acetabular bone lesion SUV 13.4, previously 11    -Unchanged:  -1 of 2 left parotid gland lesions, 1.6 x 1.1 cm, SUV 8.4, previously 6.6  - Left axillary lymph node, 1.2 x 1.0 cm, SUV 2.3  -Spleen normal    -Resolved/improved:  - Left orbital hypermetabolic lesion-resolved  - 1 of 2 previous left parotid hypermetabolic lesions-resolved  - Multiple mildly hypermetabolic bilateral inguinal nodes, index right inguinal lymph node SUV 2.62, previously 5.19-improved    -12/23 Left pelvic lymph node, biopsy:  - Recurrent/persistent low grade B-cell lymphoma, most consistent with follicular lymphoma (limited evaluation of architecture),  no evidence of large cell  transformation in this biopsy, and no evidence of negro small lymphocytic lymphoma (SLL).  - Negative for metastatic carcinoma    Flow cytometry:   C5 positive kappa-monotypic B cells 2.6% consistent with CLL/SLL vs monoclonal B cell lymphocytosis  CD10 positive B cells negative for surface light chain 29%, consistent w/previously dx CD10+ B cell lymphoma    - 12/23 bone marrow biopsy:  MORPHOLOGY:  - Hypercellular marrow (cellularity estimated at 40%) with trilineage hematopoiesis, no overt dysplasia, and no increase in blasts  - Rare CD5+ kappa-monotypic B cells detected by flow cytometry (1.7%)  - Small lymphoid aggregate suspicious for marrow involvement by CD5+ B-cell lymphoproliferative disorder (<5% of marrow cellularity), most consistent with monoclonal B-cell lymphocytosis.  - No definitive histologic or immunophenotypic evidence of follicular lymphoma and  no morphologic evidence of large cell lymphoma.  FLOW CYTOMETRY:  -CD5-positive kappa-monotypic B cells (1.7%) express CD5 (dim, partial), CD19, CD20 (dim), and monotypic kappa immunoglobulin light chains and lack CD10 and CD38.  suggestive of marrow involvement by monoclonal B-cell lymphocytosis (versus chronic lymphocytic leukemia/small lymphocytic lymphoma; CLL/SLL).   CYTOGENETICS:  45,XX[20]  FISH:  Pending    Staging and prognostication:  - Stage: 4 given right medial thigh LN and left common iliac, external iliac, pelvic sidewall; biopsy proven left orbital mass involvement; there was some concern from ENT that patients parotid lesions were related to pts follicular lymphoma   - FLIPI: score 2 (age>60),  stage 3-4; intermediate risk  - pertinent labs: 12/22  and 5/23 ; 2/23 and 5/23 beta 2 microglobulin 1.7, hepatitis B and C negative    Treatment:  - GELF criteria to indicate treatment (involvement of 3 or more bj sites each with a diameter greater than or equal to 3 cm, any bj or extranodal tumor mass with a diameter of  greater than or equal to 7 cm, B symptoms, splenomegaly, pleural effusions, peritoneal ascites, clinically progressive or significant cytopenias, leukemia, symptoms, threatened end organ function, clinically significant bulky disease, steady or rapid progression of disease)  - option of systemic treatment including rituximab discussed previously on multiple occassions particularly w/left orbital involvement, see my previous notes for details; pt deferred  - 6/14/2023- 6/15/2023 palliative radiation to left orbit follicular lymphoma 4Gy/2fx    PLAN:  - pt has 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 INCLUDING biopsy proven LN and left orbital mass/lacrimal gland involvement   - bone marrow biopsy negative for lymphoma and right acetabulum bone biopsy atypical lymphoid infiltrate seen but biopsy suboptimal and flow cytometry shows 2.8% CD5 positive kappa monotypic B cells consistent w/monoclonal B cell lymphocytosis and 0.4% CD10 positive lambda-monotypic B cells consistent w/lymphoma- ie follicular vs other  - 12/23 PET showed left pelvic LN w/SUV increased from 6->20, biopsied to rule out transformation to aggressive lymphoma. Biopsy showed follicular lymphoma w/aggressive lymphoma or SLL. LN flow cytometry consistent w/follicular lymphoma and MBL. 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   - 1/23 right external iliac LN core needle biopsy- FLOW CYTOMETRY: 1.3% rare monoclonal B cell population CD5 equivocal, CD23 negative, CD 79b negative, possible MBL; no morphological correlate  -2/23 peripheral blood-  FLOW CYTOMETRY: 2.3% kappa monoclonal B cell population, CD5 positive, CD38 negative, CD49d negative, possible MBL  - 4/6/23 left orbital mass/lacrimal gland excisional biopsy: FLOW CYTOMETRY: 15.1% kappa monotypic B cell population, bright positive: CD45, CD19, CD22, moderately positive kappa, CD20, CD79b, dim positive CD23, negative CD 2,3,103, lambda, 5, 10, 200, 38, 43  - 6/23 bone marrow biopsy: FLOW CYTOMETRY: 0.8% kappa monotypic B cell population, positive for CD5, CD19, dim to negative CD20, negative for CD38 and CD10  - 6/23 right acetabulum bone biopsy: FLOW CYTOMETRY: 2.8% kappa monotypic B cell population, CD5 partial dim, CD 19 positive, CD20 dim negative, CD10 negative, possible MBL    - of note pt does not have leukocytosis or lymphocytosis, ALC consistently <5000, no splenomegaly, LN biopsy did not demonstrate IHC consistent with SLL  - 5/23 PET/CT as above  - 12/23 bone marrow biopsy:  MORPHOLOGY:  - Hypercellular marrow (cellularity estimated at 40%) with trilineage hematopoiesis, no overt dysplasia, and no increase in blasts  - Rare CD5+ kappa-monotypic B cells detected by flow cytometry (1.7%)  - Small lymphoid aggregate suspicious for marrow involvement by CD5+ B-cell lymphoproliferative disorder (<5% of marrow cellularity), most consistent with monoclonal B-cell lymphocytosis.  - No definitive histologic or immunophenotypic evidence of follicular lymphoma and  no morphologic evidence of large cell lymphoma.  FLOW CYTOMETRY:  -CD5-positive kappa-monotypic B cells (1.7%) express CD5 (dim, partial), CD19, CD20 (dim), and monotypic kappa immunoglobulin light chains and lack CD10 and CD38.  suggestive of marrow involvement by monoclonal B-cell lymphocytosis (versus chronic lymphocytic leukemia/small lymphocytic lymphoma; CLL/SLL).     PLAN:  - pt likely has monoclonal B cell lymphocytosis, though cannot definitively rule out presence of SLL as pt  has multiple lymph nodes, though at least the  external iliac and lacrimal gland regions and left pelvic LN were biopsied and consistent with low grade follicular lymphoma. Bone marrow biopsy negative for involvement of lymphoma 6/23 and shows <5% cellularity involvement of MBL 12/23  - 9/23 ALC 1.6, 12/23 ALC 1.7, 1/24 ALC 1.7  - prioritizing tx of hodgkin's lymphoma as above    #Sclerotic lesions in left sacrum and left posterior ilium  -10/21 mammogram, endoscopy with EUS and colonoscopy WNL  -12/22 CT chest abdomen pelvis shows new or increased left common iliac, external iliac, pelvic sidewall lymphadenopathy and sclerotic lesions in left sacrum and left posterior ilium.  No metastatic disease in chest.  No abnormal findings in breast.  - 12/22 tumor markers: CA 15-3 24,  29, CEA 3.1, CA 19-9 4, AFP 5.9,   - 1/2023 MRI brain w/wo contrast: no metastases   - 1/2023 right external iliac LN core need biopsy- no metastatic carcinoma   - 5/23 PET avid bone lesions  -6/19/2023 CT-guided bone biopsy, right acetabulum  PATHOLOGY:  - Atypical lymphoid infiltrate in a suboptimal biopsy  - No histologic evidence of metastatic carcinoma  - Two small monoclonal B-cell populations detected by flow cytometry: CD5 positive kappa-monotypic B cells ( 2.8 %, CD5 partial, dim, CD19 positive, CD30 dim to negative, CD10 negative); CD10 positive lambda-monotypic B cells ( 0.4 %, CD10 positive, CD19 slightly dim, CD20 positive, CD5 negative)   - 12/23 PET   - Right acetabulum, 2.0 x 1.9 cm, SUV 14.03, previously 12.05  - No new bone lesions but increased metabolic activity previously identified multiple FDG avid osseous foci:  1.  Sternal lesion, SUV 5.7, previously 3.6  2.  Right scapular spine lesion, SUV 4.6, previously 3.6  3.  Right acetabular bone lesion SUV 13.4, previously 11  - 12/23 alk phos 158, 1/24 alk phos 185, liver 104, bone 81   - 3/24 PET:  New FDG avid bony lesions:  1. Mixed sclerotic and lytic lesion in the T6 vertebral body, SUV max 6.28  2.  "Right sternal lesion, SUV max 4.44  - Similar appearance of previously seen FDG avid bony lesions  1. mildly sclerotic lesion in the right iliac bone and acetabulum demonstrates intense tracer uptake (SUV max 14.7, previously 14.0).   2. Sclerotic lesion in the right proximal humerus, SUV max 6.8   3. Right sacral ala, SUV max 4.26 Decreased tracer uptake in the right scapular spine and left distal clavicular lesions.    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    -4/2020 left breast simple mastectomy with no residual carcinoma.   -S/p ACx4, taxol x4, no anti-her2 treatment, no RT.   -S/p 5 years adjuvant endocrine therapy (tamoxifen and anastrozole).  -Delayed left breast reconstruction and right breast augmentation.   - 5/23 PET/CT notes single enlarged left axillary lymph node with SUV 3, \"could also represent lymphoma although given patient's history of left breast cancer, a breast cancer metastasis is also a possibility\"  - The above in the setting of known lymphoma and breast tumor markers normal   -9/23 right breast diagnostic mammogram: [facility]  - 9/23 ultrasound left axilla: 1.1 x 0.9 x 1.0 cm lymph node with mildly thickened cortex that correlates with abnormal lymph node on previous PET, recommend tissue diagnosis  - 9/23 ultrasound-guided left axillary lymph node core biopsy: No morphologic evidence of lymphoma.  Lymph node tissue was received in formalin, this could not be sent for flow cytometry.  No separate fresh tissue was sent for flow cytometry.  - 12/23 PET Left axillary lymph node, 1.2 x " 1.0 cm, SUV 2.3, unchanged    PLAN:   -With the limitations of tissue processed in formalin and no fresh tissue available for flow cytometry, appears left axilla lymph node is benign without lymphoma      RTC 2 weeks for follow up with me, labs, start chemo C1D1  RTC 3 weeks for follow up with EDUIN, IVF  RTC 4 weeks for follow up with me, labs, C1D15  RTC 5 weeks for follow up with EDUIN, IVF  RTC 6 weeks for follow up with me, labs, C2D1  RTC 7 weeks for follow up with EDUIN, IVF  RTC 8 weeks for follow up with me, labs, C2D15  RTC 9 weeks for follow up with EDUIN, IVF  RTC 10 weeks for follow up with me, labs (after PET)      ADDENDUM #1:  I discussed this case with the pts lymphoma specialist at the [facility]- Dr. Nguyen- suggested using BV instead of bleomycin given age. I asked our team to set up a video visit to discuss these new recs and discuss BV.     ADDENDUM #2  Pt called to cancel all appts  She has transferred care to [facility]    Naomy Zaidi, DO  Hematology/Oncology  [facility]

## 2024-03-22 NOTE — NURSING NOTE
"Oncology Rooming Note    March 22, 2024 3:05 PM   Virginia Byers is a 73 year old female who presents for:    Chief Complaint   Patient presents with    Oncology Clinic Visit     Initial Vitals: /68   Pulse 78   Ht 1.626 m (5' 4\")   SpO2 98%   BMI 25.27 kg/m   Estimated body mass index is 25.27 kg/m  as calculated from the following:    Height as of this encounter: 1.626 m (5' 4\").    Weight as of 3/8/24: 66.8 kg (147 lb 3.2 oz). Body surface area is 1.74 meters squared.  Severe Pain (7) Comment: Data Unavailable   No LMP recorded. Patient is postmenopausal.  Allergies reviewed: Yes  Medications reviewed: Yes    Medications: Medication refills not needed today.  Pharmacy name entered into [employer]: [id number] - [place], [place] - [address] [address],     Frailty Screening:   Is the patient here for a new oncology consult visit in cancer care? 2. No      Clinical concerns: Results       Clayton Payton MA            "

## 2024-03-25 ENCOUNTER — PATIENT OUTREACH (OUTPATIENT)
Dept: ONCOLOGY | Facility: CLINIC | Age: 74
End: 2024-03-25
Payer: MEDICARE

## 2024-03-25 PROBLEM — C81.11 NODULAR SCLEROSIS HODGKIN LYMPHOMA OF LYMPH NODES OF NECK (H): Status: ACTIVE | Noted: 2024-03-25

## 2024-03-25 RX ORDER — EPINEPHRINE 1 MG/ML
0.3 INJECTION, SOLUTION INTRAMUSCULAR; SUBCUTANEOUS EVERY 5 MIN PRN
OUTPATIENT
Start: 2024-04-04

## 2024-03-25 RX ORDER — ALBUTEROL SULFATE 0.83 MG/ML
2.5 SOLUTION RESPIRATORY (INHALATION)
OUTPATIENT
Start: 2024-04-04

## 2024-03-25 RX ORDER — PALONOSETRON 0.05 MG/ML
0.25 INJECTION, SOLUTION INTRAVENOUS ONCE
OUTPATIENT
Start: 2024-04-18

## 2024-03-25 RX ORDER — METHYLPREDNISOLONE SODIUM SUCCINATE 125 MG/2ML
125 INJECTION, POWDER, LYOPHILIZED, FOR SOLUTION INTRAMUSCULAR; INTRAVENOUS
Start: 2024-04-18

## 2024-03-25 RX ORDER — MEPERIDINE HYDROCHLORIDE 25 MG/ML
25 INJECTION INTRAMUSCULAR; INTRAVENOUS; SUBCUTANEOUS EVERY 30 MIN PRN
OUTPATIENT
Start: 2024-04-18

## 2024-03-25 RX ORDER — ALBUTEROL SULFATE 90 UG/1
1-2 AEROSOL, METERED RESPIRATORY (INHALATION)
Start: 2024-04-18

## 2024-03-25 RX ORDER — ALBUTEROL SULFATE 90 UG/1
1-2 AEROSOL, METERED RESPIRATORY (INHALATION)
Start: 2024-04-04

## 2024-03-25 RX ORDER — EPINEPHRINE 1 MG/ML
0.3 INJECTION, SOLUTION INTRAMUSCULAR; SUBCUTANEOUS EVERY 5 MIN PRN
OUTPATIENT
Start: 2024-04-18

## 2024-03-25 RX ORDER — DIPHENHYDRAMINE HYDROCHLORIDE 50 MG/ML
50 INJECTION INTRAMUSCULAR; INTRAVENOUS
Start: 2024-04-18

## 2024-03-25 RX ORDER — HEPARIN SODIUM,PORCINE 10 UNIT/ML
5-20 VIAL (ML) INTRAVENOUS DAILY PRN
OUTPATIENT
Start: 2024-04-18

## 2024-03-25 RX ORDER — HEPARIN SODIUM,PORCINE 10 UNIT/ML
5-20 VIAL (ML) INTRAVENOUS DAILY PRN
OUTPATIENT
Start: 2024-04-04

## 2024-03-25 RX ORDER — DOXORUBICIN HYDROCHLORIDE 2 MG/ML
25 INJECTION, SOLUTION INTRAVENOUS ONCE
OUTPATIENT
Start: 2024-04-18

## 2024-03-25 RX ORDER — HEPARIN SODIUM (PORCINE) LOCK FLUSH IV SOLN 100 UNIT/ML 100 UNIT/ML
5 SOLUTION INTRAVENOUS
OUTPATIENT
Start: 2024-04-18

## 2024-03-25 RX ORDER — ALBUTEROL SULFATE 0.83 MG/ML
2.5 SOLUTION RESPIRATORY (INHALATION)
OUTPATIENT
Start: 2024-04-18

## 2024-03-25 RX ORDER — DIPHENHYDRAMINE HYDROCHLORIDE 50 MG/ML
50 INJECTION INTRAMUSCULAR; INTRAVENOUS
Start: 2024-04-04

## 2024-03-25 RX ORDER — MEPERIDINE HYDROCHLORIDE 25 MG/ML
25 INJECTION INTRAMUSCULAR; INTRAVENOUS; SUBCUTANEOUS EVERY 30 MIN PRN
OUTPATIENT
Start: 2024-04-04

## 2024-03-25 RX ORDER — PALONOSETRON 0.05 MG/ML
0.25 INJECTION, SOLUTION INTRAVENOUS ONCE
OUTPATIENT
Start: 2024-04-04

## 2024-03-25 RX ORDER — HEPARIN SODIUM (PORCINE) LOCK FLUSH IV SOLN 100 UNIT/ML 100 UNIT/ML
5 SOLUTION INTRAVENOUS
OUTPATIENT
Start: 2024-04-04

## 2024-03-25 RX ORDER — DOXORUBICIN HYDROCHLORIDE 2 MG/ML
25 INJECTION, SOLUTION INTRAVENOUS ONCE
OUTPATIENT
Start: 2024-04-04

## 2024-03-25 RX ORDER — METHYLPREDNISOLONE SODIUM SUCCINATE 125 MG/2ML
125 INJECTION, POWDER, LYOPHILIZED, FOR SOLUTION INTRAMUSCULAR; INTRAVENOUS
Start: 2024-04-04

## 2024-03-25 NOTE — PROGRESS NOTES
Port order faxed to [facility] at [phone].  Transmission confirmed.    Earnestine Tapia RN

## 2024-03-27 ENCOUNTER — TELEPHONE (OUTPATIENT)
Dept: ONCOLOGY | Facility: CLINIC | Age: 74
End: 2024-03-27
Payer: MEDICARE

## 2024-03-27 NOTE — TELEPHONE ENCOUNTER
Per order on March 25th from Dr Zaidi I scheduled a PFT lab, Bone Marrow Biopsy, Port Placement, Echo and First Chemo. I spoke to the patient on March 26, 2024 and the patient requested I cancel all the appointments she does not want them she stated she is going to die anyway. I spoke with Cynthia the Nurse Coordinator about the situation and she requested I send a message to Dr Zaidi. I sent a message to Dr Zaidi and she wanted me to check with the patient to do a video visit to discuss plans. I called the patient 2 times and left messages and the patient did not return any of my calls. I notified Dr Zaidi and Cynthia that the patient has not returned any of my calls. I received a message from Cynthia to cancel all the appointments the patient is transferring to the [facility].-cap-03/27/2024

## 2024-04-18 ENCOUNTER — LAB (OUTPATIENT)
Dept: LAB | Facility: CLINIC | Age: 74
End: 2024-04-18
Payer: MEDICARE

## 2024-04-18 DIAGNOSIS — Z79.899 NEED FOR PROPHYLACTIC CHEMOTHERAPY: ICD-10-CM

## 2024-04-18 DIAGNOSIS — C81.11 HODGKIN'S DISEASE, NODULAR SCLEROSIS, OF LYMPH NODES OF HEAD, FACE, AND NECK (H): Primary | ICD-10-CM

## 2024-04-18 DIAGNOSIS — C81.11 HODGKIN'S DISEASE, NODULAR SCLEROSIS, OF LYMPH NODES OF HEAD, FACE, AND NECK (H): ICD-10-CM

## 2024-04-18 LAB
BASOPHILS # BLD AUTO: 0 10E3/UL (ref 0–0.2)
BASOPHILS NFR BLD AUTO: 0 %
EOSINOPHIL # BLD AUTO: 0.2 10E3/UL (ref 0–0.7)
EOSINOPHIL NFR BLD AUTO: 1 %
ERYTHROCYTE [DISTWIDTH] IN BLOOD BY AUTOMATED COUNT: 16.8 % (ref 10–15)
HCT VFR BLD AUTO: 35.9 % (ref 35–47)
HGB BLD-MCNC: 11.4 G/DL (ref 11.7–15.7)
IMM GRANULOCYTES # BLD: 0 10E3/UL
IMM GRANULOCYTES NFR BLD: 0 %
LYMPHOCYTES # BLD AUTO: 1.4 10E3/UL (ref 0.8–5.3)
LYMPHOCYTES NFR BLD AUTO: 11 %
MCH RBC QN AUTO: 26.5 PG (ref 26.5–33)
MCHC RBC AUTO-ENTMCNC: 31.8 G/DL (ref 31.5–36.5)
MCV RBC AUTO: 83 FL (ref 78–100)
MONOCYTES # BLD AUTO: 1.1 10E3/UL (ref 0–1.3)
MONOCYTES NFR BLD AUTO: 9 %
NEUTROPHILS # BLD AUTO: 9.3 10E3/UL (ref 1.6–8.3)
NEUTROPHILS NFR BLD AUTO: 78 %
PLATELET # BLD AUTO: 296 10E3/UL (ref 150–450)
RBC # BLD AUTO: 4.31 10E6/UL (ref 3.8–5.2)
WBC # BLD AUTO: 12 10E3/UL (ref 4–11)

## 2024-04-18 PROCEDURE — 85025 COMPLETE CBC W/AUTO DIFF WBC: CPT

## 2024-04-18 PROCEDURE — 36415 COLL VENOUS BLD VENIPUNCTURE: CPT

## 2024-04-22 DIAGNOSIS — C81.10 NODULAR SCLEROSIS CLASSICAL HODGKIN LYMPHOMA (H): Primary | ICD-10-CM

## 2024-04-22 DIAGNOSIS — Z79.899 OTHER LONG TERM (CURRENT) DRUG THERAPY: ICD-10-CM

## 2024-04-25 ENCOUNTER — LAB (OUTPATIENT)
Dept: LAB | Facility: CLINIC | Age: 74
End: 2024-04-25
Payer: MEDICARE

## 2024-04-25 DIAGNOSIS — C81.11 HODGKIN'S DISEASE, NODULAR SCLEROSIS, OF LYMPH NODES OF HEAD, FACE, AND NECK (H): ICD-10-CM

## 2024-04-25 DIAGNOSIS — E78.5 HYPERLIPIDEMIA LDL GOAL <160: Primary | ICD-10-CM

## 2024-04-25 DIAGNOSIS — Z79.899 NEED FOR PROPHYLACTIC CHEMOTHERAPY: ICD-10-CM

## 2024-04-25 LAB
BASOPHILS # BLD AUTO: 0.1 10E3/UL (ref 0–0.2)
BASOPHILS NFR BLD AUTO: 1 %
EOSINOPHIL # BLD AUTO: 0.1 10E3/UL (ref 0–0.7)
EOSINOPHIL NFR BLD AUTO: 1 %
ERYTHROCYTE [DISTWIDTH] IN BLOOD BY AUTOMATED COUNT: 17.5 % (ref 10–15)
HCT VFR BLD AUTO: 35.1 % (ref 35–47)
HGB BLD-MCNC: 11 G/DL (ref 11.7–15.7)
IMM GRANULOCYTES # BLD: 0 10E3/UL
IMM GRANULOCYTES NFR BLD: 0 %
LYMPHOCYTES # BLD AUTO: 1.4 10E3/UL (ref 0.8–5.3)
LYMPHOCYTES NFR BLD AUTO: 13 %
MCH RBC QN AUTO: 26.6 PG (ref 26.5–33)
MCHC RBC AUTO-ENTMCNC: 31.3 G/DL (ref 31.5–36.5)
MCV RBC AUTO: 85 FL (ref 78–100)
MONOCYTES # BLD AUTO: 1 10E3/UL (ref 0–1.3)
MONOCYTES NFR BLD AUTO: 9 %
NEUTROPHILS # BLD AUTO: 7.8 10E3/UL (ref 1.6–8.3)
NEUTROPHILS NFR BLD AUTO: 76 %
PLATELET # BLD AUTO: 426 10E3/UL (ref 150–450)
RBC # BLD AUTO: 4.14 10E6/UL (ref 3.8–5.2)
WBC # BLD AUTO: 10.4 10E3/UL (ref 4–11)

## 2024-04-25 PROCEDURE — 36415 COLL VENOUS BLD VENIPUNCTURE: CPT

## 2024-04-25 PROCEDURE — 85025 COMPLETE CBC W/AUTO DIFF WBC: CPT

## 2024-05-06 DIAGNOSIS — C81.11: Primary | ICD-10-CM

## 2024-05-09 ENCOUNTER — LAB (OUTPATIENT)
Dept: LAB | Facility: CLINIC | Age: 74
End: 2024-05-09
Payer: MEDICARE

## 2024-05-09 DIAGNOSIS — C81.10 NODULAR SCLEROSIS CLASSICAL HODGKIN LYMPHOMA (H): ICD-10-CM

## 2024-05-09 DIAGNOSIS — E78.5 HYPERLIPIDEMIA LDL GOAL <160: ICD-10-CM

## 2024-05-09 DIAGNOSIS — Z79.899 OTHER LONG TERM (CURRENT) DRUG THERAPY: ICD-10-CM

## 2024-05-09 LAB
BASOPHILS # BLD AUTO: 0.1 10E3/UL (ref 0–0.2)
BASOPHILS NFR BLD AUTO: 1 %
CHOLEST SERPL-MCNC: 196 MG/DL
EOSINOPHIL # BLD AUTO: 0.1 10E3/UL (ref 0–0.7)
EOSINOPHIL NFR BLD AUTO: 1 %
ERYTHROCYTE [DISTWIDTH] IN BLOOD BY AUTOMATED COUNT: 17 % (ref 10–15)
FASTING STATUS PATIENT QL REPORTED: NO
HCT VFR BLD AUTO: 37.5 % (ref 35–47)
HDLC SERPL-MCNC: 39 MG/DL
HGB BLD-MCNC: 11.8 G/DL (ref 11.7–15.7)
IMM GRANULOCYTES # BLD: 0 10E3/UL
IMM GRANULOCYTES NFR BLD: 0 %
LDLC SERPL CALC-MCNC: 92 MG/DL
LYMPHOCYTES # BLD AUTO: 1.9 10E3/UL (ref 0.8–5.3)
LYMPHOCYTES NFR BLD AUTO: 20 %
MCH RBC QN AUTO: 26.3 PG (ref 26.5–33)
MCHC RBC AUTO-ENTMCNC: 31.5 G/DL (ref 31.5–36.5)
MCV RBC AUTO: 84 FL (ref 78–100)
MONOCYTES # BLD AUTO: 0.8 10E3/UL (ref 0–1.3)
MONOCYTES NFR BLD AUTO: 8 %
NEUTROPHILS # BLD AUTO: 6.9 10E3/UL (ref 1.6–8.3)
NEUTROPHILS NFR BLD AUTO: 70 %
NONHDLC SERPL-MCNC: 157 MG/DL
PLATELET # BLD AUTO: 371 10E3/UL (ref 150–450)
RBC # BLD AUTO: 4.48 10E6/UL (ref 3.8–5.2)
TRIGL SERPL-MCNC: 325 MG/DL
WBC # BLD AUTO: 9.8 10E3/UL (ref 4–11)

## 2024-05-09 PROCEDURE — 80061 LIPID PANEL: CPT

## 2024-05-09 PROCEDURE — 85025 COMPLETE CBC W/AUTO DIFF WBC: CPT

## 2024-05-09 PROCEDURE — 36415 COLL VENOUS BLD VENIPUNCTURE: CPT

## 2024-05-16 ENCOUNTER — LAB (OUTPATIENT)
Dept: LAB | Facility: CLINIC | Age: 74
End: 2024-05-16
Payer: MEDICARE

## 2024-05-16 DIAGNOSIS — C81.10 NODULAR SCLEROSIS CLASSICAL HODGKIN LYMPHOMA (H): ICD-10-CM

## 2024-05-16 DIAGNOSIS — Z79.899 OTHER LONG TERM (CURRENT) DRUG THERAPY: ICD-10-CM

## 2024-05-16 LAB
BASOPHILS # BLD AUTO: 0.1 10E3/UL (ref 0–0.2)
BASOPHILS NFR BLD AUTO: 1 %
EOSINOPHIL # BLD AUTO: 0.1 10E3/UL (ref 0–0.7)
EOSINOPHIL NFR BLD AUTO: 1 %
ERYTHROCYTE [DISTWIDTH] IN BLOOD BY AUTOMATED COUNT: 18.1 % (ref 10–15)
HCT VFR BLD AUTO: 37.4 % (ref 35–47)
HGB BLD-MCNC: 11.9 G/DL (ref 11.7–15.7)
IMM GRANULOCYTES # BLD: 0 10E3/UL
IMM GRANULOCYTES NFR BLD: 0 %
LYMPHOCYTES # BLD AUTO: 1.6 10E3/UL (ref 0.8–5.3)
LYMPHOCYTES NFR BLD AUTO: 18 %
MCH RBC QN AUTO: 27 PG (ref 26.5–33)
MCHC RBC AUTO-ENTMCNC: 31.8 G/DL (ref 31.5–36.5)
MCV RBC AUTO: 85 FL (ref 78–100)
MONOCYTES # BLD AUTO: 0.6 10E3/UL (ref 0–1.3)
MONOCYTES NFR BLD AUTO: 7 %
NEUTROPHILS # BLD AUTO: 6.6 10E3/UL (ref 1.6–8.3)
NEUTROPHILS NFR BLD AUTO: 73 %
PLATELET # BLD AUTO: 357 10E3/UL (ref 150–450)
RBC # BLD AUTO: 4.41 10E6/UL (ref 3.8–5.2)
WBC # BLD AUTO: 9 10E3/UL (ref 4–11)

## 2024-05-16 PROCEDURE — 85025 COMPLETE CBC W/AUTO DIFF WBC: CPT

## 2024-05-16 PROCEDURE — 36415 COLL VENOUS BLD VENIPUNCTURE: CPT

## 2024-05-29 ENCOUNTER — LAB (OUTPATIENT)
Dept: LAB | Facility: CLINIC | Age: 74
End: 2024-05-29
Payer: MEDICARE

## 2024-05-29 DIAGNOSIS — C81.10 NODULAR SCLEROSIS CLASSICAL HODGKIN LYMPHOMA (H): ICD-10-CM

## 2024-05-29 DIAGNOSIS — Z79.899 OTHER LONG TERM (CURRENT) DRUG THERAPY: ICD-10-CM

## 2024-05-29 LAB
BASOPHILS # BLD AUTO: 0.1 10E3/UL (ref 0–0.2)
BASOPHILS NFR BLD AUTO: 1 %
EOSINOPHIL # BLD AUTO: 0.1 10E3/UL (ref 0–0.7)
EOSINOPHIL NFR BLD AUTO: 1 %
ERYTHROCYTE [DISTWIDTH] IN BLOOD BY AUTOMATED COUNT: 18.1 % (ref 10–15)
HCT VFR BLD AUTO: 38.5 % (ref 35–47)
HGB BLD-MCNC: 12.4 G/DL (ref 11.7–15.7)
IMM GRANULOCYTES # BLD: 0 10E3/UL
IMM GRANULOCYTES NFR BLD: 0 %
LYMPHOCYTES # BLD AUTO: 1.5 10E3/UL (ref 0.8–5.3)
LYMPHOCYTES NFR BLD AUTO: 22 %
MCH RBC QN AUTO: 27.3 PG (ref 26.5–33)
MCHC RBC AUTO-ENTMCNC: 32.2 G/DL (ref 31.5–36.5)
MCV RBC AUTO: 85 FL (ref 78–100)
MONOCYTES # BLD AUTO: 0.7 10E3/UL (ref 0–1.3)
MONOCYTES NFR BLD AUTO: 11 %
NEUTROPHILS # BLD AUTO: 4.6 10E3/UL (ref 1.6–8.3)
NEUTROPHILS NFR BLD AUTO: 66 %
PLATELET # BLD AUTO: 340 10E3/UL (ref 150–450)
RBC # BLD AUTO: 4.55 10E6/UL (ref 3.8–5.2)
WBC # BLD AUTO: 7 10E3/UL (ref 4–11)

## 2024-05-29 PROCEDURE — 85025 COMPLETE CBC W/AUTO DIFF WBC: CPT

## 2024-05-29 PROCEDURE — 36415 COLL VENOUS BLD VENIPUNCTURE: CPT

## 2024-06-05 ENCOUNTER — LAB (OUTPATIENT)
Dept: LAB | Facility: CLINIC | Age: 74
End: 2024-06-05
Payer: MEDICARE

## 2024-06-05 DIAGNOSIS — C81.10 NODULAR SCLEROSIS CLASSICAL HODGKIN LYMPHOMA (H): ICD-10-CM

## 2024-06-05 DIAGNOSIS — Z79.899 OTHER LONG TERM (CURRENT) DRUG THERAPY: ICD-10-CM

## 2024-06-05 LAB
BASOPHILS # BLD AUTO: 0 10E3/UL (ref 0–0.2)
BASOPHILS NFR BLD AUTO: 1 %
EOSINOPHIL # BLD AUTO: 0.1 10E3/UL (ref 0–0.7)
EOSINOPHIL NFR BLD AUTO: 2 %
ERYTHROCYTE [DISTWIDTH] IN BLOOD BY AUTOMATED COUNT: 18.8 % (ref 10–15)
HCT VFR BLD AUTO: 38.5 % (ref 35–47)
HGB BLD-MCNC: 12.3 G/DL (ref 11.7–15.7)
IMM GRANULOCYTES # BLD: 0 10E3/UL
IMM GRANULOCYTES NFR BLD: 0 %
LYMPHOCYTES # BLD AUTO: 1.5 10E3/UL (ref 0.8–5.3)
LYMPHOCYTES NFR BLD AUTO: 19 %
MCH RBC QN AUTO: 27.2 PG (ref 26.5–33)
MCHC RBC AUTO-ENTMCNC: 31.9 G/DL (ref 31.5–36.5)
MCV RBC AUTO: 85 FL (ref 78–100)
MONOCYTES # BLD AUTO: 0.6 10E3/UL (ref 0–1.3)
MONOCYTES NFR BLD AUTO: 7 %
NEUTROPHILS # BLD AUTO: 5.7 10E3/UL (ref 1.6–8.3)
NEUTROPHILS NFR BLD AUTO: 71 %
PLATELET # BLD AUTO: 294 10E3/UL (ref 150–450)
RBC # BLD AUTO: 4.52 10E6/UL (ref 3.8–5.2)
WBC # BLD AUTO: 8 10E3/UL (ref 4–11)

## 2024-06-05 PROCEDURE — 85025 COMPLETE CBC W/AUTO DIFF WBC: CPT

## 2024-06-05 PROCEDURE — 36415 COLL VENOUS BLD VENIPUNCTURE: CPT

## 2024-06-20 ENCOUNTER — LAB (OUTPATIENT)
Dept: LAB | Facility: CLINIC | Age: 74
End: 2024-06-20
Payer: MEDICARE

## 2024-06-20 DIAGNOSIS — C81.10 NODULAR SCLEROSIS CLASSICAL HODGKIN LYMPHOMA (H): ICD-10-CM

## 2024-06-20 DIAGNOSIS — Z79.899 OTHER LONG TERM (CURRENT) DRUG THERAPY: ICD-10-CM

## 2024-06-20 LAB
BASOPHILS # BLD AUTO: 0 10E3/UL (ref 0–0.2)
BASOPHILS NFR BLD AUTO: 1 %
EOSINOPHIL # BLD AUTO: 0.1 10E3/UL (ref 0–0.7)
EOSINOPHIL NFR BLD AUTO: 1 %
ERYTHROCYTE [DISTWIDTH] IN BLOOD BY AUTOMATED COUNT: 18.6 % (ref 10–15)
HCT VFR BLD AUTO: 41.3 % (ref 35–47)
HGB BLD-MCNC: 13.2 G/DL (ref 11.7–15.7)
IMM GRANULOCYTES # BLD: 0 10E3/UL
IMM GRANULOCYTES NFR BLD: 0 %
LYMPHOCYTES # BLD AUTO: 1.6 10E3/UL (ref 0.8–5.3)
LYMPHOCYTES NFR BLD AUTO: 21 %
MCH RBC QN AUTO: 27.4 PG (ref 26.5–33)
MCHC RBC AUTO-ENTMCNC: 32 G/DL (ref 31.5–36.5)
MCV RBC AUTO: 86 FL (ref 78–100)
MONOCYTES # BLD AUTO: 0.7 10E3/UL (ref 0–1.3)
MONOCYTES NFR BLD AUTO: 9 %
NEUTROPHILS # BLD AUTO: 5.1 10E3/UL (ref 1.6–8.3)
NEUTROPHILS NFR BLD AUTO: 68 %
PLATELET # BLD AUTO: 256 10E3/UL (ref 150–450)
RBC # BLD AUTO: 4.81 10E6/UL (ref 3.8–5.2)
WBC # BLD AUTO: 7.4 10E3/UL (ref 4–11)

## 2024-06-20 PROCEDURE — 36415 COLL VENOUS BLD VENIPUNCTURE: CPT

## 2024-06-20 PROCEDURE — 85025 COMPLETE CBC W/AUTO DIFF WBC: CPT

## 2024-06-27 ENCOUNTER — LAB (OUTPATIENT)
Dept: LAB | Facility: CLINIC | Age: 74
End: 2024-06-27
Payer: MEDICARE

## 2024-06-27 DIAGNOSIS — Z79.899 OTHER LONG TERM (CURRENT) DRUG THERAPY: ICD-10-CM

## 2024-06-27 DIAGNOSIS — C81.10 NODULAR SCLEROSIS CLASSICAL HODGKIN LYMPHOMA (H): ICD-10-CM

## 2024-06-27 LAB
BASOPHILS # BLD AUTO: 0 10E3/UL (ref 0–0.2)
BASOPHILS NFR BLD AUTO: 1 %
EOSINOPHIL # BLD AUTO: 0.1 10E3/UL (ref 0–0.7)
EOSINOPHIL NFR BLD AUTO: 2 %
ERYTHROCYTE [DISTWIDTH] IN BLOOD BY AUTOMATED COUNT: 18.2 % (ref 10–15)
HCT VFR BLD AUTO: 39.7 % (ref 35–47)
HGB BLD-MCNC: 12.8 G/DL (ref 11.7–15.7)
IMM GRANULOCYTES # BLD: 0 10E3/UL
IMM GRANULOCYTES NFR BLD: 0 %
LYMPHOCYTES # BLD AUTO: 1.5 10E3/UL (ref 0.8–5.3)
LYMPHOCYTES NFR BLD AUTO: 20 %
MCH RBC QN AUTO: 27.6 PG (ref 26.5–33)
MCHC RBC AUTO-ENTMCNC: 32.2 G/DL (ref 31.5–36.5)
MCV RBC AUTO: 86 FL (ref 78–100)
MONOCYTES # BLD AUTO: 0.5 10E3/UL (ref 0–1.3)
MONOCYTES NFR BLD AUTO: 7 %
NEUTROPHILS # BLD AUTO: 5.4 10E3/UL (ref 1.6–8.3)
NEUTROPHILS NFR BLD AUTO: 70 %
PLATELET # BLD AUTO: 295 10E3/UL (ref 150–450)
RBC # BLD AUTO: 4.64 10E6/UL (ref 3.8–5.2)
WBC # BLD AUTO: 7.6 10E3/UL (ref 4–11)

## 2024-06-27 PROCEDURE — 36415 COLL VENOUS BLD VENIPUNCTURE: CPT

## 2024-06-27 PROCEDURE — 85025 COMPLETE CBC W/AUTO DIFF WBC: CPT

## 2024-07-11 ENCOUNTER — LAB (OUTPATIENT)
Dept: LAB | Facility: CLINIC | Age: 74
End: 2024-07-11
Payer: MEDICARE

## 2024-07-11 DIAGNOSIS — C81.10 NODULAR SCLEROSIS CLASSICAL HODGKIN LYMPHOMA (H): ICD-10-CM

## 2024-07-11 DIAGNOSIS — Z79.899 OTHER LONG TERM (CURRENT) DRUG THERAPY: ICD-10-CM

## 2024-07-11 LAB
BASOPHILS # BLD AUTO: 0.1 10E3/UL (ref 0–0.2)
BASOPHILS NFR BLD AUTO: 1 %
EOSINOPHIL # BLD AUTO: 0.1 10E3/UL (ref 0–0.7)
EOSINOPHIL NFR BLD AUTO: 1 %
ERYTHROCYTE [DISTWIDTH] IN BLOOD BY AUTOMATED COUNT: 18.2 % (ref 10–15)
HCT VFR BLD AUTO: 41.4 % (ref 35–47)
HGB BLD-MCNC: 13.5 G/DL (ref 11.7–15.7)
IMM GRANULOCYTES # BLD: 0 10E3/UL
IMM GRANULOCYTES NFR BLD: 0 %
LYMPHOCYTES # BLD AUTO: 1.6 10E3/UL (ref 0.8–5.3)
LYMPHOCYTES NFR BLD AUTO: 26 %
MCH RBC QN AUTO: 27.8 PG (ref 26.5–33)
MCHC RBC AUTO-ENTMCNC: 32.6 G/DL (ref 31.5–36.5)
MCV RBC AUTO: 85 FL (ref 78–100)
MONOCYTES # BLD AUTO: 0.7 10E3/UL (ref 0–1.3)
MONOCYTES NFR BLD AUTO: 11 %
NEUTROPHILS # BLD AUTO: 3.9 10E3/UL (ref 1.6–8.3)
NEUTROPHILS NFR BLD AUTO: 61 %
PLATELET # BLD AUTO: 272 10E3/UL (ref 150–450)
RBC # BLD AUTO: 4.86 10E6/UL (ref 3.8–5.2)
WBC # BLD AUTO: 6.4 10E3/UL (ref 4–11)

## 2024-07-11 PROCEDURE — 36415 COLL VENOUS BLD VENIPUNCTURE: CPT

## 2024-07-11 PROCEDURE — 85025 COMPLETE CBC W/AUTO DIFF WBC: CPT

## 2024-07-18 ENCOUNTER — LAB (OUTPATIENT)
Dept: LAB | Facility: CLINIC | Age: 74
End: 2024-07-18
Payer: MEDICARE

## 2024-07-18 DIAGNOSIS — Z79.899 OTHER LONG TERM (CURRENT) DRUG THERAPY: ICD-10-CM

## 2024-07-18 DIAGNOSIS — C81.10 NODULAR SCLEROSIS CLASSICAL HODGKIN LYMPHOMA (H): ICD-10-CM

## 2024-07-18 LAB
BASOPHILS # BLD AUTO: 0 10E3/UL (ref 0–0.2)
BASOPHILS NFR BLD AUTO: 0 %
EOSINOPHIL # BLD AUTO: 0.1 10E3/UL (ref 0–0.7)
EOSINOPHIL NFR BLD AUTO: 1 %
ERYTHROCYTE [DISTWIDTH] IN BLOOD BY AUTOMATED COUNT: 18.1 % (ref 10–15)
HCT VFR BLD AUTO: 38.5 % (ref 35–47)
HGB BLD-MCNC: 12.5 G/DL (ref 11.7–15.7)
IMM GRANULOCYTES # BLD: 0 10E3/UL
IMM GRANULOCYTES NFR BLD: 0 %
LYMPHOCYTES # BLD AUTO: 1.7 10E3/UL (ref 0.8–5.3)
LYMPHOCYTES NFR BLD AUTO: 22 %
MCH RBC QN AUTO: 28.2 PG (ref 26.5–33)
MCHC RBC AUTO-ENTMCNC: 32.5 G/DL (ref 31.5–36.5)
MCV RBC AUTO: 87 FL (ref 78–100)
MONOCYTES # BLD AUTO: 0.6 10E3/UL (ref 0–1.3)
MONOCYTES NFR BLD AUTO: 9 %
NEUTROPHILS # BLD AUTO: 5 10E3/UL (ref 1.6–8.3)
NEUTROPHILS NFR BLD AUTO: 67 %
PLATELET # BLD AUTO: 275 10E3/UL (ref 150–450)
RBC # BLD AUTO: 4.44 10E6/UL (ref 3.8–5.2)
WBC # BLD AUTO: 7.4 10E3/UL (ref 4–11)

## 2024-07-18 PROCEDURE — 36415 COLL VENOUS BLD VENIPUNCTURE: CPT

## 2024-07-18 PROCEDURE — 85025 COMPLETE CBC W/AUTO DIFF WBC: CPT

## 2024-07-31 ENCOUNTER — LAB (OUTPATIENT)
Dept: LAB | Facility: CLINIC | Age: 74
End: 2024-07-31
Payer: MEDICARE

## 2024-07-31 DIAGNOSIS — C81.10 NODULAR SCLEROSIS CLASSICAL HODGKIN LYMPHOMA (H): ICD-10-CM

## 2024-07-31 DIAGNOSIS — Z79.899 OTHER LONG TERM (CURRENT) DRUG THERAPY: ICD-10-CM

## 2024-07-31 LAB
BASOPHILS # BLD AUTO: 0 10E3/UL (ref 0–0.2)
BASOPHILS NFR BLD AUTO: 1 %
EOSINOPHIL # BLD AUTO: 0.1 10E3/UL (ref 0–0.7)
EOSINOPHIL NFR BLD AUTO: 1 %
ERYTHROCYTE [DISTWIDTH] IN BLOOD BY AUTOMATED COUNT: 17.7 % (ref 10–15)
HCT VFR BLD AUTO: 39.9 % (ref 35–47)
HGB BLD-MCNC: 13.1 G/DL (ref 11.7–15.7)
IMM GRANULOCYTES # BLD: 0 10E3/UL
IMM GRANULOCYTES NFR BLD: 0 %
LYMPHOCYTES # BLD AUTO: 1.5 10E3/UL (ref 0.8–5.3)
LYMPHOCYTES NFR BLD AUTO: 25 %
MCH RBC QN AUTO: 28.2 PG (ref 26.5–33)
MCHC RBC AUTO-ENTMCNC: 32.8 G/DL (ref 31.5–36.5)
MCV RBC AUTO: 86 FL (ref 78–100)
MONOCYTES # BLD AUTO: 0.7 10E3/UL (ref 0–1.3)
MONOCYTES NFR BLD AUTO: 11 %
NEUTROPHILS # BLD AUTO: 4 10E3/UL (ref 1.6–8.3)
NEUTROPHILS NFR BLD AUTO: 63 %
PLATELET # BLD AUTO: 264 10E3/UL (ref 150–450)
RBC # BLD AUTO: 4.64 10E6/UL (ref 3.8–5.2)
WBC # BLD AUTO: 6.3 10E3/UL (ref 4–11)

## 2024-07-31 PROCEDURE — 85025 COMPLETE CBC W/AUTO DIFF WBC: CPT

## 2024-07-31 PROCEDURE — 36415 COLL VENOUS BLD VENIPUNCTURE: CPT

## 2024-08-02 DIAGNOSIS — I10 HYPERTENSION GOAL BP (BLOOD PRESSURE) < 140/90: ICD-10-CM

## 2024-08-02 RX ORDER — LOSARTAN POTASSIUM 100 MG/1
100 TABLET ORAL DAILY
Qty: 90 TABLET | Refills: 0 | Status: SHIPPED | OUTPATIENT
Start: 2024-08-02

## 2024-08-07 ENCOUNTER — LAB (OUTPATIENT)
Dept: LAB | Facility: CLINIC | Age: 74
End: 2024-08-07
Payer: MEDICARE

## 2024-08-07 DIAGNOSIS — C81.10 NODULAR SCLEROSIS CLASSICAL HODGKIN LYMPHOMA (H): ICD-10-CM

## 2024-08-07 DIAGNOSIS — Z79.899 OTHER LONG TERM (CURRENT) DRUG THERAPY: ICD-10-CM

## 2024-08-07 LAB
BASOPHILS # BLD AUTO: 0 10E3/UL (ref 0–0.2)
BASOPHILS NFR BLD AUTO: 1 %
EOSINOPHIL # BLD AUTO: 0.1 10E3/UL (ref 0–0.7)
EOSINOPHIL NFR BLD AUTO: 1 %
ERYTHROCYTE [DISTWIDTH] IN BLOOD BY AUTOMATED COUNT: 17.3 % (ref 10–15)
HCT VFR BLD AUTO: 39.4 % (ref 35–47)
HGB BLD-MCNC: 13 G/DL (ref 11.7–15.7)
IMM GRANULOCYTES # BLD: 0 10E3/UL
IMM GRANULOCYTES NFR BLD: 0 %
LYMPHOCYTES # BLD AUTO: 1.6 10E3/UL (ref 0.8–5.3)
LYMPHOCYTES NFR BLD AUTO: 22 %
MCH RBC QN AUTO: 28.1 PG (ref 26.5–33)
MCHC RBC AUTO-ENTMCNC: 33 G/DL (ref 31.5–36.5)
MCV RBC AUTO: 85 FL (ref 78–100)
MONOCYTES # BLD AUTO: 0.6 10E3/UL (ref 0–1.3)
MONOCYTES NFR BLD AUTO: 8 %
NEUTROPHILS # BLD AUTO: 5 10E3/UL (ref 1.6–8.3)
NEUTROPHILS NFR BLD AUTO: 68 %
PLATELET # BLD AUTO: 286 10E3/UL (ref 150–450)
RBC # BLD AUTO: 4.62 10E6/UL (ref 3.8–5.2)
WBC # BLD AUTO: 7.3 10E3/UL (ref 4–11)

## 2024-08-07 PROCEDURE — 85025 COMPLETE CBC W/AUTO DIFF WBC: CPT

## 2024-08-07 PROCEDURE — 36415 COLL VENOUS BLD VENIPUNCTURE: CPT

## 2024-08-19 ENCOUNTER — PATIENT OUTREACH (OUTPATIENT)
Dept: CARE COORDINATION | Facility: CLINIC | Age: 74
End: 2024-08-19
Payer: MEDICARE

## 2024-08-22 ENCOUNTER — LAB (OUTPATIENT)
Dept: LAB | Facility: CLINIC | Age: 74
End: 2024-08-22
Payer: MEDICARE

## 2024-08-22 DIAGNOSIS — C81.10 NODULAR SCLEROSIS CLASSICAL HODGKIN LYMPHOMA (H): ICD-10-CM

## 2024-08-22 DIAGNOSIS — Z79.899 OTHER LONG TERM (CURRENT) DRUG THERAPY: ICD-10-CM

## 2024-08-22 LAB
BASOPHILS # BLD AUTO: 0 10E3/UL (ref 0–0.2)
BASOPHILS NFR BLD AUTO: 1 %
EOSINOPHIL # BLD AUTO: 0.1 10E3/UL (ref 0–0.7)
EOSINOPHIL NFR BLD AUTO: 1 %
ERYTHROCYTE [DISTWIDTH] IN BLOOD BY AUTOMATED COUNT: 17 % (ref 10–15)
HCT VFR BLD AUTO: 41.4 % (ref 35–47)
HGB BLD-MCNC: 13.6 G/DL (ref 11.7–15.7)
IMM GRANULOCYTES # BLD: 0 10E3/UL
IMM GRANULOCYTES NFR BLD: 0 %
LYMPHOCYTES # BLD AUTO: 1.8 10E3/UL (ref 0.8–5.3)
LYMPHOCYTES NFR BLD AUTO: 23 %
MCH RBC QN AUTO: 28.3 PG (ref 26.5–33)
MCHC RBC AUTO-ENTMCNC: 32.9 G/DL (ref 31.5–36.5)
MCV RBC AUTO: 86 FL (ref 78–100)
MONOCYTES # BLD AUTO: 0.7 10E3/UL (ref 0–1.3)
MONOCYTES NFR BLD AUTO: 9 %
NEUTROPHILS # BLD AUTO: 4.9 10E3/UL (ref 1.6–8.3)
NEUTROPHILS NFR BLD AUTO: 66 %
PLATELET # BLD AUTO: 246 10E3/UL (ref 150–450)
RBC # BLD AUTO: 4.81 10E6/UL (ref 3.8–5.2)
WBC # BLD AUTO: 7.5 10E3/UL (ref 4–11)

## 2024-08-22 PROCEDURE — 85025 COMPLETE CBC W/AUTO DIFF WBC: CPT

## 2024-08-22 PROCEDURE — 36415 COLL VENOUS BLD VENIPUNCTURE: CPT

## 2024-08-29 ENCOUNTER — LAB (OUTPATIENT)
Dept: LAB | Facility: CLINIC | Age: 74
End: 2024-08-29
Payer: MEDICARE

## 2024-08-29 DIAGNOSIS — C81.10 NODULAR SCLEROSIS CLASSICAL HODGKIN LYMPHOMA (H): ICD-10-CM

## 2024-08-29 DIAGNOSIS — Z79.899 OTHER LONG TERM (CURRENT) DRUG THERAPY: ICD-10-CM

## 2024-08-29 LAB
BASOPHILS # BLD AUTO: 0 10E3/UL (ref 0–0.2)
BASOPHILS NFR BLD AUTO: 1 %
EOSINOPHIL # BLD AUTO: 0.2 10E3/UL (ref 0–0.7)
EOSINOPHIL NFR BLD AUTO: 2 %
ERYTHROCYTE [DISTWIDTH] IN BLOOD BY AUTOMATED COUNT: 17 % (ref 10–15)
HCT VFR BLD AUTO: 38.3 % (ref 35–47)
HGB BLD-MCNC: 12.7 G/DL (ref 11.7–15.7)
IMM GRANULOCYTES # BLD: 0 10E3/UL
IMM GRANULOCYTES NFR BLD: 0 %
LYMPHOCYTES # BLD AUTO: 1.8 10E3/UL (ref 0.8–5.3)
LYMPHOCYTES NFR BLD AUTO: 22 %
MCH RBC QN AUTO: 28.4 PG (ref 26.5–33)
MCHC RBC AUTO-ENTMCNC: 33.2 G/DL (ref 31.5–36.5)
MCV RBC AUTO: 86 FL (ref 78–100)
MONOCYTES # BLD AUTO: 0.7 10E3/UL (ref 0–1.3)
MONOCYTES NFR BLD AUTO: 9 %
NEUTROPHILS # BLD AUTO: 5.6 10E3/UL (ref 1.6–8.3)
NEUTROPHILS NFR BLD AUTO: 67 %
PLATELET # BLD AUTO: 298 10E3/UL (ref 150–450)
RBC # BLD AUTO: 4.47 10E6/UL (ref 3.8–5.2)
WBC # BLD AUTO: 8.3 10E3/UL (ref 4–11)

## 2024-08-29 PROCEDURE — 36415 COLL VENOUS BLD VENIPUNCTURE: CPT

## 2024-08-29 PROCEDURE — 85025 COMPLETE CBC W/AUTO DIFF WBC: CPT

## 2024-09-08 NOTE — DISCHARGE INSTRUCTIONS
You will have a visit in the oncology clinic on Thursday, you will be contacted for the time.  The plan is for you to have a biopsy here on Friday, you should receive a phone call to schedule this.  Start prednisone as directed.  Continue the amoxicillin as prescribed  Start aspirin 81 mg a day but do not take this on Thursday before the biopsy  
66

## 2024-09-12 ENCOUNTER — LAB (OUTPATIENT)
Dept: LAB | Facility: CLINIC | Age: 74
End: 2024-09-12
Payer: MEDICARE

## 2024-09-12 DIAGNOSIS — C81.10 NODULAR SCLEROSIS CLASSICAL HODGKIN LYMPHOMA (H): ICD-10-CM

## 2024-09-12 DIAGNOSIS — Z79.899 OTHER LONG TERM (CURRENT) DRUG THERAPY: ICD-10-CM

## 2024-09-12 LAB
BASOPHILS # BLD AUTO: 0 10E3/UL (ref 0–0.2)
BASOPHILS NFR BLD AUTO: 0 %
EOSINOPHIL # BLD AUTO: 0.1 10E3/UL (ref 0–0.7)
EOSINOPHIL NFR BLD AUTO: 1 %
ERYTHROCYTE [DISTWIDTH] IN BLOOD BY AUTOMATED COUNT: 16.6 % (ref 10–15)
HCT VFR BLD AUTO: 39.4 % (ref 35–47)
HGB BLD-MCNC: 13 G/DL (ref 11.7–15.7)
IMM GRANULOCYTES # BLD: 0 10E3/UL
IMM GRANULOCYTES NFR BLD: 0 %
LYMPHOCYTES # BLD AUTO: 1.8 10E3/UL (ref 0.8–5.3)
LYMPHOCYTES NFR BLD AUTO: 23 %
MCH RBC QN AUTO: 28.4 PG (ref 26.5–33)
MCHC RBC AUTO-ENTMCNC: 33 G/DL (ref 31.5–36.5)
MCV RBC AUTO: 86 FL (ref 78–100)
MONOCYTES # BLD AUTO: 0.9 10E3/UL (ref 0–1.3)
MONOCYTES NFR BLD AUTO: 11 %
NEUTROPHILS # BLD AUTO: 4.8 10E3/UL (ref 1.6–8.3)
NEUTROPHILS NFR BLD AUTO: 64 %
PLATELET # BLD AUTO: 291 10E3/UL (ref 150–450)
RBC # BLD AUTO: 4.57 10E6/UL (ref 3.8–5.2)
WBC # BLD AUTO: 7.6 10E3/UL (ref 4–11)

## 2024-09-12 PROCEDURE — 36415 COLL VENOUS BLD VENIPUNCTURE: CPT

## 2024-09-12 PROCEDURE — 85025 COMPLETE CBC W/AUTO DIFF WBC: CPT

## 2024-09-16 ENCOUNTER — PATIENT OUTREACH (OUTPATIENT)
Dept: CARE COORDINATION | Facility: CLINIC | Age: 74
End: 2024-09-16
Payer: MEDICARE

## 2024-09-19 ENCOUNTER — LAB (OUTPATIENT)
Dept: LAB | Facility: CLINIC | Age: 74
End: 2024-09-19
Payer: MEDICARE

## 2024-09-19 DIAGNOSIS — Z79.899 OTHER LONG TERM (CURRENT) DRUG THERAPY: ICD-10-CM

## 2024-09-19 DIAGNOSIS — C81.10 NODULAR SCLEROSIS CLASSICAL HODGKIN LYMPHOMA (H): ICD-10-CM

## 2024-09-19 LAB
BASOPHILS # BLD AUTO: 0.1 10E3/UL (ref 0–0.2)
BASOPHILS NFR BLD AUTO: 1 %
EOSINOPHIL # BLD AUTO: 0.1 10E3/UL (ref 0–0.7)
EOSINOPHIL NFR BLD AUTO: 1 %
ERYTHROCYTE [DISTWIDTH] IN BLOOD BY AUTOMATED COUNT: 16.9 % (ref 10–15)
HCT VFR BLD AUTO: 38.8 % (ref 35–47)
HGB BLD-MCNC: 12.8 G/DL (ref 11.7–15.7)
IMM GRANULOCYTES # BLD: 0 10E3/UL
IMM GRANULOCYTES NFR BLD: 0 %
LYMPHOCYTES # BLD AUTO: 1.9 10E3/UL (ref 0.8–5.3)
LYMPHOCYTES NFR BLD AUTO: 19 %
MCH RBC QN AUTO: 29 PG (ref 26.5–33)
MCHC RBC AUTO-ENTMCNC: 33 G/DL (ref 31.5–36.5)
MCV RBC AUTO: 88 FL (ref 78–100)
MONOCYTES # BLD AUTO: 0.8 10E3/UL (ref 0–1.3)
MONOCYTES NFR BLD AUTO: 8 %
NEUTROPHILS # BLD AUTO: 7.2 10E3/UL (ref 1.6–8.3)
NEUTROPHILS NFR BLD AUTO: 72 %
PLATELET # BLD AUTO: 308 10E3/UL (ref 150–450)
RBC # BLD AUTO: 4.42 10E6/UL (ref 3.8–5.2)
WBC # BLD AUTO: 10 10E3/UL (ref 4–11)

## 2024-09-19 PROCEDURE — 36415 COLL VENOUS BLD VENIPUNCTURE: CPT

## 2024-09-19 PROCEDURE — 85025 COMPLETE CBC W/AUTO DIFF WBC: CPT

## 2024-10-04 SDOH — HEALTH STABILITY: PHYSICAL HEALTH: ON AVERAGE, HOW MANY MINUTES DO YOU ENGAGE IN EXERCISE AT THIS LEVEL?: 30 MIN

## 2024-10-04 SDOH — HEALTH STABILITY: PHYSICAL HEALTH: ON AVERAGE, HOW MANY DAYS PER WEEK DO YOU ENGAGE IN MODERATE TO STRENUOUS EXERCISE (LIKE A BRISK WALK)?: 2 DAYS

## 2024-10-04 ASSESSMENT — SOCIAL DETERMINANTS OF HEALTH (SDOH): HOW OFTEN DO YOU GET TOGETHER WITH FRIENDS OR RELATIVES?: PATIENT DECLINED

## 2024-10-09 ENCOUNTER — OFFICE VISIT (OUTPATIENT)
Dept: FAMILY MEDICINE | Facility: CLINIC | Age: 74
End: 2024-10-09
Payer: MEDICARE

## 2024-10-09 VITALS
OXYGEN SATURATION: 95 % | DIASTOLIC BLOOD PRESSURE: 77 MMHG | HEART RATE: 91 BPM | SYSTOLIC BLOOD PRESSURE: 148 MMHG | WEIGHT: 148 LBS | BODY MASS INDEX: 25.27 KG/M2 | TEMPERATURE: 97.9 F | RESPIRATION RATE: 17 BRPM | HEIGHT: 64 IN

## 2024-10-09 DIAGNOSIS — I10 HYPERTENSION GOAL BP (BLOOD PRESSURE) < 140/90: ICD-10-CM

## 2024-10-09 DIAGNOSIS — Z12.31 ENCOUNTER FOR SCREENING MAMMOGRAM FOR BREAST CANCER: ICD-10-CM

## 2024-10-09 DIAGNOSIS — E11.9 TYPE 2 DIABETES MELLITUS WITHOUT COMPLICATION, WITHOUT LONG-TERM CURRENT USE OF INSULIN (H): ICD-10-CM

## 2024-10-09 DIAGNOSIS — C82.90 FOLLICULAR LYMPHOMA, UNSPECIFIED FOLLICULAR LYMPHOMA TYPE, UNSPECIFIED BODY REGION (H): ICD-10-CM

## 2024-10-09 DIAGNOSIS — R73.09 ELEVATED GLUCOSE: ICD-10-CM

## 2024-10-09 DIAGNOSIS — Z85.3 PERSONAL HISTORY OF MALIGNANT NEOPLASM OF BREAST: ICD-10-CM

## 2024-10-09 DIAGNOSIS — C77.0 METASTASIS TO SUPRACLAVICULAR LYMPH NODE (H): ICD-10-CM

## 2024-10-09 DIAGNOSIS — Z00.00 ENCOUNTER FOR MEDICARE ANNUAL WELLNESS EXAM: Primary | ICD-10-CM

## 2024-10-09 DIAGNOSIS — C81.11 NODULAR SCLEROSIS HODGKIN LYMPHOMA OF LYMPH NODES OF NECK (H): ICD-10-CM

## 2024-10-09 DIAGNOSIS — E78.5 HYPERLIPIDEMIA LDL GOAL <160: ICD-10-CM

## 2024-10-09 PROCEDURE — G0439 PPPS, SUBSEQ VISIT: HCPCS | Performed by: FAMILY MEDICINE

## 2024-10-09 PROCEDURE — 99213 OFFICE O/P EST LOW 20 MIN: CPT | Mod: 25 | Performed by: FAMILY MEDICINE

## 2024-10-09 RX ORDER — ACYCLOVIR 400 MG/1
TABLET ORAL
COMMUNITY

## 2024-10-09 RX ORDER — LOSARTAN POTASSIUM 100 MG/1
100 TABLET ORAL DAILY
Qty: 90 TABLET | Refills: 3 | Status: SHIPPED | OUTPATIENT
Start: 2024-10-09

## 2024-10-09 RX ORDER — ROSUVASTATIN CALCIUM 10 MG/1
10 TABLET, COATED ORAL DAILY
Qty: 90 TABLET | Refills: 3 | Status: SHIPPED | OUTPATIENT
Start: 2024-10-09

## 2024-10-09 ASSESSMENT — PAIN SCALES - GENERAL: PAINLEVEL: NO PAIN (0)

## 2024-10-09 NOTE — PATIENT INSTRUCTIONS
Patient Education   Preventive Care Advice   This is general advice given by our system to help you stay healthy. However, your care team may have specific advice just for you. Please talk to your care team about your preventive care needs.  Nutrition  Eat 5 or more servings of fruits and vegetables each day.  Try wheat bread, brown rice and whole grain pasta (instead of white bread, rice, and pasta).  Get enough calcium and vitamin D. Check the label on foods and aim for 100% of the RDA (recommended daily allowance).  Lifestyle  Exercise at least 150 minutes each week  (30 minutes a day, 5 days a week).  Do muscle strengthening activities 2 days a week. These help control your weight and prevent disease.  No smoking.  Wear sunscreen to prevent skin cancer.  Have a dental exam and cleaning every 6 months.  Yearly exams  See your health care team every year to talk about:  Any changes in your health.  Any medicines your care team has prescribed.  Preventive care, family planning, and ways to prevent chronic diseases.  Shots (vaccines)   HPV shots (up to age 26), if you've never had them before.  Hepatitis B shots (up to age 59), if you've never had them before.  COVID-19 shot: Get this shot when it's due.  Flu shot: Get a flu shot every year.  Tetanus shot: Get a tetanus shot every 10 years.  Pneumococcal, hepatitis A, and RSV shots: Ask your care team if you need these based on your risk.  Shingles shot (for age 50 and up)  General health tests  Diabetes screening:  Starting at age 35, Get screened for diabetes at least every 3 years.  If you are younger than age 35, ask your care team if you should be screened for diabetes.  Cholesterol test: At age 39, start having a cholesterol test every 5 years, or more often if advised.  Bone density scan (DEXA): At age 50, ask your care team if you should have this scan for osteoporosis (brittle bones).  Hepatitis C: Get tested at least once in your life.  STIs (sexually  transmitted infections)  Before age 24: Ask your care team if you should be screened for STIs.  After age 24: Get screened for STIs if you're at risk. You are at risk for STIs (including HIV) if:  You are sexually active with more than one person.  You don't use condoms every time.  You or a partner was diagnosed with a sexually transmitted infection.  If you are at risk for HIV, ask about PrEP medicine to prevent HIV.  Get tested for HIV at least once in your life, whether you are at risk for HIV or not.  Cancer screening tests  Cervical cancer screening: If you have a cervix, begin getting regular cervical cancer screening tests starting at age 21.  Breast cancer scan (mammogram): If you've ever had breasts, begin having regular mammograms starting at age 40. This is a scan to check for breast cancer.  Colon cancer screening: It is important to start screening for colon cancer at age 45.  Have a colonoscopy test every 10 years (or more often if you're at risk) Or, ask your provider about stool tests like a FIT test every year or Cologuard test every 3 years.  To learn more about your testing options, visit:   .  For help making a decision, visit:   https://bit.ly/lp49048.  Prostate cancer screening test: If you have a prostate, ask your care team if a prostate cancer screening test (PSA) at age 55 is right for you.  Lung cancer screening: If you are a current or former smoker ages 50 to 80, ask your care team if ongoing lung cancer screenings are right for you.  For informational purposes only. Not to replace the advice of your health care provider. Copyright   2023 [employer]. All rights reserved. Clinically reviewed by the [facility] Transitions Program. [employer] 132172 - REV 01/24.

## 2024-10-09 NOTE — PROGRESS NOTES
"Preventive Care Visit  [facility]  Virginie Carter MD, Family Medicine  Oct 9, 2024      Assessment & Plan     Encounter for Medicare annual wellness exam      Hypertension goal BP (blood pressure) < 140/90  At goal on meds per home BP readings  Actually runs low after chemo  Most likely elevated due to new clinic and provider  - losartan (COZAAR) 100 MG tablet; Take 1 tablet (100 mg) by mouth daily.  - **Basic metabolic panel FUTURE 2mo; Future  - Lipid panel reflex to direct LDL Fasting; Future    Hyperlipidemia LDL goal <160  At goal on meds  - rosuvastatin (CRESTOR) 10 MG tablet; Take 1 tablet (10 mg) by mouth daily.    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 Is holding off for now per oncology as going through chemo and just had pet scan  - MA Screening Bilateral w/ Eriberto; Future    Encounter for screening mammogram for breast cancer    - MA Screening Bilateral w/ Eriberto; Future    Patient has been advised of split billing requirements and indicates understanding: Yes        BMI  Estimated body mass index is 25.4 kg/m  as calculated from the following:    Height as of this encounter: 1.626 m (5' 4\").    Weight as of this encounter: 67.1 kg (148 lb).       Counseling  Appropriate preventive services were addressed with this patient via screening, questionnaire, or discussion as appropriate for fall prevention, nutrition, physical activity, Tobacco-use cessation, social engagement, weight loss and cognition.  Checklist reviewing preventive services available has been given to the patient.  Reviewed patient's diet, addressing concerns and/or questions.   She is at risk for lack of exercise and has been " provided with information to increase physical activity for the benefit of her well-being.           Subjective   Virginia is a 73 year old, presenting for the following:  Physical        10/9/2024     8:05 AM   Additional Questions   Roomed by tierney   Accompanied by self       Health Care Directive  Patient has a Health Care Directive on file  Advance care planning document is on file and is current.    HPI    Diagnosed with covid and was treated  Asymptomatic at this time.   Seeing heme/onc            10/4/2024   General Health   How would you rate your overall physical health? Good   Feel stress (tense, anxious, or unable to sleep) To some extent      (!) STRESS CONCERN      10/4/2024   Nutrition   Diet: Regular (no restrictions)            10/4/2024   Exercise   Days per week of moderate/strenous exercise 2 days   Average minutes spent exercising at this level 30 min      (!) EXERCISE CONCERN      10/4/2024   Social Factors   Frequency of gathering with friends or relatives Patient declined   Worry food won't last until get money to buy more No   Food not last or not have enough money for food? No   Do you have housing? (Housing is defined as stable permanent housing and does not include staying ouside in a car, in a tent, in an abandoned building, in an overnight shelter, or couch-surfing.) Yes   Are you worried about losing your housing? No   Lack of transportation? No   Unable to get utilities (heat,electricity)? No            10/4/2024   Fall Risk   Fallen 2 or more times in the past year? No    No   Trouble with walking or balance? No    No       Multiple values from one day are sorted in reverse-chronological order          10/4/2024   Activities of Daily Living- Home Safety   Needs help with the following daily activites None of the above   Safety concerns in the home None of the above            10/4/2024   Dental   Dentist two times every year? (!) DECLINE            10/4/2024   Hearing Screening   Hearing  concerns? None of the above            10/4/2024   Driving Risk Screening   Patient/family members have concerns about driving No            10/4/2024   General Alertness/Fatigue Screening   Have you been more tired than usual lately? No            10/4/2024   Urinary Incontinence Screening   Bothered by leaking urine in past 6 months No            10/4/2024   TB Screening   Were you born outside of the [place]? No            Today's PHQ-2 Score:       10/8/2024     2:29 PM   PHQ-2 (  [employer])   Q1: Little interest or pleasure in doing things 0   Q2: Feeling down, depressed or hopeless 0   PHQ-2 Score 0   Q1: Little interest or pleasure in doing things Not at all   Q2: Feeling down, depressed or hopeless Not at all   PHQ-2 Score 0           10/4/2024   Substance Use   Alcohol more than 3/day or more than 7/wk Not Applicable   Do you have a current opioid prescription? No   How severe/bad is pain from 1 to 10? 0/10 (No Pain)   Do you use any other substances recreationally? No        Social History     Tobacco Use    Smoking status: Former     Current packs/day: 0.00     Average packs/day: 1 pack/day for 17.0 years (17.0 ttl pk-yrs)     Types: Cigarettes     Start date: 3/15/1982     Quit date: 3/15/1999     Years since quittin.5     Passive exposure: Past    Smokeless tobacco: Never   Vaping Use    Vaping status: Never Used   Substance Use Topics    Alcohol use: Not Currently     Alcohol/week: 4.0 standard drinks of alcohol     Types: 4 Glasses of wine per week    Drug use: No           2023   LAST FHS-7 RESULTS   1st degree relative breast or ovarian cancer Yes   Any relative bilateral breast cancer No   Any male have breast cancer No   Any ONE woman have BOTH breast AND ovarian cancer No   Any woman with breast cancer before 50yrs No   2 or more relatives with breast AND/OR ovarian cancer No   2 or more relatives with breast AND/OR bowel cancer No        Mammogram Screening - Annual screen due to history  of breast cancer, carcinoma in situ, or hyperplasia    ASCVD Risk   The 10-year ASCVD risk score (María LOCK, et al., 2019) is: 24.8%    Values used to calculate the score:      Age: 73 years      Sex: Female      Is Non- : No      Diabetic: No      Tobacco smoker: No      Systolic Blood Pressure: 148 mmHg      Is BP treated: Yes      HDL Cholesterol: 39 mg/dL      Total Cholesterol: 196 mg/dL      Reviewed and updated as needed this visit by Provider                      Current providers sharing in care for this patient include:  Patient Care Team:  Virginie Carter MD as PCP - General (Family Medicine)  Chuyita Zaidi MD as Assigned PCP  Marlena Rogers PA-C (Inactive) as Physician Assistant (Gastroenterology)  Jennifer Jose MD as MD (Medical Oncology)  Naomy Zaidi DO as Physician (Internal Medicine)  Sami Murphy MD as MD (Occupational Medicine)  Naomy Zaidi DO as Assigned Cancer Care Provider  Hosea Minaya MD as Physician (Neurology)  Austin Blood MD as Assigned Musculoskeletal Provider  Julian Horvath MD as MD (Radiation Oncology)  Naomy Zaidi DO as Physician (Internal Medicine)  Dusty Robin MD as Assigned Rheumatology Provider  Morteza Nguyen MD as MD (Hematology & Oncology)  Pepper Bartholomew, RN as Specialty Care Coordinator (Hematology & Oncology)    The following health maintenance items are reviewed in Epic and correct as of today:  Health Maintenance   Topic Date Due    URINE DRUG SCREEN  Never done    RSV VACCINE (1 - Risk 60-74 years 1-dose series) Never done    ANNUAL REVIEW OF HM ORDERS  09/26/2024    MAMMO SCREENING  09/18/2024    MEDICARE ANNUAL WELLNESS VISIT  09/26/2024    COVID-19 Vaccine (8 - 2024-25 season) 11/14/2024    BMP  03/22/2025    LIPID  05/09/2025    FALL RISK ASSESSMENT  10/09/2025    DTAP/TDAP/TD IMMUNIZATION (3 - Td or Tdap) 03/28/2026    GLUCOSE  03/22/2027    ADVANCE CARE PLANNING   "09/26/2028    COLORECTAL CANCER SCREENING  10/04/2031    DEXA  10/14/2037    HEPATITIS C SCREENING  Completed    PHQ-2 (once per calendar year)  Completed    INFLUENZA VACCINE  Completed    Pneumococcal Vaccine: 65+ Years  Completed    ZOSTER IMMUNIZATION  Completed    HPV IMMUNIZATION  Aged Out    MENINGITIS IMMUNIZATION  Aged Out    RSV MONOCLONAL ANTIBODY  Aged Out         Review of Systems  Constitutional, HEENT, cardiovascular, pulmonary, gi and gu systems are negative, except as otherwise noted.     Objective    Exam  BP (!) 148/77   Pulse 91   Temp 97.9  F (36.6  C) (Oral)   Resp 17   Ht 1.626 m (5' 4\")   Wt 67.1 kg (148 lb)   SpO2 95%   BMI 25.40 kg/m     Estimated body mass index is 25.4 kg/m  as calculated from the following:    Height as of this encounter: 1.626 m (5' 4\").    Weight as of this encounter: 67.1 kg (148 lb).    Physical Exam  GENERAL: alert and no distress  EYES: Eyes grossly normal to inspection, PERRL and conjunctivae and sclerae normal  HENT: ear canals and TM's normal, nose and mouth without ulcers or lesions  NECK: no adenopathy, no asymmetry, masses, or scars  RESP: lungs clear to auscultation - no rales, rhonchi or wheezes  CV: regular rate and rhythm, normal S1 S2, no S3 or S4, no murmur, click or rub, no peripheral edema  ABDOMEN: soft, nontender, no hepatosplenomegaly, no masses and bowel sounds normal  MS: no gross musculoskeletal defects noted, no edema  SKIN: no suspicious lesions or rashes  NEURO: Normal strength and tone, mentation intact and speech normal  PSYCH: mentation appears normal, affect normal/bright        10/9/2024   Mini Cog   Mini-Cog Not Completed (choose reason) Patient declines          Patient declines, there are NO concerns for cognitive deficits.           Signed Electronically by: Virginie Carter MD    "

## 2024-10-10 ENCOUNTER — LAB (OUTPATIENT)
Dept: LAB | Facility: CLINIC | Age: 74
End: 2024-10-10
Payer: MEDICARE

## 2024-10-10 DIAGNOSIS — R73.09 ELEVATED GLUCOSE: ICD-10-CM

## 2024-10-10 DIAGNOSIS — I10 HYPERTENSION GOAL BP (BLOOD PRESSURE) < 140/90: ICD-10-CM

## 2024-10-10 DIAGNOSIS — Z79.899 OTHER LONG TERM (CURRENT) DRUG THERAPY: ICD-10-CM

## 2024-10-10 DIAGNOSIS — C81.10 NODULAR SCLEROSIS CLASSICAL HODGKIN LYMPHOMA (H): ICD-10-CM

## 2024-10-10 LAB
ANION GAP SERPL CALCULATED.3IONS-SCNC: 12 MMOL/L (ref 7–15)
BASOPHILS # BLD AUTO: 0 10E3/UL (ref 0–0.2)
BASOPHILS NFR BLD AUTO: 1 %
BUN SERPL-MCNC: 13.3 MG/DL (ref 8–23)
CALCIUM SERPL-MCNC: 9.7 MG/DL (ref 8.8–10.4)
CHLORIDE SERPL-SCNC: 100 MMOL/L (ref 98–107)
CHOLEST SERPL-MCNC: 299 MG/DL
CREAT SERPL-MCNC: 0.53 MG/DL (ref 0.51–0.95)
EGFRCR SERPLBLD CKD-EPI 2021: >90 ML/MIN/1.73M2
EOSINOPHIL # BLD AUTO: 0.1 10E3/UL (ref 0–0.7)
EOSINOPHIL NFR BLD AUTO: 1 %
ERYTHROCYTE [DISTWIDTH] IN BLOOD BY AUTOMATED COUNT: 16.5 % (ref 10–15)
FASTING STATUS PATIENT QL REPORTED: YES
FASTING STATUS PATIENT QL REPORTED: YES
GLUCOSE SERPL-MCNC: 139 MG/DL (ref 70–99)
HCO3 SERPL-SCNC: 24 MMOL/L (ref 22–29)
HCT VFR BLD AUTO: 38.4 % (ref 35–47)
HDLC SERPL-MCNC: 33 MG/DL
HGB BLD-MCNC: 12.6 G/DL (ref 11.7–15.7)
IMM GRANULOCYTES # BLD: 0 10E3/UL
IMM GRANULOCYTES NFR BLD: 0 %
LDLC SERPL CALC-MCNC: ABNORMAL MG/DL
LDLC SERPL DIRECT ASSAY-MCNC: 125 MG/DL
LYMPHOCYTES # BLD AUTO: 1.5 10E3/UL (ref 0.8–5.3)
LYMPHOCYTES NFR BLD AUTO: 18 %
MCH RBC QN AUTO: 29 PG (ref 26.5–33)
MCHC RBC AUTO-ENTMCNC: 32.8 G/DL (ref 31.5–36.5)
MCV RBC AUTO: 88 FL (ref 78–100)
MONOCYTES # BLD AUTO: 0.8 10E3/UL (ref 0–1.3)
MONOCYTES NFR BLD AUTO: 9 %
NEUTROPHILS # BLD AUTO: 6 10E3/UL (ref 1.6–8.3)
NEUTROPHILS NFR BLD AUTO: 72 %
NONHDLC SERPL-MCNC: 266 MG/DL
PLATELET # BLD AUTO: 356 10E3/UL (ref 150–450)
POTASSIUM SERPL-SCNC: 5.2 MMOL/L (ref 3.4–5.3)
RBC # BLD AUTO: 4.35 10E6/UL (ref 3.8–5.2)
SODIUM SERPL-SCNC: 136 MMOL/L (ref 135–145)
TRIGL SERPL-MCNC: 711 MG/DL
WBC # BLD AUTO: 8.4 10E3/UL (ref 4–11)

## 2024-10-10 PROCEDURE — 36415 COLL VENOUS BLD VENIPUNCTURE: CPT

## 2024-10-10 PROCEDURE — 83721 ASSAY OF BLOOD LIPOPROTEIN: CPT

## 2024-10-10 PROCEDURE — 83036 HEMOGLOBIN GLYCOSYLATED A1C: CPT

## 2024-10-10 PROCEDURE — 85025 COMPLETE CBC W/AUTO DIFF WBC: CPT

## 2024-10-10 PROCEDURE — 80061 LIPID PANEL: CPT

## 2024-10-10 PROCEDURE — 80048 BASIC METABOLIC PNL TOTAL CA: CPT

## 2024-10-11 ENCOUNTER — MYC MEDICAL ADVICE (OUTPATIENT)
Dept: FAMILY MEDICINE | Facility: CLINIC | Age: 74
End: 2024-10-11
Payer: MEDICARE

## 2024-10-11 DIAGNOSIS — E78.5 HYPERLIPIDEMIA LDL GOAL <160: Primary | ICD-10-CM

## 2024-10-11 LAB
EST. AVERAGE GLUCOSE BLD GHB EST-MCNC: 143 MG/DL
HBA1C MFR BLD: 6.6 % (ref 0–5.6)

## 2024-10-13 PROBLEM — E11.9 DIABETES MELLITUS, TYPE 2 (H): Status: ACTIVE | Noted: 2024-10-13

## 2024-10-13 RX ORDER — LANCETS
EACH MISCELLANEOUS
Qty: 100 EACH | Refills: 6 | Status: SHIPPED | OUTPATIENT
Start: 2024-10-13

## 2024-10-14 ENCOUNTER — MYC MEDICAL ADVICE (OUTPATIENT)
Dept: FAMILY MEDICINE | Facility: CLINIC | Age: 74
End: 2024-10-14
Payer: MEDICARE

## 2024-10-24 ENCOUNTER — LAB (OUTPATIENT)
Dept: LAB | Facility: CLINIC | Age: 74
End: 2024-10-24
Payer: MEDICARE

## 2024-10-24 DIAGNOSIS — C81.10 NODULAR SCLEROSIS CLASSICAL HODGKIN LYMPHOMA (H): ICD-10-CM

## 2024-10-24 DIAGNOSIS — Z79.899 OTHER LONG TERM (CURRENT) DRUG THERAPY: ICD-10-CM

## 2024-10-24 LAB
BASOPHILS # BLD AUTO: 0 10E3/UL (ref 0–0.2)
BASOPHILS NFR BLD AUTO: 1 %
EOSINOPHIL # BLD AUTO: 0.1 10E3/UL (ref 0–0.7)
EOSINOPHIL NFR BLD AUTO: 1 %
ERYTHROCYTE [DISTWIDTH] IN BLOOD BY AUTOMATED COUNT: 17 % (ref 10–15)
HCT VFR BLD AUTO: 42.5 % (ref 35–47)
HGB BLD-MCNC: 13.9 G/DL (ref 11.7–15.7)
IMM GRANULOCYTES # BLD: 0 10E3/UL
IMM GRANULOCYTES NFR BLD: 0 %
LYMPHOCYTES # BLD AUTO: 1.8 10E3/UL (ref 0.8–5.3)
LYMPHOCYTES NFR BLD AUTO: 22 %
MCH RBC QN AUTO: 28.8 PG (ref 26.5–33)
MCHC RBC AUTO-ENTMCNC: 32.7 G/DL (ref 31.5–36.5)
MCV RBC AUTO: 88 FL (ref 78–100)
MONOCYTES # BLD AUTO: 0.8 10E3/UL (ref 0–1.3)
MONOCYTES NFR BLD AUTO: 11 %
NEUTROPHILS # BLD AUTO: 5.2 10E3/UL (ref 1.6–8.3)
NEUTROPHILS NFR BLD AUTO: 66 %
PLATELET # BLD AUTO: 309 10E3/UL (ref 150–450)
RBC # BLD AUTO: 4.82 10E6/UL (ref 3.8–5.2)
WBC # BLD AUTO: 7.9 10E3/UL (ref 4–11)

## 2024-10-24 PROCEDURE — 36415 COLL VENOUS BLD VENIPUNCTURE: CPT

## 2024-10-24 PROCEDURE — 85025 COMPLETE CBC W/AUTO DIFF WBC: CPT

## 2024-10-26 ENCOUNTER — MYC MEDICAL ADVICE (OUTPATIENT)
Dept: FAMILY MEDICINE | Facility: CLINIC | Age: 74
End: 2024-10-26
Payer: MEDICARE

## 2024-10-26 DIAGNOSIS — E78.5 HYPERLIPIDEMIA LDL GOAL <160: Primary | ICD-10-CM

## 2024-10-31 ENCOUNTER — LAB (OUTPATIENT)
Dept: LAB | Facility: CLINIC | Age: 74
End: 2024-10-31
Payer: MEDICARE

## 2024-10-31 DIAGNOSIS — Z79.899 OTHER LONG TERM (CURRENT) DRUG THERAPY: ICD-10-CM

## 2024-10-31 DIAGNOSIS — C81.10 NODULAR SCLEROSIS CLASSICAL HODGKIN LYMPHOMA (H): ICD-10-CM

## 2024-10-31 DIAGNOSIS — E78.5 HYPERLIPIDEMIA LDL GOAL <160: ICD-10-CM

## 2024-10-31 LAB
BASOPHILS # BLD AUTO: 0.1 10E3/UL (ref 0–0.2)
BASOPHILS NFR BLD AUTO: 1 %
CHOLEST SERPL-MCNC: 129 MG/DL
EOSINOPHIL # BLD AUTO: 0.3 10E3/UL (ref 0–0.7)
EOSINOPHIL NFR BLD AUTO: 4 %
ERYTHROCYTE [DISTWIDTH] IN BLOOD BY AUTOMATED COUNT: 17.1 % (ref 10–15)
FASTING STATUS PATIENT QL REPORTED: YES
HCT VFR BLD AUTO: 39.5 % (ref 35–47)
HDLC SERPL-MCNC: 35 MG/DL
HGB BLD-MCNC: 13.1 G/DL (ref 11.7–15.7)
IMM GRANULOCYTES # BLD: 0 10E3/UL
IMM GRANULOCYTES NFR BLD: 0 %
LDLC SERPL CALC-MCNC: 71 MG/DL
LYMPHOCYTES # BLD AUTO: 1.6 10E3/UL (ref 0.8–5.3)
LYMPHOCYTES NFR BLD AUTO: 21 %
MCH RBC QN AUTO: 29.2 PG (ref 26.5–33)
MCHC RBC AUTO-ENTMCNC: 33.2 G/DL (ref 31.5–36.5)
MCV RBC AUTO: 88 FL (ref 78–100)
MONOCYTES # BLD AUTO: 0.7 10E3/UL (ref 0–1.3)
MONOCYTES NFR BLD AUTO: 9 %
NEUTROPHILS # BLD AUTO: 5.3 10E3/UL (ref 1.6–8.3)
NEUTROPHILS NFR BLD AUTO: 66 %
NONHDLC SERPL-MCNC: 94 MG/DL
PLATELET # BLD AUTO: 321 10E3/UL (ref 150–450)
RBC # BLD AUTO: 4.48 10E6/UL (ref 3.8–5.2)
TRIGL SERPL-MCNC: 116 MG/DL
WBC # BLD AUTO: 8 10E3/UL (ref 4–11)

## 2024-10-31 PROCEDURE — 36415 COLL VENOUS BLD VENIPUNCTURE: CPT

## 2024-10-31 PROCEDURE — 85025 COMPLETE CBC W/AUTO DIFF WBC: CPT

## 2024-10-31 PROCEDURE — 80061 LIPID PANEL: CPT

## 2024-11-06 ENCOUNTER — VIRTUAL VISIT (OUTPATIENT)
Dept: EDUCATION SERVICES | Facility: CLINIC | Age: 74
End: 2024-11-06
Attending: FAMILY MEDICINE
Payer: MEDICARE

## 2024-11-06 DIAGNOSIS — E11.9 DIABETES MELLITUS, TYPE 2 (H): Primary | ICD-10-CM

## 2024-11-06 DIAGNOSIS — E11.9 TYPE 2 DIABETES MELLITUS WITHOUT COMPLICATION, WITHOUT LONG-TERM CURRENT USE OF INSULIN (H): ICD-10-CM

## 2024-11-06 PROCEDURE — 99207 PR NO CHARGE LOS: CPT | Mod: 93 | Performed by: DIETITIAN, REGISTERED

## 2024-11-06 ASSESSMENT — PATIENT HEALTH QUESTIONNAIRE - PHQ9: SUM OF ALL RESPONSES TO PHQ QUESTIONS 1-9: 14

## 2024-11-06 NOTE — LETTER
11/6/2024         RE: Virginia Byers  [id number] [address] [facility] [address]        Dear Colleague,    Thank you for referring your patient, Virginia Byers, to the [facility]. Please see a copy of my visit note below.    Diabetes Education Follow-up    Subjective/Objective:    Virginia Byers was contacted today to get her scheduled with a group class for a new diagnosis of diabetes or a 1:1 education class.    Patient was newly diagnosed on 10/10/24 with an A1C of 6.6%    Assessment:    Virginia is overwhelmed with her treatments for her cancer and prefers to hold off on further education at this time. Follow up order placed and she will schedule when she feels she can.    Plan/Response:  Schedule education after chemotherapy completed.

## 2024-11-06 NOTE — PROGRESS NOTES
Diabetes Education Follow-up    Subjective/Objective:    Virginia Byers was contacted today to get her scheduled with a group class for a new diagnosis of diabetes or a 1:1 education class.    Patient was newly diagnosed on 10/10/24 with an A1C of 6.6%    Assessment:    Virginia is overwhelmed with her treatments for her cancer and prefers to hold off on further education at this time. Follow up order placed and she will schedule when she feels she can.    Plan/Response:  Schedule education after chemotherapy completed.

## 2024-11-14 ENCOUNTER — LAB (OUTPATIENT)
Dept: LAB | Facility: CLINIC | Age: 74
End: 2024-11-14
Payer: MEDICARE

## 2024-11-14 DIAGNOSIS — C81.10 NODULAR SCLEROSIS CLASSICAL HODGKIN LYMPHOMA (H): ICD-10-CM

## 2024-11-14 DIAGNOSIS — Z79.899 OTHER LONG TERM (CURRENT) DRUG THERAPY: ICD-10-CM

## 2024-11-14 LAB
BASOPHILS # BLD AUTO: 0.1 10E3/UL (ref 0–0.2)
BASOPHILS NFR BLD AUTO: 1 %
EOSINOPHIL # BLD AUTO: 0.8 10E3/UL (ref 0–0.7)
EOSINOPHIL NFR BLD AUTO: 10 %
ERYTHROCYTE [DISTWIDTH] IN BLOOD BY AUTOMATED COUNT: 17.1 % (ref 10–15)
HCT VFR BLD AUTO: 42.3 % (ref 35–47)
HGB BLD-MCNC: 13.7 G/DL (ref 11.7–15.7)
IMM GRANULOCYTES # BLD: 0 10E3/UL
IMM GRANULOCYTES NFR BLD: 0 %
LYMPHOCYTES # BLD AUTO: 1.9 10E3/UL (ref 0.8–5.3)
LYMPHOCYTES NFR BLD AUTO: 25 %
MCH RBC QN AUTO: 28.7 PG (ref 26.5–33)
MCHC RBC AUTO-ENTMCNC: 32.4 G/DL (ref 31.5–36.5)
MCV RBC AUTO: 89 FL (ref 78–100)
MONOCYTES # BLD AUTO: 0.7 10E3/UL (ref 0–1.3)
MONOCYTES NFR BLD AUTO: 9 %
NEUTROPHILS # BLD AUTO: 4.4 10E3/UL (ref 1.6–8.3)
NEUTROPHILS NFR BLD AUTO: 56 %
PLATELET # BLD AUTO: 287 10E3/UL (ref 150–450)
RBC # BLD AUTO: 4.77 10E6/UL (ref 3.8–5.2)
WBC # BLD AUTO: 7.8 10E3/UL (ref 4–11)

## 2024-11-14 PROCEDURE — 85025 COMPLETE CBC W/AUTO DIFF WBC: CPT

## 2024-11-14 PROCEDURE — 36415 COLL VENOUS BLD VENIPUNCTURE: CPT

## 2024-11-21 ENCOUNTER — LAB (OUTPATIENT)
Dept: LAB | Facility: CLINIC | Age: 74
End: 2024-11-21
Payer: MEDICARE

## 2024-11-21 DIAGNOSIS — C81.10 NODULAR SCLEROSIS CLASSICAL HODGKIN LYMPHOMA (H): ICD-10-CM

## 2024-11-21 DIAGNOSIS — Z79.899 OTHER LONG TERM (CURRENT) DRUG THERAPY: ICD-10-CM

## 2024-11-21 LAB
BASOPHILS # BLD AUTO: 0.1 10E3/UL (ref 0–0.2)
BASOPHILS NFR BLD AUTO: 1 %
EOSINOPHIL # BLD AUTO: 0.7 10E3/UL (ref 0–0.7)
EOSINOPHIL NFR BLD AUTO: 9 %
ERYTHROCYTE [DISTWIDTH] IN BLOOD BY AUTOMATED COUNT: 17.3 % (ref 10–15)
HCT VFR BLD AUTO: 40.3 % (ref 35–47)
HGB BLD-MCNC: 13.4 G/DL (ref 11.7–15.7)
IMM GRANULOCYTES # BLD: 0 10E3/UL
IMM GRANULOCYTES NFR BLD: 0 %
LYMPHOCYTES # BLD AUTO: 1.6 10E3/UL (ref 0.8–5.3)
LYMPHOCYTES NFR BLD AUTO: 20 %
MCH RBC QN AUTO: 29.4 PG (ref 26.5–33)
MCHC RBC AUTO-ENTMCNC: 33.3 G/DL (ref 31.5–36.5)
MCV RBC AUTO: 88 FL (ref 78–100)
MONOCYTES # BLD AUTO: 0.6 10E3/UL (ref 0–1.3)
MONOCYTES NFR BLD AUTO: 8 %
NEUTROPHILS # BLD AUTO: 5.3 10E3/UL (ref 1.6–8.3)
NEUTROPHILS NFR BLD AUTO: 64 %
PLATELET # BLD AUTO: 314 10E3/UL (ref 150–450)
RBC # BLD AUTO: 4.56 10E6/UL (ref 3.8–5.2)
WBC # BLD AUTO: 8.3 10E3/UL (ref 4–11)

## 2024-12-16 ENCOUNTER — TELEPHONE (OUTPATIENT)
Dept: FAMILY MEDICINE | Facility: CLINIC | Age: 74
End: 2024-12-16
Payer: MEDICARE

## 2024-12-16 NOTE — TELEPHONE ENCOUNTER
Signed Application for Disability Parking Certificate mailed to patient's home address.  Copy sent to scanning.  Casandra BOJORQUEZ    [facility]

## 2024-12-16 NOTE — TELEPHONE ENCOUNTER
Forms/Letter Request    Type of form/letter: DMV/Handicap Parking      Is Release of Information needed?: No    Do we have the form/letter: Yes:     Who is the form from? Patient    Where did/will the form come from? Patient or family brought in       When is form/letter needed by:     How would you like the form/letter returned: Mail  Is this the correct address?: Yes  [id number] [facility] [address]    Patient Notified form requests are processed in 5-7 business days:Yes    Could we send this information to you in [employer] or would you prefer to receive a phone call?:   Patient would prefer a phone call   Okay to leave a detailed message?: Yes at Cell number on file:    Telephone Information:   Mobile [phone]

## 2025-01-05 ENCOUNTER — HEALTH MAINTENANCE LETTER (OUTPATIENT)
Age: 75
End: 2025-01-05

## 2025-01-07 ENCOUNTER — VIRTUAL VISIT (OUTPATIENT)
Dept: FAMILY MEDICINE | Facility: CLINIC | Age: 75
End: 2025-01-07
Payer: MEDICARE

## 2025-01-07 DIAGNOSIS — C77.0 METASTASIS TO SUPRACLAVICULAR LYMPH NODE (H): ICD-10-CM

## 2025-01-07 DIAGNOSIS — E11.9 TYPE 2 DIABETES MELLITUS WITHOUT COMPLICATION, WITHOUT LONG-TERM CURRENT USE OF INSULIN (H): Primary | ICD-10-CM

## 2025-01-07 DIAGNOSIS — C82.90 FOLLICULAR LYMPHOMA, UNSPECIFIED FOLLICULAR LYMPHOMA TYPE, UNSPECIFIED BODY REGION (H): ICD-10-CM

## 2025-01-07 DIAGNOSIS — S82.091D OTHER CLOSED FRACTURE OF RIGHT PATELLA WITH ROUTINE HEALING, SUBSEQUENT ENCOUNTER: ICD-10-CM

## 2025-01-07 DIAGNOSIS — S62.102S LEFT WRIST FRACTURE, SEQUELA: ICD-10-CM

## 2025-01-07 PROCEDURE — 99214 OFFICE O/P EST MOD 30 MIN: CPT | Performed by: FAMILY MEDICINE

## 2025-01-07 PROCEDURE — G2211 COMPLEX E/M VISIT ADD ON: HCPCS | Performed by: FAMILY MEDICINE

## 2025-01-07 NOTE — PROGRESS NOTES
Virginia is a 74 year old who is being evaluated via a billable video visit.    How would you like to obtain your AVS? MyChart  If the video visit is dropped, the invitation should be resent by: Text to cell phone: [phone]  Will anyone else be joining your video visit? No      Assessment & Plan     Type 2 diabetes mellitus without complication, without long-term current use of insulin (H)  Pt checking BSs, all normal per pt. Will reassess when done with chemo, elevated BSs may be due to cancer treatment. She is done with chemo in February    Left wrist fracture, sequela  Per ortho    Other closed fracture of right patella with routine healing, subsequent encounter  Per ortho    Follicular lymphoma, unspecified follicular lymphoma type, unspecified body region (H)  Per oncology    The longitudinal plan of care for the diagnosis(es)/condition(s) as documented were addressed during this visit. Due to the added complexity in care, I will continue to support Virginia in the subsequent management and with ongoing continuity of care.      MED REC REQUIRED  Post Medication Reconciliation Status: discharge medications reconciled and changed, per note/orders        Subjective   Virginia is a 74 year old, presenting for the following health issues:  Hospital F/U      1/7/2025     8:23 AM   Additional Questions   Roomed by tierney   Accompanied by siobhan patel (care taker)     Video Start Time:  9:00    HPI       Hospital Follow-up Visit:    Hospital/Nursing Home/IP Rehab Facility:  [facility]  Date of Admission: 12/12/24  Date of Discharge: 12/14/24  Reason(s) for Admission: closed colles' fracture of left radius , fracture of patella (right)  Was the patient in the ICU or did the patient experience delirium during hospitalization?  No  Do you have any other stressors you would like to discuss with your provider? No    Problems taking medications regularly:  None  Medication changes since discharge: None  Problems adhering to  non-medication therapy:  None    Summary of hospitalization:  See outside records, reviewed and scanned  Diagnostic Tests/Treatments reviewed.  Follow up needed: with ortho  Other Healthcare Providers Involved in Patient s Care:         Specialist appointment - oncology and Surgical follow-up appointment - ortho  Update since discharge: improved.         Plan of care communicated with patient         Pt with recent trip over rug last month  Had left wrist and right patella fx  Had surgery on wrist  Has follow-up with ortho in 3 weeks and hopes to get cast off  She is still seeing oncology  No signs of cancer per pt  To complete study, pt has 3 more sessions of chemo and she is done    Pt diagnosed with diabetes in October. She was going through chemo at that time as well  She will finish chemo end of next month  She has been checking her BSs and they have been normal  She did a phone visit with the diab ed and is comfortable with this  Suspect chemo may be contributing to elevated BSs so will reassess when done with chemo    Reviewed hospital notes and labwork        Review of Systems  Constitutional, HEENT, cardiovascular, pulmonary, gi and gu systems are negative, except as otherwise noted.      Objective           Vitals:  No vitals were obtained today due to virtual visit.    Physical Exam   GENERAL: alert and no distress  EYES: Eyes grossly normal to inspection.  No discharge or erythema, or obvious scleral/conjunctival abnormalities.  RESP: No audible wheeze, cough, or visible cyanosis.    SKIN: Visible skin clear. No significant rash, abnormal pigmentation or lesions.  NEURO: Cranial nerves grossly intact.  Mentation and speech appropriate for age.  PSYCH: Appropriate affect, tone, and pace of words    No results found for this or any previous visit (from the past 24 hours).      Video-Visit Details    Type of service:  Video Visit   Video End Time: 9:20  Originating Location (pt. Location): Home    Distant  Location (provider location):  On-site  Platform used for Video Visit: D[employer]imsh[employer]  Signed Electronically by: Virginie Carter MD

## 2025-02-08 ENCOUNTER — HEALTH MAINTENANCE LETTER (OUTPATIENT)
Age: 75
End: 2025-02-08

## 2025-03-17 ENCOUNTER — PATIENT OUTREACH (OUTPATIENT)
Dept: CARE COORDINATION | Facility: CLINIC | Age: 75
End: 2025-03-17
Payer: MEDICARE

## 2025-03-27 ENCOUNTER — MYC MEDICAL ADVICE (OUTPATIENT)
Dept: ORTHOPEDICS | Facility: CLINIC | Age: 75
End: 2025-03-27
Payer: MEDICARE

## 2025-03-27 DIAGNOSIS — Z96.649 STATUS POST TOTAL REPLACEMENT OF HIP, UNSPECIFIED LATERALITY: Primary | ICD-10-CM

## 2025-03-27 RX ORDER — AMOXICILLIN 500 MG/1
CAPSULE ORAL
Qty: 4 CAPSULE | Refills: 0 | Status: SHIPPED | OUTPATIENT
Start: 2025-03-27

## 2025-04-14 ENCOUNTER — OFFICE VISIT (OUTPATIENT)
Dept: FAMILY MEDICINE | Facility: CLINIC | Age: 75
End: 2025-04-14
Payer: MEDICARE

## 2025-04-14 VITALS
OXYGEN SATURATION: 97 % | DIASTOLIC BLOOD PRESSURE: 69 MMHG | RESPIRATION RATE: 18 BRPM | WEIGHT: 138 LBS | BODY MASS INDEX: 23.56 KG/M2 | HEART RATE: 69 BPM | HEIGHT: 64 IN | SYSTOLIC BLOOD PRESSURE: 121 MMHG | TEMPERATURE: 97.8 F

## 2025-04-14 DIAGNOSIS — E11.9 TYPE 2 DIABETES MELLITUS WITHOUT COMPLICATION, WITHOUT LONG-TERM CURRENT USE OF INSULIN (H): ICD-10-CM

## 2025-04-14 DIAGNOSIS — L72.9 SCALP CYST: Primary | ICD-10-CM

## 2025-04-14 DIAGNOSIS — G62.0 DRUG-INDUCED POLYNEUROPATHY: ICD-10-CM

## 2025-04-14 LAB
CREAT UR-MCNC: 90.2 MG/DL
EST. AVERAGE GLUCOSE BLD GHB EST-MCNC: 120 MG/DL
HBA1C MFR BLD: 5.8 % (ref 0–5.6)
MICROALBUMIN UR-MCNC: <12 MG/L
MICROALBUMIN/CREAT UR: NORMAL MG/G{CREAT}

## 2025-04-14 PROCEDURE — 83036 HEMOGLOBIN GLYCOSYLATED A1C: CPT | Performed by: FAMILY MEDICINE

## 2025-04-14 PROCEDURE — 82570 ASSAY OF URINE CREATININE: CPT | Performed by: FAMILY MEDICINE

## 2025-04-14 PROCEDURE — G2211 COMPLEX E/M VISIT ADD ON: HCPCS | Performed by: FAMILY MEDICINE

## 2025-04-14 PROCEDURE — 3078F DIAST BP <80 MM HG: CPT | Performed by: FAMILY MEDICINE

## 2025-04-14 PROCEDURE — 36415 COLL VENOUS BLD VENIPUNCTURE: CPT | Performed by: FAMILY MEDICINE

## 2025-04-14 PROCEDURE — 99213 OFFICE O/P EST LOW 20 MIN: CPT | Performed by: FAMILY MEDICINE

## 2025-04-14 PROCEDURE — 3074F SYST BP LT 130 MM HG: CPT | Performed by: FAMILY MEDICINE

## 2025-04-14 PROCEDURE — 1126F AMNT PAIN NOTED NONE PRSNT: CPT | Performed by: FAMILY MEDICINE

## 2025-04-14 PROCEDURE — 82043 UR ALBUMIN QUANTITATIVE: CPT | Performed by: FAMILY MEDICINE

## 2025-04-14 RX ORDER — ASPIRIN 81 MG/1
81 TABLET ORAL DAILY
Status: SHIPPED
Start: 2025-04-14

## 2025-04-14 ASSESSMENT — PAIN SCALES - GENERAL: PAINLEVEL_OUTOF10: NO PAIN (0)

## 2025-04-14 NOTE — PROGRESS NOTES
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 She has fallen due to this  Plan:             Subjective   Virginia is a 74 year old, presenting for the following health issues:  Mass (On head/)      4/14/2025     9:11 AM   Additional Questions   Roomed by tierney   Accompanied by self     Mass    History of Present Illness       Reason for visit:  Check on a lump developed above my left ear on my head   She is taking medications regularly.      Doing well with diabetes with diet and exercise  Is on statin and arb    Has cyst on left side of scalp  She just wants to make sure it is nothing to worry about    Pt with numb feet due to neuropathy.  She is hoping they will improve  She uses a cane, she broke her forearm due to fall from this  She still has some swelling around the wrist    The longitudinal plan of care for the diagnosis(es)/condition(s) as documented were addressed during this visit. Due to the added complexity in care, I will continue to support Virginia in the subsequent management and with ongoing continuity of care.          Review of Systems  Constitutional, HEENT, cardiovascular, pulmonary, gi and gu systems are negative, except as otherwise noted.      Objective    /69   Pulse 69   Temp 97.8  F (36.6  C) (Oral)   Resp 18   Ht 1.626 m (5' 4\")   Wt 62.6 kg (138 lb)   SpO2 97%   BMI 23.69 kg/m    Body mass index is 23.69 kg/m .  Physical Exam   GENERAL: alert and no distress  RESP: lungs clear to auscultation - no rales, " rhonchi or wheezes  CV: regular rate and rhythm, normal S1 S2, no S3 or S4, no murmur, click or rub, no peripheral edema   ABDOMEN: soft, nontender, no hepatosplenomegaly, no masses and bowel sounds normal  SKIN: no suspicious lesions or rashes and cyst on left side of scalp, nontender to palpation    Results for orders placed or performed in visit on 04/14/25 (from the past 24 hours)   HEMOGLOBIN A1C   Result Value Ref Range    Estimated Average Glucose 120 (H) <117 mg/dL    Hemoglobin A1C 5.8 (H) 0.0 - 5.6 %           Signed Electronically by: Virginie Carter MD

## 2025-07-01 DIAGNOSIS — E78.5 HYPERLIPIDEMIA LDL GOAL <160: ICD-10-CM

## 2025-07-01 DIAGNOSIS — I10 HYPERTENSION GOAL BP (BLOOD PRESSURE) < 140/90: ICD-10-CM

## 2025-07-01 RX ORDER — LOSARTAN POTASSIUM 100 MG/1
100 TABLET ORAL DAILY
Qty: 90 TABLET | Refills: 0 | Status: SHIPPED | OUTPATIENT
Start: 2025-07-01

## 2025-07-01 RX ORDER — ROSUVASTATIN CALCIUM 10 MG/1
10 TABLET, COATED ORAL DAILY
Qty: 90 TABLET | Refills: 0 | Status: SHIPPED | OUTPATIENT
Start: 2025-07-01

## 2025-08-03 ENCOUNTER — HEALTH MAINTENANCE LETTER (OUTPATIENT)
Age: 75
End: 2025-08-03

## 2025-08-13 ENCOUNTER — OFFICE VISIT (OUTPATIENT)
Dept: DERMATOLOGY | Facility: CLINIC | Age: 75
End: 2025-08-13
Payer: MEDICARE

## 2025-08-13 DIAGNOSIS — L72.9 SCALP CYST: ICD-10-CM

## 2025-08-13 DIAGNOSIS — D48.5 NEOPLASM OF UNCERTAIN BEHAVIOR OF SKIN: Primary | ICD-10-CM

## 2025-08-29 ENCOUNTER — MYC MEDICAL ADVICE (OUTPATIENT)
Dept: DERMATOLOGY | Facility: CLINIC | Age: 75
End: 2025-08-29
Payer: MEDICARE

## 2025-09-02 LAB
PATH REPORT.COMMENTS IMP SPEC: ABNORMAL
PATH REPORT.COMMENTS IMP SPEC: YES
PATH REPORT.FINAL DX SPEC: ABNORMAL
PATH REPORT.GROSS SPEC: ABNORMAL
PATH REPORT.MICROSCOPIC SPEC OTHER STN: ABNORMAL
PATH REPORT.RELEVANT HX SPEC: ABNORMAL

## (undated) DEVICE — SU DERMABOND ADVANCED .7ML DNX12

## (undated) DEVICE — SU VICRYL 0 CT 36" J358H

## (undated) DEVICE — ESU GROUND PAD ADULT W/CORD E7507

## (undated) DEVICE — BONE CLEANING TIP INTERPULSE  0210-010-000

## (undated) DEVICE — SU VICRYL 2-0 CT-1 27" UND J259H

## (undated) DEVICE — SPONGE LAP 18X18" X8435

## (undated) DEVICE — GOWN IMPERVIOUS SPECIALTY XLG/XLONG 32474

## (undated) DEVICE — PACKING VAG 2 X 15 XRAY DETECT 32-1359

## (undated) DEVICE — SUTURE VICRYL+ 2-0 27IN CT-1 UND VCP259H

## (undated) DEVICE — HOOD SURG T7PLUS PEEL AWAY FACE SHIELD STRL LF 0416-801-100

## (undated) DEVICE — DECANTER VIAL 2006S

## (undated) DEVICE — DRAPE STERI TOWEL LG 1010

## (undated) DEVICE — BLADE SAW RECIP STRK 60X6.27X0.64MM SHORT 0277-096-250

## (undated) DEVICE — ENDO TUBING CO2 SMARTCAP STERILE DISP 100145CO2EXT

## (undated) DEVICE — PLATE GROUNDING ADULT W/CORD 9165L

## (undated) DEVICE — LINEN MAYO STAND COVER OVERSIZE PACK 5458

## (undated) DEVICE — DRILL BIT FLEXIBLE REFLECTION 35MM 71362935

## (undated) DEVICE — DRSG STERI STRIP 1/2X4" R1547

## (undated) DEVICE — HOLDER LIMB VELCRO OR 0814-1533

## (undated) DEVICE — BONE CLEANING TIP INTERPULSE FEMORAL CANAL 0210-008-000

## (undated) DEVICE — ESU ELEC BLADE 6" COATED E1450-6

## (undated) DEVICE — NEEDLE SPINAL DISP 18GA X 3.5" QUINCKE 333350

## (undated) DEVICE — POSITIONER ABDUCTION PILLOW FOAM MED FP-ABDUCTM

## (undated) DEVICE — DRSG TEGADERM ALGINATE AG 4X5" 90303

## (undated) DEVICE — DEVICE RETRIEVAL ROTH NET FB PED 00711057

## (undated) DEVICE — KIT CONNECTOR FOR OLYMPUS ENDOSCOPES DEFENDO 100310

## (undated) DEVICE — SOL NACL 0.9% IRRIG 1000ML BOTTLE 2F7124

## (undated) DEVICE — DRAPE SHEET REV FOLD 3/4 9349

## (undated) DEVICE — SOLUTION IRRIG 2B7127 .9NS 3000ML BAG

## (undated) DEVICE — ENDO PROBE COVER ULTRASOUND BALLOON LATEX  MAJ-249

## (undated) DEVICE — WIPE PREMOIST CLEANSING WASHCLOTHS 7988

## (undated) DEVICE — PACK ENDOSCOPY GI CUSTOM UMMC

## (undated) DEVICE — DRSG KERLIX 4 1/2"X4YDS ROLL 6730

## (undated) DEVICE — SUTURE MONOCRYL 3-0 18 PS2 UND MCP497G

## (undated) DEVICE — DRAPE IOBAN INCISE 36X23" 6651EZ

## (undated) DEVICE — SU MONOCRYL 3-0 PS-1 27" Y936H

## (undated) DEVICE — GLOVE BIOGEL PI INDICATOR 8.0 LF 41680

## (undated) DEVICE — SOL WATER IRRIG 1000ML BOTTLE 2F7114

## (undated) DEVICE — DRSG AQUACEL AG HYDROFIBER  3.5X10" 422605

## (undated) DEVICE — SU ETHIBOND 5 V-37 4X30" MB66G

## (undated) DEVICE — DRSG KERLIX 4 1/2"X4YDS ROLL 6715

## (undated) DEVICE — BLADE SAW RECIP STRK 70X6X0.025MM 0277-096-250S5

## (undated) DEVICE — SUCTION IRR SYSTEM W/O TIP INTERPULSE HANDPIECE 0210-100-000

## (undated) DEVICE — ENDO CAP AND TUBING STERILE FOR ENDOGATOR  100130

## (undated) DEVICE — STRAP KNEE/BODY 31143004

## (undated) DEVICE — DRAPE U-SHAPED ORTHOARTS 60X70" 89331

## (undated) DEVICE — CEMENT PRESSURIZER FEMORAL CANAL MED 0206-546-000

## (undated) DEVICE — CUSTOM PACK TOTAL HIP SOP5BTHHEA

## (undated) DEVICE — STRAP STIRRUP W/SLIP 30187-030

## (undated) DEVICE — BONE CEMENT MIXEVAC HI VAC W/CARTRIDGE 0306-563-000

## (undated) DEVICE — PACK TOTAL HIP W/POUCH RIVERSIDE LATEX FREE

## (undated) DEVICE — KIT ENDO FIRST STEP DISINFECTANT 200ML W/POUCH EP-4

## (undated) DEVICE — ESU PENCIL W/SMOKE EVAC NEPTUNE STRYKER 0703-046-000

## (undated) DEVICE — GLOVE BIOGEL PI MICRO INDICATOR UNDERGLOVE SZ 8.0 48980

## (undated) DEVICE — LINEN ORTHO PACK 5446

## (undated) DEVICE — SUCTION MANIFOLD NEPTUNE 2 SYS 4 PORT 0702-020-000

## (undated) DEVICE — PREP CHLORAPREP 26ML TINTED HI-LITE ORANGE 930815

## (undated) DEVICE — GOWN IMPERVIOUS BREATHABLE 2XL/XLONG

## (undated) DEVICE — A3 SUPPLIES- SEE NURSING INFO PAGE

## (undated) DEVICE — Device

## (undated) DEVICE — SPONGE PACK VAGINAL 2"X9

## (undated) DEVICE — DRSG TEGADERM 4X10" 1627

## (undated) DEVICE — GLOVE BIOGEL PI SZ 7.5 40875

## (undated) DEVICE — SOL NACL 0.9% INJ 1000ML BAG 2B1324X

## (undated) DEVICE — DEVICE RETRIEVER HEWSON 71111579

## (undated) DEVICE — ENDO BITE BLOCK ADULT OMNI-BLOC

## (undated) DEVICE — ENDO NDL ASPIRATION ULTRASOUND 19GA ACQUIRE M00555580

## (undated) DEVICE — LINEN TOWEL PACK X5 5464

## (undated) DEVICE — SUTURE VICRYL+ 0 27IN CT-1 UND VCP260H

## (undated) DEVICE — BLADE KNIFE SURG 20 371120

## (undated) RX ORDER — SODIUM CHLORIDE 9 MG/ML
INJECTION, SOLUTION INTRAVENOUS
Status: DISPENSED
Start: 2023-06-19

## (undated) RX ORDER — LIDOCAINE HYDROCHLORIDE 10 MG/ML
INJECTION, SOLUTION EPIDURAL; INFILTRATION; INTRACAUDAL; PERINEURAL
Status: DISPENSED
Start: 2024-03-15

## (undated) RX ORDER — SODIUM CHLORIDE 9 MG/ML
INJECTION, SOLUTION INTRAVENOUS
Status: DISPENSED
Start: 2023-12-22

## (undated) RX ORDER — EPINEPHRINE 1 MG/ML
INJECTION, SOLUTION INTRAMUSCULAR; SUBCUTANEOUS
Status: DISPENSED
Start: 2023-07-11

## (undated) RX ORDER — FENTANYL CITRATE 50 UG/ML
INJECTION, SOLUTION INTRAMUSCULAR; INTRAVENOUS
Status: DISPENSED
Start: 2023-01-20

## (undated) RX ORDER — FENTANYL CITRATE 50 UG/ML
INJECTION, SOLUTION INTRAMUSCULAR; INTRAVENOUS
Status: DISPENSED
Start: 2021-10-04

## (undated) RX ORDER — FENTANYL CITRATE 50 UG/ML
INJECTION, SOLUTION INTRAMUSCULAR; INTRAVENOUS
Status: DISPENSED
Start: 2024-03-15

## (undated) RX ORDER — GLYCOPYRROLATE 0.2 MG/ML
INJECTION, SOLUTION INTRAMUSCULAR; INTRAVENOUS
Status: DISPENSED
Start: 2023-10-13

## (undated) RX ORDER — ACETAMINOPHEN 325 MG/1
TABLET ORAL
Status: DISPENSED
Start: 2023-07-11

## (undated) RX ORDER — FENTANYL CITRATE 50 UG/ML
INJECTION, SOLUTION INTRAMUSCULAR; INTRAVENOUS
Status: DISPENSED
Start: 2023-06-19

## (undated) RX ORDER — SODIUM CHLORIDE 9 MG/ML
INJECTION, SOLUTION INTRAVENOUS
Status: DISPENSED
Start: 2024-03-15

## (undated) RX ORDER — LIDOCAINE HYDROCHLORIDE 10 MG/ML
INJECTION, SOLUTION EPIDURAL; INFILTRATION; INTRACAUDAL; PERINEURAL
Status: DISPENSED
Start: 2023-12-22

## (undated) RX ORDER — HYDROMORPHONE HYDROCHLORIDE 1 MG/ML
INJECTION, SOLUTION INTRAMUSCULAR; INTRAVENOUS; SUBCUTANEOUS
Status: DISPENSED
Start: 2023-07-11

## (undated) RX ORDER — FENTANYL CITRATE 50 UG/ML
INJECTION, SOLUTION INTRAMUSCULAR; INTRAVENOUS
Status: DISPENSED
Start: 2023-12-22

## (undated) RX ORDER — CEFAZOLIN SODIUM/WATER 2 G/20 ML
SYRINGE (ML) INTRAVENOUS
Status: DISPENSED
Start: 2023-07-11

## (undated) RX ORDER — SODIUM CHLORIDE 9 MG/ML
INJECTION, SOLUTION INTRAVENOUS
Status: DISPENSED
Start: 2023-01-20

## (undated) RX ORDER — OXYCODONE HYDROCHLORIDE 5 MG/1
TABLET ORAL
Status: DISPENSED
Start: 2023-07-11

## (undated) RX ORDER — FENTANYL CITRATE 50 UG/ML
INJECTION, SOLUTION INTRAMUSCULAR; INTRAVENOUS
Status: DISPENSED
Start: 2023-07-11

## (undated) RX ORDER — LIDOCAINE HYDROCHLORIDE 10 MG/ML
INJECTION, SOLUTION EPIDURAL; INFILTRATION; INTRACAUDAL; PERINEURAL
Status: DISPENSED
Start: 2023-06-19

## (undated) RX ORDER — LIDOCAINE HYDROCHLORIDE 10 MG/ML
INJECTION, SOLUTION EPIDURAL; INFILTRATION; INTRACAUDAL; PERINEURAL
Status: DISPENSED
Start: 2023-01-20

## (undated) RX ORDER — TRANEXAMIC ACID 650 MG/1
TABLET ORAL
Status: DISPENSED
Start: 2023-07-11